# Patient Record
Sex: MALE | Race: WHITE | NOT HISPANIC OR LATINO
[De-identification: names, ages, dates, MRNs, and addresses within clinical notes are randomized per-mention and may not be internally consistent; named-entity substitution may affect disease eponyms.]

---

## 2018-06-18 ENCOUNTER — APPOINTMENT (OUTPATIENT)
Dept: CT IMAGING | Facility: IMAGING CENTER | Age: 78
End: 2018-06-18
Payer: MEDICARE

## 2018-06-18 ENCOUNTER — OUTPATIENT (OUTPATIENT)
Dept: OUTPATIENT SERVICES | Facility: HOSPITAL | Age: 78
LOS: 1 days | End: 2018-06-18
Payer: MEDICARE

## 2018-06-18 DIAGNOSIS — Z00.8 ENCOUNTER FOR OTHER GENERAL EXAMINATION: ICD-10-CM

## 2018-06-18 DIAGNOSIS — Z98.89 OTHER SPECIFIED POSTPROCEDURAL STATES: Chronic | ICD-10-CM

## 2018-06-18 PROCEDURE — 71250 CT THORAX DX C-: CPT | Mod: 26

## 2018-06-18 PROCEDURE — 71250 CT THORAX DX C-: CPT

## 2018-07-25 ENCOUNTER — RESULT CHARGE (OUTPATIENT)
Age: 78
End: 2018-07-25

## 2018-07-26 ENCOUNTER — APPOINTMENT (OUTPATIENT)
Dept: PULMONOLOGY | Facility: CLINIC | Age: 78
End: 2018-07-26
Payer: MEDICARE

## 2018-07-26 VITALS — BODY MASS INDEX: 32.5 KG/M2 | WEIGHT: 233 LBS

## 2018-07-26 VITALS
OXYGEN SATURATION: 92 % | HEIGHT: 71 IN | HEART RATE: 60 BPM | RESPIRATION RATE: 14 BRPM | DIASTOLIC BLOOD PRESSURE: 70 MMHG | TEMPERATURE: 98 F | SYSTOLIC BLOOD PRESSURE: 114 MMHG

## 2018-07-26 DIAGNOSIS — Z87.891 PERSONAL HISTORY OF NICOTINE DEPENDENCE: ICD-10-CM

## 2018-07-26 PROCEDURE — 36415 COLL VENOUS BLD VENIPUNCTURE: CPT

## 2018-07-26 PROCEDURE — 99213 OFFICE O/P EST LOW 20 MIN: CPT | Mod: 25

## 2018-07-26 PROCEDURE — 94060 EVALUATION OF WHEEZING: CPT

## 2018-07-26 RX ORDER — ASPIRIN ENTERIC COATED TABLETS 81 MG 81 MG/1
81 TABLET, DELAYED RELEASE ORAL
Refills: 0 | Status: ACTIVE | COMMUNITY
Start: 2018-07-26

## 2018-07-27 LAB
ALBUMIN SERPL ELPH-MCNC: 4.4 G/DL
ALP BLD-CCNC: 48 U/L
ALT SERPL-CCNC: 18 U/L
ANION GAP SERPL CALC-SCNC: 14 MMOL/L
AST SERPL-CCNC: 18 U/L
BILIRUB SERPL-MCNC: 0.8 MG/DL
BUN SERPL-MCNC: 15 MG/DL
CALCIUM SERPL-MCNC: 10.3 MG/DL
CHLORIDE SERPL-SCNC: 95 MMOL/L
CHOLEST SERPL-MCNC: 152 MG/DL
CHOLEST/HDLC SERPL: 3.1 RATIO
CO2 SERPL-SCNC: 26 MMOL/L
CREAT SERPL-MCNC: 1 MG/DL
GLUCOSE SERPL-MCNC: 100 MG/DL
HDLC SERPL-MCNC: 49 MG/DL
LDLC SERPL CALC-MCNC: 81 MG/DL
POTASSIUM SERPL-SCNC: 4.4 MMOL/L
PROT SERPL-MCNC: 6.7 G/DL
SODIUM SERPL-SCNC: 135 MMOL/L
TRIGL SERPL-MCNC: 109 MG/DL

## 2018-08-04 ENCOUNTER — RECORD ABSTRACTING (OUTPATIENT)
Age: 78
End: 2018-08-04

## 2018-08-31 ENCOUNTER — APPOINTMENT (OUTPATIENT)
Dept: PULMONOLOGY | Facility: CLINIC | Age: 78
End: 2018-08-31
Payer: MEDICARE

## 2018-08-31 VITALS
OXYGEN SATURATION: 94 % | DIASTOLIC BLOOD PRESSURE: 77 MMHG | RESPIRATION RATE: 16 BRPM | SYSTOLIC BLOOD PRESSURE: 124 MMHG | WEIGHT: 233 LBS | HEART RATE: 63 BPM | BODY MASS INDEX: 32.62 KG/M2 | HEIGHT: 71 IN | TEMPERATURE: 97.7 F

## 2018-08-31 PROCEDURE — 99213 OFFICE O/P EST LOW 20 MIN: CPT | Mod: 25

## 2018-08-31 PROCEDURE — 94060 EVALUATION OF WHEEZING: CPT

## 2018-09-05 ENCOUNTER — RX CHANGE (OUTPATIENT)
Age: 78
End: 2018-09-05

## 2018-10-05 ENCOUNTER — APPOINTMENT (OUTPATIENT)
Dept: PULMONOLOGY | Facility: CLINIC | Age: 78
End: 2018-10-05
Payer: MEDICARE

## 2018-10-05 VITALS
SYSTOLIC BLOOD PRESSURE: 152 MMHG | DIASTOLIC BLOOD PRESSURE: 80 MMHG | HEIGHT: 71 IN | OXYGEN SATURATION: 91 % | BODY MASS INDEX: 32.62 KG/M2 | RESPIRATION RATE: 16 BRPM | WEIGHT: 233 LBS | TEMPERATURE: 97.5 F | HEART RATE: 74 BPM

## 2018-10-05 PROCEDURE — 99213 OFFICE O/P EST LOW 20 MIN: CPT | Mod: 25

## 2018-10-05 PROCEDURE — 90662 IIV NO PRSV INCREASED AG IM: CPT

## 2018-10-05 PROCEDURE — 94060 EVALUATION OF WHEEZING: CPT

## 2018-10-05 PROCEDURE — G0008: CPT

## 2018-10-16 ENCOUNTER — RX RENEWAL (OUTPATIENT)
Age: 78
End: 2018-10-16

## 2018-11-15 ENCOUNTER — RX RENEWAL (OUTPATIENT)
Age: 78
End: 2018-11-15

## 2018-11-16 ENCOUNTER — APPOINTMENT (OUTPATIENT)
Dept: PULMONOLOGY | Facility: CLINIC | Age: 78
End: 2018-11-16
Payer: MEDICARE

## 2018-11-16 VITALS — DIASTOLIC BLOOD PRESSURE: 98 MMHG | HEART RATE: 91 BPM | OXYGEN SATURATION: 94 % | SYSTOLIC BLOOD PRESSURE: 180 MMHG

## 2018-11-16 PROCEDURE — 99213 OFFICE O/P EST LOW 20 MIN: CPT | Mod: 25

## 2018-11-16 PROCEDURE — 94010 BREATHING CAPACITY TEST: CPT

## 2018-11-16 RX ORDER — CEFUROXIME AXETIL 500 MG/1
500 TABLET ORAL
Qty: 14 | Refills: 0 | Status: DISCONTINUED | COMMUNITY
Start: 2018-09-05 | End: 2018-11-16

## 2018-12-18 ENCOUNTER — LABORATORY RESULT (OUTPATIENT)
Age: 78
End: 2018-12-18

## 2018-12-18 ENCOUNTER — APPOINTMENT (OUTPATIENT)
Dept: PULMONOLOGY | Facility: CLINIC | Age: 78
End: 2018-12-18
Payer: MEDICARE

## 2018-12-18 ENCOUNTER — NON-APPOINTMENT (OUTPATIENT)
Age: 78
End: 2018-12-18

## 2018-12-18 VITALS
DIASTOLIC BLOOD PRESSURE: 102 MMHG | BODY MASS INDEX: 32.2 KG/M2 | HEIGHT: 71 IN | HEART RATE: 72 BPM | WEIGHT: 230 LBS | OXYGEN SATURATION: 94 % | RESPIRATION RATE: 16 BRPM | SYSTOLIC BLOOD PRESSURE: 162 MMHG

## 2018-12-18 LAB
ALBUMIN: 30
BILIRUB UR QL STRIP: NEGATIVE
CLARITY UR: CLEAR
COLLECTION METHOD: NORMAL
CREATININE: 100
GLUCOSE UR-MCNC: NEGATIVE
HCG UR QL: 0.2 EU/DL
HGB UR QL STRIP.AUTO: NEGATIVE
KETONES UR-MCNC: NEGATIVE
LEUKOCYTE ESTERASE UR QL STRIP: NEGATIVE
MICROALBUMIN/CREAT UR TEST STR-RTO: 30
NITRITE UR QL STRIP: NEGATIVE
PH UR STRIP: 8
PROT UR STRIP-MCNC: NEGATIVE
SP GR UR STRIP: 1.01

## 2018-12-18 PROCEDURE — 82044 UR ALBUMIN SEMIQUANTITATIVE: CPT | Mod: QW

## 2018-12-18 PROCEDURE — 36415 COLL VENOUS BLD VENIPUNCTURE: CPT

## 2018-12-18 PROCEDURE — 81003 URINALYSIS AUTO W/O SCOPE: CPT | Mod: QW

## 2018-12-18 PROCEDURE — 94060 EVALUATION OF WHEEZING: CPT

## 2018-12-18 PROCEDURE — 82570 ASSAY OF URINE CREATININE: CPT | Mod: QW

## 2018-12-18 PROCEDURE — 93000 ELECTROCARDIOGRAM COMPLETE: CPT

## 2018-12-18 PROCEDURE — 71046 X-RAY EXAM CHEST 2 VIEWS: CPT

## 2018-12-18 PROCEDURE — 99214 OFFICE O/P EST MOD 30 MIN: CPT | Mod: 25

## 2018-12-18 RX ORDER — NIFEDIPINE 90 MG/1
90 TABLET, EXTENDED RELEASE ORAL
Refills: 0 | Status: DISCONTINUED | COMMUNITY
End: 2018-12-18

## 2018-12-18 RX ORDER — METHYLPREDNISOLONE 4 MG/1
4 TABLET ORAL
Qty: 1 | Refills: 0 | Status: DISCONTINUED | COMMUNITY
Start: 2018-09-05 | End: 2018-12-18

## 2018-12-19 LAB
25(OH)D3 SERPL-MCNC: 26.7 NG/ML
ALBUMIN SERPL ELPH-MCNC: 4.6 G/DL
ALP BLD-CCNC: 44 U/L
ALT SERPL-CCNC: 20 U/L
ANION GAP SERPL CALC-SCNC: 12 MMOL/L
AST SERPL-CCNC: 20 U/L
BASOPHILS # BLD AUTO: 0.04 K/UL
BASOPHILS NFR BLD AUTO: 0.5 %
BILIRUB SERPL-MCNC: 0.8 MG/DL
BUN SERPL-MCNC: 17 MG/DL
CALCIUM SERPL-MCNC: 10.7 MG/DL
CHLORIDE SERPL-SCNC: 99 MMOL/L
CHOLEST SERPL-MCNC: 171 MG/DL
CHOLEST/HDLC SERPL: 3.6 RATIO
CO2 SERPL-SCNC: 27 MMOL/L
CREAT SERPL-MCNC: 0.91 MG/DL
EOSINOPHIL # BLD AUTO: 0.4 K/UL
EOSINOPHIL NFR BLD AUTO: 4.6 %
GLUCOSE SERPL-MCNC: 105 MG/DL
HBA1C MFR BLD HPLC: 6.2 %
HCT VFR BLD CALC: 50 %
HCV AB SER QL: NONREACTIVE
HCV S/CO RATIO: 0.26 S/CO
HDLC SERPL-MCNC: 47 MG/DL
HGB BLD-MCNC: 16.6 G/DL
IMM GRANULOCYTES NFR BLD AUTO: 0.3 %
LDLC SERPL CALC-MCNC: 102 MG/DL
LYMPHOCYTES # BLD AUTO: 2.33 K/UL
LYMPHOCYTES NFR BLD AUTO: 26.8 %
MAN DIFF?: NORMAL
MCHC RBC-ENTMCNC: 31.2 PG
MCHC RBC-ENTMCNC: 33.2 GM/DL
MCV RBC AUTO: 94 FL
MONOCYTES # BLD AUTO: 0.92 K/UL
MONOCYTES NFR BLD AUTO: 10.6 %
NEUTROPHILS # BLD AUTO: 4.99 K/UL
NEUTROPHILS NFR BLD AUTO: 57.2 %
PLATELET # BLD AUTO: 275 K/UL
POTASSIUM SERPL-SCNC: 4.9 MMOL/L
PROT SERPL-MCNC: 7.4 G/DL
PSA SERPL-MCNC: 1.55 NG/ML
RBC # BLD: 5.32 M/UL
RBC # FLD: 13.4 %
SODIUM SERPL-SCNC: 138 MMOL/L
T3 SERPL-MCNC: 83 NG/DL
T3RU NFR SERPL: 0.96 INDEX
T4 FREE SERPL-MCNC: 1.2 NG/DL
T4 SERPL-MCNC: 5.9 UG/DL
TRIGL SERPL-MCNC: 108 MG/DL
TSH SERPL-ACNC: 3.06 UIU/ML
WBC # FLD AUTO: 8.71 K/UL

## 2018-12-20 LAB
CALCIUM SERPL-MCNC: 10.5 MG/DL
PARATHYROID HORMONE INTACT: 18 PG/ML

## 2019-01-22 ENCOUNTER — APPOINTMENT (OUTPATIENT)
Dept: PULMONOLOGY | Facility: CLINIC | Age: 79
End: 2019-01-22
Payer: MEDICARE

## 2019-01-22 VITALS
DIASTOLIC BLOOD PRESSURE: 84 MMHG | RESPIRATION RATE: 14 BRPM | SYSTOLIC BLOOD PRESSURE: 144 MMHG | OXYGEN SATURATION: 90 % | TEMPERATURE: 98.4 F | HEART RATE: 73 BPM

## 2019-01-22 PROCEDURE — 99213 OFFICE O/P EST LOW 20 MIN: CPT | Mod: 25

## 2019-01-22 PROCEDURE — 94060 EVALUATION OF WHEEZING: CPT

## 2019-01-22 PROCEDURE — 94729 DIFFUSING CAPACITY: CPT

## 2019-01-22 PROCEDURE — 94727 GAS DIL/WSHOT DETER LNG VOL: CPT

## 2019-01-22 PROCEDURE — 83036 HEMOGLOBIN GLYCOSYLATED A1C: CPT | Mod: QW

## 2019-01-22 NOTE — PROCEDURE
[FreeTextEntry1] : Spirometry Nov 16 2018\par Moderate reduction with obstructive ventilatory impairment\par Borderline 9% response to inhaled bronchodilator at the FEV1 line mild decline in flow rates\par \par Pulmonary Six Minute Walk 5/4/2018 No desaturation\par \par CXR  PA lateral the 18th 2018\par Cardiac size normal\par Aorta uncoiled\par Evidence of pleural plaque most prominent right lower lung zone with calcification\par Some mild interstitial markings\par Calcification right midlung zone left mid to upper lung zone\par No dominant parenchymal infiltrates pleural effusions pulmonary nodules scattered scar noted left upper lobe\par No pneumothorax\par Interval change compared to chest x-ray 10/17/2017\par \par EKG December 18, 2018\par Normal sinus rhythm with occasional ectopic ventricular beats\par RSR consistent with normal variant\par \par PFT 1/22/2019\par Moderate reduction flow rfates \par OAD\par  No BD at FEV!\par Airtrapping\par Mild reduction Diffusion with mild loss  fx alveolar capillary units\par HGB16.6\par

## 2019-01-22 NOTE — PHYSICAL EXAM
[General Appearance - Well Developed] : well developed [Normal Appearance] : normal appearance [Well Groomed] : well groomed [General Appearance - Well Nourished] : well nourished [No Deformities] : no deformities [General Appearance - In No Acute Distress] : no acute distress [Normal Conjunctiva] : the conjunctiva exhibited no abnormalities [Eyelids - No Xanthelasma] : the eyelids demonstrated no xanthelasmas [Normal Oropharynx] : normal oropharynx [I] : I [Neck Appearance] : the appearance of the neck was normal [Neck Cervical Mass (___cm)] : no neck mass was observed [Jugular Venous Distention Increased] : there was no jugular-venous distention [Thyroid Diffuse Enlargement] : the thyroid was not enlarged [Heart Rate And Rhythm] : heart rate and rhythm were normal [Heart Sounds] : normal S1 and S2 [Murmurs] : no murmurs present [Arterial Pulses Normal] : the arterial pulses were normal [Edema] : no peripheral edema present [Veins - Varicosity Changes] : no varicosital changes were noted in the lower extremities [Respiration, Rhythm And Depth] : normal respiratory rhythm and effort [Exaggerated Use Of Accessory Muscles For Inspiration] : no accessory muscle use [Bowel Sounds] : normal bowel sounds [Abdomen Soft] : soft [Abdomen Tenderness] : non-tender [Abdomen Mass (___ Cm)] : no abdominal mass palpated [Abnormal Walk] : normal gait [Gait - Sufficient For Exercise Testing] : the gait was sufficient for exercise testing [Nail Clubbing] : no clubbing of the fingernails [Cyanosis, Localized] : no localized cyanosis [Petechial Hemorrhages (___cm)] : no petechial hemorrhages [Skin Color & Pigmentation] : normal skin color and pigmentation [] : no rash [No Venous Stasis] : no venous stasis [Skin Lesions] : no skin lesions [No Skin Ulcers] : no skin ulcer [No Xanthoma] : no  xanthoma was observed [Deep Tendon Reflexes (DTR)] : deep tendon reflexes were 2+ and symmetric [Sensation] : the sensory exam was normal to light touch and pinprick [Motor Exam] : the motor exam was normal [No Focal Deficits] : no focal deficits [Oriented To Time, Place, And Person] : oriented to person, place, and time [Impaired Insight] : insight and judgment were intact [Affect] : the affect was normal [FreeTextEntry1] : Minimal basilar crackles

## 2019-01-22 NOTE — DISCUSSION/SUMMARY
[FreeTextEntry1] : \par \par OAD\par  abnormal PFT\par  History asbestosis\par  COPD-Emphysema with mucoid impaction\par HTN with better control\par ANORO 1 puff Daily\par Rx updated\par f/u several months CT CHEST June 2 2019\par

## 2019-02-19 ENCOUNTER — APPOINTMENT (OUTPATIENT)
Dept: PULMONOLOGY | Facility: CLINIC | Age: 79
End: 2019-02-19

## 2019-02-26 ENCOUNTER — APPOINTMENT (OUTPATIENT)
Dept: PULMONOLOGY | Facility: CLINIC | Age: 79
End: 2019-02-26
Payer: MEDICARE

## 2019-02-26 VITALS
OXYGEN SATURATION: 93 % | HEIGHT: 71 IN | BODY MASS INDEX: 30.1 KG/M2 | HEART RATE: 69 BPM | RESPIRATION RATE: 16 BRPM | SYSTOLIC BLOOD PRESSURE: 160 MMHG | DIASTOLIC BLOOD PRESSURE: 90 MMHG | WEIGHT: 215 LBS

## 2019-02-26 LAB
GLUCOSE BLDC GLUCOMTR-MCNC: 122
HBA1C MFR BLD HPLC: 6.1

## 2019-02-26 PROCEDURE — 94060 EVALUATION OF WHEEZING: CPT

## 2019-02-26 PROCEDURE — 99213 OFFICE O/P EST LOW 20 MIN: CPT | Mod: 25

## 2019-02-26 PROCEDURE — 83036 HEMOGLOBIN GLYCOSYLATED A1C: CPT | Mod: QW

## 2019-02-26 PROCEDURE — 82962 GLUCOSE BLOOD TEST: CPT

## 2019-02-26 NOTE — DISCUSSION/SUMMARY
[FreeTextEntry1] : \par \par OAD\par  abnormal PFT\par  History asbestosis\par  COPD-Emphysema with mucoid impaction\par Pre DM\par HTN with better control\par HLD\par ANORO 1 puff Daily\par f/u several months CT CHEST June 2 2019\par Next 1 month APPT watch Lipid profile\par  june HGBA1C / watch Glucose\par low sugar diet\par

## 2019-02-26 NOTE — HISTORY OF PRESENT ILLNESS
[Stable] : are stable [None] : ~He/She~ has no significant interval events [Difficulty Breathing During Exertion] : stable dyspnea on exertion [Feelings Of Weakness On Exertion] : stable exercise intolerance [Cough] : denies coughing [Wheezing] : denies wheezing [Regional Soft Tissue Swelling Both Lower Extremities] : denies lower extremity edema [Chest Pain Or Discomfort] : denies chest pain [Fever] : denies fever [Date: ___] : was performed [unfilled] [Wt Gain ___ Lbs] : no recent weight gain [Wt Loss ___ Lbs] : no recent weight loss [Oxygen] : the patient uses no supplemental oxygen [de-identified] : emphysema with mucous plugging

## 2019-02-26 NOTE — PROCEDURE
[FreeTextEntry1] : \par Pulmonary Six Minute Walk 5/4/2018 No desaturation\par \par CXR  PA lateral  Dec 18th 2018\par Cardiac size normal\par Aorta uncoiled\par Evidence of pleural plaque most prominent right lower lung zone with calcification\par Some mild interstitial markings\par Calcification right midlung zone left mid to upper lung zone\par No dominant parenchymal infiltrates pleural effusions pulmonary nodules scattered scar noted left upper lobe\par No pneumothorax\par Interval change compared to chest x-ray 10/17/2017\par \par EKG December 18, 2018\par Normal sinus rhythm with occasional ectopic ventricular beats\par RSR consistent with normal variant\par \par PFT 1/22/2019\par Moderate reduction flow rfates \par OAD\par  No BD at FEV!\par Airtrapping\par Mild reduction Diffusion with mild loss  fx alveolar capillary units\par HGB16.6\par \par Spirometry 2/26/2019\par Moderate OAD\par  No BD at FEV1\par \par Glucose 122\par  HGBA1C 6.1

## 2019-03-06 ENCOUNTER — RX CHANGE (OUTPATIENT)
Age: 79
End: 2019-03-06

## 2019-03-06 RX ORDER — METOPROLOL SUCCINATE 100 MG/1
100 TABLET, EXTENDED RELEASE ORAL DAILY
Refills: 3 | Status: DISCONTINUED | COMMUNITY
End: 2019-03-06

## 2019-04-04 ENCOUNTER — APPOINTMENT (OUTPATIENT)
Dept: PULMONOLOGY | Facility: CLINIC | Age: 79
End: 2019-04-04
Payer: MEDICARE

## 2019-04-04 VITALS
HEIGHT: 71 IN | SYSTOLIC BLOOD PRESSURE: 139 MMHG | RESPIRATION RATE: 16 BRPM | DIASTOLIC BLOOD PRESSURE: 85 MMHG | OXYGEN SATURATION: 94 % | WEIGHT: 215 LBS | BODY MASS INDEX: 30.1 KG/M2 | HEART RATE: 63 BPM

## 2019-04-04 LAB — GLUCOSE BLDC GLUCOMTR-MCNC: 109

## 2019-04-04 PROCEDURE — 82962 GLUCOSE BLOOD TEST: CPT

## 2019-04-04 PROCEDURE — 36415 COLL VENOUS BLD VENIPUNCTURE: CPT

## 2019-04-04 PROCEDURE — 94010 BREATHING CAPACITY TEST: CPT

## 2019-04-04 PROCEDURE — 99213 OFFICE O/P EST LOW 20 MIN: CPT | Mod: 25

## 2019-04-05 LAB
CHOLEST SERPL-MCNC: 158 MG/DL
CHOLEST/HDLC SERPL: 3.2 RATIO
HDLC SERPL-MCNC: 50 MG/DL
LDLC SERPL CALC-MCNC: 85 MG/DL
TRIGL SERPL-MCNC: 117 MG/DL

## 2019-04-05 NOTE — REASON FOR VISIT
[Follow-Up] : a follow-up visit [Abnormal CT Scan] : abnormal CT Scan [COPD] : COPD [FreeTextEntry2] : HTN, HLD

## 2019-04-05 NOTE — HISTORY OF PRESENT ILLNESS
[Stable] : are stable [None] : ~He/She~ has no significant interval events [Difficulty Breathing During Exertion] : stable dyspnea on exertion [Feelings Of Weakness On Exertion] : stable exercise intolerance [Cough] : denies coughing [Wheezing] : denies wheezing [Regional Soft Tissue Swelling Both Lower Extremities] : denies lower extremity edema [Chest Pain Or Discomfort] : denies chest pain [Fever] : denies fever [Date: ___] : was performed [unfilled] [Wt Gain ___ Lbs] : no recent weight gain [Wt Loss ___ Lbs] : no recent weight loss [Oxygen] : the patient uses no supplemental oxygen [de-identified] : emphysema with mucous plugging

## 2019-04-05 NOTE — DISCUSSION/SUMMARY
[FreeTextEntry1] : \par \par OAD\par  abnormal PFT\par  History asbestosis\par  COPD-Emphysema with mucoid impaction\par Pre DM\par HTN with better control\par HLD\par ANORO 1 puff Daily\par f/u several months CT CHEST June 2 2019\par Next 1 month APPT watch Lipid profile\par  june HGBA1C / watch Glucose\par low sugar diet\par Continue statin as ordered

## 2019-04-05 NOTE — PROCEDURE
[FreeTextEntry1] : \par Pulmonary Six Minute Walk 5/4/2018 No desaturation\par \par CXR  PA lateral  Dec 18th 2018\par Cardiac size normal\par Aorta uncoiled\par Evidence of pleural plaque most prominent right lower lung zone with calcification\par Some mild interstitial markings\par Calcification right midlung zone left mid to upper lung zone\par No dominant parenchymal infiltrates pleural effusions pulmonary nodules scattered scar noted left upper lobe\par No pneumothorax\par Interval change compared to chest x-ray 10/17/2017\par \par EKG December 18, 2018\par Normal sinus rhythm with occasional ectopic ventricular beats\par RSR consistent with normal variant\par \par PFT 1/22/2019\par Moderate reduction flow rfates \par OAD\par  No BD at FEV!\par Airtrapping\par Mild reduction Diffusion with mild loss  fx alveolar capillary units\par HGB16.6\par \par Spirometry  4/4/2019\par Moderate OAD\par stable flow rates\par \par Glucose 109 /4/2019\par  HGBA1C 6.1f/u June 2019\par \par Lipid profile April 5, 2019\par Total cholesterol  158 HDL 50 LDL 85\par Triglycerides 117

## 2019-05-06 ENCOUNTER — APPOINTMENT (OUTPATIENT)
Dept: PULMONOLOGY | Facility: CLINIC | Age: 79
End: 2019-05-06
Payer: MEDICARE

## 2019-05-06 VITALS
HEART RATE: 64 BPM | RESPIRATION RATE: 13 BRPM | SYSTOLIC BLOOD PRESSURE: 145 MMHG | DIASTOLIC BLOOD PRESSURE: 83 MMHG | TEMPERATURE: 97.7 F | OXYGEN SATURATION: 92 %

## 2019-05-06 LAB — GLUCOSE BLDC GLUCOMTR-MCNC: 107

## 2019-05-06 PROCEDURE — 82962 GLUCOSE BLOOD TEST: CPT

## 2019-05-06 PROCEDURE — 94010 BREATHING CAPACITY TEST: CPT

## 2019-05-06 PROCEDURE — 99213 OFFICE O/P EST LOW 20 MIN: CPT | Mod: 25

## 2019-05-06 NOTE — DISCUSSION/SUMMARY
[FreeTextEntry1] : \par OAD\par  abnormal PFT\par  History asbestosis\par  COPD-Emphysema with mucoid impaction\par Pre DM\par HTN with better control\par HLD\par ANORO 1 puff Daily\par f/u several months CT CHEST June 2 2019\par  watch Lipid profile Q 3 months\par  june HGBA1C / watch Glucose\par low sugar diet\par

## 2019-05-06 NOTE — PHYSICAL EXAM
[General Appearance - Well Developed] : well developed [Normal Appearance] : normal appearance [Well Groomed] : well groomed [General Appearance - Well Nourished] : well nourished [No Deformities] : no deformities [General Appearance - In No Acute Distress] : no acute distress [Normal Conjunctiva] : the conjunctiva exhibited no abnormalities [Eyelids - No Xanthelasma] : the eyelids demonstrated no xanthelasmas [Normal Oropharynx] : normal oropharynx [I] : I [Neck Appearance] : the appearance of the neck was normal [Neck Cervical Mass (___cm)] : no neck mass was observed [Thyroid Diffuse Enlargement] : the thyroid was not enlarged [Jugular Venous Distention Increased] : there was no jugular-venous distention [Heart Sounds] : normal S1 and S2 [Heart Rate And Rhythm] : heart rate and rhythm were normal [Arterial Pulses Normal] : the arterial pulses were normal [Murmurs] : no murmurs present [Edema] : no peripheral edema present [Veins - Varicosity Changes] : no varicosital changes were noted in the lower extremities [Respiration, Rhythm And Depth] : normal respiratory rhythm and effort [Exaggerated Use Of Accessory Muscles For Inspiration] : no accessory muscle use [Abdomen Soft] : soft [Bowel Sounds] : normal bowel sounds [Abdomen Tenderness] : non-tender [Abdomen Mass (___ Cm)] : no abdominal mass palpated [Abnormal Walk] : normal gait [Gait - Sufficient For Exercise Testing] : the gait was sufficient for exercise testing [Nail Clubbing] : no clubbing of the fingernails [Cyanosis, Localized] : no localized cyanosis [Petechial Hemorrhages (___cm)] : no petechial hemorrhages [Skin Color & Pigmentation] : normal skin color and pigmentation [] : no rash [Skin Lesions] : no skin lesions [No Venous Stasis] : no venous stasis [No Skin Ulcers] : no skin ulcer [No Xanthoma] : no  xanthoma was observed [Sensation] : the sensory exam was normal to light touch and pinprick [Deep Tendon Reflexes (DTR)] : deep tendon reflexes were 2+ and symmetric [Motor Exam] : the motor exam was normal [No Focal Deficits] : no focal deficits [Oriented To Time, Place, And Person] : oriented to person, place, and time [Affect] : the affect was normal [Impaired Insight] : insight and judgment were intact [FreeTextEntry1] : Minimal basilar crackles

## 2019-05-06 NOTE — HISTORY OF PRESENT ILLNESS
[Stable] : are stable [None] : ~He/She~ has no significant interval events [Feelings Of Weakness On Exertion] : stable exercise intolerance [Difficulty Breathing During Exertion] : stable dyspnea on exertion [Wheezing] : denies wheezing [Cough] : denies coughing [Fever] : denies fever [Chest Pain Or Discomfort] : denies chest pain [Regional Soft Tissue Swelling Both Lower Extremities] : denies lower extremity edema [Date: ___] : was performed [unfilled] [Wt Gain ___ Lbs] : no recent weight gain [Oxygen] : the patient uses no supplemental oxygen [Wt Loss ___ Lbs] : no recent weight loss [de-identified] : emphysema with mucous plugging

## 2019-05-06 NOTE — PROCEDURE
[FreeTextEntry1] : \par Pulmonary Six Minute Walk 5/4/2018 No desaturation\par \par CXR  PA lateral  Dec 18th 2018\par Cardiac size normal\par Aorta uncoiled\par Evidence of pleural plaque most prominent right lower lung zone with calcification\par Some mild interstitial markings\par Calcification right midlung zone left mid to upper lung zone\par No dominant parenchymal infiltrates pleural effusions pulmonary nodules scattered scar noted left upper lobe\par No pneumothorax\par Interval change compared to chest x-ray 10/17/2017\par \par EKG December 18, 2018\par Normal sinus rhythm with occasional ectopic ventricular beats\par RSR consistent with normal variant\par \par PFT 1/22/2019\par Moderate reduction flow rates \par OAD\par  No BD at FEV1\par Airtrapping\par Mild reduction Diffusion with mild loss  fx alveolar capillary units\par HGB16.6\par \par Spirometry   5/6/2019\par Moderate OAD with reduction flow rates\par stable flow rates\par \par Glucose 1075/6//2019\par  HGBA1C 6.1f/u June 2019\par \par Lipid profile April 5, 2019\par Total cholesterol  158 HDL 50 LDL 85\par Triglycerides 117

## 2019-06-24 ENCOUNTER — APPOINTMENT (OUTPATIENT)
Dept: PULMONOLOGY | Facility: CLINIC | Age: 79
End: 2019-06-24
Payer: MEDICARE

## 2019-06-24 VITALS — DIASTOLIC BLOOD PRESSURE: 82 MMHG | HEART RATE: 92 BPM | SYSTOLIC BLOOD PRESSURE: 162 MMHG | OXYGEN SATURATION: 90 %

## 2019-06-24 DIAGNOSIS — N50.819 TESTICULAR PAIN, UNSPECIFIED: ICD-10-CM

## 2019-06-24 LAB
GLUCOSE BLDC GLUCOMTR-MCNC: 114
HBA1C MFR BLD HPLC: 5.9

## 2019-06-24 PROCEDURE — 99214 OFFICE O/P EST MOD 30 MIN: CPT | Mod: 25

## 2019-06-24 PROCEDURE — 94010 BREATHING CAPACITY TEST: CPT

## 2019-06-24 PROCEDURE — 82962 GLUCOSE BLOOD TEST: CPT

## 2019-06-24 PROCEDURE — 36415 COLL VENOUS BLD VENIPUNCTURE: CPT

## 2019-06-24 PROCEDURE — 83036 HEMOGLOBIN GLYCOSYLATED A1C: CPT | Mod: QW

## 2019-06-25 LAB
ALBUMIN SERPL ELPH-MCNC: 4.8 G/DL
ALP BLD-CCNC: 52 U/L
ALT SERPL-CCNC: 21 U/L
ANION GAP SERPL CALC-SCNC: 15 MMOL/L
AST SERPL-CCNC: 17 U/L
BASOPHILS # BLD AUTO: 0.04 K/UL
BASOPHILS NFR BLD AUTO: 0.4 %
BILIRUB SERPL-MCNC: 0.8 MG/DL
BUN SERPL-MCNC: 11 MG/DL
CALCIUM SERPL-MCNC: 9.9 MG/DL
CHLORIDE SERPL-SCNC: 98 MMOL/L
CO2 SERPL-SCNC: 23 MMOL/L
CREAT SERPL-MCNC: 0.81 MG/DL
EOSINOPHIL # BLD AUTO: 0.22 K/UL
EOSINOPHIL NFR BLD AUTO: 2.4 %
GLUCOSE SERPL-MCNC: 128 MG/DL
HCT VFR BLD CALC: 49.8 %
HGB BLD-MCNC: 16.2 G/DL
IMM GRANULOCYTES NFR BLD AUTO: 0.4 %
LYMPHOCYTES # BLD AUTO: 2.14 K/UL
LYMPHOCYTES NFR BLD AUTO: 23.1 %
MAN DIFF?: NORMAL
MCHC RBC-ENTMCNC: 30.5 PG
MCHC RBC-ENTMCNC: 32.5 GM/DL
MCV RBC AUTO: 93.6 FL
MONOCYTES # BLD AUTO: 0.76 K/UL
MONOCYTES NFR BLD AUTO: 8.2 %
NEUTROPHILS # BLD AUTO: 6.07 K/UL
NEUTROPHILS NFR BLD AUTO: 65.5 %
PLATELET # BLD AUTO: 314 K/UL
POTASSIUM SERPL-SCNC: 4 MMOL/L
PROT SERPL-MCNC: 7.1 G/DL
RBC # BLD: 5.32 M/UL
RBC # FLD: 12.5 %
SODIUM SERPL-SCNC: 136 MMOL/L
WBC # FLD AUTO: 9.27 K/UL

## 2019-06-25 NOTE — HISTORY OF PRESENT ILLNESS
[Stable] : are stable [None] : ~He/She~ has no significant interval events [Difficulty Breathing During Exertion] : stable dyspnea on exertion [Feelings Of Weakness On Exertion] : stable exercise intolerance [Cough] : denies coughing [Wheezing] : denies wheezing [Regional Soft Tissue Swelling Both Lower Extremities] : denies lower extremity edema [Chest Pain Or Discomfort] : denies chest pain [Fever] : denies fever [Date: ___] : was performed [unfilled] [Wt Gain ___ Lbs] : no recent weight gain [Wt Loss ___ Lbs] : no recent weight loss [Oxygen] : the patient uses no supplemental oxygen [de-identified] : emphysema with mucous plugging

## 2019-06-25 NOTE — PROCEDURE
[FreeTextEntry1] : \par Pulmonary Six Minute Walk 5/4/2018 No desaturation\par \par CXR  PA lateral  Dec 18th 2018\par Cardiac size normal\par Aorta uncoiled\par Evidence of pleural plaque most prominent right lower lung zone with calcification\par Some mild interstitial markings\par Calcification right midlung zone left mid to upper lung zone\par No dominant parenchymal infiltrates pleural effusions pulmonary nodules scattered scar noted left upper lobe\par No pneumothorax\par Interval change compared to chest x-ray 10/17/2017\par \par EKG December 18, 2018\par Normal sinus rhythm with occasional ectopic ventricular beats\par RSR consistent with normal variant\par \par PFT 1/22/2019\par Moderate reduction flow rates \par OAD\par  No BD at FEV1\par Airtrapping\par Mild reduction Diffusion with mild loss  fx alveolar capillary units\par HGB16.6\par \par  F- V Loop June 24 2019\par  Moderate reduction flow rates\par Spirometry   5/6/2019\par Moderate OAD with reduction flow rates\par stable flow rates\par \par Glucose  114\par 5/6//2019\par  HGBA1C 5.9 % improved and prior  6.1 F/U late Sept\par Blood draw\par Reviewed June 25, 2019\par CBC normal\par White blood count 9.27\par Hemoglobin 16.2 hematocrit 49.8 platelet count 214,000\par Glucose 128\par Serum electrolytes normal\par BUN 11 creatinine 0.81\par Liver function testing normal\par Serum potassium 4.0\par Serum calcium 9.9\par Lipid profile April 5, 2019\par Total cholesterol  158 HDL 50 LDL 85\par Triglycerides 117

## 2019-06-25 NOTE — PHYSICAL EXAM
[General Appearance - Well Developed] : well developed [Normal Appearance] : normal appearance [General Appearance - Well Nourished] : well nourished [Well Groomed] : well groomed [No Deformities] : no deformities [General Appearance - In No Acute Distress] : no acute distress [Normal Conjunctiva] : the conjunctiva exhibited no abnormalities [Eyelids - No Xanthelasma] : the eyelids demonstrated no xanthelasmas [Normal Oropharynx] : normal oropharynx [I] : I [Neck Appearance] : the appearance of the neck was normal [Neck Cervical Mass (___cm)] : no neck mass was observed [Jugular Venous Distention Increased] : there was no jugular-venous distention [Heart Rate And Rhythm] : heart rate and rhythm were normal [Thyroid Diffuse Enlargement] : the thyroid was not enlarged [Heart Sounds] : normal S1 and S2 [Murmurs] : no murmurs present [Arterial Pulses Normal] : the arterial pulses were normal [Edema] : no peripheral edema present [Veins - Varicosity Changes] : no varicosital changes were noted in the lower extremities [Respiration, Rhythm And Depth] : normal respiratory rhythm and effort [Exaggerated Use Of Accessory Muscles For Inspiration] : no accessory muscle use [Bowel Sounds] : normal bowel sounds [Abdomen Soft] : soft [Abdomen Tenderness] : non-tender [Abdomen Mass (___ Cm)] : no abdominal mass palpated [Abnormal Walk] : normal gait [Gait - Sufficient For Exercise Testing] : the gait was sufficient for exercise testing [Nail Clubbing] : no clubbing of the fingernails [Cyanosis, Localized] : no localized cyanosis [Petechial Hemorrhages (___cm)] : no petechial hemorrhages [Skin Color & Pigmentation] : normal skin color and pigmentation [] : no rash [No Venous Stasis] : no venous stasis [Skin Lesions] : no skin lesions [No Skin Ulcers] : no skin ulcer [No Xanthoma] : no  xanthoma was observed [Deep Tendon Reflexes (DTR)] : deep tendon reflexes were 2+ and symmetric [Sensation] : the sensory exam was normal to light touch and pinprick [Motor Exam] : the motor exam was normal [No Focal Deficits] : no focal deficits [Oriented To Time, Place, And Person] : oriented to person, place, and time [Impaired Insight] : insight and judgment were intact [Affect] : the affect was normal [Mood] : the mood was normal [FreeTextEntry1] : left groin swelling erythema edema consistent with cellulitis

## 2019-06-25 NOTE — DISCUSSION/SUMMARY
[FreeTextEntry1] : Cellulitis Groin wityh extension left Groin\par OAD\par  abnormal PFT\par  History asbestosis\par  COPD-Emphysema with mucoid impaction\par Pre DM\par HTN with better control\par HLD\par ANORO 1 puff Daily\par f/u several months CT CHEST June 2 2019\par Next 1 month APPT watch Lipid profile\par low sugar diet\par ANTIBX Augmentin BID with food\par  Topical Antibx\par  Warm soaks QID\par  f/u later this week advised\par

## 2019-06-27 ENCOUNTER — APPOINTMENT (OUTPATIENT)
Dept: PULMONOLOGY | Facility: CLINIC | Age: 79
End: 2019-06-27
Payer: MEDICARE

## 2019-06-27 VITALS
WEIGHT: 215 LBS | DIASTOLIC BLOOD PRESSURE: 96 MMHG | TEMPERATURE: 97.8 F | RESPIRATION RATE: 16 BRPM | SYSTOLIC BLOOD PRESSURE: 163 MMHG | OXYGEN SATURATION: 95 % | HEIGHT: 71 IN | HEART RATE: 92 BPM | BODY MASS INDEX: 30.1 KG/M2

## 2019-06-27 PROCEDURE — 99213 OFFICE O/P EST LOW 20 MIN: CPT

## 2019-06-27 NOTE — DISCUSSION/SUMMARY
[FreeTextEntry1] : Left groin cellulitis with some improvement\par complete antibx\par  cont antix cream\par  warm, soaks\par \par OAD\par  abnormal PFT\par  History asbestosis\par  COPD-Emphysema with mucoid impaction\par Pre DM\par HTN with better control\par HLD\par ANORO 1 puff Daily\par f/u several months CT CHEST June 2 2019\par Next 1 month APPT watch Lipid profile\par  june HGBA1C / watch Glucose\par low sugar diet\par

## 2019-06-27 NOTE — PHYSICAL EXAM
[General Appearance - Well Developed] : well developed [Well Groomed] : well groomed [Normal Appearance] : normal appearance [General Appearance - Well Nourished] : well nourished [No Deformities] : no deformities [General Appearance - In No Acute Distress] : no acute distress [Eyelids - No Xanthelasma] : the eyelids demonstrated no xanthelasmas [Normal Conjunctiva] : the conjunctiva exhibited no abnormalities [I] : I [Normal Oropharynx] : normal oropharynx [Neck Cervical Mass (___cm)] : no neck mass was observed [Neck Appearance] : the appearance of the neck was normal [Jugular Venous Distention Increased] : there was no jugular-venous distention [Thyroid Diffuse Enlargement] : the thyroid was not enlarged [Heart Sounds] : normal S1 and S2 [Heart Rate And Rhythm] : heart rate and rhythm were normal [Arterial Pulses Normal] : the arterial pulses were normal [Edema] : no peripheral edema present [Murmurs] : no murmurs present [Respiration, Rhythm And Depth] : normal respiratory rhythm and effort [Veins - Varicosity Changes] : no varicosital changes were noted in the lower extremities [Exaggerated Use Of Accessory Muscles For Inspiration] : no accessory muscle use [Bowel Sounds] : normal bowel sounds [Abdomen Tenderness] : non-tender [Abdomen Soft] : soft [Abdomen Mass (___ Cm)] : no abdominal mass palpated [Gait - Sufficient For Exercise Testing] : the gait was sufficient for exercise testing [Abnormal Walk] : normal gait [Nail Clubbing] : no clubbing of the fingernails [Cyanosis, Localized] : no localized cyanosis [Petechial Hemorrhages (___cm)] : no petechial hemorrhages [Skin Color & Pigmentation] : normal skin color and pigmentation [] : no ischemic changes [No Skin Ulcers] : no skin ulcer [Skin Lesions] : no skin lesions [No Venous Stasis] : no venous stasis [Deep Tendon Reflexes (DTR)] : deep tendon reflexes were 2+ and symmetric [No Xanthoma] : no  xanthoma was observed [Sensation] : the sensory exam was normal to light touch and pinprick [Motor Exam] : the motor exam was normal [No Focal Deficits] : no focal deficits [Impaired Insight] : insight and judgment were intact [Oriented To Time, Place, And Person] : oriented to person, place, and time [Affect] : the affect was normal [Mood] : the mood was normal [FreeTextEntry1] : left groin swelling erythema edema consistent with cellulitis with mild improvement

## 2019-06-27 NOTE — HISTORY OF PRESENT ILLNESS
[None] : ~He/She~ has no significant interval events [Stable] : are stable [Difficulty Breathing During Exertion] : stable dyspnea on exertion [Cough] : denies coughing [Feelings Of Weakness On Exertion] : stable exercise intolerance [Wheezing] : denies wheezing [Regional Soft Tissue Swelling Both Lower Extremities] : denies lower extremity edema [Chest Pain Or Discomfort] : denies chest pain [Fever] : denies fever [Date: ___] : was performed [unfilled] [Wt Loss ___ Lbs] : no recent weight loss [Oxygen] : the patient uses no supplemental oxygen [Wt Gain ___ Lbs] : no recent weight gain [FreeTextEntry1] : s/p treatment left  groin cellulitis on Augmentin with slow improvement \par Pain at left groin resolved\par No testicular pain [de-identified] : emphysema with mucous plugging

## 2019-07-08 ENCOUNTER — APPOINTMENT (OUTPATIENT)
Dept: PULMONOLOGY | Facility: CLINIC | Age: 79
End: 2019-07-08
Payer: MEDICARE

## 2019-07-08 VITALS
RESPIRATION RATE: 16 BRPM | HEART RATE: 57 BPM | OXYGEN SATURATION: 95 % | HEIGHT: 71 IN | DIASTOLIC BLOOD PRESSURE: 92 MMHG | TEMPERATURE: 97.9 F | SYSTOLIC BLOOD PRESSURE: 166 MMHG | BODY MASS INDEX: 30.1 KG/M2 | WEIGHT: 215 LBS

## 2019-07-08 PROCEDURE — 99213 OFFICE O/P EST LOW 20 MIN: CPT

## 2019-07-08 RX ORDER — AMOXICILLIN AND CLAVULANATE POTASSIUM 875; 125 MG/1; MG/1
875-125 TABLET, COATED ORAL
Qty: 14 | Refills: 1 | Status: DISCONTINUED | COMMUNITY
Start: 2019-06-24 | End: 2019-07-08

## 2019-07-08 NOTE — DISCUSSION/SUMMARY
[FreeTextEntry1] : Cellulitis groin post antibiotic with interval improvement\par OAD\par  abnormal PFT\par  History asbestosis\par  COPD-Emphysema with mucoid impaction\par Pre DM\par HTN  with borderline control\par HLD\par ANORO 1 puff Daily\par f/u several months CT CHEST  readdress\par Next 1 month APPT watch Lipid profile at f/u\par September follow-up hemoglobin A1c and continue to monitor the glucose\par low sugar diet\par

## 2019-07-08 NOTE — HISTORY OF PRESENT ILLNESS
[Stable] : are stable [None] : ~He/She~ has no significant interval events [Cough] : denies coughing [Difficulty Breathing During Exertion] : stable dyspnea on exertion [Feelings Of Weakness On Exertion] : stable exercise intolerance [Regional Soft Tissue Swelling Both Lower Extremities] : denies lower extremity edema [Chest Pain Or Discomfort] : denies chest pain [Wheezing] : denies wheezing [Fever] : denies fever [Date: ___] : was performed [unfilled] [Wt Gain ___ Lbs] : no recent weight gain [Wt Loss ___ Lbs] : no recent weight loss [Oxygen] : the patient uses no supplemental oxygen [de-identified] : emphysema with mucous plugging [FreeTextEntry1] : s/p treatment left  groin cellulitis on Augmentin with slow improvement \par Pain at left groin resolved\par No testicular pain

## 2019-07-08 NOTE — PHYSICAL EXAM
[General Appearance - Well Developed] : well developed [Normal Appearance] : normal appearance [General Appearance - Well Nourished] : well nourished [Well Groomed] : well groomed [Normal Conjunctiva] : the conjunctiva exhibited no abnormalities [General Appearance - In No Acute Distress] : no acute distress [No Deformities] : no deformities [Eyelids - No Xanthelasma] : the eyelids demonstrated no xanthelasmas [Normal Oropharynx] : normal oropharynx [I] : I [Neck Cervical Mass (___cm)] : no neck mass was observed [Neck Appearance] : the appearance of the neck was normal [Jugular Venous Distention Increased] : there was no jugular-venous distention [Murmurs] : no murmurs present [Thyroid Diffuse Enlargement] : the thyroid was not enlarged [Heart Sounds] : normal S1 and S2 [Heart Rate And Rhythm] : heart rate and rhythm were normal [Edema] : no peripheral edema present [Arterial Pulses Normal] : the arterial pulses were normal [Veins - Varicosity Changes] : no varicosital changes were noted in the lower extremities [Respiration, Rhythm And Depth] : normal respiratory rhythm and effort [Exaggerated Use Of Accessory Muscles For Inspiration] : no accessory muscle use [Bowel Sounds] : normal bowel sounds [Abdomen Soft] : soft [Abdomen Tenderness] : non-tender [Abdomen Mass (___ Cm)] : no abdominal mass palpated [Abnormal Walk] : normal gait [Gait - Sufficient For Exercise Testing] : the gait was sufficient for exercise testing [Nail Clubbing] : no clubbing of the fingernails [Petechial Hemorrhages (___cm)] : no petechial hemorrhages [Cyanosis, Localized] : no localized cyanosis [No Venous Stasis] : no venous stasis [] : no rash [Skin Color & Pigmentation] : normal skin color and pigmentation [Skin Lesions] : no skin lesions [No Xanthoma] : no  xanthoma was observed [No Skin Ulcers] : no skin ulcer [Deep Tendon Reflexes (DTR)] : deep tendon reflexes were 2+ and symmetric [Sensation] : the sensory exam was normal to light touch and pinprick [Motor Exam] : the motor exam was normal [No Focal Deficits] : no focal deficits [Oriented To Time, Place, And Person] : oriented to person, place, and time [Affect] : the affect was normal [Impaired Insight] : insight and judgment were intact [Mood] : the mood was normal [FreeTextEntry1] : left groin swelling erythema edema consistent with cellulitis with mild improvement

## 2019-08-12 ENCOUNTER — APPOINTMENT (OUTPATIENT)
Dept: PULMONOLOGY | Facility: CLINIC | Age: 79
End: 2019-08-12
Payer: MEDICARE

## 2019-08-12 VITALS
OXYGEN SATURATION: 92 % | DIASTOLIC BLOOD PRESSURE: 76 MMHG | HEART RATE: 66 BPM | RESPIRATION RATE: 12 BRPM | SYSTOLIC BLOOD PRESSURE: 128 MMHG

## 2019-08-12 LAB — GLUCOSE BLDC GLUCOMTR-MCNC: 130

## 2019-08-12 PROCEDURE — 82962 GLUCOSE BLOOD TEST: CPT

## 2019-08-12 PROCEDURE — 99213 OFFICE O/P EST LOW 20 MIN: CPT | Mod: 25

## 2019-08-12 NOTE — HISTORY OF PRESENT ILLNESS
[Stable] : are stable [None] : ~He/She~ has no significant interval events [Feelings Of Weakness On Exertion] : stable exercise intolerance [Difficulty Breathing During Exertion] : stable dyspnea on exertion [Wheezing] : denies wheezing [Cough] : denies coughing [Chest Pain Or Discomfort] : denies chest pain [Regional Soft Tissue Swelling Both Lower Extremities] : denies lower extremity edema [Fever] : denies fever [Date: ___] : was performed [unfilled] [Wt Gain ___ Lbs] : no recent weight gain [Wt Loss ___ Lbs] : no recent weight loss [Oxygen] : the patient uses no supplemental oxygen [de-identified] : emphysema with mucous plugging [FreeTextEntry1] : s/p treatment left  groin cellulitis on Augmentin with slow improvement \par Pain at left groin resolved\par No testicular pain

## 2019-08-12 NOTE — PROCEDURE
[FreeTextEntry1] : \par Pulmonary Six Minute Walk 5/4/2018 No desaturation\par \par CXR  PA lateral  Dec 18th 2018\par Cardiac size normal\par Aorta uncoiled\par Evidence of pleural plaque most prominent right lower lung zone with calcification\par Some mild interstitial markings\par Calcification right midlung zone left mid to upper lung zone\par No dominant parenchymal infiltrates pleural effusions pulmonary nodules scattered scar noted left upper lobe\par No pneumothorax\par Interval change compared to chest x-ray 10/17/2017\par \par EKG December 18, 2018\par Normal sinus rhythm with occasional ectopic ventricular beats\par RSR consistent with normal variant\par \par PFT 1/22/2019\par Moderate reduction flow rates \par OAD\par  No BD at FEV1\par Airtrapping\par Mild reduction Diffusion with mild loss  fx alveolar capillary units\par HGB16.6\par \par  F- V Loop June 24 2019\par  Moderate reduction flow rates\par Spirometry   5/6/2019\par Moderate OAD with reduction flow rates\par stable flow rates\par \par Glucose  130 8/12/2019\par 5/6//2019\par  HGBA1C 5.9 % improved and prior  6.1 F/U late Sept\par Blood draw\par Reviewed June 25, 2019\par CBC normal\par White blood count 9.27\par Hemoglobin 16.2 hematocrit 49.8 platelet count 214,000\par Glucose 128\par Serum electrolytes normal\par BUN 11 creatinine 0.81\par Liver function testing normal\par Serum potassium 4.0\par Serum calcium 9.9\par Lipid profile April 5, 2019\par Total cholesterol  158 HDL 50 LDL 85\par Triglycerides 117

## 2019-08-12 NOTE — DISCUSSION/SUMMARY
[FreeTextEntry1] : Cellulitis groin post antibiotic Resolved\par OAD\par  abnormal PFT\par  History asbestosis\par  COPD-Emphysema with mucoid impaction\par Pre DM\par HTN  with better control\par Post oral surgery this mweek can elevate blood sugar\par HLD\par ANORO 1 puff Daily\par f/u several months CT CHEST  readdress\par Next 1 month APPT watch Lipid profile at f/u\par September follow-up hemoglobin A1c and continue to monitor the glucose\par low sugar diet\par

## 2019-08-12 NOTE — PHYSICAL EXAM
[General Appearance - Well Developed] : well developed [Normal Appearance] : normal appearance [Well Groomed] : well groomed [General Appearance - Well Nourished] : well nourished [No Deformities] : no deformities [General Appearance - In No Acute Distress] : no acute distress [Normal Conjunctiva] : the conjunctiva exhibited no abnormalities [Eyelids - No Xanthelasma] : the eyelids demonstrated no xanthelasmas [Normal Oropharynx] : normal oropharynx [I] : I [Neck Cervical Mass (___cm)] : no neck mass was observed [Neck Appearance] : the appearance of the neck was normal [Jugular Venous Distention Increased] : there was no jugular-venous distention [Heart Rate And Rhythm] : heart rate and rhythm were normal [Thyroid Diffuse Enlargement] : the thyroid was not enlarged [Heart Sounds] : normal S1 and S2 [Murmurs] : no murmurs present [Edema] : no peripheral edema present [Arterial Pulses Normal] : the arterial pulses were normal [Veins - Varicosity Changes] : no varicosital changes were noted in the lower extremities [Respiration, Rhythm And Depth] : normal respiratory rhythm and effort [Exaggerated Use Of Accessory Muscles For Inspiration] : no accessory muscle use [Bowel Sounds] : normal bowel sounds [Abdomen Tenderness] : non-tender [Abdomen Soft] : soft [Abdomen Mass (___ Cm)] : no abdominal mass palpated [Abnormal Walk] : normal gait [Gait - Sufficient For Exercise Testing] : the gait was sufficient for exercise testing [Nail Clubbing] : no clubbing of the fingernails [Cyanosis, Localized] : no localized cyanosis [Petechial Hemorrhages (___cm)] : no petechial hemorrhages [] : no rash [Skin Color & Pigmentation] : normal skin color and pigmentation [Skin Lesions] : no skin lesions [No Venous Stasis] : no venous stasis [No Skin Ulcers] : no skin ulcer [No Xanthoma] : no  xanthoma was observed [Sensation] : the sensory exam was normal to light touch and pinprick [Deep Tendon Reflexes (DTR)] : deep tendon reflexes were 2+ and symmetric [No Focal Deficits] : no focal deficits [Motor Exam] : the motor exam was normal [Impaired Insight] : insight and judgment were intact [Oriented To Time, Place, And Person] : oriented to person, place, and time [Affect] : the affect was normal [Mood] : the mood was normal [FreeTextEntry1] : left groin swelling erythema edema consistent with cellulitis with mild improvement

## 2019-09-16 ENCOUNTER — APPOINTMENT (OUTPATIENT)
Dept: PULMONOLOGY | Facility: CLINIC | Age: 79
End: 2019-09-16
Payer: MEDICARE

## 2019-09-16 VITALS
RESPIRATION RATE: 16 BRPM | HEART RATE: 124 BPM | OXYGEN SATURATION: 98 % | DIASTOLIC BLOOD PRESSURE: 88 MMHG | SYSTOLIC BLOOD PRESSURE: 158 MMHG

## 2019-09-16 DIAGNOSIS — Z23 ENCOUNTER FOR IMMUNIZATION: ICD-10-CM

## 2019-09-16 LAB — GLUCOSE BLDC GLUCOMTR-MCNC: 127

## 2019-09-16 PROCEDURE — 99214 OFFICE O/P EST MOD 30 MIN: CPT | Mod: 25

## 2019-09-16 PROCEDURE — 90662 IIV NO PRSV INCREASED AG IM: CPT

## 2019-09-16 PROCEDURE — 82962 GLUCOSE BLOOD TEST: CPT

## 2019-09-16 PROCEDURE — G0008: CPT

## 2019-09-16 PROCEDURE — 94727 GAS DIL/WSHOT DETER LNG VOL: CPT

## 2019-09-16 PROCEDURE — 94060 EVALUATION OF WHEEZING: CPT

## 2019-09-16 PROCEDURE — 94729 DIFFUSING CAPACITY: CPT

## 2019-09-16 NOTE — DISCUSSION/SUMMARY
[FreeTextEntry1] : \par OAD\par  abnormal PFT\par  History asbestosis\par  COPD-Emphysema with mucoid impaction\par Pre DM\par HTN  with borderline control\par HLD\par Groin cellulitis without recurrence\par ANORO 1 puff Daily\par f/u several months CT CHEST  readdress- Ordered \par Next 1 month APPT watch Lipid profile at f/u\par September follow-up hemoglobin A1c and continue to monitor the glucose\par low sugar diet\par

## 2019-09-16 NOTE — REVIEW OF SYSTEMS
[As Noted in HPI] : as noted in HPI [Hypertension] : ~T hypertension [Negative] : Psychiatric [de-identified] : R/O Pre DM

## 2019-09-16 NOTE — PROCEDURE
[FreeTextEntry1] : Glucose 127\par \par PFT 9/16/2019\par Moderate OAD\par  Pos BD 14 % at FVC\par  Minimal airtrapping and normal TLC 95 %\par  DLCO  84 % normal  range\par  HGB 16.2\par  Stable pulmonary physiology\par \par Pulmonary Six Minute Walk 5/4/2018 No desaturation\par \par CXR  PA lateral  Dec 18th 2018\par Cardiac size normal\par Aorta uncoiled\par Evidence of pleural plaque most prominent right lower lung zone with calcification\par Some mild interstitial markings\par Calcification right midlung zone left mid to upper lung zone\par No dominant parenchymal infiltrates pleural effusions pulmonary nodules scattered scar noted left upper lobe\par No pneumothorax\par Interval change compared to chest x-ray 10/17/2017\par \par EKG December 18, 2018\par Normal sinus rhythm with occasional ectopic ventricular beats\par RSR consistent with normal variant\par \par PFT 1/22/2019\par Moderate reduction flow rates \par OAD\par  No BD at FEV1\par Airtrapping\par Mild reduction Diffusion with mild loss  fx alveolar capillary units\par HGB16.6\par \par  F- V Loop June 24 2019\par  Moderate reduction flow rates\par Spirometry   5/6/2019\par Moderate OAD with reduction flow rates\par stable flow rates\par \par Glucose  130 8/12/2019\par 5/6//2019\par  HGBA1C 5.9 % improved and prior  6.1 F/U late Sept\par Blood draw\par Reviewed June 25, 2019\par CBC normal\par White blood count 9.27\par Hemoglobin 16.2 hematocrit 49.8 platelet count 214,000\par Glucose 128\par Serum electrolytes normal\par BUN 11 creatinine 0.81\par Liver function testing normal\par Serum potassium 4.0\par Serum calcium 9.9\par Lipid profile April 5, 2019\par Total cholesterol  158 HDL 50 LDL 85\par Triglycerides 117\par \par HD Flu administered

## 2019-09-16 NOTE — PHYSICAL EXAM
[General Appearance - Well Developed] : well developed [Normal Appearance] : normal appearance [Well Groomed] : well groomed [General Appearance - Well Nourished] : well nourished [No Deformities] : no deformities [General Appearance - In No Acute Distress] : no acute distress [Normal Conjunctiva] : the conjunctiva exhibited no abnormalities [Eyelids - No Xanthelasma] : the eyelids demonstrated no xanthelasmas [Normal Oropharynx] : normal oropharynx [I] : I [Neck Appearance] : the appearance of the neck was normal [Neck Cervical Mass (___cm)] : no neck mass was observed [Thyroid Diffuse Enlargement] : the thyroid was not enlarged [Jugular Venous Distention Increased] : there was no jugular-venous distention [Heart Rate And Rhythm] : heart rate and rhythm were normal [Heart Sounds] : normal S1 and S2 [Murmurs] : no murmurs present [Arterial Pulses Normal] : the arterial pulses were normal [Edema] : no peripheral edema present [Veins - Varicosity Changes] : no varicosital changes were noted in the lower extremities [Exaggerated Use Of Accessory Muscles For Inspiration] : no accessory muscle use [Respiration, Rhythm And Depth] : normal respiratory rhythm and effort [Abdomen Soft] : soft [Bowel Sounds] : normal bowel sounds [Abdomen Tenderness] : non-tender [Abdomen Mass (___ Cm)] : no abdominal mass palpated [Abnormal Walk] : normal gait [Gait - Sufficient For Exercise Testing] : the gait was sufficient for exercise testing [Nail Clubbing] : no clubbing of the fingernails [Cyanosis, Localized] : no localized cyanosis [Petechial Hemorrhages (___cm)] : no petechial hemorrhages [] : no rash [Skin Color & Pigmentation] : normal skin color and pigmentation [Skin Lesions] : no skin lesions [No Venous Stasis] : no venous stasis [No Skin Ulcers] : no skin ulcer [No Xanthoma] : no  xanthoma was observed [Sensation] : the sensory exam was normal to light touch and pinprick [Deep Tendon Reflexes (DTR)] : deep tendon reflexes were 2+ and symmetric [No Focal Deficits] : no focal deficits [Motor Exam] : the motor exam was normal [Oriented To Time, Place, And Person] : oriented to person, place, and time [Affect] : the affect was normal [Impaired Insight] : insight and judgment were intact [Mood] : the mood was normal [FreeTextEntry1] : left groin swelling erythema edema consistent with cellulitis with mild improvement [FreeTextEntry2] : trace edema

## 2019-09-19 ENCOUNTER — APPOINTMENT (OUTPATIENT)
Dept: UROLOGY | Facility: CLINIC | Age: 79
End: 2019-09-19
Payer: MEDICARE

## 2019-09-19 VITALS
WEIGHT: 213 LBS | OXYGEN SATURATION: 93 % | BODY MASS INDEX: 29.82 KG/M2 | TEMPERATURE: 98.6 F | HEIGHT: 71 IN | DIASTOLIC BLOOD PRESSURE: 121 MMHG | HEART RATE: 114 BPM | SYSTOLIC BLOOD PRESSURE: 219 MMHG

## 2019-09-19 PROCEDURE — 99202 OFFICE O/P NEW SF 15 MIN: CPT

## 2019-09-19 NOTE — HISTORY OF PRESENT ILLNESS
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

## 2019-09-19 NOTE — PHYSICAL EXAM
[General Appearance - Well Developed] : well developed [Heart Rate And Rhythm] : Heart rate and rhythm were normal [] : no respiratory distress [Bowel Sounds] : normal bowel sounds [Abdomen Soft] : soft [Abdomen Tenderness] : non-tender [Abdomen Mass (___ Cm)] : no abdominal mass palpated [Abdomen Hernia] : no hernia was discovered [Urethral Meatus] : meatus normal [Penis Abnormality] : normal circumcised penis [Testes Tenderness] : no tenderness of the testes [Prostate Enlargement] : the prostate was not enlarged [Prostate Tenderness] : the prostate was not tender [Normal Station and Gait] : the gait and station were normal for the patient's age [Skin Color & Pigmentation] : normal skin color and pigmentation [No Focal Deficits] : no focal deficits [Mood] : the mood was normal [Inguinal Lymph Nodes Enlarged Bilaterally] : inguinal [FreeTextEntry1] : healing inguinal skin abscess with induration no erythema no tenderness

## 2019-09-19 NOTE — ASSESSMENT
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 AMA (saw a physician/midlevel provider and clinician was able to provide reasons for staying for treatment & form is signed)

## 2019-09-19 NOTE — ASSESSMENT
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

## 2019-09-20 LAB
ANION GAP SERPL CALC-SCNC: 15 MMOL/L
APPEARANCE: CLEAR
BACTERIA: NEGATIVE
BILIRUBIN URINE: NEGATIVE
BLOOD URINE: NEGATIVE
BUN SERPL-MCNC: 8 MG/DL
CALCIUM SERPL-MCNC: 10.4 MG/DL
CHLORIDE SERPL-SCNC: 99 MMOL/L
CO2 SERPL-SCNC: 23 MMOL/L
COLOR: NORMAL
CREAT SERPL-MCNC: 0.83 MG/DL
GLUCOSE QUALITATIVE U: NEGATIVE
GLUCOSE SERPL-MCNC: 123 MG/DL
HYALINE CASTS: 0 /LPF
KETONES URINE: NEGATIVE
LEUKOCYTE ESTERASE URINE: NEGATIVE
MICROSCOPIC-UA: NORMAL
NITRITE URINE: NEGATIVE
PH URINE: 7
POTASSIUM SERPL-SCNC: 4.1 MMOL/L
PROTEIN URINE: NEGATIVE
RED BLOOD CELLS URINE: 1 /HPF
SODIUM SERPL-SCNC: 137 MMOL/L
SPECIFIC GRAVITY URINE: 1.01
SQUAMOUS EPITHELIAL CELLS: 0 /HPF
UROBILINOGEN URINE: NORMAL
WHITE BLOOD CELLS URINE: 0 /HPF

## 2019-09-21 LAB — BACTERIA UR CULT: NORMAL

## 2019-09-22 ENCOUNTER — FORM ENCOUNTER (OUTPATIENT)
Age: 79
End: 2019-09-22

## 2019-09-23 ENCOUNTER — OUTPATIENT (OUTPATIENT)
Dept: OUTPATIENT SERVICES | Facility: HOSPITAL | Age: 79
LOS: 1 days | End: 2019-09-23
Payer: MEDICARE

## 2019-09-23 ENCOUNTER — APPOINTMENT (OUTPATIENT)
Dept: CT IMAGING | Facility: CLINIC | Age: 79
End: 2019-09-23
Payer: MEDICARE

## 2019-09-23 DIAGNOSIS — Z98.89 OTHER SPECIFIED POSTPROCEDURAL STATES: Chronic | ICD-10-CM

## 2019-09-23 DIAGNOSIS — J61 PNEUMOCONIOSIS DUE TO ASBESTOS AND OTHER MINERAL FIBERS: ICD-10-CM

## 2019-09-23 PROCEDURE — 74178 CT ABD&PLV WO CNTR FLWD CNTR: CPT | Mod: 26

## 2019-09-23 PROCEDURE — 74178 CT ABD&PLV WO CNTR FLWD CNTR: CPT

## 2019-09-23 PROCEDURE — 71260 CT THORAX DX C+: CPT

## 2019-09-23 PROCEDURE — 71260 CT THORAX DX C+: CPT | Mod: 26

## 2019-09-25 ENCOUNTER — RX RENEWAL (OUTPATIENT)
Age: 79
End: 2019-09-25

## 2019-10-01 ENCOUNTER — APPOINTMENT (OUTPATIENT)
Dept: UROLOGY | Facility: CLINIC | Age: 79
End: 2019-10-01
Payer: MEDICARE

## 2019-10-01 PROCEDURE — 99214 OFFICE O/P EST MOD 30 MIN: CPT

## 2019-10-14 ENCOUNTER — APPOINTMENT (OUTPATIENT)
Dept: PULMONOLOGY | Facility: CLINIC | Age: 79
End: 2019-10-14
Payer: MEDICARE

## 2019-10-14 VITALS
RESPIRATION RATE: 15 BRPM | DIASTOLIC BLOOD PRESSURE: 105 MMHG | HEIGHT: 71 IN | TEMPERATURE: 98.3 F | SYSTOLIC BLOOD PRESSURE: 190 MMHG | OXYGEN SATURATION: 93 % | HEART RATE: 128 BPM

## 2019-10-14 LAB
ALBUMIN SERPL ELPH-MCNC: 4.8 G/DL
ALP BLD-CCNC: 50 U/L
ALT SERPL-CCNC: 18 U/L
AST SERPL-CCNC: 20 U/L
BILIRUB DIRECT SERPL-MCNC: 0.2 MG/DL
BILIRUB INDIRECT SERPL-MCNC: 0.5 MG/DL
BILIRUB SERPL-MCNC: 0.6 MG/DL
CHOLEST SERPL-MCNC: 166 MG/DL
CHOLEST/HDLC SERPL: 3.7 RATIO
GLUCOSE BLDC GLUCOMTR-MCNC: 127
HBA1C MFR BLD HPLC: 6
HDLC SERPL-MCNC: 45 MG/DL
LDLC SERPL CALC-MCNC: 99 MG/DL
PROT SERPL-MCNC: 7.3 G/DL
TRIGL SERPL-MCNC: 109 MG/DL

## 2019-10-14 PROCEDURE — 94010 BREATHING CAPACITY TEST: CPT

## 2019-10-14 PROCEDURE — 99214 OFFICE O/P EST MOD 30 MIN: CPT | Mod: 25

## 2019-10-14 PROCEDURE — 82962 GLUCOSE BLOOD TEST: CPT

## 2019-10-14 PROCEDURE — 83036 HEMOGLOBIN GLYCOSYLATED A1C: CPT | Mod: QW

## 2019-10-14 PROCEDURE — 36415 COLL VENOUS BLD VENIPUNCTURE: CPT

## 2019-10-14 RX ORDER — MUPIROCIN 20 MG/G
2 OINTMENT TOPICAL 3 TIMES DAILY
Qty: 1 | Refills: 1 | Status: DISCONTINUED | COMMUNITY
Start: 2019-06-24 | End: 2019-10-14

## 2019-10-14 NOTE — HISTORY OF PRESENT ILLNESS
[None] : ~He/She~ has no significant interval events [Stable] : are stable [Difficulty Breathing During Exertion] : stable dyspnea on exertion [Feelings Of Weakness On Exertion] : stable exercise intolerance [Cough] : denies coughing [Wheezing] : denies wheezing [Regional Soft Tissue Swelling Both Lower Extremities] : denies lower extremity edema [Chest Pain Or Discomfort] : denies chest pain [Fever] : denies fever [Date: ___] : was performed [unfilled] [Wt Gain ___ Lbs] : no recent weight gain [Wt Loss ___ Lbs] : no recent weight loss [Oxygen] : the patient uses no supplemental oxygen [de-identified] : emphysema with mucous plugging

## 2019-10-14 NOTE — PHYSICAL EXAM
[General Appearance - Well Developed] : well developed [General Appearance - Well Nourished] : well nourished [Well Groomed] : well groomed [Normal Appearance] : normal appearance [No Deformities] : no deformities [General Appearance - In No Acute Distress] : no acute distress [Normal Conjunctiva] : the conjunctiva exhibited no abnormalities [Eyelids - No Xanthelasma] : the eyelids demonstrated no xanthelasmas [Neck Appearance] : the appearance of the neck was normal [I] : I [Normal Oropharynx] : normal oropharynx [Neck Cervical Mass (___cm)] : no neck mass was observed [Heart Rate And Rhythm] : heart rate and rhythm were normal [Thyroid Diffuse Enlargement] : the thyroid was not enlarged [Jugular Venous Distention Increased] : there was no jugular-venous distention [Heart Sounds] : normal S1 and S2 [Murmurs] : no murmurs present [Veins - Varicosity Changes] : no varicosital changes were noted in the lower extremities [Edema] : no peripheral edema present [Arterial Pulses Normal] : the arterial pulses were normal [Respiration, Rhythm And Depth] : normal respiratory rhythm and effort [Exaggerated Use Of Accessory Muscles For Inspiration] : no accessory muscle use [Abdomen Tenderness] : non-tender [Bowel Sounds] : normal bowel sounds [Abdomen Soft] : soft [Abdomen Mass (___ Cm)] : no abdominal mass palpated [Gait - Sufficient For Exercise Testing] : the gait was sufficient for exercise testing [Abnormal Walk] : normal gait [Nail Clubbing] : no clubbing of the fingernails [Cyanosis, Localized] : no localized cyanosis [Petechial Hemorrhages (___cm)] : no petechial hemorrhages [Skin Color & Pigmentation] : normal skin color and pigmentation [] : no rash [No Venous Stasis] : no venous stasis [Skin Lesions] : no skin lesions [No Skin Ulcers] : no skin ulcer [No Xanthoma] : no  xanthoma was observed [Deep Tendon Reflexes (DTR)] : deep tendon reflexes were 2+ and symmetric [Sensation] : the sensory exam was normal to light touch and pinprick [Motor Exam] : the motor exam was normal [No Focal Deficits] : no focal deficits [Oriented To Time, Place, And Person] : oriented to person, place, and time [Affect] : the affect was normal [Impaired Insight] : insight and judgment were intact [Mood] : the mood was normal [FreeTextEntry1] : Minimal basilar crackles [FreeTextEntry2] : trace edema

## 2019-10-14 NOTE — REVIEW OF SYSTEMS
[As Noted in HPI] : as noted in HPI [Hypertension] : ~T hypertension [Negative] : Psychiatric [FreeTextEntry7] : abnormal CT  ABD  focal  wall thickening [de-identified] : R/O Pre DM [FreeTextEntry5] : Hematuria  with  cystoscopy

## 2019-10-14 NOTE — DISCUSSION/SUMMARY
[FreeTextEntry1] : Hematuria -   noted scheduled this  week cystoscopy/cytology\par Abnl CT  ABD  rl colonic  neoplasm- GI  consult  Dr Thomas\par OAD\par  abnormal PFT\par  History asbestosis\par  COPD-Emphysema with mucoid impaction\par Pre DM\par HTN  with borderline control-  maitaim Rx  adherence\par HLD\par Groin cellulitis without recurrence\par ANORO 1 puff Daily\par f/u several months CT CHEST  readdress- Ordered \par Next 1 month APPT watch Lipid profile at f/u\par December follow-up hemoglobin A1c and continue to monitor the glucose\par low sugar diet\par

## 2019-10-14 NOTE — REASON FOR VISIT
[Follow-Up] : a follow-up visit [COPD] : COPD [FreeTextEntry2] : Asbestosis, HTN,HLD, PreDM Hematuria

## 2019-10-14 NOTE — PROCEDURE
[FreeTextEntry1] : Hemoglobin A1c 6.0\par Glucose 127\par \par Spirometry without bronchodilator October 14, 2019\par Moderate reduction in flow rates with an obstructive pattern with FEV1 FVC 69\par \par PFT 9/16/2019\par Moderate OAD\par  Pos BD 14 % at FVC\par  Minimal airtrapping and normal TLC 95 %\par  DLCO  84 % normal  range\par  HGB 16.2\par  Stable pulmonary physiology\par \par Pulmonary Six Minute Walk 5/4/2018 No desaturation\par \par CXR  PA lateral  Dec 18th 2018\par Cardiac size normal\par Aorta uncoiled\par Evidence of pleural plaque most prominent right lower lung zone with calcification\par Some mild interstitial markings\par Calcification right midlung zone left mid to upper lung zone\par No dominant parenchymal infiltrates pleural effusions pulmonary nodules scattered scar noted left upper lobe\par No pneumothorax\par Interval change compared to chest x-ray 10/17/2017\par \par EKG December 18, 2018\par Normal sinus rhythm with occasional ectopic ventricular beats\par RSR consistent with normal variant\par \par .\par \par Glucose  130 8/12/2019\par 5/6//2019\par  HGBA1C 5.9 % improved and prior  6.1 F/U late Sept\par Blood draw\par Data  Review  lab  review  Oct  14  2019\par Lipid panel\par Cholesterol 166 HDL 45 LDL 99\par Liver function testing normal\par Reviewed June 25, 2019\par CBC normal\par White blood count 9.27\par Hemoglobin 16.2 hematocrit 49.8 platelet count 214,000\par Glucose 128\par Serum electrolytes normal\par BUN 11 creatinine 0.81\par Liver function testing normal\par Serum potassium 4.0\par Serum calcium 9.9\par Lipid profile April 5, 2019\par Total cholesterol  158 HDL 50 LDL 85\par Triglycerides 117\par \par HD Flu administered sept 2019

## 2019-10-21 ENCOUNTER — APPOINTMENT (OUTPATIENT)
Dept: UROLOGY | Facility: CLINIC | Age: 79
End: 2019-10-21
Payer: MEDICARE

## 2019-10-21 ENCOUNTER — OUTPATIENT (OUTPATIENT)
Dept: OUTPATIENT SERVICES | Facility: HOSPITAL | Age: 79
LOS: 1 days | End: 2019-10-21
Payer: MEDICARE

## 2019-10-21 VITALS
HEART RATE: 128 BPM | SYSTOLIC BLOOD PRESSURE: 228 MMHG | WEIGHT: 213 LBS | TEMPERATURE: 97.8 F | HEIGHT: 71 IN | DIASTOLIC BLOOD PRESSURE: 127 MMHG | BODY MASS INDEX: 29.82 KG/M2

## 2019-10-21 VITALS — DIASTOLIC BLOOD PRESSURE: 108 MMHG | HEART RATE: 137 BPM | SYSTOLIC BLOOD PRESSURE: 239 MMHG

## 2019-10-21 DIAGNOSIS — R31.9 HEMATURIA, UNSPECIFIED: ICD-10-CM

## 2019-10-21 DIAGNOSIS — R35.0 FREQUENCY OF MICTURITION: ICD-10-CM

## 2019-10-21 DIAGNOSIS — Z98.89 OTHER SPECIFIED POSTPROCEDURAL STATES: Chronic | ICD-10-CM

## 2019-10-21 PROCEDURE — 52000 CYSTOURETHROSCOPY: CPT

## 2019-10-24 DIAGNOSIS — R31.9 HEMATURIA, UNSPECIFIED: ICD-10-CM

## 2019-10-29 LAB — URINE CYTOLOGY: NORMAL

## 2019-11-15 ENCOUNTER — APPOINTMENT (OUTPATIENT)
Dept: PULMONOLOGY | Facility: CLINIC | Age: 79
End: 2019-11-15

## 2019-11-19 ENCOUNTER — APPOINTMENT (OUTPATIENT)
Dept: PULMONOLOGY | Facility: CLINIC | Age: 79
End: 2019-11-19
Payer: MEDICARE

## 2019-11-19 VITALS
HEIGHT: 71 IN | SYSTOLIC BLOOD PRESSURE: 170 MMHG | HEART RATE: 62 BPM | TEMPERATURE: 97.6 F | DIASTOLIC BLOOD PRESSURE: 85 MMHG | OXYGEN SATURATION: 94 % | WEIGHT: 213 LBS | BODY MASS INDEX: 29.82 KG/M2

## 2019-11-19 VITALS — SYSTOLIC BLOOD PRESSURE: 140 MMHG | DIASTOLIC BLOOD PRESSURE: 80 MMHG

## 2019-11-19 LAB — GLUCOSE BLDC GLUCOMTR-MCNC: 115

## 2019-11-19 PROCEDURE — 99213 OFFICE O/P EST LOW 20 MIN: CPT | Mod: 25

## 2019-11-19 PROCEDURE — 82962 GLUCOSE BLOOD TEST: CPT

## 2019-11-19 PROCEDURE — 36415 COLL VENOUS BLD VENIPUNCTURE: CPT

## 2019-11-19 PROCEDURE — 94060 EVALUATION OF WHEEZING: CPT

## 2019-11-20 LAB
CHOLEST SERPL-MCNC: 163 MG/DL
CHOLEST/HDLC SERPL: 3.3 RATIO
HDLC SERPL-MCNC: 50 MG/DL
LDLC SERPL CALC-MCNC: 96 MG/DL
TRIGL SERPL-MCNC: 87 MG/DL

## 2019-11-20 NOTE — HISTORY OF PRESENT ILLNESS
[None] : ~He/She~ has no significant interval events [Stable] : are stable [Difficulty Breathing During Exertion] : stable dyspnea on exertion [Feelings Of Weakness On Exertion] : stable exercise intolerance [Wheezing] : denies wheezing [Cough] : denies coughing [Regional Soft Tissue Swelling Both Lower Extremities] : denies lower extremity edema [Chest Pain Or Discomfort] : denies chest pain [Fever] : denies fever [Date: ___] : was performed [unfilled] [Wt Loss ___ Lbs] : no recent weight loss [Oxygen] : the patient uses no supplemental oxygen [Wt Gain ___ Lbs] : no recent weight gain [de-identified] : emphysema with mucous plugging [FreeTextEntry1] :  cystoscopy-  negative\par will  schedule  colonoscopy

## 2019-11-20 NOTE — REVIEW OF SYSTEMS
[Hypertension] : ~T hypertension [As Noted in HPI] : as noted in HPI [Negative] : Psychiatric [FreeTextEntry7] : abnormal CT  ABD  focal  wall thickening [FreeTextEntry5] : Hematuria  with  cystoscopy [de-identified] : R/O Pre DM

## 2019-11-20 NOTE — PHYSICAL EXAM
[General Appearance - Well Developed] : well developed [Normal Appearance] : normal appearance [Well Groomed] : well groomed [General Appearance - Well Nourished] : well nourished [No Deformities] : no deformities [General Appearance - In No Acute Distress] : no acute distress [Normal Conjunctiva] : the conjunctiva exhibited no abnormalities [Eyelids - No Xanthelasma] : the eyelids demonstrated no xanthelasmas [Normal Oropharynx] : normal oropharynx [I] : I [Neck Appearance] : the appearance of the neck was normal [Jugular Venous Distention Increased] : there was no jugular-venous distention [Neck Cervical Mass (___cm)] : no neck mass was observed [Thyroid Diffuse Enlargement] : the thyroid was not enlarged [Heart Sounds] : normal S1 and S2 [Heart Rate And Rhythm] : heart rate and rhythm were normal [Murmurs] : no murmurs present [Arterial Pulses Normal] : the arterial pulses were normal [Edema] : no peripheral edema present [Veins - Varicosity Changes] : no varicosital changes were noted in the lower extremities [Respiration, Rhythm And Depth] : normal respiratory rhythm and effort [Exaggerated Use Of Accessory Muscles For Inspiration] : no accessory muscle use [Bowel Sounds] : normal bowel sounds [Abdomen Soft] : soft [Abdomen Tenderness] : non-tender [Abdomen Mass (___ Cm)] : no abdominal mass palpated [Abnormal Walk] : normal gait [Gait - Sufficient For Exercise Testing] : the gait was sufficient for exercise testing [Cyanosis, Localized] : no localized cyanosis [Nail Clubbing] : no clubbing of the fingernails [Petechial Hemorrhages (___cm)] : no petechial hemorrhages [] : no rash [Skin Color & Pigmentation] : normal skin color and pigmentation [Skin Lesions] : no skin lesions [No Venous Stasis] : no venous stasis [No Skin Ulcers] : no skin ulcer [Deep Tendon Reflexes (DTR)] : deep tendon reflexes were 2+ and symmetric [No Xanthoma] : no  xanthoma was observed [Motor Exam] : the motor exam was normal [Sensation] : the sensory exam was normal to light touch and pinprick [No Focal Deficits] : no focal deficits [Oriented To Time, Place, And Person] : oriented to person, place, and time [Impaired Insight] : insight and judgment were intact [Affect] : the affect was normal [Mood] : the mood was normal [FreeTextEntry1] : left groin swelling erythema edema consistent with cellulitis with mild improvement [FreeTextEntry2] : trace edema

## 2019-11-20 NOTE — PROCEDURE
[FreeTextEntry1] : Hemoglobin A1c 6.0 Oct  14  2019\par Glucose 127\par Spirometry November 19, 2019\par Mild- moderate  reduction in flow rates\par Mild obstructive ventilatory impairment\par \par PFT 9/16/2019\par Moderate OAD\par  Pos BD 14 % at FVC\par  Minimal airtrapping and normal TLC 95 %\par  DLCO  84 % normal  range\par  HGB 16.2\par  Stable pulmonary physiology\par \par Pulmonary Six Minute Walk 5/4/2018 No desaturation\par \par CXR  PA lateral  Dec 18th 2018\par Cardiac size normal\par Aorta uncoiled\par Evidence of pleural plaque most prominent right lower lung zone with calcification\par Some mild interstitial markings\par Calcification right midlung zone left mid to upper lung zone\par No dominant parenchymal infiltrates pleural effusions pulmonary nodules scattered scar noted left upper lobe\par No pneumothorax\par Interval change compared to chest x-ray 10/17/2017\par \par EKG December 18, 2018\par Normal sinus rhythm with occasional ectopic ventricular beats\par RSR consistent with normal variant\par \par Blood draw\par Data review November 20, 2019\par Lipid profile\par Cholesterol 163\par HDL 50\par LDL 96\par Triglycerides 87\par \par Data  Review  lab  review  Oct  14  2019\par Lipid panel\par Cholesterol 166 HDL 45 LDL 99\par Liver function testing normal\par Reviewed June 25, 2019\par CBC normal\par White blood count 9.27\par Hemoglobin 16.2 hematocrit 49.8 platelet count 214,000\par Glucose 128\par Serum electrolytes normal\par BUN 11 creatinine 0.81\par Liver function testing normal\par Serum potassium 4.0\par Serum calcium 9.9\par Lipid profile April 5, 2019\par Total cholesterol  158 HDL 50 LDL 85\par Triglycerides 117\par \par HD Flu administered sept 2019

## 2019-11-20 NOTE — DISCUSSION/SUMMARY
[FreeTextEntry1] : \par OAD\par  abnormal PFT\par  History asbestosis\par  COPD-Emphysema with mucoid impaction\par Pre DM\par HTN  with borderline control\par HLD\par Groin cellulitis without recurrence\par  hematuria  with  negative  cystoscopy  and  neg urine  cytology\par ANORO 1 puff Daily\par f/u several months CT CHEST  readdress- Sept 2020\par  watch Lipid profile at f/u\par  follow-up hemoglobin A1c and continue to monitor the glucose\par low sugar diet\par

## 2019-11-26 ENCOUNTER — RX CHANGE (OUTPATIENT)
Age: 79
End: 2019-11-26

## 2019-11-27 ENCOUNTER — RX CHANGE (OUTPATIENT)
Age: 79
End: 2019-11-27

## 2019-12-19 ENCOUNTER — APPOINTMENT (OUTPATIENT)
Dept: PULMONOLOGY | Facility: CLINIC | Age: 79
End: 2019-12-19
Payer: MEDICARE

## 2019-12-19 VITALS
WEIGHT: 213 LBS | OXYGEN SATURATION: 94 % | BODY MASS INDEX: 29.82 KG/M2 | HEIGHT: 71 IN | RESPIRATION RATE: 16 BRPM | DIASTOLIC BLOOD PRESSURE: 93 MMHG | SYSTOLIC BLOOD PRESSURE: 153 MMHG | HEART RATE: 68 BPM

## 2019-12-19 PROCEDURE — 94060 EVALUATION OF WHEEZING: CPT

## 2019-12-19 PROCEDURE — 99213 OFFICE O/P EST LOW 20 MIN: CPT | Mod: 25

## 2019-12-19 PROCEDURE — 71046 X-RAY EXAM CHEST 2 VIEWS: CPT

## 2019-12-19 NOTE — PHYSICAL EXAM
[General Appearance - Well Developed] : well developed [Normal Appearance] : normal appearance [Well Groomed] : well groomed [General Appearance - Well Nourished] : well nourished [No Deformities] : no deformities [General Appearance - In No Acute Distress] : no acute distress [Normal Conjunctiva] : the conjunctiva exhibited no abnormalities [Eyelids - No Xanthelasma] : the eyelids demonstrated no xanthelasmas [Normal Oropharynx] : normal oropharynx [I] : I [Neck Appearance] : the appearance of the neck was normal [Thyroid Diffuse Enlargement] : the thyroid was not enlarged [Jugular Venous Distention Increased] : there was no jugular-venous distention [Neck Cervical Mass (___cm)] : no neck mass was observed [Murmurs] : no murmurs present [Heart Sounds] : normal S1 and S2 [Heart Rate And Rhythm] : heart rate and rhythm were normal [Veins - Varicosity Changes] : no varicosital changes were noted in the lower extremities [Arterial Pulses Normal] : the arterial pulses were normal [Edema] : no peripheral edema present [Respiration, Rhythm And Depth] : normal respiratory rhythm and effort [Bowel Sounds] : normal bowel sounds [Exaggerated Use Of Accessory Muscles For Inspiration] : no accessory muscle use [Abdomen Tenderness] : non-tender [Abdomen Soft] : soft [Abdomen Mass (___ Cm)] : no abdominal mass palpated [Abnormal Walk] : normal gait [Nail Clubbing] : no clubbing of the fingernails [Gait - Sufficient For Exercise Testing] : the gait was sufficient for exercise testing [Petechial Hemorrhages (___cm)] : no petechial hemorrhages [Cyanosis, Localized] : no localized cyanosis [Skin Color & Pigmentation] : normal skin color and pigmentation [] : no rash [No Venous Stasis] : no venous stasis [Skin Lesions] : no skin lesions [No Xanthoma] : no  xanthoma was observed [No Skin Ulcers] : no skin ulcer [Sensation] : the sensory exam was normal to light touch and pinprick [Deep Tendon Reflexes (DTR)] : deep tendon reflexes were 2+ and symmetric [No Focal Deficits] : no focal deficits [Motor Exam] : the motor exam was normal [Oriented To Time, Place, And Person] : oriented to person, place, and time [Affect] : the affect was normal [Impaired Insight] : insight and judgment were intact [Mood] : the mood was normal [FreeTextEntry1] : left groin swelling erythema edema consistent with cellulitis with mild improvement [FreeTextEntry2] : trace edema

## 2019-12-19 NOTE — PROCEDURE
[FreeTextEntry1] : Hemoglobin A1c 6.0 Oct  14  2019\par Glucose 127\par Spirometry  12/19/19\par Mild- moderate  reduction in flow rates\par  obstructive ventilatory impairment and with reduced FVC cannot exclude restrictive component\par \par Chest x-ray PA lateral December 19, 2019\par Indication COPD-asbestosis\par Cardiac size normal\par " Aortic\par Dense calcified pleural plaque right hemidiaphragm\par COPD changes\par No significant interstitial changes\par No dominant pulmonary nodules parenchymal infiltrates pleural effusions\par Change compared to chest x-ray of December 18, 2018\par \par PFT 9/16/2019\par Moderate OAD\par  Pos BD 14 % at FVC\par  Minimal airtrapping and normal TLC 95 %\par  DLCO  84 % normal  range\par  HGB 16.2\par  Stable pulmonary physiology\par \par Pulmonary Six Minute Walk 5/4/2018 No desaturation\par \par CXR  PA lateral  Dec 18th 2018\par Cardiac size normal\par Aorta uncoiled\par Evidence of pleural plaque most prominent right lower lung zone with calcification\par Some mild interstitial markings\par Calcification right midlung zone left mid to upper lung zone\par No dominant parenchymal infiltrates pleural effusions pulmonary nodules scattered scar noted left upper lobe\par No pneumothorax\par Interval change compared to chest x-ray 10/17/2017\par \par EKG December 18, 2018\par Normal sinus rhythm with occasional ectopic ventricular beats\par RSR consistent with normal variant\par \par Blood draw\par Data review November 20, 2019\par Lipid profile\par Cholesterol 163\par HDL 50\par LDL 96\par Triglycerides 87\par \par Data  Review  lab  review  Oct  14  2019\par Lipid panel\par Cholesterol 166 HDL 45 LDL 99\par Liver function testing normal\par Reviewed June 25, 2019\par CBC normal\par White blood count 9.27\par Hemoglobin 16.2 hematocrit 49.8 platelet count 214,000\par Glucose 128\par Serum electrolytes normal\par BUN 11 creatinine 0.81\par Liver function testing normal\par Serum potassium 4.0\par Serum calcium 9.9\par Lipid profile April 5, 2019\par Total cholesterol  158 HDL 50 LDL 85\par Triglycerides 117\par \par HD Flu administered sept 2019

## 2019-12-19 NOTE — DISCUSSION/SUMMARY
[FreeTextEntry1] : \par OAD\par  abnormal PFT\par  History asbestosis\par  COPD-Emphysema with mucoid impaction\par Pre DM\par HTN  with borderline control\par HLD\par Groin cellulitis without recurrence\par ANORO 1 puff Daily\par f/u several months CT CHEST  readdress-  Sept 2020\par low sugar diet\par Next visit well Visit with EKG

## 2019-12-19 NOTE — HISTORY OF PRESENT ILLNESS
[Stable] : are stable [None] : ~He/She~ has no significant interval events [Difficulty Breathing During Exertion] : stable dyspnea on exertion [Feelings Of Weakness On Exertion] : stable exercise intolerance [Cough] : denies coughing [Wheezing] : denies wheezing [Regional Soft Tissue Swelling Both Lower Extremities] : denies lower extremity edema [Chest Pain Or Discomfort] : denies chest pain [Fever] : denies fever [Date: ___] : was performed [unfilled] [Wt Gain ___ Lbs] : no recent weight gain [Wt Loss ___ Lbs] : no recent weight loss [Oxygen] : the patient uses no supplemental oxygen [de-identified] : emphysema with mucous plugging [FreeTextEntry1] :  cystoscopy-  negative\par will  schedule  colonoscopy

## 2019-12-19 NOTE — REVIEW OF SYSTEMS
[Hypertension] : ~T hypertension [As Noted in HPI] : as noted in HPI [Negative] : Psychiatric [FreeTextEntry7] : abnormal CT  ABD  focal  wall thickening [de-identified] : R/O Pre DM [FreeTextEntry5] : Hematuria  with  cystoscopy

## 2020-01-06 ENCOUNTER — RX RENEWAL (OUTPATIENT)
Age: 80
End: 2020-01-06

## 2020-01-17 ENCOUNTER — NON-APPOINTMENT (OUTPATIENT)
Age: 80
End: 2020-01-17

## 2020-01-17 ENCOUNTER — APPOINTMENT (OUTPATIENT)
Dept: PULMONOLOGY | Facility: CLINIC | Age: 80
End: 2020-01-17
Payer: MEDICARE

## 2020-01-17 VITALS
HEART RATE: 61 BPM | WEIGHT: 213 LBS | BODY MASS INDEX: 29.82 KG/M2 | SYSTOLIC BLOOD PRESSURE: 150 MMHG | TEMPERATURE: 97.5 F | DIASTOLIC BLOOD PRESSURE: 80 MMHG | HEIGHT: 71 IN | OXYGEN SATURATION: 94 %

## 2020-01-17 LAB
GLUCOSE BLDC GLUCOMTR-MCNC: 107
HBA1C MFR BLD HPLC: 6

## 2020-01-17 PROCEDURE — 83036 HEMOGLOBIN GLYCOSYLATED A1C: CPT | Mod: QW

## 2020-01-17 PROCEDURE — 99214 OFFICE O/P EST MOD 30 MIN: CPT | Mod: 25

## 2020-01-17 PROCEDURE — 82962 GLUCOSE BLOOD TEST: CPT

## 2020-01-17 PROCEDURE — 93000 ELECTROCARDIOGRAM COMPLETE: CPT

## 2020-01-17 PROCEDURE — 94060 EVALUATION OF WHEEZING: CPT

## 2020-01-17 NOTE — DISCUSSION/SUMMARY
[FreeTextEntry1] : mild lower  extremity edema\par OAD\par  abnormal PFT\par  History asbestosis\par  COPD-Emphysema with mucoid impaction\par Pre DM\par HTN  with borderline control\par HLD\par Groin cellulitis without recurrence\par ANORO 1 puff Daily\par f/u several months CT CHEST  readdress- Ordered \par Next 1 month APPT watch Lipid profile at f/u\par September follow-up hemoglobin A1c and continue to monitor the glucose\par low sugar diet\par make sure von HCTZ\par  leg  elevation\par repeat EKG

## 2020-01-17 NOTE — PROCEDURE
[FreeTextEntry1] : Hemoglobin A1c 6.0  Jan 17 2020\par Glucose  107\par Spirometry  1/17/2020\par  moderate  reduction in flow rates\par  obstructive ventilatory impairment and with reduced FVC cannot exclude restrictive component\par Pos BD at FVC 12 %\par \par EKG 1/16/20\par  atrial  tachycardia\par  1 st degree AV block\par Chest x-ray PA lateral December 19, 2019\par Indication COPD-asbestosis\par Cardiac size normal\par " Aortic\par Dense calcified pleural plaque right hemidiaphragm\par COPD changes\par No significant interstitial changes\par No dominant pulmonary nodules parenchymal infiltrates pleural effusions\par Change compared to chest x-ray of December 18, 2018\par \par PFT 9/16/2019\par Moderate OAD\par  Pos BD 14 % at FVC\par  Minimal airtrapping and normal TLC 95 %\par  DLCO  84 % normal  range\par  HGB 16.2\par  Stable pulmonary physiology\par \par Pulmonary Six Minute Walk 5/4/2018 No desaturation\par \par CXR  PA lateral  Dec 18th 2018\par Cardiac size normal\par Aorta uncoiled\par Evidence of pleural plaque most prominent right lower lung zone with calcification\par Some mild interstitial markings\par Calcification right midlung zone left mid to upper lung zone\par No dominant parenchymal infiltrates pleural effusions pulmonary nodules scattered scar noted left upper lobe\par No pneumothorax\par Interval change compared to chest x-ray 10/17/2017\par \par EKG December 18, 2018\par Normal sinus rhythm with occasional ectopic ventricular beats\par RSR consistent with normal variant\par \par Blood draw\par Data review November 20, 2019\par Lipid profile\par Cholesterol 163\par HDL 50\par LDL 96\par Triglycerides 87\par \par Data  Review  lab  review  Oct  14  2019\par Lipid panel\par Cholesterol 166 HDL 45 LDL 99\par Liver function testing normal\par Reviewed June 25, 2019\par CBC normal\par White blood count 9.27\par Hemoglobin 16.2 hematocrit 49.8 platelet count 214,000\par Glucose 128\par Serum electrolytes normal\par BUN 11 creatinine 0.81\par Liver function testing normal\par Serum potassium 4.0\par Serum calcium 9.9\par Lipid profile April 5, 2019\par Total cholesterol  158 HDL 50 LDL 85\par Triglycerides 117\par \par HD Flu administered sept 2019

## 2020-01-17 NOTE — REVIEW OF SYSTEMS
[Hypertension] : ~T hypertension [As Noted in HPI] : as noted in HPI [Negative] : Psychiatric [FreeTextEntry7] : abnormal CT  ABD  focal  wall thickening [de-identified] : R/O Pre DM [FreeTextEntry5] : Hematuria  with  cystoscopy

## 2020-01-17 NOTE — HISTORY OF PRESENT ILLNESS
[None] : ~He/She~ has no significant interval events [Stable] : are stable [Feelings Of Weakness On Exertion] : stable exercise intolerance [Difficulty Breathing During Exertion] : stable dyspnea on exertion [Wheezing] : denies wheezing [Cough] : denies coughing [Regional Soft Tissue Swelling Both Lower Extremities] : denies lower extremity edema [Chest Pain Or Discomfort] : denies chest pain [Fever] : denies fever [Date: ___] : was performed [unfilled] [Wt Loss ___ Lbs] : no recent weight loss [Wt Gain ___ Lbs] : no recent weight gain [Oxygen] : the patient uses no supplemental oxygen [de-identified] : emphysema with mucous plugging [FreeTextEntry1] :  cystoscopy-  negative\par will  schedule  colonoscopy

## 2020-01-17 NOTE — PHYSICAL EXAM
[General Appearance - Well Developed] : well developed [Normal Appearance] : normal appearance [Well Groomed] : well groomed [General Appearance - Well Nourished] : well nourished [General Appearance - In No Acute Distress] : no acute distress [No Deformities] : no deformities [Eyelids - No Xanthelasma] : the eyelids demonstrated no xanthelasmas [Normal Conjunctiva] : the conjunctiva exhibited no abnormalities [Normal Oropharynx] : normal oropharynx [I] : I [Neck Appearance] : the appearance of the neck was normal [Jugular Venous Distention Increased] : there was no jugular-venous distention [Neck Cervical Mass (___cm)] : no neck mass was observed [Heart Rate And Rhythm] : heart rate and rhythm were normal [Thyroid Diffuse Enlargement] : the thyroid was not enlarged [Murmurs] : no murmurs present [Heart Sounds] : normal S1 and S2 [Arterial Pulses Normal] : the arterial pulses were normal [Edema] : no peripheral edema present [Veins - Varicosity Changes] : no varicosital changes were noted in the lower extremities [Respiration, Rhythm And Depth] : normal respiratory rhythm and effort [Exaggerated Use Of Accessory Muscles For Inspiration] : no accessory muscle use [Bowel Sounds] : normal bowel sounds [Abdomen Soft] : soft [Abdomen Tenderness] : non-tender [Abdomen Mass (___ Cm)] : no abdominal mass palpated [Abnormal Walk] : normal gait [Gait - Sufficient For Exercise Testing] : the gait was sufficient for exercise testing [Nail Clubbing] : no clubbing of the fingernails [Cyanosis, Localized] : no localized cyanosis [Petechial Hemorrhages (___cm)] : no petechial hemorrhages [] : no rash [Skin Color & Pigmentation] : normal skin color and pigmentation [No Venous Stasis] : no venous stasis [No Skin Ulcers] : no skin ulcer [Skin Lesions] : no skin lesions [No Xanthoma] : no  xanthoma was observed [Deep Tendon Reflexes (DTR)] : deep tendon reflexes were 2+ and symmetric [Motor Exam] : the motor exam was normal [Sensation] : the sensory exam was normal to light touch and pinprick [No Focal Deficits] : no focal deficits [Oriented To Time, Place, And Person] : oriented to person, place, and time [Impaired Insight] : insight and judgment were intact [Affect] : the affect was normal [Mood] : the mood was normal [FreeTextEntry1] : left groin swelling erythema edema consistent with cellulitis with mild improvement [FreeTextEntry2] : trace edema

## 2020-02-21 ENCOUNTER — NON-APPOINTMENT (OUTPATIENT)
Age: 80
End: 2020-02-21

## 2020-02-21 ENCOUNTER — APPOINTMENT (OUTPATIENT)
Dept: PULMONOLOGY | Facility: CLINIC | Age: 80
End: 2020-02-21
Payer: MEDICARE

## 2020-02-21 VITALS — SYSTOLIC BLOOD PRESSURE: 174 MMHG | DIASTOLIC BLOOD PRESSURE: 71 MMHG | OXYGEN SATURATION: 95 % | HEART RATE: 61 BPM

## 2020-02-21 LAB
ANION GAP SERPL CALC-SCNC: 15 MMOL/L
BUN SERPL-MCNC: 16 MG/DL
CALCIUM SERPL-MCNC: 10.3 MG/DL
CHLORIDE SERPL-SCNC: 95 MMOL/L
CO2 SERPL-SCNC: 27 MMOL/L
CREAT SERPL-MCNC: 0.94 MG/DL
GLUCOSE SERPL-MCNC: 117 MG/DL
POTASSIUM SERPL-SCNC: 4.4 MMOL/L
SODIUM SERPL-SCNC: 136 MMOL/L

## 2020-02-21 PROCEDURE — 36415 COLL VENOUS BLD VENIPUNCTURE: CPT

## 2020-02-21 PROCEDURE — 94010 BREATHING CAPACITY TEST: CPT

## 2020-02-21 PROCEDURE — 93000 ELECTROCARDIOGRAM COMPLETE: CPT

## 2020-02-21 PROCEDURE — 99213 OFFICE O/P EST LOW 20 MIN: CPT | Mod: 25

## 2020-02-21 PROCEDURE — 82962 GLUCOSE BLOOD TEST: CPT

## 2020-02-21 NOTE — PHYSICAL EXAM
[FreeTextEntry1] : left groin swelling erythema edema consistent with cellulitis with mild improvement [FreeTextEntry2] : trace edema

## 2020-02-21 NOTE — PROCEDURE
[FreeTextEntry1] : Hemoglobin A1c 6.0  Jan 17 2020\par Glucose  107\par Spirometry No BD\par Moderate reduction flow rates\par Spirometry  1/17/2020\par  moderate  reduction in flow rates\par  obstructive ventilatory impairment and with reduced FVC cannot exclude restrictive component\par Pos BD at FVC 12 %\par \par EKG 2/21/2020\par sinus tachycardia\par  1 st degree AV block\par NSST -T changes\par \par Chest x-ray PA lateral December 19, 2019\par Indication COPD-asbestosis\par Cardiac size normal\par " Aortic\par Dense calcified pleural plaque right hemidiaphragm\par COPD changes\par No significant interstitial changes\par No dominant pulmonary nodules parenchymal infiltrates pleural effusions\par Change compared to chest x-ray of December 18, 2018\par \par PFT 9/16/2019\par Moderate OAD\par  Pos BD 14 % at FVC\par  Minimal airtrapping and normal TLC 95 %\par  DLCO  84 % normal  range\par  HGB 16.2\par  Stable pulmonary physiology\par \par Pulmonary Six Minute Walk 5/4/2018 No desaturation\par \par Blood draw\par Basic metabolic panel February 21, 2020\par Normal\par BUN 16 creatinine 0.94\par Serum electrolytes normal\par Glucose 117\par Data review November 20, 2019\par Lipid profile\par Cholesterol 163\par HDL 50\par LDL 96\par Triglycerides 87\par \par Data  Review  lab  review  Oct  14  2019\par Lipid panel\par Cholesterol 166 HDL 45 LDL 99\par Liver function testing normal\par Reviewed June 25, 2019\par CBC normal\par White blood count 9.27\par Hemoglobin 16.2 hematocrit 49.8 platelet count 214,000\par Glucose 128\par Serum electrolytes normal\par BUN 11 creatinine 0.81\par Liver function testing normal\par Serum potassium 4.0\par Serum calcium 9.9\par Lipid profile April 5, 2019\par Total cholesterol  158 HDL 50 LDL 85\par Triglycerides 117\par \par HD Flu administered sept 2019

## 2020-02-21 NOTE — REVIEW OF SYSTEMS
[FreeTextEntry7] : abnormal CT  ABD  focal  wall thickening [FreeTextEntry5] : Hematuria  with  cystoscopy [de-identified] : R/O Pre DM

## 2020-02-21 NOTE — DISCUSSION/SUMMARY
[FreeTextEntry1] : mild lower  extremity edema\par OAD\par  abnormal PFT\par  History asbestosis\par  COPD-Emphysema with mucoid impaction\par Pre DM\par HTN  with borderline control\par HLD\par Groin cellulitis without recurrence\par ANORO 1 puff Daily\par  CT CHEST   Sept 2020\par Next 1 month APPT watch Lipid profile at f/u\par September follow-up hemoglobin A1c and continue to monitor the glucose\par low sugar diet\par  leg  elevation\par repeat EKG

## 2020-02-21 NOTE — HISTORY OF PRESENT ILLNESS
[Wt Gain ___ Lbs] : no recent weight gain [Wt Loss ___ Lbs] : no recent weight loss [Oxygen] : the patient uses no supplemental oxygen [de-identified] : emphysema with mucous plugging [FreeTextEntry1] : no chest pain SOB\par  sleeping well\par no increased lower extremity edema\par up to date with Meds

## 2020-02-24 ENCOUNTER — RX RENEWAL (OUTPATIENT)
Age: 80
End: 2020-02-24

## 2020-03-27 ENCOUNTER — APPOINTMENT (OUTPATIENT)
Dept: PULMONOLOGY | Facility: CLINIC | Age: 80
End: 2020-03-27

## 2020-04-22 ENCOUNTER — RX RENEWAL (OUTPATIENT)
Age: 80
End: 2020-04-22

## 2020-05-27 ENCOUNTER — RX RENEWAL (OUTPATIENT)
Age: 80
End: 2020-05-27

## 2020-05-30 ENCOUNTER — RX RENEWAL (OUTPATIENT)
Age: 80
End: 2020-05-30

## 2020-06-02 ENCOUNTER — RX RENEWAL (OUTPATIENT)
Age: 80
End: 2020-06-02

## 2020-06-03 ENCOUNTER — NON-APPOINTMENT (OUTPATIENT)
Age: 80
End: 2020-06-03

## 2020-06-03 ENCOUNTER — APPOINTMENT (OUTPATIENT)
Dept: PULMONOLOGY | Facility: CLINIC | Age: 80
End: 2020-06-03
Payer: MEDICARE

## 2020-06-03 VITALS
SYSTOLIC BLOOD PRESSURE: 160 MMHG | HEART RATE: 106 BPM | TEMPERATURE: 97.9 F | DIASTOLIC BLOOD PRESSURE: 110 MMHG | OXYGEN SATURATION: 95 %

## 2020-06-03 LAB
ALBUMIN: 80
BILIRUB UR QL STRIP: NEGATIVE
CLARITY UR: CLEAR
COLLECTION METHOD: NORMAL
CREATININE: 200
GLUCOSE BLDC GLUCOMTR-MCNC: 112
GLUCOSE UR-MCNC: NEGATIVE
HBA1C MFR BLD HPLC: 6.1
HCG UR QL: 0.2 EU/DL
HGB UR QL STRIP.AUTO: NEGATIVE
KETONES UR-MCNC: NEGATIVE
LEUKOCYTE ESTERASE UR QL STRIP: NEGATIVE
MICROALBUMIN/CREAT UR TEST STR-RTO: NORMAL
NITRITE UR QL STRIP: NEGATIVE
PH UR STRIP: 6
PROT UR STRIP-MCNC: NORMAL
SP GR UR STRIP: 1.02

## 2020-06-03 PROCEDURE — 82962 GLUCOSE BLOOD TEST: CPT

## 2020-06-03 PROCEDURE — 93000 ELECTROCARDIOGRAM COMPLETE: CPT

## 2020-06-03 PROCEDURE — 83036 HEMOGLOBIN GLYCOSYLATED A1C: CPT | Mod: QW

## 2020-06-03 PROCEDURE — 36415 COLL VENOUS BLD VENIPUNCTURE: CPT

## 2020-06-03 PROCEDURE — 99214 OFFICE O/P EST MOD 30 MIN: CPT | Mod: 25

## 2020-06-03 PROCEDURE — 71046 X-RAY EXAM CHEST 2 VIEWS: CPT

## 2020-06-03 PROCEDURE — 82044 UR ALBUMIN SEMIQUANTITATIVE: CPT | Mod: QW

## 2020-06-03 PROCEDURE — 81003 URINALYSIS AUTO W/O SCOPE: CPT | Mod: QW

## 2020-06-04 LAB
25(OH)D3 SERPL-MCNC: 27.9 NG/ML
ALBUMIN SERPL ELPH-MCNC: 4.7 G/DL
ALP BLD-CCNC: 60 U/L
ALT SERPL-CCNC: 16 U/L
ANION GAP SERPL CALC-SCNC: 21 MMOL/L
AST SERPL-CCNC: 18 U/L
BASOPHILS # BLD AUTO: 0.05 K/UL
BASOPHILS NFR BLD AUTO: 0.5 %
BILIRUB DIRECT SERPL-MCNC: 0.2 MG/DL
BILIRUB INDIRECT SERPL-MCNC: 0.5 MG/DL
BILIRUB SERPL-MCNC: 0.7 MG/DL
BUN SERPL-MCNC: 10 MG/DL
CALCIUM SERPL-MCNC: 9.8 MG/DL
CHLORIDE SERPL-SCNC: 96 MMOL/L
CHOLEST SERPL-MCNC: 148 MG/DL
CHOLEST/HDLC SERPL: 3.2 RATIO
CO2 SERPL-SCNC: 20 MMOL/L
CREAT SERPL-MCNC: 0.74 MG/DL
EOSINOPHIL # BLD AUTO: 0.24 K/UL
EOSINOPHIL NFR BLD AUTO: 2.5 %
GLUCOSE SERPL-MCNC: 111 MG/DL
HCT VFR BLD CALC: 49.6 %
HDLC SERPL-MCNC: 47 MG/DL
HGB BLD-MCNC: 16.3 G/DL
IMM GRANULOCYTES NFR BLD AUTO: 0.3 %
LDLC SERPL CALC-MCNC: 82 MG/DL
LYMPHOCYTES # BLD AUTO: 2.27 K/UL
LYMPHOCYTES NFR BLD AUTO: 24 %
MAN DIFF?: NORMAL
MCHC RBC-ENTMCNC: 30.4 PG
MCHC RBC-ENTMCNC: 32.9 GM/DL
MCV RBC AUTO: 92.4 FL
MONOCYTES # BLD AUTO: 0.86 K/UL
MONOCYTES NFR BLD AUTO: 9.1 %
NEUTROPHILS # BLD AUTO: 6 K/UL
NEUTROPHILS NFR BLD AUTO: 63.6 %
PLATELET # BLD AUTO: 315 K/UL
POTASSIUM SERPL-SCNC: 3.9 MMOL/L
PROT SERPL-MCNC: 7 G/DL
PSA SERPL-MCNC: 1.38 NG/ML
RBC # BLD: 5.37 M/UL
RBC # FLD: 12.8 %
SARS-COV-2 IGG SERPL IA-ACNC: 0.22 INDEX
SARS-COV-2 IGG SERPL QL IA: NEGATIVE
SODIUM SERPL-SCNC: 136 MMOL/L
T4 FREE SERPL-MCNC: 1.3 NG/DL
T4 SERPL-MCNC: 5.5 UG/DL
TRIGL SERPL-MCNC: 97 MG/DL
TSH SERPL-ACNC: 2.68 UIU/ML
WBC # FLD AUTO: 9.45 K/UL

## 2020-06-04 NOTE — HISTORY OF PRESENT ILLNESS
[Stable] : are stable [None] : ~He/She~ has no significant interval events [Difficulty Breathing During Exertion] : stable dyspnea on exertion [Feelings Of Weakness On Exertion] : stable exercise intolerance [Cough] : denies coughing [Wheezing] : denies wheezing [Regional Soft Tissue Swelling Both Lower Extremities] : denies lower extremity edema [Chest Pain Or Discomfort] : denies chest pain [Fever] : denies fever [Date: ___] : was performed [unfilled] [Wt Gain ___ Lbs] : no recent weight gain [Oxygen] : the patient uses no supplemental oxygen [Wt Loss ___ Lbs] : no recent weight loss [de-identified] : emphysema with mucous plugging [FreeTextEntry1] : no chest pain SOB\par  sleeping well\par no increased lower extremity edema\par up to date with Meds

## 2020-06-04 NOTE — REVIEW OF SYSTEMS
[Hypertension] : ~T hypertension [As Noted in HPI] : as noted in HPI [Negative] : Psychiatric [FreeTextEntry7] : abnormal CT  ABD  focal  wall thickening [FreeTextEntry5] : Hematuria  with  cystoscopy [de-identified] : R/O Pre DM

## 2020-06-04 NOTE — PHYSICAL EXAM
[General Appearance - Well Developed] : well developed [Normal Appearance] : normal appearance [Well Groomed] : well groomed [General Appearance - Well Nourished] : well nourished [No Deformities] : no deformities [General Appearance - In No Acute Distress] : no acute distress [Normal Conjunctiva] : the conjunctiva exhibited no abnormalities [Eyelids - No Xanthelasma] : the eyelids demonstrated no xanthelasmas [I] : I [Normal Oropharynx] : normal oropharynx [Neck Appearance] : the appearance of the neck was normal [Neck Cervical Mass (___cm)] : no neck mass was observed [Jugular Venous Distention Increased] : there was no jugular-venous distention [Thyroid Diffuse Enlargement] : the thyroid was not enlarged [Heart Rate And Rhythm] : heart rate and rhythm were normal [Heart Sounds] : normal S1 and S2 [Murmurs] : no murmurs present [Arterial Pulses Normal] : the arterial pulses were normal [Edema] : no peripheral edema present [Veins - Varicosity Changes] : no varicosital changes were noted in the lower extremities [Respiration, Rhythm And Depth] : normal respiratory rhythm and effort [Exaggerated Use Of Accessory Muscles For Inspiration] : no accessory muscle use [Bowel Sounds] : normal bowel sounds [Abdomen Soft] : soft [Abdomen Tenderness] : non-tender [Abdomen Mass (___ Cm)] : no abdominal mass palpated [Abnormal Walk] : normal gait [Gait - Sufficient For Exercise Testing] : the gait was sufficient for exercise testing [Nail Clubbing] : no clubbing of the fingernails [Cyanosis, Localized] : no localized cyanosis [Petechial Hemorrhages (___cm)] : no petechial hemorrhages [Skin Color & Pigmentation] : normal skin color and pigmentation [] : no rash [Deep Tendon Reflexes (DTR)] : deep tendon reflexes were 2+ and symmetric [Sensation] : the sensory exam was normal to light touch and pinprick [Motor Exam] : the motor exam was normal [No Focal Deficits] : no focal deficits [Oriented To Time, Place, And Person] : oriented to person, place, and time [Impaired Insight] : insight and judgment were intact [Affect] : the affect was normal [Mood] : the mood was normal [FreeTextEntry1] : left groin swelling erythema edema consistent with cellulitis  [FreeTextEntry2] : No significant edema

## 2020-06-04 NOTE — DISCUSSION/SUMMARY
[FreeTextEntry1] : mild lower  extremity edema\par OAD\par  abnormal PFT\par  History asbestosis\par  COPD-Emphysema with mucoid impaction\par Pre DM\par HTN  - poor  control\par Sinus tachycardia-we will increase metoprolol 200 mg a.m. and 50 mg p.o.\par HLD\par Groin cellulitis w with recurrence- left inguinal\par COVID 19 AB Negative\par \par ANORO 1 puff Daily\par  CT CHEST   Sept 2020\par Next 1 month APPT watch Lipid profile at f/u\par September follow-up hemoglobin A1c and continue to monitor the glucose\par low sugar diet\par  leg  elevation\par Order topical antibiotic with oral antibiotic and warm soaks\par Should have follow-up with urology if no interval improvement\par We will consider a CT abdomen pelvis if no adequate response

## 2020-06-04 NOTE — PROCEDURE
[FreeTextEntry1] : DATA [facility]  3  2020\par Serum glucose 112\par Hemoglobin A1c 6.1\par Urine analysis trace protein negative nitrates\par \par EKG [facility] 3, 2020\par Sinus tachycardia\par Cannot exclude old anterior wall changes\par RSR\par \par Chest x-ray PA lateral [facility] 3, 2020\par Normal cardiac size\par " Reported\par Calcified pleural diaphragmatic pad right\par Patchy changes midlung zone\par No pleural effusions dominant pulmonary nodules\par No gross interval change compared to chest x-ray of December 19, 2019\par \par \par \par Spirometry No BD\par Moderate reduction flow rates\par Spirometry  1/17/2020\par  moderate  reduction in flow rates\par  obstructive ventilatory impairment and with reduced FVC cannot exclude restrictive component\par Pos BD at FVC 12 %\par \par Chest x-ray PA lateral December 19, 2019\par Indication COPD-asbestosis\par Cardiac size normal\par " Aortic\par Dense calcified pleural plaque right hemidiaphragm\par COPD changes\par No significant interstitial changes\par No dominant pulmonary nodules parenchymal infiltrates pleural effusions\par Change compared to chest x-ray of December 18, 2018\par \par PFT 9/16/2019\par Moderate OAD\par  Pos BD 14 % at FVC\par  Minimal airtrapping and normal TLC 95 %\par  DLCO  84 % normal  range\par  HGB 16.2\par  Stable pulmonary physiology\par \par Pulmonary Six Minute Walk 5/4/2018 No desaturation\par \par Blood draw\par Data review June 4, 2020\par COVID-19 IgG antibody negative\par CBC normal\par White blood count 9.45 hemoglobin 16.3 hematocrit 49.6\par Platelet count 315,000\par T4 TSH normal\par PSA normal range 1.38\par Lipid profile\par Cholesterol 148 HDL 47 LDL 82\par Vitamin D 27.9\par \par Basic metabolic panel February 21, 2020\par Normal\par BUN 16 creatinine 0.94\par Serum electrolytes normal\par Glucose 117\par Data review November 20, 2019\par Lipid profile\par Cholesterol 163\par HDL 50\par LDL 96\par Triglycerides 87\par \par Data  Review  lab  review  Oct  14  2019\par Lipid panel\par Cholesterol 166 HDL 45 LDL 99\par Liver function testing normal\par Reviewed June 25, 2019\par CBC normal\par White blood count 9.27\par Hemoglobin 16.2 hematocrit 49.8 platelet count 214,000\par Glucose 128\par Serum electrolytes normal\par BUN 11 creatinine 0.81\par Liver function testing normal\par Serum potassium 4.0\par Serum calcium 9.9\par Lipid profile April 5, 2019\par Total cholesterol  158 HDL 50 LDL 85\par Triglycerides 117\par \par HD Flu administered sept 2019

## 2020-06-10 ENCOUNTER — APPOINTMENT (OUTPATIENT)
Dept: PULMONOLOGY | Facility: CLINIC | Age: 80
End: 2020-06-10
Payer: MEDICARE

## 2020-06-10 VITALS
DIASTOLIC BLOOD PRESSURE: 82 MMHG | SYSTOLIC BLOOD PRESSURE: 156 MMHG | OXYGEN SATURATION: 93 % | TEMPERATURE: 97.7 F | HEART RATE: 68 BPM

## 2020-06-10 DIAGNOSIS — L03.314 CELLULITIS OF GROIN: ICD-10-CM

## 2020-06-10 PROCEDURE — 99213 OFFICE O/P EST LOW 20 MIN: CPT

## 2020-06-10 NOTE — HISTORY OF PRESENT ILLNESS
[Stable] : are stable [Difficulty Breathing During Exertion] : stable dyspnea on exertion [None] : ~He/She~ has no significant interval events [Feelings Of Weakness On Exertion] : stable exercise intolerance [Cough] : denies coughing [Chest Pain Or Discomfort] : denies chest pain [Wheezing] : denies wheezing [Regional Soft Tissue Swelling Both Lower Extremities] : denies lower extremity edema [Fever] : denies fever [Date: ___] : was performed [unfilled] [Wt Gain ___ Lbs] : no recent weight gain [Wt Loss ___ Lbs] : no recent weight loss [Oxygen] : the patient uses no supplemental oxygen [de-identified] : emphysema with mucous plugging [FreeTextEntry1] : no chest pain SOB\par  sleeping well\par no increased lower extremity edema\par up to date with Meds

## 2020-06-10 NOTE — DISCUSSION/SUMMARY
[FreeTextEntry1] : mild lower  extremity edema\par OAD\par  abnormal PFT\par  History asbestosis\par  COPD-Emphysema with mucoid impaction\par Pre DM\par HTN  - poor  control\par Sinus tachycardia-we will increase metoprolol 200 mg a.m. and 50 mg p.o.\par HLD\par Groin cellulitis w with recurrence- left inguinal\par COVID 19 AB Negative\par \par ANORO 1 puff Daily\par  CT CHEST   Sept 2020\par Next 1 month APPT watch Lipid profile at f/u\par September follow-up hemoglobin A1c and continue to monitor the glucose\par low sugar diet\par  leg  elevation\par Order topical antibiotic with oral antibiotic and warm soaks- additional  week\par add Diflucan \par Should have follow-up with urology if no interval improvement\par We will consider a CT abdomen pelvis if no adequate response\par 2 week f/u and then  schedule PFT

## 2020-06-10 NOTE — PHYSICAL EXAM
[General Appearance - Well Developed] : well developed [Normal Appearance] : normal appearance [Well Groomed] : well groomed [General Appearance - Well Nourished] : well nourished [General Appearance - In No Acute Distress] : no acute distress [No Deformities] : no deformities [Eyelids - No Xanthelasma] : the eyelids demonstrated no xanthelasmas [Normal Conjunctiva] : the conjunctiva exhibited no abnormalities [Normal Oropharynx] : normal oropharynx [I] : I [Neck Appearance] : the appearance of the neck was normal [Jugular Venous Distention Increased] : there was no jugular-venous distention [Neck Cervical Mass (___cm)] : no neck mass was observed [Thyroid Diffuse Enlargement] : the thyroid was not enlarged [Heart Sounds] : normal S1 and S2 [Heart Rate And Rhythm] : heart rate and rhythm were normal [Murmurs] : no murmurs present [Arterial Pulses Normal] : the arterial pulses were normal [Edema] : no peripheral edema present [Veins - Varicosity Changes] : no varicosital changes were noted in the lower extremities [Exaggerated Use Of Accessory Muscles For Inspiration] : no accessory muscle use [Respiration, Rhythm And Depth] : normal respiratory rhythm and effort [Bowel Sounds] : normal bowel sounds [Abdomen Soft] : soft [Abdomen Mass (___ Cm)] : no abdominal mass palpated [Abdomen Tenderness] : non-tender [Gait - Sufficient For Exercise Testing] : the gait was sufficient for exercise testing [Abnormal Walk] : normal gait [Cyanosis, Localized] : no localized cyanosis [Petechial Hemorrhages (___cm)] : no petechial hemorrhages [Nail Clubbing] : no clubbing of the fingernails [Deep Tendon Reflexes (DTR)] : deep tendon reflexes were 2+ and symmetric [Skin Color & Pigmentation] : normal skin color and pigmentation [] : no rash [Motor Exam] : the motor exam was normal [Sensation] : the sensory exam was normal to light touch and pinprick [Oriented To Time, Place, And Person] : oriented to person, place, and time [No Focal Deficits] : no focal deficits [Mood] : the mood was normal [Affect] : the affect was normal [Impaired Insight] : insight and judgment were intact [FreeTextEntry1] : left groin swelling erythema edema consistent with cellulitis - interval improvement with candida  [FreeTextEntry2] : No significant edema

## 2020-06-10 NOTE — REVIEW OF SYSTEMS
[Hypertension] : ~T hypertension [As Noted in HPI] : as noted in HPI [Negative] : Psychiatric [FreeTextEntry7] : abnormal CT  ABD  focal  wall thickening [FreeTextEntry5] : Hematuria  with  cystoscopy [de-identified] : R/O Pre DM

## 2020-07-31 ENCOUNTER — APPOINTMENT (OUTPATIENT)
Dept: PULMONOLOGY | Facility: CLINIC | Age: 80
End: 2020-07-31
Payer: MEDICARE

## 2020-07-31 VITALS
OXYGEN SATURATION: 93 % | TEMPERATURE: 98.2 F | DIASTOLIC BLOOD PRESSURE: 80 MMHG | BODY MASS INDEX: 32.9 KG/M2 | HEART RATE: 57 BPM | WEIGHT: 235 LBS | SYSTOLIC BLOOD PRESSURE: 178 MMHG | HEIGHT: 71 IN

## 2020-07-31 VITALS — DIASTOLIC BLOOD PRESSURE: 82 MMHG | SYSTOLIC BLOOD PRESSURE: 167 MMHG

## 2020-07-31 DIAGNOSIS — Z20.828 CONTACT WITH AND (SUSPECTED) EXPOSURE TO OTHER VIRAL COMMUNICABLE DISEASES: ICD-10-CM

## 2020-07-31 PROCEDURE — 99214 OFFICE O/P EST MOD 30 MIN: CPT | Mod: CS,25

## 2020-07-31 PROCEDURE — 36415 COLL VENOUS BLD VENIPUNCTURE: CPT | Mod: CS

## 2020-07-31 RX ORDER — AMOXICILLIN AND CLAVULANATE POTASSIUM 875; 125 MG/1; MG/1
875-125 TABLET, COATED ORAL
Qty: 14 | Refills: 1 | Status: DISCONTINUED | COMMUNITY
Start: 2020-06-03 | End: 2020-07-31

## 2020-07-31 NOTE — DISCUSSION/SUMMARY
[FreeTextEntry1] : mild lower  extremity edema\par OAD\par  abnormal PFT\par  History asbestosis\par  COPD-Emphysema with mucoid impaction\par Pre DM\par HTN  - poor  control\par Sinus tachycardia-we will increase metoprolol 200 mg a.m. and 50 mg p.o.\par HLD\par Groin cellulitis w with recurrence- left inguinal- Resolved\par COVID 19 AB Negative\par \par ANORO 1 puff Daily\par  CT CHEST   Sept 2020\par Next 1 month APPT watch Lipid profile at f/u\par September follow-up hemoglobin A1c and continue to monitor the glucose\par low sugar diet\par  leg  elevation\par Order topical antibiotic with oral antibiotic and warm soaks- additional  week\par add Diflucan \par Should have follow-up with urology if no interval improvement\par We will consider a CT abdomen pelvis if no adequate response\par 2 week f/u and then  schedule PFT

## 2020-07-31 NOTE — REVIEW OF SYSTEMS
[As Noted in HPI] : as noted in HPI [Hypertension] : ~T hypertension [Negative] : Psychiatric [FreeTextEntry5] : Hematuria  with  cystoscopy [FreeTextEntry7] : abnormal CT  ABD  focal  wall thickening [de-identified] : R/O Pre DM

## 2020-07-31 NOTE — PHYSICAL EXAM
[General Appearance - Well Developed] : well developed [Normal Appearance] : normal appearance [General Appearance - Well Nourished] : well nourished [Well Groomed] : well groomed [No Deformities] : no deformities [General Appearance - In No Acute Distress] : no acute distress [Eyelids - No Xanthelasma] : the eyelids demonstrated no xanthelasmas [Normal Conjunctiva] : the conjunctiva exhibited no abnormalities [Normal Oropharynx] : normal oropharynx [Neck Cervical Mass (___cm)] : no neck mass was observed [Neck Appearance] : the appearance of the neck was normal [I] : I [Jugular Venous Distention Increased] : there was no jugular-venous distention [Thyroid Diffuse Enlargement] : the thyroid was not enlarged [Heart Rate And Rhythm] : heart rate and rhythm were normal [Heart Sounds] : normal S1 and S2 [Arterial Pulses Normal] : the arterial pulses were normal [Edema] : no peripheral edema present [Murmurs] : no murmurs present [Veins - Varicosity Changes] : no varicosital changes were noted in the lower extremities [Exaggerated Use Of Accessory Muscles For Inspiration] : no accessory muscle use [Respiration, Rhythm And Depth] : normal respiratory rhythm and effort [Abdomen Soft] : soft [Abdomen Tenderness] : non-tender [Bowel Sounds] : normal bowel sounds [Abdomen Mass (___ Cm)] : no abdominal mass palpated [Abnormal Walk] : normal gait [Gait - Sufficient For Exercise Testing] : the gait was sufficient for exercise testing [Cyanosis, Localized] : no localized cyanosis [Nail Clubbing] : no clubbing of the fingernails [Petechial Hemorrhages (___cm)] : no petechial hemorrhages [Skin Color & Pigmentation] : normal skin color and pigmentation [Sensation] : the sensory exam was normal to light touch and pinprick [Deep Tendon Reflexes (DTR)] : deep tendon reflexes were 2+ and symmetric [] : no rash [Motor Exam] : the motor exam was normal [No Focal Deficits] : no focal deficits [Impaired Insight] : insight and judgment were intact [Affect] : the affect was normal [Oriented To Time, Place, And Person] : oriented to person, place, and time [Mood] : the mood was normal [FreeTextEntry1] : left groin swelling erythema edema consistent with cellulitis - interval improvement with candida  [FreeTextEntry2] : No significant edema

## 2020-07-31 NOTE — HISTORY OF PRESENT ILLNESS
[Feelings Of Weakness On Exertion] : stable exercise intolerance [None] : ~He/She~ has no significant interval events [Stable] : are stable [Difficulty Breathing During Exertion] : stable dyspnea on exertion [Chest Pain Or Discomfort] : denies chest pain [Wheezing] : denies wheezing [Cough] : denies coughing [Regional Soft Tissue Swelling Both Lower Extremities] : denies lower extremity edema [Fever] : denies fever [Date: ___] : was performed [unfilled] [Wt Gain ___ Lbs] : no recent weight gain [Wt Loss ___ Lbs] : no recent weight loss [Oxygen] : the patient uses no supplemental oxygen [de-identified] : emphysema with mucous plugging [FreeTextEntry1] : no chest pain SOB\par  sleeping well\par no increased lower extremity edema\par up to date with Meds

## 2020-08-02 LAB
ANION GAP SERPL CALC-SCNC: 20 MMOL/L
BUN SERPL-MCNC: 13 MG/DL
CALCIUM SERPL-MCNC: 9.5 MG/DL
CHLORIDE SERPL-SCNC: 96 MMOL/L
CO2 SERPL-SCNC: 17 MMOL/L
CREAT SERPL-MCNC: 0.82 MG/DL
GLUCOSE SERPL-MCNC: 108 MG/DL
POTASSIUM SERPL-SCNC: 4 MMOL/L
SODIUM SERPL-SCNC: 133 MMOL/L

## 2020-08-21 ENCOUNTER — RX RENEWAL (OUTPATIENT)
Age: 80
End: 2020-08-21

## 2020-08-28 ENCOUNTER — RX RENEWAL (OUTPATIENT)
Age: 80
End: 2020-08-28

## 2020-09-01 LAB — SARS-COV-2 N GENE NPH QL NAA+PROBE: NOT DETECTED

## 2020-09-04 ENCOUNTER — APPOINTMENT (OUTPATIENT)
Dept: PULMONOLOGY | Facility: CLINIC | Age: 80
End: 2020-09-04
Payer: MEDICARE

## 2020-09-04 VITALS
RESPIRATION RATE: 14 BRPM | BODY MASS INDEX: 32.2 KG/M2 | HEIGHT: 71 IN | HEART RATE: 59 BPM | OXYGEN SATURATION: 93 % | WEIGHT: 230 LBS | SYSTOLIC BLOOD PRESSURE: 160 MMHG | DIASTOLIC BLOOD PRESSURE: 80 MMHG

## 2020-09-04 PROCEDURE — 94010 BREATHING CAPACITY TEST: CPT

## 2020-09-04 PROCEDURE — 94727 GAS DIL/WSHOT DETER LNG VOL: CPT

## 2020-09-04 PROCEDURE — 94729 DIFFUSING CAPACITY: CPT

## 2020-10-26 ENCOUNTER — APPOINTMENT (OUTPATIENT)
Dept: PULMONOLOGY | Facility: CLINIC | Age: 80
End: 2020-10-26
Payer: MEDICARE

## 2020-10-26 VITALS
TEMPERATURE: 98.6 F | HEART RATE: 72 BPM | DIASTOLIC BLOOD PRESSURE: 74 MMHG | OXYGEN SATURATION: 93 % | SYSTOLIC BLOOD PRESSURE: 163 MMHG | RESPIRATION RATE: 14 BRPM

## 2020-10-26 PROCEDURE — 90662 IIV NO PRSV INCREASED AG IM: CPT

## 2020-10-26 PROCEDURE — 99214 OFFICE O/P EST MOD 30 MIN: CPT | Mod: 25

## 2020-10-26 PROCEDURE — G0008: CPT

## 2020-10-26 NOTE — PHYSICAL EXAM
[General Appearance - Well Developed] : well developed [Normal Appearance] : normal appearance [Well Groomed] : well groomed [General Appearance - Well Nourished] : well nourished [No Deformities] : no deformities [General Appearance - In No Acute Distress] : no acute distress [Normal Conjunctiva] : the conjunctiva exhibited no abnormalities [Eyelids - No Xanthelasma] : the eyelids demonstrated no xanthelasmas [Normal Oropharynx] : normal oropharynx [I] : I [Neck Appearance] : the appearance of the neck was normal [Neck Cervical Mass (___cm)] : no neck mass was observed [Jugular Venous Distention Increased] : there was no jugular-venous distention [Thyroid Diffuse Enlargement] : the thyroid was not enlarged [Heart Rate And Rhythm] : heart rate and rhythm were normal [Heart Sounds] : normal S1 and S2 [Murmurs] : no murmurs present [Arterial Pulses Normal] : the arterial pulses were normal [Edema] : no peripheral edema present [Veins - Varicosity Changes] : no varicosital changes were noted in the lower extremities [Respiration, Rhythm And Depth] : normal respiratory rhythm and effort [Exaggerated Use Of Accessory Muscles For Inspiration] : no accessory muscle use [Bowel Sounds] : normal bowel sounds [Abdomen Soft] : soft [Abdomen Tenderness] : non-tender [Abdomen Mass (___ Cm)] : no abdominal mass palpated [Abnormal Walk] : normal gait [Gait - Sufficient For Exercise Testing] : the gait was sufficient for exercise testing [Nail Clubbing] : no clubbing of the fingernails [Cyanosis, Localized] : no localized cyanosis [Petechial Hemorrhages (___cm)] : no petechial hemorrhages [Skin Color & Pigmentation] : normal skin color and pigmentation [] : no rash [Deep Tendon Reflexes (DTR)] : deep tendon reflexes were 2+ and symmetric [Sensation] : the sensory exam was normal to light touch and pinprick [Motor Exam] : the motor exam was normal [No Focal Deficits] : no focal deficits [Oriented To Time, Place, And Person] : oriented to person, place, and time [Impaired Insight] : insight and judgment were intact [Affect] : the affect was normal [Mood] : the mood was normal [FreeTextEntry1] : left groin swelling erythema edema consistent with cellulitis - interval improvement with candida  [FreeTextEntry2] : No significant edema

## 2020-10-26 NOTE — HISTORY OF PRESENT ILLNESS
[Stable] : are stable [None] : ~He/She~ has no significant interval events [Difficulty Breathing During Exertion] : stable dyspnea on exertion [Feelings Of Weakness On Exertion] : stable exercise intolerance [Cough] : denies coughing [Wheezing] : denies wheezing [Regional Soft Tissue Swelling Both Lower Extremities] : denies lower extremity edema [Chest Pain Or Discomfort] : denies chest pain [Fever] : denies fever [Date: ___] : was performed [unfilled] [Wt Gain ___ Lbs] : no recent weight gain [Wt Loss ___ Lbs] : no recent weight loss [Oxygen] : the patient uses no supplemental oxygen [de-identified] : emphysema with mucous plugging [FreeTextEntry1] : no chest pain SOB\par  sleeping well\par no increased lower extremity edema\par up to date with Meds

## 2020-10-26 NOTE — PROCEDURE
[FreeTextEntry1] : PFT September 4, 2020\par No bronchodilator administered\par Moderate reduction flow rates with an obstructive ventilatory impairment and a reduced total lung capacity 69% predicted.\par Mild reduction diffusion with a loss of functioning alveolocapillary units are\par Impression combined mild obstructive and restrictive ventilatory impairment with mild gas exchange impairment.\par \par DATA [facility]  3  2020\par Serum glucose 112\par Hemoglobin A1c 6.1\par Urine analysis trace protein negative nitrates\par \par EKG [facility] 3, 2020\par Sinus tachycardia\par Cannot exclude old anterior wall changes\par RSR\par \par Chest x-ray PA lateral [facility] 3, 2020\par Normal cardiac size\par " Reported\par Calcified pleural diaphragmatic pad right\par Patchy changes midlung zone\par No pleural effusions dominant pulmonary nodules\par No gross interval change compared to chest x-ray of December 19, 2019\par \par \par \par Spirometry No BD\par Moderate reduction flow rates\par Spirometry  1/17/2020\par  moderate  reduction in flow rates\par  obstructive ventilatory impairment and with reduced FVC cannot exclude restrictive component\par Pos BD at FVC 12 %\par \par Chest x-ray PA lateral December 19, 2019\par Indication COPD-asbestosis\par Cardiac size normal\par " Aortic\par Dense calcified pleural plaque right hemidiaphragm\par COPD changes\par No significant interstitial changes\par No dominant pulmonary nodules parenchymal infiltrates pleural effusions\par Change compared to chest x-ray of December 18, 2018\par \par PFT 9/16/2019\par Moderate OAD\par  Pos BD 14 % at FVC\par  Minimal airtrapping and normal TLC 95 %\par  DLCO  84 % normal  range\par  HGB 16.2\par  Stable pulmonary physiology\par \par Pulmonary Six Minute Walk 5/4/2018 No desaturation\par \par Blood draw\par Data review June 4, 2020\par COVID-19 IgG antibody negative\par CBC normal\par White blood count 9.45 hemoglobin 16.3 hematocrit 49.6\par Platelet count 315,000\par T4 TSH normal\par PSA normal range 1.38\par Lipid profile\par Cholesterol 148 HDL 47 LDL 82\par Vitamin D 27.9\par \par Basic metabolic panel February 21, 2020\par Normal\par BUN 16 creatinine 0.94\par Serum electrolytes normal\par Glucose 117\par Data review November 20, 2019\par Lipid profile\par Cholesterol 163\par HDL 50\par LDL 96\par Triglycerides 87\par \par Data  Review  lab  review  Oct  14  2019\par Lipid panel\par Cholesterol 166 HDL 45 LDL 99\par Liver function testing normal\par Reviewed June 25, 2019\par CBC normal\par White blood count 9.27\par Hemoglobin 16.2 hematocrit 49.8 platelet count 214,000\par Glucose 128\par Serum electrolytes normal\par BUN 11 creatinine 0.81\par Liver function testing normal\par Serum potassium 4.0\par Serum calcium 9.9\par Lipid profile April 5, 2019\par Total cholesterol  158 HDL 50 LDL 85\par Triglycerides 117\par \par HD Flu administered Oct  26  2020

## 2020-10-26 NOTE — REVIEW OF SYSTEMS
[Hypertension] : ~T hypertension [As Noted in HPI] : as noted in HPI [Negative] : Psychiatric [FreeTextEntry7] : abnormal CT  ABD  focal  wall thickening [FreeTextEntry5] : Hematuria  with  cystoscopy [de-identified] : R/O Pre DM

## 2020-10-26 NOTE — DISCUSSION/SUMMARY
[FreeTextEntry1] : mild lower  extremity edema\par OAD\par  abnormal PFT\par  History asbestosis\par  COPD-Emphysema with mucoid impaction\par Pre DM\par HTN  - poor  control\par Sinus tachycardia-we will increase metoprolol 200 mg a.m. and 50 mg p.o.-  normalized \par HLD\par Groin cellulitis w with recurrence- left inguinal- Resolved\par COVID 19 AB Negative\par \par ANORO 1 puff Daily\par  CT CHEST   Sept 2020\par Next 1 month APPT watch Lipid profile at f/u\par September follow-up hemoglobin A1c and continue to monitor the glucose\par low sugar diet\par Discussed  with patient never did get Colonoscopy - readvised\par

## 2020-11-15 ENCOUNTER — RX RENEWAL (OUTPATIENT)
Age: 80
End: 2020-11-15

## 2020-12-03 LAB — SARS-COV-2 N GENE NPH QL NAA+PROBE: NOT DETECTED

## 2020-12-07 ENCOUNTER — APPOINTMENT (OUTPATIENT)
Dept: PULMONOLOGY | Facility: CLINIC | Age: 80
End: 2020-12-07
Payer: MEDICARE

## 2020-12-07 VITALS
BODY MASS INDEX: 30.1 KG/M2 | SYSTOLIC BLOOD PRESSURE: 131 MMHG | RESPIRATION RATE: 14 BRPM | TEMPERATURE: 97.6 F | HEART RATE: 60 BPM | OXYGEN SATURATION: 92 % | DIASTOLIC BLOOD PRESSURE: 74 MMHG | HEIGHT: 71 IN | WEIGHT: 215 LBS

## 2020-12-07 LAB
GLUCOSE BLDC GLUCOMTR-MCNC: 138
HBA1C MFR BLD HPLC: 5.8

## 2020-12-07 PROCEDURE — ZZZZZ: CPT

## 2020-12-07 PROCEDURE — 83036 HEMOGLOBIN GLYCOSYLATED A1C: CPT | Mod: QW

## 2020-12-07 PROCEDURE — 94010 BREATHING CAPACITY TEST: CPT

## 2020-12-07 PROCEDURE — 94726 PLETHYSMOGRAPHY LUNG VOLUMES: CPT

## 2020-12-07 PROCEDURE — 82962 GLUCOSE BLOOD TEST: CPT

## 2020-12-07 PROCEDURE — 94750: CPT

## 2020-12-07 PROCEDURE — 99214 OFFICE O/P EST MOD 30 MIN: CPT | Mod: CS,25

## 2020-12-07 PROCEDURE — 94729 DIFFUSING CAPACITY: CPT

## 2020-12-07 NOTE — REVIEW OF SYSTEMS
[Hypertension] : ~T hypertension [As Noted in HPI] : as noted in HPI [Negative] : Psychiatric [FreeTextEntry7] : abnormal CT  ABD  focal  wall thickening [FreeTextEntry5] : Hematuria  with  cystoscopy [de-identified] : R/O Pre DM

## 2020-12-07 NOTE — DISCUSSION/SUMMARY
[FreeTextEntry1] : mild lower  extremity edema\par Low normal O2 saturations on room air\par  abnormal PFT\par  History asbestosis\par  COPD-Emphysema with mucoid impaction\par Pre DM\par HTN  - controlled\par Sinus tachycardia-we will increase metoprolol 200 mg a.m. and 50 mg p.o.-  normalized \par HLD\par Groin cellulitis w with recurrence- left inguinal- Resolved\par COVID 19 AB Negative\par \par ANORO 1 puff Daily\par  \par Next 1 month APPT watch Lipid profile at f/u\par September follow-up hemoglobin A1c and continue to monitor the glucose\par low sugar diet\par Discussed  with patient never did get Colonoscopy - readvised\par

## 2020-12-07 NOTE — HISTORY OF PRESENT ILLNESS
[Stable] : are stable [None] : ~He/She~ has no significant interval events [Difficulty Breathing During Exertion] : stable dyspnea on exertion [Feelings Of Weakness On Exertion] : stable exercise intolerance [Cough] : denies coughing [Wheezing] : denies wheezing [Regional Soft Tissue Swelling Both Lower Extremities] : denies lower extremity edema [Chest Pain Or Discomfort] : denies chest pain [Fever] : denies fever [Date: ___] : was performed [unfilled] [Wt Gain ___ Lbs] : no recent weight gain [Wt Loss ___ Lbs] : no recent weight loss [Oxygen] : the patient uses no supplemental oxygen [de-identified] : emphysema with mucous plugging [FreeTextEntry1] : no chest pain SOB\par  sleeping well\par no increased lower extremity edema\par up to date with Meds

## 2020-12-22 ENCOUNTER — RX RENEWAL (OUTPATIENT)
Age: 80
End: 2020-12-22

## 2021-01-18 ENCOUNTER — NON-APPOINTMENT (OUTPATIENT)
Age: 81
End: 2021-01-18

## 2021-01-18 ENCOUNTER — APPOINTMENT (OUTPATIENT)
Dept: PULMONOLOGY | Facility: CLINIC | Age: 81
End: 2021-01-18
Payer: MEDICARE

## 2021-01-18 VITALS — TEMPERATURE: 97.9 F

## 2021-01-18 VITALS
HEART RATE: 56 BPM | SYSTOLIC BLOOD PRESSURE: 153 MMHG | OXYGEN SATURATION: 95 % | TEMPERATURE: 97.2 F | DIASTOLIC BLOOD PRESSURE: 83 MMHG

## 2021-01-18 DIAGNOSIS — L30.9 DERMATITIS, UNSPECIFIED: ICD-10-CM

## 2021-01-18 LAB
ALBUMIN SERPL ELPH-MCNC: 4.4 G/DL
ALP BLD-CCNC: 80 U/L
ALT SERPL-CCNC: 11 U/L
ANION GAP SERPL CALC-SCNC: 16 MMOL/L
AST SERPL-CCNC: 16 U/L
BILIRUB DIRECT SERPL-MCNC: 0.2 MG/DL
BILIRUB INDIRECT SERPL-MCNC: 0.6 MG/DL
BILIRUB SERPL-MCNC: 0.8 MG/DL
BUN SERPL-MCNC: 13 MG/DL
CALCIUM SERPL-MCNC: 9.6 MG/DL
CHLORIDE SERPL-SCNC: 96 MMOL/L
CHOLEST SERPL-MCNC: 158 MG/DL
CO2 SERPL-SCNC: 21 MMOL/L
CREAT SERPL-MCNC: 0.99 MG/DL
GLUCOSE SERPL-MCNC: 134 MG/DL
HDLC SERPL-MCNC: 48 MG/DL
LDLC SERPL CALC-MCNC: 95 MG/DL
NONHDLC SERPL-MCNC: 110 MG/DL
POTASSIUM SERPL-SCNC: 4.3 MMOL/L
PROT SERPL-MCNC: 7 G/DL
SODIUM SERPL-SCNC: 132 MMOL/L
TRIGL SERPL-MCNC: 77 MG/DL

## 2021-01-18 PROCEDURE — 36415 COLL VENOUS BLD VENIPUNCTURE: CPT

## 2021-01-18 PROCEDURE — 99214 OFFICE O/P EST MOD 30 MIN: CPT | Mod: 25

## 2021-01-18 NOTE — REVIEW OF SYSTEMS
[Hypertension] : ~T hypertension [As Noted in HPI] : as noted in HPI [Negative] : Psychiatric [FreeTextEntry7] : abnormal CT  ABD  focal  wall thickening [FreeTextEntry5] : Hematuria  with  cystoscopy [de-identified] : R/O Pre DM

## 2021-01-18 NOTE — PHYSICAL EXAM
[General Appearance - Well Developed] : well developed [Normal Appearance] : normal appearance [Well Groomed] : well groomed [General Appearance - Well Nourished] : well nourished [No Deformities] : no deformities [General Appearance - In No Acute Distress] : no acute distress [Normal Conjunctiva] : the conjunctiva exhibited no abnormalities [Eyelids - No Xanthelasma] : the eyelids demonstrated no xanthelasmas [Normal Oropharynx] : normal oropharynx [I] : I [Neck Appearance] : the appearance of the neck was normal [Neck Cervical Mass (___cm)] : no neck mass was observed [Jugular Venous Distention Increased] : there was no jugular-venous distention [Thyroid Diffuse Enlargement] : the thyroid was not enlarged [Heart Rate And Rhythm] : heart rate and rhythm were normal [Heart Sounds] : normal S1 and S2 [Murmurs] : no murmurs present [Arterial Pulses Normal] : the arterial pulses were normal [Edema] : no peripheral edema present [Veins - Varicosity Changes] : no varicosital changes were noted in the lower extremities [Respiration, Rhythm And Depth] : normal respiratory rhythm and effort [Exaggerated Use Of Accessory Muscles For Inspiration] : no accessory muscle use [Bowel Sounds] : normal bowel sounds [Abdomen Soft] : soft [Abdomen Tenderness] : non-tender [Abdomen Mass (___ Cm)] : no abdominal mass palpated [Abnormal Walk] : normal gait [Gait - Sufficient For Exercise Testing] : the gait was sufficient for exercise testing [Nail Clubbing] : no clubbing of the fingernails [Cyanosis, Localized] : no localized cyanosis [Petechial Hemorrhages (___cm)] : no petechial hemorrhages [Skin Color & Pigmentation] : normal skin color and pigmentation [] : no rash [Deep Tendon Reflexes (DTR)] : deep tendon reflexes were 2+ and symmetric [Sensation] : the sensory exam was normal to light touch and pinprick [Motor Exam] : the motor exam was normal [No Focal Deficits] : no focal deficits [Oriented To Time, Place, And Person] : oriented to person, place, and time [Affect] : the affect was normal [Impaired Insight] : insight and judgment were intact [Mood] : the mood was normal [FreeTextEntry1] : left groin swelling erythema edema consistent with cellulitis - interval improvement with candida  [FreeTextEntry2] : No significant edema

## 2021-01-18 NOTE — HISTORY OF PRESENT ILLNESS
[Stable] : are stable [None] : ~He/She~ has no significant interval events [Difficulty Breathing During Exertion] : stable dyspnea on exertion [Cough] : denies coughing [Feelings Of Weakness On Exertion] : stable exercise intolerance [Wheezing] : denies wheezing [Regional Soft Tissue Swelling Both Lower Extremities] : denies lower extremity edema [Chest Pain Or Discomfort] : denies chest pain [Fever] : denies fever [Date: ___] : was performed [unfilled] [Wt Gain ___ Lbs] : no recent weight gain [Wt Loss ___ Lbs] : no recent weight loss [Oxygen] : the patient uses no supplemental oxygen [de-identified] : emphysema with mucous plugging [FreeTextEntry1] : no chest pain SOB\par  sleeping well\par no increased lower extremity edema\par up to date with Meds

## 2021-01-18 NOTE — PROCEDURE
[FreeTextEntry1] : PFT Body Box 12/7/2020\par moderate OAD\par airtrapping\par Resistance is  normal and sp conductance decreased\par low  normal DLCO DLCO 78 %\par HGB 16.3\par \par PFT September 4, 2020\par No bronchodilator administered\par Moderate reduction flow rates with an obstructive ventilatory impairment and a reduced total lung capacity 69% predicted.\par Mild reduction diffusion with a loss of functioning alveolocapillary units are\par Impression combined mild obstructive and restrictive ventilatory impairment with mild gas exchange impairment.\par \par POCT  12/7/2020\par Glucose 138\par HGB  5.8\par \par DATA [facility]  3  2020\par Serum glucose 112\par Hemoglobin A1c 6.1\par Urine analysis trace protein negative nitrates\par \par EKG [facility] 3, 2020\par Sinus tachycardia\par Cannot exclude old anterior wall changes\par RSR\par \par Chest x-ray PA lateral [facility] 3, 2020\par Normal cardiac size\par " Reported\par Calcified pleural diaphragmatic pad right\par Patchy changes midlung zone\par No pleural effusions dominant pulmonary nodules\par No gross interval change compared to chest x-ray of December 19, 2019\par \par \par Spirometry No BD\par Moderate reduction flow rates\par Spirometry  1/17/2020\par  moderate  reduction in flow rates\par  obstructive ventilatory impairment and with reduced FVC cannot exclude restrictive component\par Pos BD at FVC 12 %\par \par Chest x-ray PA lateral December 19, 2019\par Indication COPD-asbestosis\par Cardiac size normal\par " Aortic\par Dense calcified pleural plaque right hemidiaphragm\par COPD changes\par No significant interstitial changes\par No dominant pulmonary nodules parenchymal infiltrates pleural effusions\par Change compared to chest x-ray of December 18, 2018\par \par PFT 9/16/2019\par Moderate OAD\par  Pos BD 14 % at FVC\par  Minimal airtrapping and normal TLC 95 %\par  DLCO  84 % normal  range\par  HGB 16.2\par  Stable pulmonary physiology\par \par Pulmonary Six Minute Walk 5/4/2018 No desaturation\par \par Blood draw\par Data review June 4, 2020\par COVID-19 IgG antibody negative\par CBC normal\par White blood count 9.45 hemoglobin 16.3 hematocrit 49.6\par Platelet count 315,000\par T4 TSH normal\par PSA normal range 1.38\par Lipid profile\par Cholesterol 148 HDL 47 LDL 82\par Vitamin D 27.9\par \par Basic metabolic panel February 21, 2020\par Normal\par BUN 16 creatinine 0.94\par Serum electrolytes normal\par Glucose 117\par Data review November 20, 2019\par Lipid profile\par Cholesterol 163\par HDL 50\par LDL 96\par Triglycerides 87\par \par Data  Review  lab  review  Oct  14  2019\par Lipid panel\par Cholesterol 166 HDL 45 LDL 99\par Liver function testing normal\par Reviewed June 25, 2019\par CBC normal\par White blood count 9.27\par Hemoglobin 16.2 hematocrit 49.8 platelet count 214,000\par Glucose 128\par Serum electrolytes normal\par BUN 11 creatinine 0.81\par Liver function testing normal\par Serum potassium 4.0\par Serum calcium 9.9\par Lipid profile April 5, 2019\par Total cholesterol  158 HDL 50 LDL 85\par Triglycerides 117\par \par HD Flu administered Oct  26  2020

## 2021-01-24 ENCOUNTER — RX RENEWAL (OUTPATIENT)
Age: 81
End: 2021-01-24

## 2021-01-28 ENCOUNTER — RX RENEWAL (OUTPATIENT)
Age: 81
End: 2021-01-28

## 2021-02-17 ENCOUNTER — APPOINTMENT (OUTPATIENT)
Dept: PULMONOLOGY | Facility: CLINIC | Age: 81
End: 2021-02-17

## 2021-02-24 ENCOUNTER — APPOINTMENT (OUTPATIENT)
Dept: PULMONOLOGY | Facility: CLINIC | Age: 81
End: 2021-02-24
Payer: MEDICARE

## 2021-02-24 VITALS
TEMPERATURE: 98.5 F | SYSTOLIC BLOOD PRESSURE: 155 MMHG | HEART RATE: 65 BPM | OXYGEN SATURATION: 94 % | DIASTOLIC BLOOD PRESSURE: 85 MMHG

## 2021-02-24 PROCEDURE — 71046 X-RAY EXAM CHEST 2 VIEWS: CPT

## 2021-02-24 PROCEDURE — 77085 DXA BONE DENSITY AXL VRT FX: CPT

## 2021-02-24 PROCEDURE — 99214 OFFICE O/P EST MOD 30 MIN: CPT | Mod: 25

## 2021-02-24 NOTE — PHYSICAL EXAM
[General Appearance - Well Developed] : well developed [Normal Appearance] : normal appearance [Well Groomed] : well groomed [General Appearance - Well Nourished] : well nourished [No Deformities] : no deformities [General Appearance - In No Acute Distress] : no acute distress [Normal Conjunctiva] : the conjunctiva exhibited no abnormalities [Eyelids - No Xanthelasma] : the eyelids demonstrated no xanthelasmas [Normal Oropharynx] : normal oropharynx [I] : I [Neck Appearance] : the appearance of the neck was normal [Neck Cervical Mass (___cm)] : no neck mass was observed [Jugular Venous Distention Increased] : there was no jugular-venous distention [Thyroid Diffuse Enlargement] : the thyroid was not enlarged [Heart Sounds] : normal S1 and S2 [Heart Rate And Rhythm] : heart rate and rhythm were normal [Murmurs] : no murmurs present [Arterial Pulses Normal] : the arterial pulses were normal [Edema] : no peripheral edema present [Veins - Varicosity Changes] : no varicosital changes were noted in the lower extremities [Exaggerated Use Of Accessory Muscles For Inspiration] : no accessory muscle use [Respiration, Rhythm And Depth] : normal respiratory rhythm and effort [Abdomen Soft] : soft [Bowel Sounds] : normal bowel sounds [Abdomen Tenderness] : non-tender [Abdomen Mass (___ Cm)] : no abdominal mass palpated [Abnormal Walk] : normal gait [Gait - Sufficient For Exercise Testing] : the gait was sufficient for exercise testing [Nail Clubbing] : no clubbing of the fingernails [Cyanosis, Localized] : no localized cyanosis [Petechial Hemorrhages (___cm)] : no petechial hemorrhages [Skin Color & Pigmentation] : normal skin color and pigmentation [] : no rash [Deep Tendon Reflexes (DTR)] : deep tendon reflexes were 2+ and symmetric [Sensation] : the sensory exam was normal to light touch and pinprick [Motor Exam] : the motor exam was normal [No Focal Deficits] : no focal deficits [Oriented To Time, Place, And Person] : oriented to person, place, and time [Impaired Insight] : insight and judgment were intact [Affect] : the affect was normal [Mood] : the mood was normal [FreeTextEntry1] : left groin swelling erythema edema consistent with cellulitis - interval improvement with candida  [FreeTextEntry2] : No significant edema

## 2021-02-24 NOTE — DISCUSSION/SUMMARY
[FreeTextEntry1] : mild lower  extremity edema resolved\par Low normal O2 saturations on room air\par  abnormal PFT\par  History asbestosis\par  COPD-Emphysema with mucoid impaction\par Pre DM\par HTN  - controlled\par Sinus tachycardia-we will increase metoprolol 200 mg a.m. and 50 mg p.o.-  normalized \par HLD\par Groin cellulitis w with recurrence- left inguinal- Resolved without recent  recurrence\par COVID 19 AB Negative\par \par ANORO 1 puff Daily\par  \par Next 1 month APPT watch Lipid profile at f/u\par September follow-up hemoglobin A1c and continue to monitor the glucose\par low sugar diet\par Discussed  with patient never did get Colonoscopy - readvised\par

## 2021-02-24 NOTE — REVIEW OF SYSTEMS
[Hypertension] : ~T hypertension [As Noted in HPI] : as noted in HPI [Negative] : Psychiatric [FreeTextEntry7] : abnormal CT  ABD  focal  wall thickening [FreeTextEntry5] : Hematuria  with  cystoscopy [de-identified] : R/O Pre DM

## 2021-02-24 NOTE — HISTORY OF PRESENT ILLNESS
[Stable] : are stable [None] : ~He/She~ has no significant interval events [Difficulty Breathing During Exertion] : stable dyspnea on exertion [Feelings Of Weakness On Exertion] : stable exercise intolerance [Cough] : denies coughing [Wheezing] : denies wheezing [Regional Soft Tissue Swelling Both Lower Extremities] : denies lower extremity edema [Chest Pain Or Discomfort] : denies chest pain [Fever] : denies fever [Date: ___] : was performed [unfilled] [Wt Gain ___ Lbs] : no recent weight gain [Wt Loss ___ Lbs] : no recent weight loss [Oxygen] : the patient uses no supplemental oxygen [de-identified] : emphysema with mucous plugging [FreeTextEntry1] : no chest pain SOB\par  sleeping well\par no increased lower extremity edema\par up to date with Meds

## 2021-02-24 NOTE — PROCEDURE
[FreeTextEntry1] : CHEST  x-ray PA lateral February 24, 2021 history of asbestosis\par COPD\par Normal cardiac size\par Uncoiled aorta\par Chronic parenchymal changes with bilateral identifiable pleural disease.\par Diagnosis with pleural plaques clinically right diaphragm asbestosis\par Minimal calcification aortic knob no gross interval change compared to chest x-ray [facility] 3, 2020\par \par PFT Body Box 12/7/2020\par moderate OAD\par airtrapping\par Resistance is  normal and sp conductance decreased\par low  normal DLCO DLCO 78 %\par HGB 16.3\par \par PFT September 4, 2020\par No bronchodilator administered\par Moderate reduction flow rates with an obstructive ventilatory impairment and a reduced total lung capacity 69% predicted.\par Mild reduction diffusion with a loss of functioning alveolocapillary units are\par Impression combined mild obstructive and restrictive ventilatory impairment with mild gas exchange impairment.\par \par POCT  12/7/2020\par Glucose 138\par HGB  5.8\par \par DATA [facility]  3  2020\par Serum glucose 112\par Hemoglobin A1c 6.1\par Urine analysis trace protein negative nitrates\par \par EKG [facility] 3, 2020\par Sinus tachycardia\par Cannot exclude old anterior wall changes\par RSR\par \par Chest x-ray PA lateral [facility] 3, 2020\par Normal cardiac size\par " Reported\par Calcified pleural diaphragmatic pad right\par Patchy changes midlung zone\par No pleural effusions dominant pulmonary nodules\par No gross interval change compared to chest x-ray of December 19, 2019\par \par \par Spirometry No BD\par Moderate reduction flow rates\par Spirometry  1/17/2020\par  moderate  reduction in flow rates\par  obstructive ventilatory impairment and with reduced FVC cannot exclude restrictive component\par Pos BD at FVC 12 %\par \par Chest x-ray PA lateral December 19, 2019\par Indication COPD-asbestosis\par Cardiac size normal\par " Aortic\par Dense calcified pleural plaque right hemidiaphragm\par COPD changes\par No significant interstitial changes\par No dominant pulmonary nodules parenchymal infiltrates pleural effusions\par Change compared to chest x-ray of December 18, 2018\par \par PFT 9/16/2019\par Moderate OAD\par  Pos BD 14 % at FVC\par  Minimal airtrapping and normal TLC 95 %\par  DLCO  84 % normal  range\par  HGB 16.2\par  Stable pulmonary physiology\par \par Pulmonary Six Minute Walk 5/4/2018 No desaturation\par \par Blood draw\par Data review June 4, 2020\par COVID-19 IgG antibody negative\par CBC normal\par White blood count 9.45 hemoglobin 16.3 hematocrit 49.6\par Platelet count 315,000\par T4 TSH normal\par PSA normal range 1.38\par Lipid profile\par Cholesterol 148 HDL 47 LDL 82\par Vitamin D 27.9\par \par \par HD Flu administered Oct  26  2020\par \par Bone density screening osteoporosis high risk patient February 24, 2021\par AP spine L1-L4 normal\par Femoral neck left normal\par Total left hip normal\par Impression normal study\par

## 2021-04-10 LAB — SARS-COV-2 N GENE NPH QL NAA+PROBE: NOT DETECTED

## 2021-04-14 ENCOUNTER — APPOINTMENT (OUTPATIENT)
Dept: PULMONOLOGY | Facility: CLINIC | Age: 81
End: 2021-04-14
Payer: MEDICARE

## 2021-04-14 VITALS
TEMPERATURE: 97.9 F | HEIGHT: 71 IN | OXYGEN SATURATION: 96 % | RESPIRATION RATE: 14 BRPM | SYSTOLIC BLOOD PRESSURE: 152 MMHG | BODY MASS INDEX: 30.1 KG/M2 | WEIGHT: 215 LBS | HEART RATE: 67 BPM | DIASTOLIC BLOOD PRESSURE: 78 MMHG

## 2021-04-14 LAB — POCT - HEMOGLOBIN (HGB), QUANTITATIVE, TRANSCUTANEOUS: 17.7

## 2021-04-14 PROCEDURE — 94010 BREATHING CAPACITY TEST: CPT

## 2021-04-14 PROCEDURE — 94727 GAS DIL/WSHOT DETER LNG VOL: CPT

## 2021-04-14 PROCEDURE — 99214 OFFICE O/P EST MOD 30 MIN: CPT | Mod: 25,CS

## 2021-04-14 PROCEDURE — 94729 DIFFUSING CAPACITY: CPT

## 2021-04-14 PROCEDURE — 88738 HGB QUANT TRANSCUTANEOUS: CPT

## 2021-04-14 PROCEDURE — ZZZZZ: CPT

## 2021-04-14 RX ORDER — MUPIROCIN 2 G/100G
2 CREAM TOPICAL
Qty: 60 | Refills: 1 | Status: DISCONTINUED | COMMUNITY
Start: 2020-06-03 | End: 2021-04-14

## 2021-04-14 NOTE — PROCEDURE
[FreeTextEntry1] : PFT  4//14/21\par Moderate reduction flow  rates\par  Mod OAD\par Normal TLC 82 %\par Pos  airtrapping\par Low nl DLCO  73 %\par HGB 17.7\par  stable pulmonary physiology\par \par CHEST  x-ray PA lateral February 24, 2021 history of asbestosis\par COPD\par Normal cardiac size\par Uncoiled aorta\par Chronic parenchymal changes with bilateral identifiable pleural disease.\par Diagnosis with pleural plaques clinically right diaphragm asbestosis\par Minimal calcification aortic knob no gross interval change compared to chest x-ray [facility] 3, 2020\par \par PFT Body Box 12/7/2020\par moderate OAD\par airtrapping\par Resistance is  normal and sp conductance decreased\par low  normal DLCO DLCO 78 %\par HGB 16.3\par \par PFT September 4, 2020\par No bronchodilator administered\par Moderate reduction flow rates with an obstructive ventilatory impairment and a reduced total lung capacity 69% predicted.\par Mild reduction diffusion with a loss of functioning alveolocapillary units are\par Impression combined mild obstructive and restrictive ventilatory impairment with mild gas exchange impairment.\par \par POCT  12/7/2020\par Glucose 138\par HGB  5.8\par \par DATA [facility]  3  2020\par Serum glucose 112\par Hemoglobin A1c 6.1\par Urine analysis trace protein negative nitrates\par \par EKG [facility] 3, 2020\par Sinus tachycardia\par Cannot exclude old anterior wall changes\par RSR\par \par Chest x-ray PA lateral [facility] 3, 2020\par Normal cardiac size\par " Reported\par Calcified pleural diaphragmatic pad right\par Patchy changes midlung zone\par No pleural effusions dominant pulmonary nodules\par No gross interval change compared to chest x-ray of December 19, 2019\par \par \par Spirometry No BD\par Moderate reduction flow rates\par Spirometry  1/17/2020\par  moderate  reduction in flow rates\par  obstructive ventilatory impairment and with reduced FVC cannot exclude restrictive component\par Pos BD at FVC 12 %\par \par Chest x-ray PA lateral December 19, 2019\par Indication COPD-asbestosis\par Cardiac size normal\par " Aortic\par Dense calcified pleural plaque right hemidiaphragm\par COPD changes\par No significant interstitial changes\par No dominant pulmonary nodules parenchymal infiltrates pleural effusions\par Change compared to chest x-ray of December 18, 2018\par \par PFT 9/16/2019\par Moderate OAD\par  Pos BD 14 % at FVC\par  Minimal airtrapping and normal TLC 95 %\par  DLCO  84 % normal  range\par  HGB 16.2\par  Stable pulmonary physiology\par \par Pulmonary Six Minute Walk 5/4/2018 No desaturation\par \par Blood draw\par Data review June 4, 2020\par COVID-19 IgG antibody negative\par CBC normal\par White blood count 9.45 hemoglobin 16.3 hematocrit 49.6\par Platelet count 315,000\par T4 TSH normal\par PSA normal range 1.38\par Lipid profile\par Cholesterol 148 HDL 47 LDL 82\par Vitamin D 27.9\par \par \par HD Flu administered Oct  26  2020\par \par Bone density screening osteoporosis high risk patient February 24, 2021\par AP spine L1-L4 normal\par Femoral neck left normal\par Total left hip normal\par Impression normal study\par

## 2021-04-14 NOTE — DISCUSSION/SUMMARY
[FreeTextEntry1] : mild lower  extremity edema resolved\par Low normal O2 saturations on room air\par  abnormal PFT\par  History asbestosis\par  COPD-Emphysema with mucoid impaction\par Pre DM\par HTN  - controlled\par Sinus tachycardia-we will increase metoprolol 200 mg a.m. and 50 mg p.o.-  normalized \par HLD\par Groin cellulitis w with recurrence- left inguinal- Resolved without recent  recurrence\par COVID 19 AB Negative\par \par ANORO 1 puff Daily\par  \par Next 1 month APPT watch Lipid profile at f/u/ A1C\par low sugar diet\par Discussed  with patient never did get Colonoscopy - readvised\par \par J and J completed

## 2021-04-14 NOTE — REVIEW OF SYSTEMS
[Hypertension] : ~T hypertension [As Noted in HPI] : as noted in HPI [Negative] : Psychiatric [FreeTextEntry7] : abnormal CT  ABD  focal  wall thickening [FreeTextEntry5] : Hematuria  with  cystoscopy [de-identified] : R/O Pre DM

## 2021-04-14 NOTE — HISTORY OF PRESENT ILLNESS
[Stable] : are stable [None] : ~He/She~ has no significant interval events [Difficulty Breathing During Exertion] : stable dyspnea on exertion [Feelings Of Weakness On Exertion] : stable exercise intolerance [Wheezing] : denies wheezing [Cough] : denies coughing [Regional Soft Tissue Swelling Both Lower Extremities] : denies lower extremity edema [Chest Pain Or Discomfort] : denies chest pain [Fever] : denies fever [Date: ___] : was performed [unfilled] [Wt Gain ___ Lbs] : no recent weight gain [Wt Loss ___ Lbs] : no recent weight loss [Oxygen] : the patient uses no supplemental oxygen [de-identified] : emphysema with mucous plugging [FreeTextEntry1] : no chest pain SOB\par  sleeping well\par no increased lower extremity edema\par up to date with Meds

## 2021-04-19 ENCOUNTER — RX RENEWAL (OUTPATIENT)
Age: 81
End: 2021-04-19

## 2021-05-05 ENCOUNTER — RX RENEWAL (OUTPATIENT)
Age: 81
End: 2021-05-05

## 2021-05-12 ENCOUNTER — APPOINTMENT (OUTPATIENT)
Dept: PULMONOLOGY | Facility: CLINIC | Age: 81
End: 2021-05-12
Payer: MEDICARE

## 2021-05-12 VITALS
DIASTOLIC BLOOD PRESSURE: 75 MMHG | OXYGEN SATURATION: 96 % | TEMPERATURE: 98 F | SYSTOLIC BLOOD PRESSURE: 160 MMHG | HEART RATE: 77 BPM

## 2021-05-12 PROCEDURE — 99214 OFFICE O/P EST MOD 30 MIN: CPT | Mod: 25

## 2021-05-12 PROCEDURE — 36415 COLL VENOUS BLD VENIPUNCTURE: CPT

## 2021-05-12 NOTE — PHYSICAL EXAM
[General Appearance - Well Developed] : well developed [Normal Appearance] : normal appearance [Well Groomed] : well groomed [General Appearance - Well Nourished] : well nourished [No Deformities] : no deformities [General Appearance - In No Acute Distress] : no acute distress [Normal Conjunctiva] : the conjunctiva exhibited no abnormalities [Eyelids - No Xanthelasma] : the eyelids demonstrated no xanthelasmas [Normal Oropharynx] : normal oropharynx [I] : I [Neck Appearance] : the appearance of the neck was normal [Neck Cervical Mass (___cm)] : no neck mass was observed [Jugular Venous Distention Increased] : there was no jugular-venous distention [Thyroid Diffuse Enlargement] : the thyroid was not enlarged [Heart Rate And Rhythm] : heart rate and rhythm were normal [Heart Sounds] : normal S1 and S2 [Murmurs] : no murmurs present [Arterial Pulses Normal] : the arterial pulses were normal [Edema] : no peripheral edema present [Veins - Varicosity Changes] : no varicosital changes were noted in the lower extremities [Respiration, Rhythm And Depth] : normal respiratory rhythm and effort [Exaggerated Use Of Accessory Muscles For Inspiration] : no accessory muscle use [Bowel Sounds] : normal bowel sounds [Abdomen Soft] : soft [Abdomen Tenderness] : non-tender [Abdomen Mass (___ Cm)] : no abdominal mass palpated [Abnormal Walk] : normal gait [Gait - Sufficient For Exercise Testing] : the gait was sufficient for exercise testing [Nail Clubbing] : no clubbing of the fingernails [Cyanosis, Localized] : no localized cyanosis [Petechial Hemorrhages (___cm)] : no petechial hemorrhages [Skin Color & Pigmentation] : normal skin color and pigmentation [] : no rash [Deep Tendon Reflexes (DTR)] : deep tendon reflexes were 2+ and symmetric [Sensation] : the sensory exam was normal to light touch and pinprick [Motor Exam] : the motor exam was normal [No Focal Deficits] : no focal deficits [Oriented To Time, Place, And Person] : oriented to person, place, and time [Impaired Insight] : insight and judgment were intact [Affect] : the affect was normal [Mood] : the mood was normal [FreeTextEntry2] : trace  edema  [FreeTextEntry1] : Pos Right upper  ext posterior raised red lesion most consistent with  cellulitis

## 2021-05-12 NOTE — DISCUSSION/SUMMARY
[FreeTextEntry1] : Right upper  extremity cellulitis\par Augmentin BID with food\par  Topical antix\par warm soaks\par recheck 1  week\par \par mild lower  extremity edema mild interval increase- add 3  days lasix\par Low normal O2 saturations on room air\par  abnormal PFT\par  History asbestosis\par  COPD-Emphysema with mucoid impaction\par Pre DM\par HTN  - controlled\par Sinus tachycardia-we will increase metoprolol 200 mg a.m. and 50 mg p.o.-  normalized \par HLD\par Groin cellulitis w with recurrence- left inguinal- Resolved without recent  recurrence\par COVID 19 AB Negative\par \par ANORO 1 puff Daily\par  \par Next 1 month APPT watch Lipid profile at f/u/ A1C\par low sugar diet\par Discussed  with patient never did get Colonoscopy - readvised\par \par J and J completed

## 2021-05-12 NOTE — HISTORY OF PRESENT ILLNESS
[Stable] : are stable [None] : ~He/She~ has no significant interval events [Difficulty Breathing During Exertion] : stable dyspnea on exertion [Feelings Of Weakness On Exertion] : stable exercise intolerance [Cough] : denies coughing [Wheezing] : denies wheezing [Regional Soft Tissue Swelling Both Lower Extremities] : denies lower extremity edema [Chest Pain Or Discomfort] : denies chest pain [Fever] : denies fever [Date: ___] : was performed [unfilled] [Wt Gain ___ Lbs] : no recent weight gain [Wt Loss ___ Lbs] : no recent weight loss [Oxygen] : the patient uses no supplemental oxygen [de-identified] : emphysema with mucous plugging [FreeTextEntry1] : developed right posterior triceps with swelling lesion with mild  tenderness\par No  fever  chills  swaest\par \par no chest pain SOB\par  sleeping well\par no increased lower extremity edema\par up to date with Meds

## 2021-05-12 NOTE — REVIEW OF SYSTEMS
[Hypertension] : ~T hypertension [As Noted in HPI] : as noted in HPI [Negative] : Psychiatric [FreeTextEntry7] : abnormal CT  ABD  focal  wall thickening [FreeTextEntry5] : Hematuria  with  cystoscopy [de-identified] : R/O Pre DM

## 2021-05-13 ENCOUNTER — NON-APPOINTMENT (OUTPATIENT)
Age: 81
End: 2021-05-13

## 2021-05-13 LAB
ANION GAP SERPL CALC-SCNC: 18 MMOL/L
BASOPHILS # BLD AUTO: 0.06 K/UL
BASOPHILS NFR BLD AUTO: 0.5 %
BUN SERPL-MCNC: 17 MG/DL
CALCIUM SERPL-MCNC: 10.2 MG/DL
CHLORIDE SERPL-SCNC: 94 MMOL/L
CO2 SERPL-SCNC: 23 MMOL/L
CREAT SERPL-MCNC: 0.89 MG/DL
EOSINOPHIL # BLD AUTO: 0.18 K/UL
EOSINOPHIL NFR BLD AUTO: 1.6 %
GLUCOSE SERPL-MCNC: 116 MG/DL
HCT VFR BLD CALC: 53.3 %
HGB BLD-MCNC: 17.1 G/DL
IMM GRANULOCYTES NFR BLD AUTO: 0.4 %
LYMPHOCYTES # BLD AUTO: 1.6 K/UL
LYMPHOCYTES NFR BLD AUTO: 14.2 %
MAN DIFF?: NORMAL
MCHC RBC-ENTMCNC: 30.6 PG
MCHC RBC-ENTMCNC: 32.1 GM/DL
MCV RBC AUTO: 95.5 FL
MONOCYTES # BLD AUTO: 0.9 K/UL
MONOCYTES NFR BLD AUTO: 8 %
NEUTROPHILS # BLD AUTO: 8.45 K/UL
NEUTROPHILS NFR BLD AUTO: 75.3 %
PLATELET # BLD AUTO: 346 K/UL
POTASSIUM SERPL-SCNC: 4.8 MMOL/L
RBC # BLD: 5.58 M/UL
RBC # FLD: 13.1 %
SODIUM SERPL-SCNC: 135 MMOL/L
WBC # FLD AUTO: 11.24 K/UL

## 2021-05-17 ENCOUNTER — APPOINTMENT (OUTPATIENT)
Dept: PULMONOLOGY | Facility: CLINIC | Age: 81
End: 2021-05-17
Payer: MEDICARE

## 2021-05-17 VITALS
HEART RATE: 69 BPM | SYSTOLIC BLOOD PRESSURE: 154 MMHG | RESPIRATION RATE: 14 BRPM | TEMPERATURE: 97.1 F | DIASTOLIC BLOOD PRESSURE: 74 MMHG | OXYGEN SATURATION: 97 %

## 2021-05-17 PROCEDURE — 99214 OFFICE O/P EST MOD 30 MIN: CPT

## 2021-05-17 NOTE — PHYSICAL EXAM
[General Appearance - Well Developed] : well developed [Normal Appearance] : normal appearance [Well Groomed] : well groomed [General Appearance - Well Nourished] : well nourished [No Deformities] : no deformities [General Appearance - In No Acute Distress] : no acute distress [Normal Conjunctiva] : the conjunctiva exhibited no abnormalities [Eyelids - No Xanthelasma] : the eyelids demonstrated no xanthelasmas [Normal Oropharynx] : normal oropharynx [I] : I [Neck Appearance] : the appearance of the neck was normal [Neck Cervical Mass (___cm)] : no neck mass was observed [Jugular Venous Distention Increased] : there was no jugular-venous distention [Thyroid Diffuse Enlargement] : the thyroid was not enlarged [Heart Rate And Rhythm] : heart rate and rhythm were normal [Heart Sounds] : normal S1 and S2 [Murmurs] : no murmurs present [Arterial Pulses Normal] : the arterial pulses were normal [Edema] : no peripheral edema present [Veins - Varicosity Changes] : no varicosital changes were noted in the lower extremities [Respiration, Rhythm And Depth] : normal respiratory rhythm and effort [Exaggerated Use Of Accessory Muscles For Inspiration] : no accessory muscle use [Bowel Sounds] : normal bowel sounds [Abdomen Soft] : soft [Abdomen Tenderness] : non-tender [Abdomen Mass (___ Cm)] : no abdominal mass palpated [Abnormal Walk] : normal gait [Gait - Sufficient For Exercise Testing] : the gait was sufficient for exercise testing [Nail Clubbing] : no clubbing of the fingernails [Cyanosis, Localized] : no localized cyanosis [Petechial Hemorrhages (___cm)] : no petechial hemorrhages [FreeTextEntry2] : trace  edema  [Skin Color & Pigmentation] : normal skin color and pigmentation [] : no rash [FreeTextEntry1] : Pos Right upper  ext posterior raised red lesion most consistent with  cellulitis [Deep Tendon Reflexes (DTR)] : deep tendon reflexes were 2+ and symmetric [Sensation] : the sensory exam was normal to light touch and pinprick [Motor Exam] : the motor exam was normal [No Focal Deficits] : no focal deficits [Oriented To Time, Place, And Person] : oriented to person, place, and time [Impaired Insight] : insight and judgment were intact [Affect] : the affect was normal [Mood] : the mood was normal

## 2021-05-17 NOTE — REVIEW OF SYSTEMS
[Hypertension] : ~T hypertension [As Noted in HPI] : as noted in HPI [Negative] : Psychiatric [FreeTextEntry7] : abnormal CT  ABD  focal  wall thickening [FreeTextEntry5] : Hematuria  with  cystoscopy [de-identified] : R/O Pre DM

## 2021-05-17 NOTE — HISTORY OF PRESENT ILLNESS
[Stable] : are stable [None] : ~He/She~ has no significant interval events [Difficulty Breathing During Exertion] : stable dyspnea on exertion [Feelings Of Weakness On Exertion] : stable exercise intolerance [Cough] : denies coughing [Wheezing] : denies wheezing [Regional Soft Tissue Swelling Both Lower Extremities] : denies lower extremity edema [Chest Pain Or Discomfort] : denies chest pain [Fever] : denies fever [Wt Gain ___ Lbs] : no recent weight gain [Wt Loss ___ Lbs] : no recent weight loss [Oxygen] : the patient uses no supplemental oxygen [Date: ___] : was performed [unfilled] [de-identified] : emphysema with mucous plugging [FreeTextEntry1] : developed right posterior triceps with swelling lesion with mild  tenderness\par No  fever  chills  swaest\par \par no chest pain SOB\par  sleeping well\par no increased lower extremity edema\par up to date with Meds

## 2021-05-17 NOTE — DISCUSSION/SUMMARY
[FreeTextEntry1] : Right upper  extremity cellulitis with drainage\par Leukocytosis- f/u CBC\par Augmentin BID with food- GI Changed to Bactrim 1 po BID x 7  days\par  Topical antix\par warm soaks\par recheck 1  week\par wound care provided\par Topical Antibx\par \par mild lower  extremity edema mild interval increase- add 3  days lasix\par Low normal O2 saturations on room air\par  abnormal PFT\par  History asbestosis\par  COPD-Emphysema with mucoid impaction\par Pre DM\par HTN  - controlled\par Sinus tachycardia-we will increase metoprolol 200 mg a.m. and 50 mg p.o.-  normalized \par HLD\par Groin cellulitis w with recurrence- left inguinal- Resolved without recent  recurrence\par COVID 19 AB Negative\par \par ANORO 1 puff Daily\par  \par Next 1 month APPT watch Lipid profile at f/u/ A1C\par low sugar diet\par Discussed  with patient never did get Colonoscopy - readvised\par \par J and J completed

## 2021-05-20 ENCOUNTER — NON-APPOINTMENT (OUTPATIENT)
Age: 81
End: 2021-05-20

## 2021-05-20 LAB — BACTERIA FLD CULT: ABNORMAL

## 2021-05-24 ENCOUNTER — APPOINTMENT (OUTPATIENT)
Dept: PULMONOLOGY | Facility: CLINIC | Age: 81
End: 2021-05-24
Payer: MEDICARE

## 2021-05-24 VITALS
TEMPERATURE: 97.6 F | OXYGEN SATURATION: 91 % | HEART RATE: 96 BPM | HEIGHT: 71 IN | DIASTOLIC BLOOD PRESSURE: 83 MMHG | WEIGHT: 215 LBS | SYSTOLIC BLOOD PRESSURE: 161 MMHG | BODY MASS INDEX: 30.1 KG/M2

## 2021-05-24 LAB
BASOPHILS # BLD AUTO: 0.08 K/UL
BASOPHILS NFR BLD AUTO: 0.7 %
EOSINOPHIL # BLD AUTO: 0.29 K/UL
EOSINOPHIL NFR BLD AUTO: 2.7 %
HCT VFR BLD CALC: 49.2 %
HGB BLD-MCNC: 16.1 G/DL
IMM GRANULOCYTES NFR BLD AUTO: 0.5 %
LYMPHOCYTES # BLD AUTO: 1.78 K/UL
LYMPHOCYTES NFR BLD AUTO: 16.7 %
MAN DIFF?: NORMAL
MCHC RBC-ENTMCNC: 30.4 PG
MCHC RBC-ENTMCNC: 32.7 GM/DL
MCV RBC AUTO: 92.8 FL
MONOCYTES # BLD AUTO: 1 K/UL
MONOCYTES NFR BLD AUTO: 9.4 %
NEUTROPHILS # BLD AUTO: 7.47 K/UL
NEUTROPHILS NFR BLD AUTO: 70 %
PLATELET # BLD AUTO: 369 K/UL
RBC # BLD: 5.3 M/UL
RBC # FLD: 13 %
WBC # FLD AUTO: 10.67 K/UL

## 2021-05-24 PROCEDURE — 36415 COLL VENOUS BLD VENIPUNCTURE: CPT

## 2021-05-24 PROCEDURE — 99213 OFFICE O/P EST LOW 20 MIN: CPT | Mod: 25

## 2021-05-24 RX ORDER — AMOXICILLIN AND CLAVULANATE POTASSIUM 875; 125 MG/1; MG/1
875-125 TABLET, COATED ORAL
Qty: 14 | Refills: 1 | Status: DISCONTINUED | COMMUNITY
Start: 2021-05-12 | End: 2021-05-24

## 2021-05-24 NOTE — DISCUSSION/SUMMARY
[FreeTextEntry1] : Right upper  extremity cellulitis with drainage- Improving\par GI sxs with antibx\par Leukocytosis- f/u CBC\par Augmentin BID with food- GI Changed to Bactrim 1 po BID x 7  days- complete\par  Topical antix\par warm soaks\par recheck 1  week\par wound care provided\par Topical Antibx\par \par mild lower  extremity edema mild interval increase- add 3  days lasix- Improved\par Low normal O2 saturations on room air\par  abnormal PFT\par  History asbestosis\par  COPD-Emphysema with mucoid impaction\par Pre DM\par HTN  - controlled\par Sinus tachycardia-we will increase metoprolol 200 mg a.m. and 50 mg p.o.-  normalized \par HLD\par Groin cellulitis w with recurrence- left inguinal- Resolved without recent  recurrence\par COVID 19 AB Negative\par \par ANORO 1 puff Daily\par  \par Next 1 month APPT watch Lipid profile at f/u/ A1C\par low sugar diet\par Discussed  with patient never did get Colonoscopy - readvised\par \par J and J completed

## 2021-05-24 NOTE — HISTORY OF PRESENT ILLNESS
[Stable] : are stable [None] : ~He/She~ has no significant interval events [Difficulty Breathing During Exertion] : stable dyspnea on exertion [Feelings Of Weakness On Exertion] : stable exercise intolerance [Cough] : denies coughing [Wheezing] : denies wheezing [Regional Soft Tissue Swelling Both Lower Extremities] : denies lower extremity edema [Chest Pain Or Discomfort] : denies chest pain [Fever] : denies fever [Date: ___] : was performed [unfilled] [Wt Gain ___ Lbs] : no recent weight gain [Wt Loss ___ Lbs] : no recent weight loss [Oxygen] : the patient uses no supplemental oxygen [de-identified] : emphysema with mucous plugging [FreeTextEntry1] : developed right posterior triceps with swelling lesion with mild  tenderness\par No  fever  chills  swaest\par \par no chest pain SOB\par  sleeping well\par no increased lower extremity edema\par up to date with Meds

## 2021-05-24 NOTE — REVIEW OF SYSTEMS
[Hypertension] : ~T hypertension [As Noted in HPI] : as noted in HPI [Negative] : Psychiatric [FreeTextEntry7] : abnormal CT  ABD  focal  wall thickening [FreeTextEntry5] : Hematuria  with  cystoscopy [de-identified] : R/O Pre DM

## 2021-05-27 NOTE — PROCEDURE
Pt requested prn nicotine gum 2mg PO at 1548 for smoking cessation. Tolerated well. Will continue plan of care.    [FreeTextEntry1] : PFT Body Box 12/7/2020\par moderate OAD\par airtrapping\par Resistance is  normal and sp conductance decreased\par low  normal DLCO DLCO 78 %\par HGB 16.3\par \par \par PFT September 4, 2020\par No bronchodilator administered\par Moderate reduction flow rates with an obstructive ventilatory impairment and a reduced total lung capacity 69% predicted.\par Mild reduction diffusion with a loss of functioning alveolocapillary units are\par Impression combined mild obstructive and restrictive ventilatory impairment with mild gas exchange impairment.\par \par POCT  12/7/2020\par Glucose 138\par HGB  5.8\par \par DATA [facility]  3  2020\par Serum glucose 112\par Hemoglobin A1c 6.1\par Urine analysis trace protein negative nitrates\par \par EKG [facility] 3, 2020\par Sinus tachycardia\par Cannot exclude old anterior wall changes\par RSR\par \par Chest x-ray PA lateral [facility] 3, 2020\par Normal cardiac size\par " Reported\par Calcified pleural diaphragmatic pad right\par Patchy changes midlung zone\par No pleural effusions dominant pulmonary nodules\par No gross interval change compared to chest x-ray of December 19, 2019\par \par \par \par Spirometry No BD\par Moderate reduction flow rates\par Spirometry  1/17/2020\par  moderate  reduction in flow rates\par  obstructive ventilatory impairment and with reduced FVC cannot exclude restrictive component\par Pos BD at FVC 12 %\par \par Chest x-ray PA lateral December 19, 2019\par Indication COPD-asbestosis\par Cardiac size normal\par " Aortic\par Dense calcified pleural plaque right hemidiaphragm\par COPD changes\par No significant interstitial changes\par No dominant pulmonary nodules parenchymal infiltrates pleural effusions\par Change compared to chest x-ray of December 18, 2018\par \par PFT 9/16/2019\par Moderate OAD\par  Pos BD 14 % at FVC\par  Minimal airtrapping and normal TLC 95 %\par  DLCO  84 % normal  range\par  HGB 16.2\par  Stable pulmonary physiology\par \par Pulmonary Six Minute Walk 5/4/2018 No desaturation\par \par Blood draw\par Data review June 4, 2020\par COVID-19 IgG antibody negative\par CBC normal\par White blood count 9.45 hemoglobin 16.3 hematocrit 49.6\par Platelet count 315,000\par T4 TSH normal\par PSA normal range 1.38\par Lipid profile\par Cholesterol 148 HDL 47 LDL 82\par Vitamin D 27.9\par \par Basic metabolic panel February 21, 2020\par Normal\par BUN 16 creatinine 0.94\par Serum electrolytes normal\par Glucose 117\par Data review November 20, 2019\par Lipid profile\par Cholesterol 163\par HDL 50\par LDL 96\par Triglycerides 87\par \par Data  Review  lab  review  Oct  14  2019\par Lipid panel\par Cholesterol 166 HDL 45 LDL 99\par Liver function testing normal\par Reviewed June 25, 2019\par CBC normal\par White blood count 9.27\par Hemoglobin 16.2 hematocrit 49.8 platelet count 214,000\par Glucose 128\par Serum electrolytes normal\par BUN 11 creatinine 0.81\par Liver function testing normal\par Serum potassium 4.0\par Serum calcium 9.9\par Lipid profile April 5, 2019\par Total cholesterol  158 HDL 50 LDL 85\par Triglycerides 117\par \par HD Flu administered Oct  26  2020

## 2021-06-09 ENCOUNTER — APPOINTMENT (OUTPATIENT)
Dept: PULMONOLOGY | Facility: CLINIC | Age: 81
End: 2021-06-09
Payer: MEDICARE

## 2021-06-09 PROCEDURE — 99213 OFFICE O/P EST LOW 20 MIN: CPT

## 2021-06-09 NOTE — HISTORY OF PRESENT ILLNESS
[Stable] : are stable [None] : ~He/She~ has no significant interval events [Difficulty Breathing During Exertion] : stable dyspnea on exertion [Feelings Of Weakness On Exertion] : stable exercise intolerance [Cough] : denies coughing [Wheezing] : denies wheezing [Regional Soft Tissue Swelling Both Lower Extremities] : denies lower extremity edema [Chest Pain Or Discomfort] : denies chest pain [Fever] : denies fever [Date: ___] : was performed [unfilled] [Wt Gain ___ Lbs] : no recent weight gain [Wt Loss ___ Lbs] : no recent weight loss [Oxygen] : the patient uses no supplemental oxygen [de-identified] : emphysema with mucous plugging [FreeTextEntry1] : developed right posterior triceps with swelling lesion with mild  tenderness\par No  fever  chills  sweats\par Ongoing management reevaluation for right upper extremity cellulitis with continued healing following treatment including oral antibiotics and topical antibiotics I&D drainage\par \par no chest pain SOB\par  sleeping well\par no increased lower extremity edema\par up to date with Meds

## 2021-06-09 NOTE — DISCUSSION/SUMMARY
[FreeTextEntry1] : Right upper  extremity cellulitis with drainage- Improving-basically resolved but continue topical antibiotic washing soap and water\par GI sxs with antibx\par Leukocytosis- f/u CBC\par Augmentin BID with food- GI Changed to Bactrim 1 po BID x 7  days- complete\par  Topical antix\par warm soaks\par recheck 1  week\par wound care provided\par \par mild lower  extremity edema mild interval increase- add 3  days lasix- Improved\par Low normal O2 saturations on room air\par  abnormal PFT\par  History asbestosis\par  COPD-Emphysema with mucoid impaction\par Pre DM\par HTN  - controlled\par Sinus tachycardia-we will increase metoprolol 200 mg a.m. and 50 mg p.o.-  normalized \par HLD\par Groin cellulitis w with recurrence- left inguinal- Resolved without recent  recurrence\par COVID 19 AB Negative\par \par ANORO 1 puff Daily-PFT\par  \par Next 1 month APPT watch Lipid profile at f/u/ A1C\par low sugar diet\par Discussed  with patient never did get Colonoscopy - readvised\par \par J and J completed

## 2021-06-09 NOTE — REVIEW OF SYSTEMS
[Hypertension] : ~T hypertension [As Noted in HPI] : as noted in HPI [Negative] : Psychiatric [FreeTextEntry7] : abnormal CT  ABD  focal  wall thickening [FreeTextEntry5] : Hematuria  with  cystoscopy [de-identified] : R/O Pre DM

## 2021-06-25 ENCOUNTER — RX RENEWAL (OUTPATIENT)
Age: 81
End: 2021-06-25

## 2021-07-08 ENCOUNTER — APPOINTMENT (OUTPATIENT)
Dept: PULMONOLOGY | Facility: CLINIC | Age: 81
End: 2021-07-08
Payer: MEDICARE

## 2021-07-08 VITALS
RESPIRATION RATE: 14 BRPM | OXYGEN SATURATION: 96 % | HEART RATE: 61 BPM | HEIGHT: 71 IN | DIASTOLIC BLOOD PRESSURE: 81 MMHG | TEMPERATURE: 98.1 F | SYSTOLIC BLOOD PRESSURE: 157 MMHG | WEIGHT: 220 LBS | BODY MASS INDEX: 30.8 KG/M2

## 2021-07-08 DIAGNOSIS — L03.113 CELLULITIS OF RIGHT UPPER LIMB: ICD-10-CM

## 2021-07-08 PROCEDURE — ZZZZZ: CPT

## 2021-07-08 PROCEDURE — 36415 COLL VENOUS BLD VENIPUNCTURE: CPT

## 2021-07-08 PROCEDURE — 94727 GAS DIL/WSHOT DETER LNG VOL: CPT

## 2021-07-08 PROCEDURE — 99214 OFFICE O/P EST MOD 30 MIN: CPT | Mod: 25

## 2021-07-08 PROCEDURE — 94010 BREATHING CAPACITY TEST: CPT

## 2021-07-08 PROCEDURE — 94729 DIFFUSING CAPACITY: CPT

## 2021-07-08 NOTE — PROCEDURE
[FreeTextEntry1] : PFT 7/8/21\par Moderate OAD\par  TLC 86 % pred\par  Airtrapping\par  Low nl DLCO 73 %\par HGB 16.1\par \par PFT  4//14/21\par Moderate reduction flow  rates\par  Mod OAD\par Normal TLC 82 %\par Pos  airtrapping\par Low nl DLCO  73 %\par HGB 17.7\par  stable pulmonary physiology\par \par CHEST  x-ray PA lateral February 24, 2021 history of asbestosis\par COPD\par Normal cardiac size\par Uncoiled aorta\par Chronic parenchymal changes with bilateral identifiable pleural disease.\par Diagnosis with pleural plaques clinically right diaphragm asbestosis\par Minimal calcification aortic knob no gross interval change compared to chest x-ray [facility] 3, 2020\par \par PFT Body Box 12/7/2020\par moderate OAD\par airtrapping\par Resistance is  normal and sp conductance decreased\par low  normal DLCO DLCO 78 %\par HGB 16.3\par \par PFT September 4, 2020\par No bronchodilator administered\par Moderate reduction flow rates with an obstructive ventilatory impairment and a reduced total lung capacity 69% predicted.\par Mild reduction diffusion with a loss of functioning alveolocapillary units are\par Impression combined mild obstructive and restrictive ventilatory impairment with mild gas exchange impairment.\par \par POCT  12/7/2020\par Glucose 138\par HGB  5.8\par \par DATA [facility]  3  2020\par Serum glucose 112\par Hemoglobin A1c 6.1\par Urine analysis trace protein negative nitrates\par \par EKG [facility] 3, 2020\par Sinus tachycardia\par Cannot exclude old anterior wall changes\par RSR\par \par Chest x-ray PA lateral [facility] 3, 2020\par Normal cardiac size\par " Reported\par Calcified pleural diaphragmatic pad right\par Patchy changes midlung zone\par No pleural effusions dominant pulmonary nodules\par No gross interval change compared to chest x-ray of December 19, 2019\par \par \par Spirometry No BD\par Moderate reduction flow rates\par Spirometry  1/17/2020\par  moderate  reduction in flow rates\par  obstructive ventilatory impairment and with reduced FVC cannot exclude restrictive component\par Pos BD at FVC 12 %\par \par Chest x-ray PA lateral December 19, 2019\par Indication COPD-asbestosis\par Cardiac size normal\par " Aortic\par Dense calcified pleural plaque right hemidiaphragm\par COPD changes\par No significant interstitial changes\par No dominant pulmonary nodules parenchymal infiltrates pleural effusions\par Change compared to chest x-ray of December 18, 2018\par \par PFT 9/16/2019\par Moderate OAD\par  Pos BD 14 % at FVC\par  Minimal airtrapping and normal TLC 95 %\par  DLCO  84 % normal  range\par  HGB 16.2\par  Stable pulmonary physiology\par \par Pulmonary Six Minute Walk 5/4/2018 No desaturation\par \par Blood draw\par Data review June 4, 2020\par COVID-19 IgG antibody negative\par CBC normal\par White blood count 9.45 hemoglobin 16.3 hematocrit 49.6\par Platelet count 315,000\par T4 TSH normal\par PSA normal range 1.38\par Lipid profile\par Cholesterol 148 HDL 47 LDL 82\par Vitamin D 27.9\par \par \par HD Flu administered Oct  26  2020\par \par Bone density screening osteoporosis high risk patient February 24, 2021\par AP spine L1-L4 normal\par Femoral neck left normal\par Total left hip normal\par Impression normal study\par

## 2021-07-08 NOTE — DISCUSSION/SUMMARY
[FreeTextEntry1] : Right upper  extremity cellulitis with drainage- Improving-basically resolved but continue topical antibiotic washing soap and water\par Augmentin BID with food- GI Changed to Bactrim 1 po BID x 7  days- complete\par \par mild lower  extremity edema/ Venous insufficiency mild interval increase-  lasix- QOD Improved\par Low normal O2 saturations on room air\par  abnormal PFT\par  History asbestosis\par  COPD-Emphysema with mucoid impaction\par stable pulmonary physiology\par Pre DM\par HTN  - controlled\par Sinus tachycardia-we will increase metoprolol 200 mg a.m. and 50 mg p.o.-  normalized \par HLD\par Groin cellulitis w with recurrence- left inguinal- Resolved without recent  recurrence\par COVID 19 AB Negative\par \par ANORO 1 puff Daily-PFT\par  \par Next 1 month APPT watch Lipid profile at f/u/ A1C\par low sugar diet\par Discussed  with patient never did get Colonoscopy - readvised\par \par J and J completed

## 2021-07-08 NOTE — HISTORY OF PRESENT ILLNESS
[Stable] : are stable [None] : ~He/She~ has no significant interval events [Difficulty Breathing During Exertion] : stable dyspnea on exertion [Feelings Of Weakness On Exertion] : stable exercise intolerance [Cough] : denies coughing [Wheezing] : denies wheezing [Regional Soft Tissue Swelling Both Lower Extremities] : denies lower extremity edema [Chest Pain Or Discomfort] : denies chest pain [Fever] : denies fever [Date: ___] : was performed [unfilled] [Wt Gain ___ Lbs] : no recent weight gain [Wt Loss ___ Lbs] : no recent weight loss [Oxygen] : the patient uses no supplemental oxygen [de-identified] : emphysema with mucous plugging [FreeTextEntry1] : developed right posterior triceps with swelling lesion with mild  tenderness\par No  fever  chills  sweats\par Ongoing management reevaluation for right upper extremity cellulitis with continued healing following treatment including oral antibiotics and topical antibiotics I&D drainage\par \par no chest pain SOB\par  sleeping well\par no increased lower extremity edema\par up to date with Meds

## 2021-07-08 NOTE — REVIEW OF SYSTEMS
[Hypertension] : ~T hypertension [As Noted in HPI] : as noted in HPI [Negative] : Psychiatric [FreeTextEntry7] : abnormal CT  ABD  focal  wall thickening [FreeTextEntry5] : Hematuria  with  cystoscopy [de-identified] : R/O Pre DM

## 2021-07-09 ENCOUNTER — NON-APPOINTMENT (OUTPATIENT)
Age: 81
End: 2021-07-09

## 2021-07-09 LAB
ANION GAP SERPL CALC-SCNC: 13 MMOL/L
BASOPHILS # BLD AUTO: 0.06 K/UL
BASOPHILS NFR BLD AUTO: 0.6 %
BUN SERPL-MCNC: 15 MG/DL
CALCIUM SERPL-MCNC: 9.9 MG/DL
CHLORIDE SERPL-SCNC: 97 MMOL/L
CHOLEST SERPL-MCNC: 163 MG/DL
CO2 SERPL-SCNC: 23 MMOL/L
CREAT SERPL-MCNC: 0.75 MG/DL
EOSINOPHIL # BLD AUTO: 0.19 K/UL
EOSINOPHIL NFR BLD AUTO: 2 %
ESTIMATED AVERAGE GLUCOSE: 120 MG/DL
GLUCOSE SERPL-MCNC: 125 MG/DL
HBA1C MFR BLD HPLC: 5.8 %
HCT VFR BLD CALC: 50.1 %
HDLC SERPL-MCNC: 53 MG/DL
HGB BLD-MCNC: 16.3 G/DL
IMM GRANULOCYTES NFR BLD AUTO: 0.3 %
LDLC SERPL CALC-MCNC: 95 MG/DL
LYMPHOCYTES # BLD AUTO: 1.58 K/UL
LYMPHOCYTES NFR BLD AUTO: 16.8 %
MAN DIFF?: NORMAL
MCHC RBC-ENTMCNC: 30.2 PG
MCHC RBC-ENTMCNC: 32.5 GM/DL
MCV RBC AUTO: 92.9 FL
MONOCYTES # BLD AUTO: 0.76 K/UL
MONOCYTES NFR BLD AUTO: 8.1 %
NEUTROPHILS # BLD AUTO: 6.79 K/UL
NEUTROPHILS NFR BLD AUTO: 72.2 %
NONHDLC SERPL-MCNC: 109 MG/DL
PLATELET # BLD AUTO: 326 K/UL
POTASSIUM SERPL-SCNC: 4.3 MMOL/L
RBC # BLD: 5.39 M/UL
RBC # FLD: 13.2 %
SODIUM SERPL-SCNC: 134 MMOL/L
TRIGL SERPL-MCNC: 74 MG/DL
WBC # FLD AUTO: 9.41 K/UL

## 2021-08-09 ENCOUNTER — APPOINTMENT (OUTPATIENT)
Dept: PULMONOLOGY | Facility: CLINIC | Age: 81
End: 2021-08-09
Payer: MEDICARE

## 2021-08-09 VITALS — TEMPERATURE: 97.6 F | OXYGEN SATURATION: 93 % | HEART RATE: 58 BPM

## 2021-08-09 VITALS — SYSTOLIC BLOOD PRESSURE: 140 MMHG | DIASTOLIC BLOOD PRESSURE: 82 MMHG

## 2021-08-09 PROCEDURE — 71046 X-RAY EXAM CHEST 2 VIEWS: CPT

## 2021-08-09 PROCEDURE — 99213 OFFICE O/P EST LOW 20 MIN: CPT | Mod: 25

## 2021-08-09 NOTE — DISCUSSION/SUMMARY
[FreeTextEntry1] : Right upper  extremity cellulitis with drainage- Improving-basically resolved but continue topical antibiotic washing soap and water-resolved without recurrence\par Augmentin BID with food- GI Changed to Bactrim 1 po BID x 7  days- completed\par \par mild lower  extremity edema/ Venous insufficiency mild interval increase-  lasix- QOD Improved\par Low normal O2 saturations on room air\par  abnormal PFT\par  History asbestosis\par  COPD-Emphysema with mucoid impaction\par stable pulmonary physiology\par Pre DM\par HTN  - controlled\par Sinus tachycardia-we will increase metoprolol 200 mg a.m. and 50 mg p.o.-  normalized \par HLD\par Groin cellulitis w with recurrence- left inguinal- Resolved without recent  recurrence\par COVID 19 AB Negative\par \par ANORO 1 puff Daily-PFT\par  \par 2  month APPT watch Lipid profile with  A1C\par low sugar diet\par Discussed  with patient never did get Colonoscopy - readvised\par CXR 2/2022\par J and J completed

## 2021-08-09 NOTE — PHYSICAL EXAM
[General Appearance - Well Developed] : well developed [Normal Appearance] : normal appearance [Well Groomed] : well groomed [General Appearance - Well Nourished] : well nourished [No Deformities] : no deformities [General Appearance - In No Acute Distress] : no acute distress [Normal Conjunctiva] : the conjunctiva exhibited no abnormalities [Eyelids - No Xanthelasma] : the eyelids demonstrated no xanthelasmas [Normal Oropharynx] : normal oropharynx [I] : I [Neck Appearance] : the appearance of the neck was normal [Neck Cervical Mass (___cm)] : no neck mass was observed [Jugular Venous Distention Increased] : there was no jugular-venous distention [Thyroid Diffuse Enlargement] : the thyroid was not enlarged [Heart Rate And Rhythm] : heart rate and rhythm were normal [Heart Sounds] : normal S1 and S2 [Murmurs] : no murmurs present [Arterial Pulses Normal] : the arterial pulses were normal [Edema] : no peripheral edema present [Veins - Varicosity Changes] : no varicosital changes were noted in the lower extremities [Respiration, Rhythm And Depth] : normal respiratory rhythm and effort [Exaggerated Use Of Accessory Muscles For Inspiration] : no accessory muscle use [Bowel Sounds] : normal bowel sounds [Abdomen Soft] : soft [Abdomen Tenderness] : non-tender [Abdomen Mass (___ Cm)] : no abdominal mass palpated [Abnormal Walk] : normal gait [Gait - Sufficient For Exercise Testing] : the gait was sufficient for exercise testing [Nail Clubbing] : no clubbing of the fingernails [Cyanosis, Localized] : no localized cyanosis [Petechial Hemorrhages (___cm)] : no petechial hemorrhages [Skin Color & Pigmentation] : normal skin color and pigmentation [] : no rash [Deep Tendon Reflexes (DTR)] : deep tendon reflexes were 2+ and symmetric [Motor Exam] : the motor exam was normal [Sensation] : the sensory exam was normal to light touch and pinprick [No Focal Deficits] : no focal deficits [Oriented To Time, Place, And Person] : oriented to person, place, and time [Impaired Insight] : insight and judgment were intact [Affect] : the affect was normal [Mood] : the mood was normal [FreeTextEntry2] : trace  edema  [FreeTextEntry1] : Pos Right upper  ext posterior raised red lesion most consistent with  cellulitis

## 2021-08-09 NOTE — HISTORY OF PRESENT ILLNESS
[Stable] : are stable [None] : ~He/She~ has no significant interval events [Difficulty Breathing During Exertion] : stable dyspnea on exertion [Feelings Of Weakness On Exertion] : stable exercise intolerance [Cough] : denies coughing [Wheezing] : denies wheezing [Regional Soft Tissue Swelling Both Lower Extremities] : denies lower extremity edema [Chest Pain Or Discomfort] : denies chest pain [Fever] : denies fever [Date: ___] : was performed [unfilled] [Wt Gain ___ Lbs] : no recent weight gain [Wt Loss ___ Lbs] : no recent weight loss [Oxygen] : the patient uses no supplemental oxygen [de-identified] : emphysema with mucous plugging [FreeTextEntry1] : developed right posterior triceps with swelling lesion with mild  tenderness\par No  fever  chills  sweats\par Ongoing management reevaluation for right upper extremity cellulitis with continued healing following treatment including oral antibiotics and topical antibiotics I&D drainage\par \par no chest pain SOB\par  sleeping well\par no increased lower extremity edema\par up to date with Meds

## 2021-08-09 NOTE — PROCEDURE
[FreeTextEntry1] : Chest x-ray PA lateral August 9, 2021\par Cardiac size normal\par Uncoiled aorta\par Pleural plaque identified with the dominant right hemidiaphragm\par Left lung pleural-based abnormality consistent with known asbestosis\par No dominant pulmonary nodules pleural effusions pneumothorax\par \par \par PFT 7/8/21\par Moderate OAD\par  TLC 86 % pred\par  Airtrapping\par  Low nl DLCO 73 %\par HGB 16.1\par \par PFT  4//14/21\par Moderate reduction flow  rates\par  Mod OAD\par Normal TLC 82 %\par Pos  airtrapping\par Low nl DLCO  73 %\par HGB 17.7\par  stable pulmonary physiology\par \par CHEST  x-ray PA lateral February 24, 2021 history of asbestosis\par COPD\par Normal cardiac size\par Uncoiled aorta\par Chronic parenchymal changes with bilateral identifiable pleural disease.\par Diagnosis with pleural plaques clinically right diaphragm asbestosis\par Minimal calcification aortic knob no gross interval change compared to chest x-ray [facility] 3, 2020\par \par PFT Body Box 12/7/2020\par moderate OAD\par airtrapping\par Resistance is  normal and sp conductance decreased\par low  normal DLCO DLCO 78 %\par HGB 16.3\par \par PFT September 4, 2020\par No bronchodilator administered\par Moderate reduction flow rates with an obstructive ventilatory impairment and a reduced total lung capacity 69% predicted.\par Mild reduction diffusion with a loss of functioning alveolocapillary units are\par Impression combined mild obstructive and restrictive ventilatory impairment with mild gas exchange impairment.\par \par POCT  12/7/2020\par Glucose 138\par HGB  5.8\par \par DATA [facility]  3  2020\par Serum glucose 112\par Hemoglobin A1c 6.1\par Urine analysis trace protein negative nitrates\par \par EKG [facility] 3, 2020\par Sinus tachycardia\par Cannot exclude old anterior wall changes\par RSR\par \par Chest x-ray PA lateral [facility] 3, 2020\par Normal cardiac size\par " Reported\par Calcified pleural diaphragmatic pad right\par Patchy changes midlung zone\par No pleural effusions dominant pulmonary nodules\par No gross interval change compared to chest x-ray of December 19, 2019\par \par \par Spirometry No BD\par Moderate reduction flow rates\par Spirometry  1/17/2020\par  moderate  reduction in flow rates\par  obstructive ventilatory impairment and with reduced FVC cannot exclude restrictive component\par Pos BD at FVC 12 %\par \par Chest x-ray PA lateral December 19, 2019\par Indication COPD-asbestosis\par Cardiac size normal\par " Aortic\par Dense calcified pleural plaque right hemidiaphragm\par COPD changes\par No significant interstitial changes\par No dominant pulmonary nodules parenchymal infiltrates pleural effusions\par Change compared to chest x-ray of December 18, 2018\par \par PFT 9/16/2019\par Moderate OAD\par  Pos BD 14 % at FVC\par  Minimal airtrapping and normal TLC 95 %\par  DLCO  84 % normal  range\par  HGB 16.2\par  Stable pulmonary physiology\par \par Pulmonary Six Minute Walk 5/4/2018 No desaturation\par \par Blood draw\par Data review June 4, 2020\par COVID-19 IgG antibody negative\par CBC normal\par White blood count 9.45 hemoglobin 16.3 hematocrit 49.6\par Platelet count 315,000\par T4 TSH normal\par PSA normal range 1.38\par Lipid profile\par Cholesterol 148 HDL 47 LDL 82\par Vitamin D 27.9\par \par \par HD Flu administered Oct  26  2020\par \par Bone density screening osteoporosis high risk patient February 24, 2021\par AP spine L1-L4 normal\par Femoral neck left normal\par Total left hip normal\par Impression normal study\par

## 2021-08-09 NOTE — REVIEW OF SYSTEMS
[Hypertension] : ~T hypertension [As Noted in HPI] : as noted in HPI [Negative] : Psychiatric [FreeTextEntry7] : abnormal CT  ABD  focal  wall thickening [FreeTextEntry5] : Hematuria  with  cystoscopy [de-identified] : R/O Pre DM

## 2021-09-09 ENCOUNTER — RX RENEWAL (OUTPATIENT)
Age: 81
End: 2021-09-09

## 2021-10-27 ENCOUNTER — NON-APPOINTMENT (OUTPATIENT)
Age: 81
End: 2021-10-27

## 2021-10-27 ENCOUNTER — APPOINTMENT (OUTPATIENT)
Dept: PULMONOLOGY | Facility: CLINIC | Age: 81
End: 2021-10-27
Payer: MEDICARE

## 2021-10-27 VITALS
SYSTOLIC BLOOD PRESSURE: 113 MMHG | HEART RATE: 115 BPM | OXYGEN SATURATION: 94 % | DIASTOLIC BLOOD PRESSURE: 73 MMHG | TEMPERATURE: 97.7 F

## 2021-10-27 DIAGNOSIS — N40.0 BENIGN PROSTATIC HYPERPLASIA WITHOUT LOWER URINARY TRACT SYMPMS: ICD-10-CM

## 2021-10-27 DIAGNOSIS — Z00.00 ENCOUNTER FOR GENERAL ADULT MEDICAL EXAMINATION W/OUT ABNORMAL FINDINGS: ICD-10-CM

## 2021-10-27 DIAGNOSIS — I44.0 ATRIOVENTRICULAR BLOCK, FIRST DEGREE: ICD-10-CM

## 2021-10-27 LAB
ALBUMIN: 150
BILIRUB UR QL STRIP: NORMAL
CLARITY UR: CLEAR
COLLECTION METHOD: NORMAL
CREATININE: 200
GLUCOSE UR-MCNC: NORMAL
HCG UR QL: 0.2 EU/DL
HGB UR QL STRIP.AUTO: NORMAL
KETONES UR-MCNC: NORMAL
LEUKOCYTE ESTERASE UR QL STRIP: NORMAL
MICROALBUMIN/CREAT UR TEST STR-RTO: NORMAL
NITRITE UR QL STRIP: NORMAL
PH UR STRIP: 7
PROT UR STRIP-MCNC: NORMAL
SP GR UR STRIP: 1.02

## 2021-10-27 PROCEDURE — 90662 IIV NO PRSV INCREASED AG IM: CPT

## 2021-10-27 PROCEDURE — 99214 OFFICE O/P EST MOD 30 MIN: CPT | Mod: 25

## 2021-10-27 PROCEDURE — 93000 ELECTROCARDIOGRAM COMPLETE: CPT

## 2021-10-27 PROCEDURE — 82044 UR ALBUMIN SEMIQUANTITATIVE: CPT | Mod: QW

## 2021-10-27 PROCEDURE — 36415 COLL VENOUS BLD VENIPUNCTURE: CPT

## 2021-10-27 PROCEDURE — G0008: CPT

## 2021-10-27 PROCEDURE — 81003 URINALYSIS AUTO W/O SCOPE: CPT | Mod: QW

## 2021-10-27 NOTE — HISTORY OF PRESENT ILLNESS
[Stable] : are stable [None] : ~He/She~ has no significant interval events [Difficulty Breathing During Exertion] : stable dyspnea on exertion [Feelings Of Weakness On Exertion] : stable exercise intolerance [Cough] : denies coughing [Wheezing] : denies wheezing [Regional Soft Tissue Swelling Both Lower Extremities] : denies lower extremity edema [Chest Pain Or Discomfort] : denies chest pain [Fever] : denies fever [Date: ___] : was performed [unfilled] [Wt Gain ___ Lbs] : no recent weight gain [Wt Loss ___ Lbs] : no recent weight loss [Oxygen] : the patient uses no supplemental oxygen [de-identified] : emphysema with mucous plugging [FreeTextEntry1] : no active compliants\par no chest pain SOB\par  sleeping well\par no increased lower extremity edema\par up to date with Meds\par no recurrent  cellulitis

## 2021-10-27 NOTE — PROCEDURE
[FreeTextEntry1] : UA 1 = protein\par \par EKG 10/27/21\par  Sinus tach with rate variation\par \par Chest x-ray PA lateral August 9, 2021\par Cardiac size normal\par Uncoiled aorta\par Pleural plaque identified with the dominant right hemidiaphragm\par Left lung pleural-based abnormality consistent with known asbestosis\par No dominant pulmonary nodules pleural effusions pneumothorax\par \par \par PFT 7/8/21\par Moderate OAD\par  TLC 86 % pred\par  Airtrapping\par  Low nl DLCO 73 %\par HGB 16.1\par \par PFT  4//14/21\par Moderate reduction flow  rates\par  Mod OAD\par Normal TLC 82 %\par Pos  airtrapping\par Low nl DLCO  73 %\par HGB 17.7\par  stable pulmonary physiology\par \par CHEST  x-ray PA lateral February 24, 2021 history of asbestosis\par COPD\par Normal cardiac size\par Uncoiled aorta\par Chronic parenchymal changes with bilateral identifiable pleural disease.\par Diagnosis with pleural plaques clinically right diaphragm asbestosis\par Minimal calcification aortic knob no gross interval change compared to chest x-ray [facility] 3, 2020\par \par PFT Body Box 12/7/2020\par moderate OAD\par airtrapping\par Resistance is  normal and sp conductance decreased\par low  normal DLCO DLCO 78 %\par HGB 16.3\par \par PFT September 4, 2020\par No bronchodilator administered\par Moderate reduction flow rates with an obstructive ventilatory impairment and a reduced total lung capacity 69% predicted.\par Mild reduction diffusion with a loss of functioning alveolocapillary units are\par Impression combined mild obstructive and restrictive ventilatory impairment with mild gas exchange impairment.\par \par POCT  12/7/2020\par Glucose 138\par HGB  5.8\par \par DATA [facility]  3  2020\par Serum glucose 112\par Hemoglobin A1c 6.1\par Urine analysis trace protein negative nitrates\par \par EKG [facility] 3, 2020\par Sinus tachycardia\par Cannot exclude old anterior wall changes\par RSR\par \par Chest x-ray PA lateral [facility] 3, 2020\par Normal cardiac size\par " Reported\par Calcified pleural diaphragmatic pad right\par Patchy changes midlung zone\par No pleural effusions dominant pulmonary nodules\par No gross interval change compared to chest x-ray of December 19, 2019\par \par \par Spirometry No BD\par Moderate reduction flow rates\par Spirometry  1/17/2020\par  moderate  reduction in flow rates\par  obstructive ventilatory impairment and with reduced FVC cannot exclude restrictive component\par Pos BD at FVC 12 %\par \par Chest x-ray PA lateral December 19, 2019\par Indication COPD-asbestosis\par Cardiac size normal\par " Aortic\par Dense calcified pleural plaque right hemidiaphragm\par COPD changes\par No significant interstitial changes\par No dominant pulmonary nodules parenchymal infiltrates pleural effusions\par Change compared to chest x-ray of December 18, 2018\par \par PFT 9/16/2019\par Moderate OAD\par  Pos BD 14 % at FVC\par  Minimal airtrapping and normal TLC 95 %\par  DLCO  84 % normal  range\par  HGB 16.2\par  Stable pulmonary physiology\par \par Pulmonary Six Minute Walk 5/4/2018 No desaturation\par \par Blood draw\par Data review June 4, 2020\par COVID-19 IgG antibody negative\par CBC normal\par White blood count 9.45 hemoglobin 16.3 hematocrit 49.6\par Platelet count 315,000\par T4 TSH normal\par PSA normal range 1.38\par Lipid profile\par Cholesterol 148 HDL 47 LDL 82\par Vitamin D 27.9\par \par \par HD Flu administered Oct 27 2021\par \par Bone density screening osteoporosis high risk patient February 24, 2021\par AP spine L1-L4 normal\par Femoral neck left normal\par Total left hip normal\par Impression normal study\par

## 2021-10-27 NOTE — PHYSICAL EXAM
[General Appearance - Well Developed] : well developed [Normal Appearance] : normal appearance [Well Groomed] : well groomed [General Appearance - Well Nourished] : well nourished [No Deformities] : no deformities [General Appearance - In No Acute Distress] : no acute distress [Normal Conjunctiva] : the conjunctiva exhibited no abnormalities [Eyelids - No Xanthelasma] : the eyelids demonstrated no xanthelasmas [Normal Oropharynx] : normal oropharynx [I] : I [Neck Appearance] : the appearance of the neck was normal [Neck Cervical Mass (___cm)] : no neck mass was observed [Jugular Venous Distention Increased] : there was no jugular-venous distention [Thyroid Diffuse Enlargement] : the thyroid was not enlarged [Heart Rate And Rhythm] : heart rate and rhythm were normal [Heart Sounds] : normal S1 and S2 [Murmurs] : no murmurs present [Arterial Pulses Normal] : the arterial pulses were normal [Edema] : no peripheral edema present [Veins - Varicosity Changes] : no varicosital changes were noted in the lower extremities [Respiration, Rhythm And Depth] : normal respiratory rhythm and effort [Exaggerated Use Of Accessory Muscles For Inspiration] : no accessory muscle use [Bowel Sounds] : normal bowel sounds [Abdomen Soft] : soft [Abdomen Tenderness] : non-tender [Abdomen Mass (___ Cm)] : no abdominal mass palpated [Abnormal Walk] : normal gait [Gait - Sufficient For Exercise Testing] : the gait was sufficient for exercise testing [Nail Clubbing] : no clubbing of the fingernails [Cyanosis, Localized] : no localized cyanosis [Petechial Hemorrhages (___cm)] : no petechial hemorrhages [Skin Color & Pigmentation] : normal skin color and pigmentation [] : no rash [Deep Tendon Reflexes (DTR)] : deep tendon reflexes were 2+ and symmetric [Sensation] : the sensory exam was normal to light touch and pinprick [Motor Exam] : the motor exam was normal [No Focal Deficits] : no focal deficits [Oriented To Time, Place, And Person] : oriented to person, place, and time [Impaired Insight] : insight and judgment were intact [Affect] : the affect was normal [Mood] : the mood was normal [FreeTextEntry2] : trace  edema resolved [FreeTextEntry1] : Pos Right upper  ext posterior raised red lesion most consistent with  cellulitis

## 2021-10-27 NOTE — REVIEW OF SYSTEMS
[Hypertension] : ~T hypertension [As Noted in HPI] : as noted in HPI [Negative] : Psychiatric [FreeTextEntry7] : abnormal CT  ABD  focal  wall thickening [FreeTextEntry5] : Hematuria  with  cystoscopy [de-identified] : R/O Pre DM

## 2021-10-28 ENCOUNTER — NON-APPOINTMENT (OUTPATIENT)
Age: 81
End: 2021-10-28

## 2021-10-28 LAB
25(OH)D3 SERPL-MCNC: 27.9 NG/ML
ALBUMIN SERPL ELPH-MCNC: 4.6 G/DL
ALP BLD-CCNC: 79 U/L
ALT SERPL-CCNC: 13 U/L
ANION GAP SERPL CALC-SCNC: 18 MMOL/L
AST SERPL-CCNC: 16 U/L
BASOPHILS # BLD AUTO: 0.05 K/UL
BASOPHILS NFR BLD AUTO: 0.6 %
BILIRUB SERPL-MCNC: 1.1 MG/DL
BUN SERPL-MCNC: 15 MG/DL
CALCIUM SERPL-MCNC: 9.8 MG/DL
CHLORIDE SERPL-SCNC: 98 MMOL/L
CHOLEST SERPL-MCNC: 173 MG/DL
CO2 SERPL-SCNC: 20 MMOL/L
CREAT SERPL-MCNC: 0.86 MG/DL
EOSINOPHIL # BLD AUTO: 0.12 K/UL
EOSINOPHIL NFR BLD AUTO: 1.4 %
ESTIMATED AVERAGE GLUCOSE: 128 MG/DL
GLUCOSE SERPL-MCNC: 126 MG/DL
HBA1C MFR BLD HPLC: 6.1 %
HCT VFR BLD CALC: 51.8 %
HDLC SERPL-MCNC: 47 MG/DL
HGB BLD-MCNC: 16.8 G/DL
IMM GRANULOCYTES NFR BLD AUTO: 0.4 %
LDLC SERPL CALC-MCNC: 108 MG/DL
LYMPHOCYTES # BLD AUTO: 1.81 K/UL
LYMPHOCYTES NFR BLD AUTO: 21.5 %
MAN DIFF?: NORMAL
MCHC RBC-ENTMCNC: 30.1 PG
MCHC RBC-ENTMCNC: 32.4 GM/DL
MCV RBC AUTO: 92.8 FL
MONOCYTES # BLD AUTO: 0.88 K/UL
MONOCYTES NFR BLD AUTO: 10.5 %
NEUTROPHILS # BLD AUTO: 5.52 K/UL
NEUTROPHILS NFR BLD AUTO: 65.6 %
NONHDLC SERPL-MCNC: 126 MG/DL
PLATELET # BLD AUTO: 296 K/UL
POTASSIUM SERPL-SCNC: 3.9 MMOL/L
PROT SERPL-MCNC: 7.3 G/DL
PSA SERPL-MCNC: 1.53 NG/ML
RBC # BLD: 5.58 M/UL
RBC # FLD: 13.2 %
SODIUM SERPL-SCNC: 136 MMOL/L
T4 SERPL-MCNC: 5.5 UG/DL
TRIGL SERPL-MCNC: 92 MG/DL
TSH SERPL-ACNC: 1.86 UIU/ML
WBC # FLD AUTO: 8.41 K/UL

## 2021-12-05 ENCOUNTER — RX RENEWAL (OUTPATIENT)
Age: 81
End: 2021-12-05

## 2021-12-13 ENCOUNTER — APPOINTMENT (OUTPATIENT)
Dept: PULMONOLOGY | Facility: CLINIC | Age: 81
End: 2021-12-13
Payer: MEDICARE

## 2021-12-13 VITALS
DIASTOLIC BLOOD PRESSURE: 102 MMHG | BODY MASS INDEX: 30.8 KG/M2 | TEMPERATURE: 98.5 F | OXYGEN SATURATION: 94 % | SYSTOLIC BLOOD PRESSURE: 202 MMHG | HEIGHT: 71 IN | HEART RATE: 106 BPM | WEIGHT: 220 LBS

## 2021-12-13 VITALS — RESPIRATION RATE: 14 BRPM | OXYGEN SATURATION: 92 % | TEMPERATURE: 96.9 F | HEART RATE: 107 BPM

## 2021-12-13 DIAGNOSIS — R80.9 PROTEINURIA, UNSPECIFIED: ICD-10-CM

## 2021-12-13 LAB
BILIRUB UR QL STRIP: NORMAL
CLARITY UR: CLEAR
COLLECTION METHOD: NORMAL
GLUCOSE UR-MCNC: NORMAL
HCG UR QL: 0.2 EU/DL
HGB UR QL STRIP.AUTO: NORMAL
KETONES UR-MCNC: NORMAL
LEUKOCYTE ESTERASE UR QL STRIP: NORMAL
NITRITE UR QL STRIP: NORMAL
PH UR STRIP: 7
PROT UR STRIP-MCNC: NORMAL
SP GR UR STRIP: 1.02

## 2021-12-13 PROCEDURE — 81003 URINALYSIS AUTO W/O SCOPE: CPT | Mod: QW

## 2021-12-13 PROCEDURE — 94010 BREATHING CAPACITY TEST: CPT

## 2021-12-13 PROCEDURE — 94729 DIFFUSING CAPACITY: CPT

## 2021-12-13 PROCEDURE — 99214 OFFICE O/P EST MOD 30 MIN: CPT | Mod: 25

## 2021-12-13 PROCEDURE — 94727 GAS DIL/WSHOT DETER LNG VOL: CPT

## 2021-12-13 PROCEDURE — ZZZZZ: CPT

## 2021-12-13 RX ORDER — UMECLIDINIUM BROMIDE AND VILANTEROL TRIFENATATE 62.5; 25 UG/1; UG/1
62.5-25 POWDER RESPIRATORY (INHALATION)
Qty: 1 | Refills: 3 | Status: DISCONTINUED | COMMUNITY
Start: 2018-10-05 | End: 2021-12-13

## 2021-12-13 RX ORDER — FLUCONAZOLE 100 MG/1
100 TABLET ORAL DAILY
Qty: 7 | Refills: 1 | Status: DISCONTINUED | COMMUNITY
Start: 2020-06-10 | End: 2021-12-13

## 2021-12-13 RX ORDER — SULFAMETHOXAZOLE AND TRIMETHOPRIM 800; 160 MG/1; MG/1
800-160 TABLET ORAL TWICE DAILY
Qty: 14 | Refills: 0 | Status: DISCONTINUED | COMMUNITY
Start: 2021-05-17 | End: 2021-12-13

## 2021-12-13 NOTE — HISTORY OF PRESENT ILLNESS
[Stable] : are stable [None] : ~He/She~ has no significant interval events [Difficulty Breathing During Exertion] : stable dyspnea on exertion [Feelings Of Weakness On Exertion] : stable exercise intolerance [Cough] : denies coughing [Wheezing] : denies wheezing [Regional Soft Tissue Swelling Both Lower Extremities] : denies lower extremity edema [Chest Pain Or Discomfort] : denies chest pain [Fever] : denies fever [Date: ___] : was performed [unfilled] [Wt Gain ___ Lbs] : no recent weight gain [Wt Loss ___ Lbs] : no recent weight loss [Oxygen] : the patient uses no supplemental oxygen [de-identified] : emphysema with mucous plugging [FreeTextEntry1] : no active compliants\par no chest pain SOB\par  sleeping well\par no increased lower extremity edema\par up to date with Meds\par no recurrent  cellulitis

## 2021-12-13 NOTE — REVIEW OF SYSTEMS
[Hypertension] : ~T hypertension [As Noted in HPI] : as noted in HPI [Negative] : Psychiatric [FreeTextEntry7] : abnormal CT  ABD  focal  wall thickening [FreeTextEntry5] : Hematuria  with  cystoscopy [de-identified] : R/O Pre DM

## 2021-12-13 NOTE — HISTORY OF PRESENT ILLNESS
[Stable] : are stable [None] : ~He/She~ has no significant interval events [Difficulty Breathing During Exertion] : stable dyspnea on exertion [Feelings Of Weakness On Exertion] : stable exercise intolerance [Cough] : denies coughing [Regional Soft Tissue Swelling Both Lower Extremities] : denies lower extremity edema [Wheezing] : denies wheezing [Chest Pain Or Discomfort] : denies chest pain [Fever] : denies fever [Date: ___] : was performed [unfilled] [Wt Gain ___ Lbs] : no recent weight gain [Wt Loss ___ Lbs] : no recent weight loss [Oxygen] : the patient uses no supplemental oxygen [de-identified] : emphysema with mucous plugging [FreeTextEntry1] : no active compliants\par no chest pain SOB\par  sleeping well\par no increased lower extremity edema\par up to date with Meds\par no recurrent  cellulitis

## 2021-12-13 NOTE — DISCUSSION/SUMMARY
[FreeTextEntry1] : Poorly controlled hypertension-patient is medication adherence compliance\par Patient will meet with nurse practitioner tomorrow and bring in all active medications and then will make appropriate adjustments\par \par 1 + proteinuria - address at f/u 24 hr urine collection etiology hypertension\par mild lower  extremity edema/ Venous insufficiency mild interval increase-  lasix- QOD Improved\par Low normal O2 saturations on room air\par  abnormal PFT\par  History asbestosis\par  COPD-Emphysema with mucoid impaction\par stable pulmonary physiology\par Pre DM\par HTN  - controlled\par Sinus tachycardia-we will increase metoprolol 200 mg a.m. and 50 mg p.o.-  normalized \par HLD\par Groin cellulitis w with recurrence- left inguinal- Resolved without recent  recurrence\par Hx right  shoulder  cellulitis\par COVID 19 AB Negative\par \par ANORO 1 puff Daily-PFT\par  \par 2  month APPT watch Lipid profile with  A1C\par low sugar diet\par Discussed  with patient never did get Colonoscopy - readvised\par CXR 2/2022\par J and J completed

## 2021-12-13 NOTE — PHYSICAL EXAM
[General Appearance - Well Developed] : well developed [Normal Appearance] : normal appearance [Well Groomed] : well groomed [General Appearance - Well Nourished] : well nourished [No Deformities] : no deformities [General Appearance - In No Acute Distress] : no acute distress [Normal Conjunctiva] : the conjunctiva exhibited no abnormalities [Eyelids - No Xanthelasma] : the eyelids demonstrated no xanthelasmas [Normal Oropharynx] : normal oropharynx [I] : I [Neck Appearance] : the appearance of the neck was normal [Neck Cervical Mass (___cm)] : no neck mass was observed [Jugular Venous Distention Increased] : there was no jugular-venous distention [Thyroid Diffuse Enlargement] : the thyroid was not enlarged [Heart Rate And Rhythm] : heart rate and rhythm were normal [Heart Sounds] : normal S1 and S2 [Murmurs] : no murmurs present [Arterial Pulses Normal] : the arterial pulses were normal [Edema] : no peripheral edema present [Veins - Varicosity Changes] : no varicosital changes were noted in the lower extremities [Respiration, Rhythm And Depth] : normal respiratory rhythm and effort [Exaggerated Use Of Accessory Muscles For Inspiration] : no accessory muscle use [Bowel Sounds] : normal bowel sounds [Abdomen Soft] : soft [Abdomen Tenderness] : non-tender [Abdomen Mass (___ Cm)] : no abdominal mass palpated [Abnormal Walk] : normal gait [Gait - Sufficient For Exercise Testing] : the gait was sufficient for exercise testing [Nail Clubbing] : no clubbing of the fingernails [Cyanosis, Localized] : no localized cyanosis [Petechial Hemorrhages (___cm)] : no petechial hemorrhages [Skin Color & Pigmentation] : normal skin color and pigmentation [] : no rash [Deep Tendon Reflexes (DTR)] : deep tendon reflexes were 2+ and symmetric [Sensation] : the sensory exam was normal to light touch and pinprick [No Focal Deficits] : no focal deficits [Motor Exam] : the motor exam was normal [Oriented To Time, Place, And Person] : oriented to person, place, and time [Impaired Insight] : insight and judgment were intact [Affect] : the affect was normal [Mood] : the mood was normal [FreeTextEntry2] : trace  edema resolved [FreeTextEntry1] : Pos Right upper  ext posterior raised red lesion most consistent with  cellulitis

## 2021-12-13 NOTE — PROCEDURE
[FreeTextEntry1] : PFT no bronchodilator December 13, 2021\par Flow rates normal\par Very mild obstructive pattern\par Ratio 75\par Lung volumes normal\par Total lung capacity 98% predicted\par Diffusion 108% predicted\par There is noted to be some decline of pulmonary physiology\par \par \par EKG 10/27/21\par  Sinus tach with rate variation\par \par Chest x-ray PA lateral August 9, 2021\par Cardiac size normal\par Uncoiled aorta\par Pleural plaque identified with the dominant right hemidiaphragm\par Left lung pleural-based abnormality consistent with known asbestosis\par No dominant pulmonary nodules pleural effusions pneumothorax\par \par \par PFT 7/8/21\par Moderate OAD\par  TLC 86 % pred\par  Airtrapping\par  Low nl DLCO 73 %\par HGB 16.1\par \par PFT  4//14/21\par Moderate reduction flow  rates\par  Mod OAD\par Normal TLC 82 %\par Pos  airtrapping\par Low nl DLCO  73 %\par HGB 17.7\par  stable pulmonary physiology\par \par CHEST  x-ray PA lateral February 24, 2021 history of asbestosis\par COPD\par Normal cardiac size\par Uncoiled aorta\par Chronic parenchymal changes with bilateral identifiable pleural disease.\par Diagnosis with pleural plaques clinically right diaphragm asbestosis\par Minimal calcification aortic knob no gross interval change compared to chest x-ray [facility] 3, 2020\par \par PFT Body Box 12/7/2020\par moderate OAD\par airtrapping\par Resistance is  normal and sp conductance decreased\par low  normal DLCO DLCO 78 %\par HGB 16.3\par \par PFT September 4, 2020\par No bronchodilator administered\par Moderate reduction flow rates with an obstructive ventilatory impairment and a reduced total lung capacity 69% predicted.\par Mild reduction diffusion with a loss of functioning alveolocapillary units are\par Impression combined mild obstructive and restrictive ventilatory impairment with mild gas exchange impairment.\par \par POCT  12/7/2020\par Glucose 138\par HGB  5.8\par \par DATA [facility]  3  2020\par Serum glucose 112\par Hemoglobin A1c 6.1\par Urine analysis trace protein negative nitrates\par \par EKG [facility] 3, 2020\par Sinus tachycardia\par Cannot exclude old anterior wall changes\par RSR\par \par Chest x-ray PA lateral [facility] 3, 2020\par Normal cardiac size\par " Reported\par Calcified pleural diaphragmatic pad right\par Patchy changes midlung zone\par No pleural effusions dominant pulmonary nodules\par No gross interval change compared to chest x-ray of December 19, 2019\par \par \par Spirometry No BD\par Moderate reduction flow rates\par Spirometry  1/17/2020\par  moderate  reduction in flow rates\par  obstructive ventilatory impairment and with reduced FVC cannot exclude restrictive component\par Pos BD at FVC 12 %\par \par Chest x-ray PA lateral December 19, 2019\par Indication COPD-asbestosis\par Cardiac size normal\par " Aortic\par Dense calcified pleural plaque right hemidiaphragm\par COPD changes\par No significant interstitial changes\par No dominant pulmonary nodules parenchymal infiltrates pleural effusions\par Change compared to chest x-ray of December 18, 2018\par \par PFT 9/16/2019\par Moderate OAD\par  Pos BD 14 % at FVC\par  Minimal airtrapping and normal TLC 95 %\par  DLCO  84 % normal  range\par  HGB 16.2\par  Stable pulmonary physiology\par \par Pulmonary Six Minute Walk 5/4/2018 No desaturation\par \par Blood draw\par Data review June 4, 2020\par COVID-19 IgG antibody negative\par CBC normal\par White blood count 9.45 hemoglobin 16.3 hematocrit 49.6\par Platelet count 315,000\par T4 TSH normal\par PSA normal range 1.38\par Lipid profile\par Cholesterol 148 HDL 47 LDL 82\par Vitamin D 27.9\par \par \par HD Flu administered Oct 27 2021\par \par Bone density screening osteoporosis high risk patient February 24, 2021\par AP spine L1-L4 normal\par Femoral neck left normal\par Total left hip normal\par Impression normal study\par

## 2021-12-14 ENCOUNTER — NON-APPOINTMENT (OUTPATIENT)
Age: 81
End: 2021-12-14

## 2021-12-14 RX ORDER — CLOTRIMAZOLE AND BETAMETHASONE DIPROPIONATE 10; .5 MG/G; MG/G
1-0.05 CREAM TOPICAL TWICE DAILY
Qty: 1 | Refills: 2 | Status: COMPLETED | COMMUNITY
Start: 2021-01-18 | End: 2021-12-14

## 2021-12-14 RX ORDER — MUPIROCIN 20 MG/G
2 OINTMENT TOPICAL 3 TIMES DAILY
Qty: 1 | Refills: 1 | Status: COMPLETED | COMMUNITY
Start: 2021-05-12 | End: 2021-12-14

## 2021-12-14 RX ORDER — METOPROLOL TARTRATE 50 MG/1
50 TABLET, FILM COATED ORAL
Qty: 30 | Refills: 3 | Status: COMPLETED | COMMUNITY
Start: 2020-06-03 | End: 2021-12-14

## 2021-12-14 RX ORDER — CELECOXIB 200 MG/1
200 CAPSULE ORAL
Refills: 0 | Status: COMPLETED | COMMUNITY
End: 2021-12-14

## 2021-12-14 RX ORDER — MUPIROCIN 20 MG/G
2 OINTMENT TOPICAL
Qty: 60 | Refills: 1 | Status: COMPLETED | COMMUNITY
Start: 2020-06-15 | End: 2021-12-14

## 2021-12-30 ENCOUNTER — RX RENEWAL (OUTPATIENT)
Age: 81
End: 2021-12-30

## 2022-02-28 ENCOUNTER — APPOINTMENT (OUTPATIENT)
Dept: PULMONOLOGY | Facility: CLINIC | Age: 82
End: 2022-02-28
Payer: MEDICARE

## 2022-02-28 VITALS
OXYGEN SATURATION: 90 % | DIASTOLIC BLOOD PRESSURE: 82 MMHG | RESPIRATION RATE: 16 BRPM | TEMPERATURE: 98.1 F | SYSTOLIC BLOOD PRESSURE: 218 MMHG | HEART RATE: 69 BPM

## 2022-02-28 DIAGNOSIS — Z11.59 ENCOUNTER FOR SCREENING FOR OTHER VIRAL DISEASES: ICD-10-CM

## 2022-02-28 LAB
GLUCOSE BLDC GLUCOMTR-MCNC: 115
HBA1C MFR BLD HPLC: 6.5

## 2022-02-28 PROCEDURE — 36415 COLL VENOUS BLD VENIPUNCTURE: CPT

## 2022-02-28 PROCEDURE — 82962 GLUCOSE BLOOD TEST: CPT

## 2022-02-28 PROCEDURE — 99214 OFFICE O/P EST MOD 30 MIN: CPT | Mod: 25

## 2022-02-28 PROCEDURE — 83036 HEMOGLOBIN GLYCOSYLATED A1C: CPT | Mod: QW

## 2022-02-28 PROCEDURE — 71046 X-RAY EXAM CHEST 2 VIEWS: CPT

## 2022-02-28 NOTE — HISTORY OF PRESENT ILLNESS
[Stable] : are stable [None] : ~He/She~ has no significant interval events [Difficulty Breathing During Exertion] : stable dyspnea on exertion [Feelings Of Weakness On Exertion] : stable exercise intolerance [Cough] : denies coughing [Wheezing] : denies wheezing [Regional Soft Tissue Swelling Both Lower Extremities] : denies lower extremity edema [Chest Pain Or Discomfort] : denies chest pain [Fever] : denies fever [Date: ___] : was performed [unfilled] [Wt Gain ___ Lbs] : no recent weight gain [Wt Loss ___ Lbs] : no recent weight loss [Oxygen] : the patient uses no supplemental oxygen [de-identified] : emphysema with mucous plugging [FreeTextEntry1] : no active compliants\par no chest pain SOB\par  sleeping well\par no increased lower extremity edema\par up to date with Meds\par no recurrent  cellulitis

## 2022-02-28 NOTE — REVIEW OF SYSTEMS
[Hypertension] : ~T hypertension [As Noted in HPI] : as noted in HPI [Negative] : Psychiatric [FreeTextEntry7] : abnormal CT  ABD  focal  wall thickening [FreeTextEntry5] : Hematuria  with  cystoscopy [de-identified] : R/O Pre DM

## 2022-02-28 NOTE — PROCEDURE
[FreeTextEntry1] : POCT  2/28/22\par HGBA!C 6.5\par  Glucose 115\par \par Chest x-ray PA lateral February 28, 2020\par Cardiac size is normal\par Pleural plaques identified\par On the right lung\par No parenchymal infiltrates pleural effusions or dominant pulmonary nodules\par Uncoiled aorta\par \par PFT no bronchodilator December 13, 2021\par Flow rates normal\par Very mild obstructive pattern\par Ratio 75\par Lung volumes normal\par Total lung capacity 98% predicted\par Diffusion 108% predicted\par There is noted to be some decline of pulmonary physiology\par \par EKG 10/27/21\par  Sinus tach with rate variation\par \par Chest x-ray PA lateral August 9, 2021\par Cardiac size normal\par Uncoiled aorta\par Pleural plaque identified with the dominant right hemidiaphragm\par Left lung pleural-based abnormality consistent with known asbestosis\par No dominant pulmonary nodules pleural effusions pneumothorax\par \par PFT 7/8/21\par Moderate OAD\par  TLC 86 % pred\par  Airtrapping\par  Low nl DLCO 73 %\par HGB 16.1\par \par PFT  4//14/21\par Moderate reduction flow  rates\par  Mod OAD\par Normal TLC 82 %\par Pos  airtrapping\par Low nl DLCO  73 %\par HGB 17.7\par  stable pulmonary physiology\par \par CHEST  x-ray PA lateral February 24, 2021 history of asbestosis\par COPD\par Normal cardiac size\par Uncoiled aorta\par Chronic parenchymal changes with bilateral identifiable pleural disease.\par Diagnosis with pleural plaques clinically right diaphragm asbestosis\par Minimal calcification aortic knob no gross interval change compared to chest x-ray [facility] 3, 2020\par \par PFT Body Box 12/7/2020\par moderate OAD\par airtrapping\par Resistance is  normal and sp conductance decreased\par low  normal DLCO DLCO 78 %\par HGB 16.3\par \par PFT September 4, 2020\par No bronchodilator administered\par Moderate reduction flow rates with an obstructive ventilatory impairment and a reduced total lung capacity 69% predicted.\par Mild reduction diffusion with a loss of functioning alveolocapillary units are\par Impression combined mild obstructive and restrictive ventilatory impairment with mild gas exchange impairment.\par \par POCT  12/7/2020\par Glucose 138\par HGB  5.8\par \par DATA [facility]  3  2020\par Serum glucose 112\par Hemoglobin A1c 6.1\par Urine analysis trace protein negative nitrates\par \par EKG [facility] 3, 2020\par Sinus tachycardia\par Cannot exclude old anterior wall changes\par RSR\par \par Chest x-ray PA lateral [facility] 3, 2020\par Normal cardiac size\par " Reported\par Calcified pleural diaphragmatic pad right\par Patchy changes midlung zone\par No pleural effusions dominant pulmonary nodules\par No gross interval change compared to chest x-ray of December 19, 2019\par \par \par Spirometry No BD\par Moderate reduction flow rates\par Spirometry  1/17/2020\par  moderate  reduction in flow rates\par  obstructive ventilatory impairment and with reduced FVC cannot exclude restrictive component\par Pos BD at FVC 12 %\par \par Chest x-ray PA lateral December 19, 2019\par Indication COPD-asbestosis\par Cardiac size normal\par " Aortic\par Dense calcified pleural plaque right hemidiaphragm\par COPD changes\par No significant interstitial changes\par No dominant pulmonary nodules parenchymal infiltrates pleural effusions\par Change compared to chest x-ray of December 18, 2018\par \par PFT 9/16/2019\par Moderate OAD\par  Pos BD 14 % at FVC\par  Minimal airtrapping and normal TLC 95 %\par  DLCO  84 % normal  range\par  HGB 16.2\par  Stable pulmonary physiology\par \par Pulmonary Six Minute Walk 5/4/2018 No desaturation\par \par Blood draw\par Data review June 4, 2020\par COVID-19 IgG antibody negative\par CBC normal\par White blood count 9.45 hemoglobin 16.3 hematocrit 49.6\par Platelet count 315,000\par T4 TSH normal\par PSA normal range 1.38\par Lipid profile\par Cholesterol 148 HDL 47 LDL 82\par Vitamin D 27.9\par \par \par HD Flu administered Oct 27 2021\par \par Bone density screening osteoporosis high risk patient February 24, 2021\par AP spine L1-L4 normal\par Femoral neck left normal\par Total left hip normal\par Impression normal study\par

## 2022-02-28 NOTE — DISCUSSION/SUMMARY
[FreeTextEntry1] : Poorly controlled hypertension-patient is medication adherence compliance\par \par \par 1 + proteinuria - address at f/u 24 hr urine collection etiology hypertension\par mild lower  extremity edema/ Venous insufficiency mild interval increase-  lasix- QOD Improved\par Low normal O2 saturations on room air\par  abnormal PFT\par  History asbestosis\par  COPD-Emphysema with mucoid impaction\par stable pulmonary physiology\par Pre DM- inc noted HGBA1C \par HTN  - controlled\par Sinus tachycardia-we will increase metoprolol 200 mg a.m. and 50 mg p.o.-  normalized \par HLD\par Groin cellulitis w with recurrence- left inguinal- Resolved without recent  recurrence\par Hx right  shoulder  cellulitis\par COVID 19 AB Negative\par \par ANORO 1 puff Daily-PFT\par  \par 3 month APPT watch Lipid profile with  A1C\par low sugar diet\par Discussed  with patient never did get Colonoscopy - readvised\par CXR 2/2022\par J and J completed plus booster with PFIZER

## 2022-03-01 ENCOUNTER — NON-APPOINTMENT (OUTPATIENT)
Age: 82
End: 2022-03-01

## 2022-03-01 LAB
ALBUMIN SERPL ELPH-MCNC: 4.2 G/DL
ALP BLD-CCNC: 78 U/L
ALT SERPL-CCNC: 12 U/L
ANION GAP SERPL CALC-SCNC: 16 MMOL/L
AST SERPL-CCNC: 16 U/L
BILIRUB DIRECT SERPL-MCNC: 0.2 MG/DL
BILIRUB INDIRECT SERPL-MCNC: 0.5 MG/DL
BILIRUB SERPL-MCNC: 0.7 MG/DL
BUN SERPL-MCNC: 15 MG/DL
CALCIUM SERPL-MCNC: 9.6 MG/DL
CHLORIDE SERPL-SCNC: 100 MMOL/L
CHOLEST SERPL-MCNC: 165 MG/DL
CO2 SERPL-SCNC: 23 MMOL/L
COVID-19 NUCLEOCAPSID  GAM ANTIBODY INTERPRETATION: NEGATIVE
CREAT SERPL-MCNC: 0.82 MG/DL
EGFR: 87 ML/MIN/1.73M2
GLUCOSE SERPL-MCNC: 126 MG/DL
HDLC SERPL-MCNC: 48 MG/DL
LDLC SERPL CALC-MCNC: 101 MG/DL
NONHDLC SERPL-MCNC: 116 MG/DL
POTASSIUM SERPL-SCNC: 4.2 MMOL/L
PROT SERPL-MCNC: 6.8 G/DL
SARS-COV-2 AB SERPL QL IA: 0.07 INDEX
SODIUM SERPL-SCNC: 139 MMOL/L
TRIGL SERPL-MCNC: 74 MG/DL

## 2022-04-11 PROBLEM — Z11.59 SCREENING FOR VIRAL DISEASE: Status: ACTIVE | Noted: 2020-06-03

## 2022-05-02 ENCOUNTER — RX RENEWAL (OUTPATIENT)
Age: 82
End: 2022-05-02

## 2022-06-02 ENCOUNTER — RX RENEWAL (OUTPATIENT)
Age: 82
End: 2022-06-02

## 2022-06-24 ENCOUNTER — APPOINTMENT (OUTPATIENT)
Dept: PULMONOLOGY | Facility: CLINIC | Age: 82
End: 2022-06-24
Payer: MEDICARE

## 2022-06-24 ENCOUNTER — RESULT CHARGE (OUTPATIENT)
Age: 82
End: 2022-06-24

## 2022-06-24 VITALS
SYSTOLIC BLOOD PRESSURE: 181 MMHG | WEIGHT: 215 LBS | BODY MASS INDEX: 29.99 KG/M2 | HEART RATE: 73 BPM | OXYGEN SATURATION: 93 % | DIASTOLIC BLOOD PRESSURE: 78 MMHG

## 2022-06-24 LAB — POCT - HEMOGLOBIN (HGB), QUANTITATIVE, TRANSCUTANEOUS: 15

## 2022-06-24 PROCEDURE — 94729 DIFFUSING CAPACITY: CPT

## 2022-06-24 PROCEDURE — 94727 GAS DIL/WSHOT DETER LNG VOL: CPT

## 2022-06-24 PROCEDURE — 94010 BREATHING CAPACITY TEST: CPT

## 2022-06-24 PROCEDURE — 36415 COLL VENOUS BLD VENIPUNCTURE: CPT

## 2022-06-24 PROCEDURE — ZZZZZ: CPT

## 2022-06-24 PROCEDURE — 88738 HGB QUANT TRANSCUTANEOUS: CPT

## 2022-06-24 PROCEDURE — 99214 OFFICE O/P EST MOD 30 MIN: CPT | Mod: 25

## 2022-06-24 RX ORDER — LOSARTAN POTASSIUM 100 MG/1
100 TABLET, FILM COATED ORAL DAILY
Qty: 90 | Refills: 1 | Status: DISCONTINUED | COMMUNITY
Start: 2019-11-27 | End: 2022-06-24

## 2022-06-24 NOTE — HISTORY OF PRESENT ILLNESS
[Stable] : are stable [None] : ~He/She~ has no significant interval events [Difficulty Breathing During Exertion] : stable dyspnea on exertion [Feelings Of Weakness On Exertion] : stable exercise intolerance [Cough] : denies coughing [Wheezing] : denies wheezing [Regional Soft Tissue Swelling Both Lower Extremities] : denies lower extremity edema [Chest Pain Or Discomfort] : denies chest pain [Fever] : denies fever [Date: ___] : was performed [unfilled] [Wt Gain ___ Lbs] : no recent weight gain [Wt Loss ___ Lbs] : no recent weight loss [Oxygen] : the patient uses no supplemental oxygen [de-identified] : emphysema with mucous plugging [FreeTextEntry1] : no active compliants\par no chest pain SOB\par  sleeping well\par no increased lower extremity edema\par up to date with Meds\par no recurrent  cellulitis\par request DERM APPT

## 2022-06-24 NOTE — PROCEDURE
[FreeTextEntry1] : POCT 6/24/22\par  Glucose\par  HGBA1C\par \par FT no bronchodilator June 24, 2022\par Moderate to severe reduction in flow rates\par Mild obstructive ventilatory impairment\par TLC normal 83% predicted\par Positive air trapping with RV/TLC ratio 152% predicted\par Diffusion moderate to severely reduced 54% predicted with a loss of functioning alveolar capillary units\par Hemoglobin 15.0\par Overall noted decline at diffusion\par Clinical correlation\par \par POCT  2/28/22\par HGBA!C 6.5\par  Glucose 115\par \par Chest x-ray PA lateral February 28, 2022\par Cardiac size is normal\par Pleural plaques identified\par On the right lung\par No parenchymal infiltrates pleural effusions or dominant pulmonary nodules\par Uncoiled aorta\par \par PFT no bronchodilator December 13, 2021\par Flow rates normal\par Very mild obstructive pattern\par Ratio 75\par Lung volumes normal\par Total lung capacity 98% predicted\par Diffusion 108% predicted\par There is noted to be some decline of pulmonary physiology\par \par EKG 10/27/21\par  Sinus tach with rate variation\par \par Chest x-ray PA lateral August 9, 2021\par Cardiac size normal\par Uncoiled aorta\par Pleural plaque identified with the dominant right hemidiaphragm\par Left lung pleural-based abnormality consistent with known asbestosis\par No dominant pulmonary nodules pleural effusions pneumothorax\par \par PFT 7/8/21\par Moderate OAD\par  TLC 86 % pred\par  Airtrapping\par  Low nl DLCO 73 %\par HGB 16.1\par \par PFT  4//14/21\par Moderate reduction flow  rates\par  Mod OAD\par Normal TLC 82 %\par Pos  airtrapping\par Low nl DLCO  73 %\par HGB 17.7\par  stable pulmonary physiology\par \par CHEST  x-ray PA lateral February 24, 2021 history of asbestosis\par COPD\par Normal cardiac size\par Uncoiled aorta\par Chronic parenchymal changes with bilateral identifiable pleural disease.\par Diagnosis with pleural plaques clinically right diaphragm asbestosis\par Minimal calcification aortic knob no gross interval change compared to chest x-ray [facility] 3, 2020\par \par PFT Body Box 12/7/2020\par moderate OAD\par airtrapping\par Resistance is  normal and sp conductance decreased\par low  normal DLCO DLCO 78 %\par HGB 16.3\par \par PFT September 4, 2020\par No bronchodilator administered\par Moderate reduction flow rates with an obstructive ventilatory impairment and a reduced total lung capacity 69% predicted.\par Mild reduction diffusion with a loss of functioning alveolocapillary units are\par Impression combined mild obstructive and restrictive ventilatory impairment with mild gas exchange impairment.\par \par POCT  12/7/2020\par Glucose 138\par HGB  5.8\par \par DATA [facility]  3  2020\par Serum glucose 112\par Hemoglobin A1c 6.1\par Urine analysis trace protein negative nitrates\par \par EKG [facility] 3, 2020\par Sinus tachycardia\par Cannot exclude old anterior wall changes\par RSR\par \par Chest x-ray PA lateral [facility] 3, 2020\par Normal cardiac size\par " Reported\par Calcified pleural diaphragmatic pad right\par Patchy changes midlung zone\par No pleural effusions dominant pulmonary nodules\par No gross interval change compared to chest x-ray of December 19, 2019\par \par \par Spirometry No BD\par Moderate reduction flow rates\par Spirometry  1/17/2020\par  moderate  reduction in flow rates\par  obstructive ventilatory impairment and with reduced FVC cannot exclude restrictive component\par Pos BD at FVC 12 %\par \par Chest x-ray PA lateral December 19, 2019\par Indication COPD-asbestosis\par Cardiac size normal\par " Aortic\par Dense calcified pleural plaque right hemidiaphragm\par COPD changes\par No significant interstitial changes\par No dominant pulmonary nodules parenchymal infiltrates pleural effusions\par Change compared to chest x-ray of December 18, 2018\par \par PFT 9/16/2019\par Moderate OAD\par  Pos BD 14 % at FVC\par  Minimal airtrapping and normal TLC 95 %\par  DLCO  84 % normal  range\par  HGB 16.2\par  Stable pulmonary physiology\par \par Pulmonary Six Minute Walk 5/4/2018 No desaturation\par \par Blood draw\par Data review June 4, 2020\par COVID-19 IgG antibody negative\par CBC normal\par White blood count 9.45 hemoglobin 16.3 hematocrit 49.6\par Platelet count 315,000\par T4 TSH normal\par PSA normal range 1.38\par Lipid profile\par Cholesterol 148 HDL 47 LDL 82\par Vitamin D 27.9\par \par \par HD Flu administered Oct 27 2021\par \par Bone density screening osteoporosis high risk patient February 24, 2021\par AP spine L1-L4 normal\par Femoral neck left normal\par Total left hip normal\par Impression normal study\par

## 2022-06-24 NOTE — DISCUSSION/SUMMARY
[FreeTextEntry1] : Poorly controlled hypertension-patient is medication adherence compliance\par \par 1 + proteinuria - address at f/u 24 hr urine collection etiology hypertension\par mild lower  extremity edema/ Venous insufficiency mild interval increase-  lasix- QOD Improved\par Low normal O2 saturations on room air\par  abnormal PFT\par  History asbestosis\par  COPD-Emphysema with mucoid impaction\par stable pulmonary physiology\par Pre DM- inc noted HGBA1C \par HTN  - controlled\par Sinus tachycardia-we will increase metoprolol 200 mg a.m. and 50 mg p.o.-  normalized \par HLD\par Groin cellulitis w with recurrence- left inguinal- Resolved without recent  recurrence\par Hx right  shoulder  cellulitis\par COVID 19 AB Negative\par \par ANORO 1 puff Daily-PFT\par  \par 3 month APPT watch Lipid profile with  A1C\par low sugar diet\par Discussed  with patient never did get Colonoscopy - readvised\par CXR 2/2022\par J and J completed plus booster with PFIZER

## 2022-06-24 NOTE — REVIEW OF SYSTEMS
[Hypertension] : ~T hypertension [As Noted in HPI] : as noted in HPI [Negative] : Psychiatric [FreeTextEntry7] : abnormal CT  ABD  focal  wall thickening [FreeTextEntry5] : Hematuria  with  cystoscopy [de-identified] : R/O Pre DM

## 2022-06-25 ENCOUNTER — NON-APPOINTMENT (OUTPATIENT)
Age: 82
End: 2022-06-25

## 2022-06-25 LAB
ANION GAP SERPL CALC-SCNC: 13 MMOL/L
BUN SERPL-MCNC: 17 MG/DL
CALCIUM SERPL-MCNC: 10.2 MG/DL
CHLORIDE SERPL-SCNC: 98 MMOL/L
CHOLEST SERPL-MCNC: 175 MG/DL
CO2 SERPL-SCNC: 28 MMOL/L
CREAT SERPL-MCNC: 0.92 MG/DL
EGFR: 83 ML/MIN/1.73M2
GLUCOSE SERPL-MCNC: 112 MG/DL
HDLC SERPL-MCNC: 53 MG/DL
LDLC SERPL CALC-MCNC: 107 MG/DL
NONHDLC SERPL-MCNC: 122 MG/DL
POTASSIUM SERPL-SCNC: 4.7 MMOL/L
SODIUM SERPL-SCNC: 138 MMOL/L
TRIGL SERPL-MCNC: 76 MG/DL

## 2022-08-05 ENCOUNTER — APPOINTMENT (OUTPATIENT)
Dept: PULMONOLOGY | Facility: CLINIC | Age: 82
End: 2022-08-05

## 2022-08-10 ENCOUNTER — APPOINTMENT (OUTPATIENT)
Dept: PULMONOLOGY | Facility: CLINIC | Age: 82
End: 2022-08-10

## 2022-08-10 ENCOUNTER — NON-APPOINTMENT (OUTPATIENT)
Age: 82
End: 2022-08-10

## 2022-08-10 VITALS — HEART RATE: 130 BPM | TEMPERATURE: 98.6 F | OXYGEN SATURATION: 93 %

## 2022-08-10 VITALS — DIASTOLIC BLOOD PRESSURE: 104 MMHG | SYSTOLIC BLOOD PRESSURE: 170 MMHG

## 2022-08-10 LAB
GLUCOSE BLDC GLUCOMTR-MCNC: 118
HBA1C MFR BLD HPLC: 6.8

## 2022-08-10 PROCEDURE — 99214 OFFICE O/P EST MOD 30 MIN: CPT | Mod: 25

## 2022-08-10 PROCEDURE — 83036 HEMOGLOBIN GLYCOSYLATED A1C: CPT | Mod: QW

## 2022-08-10 PROCEDURE — 82962 GLUCOSE BLOOD TEST: CPT

## 2022-08-10 PROCEDURE — 94010 BREATHING CAPACITY TEST: CPT

## 2022-08-10 NOTE — REVIEW OF SYSTEMS
[Hypertension] : ~T hypertension [As Noted in HPI] : as noted in HPI [Negative] : Psychiatric [FreeTextEntry7] : abnormal CT  ABD  focal  wall thickening [FreeTextEntry5] : Hematuria  with  cystoscopy [de-identified] : R/O Pre DM

## 2022-08-10 NOTE — DISCUSSION/SUMMARY
[FreeTextEntry1] : Poorly controlled hypertension-patient is medication adherence compliance\par in view lower ext edema \par Cardiac vascular consult Lopez Kanh DO\par \par 1 + proteinuria - address at f/u 24 hr urine collection etiology hypertension\par mild lower  extremity edema/ Venous insufficiency mild interval increase-  lasix- QOD Improved\par Low normal O2 saturations on room air\par  abnormal PFT\par  History asbestosis\par  COPD-Emphysema with mucoid impaction\par stable pulmonary physiology\par Pre DM- inc noted HGBA1C \par HTN  - controlled\par Sinus tachycardia-we will increase metoprolol 200 mg a.m. and 50 mg p.o.-  normalized \par HLD\par Groin cellulitis w with recurrence- left inguinal- Resolved without recent  recurrence\par Hx right  shoulder  cellulitis\par COVID 19 AB Negative\par \par ANORO 1 puff Daily-PFT\par  \par 3 month APPT watch Lipid profile with  A1C\par low sugar diet\par Discussed  with patient never did get Colonoscopy - readvised\par CXR 2/2022\par J and J completed plus booster with PFIZER

## 2022-08-10 NOTE — HISTORY OF PRESENT ILLNESS
[Stable] : are stable [None] : ~He/She~ has no significant interval events [Difficulty Breathing During Exertion] : stable dyspnea on exertion [Feelings Of Weakness On Exertion] : stable exercise intolerance [Cough] : denies coughing [Wheezing] : denies wheezing [Regional Soft Tissue Swelling Both Lower Extremities] : denies lower extremity edema [Chest Pain Or Discomfort] : denies chest pain [Fever] : denies fever [Date: ___] : was performed [unfilled] [Wt Gain ___ Lbs] : no recent weight gain [Wt Loss ___ Lbs] : no recent weight loss [Oxygen] : the patient uses no supplemental oxygen [de-identified] : emphysema with mucous plugging [FreeTextEntry1] : HTN BP check\par no active compliants\par no chest pain SOB\par  sleeping well\par  lower extremity edema\par up to date with Meds\par no recurrent  cellulitis\par request DERM APPT- recommended Plastic surgery [Follow-Up] : a follow-up visit [FreeTextEntry1] : and management of CKD Stage IV.

## 2022-08-10 NOTE — PROCEDURE
[FreeTextEntry1] : POCT 8/10/22\par  Glucose  118\par  HGBA1C  6.8\par \par Spirometry no bronchodilator 8/10/2022\par Moderate to severe reduction in flow rates ratio 65\par Will dating back over the past year there has been a decline at the FVC most recent noted from June till present the FEV1 is unchanged\par \par PFT no bronchodilator June 24, 2022\par Moderate to severe reduction in flow rates\par Mild obstructive ventilatory impairment\par TLC normal 83% predicted\par Positive air trapping with RV/TLC ratio 152% predicted\par Diffusion moderate to severely reduced 54% predicted with a loss of functioning alveolar capillary units\par Hemoglobin 15.0\par Overall noted decline at diffusion\par Clinical correlation\par \par POCT  2/28/22\par HGBA!C 6.5\par  Glucose 115\par \par Chest x-ray PA lateral February 28, 2022\par Cardiac size is normal\par Pleural plaques identified\par On the right lung\par No parenchymal infiltrates pleural effusions or dominant pulmonary nodules\par Uncoiled aorta\par \par PFT no bronchodilator December 13, 2021\par Flow rates normal\par Very mild obstructive pattern\par Ratio 75\par Lung volumes normal\par Total lung capacity 98% predicted\par Diffusion 108% predicted\par There is noted to be some decline of pulmonary physiology\par \par EKG 10/27/21\par  Sinus tach with rate variation\par \par Chest x-ray PA lateral August 9, 2021\par Cardiac size normal\par Uncoiled aorta\par Pleural plaque identified with the dominant right hemidiaphragm\par Left lung pleural-based abnormality consistent with known asbestosis\par No dominant pulmonary nodules pleural effusions pneumothorax\par \par PFT 7/8/21\par Moderate OAD\par  TLC 86 % pred\par  Airtrapping\par  Low nl DLCO 73 %\par HGB 16.1\par \par PFT  4//14/21\par Moderate reduction flow  rates\par  Mod OAD\par Normal TLC 82 %\par Pos  airtrapping\par Low nl DLCO  73 %\par HGB 17.7\par  stable pulmonary physiology\par \par CHEST  x-ray PA lateral February 24, 2021 history of asbestosis\par COPD\par Normal cardiac size\par Uncoiled aorta\par Chronic parenchymal changes with bilateral identifiable pleural disease.\par Diagnosis with pleural plaques clinically right diaphragm asbestosis\par Minimal calcification aortic knob no gross interval change compared to chest x-ray [facility] 3, 2020\par \par PFT Body Box 12/7/2020\par moderate OAD\par airtrapping\par Resistance is  normal and sp conductance decreased\par low  normal DLCO DLCO 78 %\par HGB 16.3\par \par PFT September 4, 2020\par No bronchodilator administered\par Moderate reduction flow rates with an obstructive ventilatory impairment and a reduced total lung capacity 69% predicted.\par Mild reduction diffusion with a loss of functioning alveolocapillary units are\par Impression combined mild obstructive and restrictive ventilatory impairment with mild gas exchange impairment.\par \par POCT  12/7/2020\par Glucose 138\par HGB  5.8\par \par DATA [facility]  3  2020\par Serum glucose 112\par Hemoglobin A1c 6.1\par Urine analysis trace protein negative nitrates\par \par EKG [facility] 3, 2020\par Sinus tachycardia\par Cannot exclude old anterior wall changes\par RSR\par \par Chest x-ray PA lateral [facility] 3, 2020\par Normal cardiac size\par " Reported\par Calcified pleural diaphragmatic pad right\par Patchy changes midlung zone\par No pleural effusions dominant pulmonary nodules\par No gross interval change compared to chest x-ray of December 19, 2019\par \par \par Spirometry No BD\par Moderate reduction flow rates\par Spirometry  1/17/2020\par  moderate  reduction in flow rates\par  obstructive ventilatory impairment and with reduced FVC cannot exclude restrictive component\par Pos BD at FVC 12 %\par \par Chest x-ray PA lateral December 19, 2019\par Indication COPD-asbestosis\par Cardiac size normal\par " Aortic\par Dense calcified pleural plaque right hemidiaphragm\par COPD changes\par No significant interstitial changes\par No dominant pulmonary nodules parenchymal infiltrates pleural effusions\par Change compared to chest x-ray of December 18, 2018\par \par PFT 9/16/2019\par Moderate OAD\par  Pos BD 14 % at FVC\par  Minimal airtrapping and normal TLC 95 %\par  DLCO  84 % normal  range\par  HGB 16.2\par  Stable pulmonary physiology\par \par Pulmonary Six Minute Walk 5/4/2018 No desaturation\par \par Blood draw\par Data review June 4, 2020\par COVID-19 IgG antibody negative\par CBC normal\par White blood count 9.45 hemoglobin 16.3 hematocrit 49.6\par Platelet count 315,000\par T4 TSH normal\par PSA normal range 1.38\par Lipid profile\par Cholesterol 148 HDL 47 LDL 82\par Vitamin D 27.9\par \par \par HD Flu administered Oct 27 2021\par \par Bone density screening osteoporosis high risk patient February 24, 2021\par AP spine L1-L4 normal\par Femoral neck left normal\par Total left hip normal\par Impression normal study\par

## 2022-08-11 ENCOUNTER — APPOINTMENT (OUTPATIENT)
Dept: CARDIOLOGY | Facility: CLINIC | Age: 82
End: 2022-08-11

## 2022-08-11 VITALS
HEIGHT: 71 IN | OXYGEN SATURATION: 95 % | HEART RATE: 79 BPM | DIASTOLIC BLOOD PRESSURE: 77 MMHG | WEIGHT: 258 LBS | BODY MASS INDEX: 36.12 KG/M2 | SYSTOLIC BLOOD PRESSURE: 199 MMHG

## 2022-08-11 PROCEDURE — 99205 OFFICE O/P NEW HI 60 MIN: CPT

## 2022-08-11 RX ORDER — LOSARTAN POTASSIUM AND HYDROCHLOROTHIAZIDE 25; 100 MG/1; MG/1
100-25 TABLET ORAL
Qty: 90 | Refills: 3 | Status: DISCONTINUED | COMMUNITY
End: 2022-08-11

## 2022-08-11 NOTE — ASSESSMENT
[FreeTextEntry1] : Assessment:\par 1.  Coronary Athero\par 2.  Obesity\par 3.  Asbestosis/copd\par 4.  LE edema, pitting, to the thigh\par .5.  Former smoker\par 6.  Aortic athero\par \par \par Plan:\par 1.  Labs today including BNP\par 2.  He has pitting of the legs to the thighs:\par - echo\par - BNP\par 3.  He has extensive plaque in the LAD distribution, former heavy smoker, will order a nuclear stress test to evaluate\par 4.  He is on 2 diuretics, will stop HCTZ\par INCREASE lasix to 40mg BID\par 5.  BP is poorly controlled, start spironolactone 25mg daily \par 6.  For now, continue metoprolol 100mg daily and losartan 100mg daily \par 7.  Await testing \par 8.  Return in 1 week

## 2022-08-11 NOTE — REASON FOR VISIT
[Initial Evaluation] : an initial evaluation of [FreeTextEntry1] : 81M HTN, cellulitis, edema, emphysema, here for vascular eval for edema\par He is a patient of Dr. Zev Thomas. \par \par Carotid duplex 2014:  mild athero\par CT from 2019 reviewed, the aorta and iliac arteries have atherosclerotic changes.  There is mild ectasia of the abdominal aorta. \par Echo in 2014:  trace MR.  Moderate Aortic root dilitation 4.4.  Normal LV function.  He has diastolic dysfunction.  \par \par CT abdomen/pelvis - showed thickening of the gut, he states he had a colonoscopy \par \par He is ordered for nifedipine 90 , but he states he has not taken it in 1 week. \par \par LDL is 107\par \par He is here for edema of the legs. This has been ongoing for 6 months.  It is progressive.  He wakes up with swollen legs.  \par \par No recent cardiac testing. No coronary stents.\par He gets winded with exertion. \par Denies any chest pain or pressure.  \par \par Medications:\par atorvatstatin 10\par metoprolol 100 daily \par losartan 100\par HCTZ 25\par finasteride 5\par furosemide 20\par Aspirin \par

## 2022-08-11 NOTE — PHYSICAL EXAM
[General Appearance - Well Developed] : well developed [Normal Appearance] : normal appearance [Well Groomed] : well groomed [General Appearance - Well Nourished] : well nourished [No Deformities] : no deformities [General Appearance - In No Acute Distress] : no acute distress [Normal Conjunctiva] : the conjunctiva exhibited no abnormalities [Eyelids - No Xanthelasma] : the eyelids demonstrated no xanthelasmas [Normal Oral Mucosa] : normal oral mucosa [No Oral Pallor] : no oral pallor [No Oral Cyanosis] : no oral cyanosis [Normal Jugular Venous A Waves Present] : normal jugular venous A waves present [Normal Jugular Venous V Waves Present] : normal jugular venous V waves present [No Jugular Venous Garcia A Waves] : no jugular venous garcia A waves [Respiration, Rhythm And Depth] : normal respiratory rhythm and effort [Exaggerated Use Of Accessory Muscles For Inspiration] : no accessory muscle use [Auscultation Breath Sounds / Voice Sounds] : lungs were clear to auscultation bilaterally [Abdomen Soft] : soft [Abdomen Tenderness] : non-tender [Abdomen Mass (___ Cm)] : no abdominal mass palpated [Nail Clubbing] : no clubbing of the fingernails [Cyanosis, Localized] : no localized cyanosis [Petechial Hemorrhages (___cm)] : no petechial hemorrhages [] : no ischemic changes [Heart Rate And Rhythm] : heart rate and rhythm were normal [Heart Sounds] : normal S1 and S2 [Murmurs] : no murmurs present [FreeTextEntry1] : bilateral pitting edema to the thighs

## 2022-08-12 LAB
ALBUMIN SERPL ELPH-MCNC: 3.8 G/DL
ALP BLD-CCNC: 83 U/L
ALT SERPL-CCNC: 11 U/L
ANION GAP SERPL CALC-SCNC: 14 MMOL/L
AST SERPL-CCNC: 22 U/L
BASOPHILS # BLD AUTO: 0.04 K/UL
BASOPHILS NFR BLD AUTO: 0.4 %
BILIRUB SERPL-MCNC: 0.8 MG/DL
BUN SERPL-MCNC: 12 MG/DL
CALCIUM SERPL-MCNC: 9.7 MG/DL
CHLORIDE SERPL-SCNC: 101 MMOL/L
CO2 SERPL-SCNC: 23 MMOL/L
CREAT SERPL-MCNC: 0.75 MG/DL
EGFR: 90 ML/MIN/1.73M2
EOSINOPHIL # BLD AUTO: 0.3 K/UL
EOSINOPHIL NFR BLD AUTO: 3.2 %
GLUCOSE SERPL-MCNC: 123 MG/DL
HCT VFR BLD CALC: 46.6 %
HGB BLD-MCNC: 14.4 G/DL
IMM GRANULOCYTES NFR BLD AUTO: 0.3 %
LYMPHOCYTES # BLD AUTO: 1.31 K/UL
LYMPHOCYTES NFR BLD AUTO: 14 %
MAN DIFF?: NORMAL
MCHC RBC-ENTMCNC: 27.8 PG
MCHC RBC-ENTMCNC: 30.9 GM/DL
MCV RBC AUTO: 90 FL
MONOCYTES # BLD AUTO: 0.85 K/UL
MONOCYTES NFR BLD AUTO: 9.1 %
NEUTROPHILS # BLD AUTO: 6.85 K/UL
NEUTROPHILS NFR BLD AUTO: 73 %
NT-PROBNP SERPL-MCNC: 327 PG/ML
PLATELET # BLD AUTO: 323 K/UL
POTASSIUM SERPL-SCNC: 4.4 MMOL/L
PROT SERPL-MCNC: 6.6 G/DL
RBC # BLD: 5.18 M/UL
RBC # FLD: 14.7 %
SODIUM SERPL-SCNC: 138 MMOL/L
WBC # FLD AUTO: 9.38 K/UL

## 2022-08-15 ENCOUNTER — APPOINTMENT (OUTPATIENT)
Dept: CARDIOLOGY | Facility: CLINIC | Age: 82
End: 2022-08-15

## 2022-08-15 PROCEDURE — 93306 TTE W/DOPPLER COMPLETE: CPT

## 2022-08-19 ENCOUNTER — APPOINTMENT (OUTPATIENT)
Dept: CARDIOLOGY | Facility: CLINIC | Age: 82
End: 2022-08-19

## 2022-08-19 ENCOUNTER — NON-APPOINTMENT (OUTPATIENT)
Age: 82
End: 2022-08-19

## 2022-08-19 VITALS
DIASTOLIC BLOOD PRESSURE: 82 MMHG | HEIGHT: 71 IN | SYSTOLIC BLOOD PRESSURE: 134 MMHG | HEART RATE: 67 BPM | OXYGEN SATURATION: 96 % | BODY MASS INDEX: 35 KG/M2 | WEIGHT: 250 LBS

## 2022-08-19 PROCEDURE — 99214 OFFICE O/P EST MOD 30 MIN: CPT

## 2022-08-19 NOTE — ASSESSMENT
[FreeTextEntry1] : Assessment:\par 1.  Coronary Athero\par 2.  Obesity\par 3.  Asbestosis/copd\par 4.  LE edema, pitting, to the thigh\par .5.  Former smoker\par 6.  Aortic athero\par \par \par Plan: \par 1.  Continue current meds\par 2.  Edema is improving\par 3.  Labwork today to assure he is tolerating lasix \par 4.  Nuclear stress test Sept\par 5.  To see Dr. Thomas mid sept\par 6.  Return in Oct\par 7.  Showed him a pneumatic pump that he can use at home, daughter will order for him

## 2022-08-19 NOTE — PHYSICAL EXAM
[General Appearance - Well Developed] : well developed [Normal Appearance] : normal appearance [Well Groomed] : well groomed [General Appearance - Well Nourished] : well nourished [No Deformities] : no deformities [General Appearance - In No Acute Distress] : no acute distress [Normal Conjunctiva] : the conjunctiva exhibited no abnormalities [Eyelids - No Xanthelasma] : the eyelids demonstrated no xanthelasmas [Normal Oral Mucosa] : normal oral mucosa [No Oral Pallor] : no oral pallor [No Oral Cyanosis] : no oral cyanosis [Normal Jugular Venous A Waves Present] : normal jugular venous A waves present [Normal Jugular Venous V Waves Present] : normal jugular venous V waves present [No Jugular Venous Garcia A Waves] : no jugular venous garcia A waves [Respiration, Rhythm And Depth] : normal respiratory rhythm and effort [Exaggerated Use Of Accessory Muscles For Inspiration] : no accessory muscle use [Auscultation Breath Sounds / Voice Sounds] : lungs were clear to auscultation bilaterally [Heart Rate And Rhythm] : heart rate and rhythm were normal [Heart Sounds] : normal S1 and S2 [Murmurs] : no murmurs present [Abdomen Soft] : soft [Abdomen Tenderness] : non-tender [Abdomen Mass (___ Cm)] : no abdominal mass palpated [Nail Clubbing] : no clubbing of the fingernails [Cyanosis, Localized] : no localized cyanosis [Petechial Hemorrhages (___cm)] : no petechial hemorrhages [] : no ischemic changes [FreeTextEntry1] : Edema is much improved, but present still

## 2022-08-19 NOTE — REASON FOR VISIT
[Follow-Up - Clinic] : a clinic follow-up of [FreeTextEntry2] : Edema [FreeTextEntry1] : 8/19/2022\par He reports that his leg swelling is improving\par Breathing ok\par His BP at home is elevated with the BP cuff\par BP here is controlled \par \par Meds:\par Aspirin 81mg daily \par Lasix 40mg BID\par Finasteride 5mg Daily\par Atorva 10mg daily \par Metoprolol 100mg daily \par Losartan 100mg daily \par Spiranolactone 25mg daily \par \par \par 81M HTN, cellulitis, edema, emphysema, here for vascular eval for edema\par He is a patient of Dr. Zev Thomas. \par \par Carotid duplex 2014:  mild athero\par CT from 2019 reviewed, the aorta and iliac arteries have atherosclerotic changes.  There is mild ectasia of the abdominal aorta. \par Echo in 2014:  trace MR.  Moderate Aortic root dilitation 4.4.  Normal LV function.  He has diastolic dysfunction.  \par \par CT abdomen/pelvis - showed thickening of the gut, he states he had a colonoscopy \par \par He is ordered for nifedipine 90 , but he states he has not taken it in 1 week. \par \par LDL is 107\par \par He is here for edema of the legs. This has been ongoing for 6 months.  It is progressive.  He wakes up with swollen legs.  \par \par No recent cardiac testing. No coronary stents.\par He gets winded with exertion. \par Denies any chest pain or pressure.  \par \par Medications:\par atorvatstatin 10\par metoprolol 100 daily \par losartan 100\par HCTZ 25\par finasteride 5\par furosemide 20\par Aspirin \par

## 2022-08-22 ENCOUNTER — RX RENEWAL (OUTPATIENT)
Age: 82
End: 2022-08-22

## 2022-08-22 LAB
ALBUMIN SERPL ELPH-MCNC: 4 G/DL
ALP BLD-CCNC: 79 U/L
ALT SERPL-CCNC: 9 U/L
ANION GAP SERPL CALC-SCNC: 16 MMOL/L
AST SERPL-CCNC: 17 U/L
BASOPHILS # BLD AUTO: 0.05 K/UL
BASOPHILS NFR BLD AUTO: 0.6 %
BILIRUB SERPL-MCNC: 0.6 MG/DL
BUN SERPL-MCNC: 18 MG/DL
CALCIUM SERPL-MCNC: 10.2 MG/DL
CHLORIDE SERPL-SCNC: 99 MMOL/L
CO2 SERPL-SCNC: 25 MMOL/L
CREAT SERPL-MCNC: 0.92 MG/DL
EGFR: 82 ML/MIN/1.73M2
EOSINOPHIL # BLD AUTO: 0.31 K/UL
EOSINOPHIL NFR BLD AUTO: 3.4 %
GLUCOSE SERPL-MCNC: 121 MG/DL
HCT VFR BLD CALC: 45.4 %
HGB BLD-MCNC: 14.3 G/DL
IMM GRANULOCYTES NFR BLD AUTO: 0.6 %
LYMPHOCYTES # BLD AUTO: 1.5 K/UL
LYMPHOCYTES NFR BLD AUTO: 16.6 %
MAN DIFF?: NORMAL
MCHC RBC-ENTMCNC: 27.6 PG
MCHC RBC-ENTMCNC: 31.5 GM/DL
MCV RBC AUTO: 87.5 FL
MONOCYTES # BLD AUTO: 0.93 K/UL
MONOCYTES NFR BLD AUTO: 10.3 %
NEUTROPHILS # BLD AUTO: 6.17 K/UL
NEUTROPHILS NFR BLD AUTO: 68.5 %
NT-PROBNP SERPL-MCNC: 178 PG/ML
PLATELET # BLD AUTO: 370 K/UL
POTASSIUM SERPL-SCNC: 5 MMOL/L
PROT SERPL-MCNC: 6.6 G/DL
RBC # BLD: 5.19 M/UL
RBC # FLD: 14.6 %
SODIUM SERPL-SCNC: 140 MMOL/L
WBC # FLD AUTO: 9.01 K/UL

## 2022-08-29 ENCOUNTER — RX RENEWAL (OUTPATIENT)
Age: 82
End: 2022-08-29

## 2022-09-21 ENCOUNTER — APPOINTMENT (OUTPATIENT)
Dept: PULMONOLOGY | Facility: CLINIC | Age: 82
End: 2022-09-21

## 2022-09-21 VITALS
OXYGEN SATURATION: 94 % | SYSTOLIC BLOOD PRESSURE: 158 MMHG | DIASTOLIC BLOOD PRESSURE: 82 MMHG | HEART RATE: 72 BPM | TEMPERATURE: 96.7 F

## 2022-09-21 PROCEDURE — 90662 IIV NO PRSV INCREASED AG IM: CPT

## 2022-09-21 PROCEDURE — G0008: CPT

## 2022-09-21 PROCEDURE — 99214 OFFICE O/P EST MOD 30 MIN: CPT | Mod: 25

## 2022-09-21 NOTE — DISCUSSION/SUMMARY
[FreeTextEntry1] : Better controlled hypertension-patient is medication adherence compliance\par in view lower ext edema improved\par Cardiac vascular consult Lopez Kahn DO reviewed\par \par 1 + proteinuria - address at f/u 24 hr urine collection etiology hypertension\par mild lower  extremity edema/ Venous insufficiency mild interval increase-  lasix- QOD Improved\par Low normal O2 saturations on room air\par  abnormal PFT\par  History asbestosis\par  COPD-Emphysema with mucoid impaction\par stable pulmonary physiology\par Pre DM- inc noted HGBA1C \par HTN  - controlled\par Sinus tachycardia-we will increase metoprolol 200 mg a.m. and 50 mg p.o.-  normalized \par HLD\par Groin cellulitis w with recurrence- left inguinal- Resolved without recent  recurrence\par Hx right  shoulder  cellulitis\par \par ANORO 1 puff Daily-PFT\par  \par 3 month APPT watch Lipid profile with  A1C\par low sugar diet\par Discussed  with patient never did get Colonoscopy - readvised\par CXR 2/2022\par J and J completed plus booster with [employer]

## 2022-09-21 NOTE — HISTORY OF PRESENT ILLNESS
[Stable] : are stable [None] : ~He/She~ has no significant interval events [Difficulty Breathing During Exertion] : stable dyspnea on exertion [Feelings Of Weakness On Exertion] : stable exercise intolerance [Cough] : denies coughing [Wheezing] : denies wheezing [Regional Soft Tissue Swelling Both Lower Extremities] : denies lower extremity edema [Chest Pain Or Discomfort] : denies chest pain [Fever] : denies fever [Date: ___] : was performed [unfilled] [Wt Gain ___ Lbs] : no recent weight gain [Wt Loss ___ Lbs] : no recent weight loss [Oxygen] : the patient uses no supplemental oxygen [de-identified] : emphysema with mucous plugging [FreeTextEntry1] : HTN BP check\par no active compliants\par no chest pain SOB\par  sleeping well\par  lower extremity edema\par up to date with Meds\par no recurrent  cellulitis\par request DERM APPT- recommended Plastic surgery\par inguinal yeast\par \par [facility] vascular\par 1. Coronary Athero\par 2. Obesity\par 3. Asbestosis/copd\par 4. LE edema, pitting, to the thigh\par .5. Former smoker\par 6. Aortic athero\par Plan: \par 1. Continue current meds\par 2. Edema is improving\par 3. Labwork today to assure he is tolerating lasix \par 4. Nuclear stress test Sept\par 6. Return in Oct\par 7. Showed him a pneumatic pump that he can use at home, daughter will order for him.

## 2022-09-21 NOTE — REVIEW OF SYSTEMS
[Hypertension] : ~T hypertension [As Noted in HPI] : as noted in HPI [Negative] : Psychiatric [FreeTextEntry7] : abnormal CT  ABD  focal  wall thickening [FreeTextEntry5] : Hematuria  with  cystoscopy [de-identified] : R/O Pre DM

## 2022-10-04 ENCOUNTER — APPOINTMENT (OUTPATIENT)
Dept: CARDIOLOGY | Facility: CLINIC | Age: 82
End: 2022-10-04

## 2022-10-30 ENCOUNTER — RX RENEWAL (OUTPATIENT)
Age: 82
End: 2022-10-30

## 2022-11-02 ENCOUNTER — APPOINTMENT (OUTPATIENT)
Dept: PULMONOLOGY | Facility: CLINIC | Age: 82
End: 2022-11-02

## 2022-11-02 PROCEDURE — 99442: CPT | Mod: 95

## 2022-12-15 ENCOUNTER — APPOINTMENT (OUTPATIENT)
Dept: CARDIOLOGY | Facility: CLINIC | Age: 82
End: 2022-12-15

## 2022-12-15 VITALS
HEIGHT: 71 IN | SYSTOLIC BLOOD PRESSURE: 150 MMHG | OXYGEN SATURATION: 97 % | DIASTOLIC BLOOD PRESSURE: 90 MMHG | WEIGHT: 250 LBS | BODY MASS INDEX: 35 KG/M2 | HEART RATE: 68 BPM

## 2022-12-15 PROCEDURE — 99214 OFFICE O/P EST MOD 30 MIN: CPT

## 2022-12-15 NOTE — REASON FOR VISIT
[Follow-Up - Clinic] : a clinic follow-up of [FreeTextEntry2] : Edema [FreeTextEntry1] : 12/15/2022\par Doing ok \par Leg swelling is controlled\par here with daughter\par Had R nasal cyst drained\par Remains on lasix 40mg bid\par Breathing is fine\par No chest pain or pressure\par No stress test done.  He does not want to have it done.  \par \par Meds:\par Aspirin 81mg daily \par Lasix 40mg BID\par Finasteride 5mg Daily\par Atorva 10mg daily \par Metoprolol 100mg daily \par Losartan 100mg daily \par Spiranolactone 25mg daily \par \par \par 8/19/2022\par He reports that his leg swelling is improving\par Breathing ok\par His BP at home is elevated with the BP cuff\par BP here is controlled \par \par Meds:\par Aspirin 81mg daily \par Lasix 40mg BID\par Finasteride 5mg Daily\par Atorva 10mg daily \par Metoprolol 100mg daily \par Losartan 100mg daily \par Spiranolactone 25mg daily \par \par \par 81M HTN, cellulitis, edema, emphysema, here for vascular eval for edema\par He is a patient of Dr. Zev Thomas. \par \par Carotid duplex 2014:  mild athero\par CT from 2019 reviewed, the aorta and iliac arteries have atherosclerotic changes.  There is mild ectasia of the abdominal aorta. \par Echo in 2014:  trace MR.  Moderate Aortic root dilitation 4.4.  Normal LV function.  He has diastolic dysfunction.  \par \par CT abdomen/pelvis - showed thickening of the gut, he states he had a colonoscopy \par \par He is ordered for nifedipine 90 , but he states he has not taken it in 1 week. \par \par LDL is 107\par \par He is here for edema of the legs. This has been ongoing for 6 months.  It is progressive.  He wakes up with swollen legs.  \par \par No recent cardiac testing. No coronary stents.\par He gets winded with exertion. \par Denies any chest pain or pressure.  \par \par Medications:\par atorvatstatin 10\par metoprolol 100 daily \par losartan 100\par HCTZ 25\par finasteride 5\par furosemide 20\par Aspirin \par

## 2022-12-15 NOTE — ASSESSMENT
[FreeTextEntry1] : Assessment:\par 1.  Coronary Athero\par 2.  Obesity\par 3.  Asbestosis/copd\par 4.  LE edema, pitting, to the thigh\par .5.  Former smoker\par 6.  Aortic athero\par \par \par Plan: \par 1.  Continue current meds\par 2.  Edema is improving\par 3.  Labwork today to assure he is tolerating lasix \par 4.  will hold off on stress test - he has no symptoms.\par 5.  Reduce lasix to 40mg DAILY .  If swelling returns then increase back to 40mg BID. \par 6.   If all ok then return in 6 months

## 2022-12-16 LAB
ALBUMIN SERPL ELPH-MCNC: 4 G/DL
ALP BLD-CCNC: 77 U/L
ALT SERPL-CCNC: 12 U/L
ANION GAP SERPL CALC-SCNC: 10 MMOL/L
AST SERPL-CCNC: 19 U/L
BASOPHILS # BLD AUTO: 0.05 K/UL
BASOPHILS NFR BLD AUTO: 0.5 %
BILIRUB SERPL-MCNC: 0.5 MG/DL
BUN SERPL-MCNC: 27 MG/DL
CALCIUM SERPL-MCNC: 9.7 MG/DL
CHLORIDE SERPL-SCNC: 100 MMOL/L
CHOLEST SERPL-MCNC: 154 MG/DL
CO2 SERPL-SCNC: 24 MMOL/L
CREAT SERPL-MCNC: 1.05 MG/DL
EGFR: 70 ML/MIN/1.73M2
EOSINOPHIL # BLD AUTO: 0.34 K/UL
EOSINOPHIL NFR BLD AUTO: 3.4 %
ESTIMATED AVERAGE GLUCOSE: 134 MG/DL
GLUCOSE SERPL-MCNC: 114 MG/DL
HBA1C MFR BLD HPLC: 6.3 %
HCT VFR BLD CALC: 42.2 %
HDLC SERPL-MCNC: 41 MG/DL
HGB BLD-MCNC: 13.2 G/DL
IMM GRANULOCYTES NFR BLD AUTO: 0.4 %
LDLC SERPL CALC-MCNC: 95 MG/DL
LYMPHOCYTES # BLD AUTO: 1.77 K/UL
LYMPHOCYTES NFR BLD AUTO: 17.7 %
MAN DIFF?: NORMAL
MCHC RBC-ENTMCNC: 28.5 PG
MCHC RBC-ENTMCNC: 31.3 GM/DL
MCV RBC AUTO: 91.1 FL
MONOCYTES # BLD AUTO: 0.93 K/UL
MONOCYTES NFR BLD AUTO: 9.3 %
NEUTROPHILS # BLD AUTO: 6.86 K/UL
NEUTROPHILS NFR BLD AUTO: 68.7 %
NONHDLC SERPL-MCNC: 113 MG/DL
NT-PROBNP SERPL-MCNC: 280 PG/ML
PLATELET # BLD AUTO: 357 K/UL
POTASSIUM SERPL-SCNC: 4.8 MMOL/L
PROT SERPL-MCNC: 6.6 G/DL
RBC # BLD: 4.63 M/UL
RBC # FLD: 13.7 %
SODIUM SERPL-SCNC: 134 MMOL/L
TRIGL SERPL-MCNC: 92 MG/DL
WBC # FLD AUTO: 9.99 K/UL

## 2022-12-30 ENCOUNTER — NON-APPOINTMENT (OUTPATIENT)
Age: 82
End: 2022-12-30

## 2023-01-12 ENCOUNTER — RX RENEWAL (OUTPATIENT)
Age: 83
End: 2023-01-12

## 2023-01-25 ENCOUNTER — APPOINTMENT (OUTPATIENT)
Dept: PULMONOLOGY | Facility: CLINIC | Age: 83
End: 2023-01-25
Payer: MEDICARE

## 2023-01-25 VITALS — SYSTOLIC BLOOD PRESSURE: 178 MMHG | OXYGEN SATURATION: 94 % | DIASTOLIC BLOOD PRESSURE: 72 MMHG | HEART RATE: 79 BPM

## 2023-01-25 DIAGNOSIS — U07.1 COVID-19: ICD-10-CM

## 2023-01-25 PROCEDURE — 99214 OFFICE O/P EST MOD 30 MIN: CPT | Mod: CS,25

## 2023-01-25 PROCEDURE — 36415 COLL VENOUS BLD VENIPUNCTURE: CPT

## 2023-01-25 PROCEDURE — 71046 X-RAY EXAM CHEST 2 VIEWS: CPT

## 2023-01-25 PROCEDURE — ZZZZZ: CPT

## 2023-01-25 PROCEDURE — 94010 BREATHING CAPACITY TEST: CPT

## 2023-01-25 PROCEDURE — 94727 GAS DIL/WSHOT DETER LNG VOL: CPT

## 2023-01-25 PROCEDURE — 94729 DIFFUSING CAPACITY: CPT

## 2023-01-25 NOTE — HISTORY OF PRESENT ILLNESS
[Stable] : are stable [None] : ~He/She~ has no significant interval events [Difficulty Breathing During Exertion] : stable dyspnea on exertion [Feelings Of Weakness On Exertion] : stable exercise intolerance [Cough] : denies coughing [Regional Soft Tissue Swelling Both Lower Extremities] : denies lower extremity edema [Wheezing] : denies wheezing [Chest Pain Or Discomfort] : denies chest pain [Fever] : denies fever [Date: ___] : was performed [unfilled] [Wt Gain ___ Lbs] : no recent weight gain [Wt Loss ___ Lbs] : no recent weight loss [Oxygen] : the patient uses no supplemental oxygen [de-identified] : emphysema with mucous plugging [FreeTextEntry1] : HTN BP check\par no active compliants\par no chest pain SOB\par  sleeping well\par  lower extremity edema\par up to date with Meds\par no recurrent  cellulitis\par request DERM APPT- recommended Plastic surgery\par inguinal yeast\par \par Cardiac Vascular\par Edema of lower extremity (782.3) (R60.0)\par \par Assessment:\par 1. Coronary Athero\par 2. Obesity\par 3. Asbestosis/copd\par 4. LE edema, pitting, to the thigh\par .5. Former smoker\par 6. Aortic athero\par Plan: \par 1. Continue current meds\par 2. Edema is improving\par 3. Labwork today to assure he is tolerating lasix \par 4. will hold off on stress test - he has no symptoms.\par 5. Reduce lasix to 40mg DAILY. If swelling returns then increase back to 40mg BID. \par 6. If all ok then return in 6 months. \par \par \par \par

## 2023-01-25 NOTE — PROCEDURE
[FreeTextEntry1] : Chest x-ray PA lateral January 25, 2023\par Cardiac size normal\par Uncoiled aorta\par Patchy parenchymal infiltrate with scarring bilateral consistent with known pleural plaques fibrotic changes\par No appreciable densities\par Left upper lobe pleural-parenchymal scar\par No gross interval change compared to x-ray dating back to February 28, 2020\par \par PFT 1/25/23\par moderate reduction flow  rates\par  TLC nl range 82 % pred\par  Pos airtrapping\par DLCO 58 % with moderate loss fx alveolar  capillary units \par HGB 13.2\par \par Labs per Vascular Dec 2022\par Reviewed\par Stable hemoglobin A1c\par \par Spirometry no bronchodilator 8/10/2022\par Moderate to severe reduction in flow rates ratio 65\par Will dating back over the past year there has been a decline at the FVC most recent noted from June till present the FEV1 is unchanged\par \par PFT no bronchodilator June 24, 2022\par Moderate to severe reduction in flow rates\par Mild obstructive ventilatory impairment\par TLC normal 83% predicted\par Positive air trapping with RV/TLC ratio 152% predicted\par Diffusion moderate to severely reduced 54% predicted with a loss of functioning alveolar capillary units\par Hemoglobin 15.0\par Overall noted decline at diffusion\par Clinical correlation\par \par POCT  2/28/22\par HGBA!C 6.5\par  Glucose 115\par \par Chest x-ray PA lateral February 28, 2022\par Cardiac size is normal\par Pleural plaques identified\par On the right lung\par No parenchymal infiltrates pleural effusions or dominant pulmonary nodules\par Uncoiled aorta\par \par PFT no bronchodilator December 13, 2021\par Flow rates normal\par Very mild obstructive pattern\par Ratio 75\par Lung volumes normal\par Total lung capacity 98% predicted\par Diffusion 108% predicted\par There is noted to be some decline of pulmonary physiology\par \par EKG 10/27/21\par  Sinus tach with rate variation\par \par Chest x-ray PA lateral August 9, 2021\par Cardiac size normal\par Uncoiled aorta\par Pleural plaque identified with the dominant right hemidiaphragm\par Left lung pleural-based abnormality consistent with known asbestosis\par No dominant pulmonary nodules pleural effusions pneumothorax\par \par PFT 7/8/21\par Moderate OAD\par  TLC 86 % pred\par  Airtrapping\par  Low nl DLCO 73 %\par HGB 16.1\par \par PFT  4//14/21\par Moderate reduction flow  rates\par  Mod OAD\par Normal TLC 82 %\par Pos  airtrapping\par Low nl DLCO  73 %\par HGB 17.7\par  stable pulmonary physiology\par \par CHEST  x-ray PA lateral February 24, 2021 history of asbestosis\par COPD\par Normal cardiac size\par Uncoiled aorta\par Chronic parenchymal changes with bilateral identifiable pleural disease.\par Diagnosis with pleural plaques clinically right diaphragm asbestosis\par Minimal calcification aortic knob no gross interval change compared to chest x-ray [facility] 3, 2020\par \par PFT Body Box 12/7/2020\par moderate OAD\par airtrapping\par Resistance is  normal and sp conductance decreased\par low  normal DLCO DLCO 78 %\par HGB 16.3\par \par PFT September 4, 2020\par No bronchodilator administered\par Moderate reduction flow rates with an obstructive ventilatory impairment and a reduced total lung capacity 69% predicted.\par Mild reduction diffusion with a loss of functioning alveolocapillary units are\par Impression combined mild obstructive and restrictive ventilatory impairment with mild gas exchange impairment.\par \par POCT  12/7/2020\par Glucose 138\par HGB  5.8\par \par DATA [facility]  3  2020\par Serum glucose 112\par Hemoglobin A1c 6.1\par Urine analysis trace protein negative nitrates\par \par EKG [facility] 3, 2020\par Sinus tachycardia\par Cannot exclude old anterior wall changes\par RSR\par \par Chest x-ray PA lateral [facility] 3, 2020\par Normal cardiac size\par " Reported\par Calcified pleural diaphragmatic pad right\par Patchy changes midlung zone\par No pleural effusions dominant pulmonary nodules\par No gross interval change compared to chest x-ray of December 19, 2019\par \par \par Spirometry No BD\par Moderate reduction flow rates\par Spirometry  1/17/2020\par  moderate  reduction in flow rates\par  obstructive ventilatory impairment and with reduced FVC cannot exclude restrictive component\par Pos BD at FVC 12 %\par \par Chest x-ray PA lateral December 19, 2019\par Indication COPD-asbestosis\par Cardiac size normal\par " Aortic\par Dense calcified pleural plaque right hemidiaphragm\par COPD changes\par No significant interstitial changes\par No dominant pulmonary nodules parenchymal infiltrates pleural effusions\par Change compared to chest x-ray of December 18, 2018\par \par PFT 9/16/2019\par Moderate OAD\par  Pos BD 14 % at FVC\par  Minimal airtrapping and normal TLC 95 %\par  DLCO  84 % normal  range\par  HGB 16.2\par  Stable pulmonary physiology\par \par Pulmonary Six Minute Walk 5/4/2018 No desaturation\par \par Blood draw\par Data review June 4, 2020\par COVID-19 IgG antibody negative\par CBC normal\par White blood count 9.45 hemoglobin 16.3 hematocrit 49.6\par Platelet count 315,000\par T4 TSH normal\par PSA normal range 1.38\par Lipid profile\par Cholesterol 148 HDL 47 LDL 82\par Vitamin D 27.9\par \par \par HD Flu administered Oct 27 2021\par \par Bone density screening osteoporosis high risk patient February 24, 2021\par AP spine L1-L4 normal\par Femoral neck left normal\par Total left hip normal\par Impression normal study\par

## 2023-01-25 NOTE — DISCUSSION/SUMMARY
[FreeTextEntry1] : ? COVID Dec 2022= check Ab and then address timing COVID variant booster\par \par Better controlled hypertension-patient is medication adherence compliance\par in view lower ext edema improved\par Cardiac vascular consult Lopez Kahn DO reviewed\par \par 1 + proteinuria - address at f/u 24 hr urine collection etiology hypertension\par mild lower  extremity edema/ Venous insufficiency mild interval increase-  lasix- QOD Improved\par Low normal O2 saturations on room air\par  abnormal PFT\par  History asbestosis\par  COPD-Emphysema with mucoid impaction\par stable pulmonary physiology\par Pre DM- inc noted HGBA1C \par HTN  - controlled\par Sinus tachycardia-we will increase metoprolol 200 mg a.m. and 50 mg p.o.-  normalized \par HLD\par Groin cellulitis w with recurrence- left inguinal- Resolved without recent  recurrence\par Hx right  shoulder  cellulitis\par \par ANORO 1 puff Daily-PFT\par  \par 3 month APPT watch Lipid profile with  A1C\par low sugar diet\par Discussed  with patient never did get Colonoscopy - readvised\par CXR 2/2022\par J and J completed plus booster with [employer]

## 2023-01-25 NOTE — REVIEW OF SYSTEMS
[Hypertension] : ~T hypertension [As Noted in HPI] : as noted in HPI [Negative] : Psychiatric [FreeTextEntry7] : abnormal CT  ABD  focal  wall thickening [FreeTextEntry5] : Hematuria  with  cystoscopy [de-identified] : R/O Pre DM

## 2023-01-26 ENCOUNTER — NON-APPOINTMENT (OUTPATIENT)
Age: 83
End: 2023-01-26

## 2023-01-26 LAB
COVID-19 NUCLEOCAPSID  GAM ANTIBODY INTERPRETATION: POSITIVE
SARS-COV-2 AB SERPL QL IA: 7.72 INDEX

## 2023-01-30 ENCOUNTER — RX RENEWAL (OUTPATIENT)
Age: 83
End: 2023-01-30

## 2023-02-02 ENCOUNTER — RX RENEWAL (OUTPATIENT)
Age: 83
End: 2023-02-02

## 2023-02-16 ENCOUNTER — RX RENEWAL (OUTPATIENT)
Age: 83
End: 2023-02-16

## 2023-02-21 RX ORDER — FUROSEMIDE 20 MG/1
20 TABLET ORAL
Qty: 30 | Refills: 2 | Status: DISCONTINUED | COMMUNITY
Start: 2022-08-22 | End: 2023-02-21

## 2023-03-08 ENCOUNTER — APPOINTMENT (OUTPATIENT)
Dept: PULMONOLOGY | Facility: CLINIC | Age: 83
End: 2023-03-08
Payer: MEDICARE

## 2023-03-08 VITALS
DIASTOLIC BLOOD PRESSURE: 70 MMHG | HEART RATE: 80 BPM | SYSTOLIC BLOOD PRESSURE: 142 MMHG | OXYGEN SATURATION: 98 % | TEMPERATURE: 96.3 F

## 2023-03-08 LAB
GLUCOSE BLDC GLUCOMTR-MCNC: 142
HBA1C MFR BLD HPLC: 6.1

## 2023-03-08 PROCEDURE — 99214 OFFICE O/P EST MOD 30 MIN: CPT | Mod: 25

## 2023-03-08 PROCEDURE — 83036 HEMOGLOBIN GLYCOSYLATED A1C: CPT | Mod: QW

## 2023-03-08 PROCEDURE — 82962 GLUCOSE BLOOD TEST: CPT

## 2023-03-08 NOTE — DISCUSSION/SUMMARY
For scheduling: Call Mackenzie at [phone]    For questions or concerns call: [facility] 8 AM- 5PM [phone]. Radiology resident on call [phone].    For immediate concerns that are not emergent, you may call our radiology clinic at: [phone]     [FreeTextEntry1] : Pos COVID AB  consitenet with Dec known pos exposure\par \par Better controlled hypertension-patient is medication adherence compliance\par in view lower ext edema improved\par Cardiac vascular consult Lopez Kahn DO reviewed\par \par 1 + proteinuria - address at f/u 24 hr urine collection etiology hypertension\par mild lower  extremity edema/ Venous insufficiency mild interval increase-  lasix- QOD Improved\par Low normal O2 saturations on room air\par  abnormal PFT\par  History asbestosis\par  COPD-Emphysema with mucoid impaction\par stable pulmonary physiology\par Pre DM- inc noted HGBA1C \par HTN  - controlled\par Sinus tachycardia-we will increase metoprolol 200 mg a.m. and 50 mg p.o.-  normalized \par HLD\par Groin cellulitis w with recurrence- left inguinal- Resolved without recent  recurrence\par Hx right  shoulder  cellulitis\par \par ANORO 1 puff Daily-PFT trend\par  \par 3 month APPT watch Lipid profile with  A1C\par low sugar diet addressed \par Discussed  with patient never did get Colonoscopy - readvised\par \par J and J completed plus booster with [employer]ER

## 2023-03-08 NOTE — PROCEDURE
[FreeTextEntry1] : POCT 3/8/23\par Glucose 142\par HGBA1C  6.1\par \par Chest x-ray PA lateral January 25, 2023\par Cardiac size normal\par Uncoiled aorta\par Patchy parenchymal infiltrate with scarring bilateral consistent with known pleural plaques fibrotic changes\par No appreciable densities\par Left upper lobe pleural-parenchymal scar\par No gross interval change compared to x-ray dating back to February 28, 2020\par \par PFT 1/25/23\par moderate reduction flow  rates\par  TLC nl range 82 % pred\par  Pos airtrapping\par DLCO 58 % with moderate loss fx alveolar  capillary units \par HGB 13.2\par \par Labs per Vascular Dec 2022\par Reviewed\par Stable hemoglobin A1c\par \par Spirometry no bronchodilator 8/10/2022\par Moderate to severe reduction in flow rates ratio 65\par Will dating back over the past year there has been a decline at the FVC most recent noted from June till present the FEV1 is unchanged\par \par PFT no bronchodilator June 24, 2022\par Moderate to severe reduction in flow rates\par Mild obstructive ventilatory impairment\par TLC normal 83% predicted\par Positive air trapping with RV/TLC ratio 152% predicted\par Diffusion moderate to severely reduced 54% predicted with a loss of functioning alveolar capillary units\par Hemoglobin 15.0\par Overall noted decline at diffusion\par Clinical correlation\par \par POCT  2/28/22\par HGBA!C 6.5\par  Glucose 115\par \par Chest x-ray PA lateral February 28, 2022\par Cardiac size is normal\par Pleural plaques identified\par On the right lung\par No parenchymal infiltrates pleural effusions or dominant pulmonary nodules\par Uncoiled aorta\par \par PFT no bronchodilator December 13, 2021\par Flow rates normal\par Very mild obstructive pattern\par Ratio 75\par Lung volumes normal\par Total lung capacity 98% predicted\par Diffusion 108% predicted\par There is noted to be some decline of pulmonary physiology\par \par EKG 10/27/21\par  Sinus tach with rate variation\par \par Chest x-ray PA lateral August 9, 2021\par Cardiac size normal\par Uncoiled aorta\par Pleural plaque identified with the dominant right hemidiaphragm\par Left lung pleural-based abnormality consistent with known asbestosis\par No dominant pulmonary nodules pleural effusions pneumothorax\par \par PFT 7/8/21\par Moderate OAD\par  TLC 86 % pred\par  Airtrapping\par  Low nl DLCO 73 %\par HGB 16.1\par \par PFT  4//14/21\par Moderate reduction flow  rates\par  Mod OAD\par Normal TLC 82 %\par Pos  airtrapping\par Low nl DLCO  73 %\par HGB 17.7\par  stable pulmonary physiology\par \par CHEST  x-ray PA lateral February 24, 2021 history of asbestosis\par COPD\par Normal cardiac size\par Uncoiled aorta\par Chronic parenchymal changes with bilateral identifiable pleural disease.\par Diagnosis with pleural plaques clinically right diaphragm asbestosis\par Minimal calcification aortic knob no gross interval change compared to chest x-ray Delores 3, 2020\par \par PFT Body Box 12/7/2020\par moderate OAD\par airtrapping\par Resistance is  normal and sp conductance decreased\par low  normal DLCO DLCO 78 %\par HGB 16.3\par \par PFT September 4, 2020\par No bronchodilator administered\par Moderate reduction flow rates with an obstructive ventilatory impairment and a reduced total lung capacity 69% predicted.\par Mild reduction diffusion with a loss of functioning alveolocapillary units are\par Impression combined mild obstructive and restrictive ventilatory impairment with mild gas exchange impairment.\par \par POCT  12/7/2020\par Glucose 138\par HGB  5.8\par \par DATA [facility]  3  2020\par Serum glucose 112\par Hemoglobin A1c 6.1\par Urine analysis trace protein negative nitrates\par \par EKG Del[facility] 3, 2020\par Sinus tachycardia\par Cannot exclude old anterior wall changes\par RSR\par \par Chest x-ray PA lateral Delores 3, 2020\par Normal cardiac size\par " Reported\par Calcified pleural diaphragmatic pad right\par Patchy changes midlung zone\par No pleural effusions dominant pulmonary nodules\par No gross interval change compared to chest x-ray of December 19, 2019\par \par \par Spirometry No BD\par Moderate reduction flow rates\par Spirometry  1/17/2020\par  moderate  reduction in flow rates\par  obstructive ventilatory impairment and with reduced FVC cannot exclude restrictive component\par Pos BD at FVC 12 %\par \par Chest x-ray PA lateral December 19, 2019\par Indication COPD-asbestosis\par Cardiac size normal\par " Aortic\par Dense calcified pleural plaque right hemidiaphragm\par COPD changes\par No significant interstitial changes\par No dominant pulmonary nodules parenchymal infiltrates pleural effusions\par Change compared to chest x-ray of December 18, 2018\par \par PFT 9/16/2019\par Moderate OAD\par  Pos BD 14 % at FVC\par  Minimal airtrapping and normal TLC 95 %\par  DLCO  84 % normal  range\par  HGB 16.2\par  Stable pulmonary physiology\par \par Pulmonary Six Minute Walk 5/4/2018 No desaturation\par \par Blood draw\par Data review June 4, 2020\par COVID-19 IgG antibody negative\par CBC normal\par White blood count 9.45 hemoglobin 16.3 hematocrit 49.6\par Platelet count 315,000\par T4 TSH normal\par PSA normal range 1.38\par Lipid profile\par Cholesterol 148 HDL 47 LDL 82\par Vitamin D 27.9\par \par \par HD Flu administered Oct 27 2021\par \par Bone density screening osteoporosis high risk patient February 24, 2021\par AP spine L1-L4 normal\par Femoral neck left normal\par Total left hip normal\par Impression normal study\par

## 2023-03-08 NOTE — HISTORY OF PRESENT ILLNESS

## 2023-03-08 NOTE — REVIEW OF SYSTEMS
[Hypertension] : ~T hypertension [As Noted in HPI] : as noted in HPI [Negative] : Psychiatric [FreeTextEntry7] : abnormal CT  ABD  focal  wall thickening [FreeTextEntry5] : Hematuria  with  cystoscopy [de-identified] : R/O Pre DM

## 2023-04-19 ENCOUNTER — APPOINTMENT (OUTPATIENT)
Dept: PULMONOLOGY | Facility: CLINIC | Age: 83
End: 2023-04-19
Payer: MEDICARE

## 2023-04-19 ENCOUNTER — NON-APPOINTMENT (OUTPATIENT)
Age: 83
End: 2023-04-19

## 2023-04-19 VITALS — OXYGEN SATURATION: 96 % | HEART RATE: 65 BPM | SYSTOLIC BLOOD PRESSURE: 168 MMHG | DIASTOLIC BLOOD PRESSURE: 71 MMHG

## 2023-04-19 DIAGNOSIS — E83.52 HYPERCALCEMIA: ICD-10-CM

## 2023-04-19 DIAGNOSIS — Z13.820 ENCOUNTER FOR SCREENING FOR OSTEOPOROSIS: ICD-10-CM

## 2023-04-19 LAB
ANION GAP SERPL CALC-SCNC: 14 MMOL/L
BUN SERPL-MCNC: 27 MG/DL
CALCIUM SERPL-MCNC: 10.1 MG/DL
CHLORIDE SERPL-SCNC: 96 MMOL/L
CHOLEST SERPL-MCNC: 149 MG/DL
CO2 SERPL-SCNC: 25 MMOL/L
CREAT SERPL-MCNC: 1.32 MG/DL
EGFR: 53 ML/MIN/1.73M2
GLUCOSE SERPL-MCNC: 135 MG/DL
HDLC SERPL-MCNC: 38 MG/DL
LDLC SERPL CALC-MCNC: 93 MG/DL
NONHDLC SERPL-MCNC: 112 MG/DL
POCT - HEMOGLOBIN (HGB), QUANTITATIVE, TRANSCUTANEOUS: 13.5
POTASSIUM SERPL-SCNC: 4.5 MMOL/L
SODIUM SERPL-SCNC: 135 MMOL/L
TRIGL SERPL-MCNC: 93 MG/DL

## 2023-04-19 PROCEDURE — 88738 HGB QUANT TRANSCUTANEOUS: CPT

## 2023-04-19 PROCEDURE — 94729 DIFFUSING CAPACITY: CPT

## 2023-04-19 PROCEDURE — 99214 OFFICE O/P EST MOD 30 MIN: CPT | Mod: 25

## 2023-04-19 PROCEDURE — 94010 BREATHING CAPACITY TEST: CPT

## 2023-04-19 PROCEDURE — 94727 GAS DIL/WSHOT DETER LNG VOL: CPT

## 2023-04-19 PROCEDURE — 36415 COLL VENOUS BLD VENIPUNCTURE: CPT

## 2023-04-19 PROCEDURE — 77085 DXA BONE DENSITY AXL VRT FX: CPT

## 2023-04-20 NOTE — HISTORY OF PRESENT ILLNESS
[Stable] : are stable [None] : ~He/She~ has no significant interval events [Difficulty Breathing During Exertion] : stable dyspnea on exertion [Feelings Of Weakness On Exertion] : stable exercise intolerance [Cough] : denies coughing [Wheezing] : denies wheezing [Regional Soft Tissue Swelling Both Lower Extremities] : denies lower extremity edema [Chest Pain Or Discomfort] : denies chest pain [Fever] : denies fever [Date: ___] : was performed [unfilled] [Wt Gain ___ Lbs] : no recent weight gain [Wt Loss ___ Lbs] : no recent weight loss [Oxygen] : the patient uses no supplemental oxygen [de-identified] : emphysema with mucous plugging [FreeTextEntry1] : COVID Ab pos  consistent with COVID prior infection\par did complete tx Paxlovid\par \par HTN BP check\par no active complaints\par no chest pain SOB\par  sleeping well\par  lower extremity edema\par up to date with Meds\par no recurrent  cellulitis\par request DERM APPT- recommended Plastic surgery\par inguinal yeast\par \par Cardiac Vascular\par Edema of lower extremity (782.3) (R60.0)\par \par Assessment:\par 1. Coronary Athero\par 2. Obesity\par 3. Asbestosis/copd\par 4. LE edema, pitting, to the thigh\par .5. Former smoker\par 6. Aortic athero\par Plan: \par 1. Continue current meds\par 2. Edema is improving\par 3. Labwork today to assure he is tolerating lasix \par 4. will hold off on stress test - he has no symptoms.\par 5. Reduce lasix to 40mg DAILY. If swelling returns then increase back to 40mg BID. \par 6. If all ok then return in 6 months. \par \par \par \par

## 2023-04-20 NOTE — REVIEW OF SYSTEMS
[Hypertension] : ~T hypertension [As Noted in HPI] : as noted in HPI [Negative] : Psychiatric [FreeTextEntry7] : abnormal CT  ABD  focal  wall thickening [FreeTextEntry5] : Hematuria  with  cystoscopy [de-identified] : R/O Pre DM

## 2023-04-20 NOTE — PROCEDURE
[FreeTextEntry1] : PFT April 19 2023 indication asbestosis\par Interstitial lung disease\par COPD\par Moderate obstructive ventilatory impairment\par Lung volumes normal\par TLC 81% predicted\par Air-trapping with RV/TLC ratio 125% predicted\par Diffusion mild mild reduction 64% predicted with normal functioning alveolar capillary units and gas exchange impairment\par Hemoglobin 13.5\par Overall stable pulmonary physiology\par \par Bone density April 19, 2023\par Indication steroids\par AP spine L1-L4 normal\par Femoral neck\par \par Normal\par Total left hip normal\par Impression normal study\par Calcium vitamin D\par Weightbearing walking exercise\par Consider follow-up in 2 to 5 years\par \par POCT 3/8/23\par Glucose 142\par HGBA1C  6.1\par \par Chest x-ray PA lateral January 25, 2023\par Cardiac size normal\par Uncoiled aorta\par Patchy parenchymal infiltrate with scarring bilateral consistent with known pleural plaques fibrotic changes\par No appreciable densities\par Left upper lobe pleural-parenchymal scar\par No gross interval change compared to x-ray dating back to February 28, 2020\par \par PFT 1/25/23\par moderate reduction flow  rates\par  TLC nl range 82 % pred\par  Pos airtrapping\par DLCO 58 % with moderate loss fx alveolar  capillary units \par HGB 13.2\par \par Labs per Vascular Dec 2022\par Reviewed\par Stable hemoglobin A1c\par \par Spirometry no bronchodilator 8/10/2022\par Moderate to severe reduction in flow rates ratio 65\par Will dating back over the past year there has been a decline at the FVC most recent noted from June till present the FEV1 is unchanged\par \par PFT no bronchodilator June 24, 2022\par Moderate to severe reduction in flow rates\par Mild obstructive ventilatory impairment\par TLC normal 83% predicted\par Positive air trapping with RV/TLC ratio 152% predicted\par Diffusion moderate to severely reduced 54% predicted with a loss of functioning alveolar capillary units\par Hemoglobin 15.0\par Overall noted decline at diffusion\par Clinical correlation\par \par POCT  2/28/22\par HGBA!C 6.5\par  Glucose 115\par \par Chest x-ray PA lateral February 28, 2022\par Cardiac size is normal\par Pleural plaques identified\par On the right lung\par No parenchymal infiltrates pleural effusions or dominant pulmonary nodules\par Uncoiled aorta\par \par PFT no bronchodilator December 13, 2021\par Flow rates normal\par Very mild obstructive pattern\par Ratio 75\par Lung volumes normal\par Total lung capacity 98% predicted\par Diffusion 108% predicted\par There is noted to be some decline of pulmonary physiology\par \par EKG 10/27/21\par  Sinus tach with rate variation\par \par Chest x-ray PA lateral August 9, 2021\par Cardiac size normal\par Uncoiled aorta\par Pleural plaque identified with the dominant right hemidiaphragm\par Left lung pleural-based abnormality consistent with known asbestosis\par No dominant pulmonary nodules pleural effusions pneumothorax\par \par PFT 7/8/21\par Moderate OAD\par  TLC 86 % pred\par  Airtrapping\par  Low nl DLCO 73 %\par HGB 16.1\par \par PFT  4//14/21\par Moderate reduction flow  rates\par  Mod OAD\par Normal TLC 82 %\par Pos  airtrapping\par Low nl DLCO  73 %\par HGB 17.7\par  stable pulmonary physiology\par \par CHEST  x-ray PA lateral February 24, 2021 history of asbestosis\par COPD\par Normal cardiac size\par Uncoiled aorta\par Chronic parenchymal changes with bilateral identifiable pleural disease.\par Diagnosis with pleural plaques clinically right diaphragm asbestosis\par Minimal calcification aortic knob no gross interval change compared to chest x-ray [facility] 3, 2020\par \par PFT Body Box 12/7/2020\par moderate OAD\par airtrapping\par Resistance is  normal and sp conductance decreased\par low  normal DLCO DLCO 78 %\par HGB 16.3\par \par PFT September 4, 2020\par No bronchodilator administered\par Moderate reduction flow rates with an obstructive ventilatory impairment and a reduced total lung capacity 69% predicted.\par Mild reduction diffusion with a loss of functioning alveolocapillary units are\par Impression combined mild obstructive and restrictive ventilatory impairment with mild gas exchange impairment.\par \par POCT  12/7/2020\par Glucose 138\par HGB  5.8\par \par DATA [facility]  3  2020\par Serum glucose 112\par Hemoglobin A1c 6.1\par Urine analysis trace protein negative nitrates\par \par EKG [facility] 3, 2020\par Sinus tachycardia\par Cannot exclude old anterior wall changes\par RSR\par \par Chest x-ray PA lateral [facility] 3, 2020\par Normal cardiac size\par " Reported\par Calcified pleural diaphragmatic pad right\par Patchy changes midlung zone\par No pleural effusions dominant pulmonary nodules\par No gross interval change compared to chest x-ray of December 19, 2019\par \par \par Spirometry No BD\par Moderate reduction flow rates\par Spirometry  1/17/2020\par  moderate  reduction in flow rates\par  obstructive ventilatory impairment and with reduced FVC cannot exclude restrictive component\par Pos BD at FVC 12 %\par \par Chest x-ray PA lateral December 19, 2019\par Indication COPD-asbestosis\par Cardiac size normal\par " Aortic\par Dense calcified pleural plaque right hemidiaphragm\par COPD changes\par No significant interstitial changes\par No dominant pulmonary nodules parenchymal infiltrates pleural effusions\par Change compared to chest x-ray of December 18, 2018\par \par PFT 9/16/2019\par Moderate OAD\par  Pos BD 14 % at FVC\par  Minimal airtrapping and normal TLC 95 %\par  DLCO  84 % normal  range\par  HGB 16.2\par  Stable pulmonary physiology\par \par Pulmonary Six Minute Walk 5/4/2018 No desaturation\par \par Blood draw\par Data review June 4, 2020\par COVID-19 IgG antibody negative\par CBC normal\par White blood count 9.45 hemoglobin 16.3 hematocrit 49.6\par Platelet count 315,000\par T4 TSH normal\par PSA normal range 1.38\par Lipid profile\par Cholesterol 148 HDL 47 LDL 82\par Vitamin D 27.9\par \par \par HD Flu administered Oct 27 2021\par \par Bone density screening osteoporosis high risk patient February 24, 2021\par AP spine L1-L4 normal\par Femoral neck left normal\par Total left hip normal\par Impression normal study\par \par Blood draw

## 2023-04-20 NOTE — PHYSICAL EXAM
[General Appearance - Well Developed] : well developed [Normal Appearance] : normal appearance [Well Groomed] : well groomed [General Appearance - Well Nourished] : well nourished [No Deformities] : no deformities [General Appearance - In No Acute Distress] : no acute distress [Normal Conjunctiva] : the conjunctiva exhibited no abnormalities [Eyelids - No Xanthelasma] : the eyelids demonstrated no xanthelasmas [Normal Oropharynx] : normal oropharynx [I] : I [Neck Appearance] : the appearance of the neck was normal [Neck Cervical Mass (___cm)] : no neck mass was observed [Thyroid Diffuse Enlargement] : the thyroid was not enlarged [Jugular Venous Distention Increased] : there was no jugular-venous distention [Heart Sounds] : normal S1 and S2 [Heart Rate And Rhythm] : heart rate and rhythm were normal [Murmurs] : no murmurs present [Arterial Pulses Normal] : the arterial pulses were normal [Edema] : no peripheral edema present [Veins - Varicosity Changes] : no varicosital changes were noted in the lower extremities [Respiration, Rhythm And Depth] : normal respiratory rhythm and effort [Exaggerated Use Of Accessory Muscles For Inspiration] : no accessory muscle use [Bowel Sounds] : normal bowel sounds [Abdomen Soft] : soft [Abdomen Tenderness] : non-tender [Abdomen Mass (___ Cm)] : no abdominal mass palpated [Abnormal Walk] : normal gait [Gait - Sufficient For Exercise Testing] : the gait was sufficient for exercise testing [Nail Clubbing] : no clubbing of the fingernails [Cyanosis, Localized] : no localized cyanosis [Petechial Hemorrhages (___cm)] : no petechial hemorrhages [Skin Color & Pigmentation] : normal skin color and pigmentation [] : no rash [Deep Tendon Reflexes (DTR)] : deep tendon reflexes were 2+ and symmetric [Sensation] : the sensory exam was normal to light touch and pinprick [Motor Exam] : the motor exam was normal [No Focal Deficits] : no focal deficits [Oriented To Time, Place, And Person] : oriented to person, place, and time [Impaired Insight] : insight and judgment were intact [Affect] : the affect was normal [Mood] : the mood was normal [FreeTextEntry2] : trace  edema resolved [FreeTextEntry1] : Pos Right upper  ext posterior raised red lesion most consistent with  cellulitis

## 2023-04-20 NOTE — DISCUSSION/SUMMARY
[FreeTextEntry1] : Stable pulmonary physiology\par Normal bone density monitor systolic hypertension\par \par Pos COVID AB  consistent with Dec known pos exposure\par \par Better controlled hypertension-patient is medication adherence compliance\par in view lower ext edema improved\par Cardiac vascular consult Lopez Kahn DO reviewed\par \par 1 + proteinuria - address at f/u 24 hr urine collection etiology hypertension\par mild lower  extremity edema/ Venous insufficiency mild interval increase-  lasix- QOD Improved\par Low normal O2 saturations on room air\par  abnormal PFT\par  History asbestosis\par  COPD-Emphysema with mucoid impaction\par stable pulmonary physiology\par Pre DM- inc noted HGBA1C \par HTN  - controlled\par Sinus tachycardia-we will increase metoprolol 200 mg a.m. and 50 mg p.o.-  normalized \par HLD\par Groin cellulitis w with recurrence- left inguinal- Resolved without recent  recurrence\par Hx right  shoulder  cellulitis\par \par ANORO 1 puff Daily-PFT trend\par  \par 3 month APPT watch Lipid profile with  A1C\par low sugar diet addressed \par Discussed  with patient never did get Colonoscopy - readvised\par \par J and J completed plus booster with PFIZER

## 2023-04-29 ENCOUNTER — RX RENEWAL (OUTPATIENT)
Age: 83
End: 2023-04-29

## 2023-06-14 ENCOUNTER — APPOINTMENT (OUTPATIENT)
Dept: PULMONOLOGY | Facility: CLINIC | Age: 83
End: 2023-06-14
Payer: MEDICARE

## 2023-06-14 VITALS — DIASTOLIC BLOOD PRESSURE: 82 MMHG | SYSTOLIC BLOOD PRESSURE: 135 MMHG | HEART RATE: 81 BPM | OXYGEN SATURATION: 96 %

## 2023-06-14 LAB
GLUCOSE BLDC GLUCOMTR-MCNC: 141
HBA1C MFR BLD HPLC: 6.2

## 2023-06-14 PROCEDURE — 99214 OFFICE O/P EST MOD 30 MIN: CPT | Mod: 25

## 2023-06-14 PROCEDURE — 83036 HEMOGLOBIN GLYCOSYLATED A1C: CPT | Mod: QW

## 2023-06-14 PROCEDURE — 82962 GLUCOSE BLOOD TEST: CPT

## 2023-06-14 NOTE — PHYSICAL EXAM
[General Appearance - Well Developed] : well developed [Normal Appearance] : normal appearance [Well Groomed] : well groomed [General Appearance - Well Nourished] : well nourished [No Deformities] : no deformities [General Appearance - In No Acute Distress] : no acute distress [Normal Conjunctiva] : the conjunctiva exhibited no abnormalities [Eyelids - No Xanthelasma] : the eyelids demonstrated no xanthelasmas [Normal Oropharynx] : normal oropharynx [I] : I [Neck Appearance] : the appearance of the neck was normal [Neck Cervical Mass (___cm)] : no neck mass was observed [Jugular Venous Distention Increased] : there was no jugular-venous distention [Thyroid Diffuse Enlargement] : the thyroid was not enlarged [Heart Sounds] : normal S1 and S2 [Heart Rate And Rhythm] : heart rate and rhythm were normal [Murmurs] : no murmurs present [Arterial Pulses Normal] : the arterial pulses were normal [Edema] : no peripheral edema present [Veins - Varicosity Changes] : no varicosital changes were noted in the lower extremities [Respiration, Rhythm And Depth] : normal respiratory rhythm and effort [Exaggerated Use Of Accessory Muscles For Inspiration] : no accessory muscle use [Bowel Sounds] : normal bowel sounds [Abdomen Soft] : soft [Abdomen Tenderness] : non-tender [Abdomen Mass (___ Cm)] : no abdominal mass palpated [Abnormal Walk] : normal gait [Gait - Sufficient For Exercise Testing] : the gait was sufficient for exercise testing [Nail Clubbing] : no clubbing of the fingernails [Cyanosis, Localized] : no localized cyanosis [Petechial Hemorrhages (___cm)] : no petechial hemorrhages [Skin Color & Pigmentation] : normal skin color and pigmentation [] : no rash [Deep Tendon Reflexes (DTR)] : deep tendon reflexes were 2+ and symmetric [Sensation] : the sensory exam was normal to light touch and pinprick [Motor Exam] : the motor exam was normal [No Focal Deficits] : no focal deficits [Oriented To Time, Place, And Person] : oriented to person, place, and time [Impaired Insight] : insight and judgment were intact [Affect] : the affect was normal [Mood] : the mood was normal [FreeTextEntry2] : trace  edema resolved [FreeTextEntry1] : Pos Right upper  ext posterior raised red lesion most consistent with  cellulitis

## 2023-06-14 NOTE — REVIEW OF SYSTEMS
[Hypertension] : ~T hypertension [As Noted in HPI] : as noted in HPI [Negative] : Psychiatric [FreeTextEntry7] : abnormal CT  ABD  focal  wall thickening [de-identified] : R/O Pre DM [FreeTextEntry5] : Hematuria  with  cystoscopy

## 2023-06-14 NOTE — HISTORY OF PRESENT ILLNESS

## 2023-06-14 NOTE — PROCEDURE
[FreeTextEntry1] : POCT 6/1/423\par  Glucose 141\par HGBA1C 6.2\par \par PFT April 19 2023 indication asbestosis\par Interstitial lung disease\par COPD\par Moderate obstructive ventilatory impairment\par Lung volumes normal\par TLC 81% predicted\par Air-trapping with RV/TLC ratio 125% predicted\par Diffusion mild mild reduction 64% predicted with normal functioning alveolar capillary units and gas exchange impairment\par Hemoglobin 13.5\par Overall stable pulmonary physiology\par \par Bone density April 19, 2023\par Indication steroids\par AP spine L1-L4 normal\par Femoral neck\par Normal\par Total left hip normal\par Impression normal study\par Calcium vitamin D\par Weightbearing walking exercise\par Consider follow-up in 2 to 5 years\par \par POCT 3/8/23\par Glucose 142\par HGBA1C  6.1\par \par Chest x-ray PA lateral January 25, 2023\par Cardiac size normal\par Uncoiled aorta\par Patchy parenchymal infiltrate with scarring bilateral consistent with known pleural plaques fibrotic changes\par No appreciable densities\par Left upper lobe pleural-parenchymal scar\par No gross interval change compared to x-ray dating back to February 28, 2020\par \par PFT 1/25/23\par moderate reduction flow  rates\par  TLC nl range 82 % pred\par  Pos airtrapping\par DLCO 58 % with moderate loss fx alveolar  capillary units \par HGB 13.2\par \par Labs per Vascular Dec 2022\par Reviewed\par Stable hemoglobin A1c\par \par Spirometry no bronchodilator 8/10/2022\par Moderate to severe reduction in flow rates ratio 65\par Will dating back over the past year there has been a decline at the FVC most recent noted from June till present the FEV1 is unchanged\par \par PFT no bronchodilator June 24, 2022\par Moderate to severe reduction in flow rates\par Mild obstructive ventilatory impairment\par TLC normal 83% predicted\par Positive air trapping with RV/TLC ratio 152% predicted\par Diffusion moderate to severely reduced 54% predicted with a loss of functioning alveolar capillary units\par Hemoglobin 15.0\par Overall noted decline at diffusion\par Clinical correlation\par \par POCT  2/28/22\par HGBA!C 6.5\par  Glucose 115\par \par Chest x-ray PA lateral February 28, 2022\par Cardiac size is normal\par Pleural plaques identified\par On the right lung\par No parenchymal infiltrates pleural effusions or dominant pulmonary nodules\par Uncoiled aorta\par \par PFT no bronchodilator December 13, 2021\par Flow rates normal\par Very mild obstructive pattern\par Ratio 75\par Lung volumes normal\par Total lung capacity 98% predicted\par Diffusion 108% predicted\par There is noted to be some decline of pulmonary physiology\par \par EKG 10/27/21\par  Sinus tach with rate variation\par \par Chest x-ray PA lateral August 9, 2021\par Cardiac size normal\par Uncoiled aorta\par Pleural plaque identified with the dominant right hemidiaphragm\par Left lung pleural-based abnormality consistent with known asbestosis\par No dominant pulmonary nodules pleural effusions pneumothorax\par \par PFT 7/8/21\par Moderate OAD\par  TLC 86 % pred\par  Airtrapping\par  Low nl DLCO 73 %\par HGB 16.1\par \par PFT  4//14/21\par Moderate reduction flow  rates\par  Mod OAD\par Normal TLC 82 %\par Pos  airtrapping\par Low nl DLCO  73 %\par HGB 17.7\par  stable pulmonary physiology\par \par CHEST  x-ray PA lateral February 24, 2021 history of asbestosis\par COPD\par Normal cardiac size\par Uncoiled aorta\par Chronic parenchymal changes with bilateral identifiable pleural disease.\par Diagnosis with pleural plaques clinically right diaphragm asbestosis\par Minimal calcification aortic knob no gross interval change compared to chest x-ray [facility] 3, 2020\par \par PFT Body Box 12/7/2020\par moderate OAD\par airtrapping\par Resistance is  normal and sp conductance decreased\par low  normal DLCO DLCO 78 %\par HGB 16.3\par \par PFT September 4, 2020\par No bronchodilator administered\par Moderate reduction flow rates with an obstructive ventilatory impairment and a reduced total lung capacity 69% predicted.\par Mild reduction diffusion with a loss of functioning alveolocapillary units are\par Impression combined mild obstructive and restrictive ventilatory impairment with mild gas exchange impairment.\par \par POCT  12/7/2020\par Glucose 138\par HGB  5.8\par \par DATA [facility]  3  2020\par Serum glucose 112\par Hemoglobin A1c 6.1\par Urine analysis trace protein negative nitrates\par \par EKG [facility] 3, 2020\par Sinus tachycardia\par Cannot exclude old anterior wall changes\par RSR\par \par Chest x-ray PA lateral [facility] 3, 2020\par Normal cardiac size\par " Reported\par Calcified pleural diaphragmatic pad right\par Patchy changes midlung zone\par No pleural effusions dominant pulmonary nodules\par No gross interval change compared to chest x-ray of December 19, 2019\par \par \par Spirometry No BD\par Moderate reduction flow rates\par Spirometry  1/17/2020\par  moderate  reduction in flow rates\par  obstructive ventilatory impairment and with reduced FVC cannot exclude restrictive component\par Pos BD at FVC 12 %\par \par Chest x-ray PA lateral December 19, 2019\par Indication COPD-asbestosis\par Cardiac size normal\par " Aortic\par Dense calcified pleural plaque right hemidiaphragm\par COPD changes\par No significant interstitial changes\par No dominant pulmonary nodules parenchymal infiltrates pleural effusions\par Change compared to chest x-ray of December 18, 2018\par \par PFT 9/16/2019\par Moderate OAD\par  Pos BD 14 % at FVC\par  Minimal airtrapping and normal TLC 95 %\par  DLCO  84 % normal  range\par  HGB 16.2\par  Stable pulmonary physiology\par \par Pulmonary Six Minute Walk 5/4/2018 No desaturation\par \par Blood draw\par Data review June 4, 2020\par COVID-19 IgG antibody negative\par CBC normal\par White blood count 9.45 hemoglobin 16.3 hematocrit 49.6\par Platelet count 315,000\par T4 TSH normal\par PSA normal range 1.38\par Lipid profile\par Cholesterol 148 HDL 47 LDL 82\par Vitamin D 27.9\par \par \par HD Flu administered Oct 27 2021\par \par Bone density screening osteoporosis high risk patient February 24, 2021\par AP spine L1-L4 normal\par Femoral neck left normal\par Total left hip normal\par Impression normal study\par \par Blood draw

## 2023-07-11 NOTE — REASON FOR VISIT
Health Maintenance Due   Topic Date Due   • Colorectal Cancer Risk - Colonoscopy  08/05/2023   • Depression Screening  01/11/2024       Patient is up to date, no discussion needed.   [Follow-Up] : a follow-up visit [COPD] : COPD [ILD] : ILD [FreeTextEntry2] : HTN , HLD, asbestosis

## 2023-07-24 ENCOUNTER — RX RENEWAL (OUTPATIENT)
Age: 83
End: 2023-07-24

## 2023-08-08 ENCOUNTER — INPATIENT (INPATIENT)
Facility: HOSPITAL | Age: 83
LOS: 14 days | Discharge: NURSING, REHAB OR LONG-TERM CARE | DRG: 853 | End: 2023-08-23
Attending: COLON & RECTAL SURGERY | Admitting: HOSPITALIST
Payer: MEDICARE

## 2023-08-08 VITALS
HEART RATE: 100 BPM | SYSTOLIC BLOOD PRESSURE: 128 MMHG | DIASTOLIC BLOOD PRESSURE: 72 MMHG | RESPIRATION RATE: 19 BRPM | TEMPERATURE: 98 F | HEIGHT: 71 IN | OXYGEN SATURATION: 92 % | WEIGHT: 179.9 LBS

## 2023-08-08 DIAGNOSIS — Z98.89 OTHER SPECIFIED POSTPROCEDURAL STATES: Chronic | ICD-10-CM

## 2023-08-08 LAB
ALBUMIN SERPL ELPH-MCNC: 3.2 G/DL — LOW (ref 3.3–5)
ALP SERPL-CCNC: 79 U/L — SIGNIFICANT CHANGE UP (ref 40–120)
ALT FLD-CCNC: 11 U/L — SIGNIFICANT CHANGE UP (ref 10–45)
ANION GAP SERPL CALC-SCNC: 16 MMOL/L — SIGNIFICANT CHANGE UP (ref 5–17)
APPEARANCE UR: CLEAR — SIGNIFICANT CHANGE UP
APTT BLD: 33.4 SEC — SIGNIFICANT CHANGE UP (ref 24.5–35.6)
AST SERPL-CCNC: 17 U/L — SIGNIFICANT CHANGE UP (ref 10–40)
BASE EXCESS BLDV CALC-SCNC: 0.7 MMOL/L — SIGNIFICANT CHANGE UP (ref -2–3)
BASOPHILS # BLD AUTO: 0.06 K/UL — SIGNIFICANT CHANGE UP (ref 0–0.2)
BASOPHILS NFR BLD AUTO: 0.3 % — SIGNIFICANT CHANGE UP (ref 0–2)
BILIRUB SERPL-MCNC: 0.8 MG/DL — SIGNIFICANT CHANGE UP (ref 0.2–1.2)
BILIRUB UR-MCNC: NEGATIVE — SIGNIFICANT CHANGE UP
BUN SERPL-MCNC: 18 MG/DL — SIGNIFICANT CHANGE UP (ref 7–23)
CA-I SERPL-SCNC: 1.23 MMOL/L — SIGNIFICANT CHANGE UP (ref 1.15–1.33)
CALCIUM SERPL-MCNC: 9.3 MG/DL — SIGNIFICANT CHANGE UP (ref 8.4–10.5)
CHLORIDE BLDV-SCNC: 101 MMOL/L — SIGNIFICANT CHANGE UP (ref 96–108)
CHLORIDE SERPL-SCNC: 99 MMOL/L — SIGNIFICANT CHANGE UP (ref 96–108)
CO2 BLDV-SCNC: 27 MMOL/L — HIGH (ref 22–26)
CO2 SERPL-SCNC: 21 MMOL/L — LOW (ref 22–31)
COLOR SPEC: YELLOW — SIGNIFICANT CHANGE UP
CREAT SERPL-MCNC: 1.17 MG/DL — SIGNIFICANT CHANGE UP (ref 0.5–1.3)
DIFF PNL FLD: NEGATIVE — SIGNIFICANT CHANGE UP
EGFR: 61 ML/MIN/1.73M2 — SIGNIFICANT CHANGE UP
EOSINOPHIL # BLD AUTO: 0.05 K/UL — SIGNIFICANT CHANGE UP (ref 0–0.5)
EOSINOPHIL NFR BLD AUTO: 0.3 % — SIGNIFICANT CHANGE UP (ref 0–6)
GAS PNL BLDV: 133 MMOL/L — LOW (ref 136–145)
GAS PNL BLDV: SIGNIFICANT CHANGE UP
GAS PNL BLDV: SIGNIFICANT CHANGE UP
GLUCOSE BLDV-MCNC: 138 MG/DL — HIGH (ref 70–99)
GLUCOSE SERPL-MCNC: 141 MG/DL — HIGH (ref 70–99)
GLUCOSE UR QL: NEGATIVE — SIGNIFICANT CHANGE UP
HCO3 BLDV-SCNC: 25 MMOL/L — SIGNIFICANT CHANGE UP (ref 22–29)
HCT VFR BLD CALC: 36.8 % — LOW (ref 39–50)
HCT VFR BLDA CALC: 36 % — LOW (ref 39–51)
HGB BLD CALC-MCNC: 12.1 G/DL — LOW (ref 12.6–17.4)
HGB BLD-MCNC: 11.8 G/DL — LOW (ref 13–17)
HOROWITZ INDEX BLDV+IHG-RTO: SIGNIFICANT CHANGE UP
IMM GRANULOCYTES NFR BLD AUTO: 0.8 % — SIGNIFICANT CHANGE UP (ref 0–0.9)
INR BLD: 1.3 RATIO — HIGH (ref 0.85–1.18)
KETONES UR-MCNC: NEGATIVE — SIGNIFICANT CHANGE UP
LACTATE BLDV-MCNC: 3.3 MMOL/L — HIGH (ref 0.5–2)
LEUKOCYTE ESTERASE UR-ACNC: NEGATIVE — SIGNIFICANT CHANGE UP
LYMPHOCYTES # BLD AUTO: 0.76 K/UL — LOW (ref 1–3.3)
LYMPHOCYTES # BLD AUTO: 4.1 % — LOW (ref 13–44)
MCHC RBC-ENTMCNC: 28.2 PG — SIGNIFICANT CHANGE UP (ref 27–34)
MCHC RBC-ENTMCNC: 32.1 GM/DL — SIGNIFICANT CHANGE UP (ref 32–36)
MCV RBC AUTO: 88 FL — SIGNIFICANT CHANGE UP (ref 80–100)
MONOCYTES # BLD AUTO: 1.18 K/UL — HIGH (ref 0–0.9)
MONOCYTES NFR BLD AUTO: 6.4 % — SIGNIFICANT CHANGE UP (ref 2–14)
NEUTROPHILS # BLD AUTO: 16.3 K/UL — HIGH (ref 1.8–7.4)
NEUTROPHILS NFR BLD AUTO: 88.1 % — HIGH (ref 43–77)
NITRITE UR-MCNC: NEGATIVE — SIGNIFICANT CHANGE UP
NRBC # BLD: 0 /100 WBCS — SIGNIFICANT CHANGE UP (ref 0–0)
PCO2 BLDV: 40 MMHG — LOW (ref 42–55)
PH BLDV: 7.41 — SIGNIFICANT CHANGE UP (ref 7.32–7.43)
PH UR: 5.5 — SIGNIFICANT CHANGE UP (ref 5–8)
PLATELET # BLD AUTO: 428 K/UL — HIGH (ref 150–400)
PO2 BLDV: 37 MMHG — SIGNIFICANT CHANGE UP (ref 25–45)
POTASSIUM BLDV-SCNC: 3.6 MMOL/L — SIGNIFICANT CHANGE UP (ref 3.5–5.1)
POTASSIUM SERPL-MCNC: 3.5 MMOL/L — SIGNIFICANT CHANGE UP (ref 3.5–5.3)
POTASSIUM SERPL-SCNC: 3.5 MMOL/L — SIGNIFICANT CHANGE UP (ref 3.5–5.3)
PROT SERPL-MCNC: 6.5 G/DL — SIGNIFICANT CHANGE UP (ref 6–8.3)
PROT UR-MCNC: SIGNIFICANT CHANGE UP
PROTHROM AB SERPL-ACNC: 13.5 SEC — HIGH (ref 9.5–13)
RBC # BLD: 4.18 M/UL — LOW (ref 4.2–5.8)
RBC # FLD: 14.4 % — SIGNIFICANT CHANGE UP (ref 10.3–14.5)
SAO2 % BLDV: 68.2 % — SIGNIFICANT CHANGE UP (ref 67–88)
SODIUM SERPL-SCNC: 136 MMOL/L — SIGNIFICANT CHANGE UP (ref 135–145)
SP GR SPEC: 1.02 — SIGNIFICANT CHANGE UP (ref 1.01–1.02)
UROBILINOGEN FLD QL: NEGATIVE — SIGNIFICANT CHANGE UP
WBC # BLD: 18.49 K/UL — HIGH (ref 3.8–10.5)
WBC # FLD AUTO: 18.49 K/UL — HIGH (ref 3.8–10.5)

## 2023-08-08 PROCEDURE — 71045 X-RAY EXAM CHEST 1 VIEW: CPT | Mod: 26

## 2023-08-08 PROCEDURE — 99285 EMERGENCY DEPT VISIT HI MDM: CPT | Mod: GC

## 2023-08-08 RX ORDER — PIPERACILLIN AND TAZOBACTAM 4; .5 G/20ML; G/20ML
3.38 INJECTION, POWDER, LYOPHILIZED, FOR SOLUTION INTRAVENOUS ONCE
Refills: 0 | Status: COMPLETED | OUTPATIENT
Start: 2023-08-08 | End: 2023-08-08

## 2023-08-08 RX ORDER — PIPERACILLIN AND TAZOBACTAM 4; .5 G/20ML; G/20ML
3.38 INJECTION, POWDER, LYOPHILIZED, FOR SOLUTION INTRAVENOUS ONCE
Refills: 0 | Status: COMPLETED | OUTPATIENT
Start: 2023-08-09 | End: 2023-08-09

## 2023-08-08 RX ORDER — VANCOMYCIN HCL 1 G
1000 VIAL (EA) INTRAVENOUS ONCE
Refills: 0 | Status: COMPLETED | OUTPATIENT
Start: 2023-08-08 | End: 2023-08-08

## 2023-08-08 RX ORDER — ACETAMINOPHEN 500 MG
1000 TABLET ORAL ONCE
Refills: 0 | Status: COMPLETED | OUTPATIENT
Start: 2023-08-08 | End: 2023-08-08

## 2023-08-08 RX ORDER — SODIUM CHLORIDE 9 MG/ML
2500 INJECTION INTRAMUSCULAR; INTRAVENOUS; SUBCUTANEOUS ONCE
Refills: 0 | Status: COMPLETED | OUTPATIENT
Start: 2023-08-08 | End: 2023-08-08

## 2023-08-08 RX ADMIN — PIPERACILLIN AND TAZOBACTAM 200 GRAM(S): 4; .5 INJECTION, POWDER, LYOPHILIZED, FOR SOLUTION INTRAVENOUS at 22:27

## 2023-08-08 RX ADMIN — SODIUM CHLORIDE 2500 MILLILITER(S): 9 INJECTION INTRAMUSCULAR; INTRAVENOUS; SUBCUTANEOUS at 22:27

## 2023-08-08 RX ADMIN — Medication 400 MILLIGRAM(S): at 22:27

## 2023-08-08 NOTE — ED PROVIDER NOTE - CLINICAL SUMMARY MEDICAL DECISION MAKING FREE TEXT BOX
Meets SIRS criteria, leukocytosis + fever/chills i/s/o weakness   Source potentially urine. Pt currently comfortable.

## 2023-08-08 NOTE — ED PROVIDER NOTE - CARE PLAN
1 Principal Discharge DX:	SIRS (systemic inflammatory response syndrome)  Assessment and plan of treatment:	weakness x1 week i/s/o fever/chills and elevated white count.   Ordered UA, labs, CTAP

## 2023-08-08 NOTE — ED PROVIDER NOTE - OBJECTIVE STATEMENT
82M w/ pmh HTN, hld, asbestosis presents after he couldn't get up from his bed tonight. He is accompanied by family at bedside. He reports that he fell twice one week ago. He said his legs "gave out" and he had to crawl to the phone to call his daughter. He reportedly felt better after hydration and food and he didn't seek care at that time. Today he wasn't able to get out of bed due to weakness. Reports not eating well x1 week due to poor appetite. He denies focal neurologic sx. Reports fever and chills today. Denies cp, sob, diarrhea/ constipation.

## 2023-08-08 NOTE — ED PROVIDER NOTE - NS ED MD DISPO SPECIAL CONSIDERATION1
Pt presents for routine physical, no pap.  Normal pap, HPV negative 2/26/18.  Saw Dr Sheikh earlier today.  Medications verified, no changes.  Denies known Latex allergy or symptoms of Latex sensitivity.  Health Maintenance Due   Topic Date Due   • Influenza Vaccine (1) 09/01/2018   • Depression Screening  02/26/2019       Patient declines flu shot.  Depression screening performed.               None

## 2023-08-08 NOTE — ED ADULT NURSE NOTE - OBJECTIVE STATEMENT
81 y/o male with PMH BPH HTN HLD BIBEMS for weakness. 83 y/o male with PMH BPH HTN HLD BIBEMS for weakness. Pt had a fall 1 week ago, pt unsure of how he fell. family at bedside reports that patient has been complaining of urinary symptoms as well. Today, pt was feeling weak and was unable to get up out of bed so he had family contact EMS. on exam pt Aox4 breathing even unlabored spontaneously,  no difficulty speaking in complete sentences, s1s2 heart sounds heard, pulses x 4, ramirez x4, abdomen soft nontender nondistended, skin intact. pt denies chest pain, sob, ha, n/v/d, abdominal pain, f/c, hematuria.  rectally 102.5F, 20 gauge IV placed to b/l ac, labs drawn & sent. Pt placed on CM, afib 110s. MD Tanner at bedside.

## 2023-08-08 NOTE — ED PROVIDER NOTE - PHYSICAL EXAMINATION
PHYSICAL EXAM:  GENERAL: NAD, Resting in bed  HEENT:  Head atraumatic, EOMI, PERRLA, conjunctiva and sclera clear; Moist mucous membranes, normal oropharynx  NECK: Supple, No JVD, no lymphadenopathy, no thyroid nodules or enlargement  CHEST/LUNG: Clear to auscultation bilaterally; No rales, rhonchi, wheezing, or rubs. Unlabored respirations on room air  HEART: Regular rate and rhythm; No murmurs, rubs, or gallops  ABDOMEN: Bowel sounds present; Soft, Nontender, Nondistended. No hepatomegally  EXTREMITIES:  2+ Peripheral Pulses, brisk capillary refill. No clubbing, cyanosis, or edema  NERVOUS SYSTEM:  Alert & Oriented X3, non-focal and spontaneous movements of all extremities  SKIN: No rashes or lesions

## 2023-08-08 NOTE — ED PROVIDER NOTE - NSICDXPASTMEDICALHX_GEN_ALL_CORE_FT
PAST MEDICAL HISTORY:  Asbestosis (ICD9 501)     BPH (Benign Prostatic Hypertrophy)     Dyslipidemia     HTN (Hypertension)     Localized Osteoarthrosis, Lower Leg left    Obesity

## 2023-08-08 NOTE — ED PROVIDER NOTE - NS ED ROS FT
REVIEW OF SYSTEMS:    CONSTITUTIONAL: No weakness, fevers or chills  EYES/ENT: No visual changes;  No vertigo or throat pain   NECK: No pain or stiffness  RESPIRATORY: No cough, wheezing, hemoptysis; No shortness of breath  CARDIOVASCULAR: No chest pain or palpitations  GASTROINTESTINAL: No abdominal or epigastric pain. No nausea, vomiting, or hematemesis; No diarrhea or constipation. No melena or hematochezia.  GENITOURINARY: No dysuria, frequency or hematuria  NEUROLOGICAL: weakness  SKIN: No itching, rashes

## 2023-08-08 NOTE — ED PROVIDER NOTE - ATTENDING CONTRIBUTION TO CARE
MD Tanner:  patient seen and evaluated with the resident.  I was present for key portions of the History & Physical, and I agree with the Impression & Plan.    Patient is an 82-year-old male brought in by EMS from home for evaluation of global fatigue unable to get out of bed.  Fever.  Rectal temp in the .5    Context: Patient has been progressively getting more fatigued over the last 2 weeks.  Decreased p.o.'s..  Family encouraging him to eat but he will not.  Not because he has abdominal pain nausea or vomiting, he just has no appetite.  Family also contends that he minimizes most of his symptoms.    Vital signs: Heart rate 100, rectal temperature 102, blood pressure 128/72, respirations 19  VS:  wnl.    Gen: eldelry male in NAD.    Head: NC/AT.   PERRL, EOMI.    Neck: trachea midline, supple.  Resp:  No distress, CTA B.  Cardiac RRR, no RMG.  Bipedal edema 1+  Abdomen:  soft, nondistended, nontender; no R/G.  Ext: no deformities, no edema.    Neuro:  A&Ox4 appears non focal.  Moving all 4 extremities equally  Skin:  thin skin, otherwise arm and dry as visualized, no rash.     Psych:  Normal affect and mood.    Medical Decision Making  82-year-old male with history and physical concerning for sepsis.  Will order sepsis bundle.  Most likely source of infection is urine, given lack of cough nausea vomiting or abdominal pain.      directly visualized by me and shows undetermined rhythm, rate 108, QRS 56, QTc 442.

## 2023-08-08 NOTE — ED PROVIDER NOTE - NSICDXPASTSURGICALHX_GEN_ALL_CORE_FT
PAST SURGICAL HISTORY:  Cataract right IOL    History of appendectomy     History of Tonsillectomy     Hyperlipidemia (ICD9 272.4)     Inguinal Hernia x2 right    S/P Arthroscopy of Left Knee x 20 yrs ago    S/P Cystoscopy (ICD9 V45.89)

## 2023-08-09 DIAGNOSIS — Z29.9 ENCOUNTER FOR PROPHYLACTIC MEASURES, UNSPECIFIED: ICD-10-CM

## 2023-08-09 DIAGNOSIS — R65.10 SYSTEMIC INFLAMMATORY RESPONSE SYNDROME (SIRS) OF NON-INFECTIOUS ORIGIN WITHOUT ACUTE ORGAN DYSFUNCTION: ICD-10-CM

## 2023-08-09 DIAGNOSIS — I10 ESSENTIAL (PRIMARY) HYPERTENSION: ICD-10-CM

## 2023-08-09 DIAGNOSIS — J44.9 CHRONIC OBSTRUCTIVE PULMONARY DISEASE, UNSPECIFIED: ICD-10-CM

## 2023-08-09 DIAGNOSIS — K63.9 DISEASE OF INTESTINE, UNSPECIFIED: ICD-10-CM

## 2023-08-09 DIAGNOSIS — I50.32 CHRONIC DIASTOLIC (CONGESTIVE) HEART FAILURE: ICD-10-CM

## 2023-08-09 DIAGNOSIS — J18.9 PNEUMONIA, UNSPECIFIED ORGANISM: ICD-10-CM

## 2023-08-09 DIAGNOSIS — Z79.899 OTHER LONG TERM (CURRENT) DRUG THERAPY: ICD-10-CM

## 2023-08-09 DIAGNOSIS — E78.5 HYPERLIPIDEMIA, UNSPECIFIED: ICD-10-CM

## 2023-08-09 DIAGNOSIS — A41.9 SEPSIS, UNSPECIFIED ORGANISM: ICD-10-CM

## 2023-08-09 DIAGNOSIS — R73.03 PREDIABETES: ICD-10-CM

## 2023-08-09 LAB
A1C WITH ESTIMATED AVERAGE GLUCOSE RESULT: 6.2 % — HIGH (ref 4–5.6)
ALBUMIN SERPL ELPH-MCNC: 2.7 G/DL — LOW (ref 3.3–5)
ALP SERPL-CCNC: 68 U/L — SIGNIFICANT CHANGE UP (ref 40–120)
ALT FLD-CCNC: 11 U/L — SIGNIFICANT CHANGE UP (ref 10–45)
ANION GAP SERPL CALC-SCNC: 13 MMOL/L — SIGNIFICANT CHANGE UP (ref 5–17)
AST SERPL-CCNC: 14 U/L — SIGNIFICANT CHANGE UP (ref 10–40)
BASOPHILS # BLD AUTO: 0.06 K/UL — SIGNIFICANT CHANGE UP (ref 0–0.2)
BASOPHILS NFR BLD AUTO: 0.4 % — SIGNIFICANT CHANGE UP (ref 0–2)
BILIRUB SERPL-MCNC: 0.8 MG/DL — SIGNIFICANT CHANGE UP (ref 0.2–1.2)
BUN SERPL-MCNC: 16 MG/DL — SIGNIFICANT CHANGE UP (ref 7–23)
CALCIUM SERPL-MCNC: 8.3 MG/DL — LOW (ref 8.4–10.5)
CHLORIDE SERPL-SCNC: 101 MMOL/L — SIGNIFICANT CHANGE UP (ref 96–108)
CO2 SERPL-SCNC: 20 MMOL/L — LOW (ref 22–31)
CREAT SERPL-MCNC: 1.06 MG/DL — SIGNIFICANT CHANGE UP (ref 0.5–1.3)
EGFR: 69 ML/MIN/1.73M2 — SIGNIFICANT CHANGE UP
EOSINOPHIL # BLD AUTO: 0.12 K/UL — SIGNIFICANT CHANGE UP (ref 0–0.5)
EOSINOPHIL NFR BLD AUTO: 0.7 % — SIGNIFICANT CHANGE UP (ref 0–6)
ESTIMATED AVERAGE GLUCOSE: 131 MG/DL — HIGH (ref 68–114)
GLUCOSE SERPL-MCNC: 155 MG/DL — HIGH (ref 70–99)
GRAM STN FLD: SIGNIFICANT CHANGE UP
HCT VFR BLD CALC: 34.7 % — LOW (ref 39–50)
HGB BLD-MCNC: 10.8 G/DL — LOW (ref 13–17)
IMM GRANULOCYTES NFR BLD AUTO: 0.6 % — SIGNIFICANT CHANGE UP (ref 0–0.9)
LACTATE BLDV-MCNC: 1.3 MMOL/L — SIGNIFICANT CHANGE UP (ref 0.5–2)
LACTATE SERPL-SCNC: 1.2 MMOL/L — SIGNIFICANT CHANGE UP (ref 0.5–2)
LEGIONELLA AG UR QL: NEGATIVE — SIGNIFICANT CHANGE UP
LYMPHOCYTES # BLD AUTO: 1.29 K/UL — SIGNIFICANT CHANGE UP (ref 1–3.3)
LYMPHOCYTES # BLD AUTO: 8.1 % — LOW (ref 13–44)
MAGNESIUM SERPL-MCNC: 1.5 MG/DL — LOW (ref 1.6–2.6)
MCHC RBC-ENTMCNC: 28.1 PG — SIGNIFICANT CHANGE UP (ref 27–34)
MCHC RBC-ENTMCNC: 31.1 GM/DL — LOW (ref 32–36)
MCV RBC AUTO: 90.1 FL — SIGNIFICANT CHANGE UP (ref 80–100)
MONOCYTES # BLD AUTO: 1.23 K/UL — HIGH (ref 0–0.9)
MONOCYTES NFR BLD AUTO: 7.7 % — SIGNIFICANT CHANGE UP (ref 2–14)
MRSA PCR RESULT.: SIGNIFICANT CHANGE UP
NEUTROPHILS # BLD AUTO: 13.22 K/UL — HIGH (ref 1.8–7.4)
NEUTROPHILS NFR BLD AUTO: 82.5 % — HIGH (ref 43–77)
NRBC # BLD: 0 /100 WBCS — SIGNIFICANT CHANGE UP (ref 0–0)
NT-PROBNP SERPL-SCNC: 3186 PG/ML — HIGH (ref 0–300)
PHOSPHATE SERPL-MCNC: 3.1 MG/DL — SIGNIFICANT CHANGE UP (ref 2.5–4.5)
PLATELET # BLD AUTO: 366 K/UL — SIGNIFICANT CHANGE UP (ref 150–400)
POTASSIUM SERPL-MCNC: 3.4 MMOL/L — LOW (ref 3.5–5.3)
POTASSIUM SERPL-SCNC: 3.4 MMOL/L — LOW (ref 3.5–5.3)
PROCALCITONIN SERPL-MCNC: 0.17 NG/ML — HIGH (ref 0.02–0.1)
PROT SERPL-MCNC: 5.7 G/DL — LOW (ref 6–8.3)
RAPID RVP RESULT: SIGNIFICANT CHANGE UP
RBC # BLD: 3.85 M/UL — LOW (ref 4.2–5.8)
RBC # FLD: 14.6 % — HIGH (ref 10.3–14.5)
S AUREUS DNA NOSE QL NAA+PROBE: SIGNIFICANT CHANGE UP
SARS-COV-2 RNA SPEC QL NAA+PROBE: SIGNIFICANT CHANGE UP
SODIUM SERPL-SCNC: 134 MMOL/L — LOW (ref 135–145)
SPECIMEN SOURCE: SIGNIFICANT CHANGE UP
TROPONIN T, HIGH SENSITIVITY RESULT: 38 NG/L — SIGNIFICANT CHANGE UP (ref 0–51)
TROPONIN T, HIGH SENSITIVITY RESULT: 45 NG/L — SIGNIFICANT CHANGE UP (ref 0–51)
TSH SERPL-MCNC: <0.01 UIU/ML — LOW (ref 0.27–4.2)
WBC # BLD: 16.02 K/UL — HIGH (ref 3.8–10.5)
WBC # FLD AUTO: 16.02 K/UL — HIGH (ref 3.8–10.5)

## 2023-08-09 PROCEDURE — 71260 CT THORAX DX C+: CPT | Mod: 26

## 2023-08-09 PROCEDURE — 93970 EXTREMITY STUDY: CPT | Mod: 26

## 2023-08-09 PROCEDURE — 74177 CT ABD & PELVIS W/CONTRAST: CPT | Mod: 26

## 2023-08-09 PROCEDURE — 99223 1ST HOSP IP/OBS HIGH 75: CPT

## 2023-08-09 PROCEDURE — 99497 ADVNCD CARE PLAN 30 MIN: CPT | Mod: 25

## 2023-08-09 RX ORDER — ATORVASTATIN CALCIUM 80 MG/1
10 TABLET, FILM COATED ORAL AT BEDTIME
Refills: 0 | Status: DISCONTINUED | OUTPATIENT
Start: 2023-08-09 | End: 2023-08-17

## 2023-08-09 RX ORDER — METHYLPREDNISOLONE 4 MG
500 TABLET ORAL ONCE
Refills: 0 | Status: COMPLETED | OUTPATIENT
Start: 2023-08-09 | End: 2023-08-09

## 2023-08-09 RX ORDER — ENOXAPARIN SODIUM 100 MG/ML
40 INJECTION SUBCUTANEOUS EVERY 24 HOURS
Refills: 0 | Status: DISCONTINUED | OUTPATIENT
Start: 2023-08-09 | End: 2023-08-10

## 2023-08-09 RX ORDER — PIPERACILLIN AND TAZOBACTAM 4; .5 G/20ML; G/20ML
3.38 INJECTION, POWDER, LYOPHILIZED, FOR SOLUTION INTRAVENOUS EVERY 8 HOURS
Refills: 0 | Status: COMPLETED | OUTPATIENT
Start: 2023-08-09 | End: 2023-08-16

## 2023-08-09 RX ORDER — FINASTERIDE 5 MG/1
5 TABLET, FILM COATED ORAL DAILY
Refills: 0 | Status: DISCONTINUED | OUTPATIENT
Start: 2023-08-09 | End: 2023-08-17

## 2023-08-09 RX ORDER — ACETAMINOPHEN 500 MG
650 TABLET ORAL EVERY 6 HOURS
Refills: 0 | Status: DISCONTINUED | OUTPATIENT
Start: 2023-08-09 | End: 2023-08-17

## 2023-08-09 RX ORDER — METOPROLOL TARTRATE 50 MG
100 TABLET ORAL DAILY
Refills: 0 | Status: DISCONTINUED | OUTPATIENT
Start: 2023-08-09 | End: 2023-08-14

## 2023-08-09 RX ORDER — POTASSIUM CHLORIDE 20 MEQ
40 PACKET (EA) ORAL ONCE
Refills: 0 | Status: COMPLETED | OUTPATIENT
Start: 2023-08-09 | End: 2023-08-09

## 2023-08-09 RX ADMIN — FINASTERIDE 5 MILLIGRAM(S): 5 TABLET, FILM COATED ORAL at 15:48

## 2023-08-09 RX ADMIN — Medication 250 MILLIGRAM(S): at 00:14

## 2023-08-09 RX ADMIN — Medication 40 MILLIEQUIVALENT(S): at 10:32

## 2023-08-09 RX ADMIN — PIPERACILLIN AND TAZOBACTAM 25 GRAM(S): 4; .5 INJECTION, POWDER, LYOPHILIZED, FOR SOLUTION INTRAVENOUS at 11:21

## 2023-08-09 RX ADMIN — PIPERACILLIN AND TAZOBACTAM 25 GRAM(S): 4; .5 INJECTION, POWDER, LYOPHILIZED, FOR SOLUTION INTRAVENOUS at 18:04

## 2023-08-09 RX ADMIN — PIPERACILLIN AND TAZOBACTAM 25 GRAM(S): 4; .5 INJECTION, POWDER, LYOPHILIZED, FOR SOLUTION INTRAVENOUS at 02:13

## 2023-08-09 RX ADMIN — Medication 500 MILLIGRAM(S): at 13:00

## 2023-08-09 RX ADMIN — Medication 100 MILLIGRAM(S): at 17:21

## 2023-08-09 RX ADMIN — Medication 1000 MILLIGRAM(S): at 17:10

## 2023-08-09 RX ADMIN — ATORVASTATIN CALCIUM 10 MILLIGRAM(S): 80 TABLET, FILM COATED ORAL at 22:53

## 2023-08-09 NOTE — H&P ADULT - PROBLEM SELECTOR PLAN 5
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 Former smoker, quit ~15 yrs ago.  - per pt, previously tried an inhaler (Anoro per outpt records), but he stopped it shortly after because it did not make him feel well  - monitor respiratory status, currently stable on RA  - f/u CT chest for interval assessment of pulm nodule

## 2023-08-09 NOTE — H&P ADULT - HISTORY OF PRESENT ILLNESS
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 He was brought to the hospital after being unable to get out of bed due to weakness. Noted that she fell twice about a week ago, stating his legs "gave out," had to crawl to the phone to call his daughter. Felt better after hydration and food and so he didn't seek care at the time. Has had poor appetite for the past week.    INCOMPLETE    In ED: Tmax rectal 102.5F, HR 80s-110s, SBP 80s-130s, RR 17-22, sating 92-97% on RA. WBC 18.49k. UA not suggestive of infection. CXR (prelim read) showed b/l patchy opacities. Given tylenol 1g, Vanc 1g, Zosyn 3.375mg x2, and 2.5L NS bolus. Admitted to Medicine for further management. 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 He was brought to the hospital after being unable to get out of bed due to weakness. Noted that he fell twice about a week ago, stating the first time was because he tripped and second time his legs "just gave out." Denied head strike or LOC. Felt better after hydration and food and so he didn't seek care at the time. Has had poor appetite for the past week, denied noticing any unintended weight loss. Denied headache, vision changes, CP, SOB, nausea, vomiting, abdominal pain, dysuria, hematuria, hematochezia, melena, diarrhea, constipation, recent travel, or sick contacts. Noted he was spitting up some mucous, but stated he has that from time to time. Stated he had a colonoscopy some time ago (unclear when) and was not told about any remarkable findings, though outpatient note from PCP from 6/2023 stated pt had not had colonoscopy at time.    In ED: Tmax rectal 102.5F, HR 80s-110s, SBP 80s-130s, RR 17-22, sating 92-97% on RA. WBC 18.49k. UA not suggestive of infection. CXR (prelim read) showed b/l patchy opacities. Given tylenol 1g, Vanc 1g, Zosyn 3.375mg x2, and 2.5L NS bolus. Admitted to Medicine for further management. 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 He was brought to the hospital after being unable to get out of bed due to weakness. Noted that he fell twice about a week ago, stating the first time was because he tripped and second time his legs "just gave out." Denied head strike or LOC. Felt better after hydration and food and so he didn't seek care at the time. Has had poor appetite for the past week, denied noticing any unintended weight loss. Denied headache, vision changes, CP, SOB, nausea, vomiting, abdominal pain, dysuria, hematuria, hematochezia, melena, diarrhea, constipation, recent travel, or sick contacts. Noted he was spitting up some mucous, but stated he has that from time to time. Stated he had a colonoscopy some time ago (unclear when) and was not told about any remarkable findings, though outpatient note from PCP from 6/2023 stated pt had not had colonoscopy at time.    In ED: Tmax rectal 102.5F, HR 80s-110s, SBP 80s-130s, RR 17-22, sating 92-97% on RA. Labs notable for WBC 18.49k, lactate 3.3->1.2. UA not suggestive of infection. CXR (prelim read) showed b/l patchy opacities. Given tylenol 1g, Vanc 1g, Zosyn 3.375mg x2, and 2.5L NS bolus. Admitted to Medicine for further management.

## 2023-08-09 NOTE — H&P ADULT - NSHPLABSRESULTS_GEN_ALL_CORE
LABS:                        11.8   18.49 )-----------( 428      ( 08 Aug 2023 22:56 )             36.8         136  |  99  |  18  ----------------------------<  141<H>  3.5   |  21<L>  |  1.17    Ca    9.3      08 Aug 2023 22:56    TPro  6.5  /  Alb  3.2<L>  /  TBili  0.8  /  DBili  x   /  AST  17  /  ALT  11  /  AlkPhos  79  08-    PT/INR - ( 08 Aug 2023 22:56 )   PT: 13.5 sec;   INR: 1.30 ratio         PTT - ( 08 Aug 2023 22:56 )  PTT:33.4 sec      Urinalysis Basic - ( 08 Aug 2023 22:57 )    Color: Yellow / Appearance: Clear / S.016 / pH: x  Gluc: x / Ketone: Negative  / Bili: Negative / Urobili: Negative   Blood: x / Protein: Trace / Nitrite: Negative   Leuk Esterase: Negative / RBC: x / WBC x   Sq Epi: x / Non Sq Epi: x / Bacteria: x            IMAGING/ADDITIONAL TESTS:    EKG (23) personally reviewed: difficult to interpret due to baseline artifacts, appears irregular, , QTc 442; repeat EKG ordered.    CXR (23) personally reviewed: bibasilar patchy opacities    CT C/A/P ordered to further assess for source of infection.

## 2023-08-09 NOTE — PHYSICAL THERAPY INITIAL EVALUATION ADULT - NSPTDMEREC_GEN_A_CORE
Patient is confined to a single room without a bathroom and requires a commode for safe home discharge./toileting

## 2023-08-09 NOTE — H&P ADULT - PROBLEM SELECTOR PLAN 2
Concern for possible PNA based on CXR (prelim/independent read) noting bibasilar patchy opacities. Pt noted some mucous production. CT chest in the past notable for mucous plugging.  - c/w empiric abx as above  - f/u procal, BCx, Sputum Cx  - f/u CT chest to further assess for PNA Concern for possible PNA based on CXR (prelim/independent read) noting bibasilar patchy opacities. Pt noted some mucous production. Review of prior CT chests notable for mucous plugging.  - c/w empiric abx as above  - f/u procal, BCx, Sputum Cx  - f/u CT chest to further assess for PNA

## 2023-08-09 NOTE — PHYSICAL THERAPY INITIAL EVALUATION ADULT - PERTINENT HX OF CURRENT PROBLEM, REHAB EVAL
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 He was brought to the hospital after being unable to get out of bed due to weakness. Noted that he fell twice about a week ago, stating the first time was because he tripped and second time his legs "just gave out." Denied head strike or LOC. Felt better after hydration and food and so he didn't seek care at the time. Has had poor appetite for the past week, denied noticing any unintended weight loss. Denied headache, vision changes, CP, SOB, nausea, vomiting, abdominal pain, dysuria, hematuria, hematochezia, melena, diarrhea, constipation, recent travel, or sick contacts. Noted he was spitting up some mucous, but stated he has that from time to time. Stated he had a colonoscopy some time ago (unclear when) and was not told about any remarkable findings, though outpatient note from PCP from 6/2023 stated pt had not had colonoscopy at time. Hospital course: In ED: Tmax rectal 102.5F, HR 80s-110s, SBP 80s-130s, RR 17-22, sating 92-97% on RA. Labs notable for WBC 18.49k, lactate 3.3->1.2. UA not suggestive of infection. CXR (prelim read) showed b/l patchy opacities. Given tylenol 1g, Vanc 1g, Zosyn 3.375mg x2, and 2.5L NS bolus. Admitted to Medicine for further management. SBP down to 80s. Concern for severe sepsis/SIRS of uncertain etiology, possibly PNA vs ?malignancy. Presented with fever to 102.5F rectal, tachycardia to 110s, RR>20, SBP down to 80s, WBC 18.49k, and lactate elevated to 3.3 initially. UA and RVP unrevealing. Concern for severe sepsis/SIRS. PNA is possible source per prelim CXR read. CT chest: Focal mural thickening and associated loculated fluid collection at the junction of the descending and sigmoid colon suspicious for contained perforation of colonic neoplasm, progressed since 2019. Stable nonobstructive left inguinal hernia Stable bilateral calcified pleural plaques Pt is a 82M admitted to [facility] on 8/9/23 w/ hx of HTN, HLD, pre-DM, BPH, COPD, ?ILD, pleural plaques 2/2 asbestosis, former smoker, colonic lesion (?neoplasm), pulm nodule, thyroid nodule, diastolic dysfunction, aortic ectasia, cellulitis, presenting with generalized weakness, fevers, and chills x1 day. He was brought to the hospital after being unable to get out of bed due to weakness. Noted that he fell twice about a week ago, stating the first time was because he tripped and second time his legs "just gave out." Denied head strike or LOC. Felt better after hydration and food and so he didn't seek care at the time. Has had poor appetite for the past week, denied noticing any unintended weight loss. Denied headache, vision changes, CP, SOB, nausea, vomiting, abdominal pain, dysuria, hematuria, hematochezia, melena, diarrhea, constipation, recent travel, or sick contacts. Noted he was spitting up some mucous, but stated he has that from time to time. Stated he had a colonoscopy some time ago (unclear when) and was not told about any remarkable findings, though outpatient note from PCP from 6/2023 stated pt had not had colonoscopy at time. Hospital course: In ED: Tmax rectal 102.5F, HR 80s-110s, SBP 80s-130s, RR 17-22, sating 92-97% on RA. Labs notable for WBC 18.49k, lactate 3.3->1.2. UA not suggestive of infection. CXR (prelim read) showed b/l patchy opacities. Given tylenol 1g, Vanc 1g, Zosyn 3.375mg x2, and 2.5L NS bolus. Admitted to Medicine for further management. SBP down to 80s. Concern for severe sepsis/SIRS of uncertain etiology, possibly PNA vs ?malignancy. Presented with fever to 102.5F rectal, tachycardia to 110s, RR>20, SBP down to 80s, WBC 18.49k, and lactate elevated to 3.3 initially. UA and RVP unrevealing. Concern for severe sepsis/SIRS. PNA is possible source per prelim CXR read. CT chest: Focal mural thickening and associated loculated fluid collection at the junction of the descending and sigmoid colon suspicious for contained perforation of colonic neoplasm, progressed since 2019. Stable nonobstructive left inguinal hernia Stable bilateral calcified pleural plaques  US BLE: negative for DVT

## 2023-08-09 NOTE — CONSULT NOTE ADULT - ASSESSMENT
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 Patient admitted for severe sepsis vs SIRS.     Severe sepsis - fever, tachycardia, leukocytosis with lactic acidosis   Concern for contained perforation of colonic neoplasm  Rule out bacteremia   - RVP negative  - UA negative for pyuria   - CT imaging noted with focal mural thickening and associated loculated fluid collection at the junction of the descending and sigmoid colon suspicious for contained perforation of colonic neoplasm, progressed since 2019. Has atelectasis reported, no pneumonia.  - s/p vancomycin and pip-tazo   - WBC downtrended, afebrile since this am, lactate downtrended     Recommendations:  Follow up cultures  Surgery evaluation   Continue on pip-tazo  Monitor temps/CBC, lactate  Continue rest of care per primary team      D/w Dr. Araceli Connelly M.D.  [facility], Division of Infectious Diseases  [phone]  After 5pm on weekdays and all day on weekends - please call [phone]

## 2023-08-09 NOTE — CONSULT NOTE ADULT - SUBJECTIVE AND OBJECTIVE BOX
[facility] DIVISION OF INFECTIOUS DISEASES  KASEY Mustafa, TUYET Nails 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 He was brought to the hospital after being unable to get out of bed due to weakness. Noted that he fell twice about a week ago, stating the first time was because he tripped and second time his legs "just gave out." Denied head strike or LOC. Felt better after hydration and food and so he didn't seek care at the time. Has had poor appetite for the past week, denied noticing any unintended weight loss. Denied headache, vision changes, CP, SOB, nausea, vomiting, abdominal pain, dysuria, hematuria, hematochezia, melena, diarrhea, constipation, recent travel, or sick contacts. Noted he was spitting up some mucous, but stated he has that from time to time. Stated he had a colonoscopy some time ago (unclear when) and was not told about any remarkable findings, though outpatient note from PCP from 6/2023 stated pt had not had colonoscopy at time.  In ED: Tmax rectal 102.5F, HR 80s-110s, SBP 80s-130s, RR 17-22, sating 92-97% on RA. Labs notable for WBC 18.49k, lactate 3.3->1.2. UA not suggestive of infection. CXR (prelim read) showed b/l patchy opacities. Given tylenol 1g, Vanc 1g, Zosyn 3.375mg x2, and 2.5L NS bolus. Admitted to Medicine for further management. (09 Aug 2023 02:46)  ROS: 14 point review of systems completed, pertinent positives and negatives as per HPI.    Allergies: No Known Allergies    PMH -- Asbestosis (ICD9 501)  HTN (Hypertension)  BPH (Benign Prostatic Hypertrophy)  Dyslipidemia  Localized Osteoarthrosis, Lower Leg  Obesity  COPD with emphysema  Pulmonary nodule  Thyroid nodule  Aortic ectasia  History of diastolic dysfunction  Venous insufficiency    PSH -- S/P Cystoscopy (ICD9 V45.89)  Hyperlipidemia (ICD9 272.4)  Localized Osteoarthrosis, Lower Leg  S/P Arthroscopy of Left Knee  Cataract  Inguinal Hernia x2  History of Tonsillectomy  History of appendectomy    FH -- No pertinent family history in first degree relatives  Social History -- denies tobacco, alcohol or illicit drug use    Physical Exam--  Vital Signs Last 24 Hrs  T(F): 97.7 (09 Aug 2023 11:37), Max: 102.5 (08 Aug 2023 22:12)  HR: 98 (09 Aug 2023 11:37) (87 - 111)  BP: 153/87 (09 Aug 2023 11:37) (86/45 - 153/87)  RR: 20 (09 Aug 2023 11:37) (17 - 23)  SpO2: 95% (09 Aug 2023 11:37) (92% - 99%)  General: no acute distress  HEENT: NC/AT, EOMI, anicteric, neck supple  Lungs: Clear bilaterally without rales, wheezing or rhonchi  Heart: S1, S2 present, RRR. No murmur, rub or gallop.  Abdomen: Soft. Nondistended. Nontender. BS present.   Neuro: AAOx3, no obvious focal deficits   Extremities: No cyanosis or clubbing. No edema.   Skin: Warm. Dry. Good turgor. No visible rash.   Psychiatric: Appropriate affect and mood for situation.   Lines: PIV    Laboratory & Imaging Data--  CBC:                       10.8   16.02 )-----------( 366      ( 09 Aug 2023 06:35 )             34.7     WBC Count: 16.02 K/uL (08-09-23 @ 06:35)  WBC Count: 18.49 K/uL (08-08-23 @ 22:56)    CMP: 08-09    134<L>  |  101  |  16  ----------------------------<  155<H>  3.4<L>   |  20<L>  |  1.06    Ca    8.3<L>      09 Aug 2023 06:35  Phos  3.1     08-09  Mg     1.5     08-09    TPro  5.7<L>  /  Alb  2.7<L>  /  TBili  0.8  /  DBili  x   /  AST  14  /  ALT  11  /  AlkPhos  68  08-09    LIVER FUNCTIONS - ( 09 Aug 2023 06:35 )  Alb: 2.7 g/dL / Pro: 5.7 g/dL / ALK PHOS: 68 U/L / ALT: 11 U/L / AST: 14 U/L / GGT: x           Urinalysis (08.08.23 @ 22:57)    Glucose Qualitative, Urine: Negative   Blood, Urine: Negative   pH Urine: 5.5   Color: Yellow   Urine Appearance: Clear   Bilirubin: Negative   Ketone - Urine: Negative   Specific Gravity: 1.016   Protein, Urine: Trace   Urobilinogen: Negative   Nitrite: Negative   Leukocyte Esterase Concentration: Negative    Microbiology: reviewed  Respiratory Viral Panel with COVID-19 by SUSANNAH (08.09.23 @ 05:03)    Rapid RVP Result: [facility]   SARS-CoV-2: [facility]: This Respiratory Panel uses polymerase chain reaction (PCR) to detect for  adenovirus; coronavirus (HKU1, NL63, 229E, OC43); human metapneumovirus  (hMPV); human enterovirus/rhinovirus (Entero/RV); influenza A; influenza  A/H1; influenza A/H3; influenza A/H1-2009; influenza B; parainfluenza  viruses 1, 2, 3, 4; respiratory syncytial virus; Mycoplasma pneumoniae;  Chlamydophila pneumoniae; and SARS-CoV-2.    Radiology--reviewed  < from: CT Chest Abdomen Pelvis w/ IV Cont (08.09.23 @ 06:20) >  IMPRESSION:  Focal mural thickening and associated loculated fluid collection at the   junction of the descending and sigmoid colon suspicious for contained   perforation of colonic neoplasm, progressed since 2019.    Stable nonobstructive left inguinal hernia    Stable bilateral calcified pleural plaques    < end of copied text >  < from: Xray Chest 1 View-PORTABLE IMMEDIATE (08.08.23 @ 22:42) >  IMPRESSION:    Patchy bibasilar opacities, possibly multifocal infection in the context   of sepsis.    Refer to follow-up chest CT.    < end of copied text >    Active Medications--  acetaminophen     Tablet .. 650 milliGRAM(s) Oral every 6 hours PRN  atorvastatin 10 milliGRAM(s) Oral at bedtime  enoxaparin Injectable 40 milliGRAM(s) SubCutaneous every 24 hours  finasteride 5 milliGRAM(s) Oral daily  piperacillin/tazobactam IVPB.. 3.375 Gram(s) IV Intermittent every 8 hours    Current Antimicrobials:   piperacillin/tazobactam IVPB.. 3.375 Gram(s) IV Intermittent every 8 hours    Prior/Completed Antimicrobials:  piperacillin/tazobactam IVPB.  piperacillin/tazobactam IVPB..  vancomycin  IVPB.    Immunologic:      [facility] DIVISION OF INFECTIOUS DISEASES  KASEY Mustafa, TUYET Nails 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 He was brought to the hospital after being unable to get out of bed due to weakness. Noted that he fell twice about a week ago, stating the first time was because he tripped and second time his legs "just gave out." Denied head strike or LOC. Felt better after hydration and food and so he didn't seek care at the time. Has had poor appetite for the past week, denied noticing any unintended weight loss. Denied headache, vision changes, CP, SOB, nausea, vomiting, abdominal pain, dysuria, hematuria, hematochezia, melena, diarrhea, constipation, recent travel, or sick contacts. Noted he was spitting up some mucous, but stated he has that from time to time. Stated he had a colonoscopy some time ago (unclear when) and was not told about any remarkable findings, though outpatient note from PCP from 6/2023 stated pt had not had colonoscopy at time.  In ED: Tmax rectal 102.5F, HR 80s-110s, SBP 80s-130s, RR 17-22, sating 92-97% on RA. Labs notable for WBC 18.49k, lactate 3.3->1.2. UA not suggestive of infection. CXR (prelim read) showed b/l patchy opacities. Given tylenol 1g, Vanc 1g, Zosyn 3.375mg x2, and 2.5L NS bolus. Admitted to Medicine for further management. (09 Aug 2023 02:46)  ROS: 14 point review of systems completed, pertinent positives and negatives as per HPI.    Allergies: No Known Allergies    PMH -- Asbestosis (ICD9 501)  HTN (Hypertension)  BPH (Benign Prostatic Hypertrophy)  Dyslipidemia  Localized Osteoarthrosis, Lower Leg  Obesity  COPD with emphysema  Pulmonary nodule  Thyroid nodule  Aortic ectasia  History of diastolic dysfunction  Venous insufficiency    PSH -- S/P Cystoscopy (ICD9 V45.89)  Hyperlipidemia (ICD9 272.4)  Localized Osteoarthrosis, Lower Leg  S/P Arthroscopy of Left Knee  Cataract  Inguinal Hernia x2  History of Tonsillectomy  History of appendectomy    FH -- No pertinent family history in first degree relatives  Social History -- denies tobacco, alcohol or illicit drug use    Physical Exam--  Vital Signs Last 24 Hrs  T(F): 97.7 (09 Aug 2023 11:37), Max: 102.5 (08 Aug 2023 22:12)  HR: 98 (09 Aug 2023 11:37) (87 - 111)  BP: 153/87 (09 Aug 2023 11:37) (86/45 - 153/87)  RR: 20 (09 Aug 2023 11:37) (17 - 23)  SpO2: 95% (09 Aug 2023 11:37) (92% - 99%)  General: no acute distress  HEENT: NC/AT, EOMI, anicteric, neck supple  Lungs: Clear bilaterally without rales, wheezing or rhonchi  Heart: S1, S2 present, normal rate, irregular rhythm  Abdomen: Soft. Nondistended. Nontender. BS present.   Neuro: AAOx3, no obvious focal deficits   Extremities: B/l LE edema. No cyanosis  Skin: Warm. Dry. LE excoriations, ecchymosis   Psychiatric: Appropriate affect and mood for situation.   Lines: PIV    Laboratory & Imaging Data--  CBC:                       10.8   16.02 )-----------( 366      ( 09 Aug 2023 06:35 )             34.7     WBC Count: 16.02 K/uL (08-09-23 @ 06:35)  WBC Count: 18.49 K/uL (08-08-23 @ 22:56)    CMP: 08-09    134<L>  |  101  |  16  ----------------------------<  155<H>  3.4<L>   |  20<L>  |  1.06    Ca    8.3<L>      09 Aug 2023 06:35  Phos  3.1     08-09  Mg     1.5     08-09    TPro  5.7<L>  /  Alb  2.7<L>  /  TBili  0.8  /  DBili  x   /  AST  14  /  ALT  11  /  AlkPhos  68  08-09    LIVER FUNCTIONS - ( 09 Aug 2023 06:35 )  Alb: 2.7 g/dL / Pro: 5.7 g/dL / ALK PHOS: 68 U/L / ALT: 11 U/L / AST: 14 U/L / GGT: x           Urinalysis (08.08.23 @ 22:57)    Glucose Qualitative, Urine: Negative   Blood, Urine: Negative   pH Urine: 5.5   Color: Yellow   Urine Appearance: Clear   Bilirubin: Negative   Ketone - Urine: Negative   Specific Gravity: 1.016   Protein, Urine: Trace   Urobilinogen: Negative   Nitrite: Negative   Leukocyte Esterase Concentration: Negative    Microbiology: reviewed  Respiratory Viral Panel with COVID-19 by SUSANNAH (08.09.23 @ 05:03)    Rapid RVP Result: [facility]   [facility]: [facility]: This Respiratory Panel uses polymerase chain reaction (PCR) to detect for  adenovirus; coronavirus (HKU1, NL63, 229E, OC43); human metapneumovirus  (hMPV); human enterovirus/rhinovirus (Entero/RV); influenza A; influenza  A/H1; influenza A/H3; influenza A/H1-2009; influenza B; parainfluenza  viruses 1, 2, 3, 4; respiratory syncytial virus; Mycoplasma pneumoniae;  Chlamydophila pneumoniae; and SARS-CoV-2.    Radiology--reviewed  < from: CT Chest Abdomen Pelvis w/ IV Cont (08.09.23 @ 06:20) >  IMPRESSION:  Focal mural thickening and associated loculated fluid collection at the   junction of the descending and sigmoid colon suspicious for contained   perforation of colonic neoplasm, progressed since 2019.    Stable nonobstructive left inguinal hernia    Stable bilateral calcified pleural plaques    < end of copied text >  < from: Xray Chest 1 View-PORTABLE IMMEDIATE (08.08.23 @ 22:42) >  IMPRESSION:    Patchy bibasilar opacities, possibly multifocal infection in the context   of sepsis.    Refer to follow-up chest CT.    < end of copied text >    Active Medications--  acetaminophen     Tablet .. 650 milliGRAM(s) Oral every 6 hours PRN  atorvastatin 10 milliGRAM(s) Oral at bedtime  enoxaparin Injectable 40 milliGRAM(s) SubCutaneous every 24 hours  finasteride 5 milliGRAM(s) Oral daily  piperacillin/tazobactam IVPB.. 3.375 Gram(s) IV Intermittent every 8 hours    Current Antimicrobials:   piperacillin/tazobactam IVPB.. 3.375 Gram(s) IV Intermittent every 8 hours    Prior/Completed Antimicrobials:  piperacillin/tazobactam IVPB.  piperacillin/tazobactam IVPB..  vancomycin  IVPB.    Immunologic:

## 2023-08-09 NOTE — H&P ADULT - PROBLEM SELECTOR PLAN 4
Pt with b/l LE pitting edema. Prior TTE (2014) noted evidence of diastolic dysfunction. Subsequent TTEs with indeterminate diastolic function. Follows outpatient with Vascular Cardiology, Dr. Lopez Kahn.  - strict I/Os, standing weights daily  - holding home lasix and spironolactone given hypotension from sepsis  - monitor volume status  - can consider obtaining repeat TTE while inpatient  - f/u proBNP  - f/u b/l LE duplex (RLE slightly larger than LLE) Pt with b/l LE pitting edema. Prior TTE (2014) noted evidence of diastolic dysfunction. Subsequent TTEs with indeterminate diastolic function. Follows outpatient with Vascular Cardiology, Dr. Lopez Kahn.  - strict I/Os, standing weights daily  - holding home lasix and spironolactone given hypotension from sepsis  - monitor volume status  - can consider obtaining repeat TTE while inpatient  - f/u proBNP, hsTrop  - f/u repeat EKG (pt with irregular rhythm on exam and difficult to interpret initial EKG due to baseline artifacts)  - f/u b/l LE duplex (RLE slightly larger than LLE)

## 2023-08-09 NOTE — H&P ADULT - PROBLEM SELECTOR PLAN 3
Prior CT A/P (9/23/19) found on PACS notable for "focal asymmetric wall thickening along the medial aspect of the descending colon with adjacent soft tissue stranding and a small lymph node. Neoplasm   is of concern." Pt stated he thinks he had a colonoscopy some time ago (unable to locate results), though outpt PCP note from 6/2023 stated pt did not have colonoscopy yet.  - f/u CT A/P for interval assessment

## 2023-08-09 NOTE — ED ADULT NURSE REASSESSMENT NOTE - NS ED NURSE REASSESS COMMENT FT1
Patient admitted to [facility]. Admission band applied to patient utilizing two patient identifiers. Patient notified and updated on plan of care. pending bed

## 2023-08-09 NOTE — ED ADULT NURSE REASSESSMENT NOTE - NS ED NURSE REASSESS COMMENT FT1
pt appears comfortable resting in stretcher with appropriate side rails up for safety and call bell in reach. pt remains A&Ox3 with breathing even and unlabored and VSS. abx infusing through IV pump per orders. NAD noted at this time. pt pending inpatient bed assignment.

## 2023-08-09 NOTE — CONSULT NOTE ADULT - SUBJECTIVE AND OBJECTIVE BOX
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 He was brought to the hospital after being unable to get out of bed due to weakness. Noted that he fell twice about a week ago, stating the first time was because he tripped and second time his legs "just gave out." Denied head strike or LOC. Felt better after hydration and food and so he didn't seek care at the time. Has had poor appetite for the past week, denied noticing any unintended weight loss. Denied headache, vision changes, CP, SOB, nausea, vomiting, abdominal pain, dysuria, hematuria, hematochezia, melena, diarrhea, constipation, recent travel, or sick contacts. Noted he was spitting up some mucous, but stated he has that from time to time. Stated he had a colonoscopy some time ago (unclear when) and was not told about any remarkable findings, though outpatient note from PCP from 6/2023 stated pt had not had colonoscopy at time.  Prior CT scan from 2019 done as outpatient as requested for urology for symptoms of hematuria,  showing Focal asymmetric wall thickening along the medial aspect of the descending colon with adjacent soft tissue stranding and a small lymph node concerning for neoplasm.  Patient did not see a Colorectal surgeon or GI doctor or any Dr for this mass. Per outpatient pulmonology notes: F/u with with GI Dr Thomas was suggested but patient never followed up, per notes was supposed to scheduled a colonoscopy, but never did.     In ED on admission on 08/08: Tmax rectal 102.5F, HR 80s-110s, SBP 80s-130s, RR 17-22, sating 92-97% on RA. Labs notable for WBC 18.49k, lactate 3.3->1.2. UA not suggestive of infection. CXR (prelim read) showed b/l patchy opacities. Given tylenol 1g, Vanc 1g, Zosyn 3.375mg x2, and 2.5L NS bolus. Admitted to Medicine for further management. patient got a CT scan on 08/09 that shows: focal mural thickening and associated loculated fluid collection at the junction of the descending and sigmoid colon suspicious for contained perforation of colonic neoplasm, progressed since 2019. Patient has never seen a colorectal surgeon in the past.     Patient seen and examined by colorectal surgery, VSS, Physical exam benign. Attempted to contact Daughter Kyle Bowen phone number [phone] with no response at the moment.         PAST MEDICAL & SURGICAL HISTORY:  Asbestosis (ICD9 501)      HTN (Hypertension)      BPH (Benign Prostatic Hypertrophy)      Dyslipidemia      Localized Osteoarthrosis, Lower Leg  left      Obesity      COPD with emphysema      Pulmonary nodule      Thyroid nodule      Aortic ectasia      History of diastolic dysfunction      Venous insufficiency      S/P Cystoscopy (ICD9 V45.89)      Hyperlipidemia (ICD9 272.4)      S/P Arthroscopy of Left Knee  x 20 yrs ago      Cataract  right IOL      Inguinal Hernia x2  right      History of Tonsillectomy      History of appendectomy          REVIEW OF SYSTEMS    General:	Negative  Skin/Breast: Negative	  Ophthalmologic: Negative  ENMT: Negative  Respiratory and Thorax: Negative  Cardiovascular: Negative  Gastrointestinal: Negative  Genitourinary: Negative  Musculoskeletal: Negative  Neurological: Negative  Psychiatric: Negative  Hematology/Lymphatics: Negative  Endocrine:	Negative  Allergic/Immunologic:	 Negative      MEDICATIONS  (STANDING):  atorvastatin 10 milliGRAM(s) Oral at bedtime  enoxaparin Injectable 40 milliGRAM(s) SubCutaneous every 24 hours  finasteride 5 milliGRAM(s) Oral daily  metoprolol tartrate 100 milliGRAM(s) Oral daily  piperacillin/tazobactam IVPB.. 3.375 Gram(s) IV Intermittent every 8 hours    MEDICATIONS  (PRN):  acetaminophen     Tablet .. 650 milliGRAM(s) Oral every 6 hours PRN Temp greater or equal to 38C (100.4F), Mild Pain (1 - 3)      Allergies    No Known Allergies    Intolerances        SOCIAL HISTORY:  Alcohol: Denied  Smoking: Nonsmoker  Drug Use: Denied  Marital Status:         FAMILY HISTORY:  No pertinent family history in first degree relatives        Vital Signs Last 24 Hrs  T(C): 36.4 (09 Aug 2023 19:13), Max: 39.2 (08 Aug 2023 22:12)  T(F): 97.5 (09 Aug 2023 19:13), Max: 102.5 (08 Aug 2023 22:12)  HR: 84 (09 Aug 2023 19:13) (84 - 111)  BP: 117/76 (09 Aug 2023 19:13) (86/45 - 153/87)  BP(mean): 67 (09 Aug 2023 07:34) (56 - 89)  RR: 20 (09 Aug 2023 19:13) (17 - 23)  SpO2: 98% (09 Aug 2023 19:13) (92% - 99%)    Parameters below as of 09 Aug 2023 19:13  Patient On (Oxygen Delivery Method): room air        PHYSICAL EXAM:  Constitutional: well developed, well nourished, NAD  Eyes: anicteric  ENMT: normal facies, symmetric  Neck: supple  Respiratory: CTA bilat  Cardiovascular: RRR  Gastrointestinal: abdomen soft, nontender, nondistended. No obvious masses. No peritonitis  Extremities: FROM, warm  Neurological: intact, non-focal  Skin: no gross lesions  Lymph Nodes: no gross adenopathy  Musculoskeletal: equal strength bilateral upper and lower extremities  Psychiatric: oriented x 3; appropriate  Rectal:        LABS:                        10.8   16.02 )-----------( 366      ( 09 Aug 2023 06:35 )             34.7     08-09    134<L>  |  101  |  16  ----------------------------<  155<H>  3.4<L>   |  20<L>  |  1.06    Ca    8.3<L>      09 Aug 2023 06:35  Phos  3.1     08-09  Mg     1.5     08-09    TPro  5.7<L>  /  Alb  2.7<L>  /  TBili  0.8  /  DBili  x   /  AST  14  /  ALT  11  /  AlkPhos  68  08-09    PT/INR - ( 08 Aug 2023 22:56 )   PT: 13.5 sec;   INR: 1.30 ratio         PTT - ( 08 Aug 2023 22:56 )  PTT:33.4 sec  Urinalysis Basic - ( 09 Aug 2023 06:35 )    Color: x / Appearance: x / SG: x / pH: x  Gluc: 155 mg/dL / Ketone: x  / Bili: x / Urobili: x   Blood: x / Protein: x / Nitrite: x   Leuk Esterase: x / RBC: x / WBC x   Sq Epi: x / Non Sq Epi: x / Bacteria: x        RADIOLOGY & ADDITIONAL STUDIES:

## 2023-08-09 NOTE — CONSULT NOTE ADULT - ASSESSMENT
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 Patient has never seen a colorectal surgeon in the past. Patient with non toxic appearance, stable  and with  benign abdominal physical exam, however CT scan concerning fo r colon neoplasm c/w contained perforation.     PLAN:  - No acute surgical intervention  - Would suggest abdominal exam before pain medications  - Surgery will follow  - Care per primary team        Discussed with Colorectal surgery DR Nam Lopez PGY2  [place] [facility]  [id number]

## 2023-08-09 NOTE — H&P ADULT - NSICDXPASTMEDICALHX_GEN_ALL_CORE_FT
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

## 2023-08-09 NOTE — H&P ADULT - PROBLEM SELECTOR PLAN 10
- DVT ppx: Lovenox  - Diet: DASH/CC  - Dispo: pending further workup/improvement, PT eval - DVT ppx: Lovenox  - Diet: DASH/CC  - Dispo: pending further workup/improvement, PT eval  - Code status: DNR/DNI

## 2023-08-09 NOTE — H&P ADULT - NSHPREVIEWOFSYSTEMS_GEN_ALL_CORE
REVIEW OF SYSTEMS:    CONSTITUTIONAL: No generalized weakness, fevers, or chills  EYES:  No visual changes or eye pain  ENMT: No hearing loss or sore throat  RESPIRATORY: No cough, wheezing, hemoptysis; No shortness of breath  CARDIOVASCULAR: No chest pain or palpitations  GASTROINTESTINAL: No abdominal or epigastric pain; No nausea, vomiting, or hematemesis; No diarrhea or constipation; No melena or hematochezia  GENITOURINARY: No dysuria, frequency or hematuria  MUSCULOSKELETAL: No joint pain or swelling; No muscle, back, or extremity pain  NEUROLOGICAL: No headaches; No numbness or loss of sensation; No loss of strength in extremities  PSYCHIATRIC: No anxiety or depression  SKIN: No itching, burning, rashes, or lesions

## 2023-08-09 NOTE — H&P ADULT - CONVERSATION DETAILS
Discussed code status with pt given hypotension down to 80s. Pt was AAOx4 during discussion. Stated that he would not want chest compressions if his heart was to stop and he would not want to be intubated/put on a ventilator machine even if he needed it to be kept alive. When asked who would be his healthcare decision maker if he was unable to make decisions for himself, he stated it would be his daughter, Michelle Sears. He noted that his daughter was aware of his wishes. DNR/DNI. RAHEEM completed and placed in paper chart.

## 2023-08-09 NOTE — H&P ADULT - NSHPPHYSICALEXAM_GEN_ALL_CORE
Vital Signs Last 24 Hrs  T(C): 36.7 (09 Aug 2023 04:15), Max: 39.2 (08 Aug 2023 22:12)  T(F): 98 (09 Aug 2023 04:15), Max: 102.5 (08 Aug 2023 22:12)  HR: 89 (09 Aug 2023 04:15) (87 - 111)  BP: 127/72 (09 Aug 2023 04:15) (86/45 - 137/70)  BP(mean): 83 (09 Aug 2023 03:46) (56 - 89)  RR: 18 (09 Aug 2023 04:15) (17 - 22)  SpO2: 99% (09 Aug 2023 04:15) (92% - 99%)    Parameters below as of 09 Aug 2023 03:46  Patient On (Oxygen Delivery Method): room air        CONSTITUTIONAL: NAD, well-developed, well-groomed, resting in bed  EYES: PERRL; sclera clear  ENMT: Moist oral mucosa, no pharyngeal injection or exudates appreciated  NECK: Supple, no palpable masses  RESPIRATORY: Normal respiratory effort; lungs are clear to auscultation bilaterally; No wheezes or rales  CARDIOVASCULAR: Irregular; Normal S1 and S2; No murmurs, rubs, or gallops; 1+ pitting edema in b/l LEs (RLE slightly more than LLE); Peripheral pulses are palpable bilaterally  ABDOMEN: Soft, Nontender, Nondistended; Bowel sounds present  MUSCULOSKELETAL: No clubbing or cyanosis of digits; No joint swelling or tenderness to palpation  PSYCH: AAOx3 (oriented to person, place, and time); affect appropriate  NEUROLOGY: CN II-XII are intact and symmetric; no gross sensory deficits  SKIN: RLE excoriation (pt attributes to recent fall), scattered ecchymosis on extremities, melanotic spots on lips/neck

## 2023-08-09 NOTE — H&P ADULT - PROBLEM SELECTOR PLAN 1
Presented with fever to 102.5F rectal, tachycardia to 110s, RR>20, SBP down to 80s, WBC 18.49k, and lactate elevated to 3.3 initially. UA and RVP unrevealing. Concern for severe sepsis/SIRS. PNA is possible source per prelim CXR read.  - s/p Vanc/Zosyn and 2.5L IVF in ED with improvement of BP  - c/w empiric Zosyn for now (8/8/23- )  - monitor WBC and fever curve  - f/u BCx, UCx, MRSA/MSSA swab  - f/u CT C/A/P to further assess source of fever

## 2023-08-09 NOTE — H&P ADULT - NSHPSOCIALHISTORY_GEN_ALL_CORE
Former smoker (~0.5 ppd for several decades, quit ~15 yrs ago). Denied drinking EtOH or using illicit/recreational drugs. At baseline, ambulates with walker when needed.

## 2023-08-09 NOTE — H&P ADULT - ASSESSMENT
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 SBP down to 80s. Concern for severe sepsis/SIRS of uncertain etiology, possibly PNA vs ?malignancy

## 2023-08-09 NOTE — ED ADULT NURSE REASSESSMENT NOTE - NS ED NURSE REASSESS COMMENT FT1
Pt is aware of plan of care and in agreement. Pt given tylenols for fever. PT has one IV and is finishing vacno, when done LR bolus to be hung

## 2023-08-09 NOTE — CHART NOTE - NSCHARTNOTEFT_GEN_A_CORE
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 Patient denies abdominal pain, N/V and last BM yesterday. Unable to recall his outpatient GI doctor or last C scope. On exam, heart irregularly irregular with LE 1+edema b/l. No hx of afib per daughter but takes metoprolol 100mg daily. Ecg reviewed with Afib    #Severe Sepsis  #Loculated fluid in Colon  #Atrial Fibrillation    -cw zosyn. Hold off additional IVF. 1+ LE edema  -f/u blood cultures  -ID and surgery consulted  -med rec completed  -f/u repeat ecg for afib    Noreen Charles MD  Division of Hospital Medicine  Available on [employer] Teams 82M w/ hx of HTN, HLD, pre-DM, BPH, COPD, ?ILD, pleural plaques 2/2 asbestosis, former smoker, colonic lesion, pulm nodule, thyroid nodule, diastolic dysfunction, aortic ectasia, cellulitis, presenting with generalized weakness, fevers, and chills admitted for severe sepsis found to have loculated fluid collection at the descending and sigmoid colon suspicious for contained perforation of colonic neoplasm. Patient denies abdominal pain, N/V and last BM yesterday. Unable to recall his outpatient GI doctor or last C scope. On exam, heart irregularly irregular with LE 1+edema b/l. No hx of afib per daughter but takes metoprolol 100mg daily. Ecg reviewed with Afib    #Severe Sepsis  #Loculated fluid in Colon  #New Atrial Fibrillation    -cw zosyn. Hold off additional IVF. 1+ LE edema  -f/u blood cultures  -ID and surgery consulted  -med rec completed. Restart home metoprolol. Hold losartan and spironolactone. Hold lasix  -Repeat ecg ordered. CHADs vasc at least 3.     Noreen Charles MD  Division of Hospital Medicine  Available on [employer] Teams

## 2023-08-09 NOTE — PROGRESS NOTE ADULT - SUBJECTIVE AND OBJECTIVE BOX
PULMONARY PROGRESS NOTE    GLADIS LEONARD  MRN-[id number]    Patient is a 82y old  Male who presents with a chief complaint of Severe sepsis/SIRS (09 Aug 2023 02:46)      HPI:  -known to my partner   -admitted with weakness  -denies cough/chest discomfort/sob    ROS:   -    ACTIVE MEDICATION LIST:  MEDICATIONS  (STANDING):  atorvastatin 10 milliGRAM(s) Oral at bedtime  enoxaparin Injectable 40 milliGRAM(s) SubCutaneous every 24 hours  finasteride 5 milliGRAM(s) Oral daily  piperacillin/tazobactam IVPB.. 3.375 Gram(s) IV Intermittent every 8 hours    MEDICATIONS  (PRN):  acetaminophen     Tablet .. 650 milliGRAM(s) Oral every 6 hours PRN Temp greater or equal to 38C (100.4F), Mild Pain (1 - 3)      EXAM:  Vital Signs Last 24 Hrs  T(C): 36.5 (09 Aug 2023 11:37), Max: 39.2 (08 Aug 2023 22:12)  T(F): 97.7 (09 Aug 2023 11:37), Max: 102.5 (08 Aug 2023 22:12)  HR: 98 (09 Aug 2023 11:37) (87 - 111)  BP: 153/87 (09 Aug 2023 11:37) (86/45 - 153/87)  BP(mean): 67 (09 Aug 2023 07:34) (56 - 89)  RR: 20 (09 Aug 2023 11:37) (17 - 23)  SpO2: 95% (09 Aug 2023 11:37) (92% - 99%)    Parameters below as of 09 Aug 2023 11:37  Patient On (Oxygen Delivery Method): room air        GENERAL: The patient is awake and alert in no apparent distress.     LUNGS: Clear to auscultation without wheezing, rales or rhonchi; respirations unlabored        LABS/IMAGING: reviewed                          10.8   16.02 )-----------( 366      ( 09 Aug 2023 06:35 )             34.7   08-09    134<L>  |  101  |  16  ----------------------------<  155<H>  3.4<L>   |  20<L>  |  1.06    Ca    8.3<L>      09 Aug 2023 06:35  Phos  3.1     08-09  Mg     1.5     08-09    TPro  5.7<L>  /  Alb  2.7<L>  /  TBili  0.8  /  DBili  x   /  AST  14  /  ALT  11  /  AlkPhos  68  08-09      < from: CT Chest w/ IV Cont (08.09.23 @ 06:20) >    ACC: [id number] EXAM:  CT CHEST IC   ORDERED BY:  CRISTINE KINSEY     ACC: [id number] EXAM:  CT ABDOMEN AND PELVIS IC   ORDERED BY:  CRISTINE KINSEY     PROCEDURE DATE:  08/09/2023          INTERPRETATION:  CLINICAL INFORMATION: Fever of unknown origin, evaluate  for infection. History of COPD.    COMPARISON: CT chest abdomen pelvis performed on September 23, 2019    CONTRAST/COMPLICATIONS:  IV Contrast: Omnipaque 350  90 cc administered   10 cc discarded  Oral Contrast: NONE  Complications: None reported at time of study completion    PROCEDURE:  CT of the Chest, Abdomen and Pelvis was performed.  Sagittal and coronal reformats were performed.    FINDINGS:  CHEST:  LUNGS AND LARGE AIRWAYS: Patent central airways. No nodules. Bibasilar   subsegmental atelectasis.  PLEURA: No pleural effusion. Bilateral calcified pleural plaques stable   from previous study on September 23, 2019.  VESSELS: Atherosclerotic changes..  HEART: Heart size is normal. No pericardial effusion. Coronary artery   calcifications.  MEDIASTINUM AND ROSANNA: No lymphadenopathy.  CHEST WALL AND LOWER NECK: Within normal limits.    ABDOMEN AND PELVIS:  LIVER: Within normal limits.  BILE DUCTS: Normal caliber.  GALLBLADDER: Within normal limits.  SPLEEN: Within normal limits.  PANCREAS: Within normal limits.  ADRENALS: Within normal limits.  KIDNEYS/URETERS: 2.2 cm hypodensity in the left kidney likely a cyst,   which is enlarged from previous study on September 23 2019 at which point   it measured 1.5 cm. No hydroureteronephrosis. Mild nonspecific bilateral   perinephric fat stranding. No renal calculi.    BLADDER: Within normal limits.  REPRODUCTIVE ORGANS: Prostate is enlarged    BOWEL: No bowel obstruction. Sigmoid diverticulosis. Wall thickening, fat   stranding, and associated mesenteric edema at the junction of the sigmoid   colon with descending colon with adjacent loculated collection with air   and fluid (2, 123) measuring 4 x 2 cm. This has progressed since CT of   2019. Appendix is normal.    PERITONEUM: No ascites.  VESSELS: Atherosclerotic changes.  RETROPERITONEUM/LYMPH NODES: No lymphadenopathy.  ABDOMINAL WALL: Stable left inguinal hernia containing bowel loops   without evidence of obstruction. Fat-containing umbilical hernia.  BONES: Degenerative changes.    IMPRESSION:  Focal mural thickening and associated loculated fluid collection at the   junction of the descending and sigmoid colon suspicious for contained   perforation of colonic neoplasm, progressed since 2019.    Stable nonobstructive left inguinal hernia    Stable bilateral calcified pleural plaques    --- End of Report ---          SHAYY ORTIZ DO; Resident Radiologist  This document has been electronically signed.  CECIL ROD MD; Attending Radiologist  This document has been electronically signed. Aug  9 2023 10:58AM    < end of copied text >    PROBLEM LIST:  82y Male with HEALTH ISSUES - PROBLEM Dx:  Medication management    Prophylactic measure    Severe sepsis    Lesion of colon    HTN (hypertension)    HLD (hyperlipidemia)    Chronic obstructive pulmonary disease (COPD)    Chronic diastolic congestive heart failure    prediabetes        RECS:  -no evidence of pna on ct chest  -surgical eval for abdominal findings        Cornelia Brown MD   [phone]

## 2023-08-09 NOTE — ED ADULT NURSE REASSESSMENT NOTE - NS ED NURSE REASSESS COMMENT FT1
received report from BESSY Morris @[id number]. pt appears comfortable resting in stretcher with appropriate side rails up for safety and call bell placed within reach. pt is currently A&Ox3, breathing even and unlabored, hypotensive to 100s/50s. MD aware of pt's BP, Fluids and antibiotics currently infusing via IV pump per MD orders. pt on CCM and . plan of care discussed with pt and MD.

## 2023-08-09 NOTE — H&P ADULT - PROBLEM SELECTOR PLAN 9
Pt unable to recall list of meds. Unable to reach daughter overnight for med list. Prelim med rec performed via [employer].  - further med rec needed in AM

## 2023-08-09 NOTE — ED ADULT NURSE REASSESSMENT NOTE - NS ED NURSE REASSESS COMMENT FT1
pt received in [place] area pt alert verbal pending bed assignment for pneumonia family member at bedside vitals stable with iv antibitoics in progress via infusion pump as ordered

## 2023-08-10 DIAGNOSIS — I48.91 UNSPECIFIED ATRIAL FIBRILLATION: ICD-10-CM

## 2023-08-10 LAB
ALBUMIN SERPL ELPH-MCNC: 2.6 G/DL — LOW (ref 3.3–5)
ALP SERPL-CCNC: 73 U/L — SIGNIFICANT CHANGE UP (ref 40–120)
ALT FLD-CCNC: 9 U/L — LOW (ref 10–45)
ANION GAP SERPL CALC-SCNC: 14 MMOL/L — SIGNIFICANT CHANGE UP (ref 5–17)
APTT BLD: 35.5 SEC — SIGNIFICANT CHANGE UP (ref 24.5–35.6)
AST SERPL-CCNC: 11 U/L — SIGNIFICANT CHANGE UP (ref 10–40)
BILIRUB SERPL-MCNC: 0.8 MG/DL — SIGNIFICANT CHANGE UP (ref 0.2–1.2)
BUN SERPL-MCNC: 16 MG/DL — SIGNIFICANT CHANGE UP (ref 7–23)
CALCIUM SERPL-MCNC: 8.5 MG/DL — SIGNIFICANT CHANGE UP (ref 8.4–10.5)
CHLORIDE SERPL-SCNC: 104 MMOL/L — SIGNIFICANT CHANGE UP (ref 96–108)
CO2 SERPL-SCNC: 20 MMOL/L — LOW (ref 22–31)
CREAT SERPL-MCNC: 1 MG/DL — SIGNIFICANT CHANGE UP (ref 0.5–1.3)
CULTURE RESULTS: SIGNIFICANT CHANGE UP
EGFR: 74 ML/MIN/1.73M2 — SIGNIFICANT CHANGE UP
GLUCOSE SERPL-MCNC: 117 MG/DL — HIGH (ref 70–99)
HCT VFR BLD CALC: 35.8 % — LOW (ref 39–50)
HGB BLD-MCNC: 11.2 G/DL — LOW (ref 13–17)
INR BLD: 1.36 RATIO — HIGH (ref 0.85–1.18)
MCHC RBC-ENTMCNC: 27.9 PG — SIGNIFICANT CHANGE UP (ref 27–34)
MCHC RBC-ENTMCNC: 31.3 GM/DL — LOW (ref 32–36)
MCV RBC AUTO: 89.1 FL — SIGNIFICANT CHANGE UP (ref 80–100)
NRBC # BLD: 0 /100 WBCS — SIGNIFICANT CHANGE UP (ref 0–0)
PLATELET # BLD AUTO: 387 K/UL — SIGNIFICANT CHANGE UP (ref 150–400)
POTASSIUM SERPL-MCNC: 3.8 MMOL/L — SIGNIFICANT CHANGE UP (ref 3.5–5.3)
POTASSIUM SERPL-SCNC: 3.8 MMOL/L — SIGNIFICANT CHANGE UP (ref 3.5–5.3)
PROT SERPL-MCNC: 5.9 G/DL — LOW (ref 6–8.3)
PROTHROM AB SERPL-ACNC: 14.2 SEC — HIGH (ref 9.5–13)
RBC # BLD: 4.02 M/UL — LOW (ref 4.2–5.8)
RBC # FLD: 14.6 % — HIGH (ref 10.3–14.5)
SODIUM SERPL-SCNC: 138 MMOL/L — SIGNIFICANT CHANGE UP (ref 135–145)
SPECIMEN SOURCE: SIGNIFICANT CHANGE UP
TROPONIN T, HIGH SENSITIVITY RESULT: 47 NG/L — SIGNIFICANT CHANGE UP (ref 0–51)
WBC # BLD: 16.06 K/UL — HIGH (ref 3.8–10.5)
WBC # FLD AUTO: 16.06 K/UL — HIGH (ref 3.8–10.5)

## 2023-08-10 PROCEDURE — 93010 ELECTROCARDIOGRAM REPORT: CPT

## 2023-08-10 PROCEDURE — 99233 SBSQ HOSP IP/OBS HIGH 50: CPT

## 2023-08-10 PROCEDURE — 99223 1ST HOSP IP/OBS HIGH 75: CPT | Mod: GC

## 2023-08-10 RX ORDER — APIXABAN 2.5 MG/1
5 TABLET, FILM COATED ORAL
Refills: 0 | Status: DISCONTINUED | OUTPATIENT
Start: 2023-08-10 | End: 2023-08-14

## 2023-08-10 RX ADMIN — PIPERACILLIN AND TAZOBACTAM 25 GRAM(S): 4; .5 INJECTION, POWDER, LYOPHILIZED, FOR SOLUTION INTRAVENOUS at 18:48

## 2023-08-10 RX ADMIN — PIPERACILLIN AND TAZOBACTAM 25 GRAM(S): 4; .5 INJECTION, POWDER, LYOPHILIZED, FOR SOLUTION INTRAVENOUS at 11:53

## 2023-08-10 RX ADMIN — ATORVASTATIN CALCIUM 10 MILLIGRAM(S): 80 TABLET, FILM COATED ORAL at 23:07

## 2023-08-10 RX ADMIN — PIPERACILLIN AND TAZOBACTAM 25 GRAM(S): 4; .5 INJECTION, POWDER, LYOPHILIZED, FOR SOLUTION INTRAVENOUS at 02:00

## 2023-08-10 RX ADMIN — Medication 100 MILLIGRAM(S): at 05:33

## 2023-08-10 RX ADMIN — FINASTERIDE 5 MILLIGRAM(S): 5 TABLET, FILM COATED ORAL at 11:53

## 2023-08-10 RX ADMIN — ENOXAPARIN SODIUM 40 MILLIGRAM(S): 100 INJECTION SUBCUTANEOUS at 05:33

## 2023-08-10 NOTE — PROGRESS NOTE ADULT - PROBLEM SELECTOR PLAN 2
Afib confirmed on ecg. No prior hx. Does not follow with outpatient cardiology  -TTE ordered  -cw home metoprolol  -CHADSvasc at least 3. Educated on risks and benefits of AC. Patient agreeable to start Eliquis 5mg BID  -monitor on telemetry

## 2023-08-10 NOTE — PROGRESS NOTE ADULT - PROBLEM SELECTOR PLAN 9
- DVT ppx: Lovenox  - Diet: DASH/CC  - Dispo: pending further workup/improvement, PT eval  - Code status: DNR/DNI

## 2023-08-10 NOTE — CONSULT NOTE ADULT - SUBJECTIVE AND OBJECTIVE BOX
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 Patient has been feeling fatigued + decreased appetite for the last week but denies weight loss. Felt better after hydration + food and so he didn't seek care at the time. Family was concerned with his fatigue + falls + lack of appetite and made sure that they were present to feed him at least one meal per day this past week. He was beginning to look and feel better until the morning of admission when he unable to get out of bed due to weakness.     He was admitted to the ED where he was found to have Tmax rectal 102.5F, HR 80s-110s, SBP 80s-130s, RR 17-22, sating 92-97% on RA. Labs notable for WBC 18.49k, lactate 3.3->1.2. CT A/P showed wall thickening, fat stranding, and associated mesenteric edema at the junction of the sigmoid colon with descending colon with adjacent loculated collection with air and fluid measuring 4 x 2 cm, suggestive of contained perforation of colonic neoplasm. This has progressed since CT of 2019 which suggested neoplasm in the medial wall of the descending colon. Patient did not follow up with colonoscopy. Patient stated he had a colonoscopy some time ago (he says it was about 10 years ago) and was not told about any remarkable finding; but no documentation to confirm c-scope findings. Patient denies headache, vision changes, CP, SOB, nausea, vomiting, abdominal pain, dysuria, hematuria, hematochezia, melena, diarrhea, constipation, recent travel, or sick contacts.         GI team was consulted for colonoscopy          ROS:   General:  + fevers, chills, fatigue  Eyes:  Good vision, no reported pain  ENT:  No sore throat, pain, runny nose  CV:  No pain, palpitations  Pulm:  No dyspnea, cough  GI:  See HPI, otherwise negative  :  No  incontinence, nocturia  Muscle:  No pain, weakness  Neuro:  No memory problems  Psych:  No insomnia, mood problems, depression  Endocrine:  No polyuria, polydipsia, cold/heat intolerance  Skin: bruising on arms    PMHX/PSHX:    Asbestosis (ICD9 501)    HTN (Hypertension)    BPH (Benign Prostatic Hypertrophy)    Dyslipidemia    Localized Osteoarthrosis, Lower Leg    Obesity    COPD with emphysema    Pulmonary nodule    Thyroid nodule    Aortic ectasia    History of diastolic dysfunction    Venous insufficiency    S/P Cystoscopy (ICD9 V45.89)    Hyperlipidemia (ICD9 272.4)    Localized Osteoarthrosis, Lower Leg    S/P Arthroscopy of Left Knee    Cataract    Inguinal Hernia x2    History of Tonsillectomy    History of appendectomy      Allergies:  No Known Allergies      Home Medications: reviewed  Hospital Medications:  acetaminophen     Tablet .. 650 milliGRAM(s) Oral every 6 hours PRN  atorvastatin 10 milliGRAM(s) Oral at bedtime  enoxaparin Injectable 40 milliGRAM(s) SubCutaneous every 24 hours  finasteride 5 milliGRAM(s) Oral daily  metoprolol tartrate 100 milliGRAM(s) Oral daily  piperacillin/tazobactam IVPB.. 3.375 Gram(s) IV Intermittent every 8 hours      Social History:   Tobacco: significant history of smoking, not currently smoking  Alcohol: denies  Recreational drugs: denies    Family history:    No pertinent family history in first degree relatives      Denies family history of colon cancer/polyps, stomach cancer/polyps, pancreatic cancer/masses, liver cancer/disease, ovarian cancer and endometrial cancer.    PHYSICAL EXAM:   Vital Signs:  Vital Signs Last 24 Hrs  T(C): 36.1 (10 Aug 2023 11:21), Max: 36.8 (09 Aug 2023 16:02)  T(F): 97 (10 Aug 2023 11:21), Max: 98.3 (09 Aug 2023 16:02)  HR: 86 (10 Aug 2023 11:21) (84 - 110)  BP: 113/72 (10 Aug 2023 11:21) (113/72 - 149/82)  BP(mean): --  RR: 18 (10 Aug 2023 11:21) (18 - 20)  SpO2: 97% (10 Aug 2023 11:21) (94% - 98%)    Parameters below as of 10 Aug 2023 11:21  Patient On (Oxygen Delivery Method): room air      Daily     Daily     GENERAL: no acute distress  NEURO: alert  HEENT: NCAT, no conjunctival pallor appreciated  CHEST: no respiratory distress, no accessory muscle use  CARDIAC: regular rate, +S1/S2  ABDOMEN: soft, nontender, no rebound or guarding  EXTREMITIES: warm, well perfused  SKIN: no lesions noted    LABS: reviewed                        11.2   16.06 )-----------( 387      ( 10 Aug 2023 07:07 )             35.8     08-10    138  |  104  |  16  ----------------------------<  117<H>  3.8   |  20<L>  |  1.00    Ca    8.5      10 Aug 2023 07:09  Phos  3.1     08-09  Mg     1.5     08-09    TPro  5.9<L>  /  Alb  2.6<L>  /  TBili  0.8  /  DBili  x   /  AST  11  /  ALT  9<L>  /  AlkPhos  73  08-10    LIVER FUNCTIONS - ( 10 Aug 2023 07:09 )  Alb: 2.6 g/dL / Pro: 5.9 g/dL / ALK PHOS: 73 U/L / ALT: 9 U/L / AST: 11 U/L / GGT: x             Culture - Sputum (collected 09 Aug 2023 11:22)  Source: .Sputum Sputum  Gram Stain (09 Aug 2023 20:44):    Rare Squamous epithelial cells per low power field    No polymorphonuclear leukocytes per low power field    Rare Gram Negative Rods per oil power field    Rare Gram positive cocci in pairs per oil power field    Culture - Urine (collected 08 Aug 2023 22:57)  Source: Clean Catch Clean Catch (Midstream)  Final Report (10 Aug 2023 10:40):    <10,000 CFU/mL Normal Urogenital Milly    Culture - Blood (collected 08 Aug 2023 22:15)  Source: .Blood Blood-Peripheral  Preliminary Report (10 Aug 2023 02:01):    No growth at 24 hours    Culture - Blood (collected 08 Aug 2023 22:10)  Source: .Blood Blood-Peripheral  Preliminary Report (10 Aug 2023 02:01):    No growth at 24 hours        Diagnostic Studies: see sunrise for full report         HPI:  GLADIS LEONARD is a 82M former smoker w/ hx of HTN, HLD, COPD (no home O2), ?ILD, 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 Patient has been feeling fatigued + decreased appetite for the last week but denies weight loss. Family was concerned with his fatigue + falls + lack of appetite and brought patient in due to weakness and inability to get OOB.    Patient was found to be febrile with leukocytosis and elevated lactate in ED. CT A/P w/ IV contrast showed wall thickening, fat stranding, and associated mesenteric edema at the junction of the sigmoid colon with descending colon with adjacent loculated collection with air and fluid measuring 4 x 2 cm, suggestive of contained perforation of colonic neoplasm. Patient stated he had a colonoscopy some time ago (he says it was about 10 years ago) and was not told about any remarkable finding; but no documentation to confirm c-scope findings. Patient denied headache, CP, SOB, nausea, vomiting, abdominal pain, dysuria, hematuria, hematochezia, melena, diarrhea, constipation, recent travel, or sick contacts.     Patient was seen by colorectal surgery team, with no planned intervention acutely. GI team was consulted for colonoscopy.         ROS:   General:  + fevers, chills, fatigue  Eyes:  Good vision, no reported pain  ENT:  No sore throat, pain, runny nose  CV:  No pain, palpitations  Pulm:  No dyspnea, cough  GI:  See HPI, otherwise negative  :  No  incontinence, nocturia  Muscle:  No pain, weakness  Neuro:  No memory problems  Psych:  No insomnia, mood problems, depression  Endocrine:  No polyuria, polydipsia, cold/heat intolerance  Skin: bruising on arms    PMHX/PSHX:    Asbestosis (ICD9 501)    HTN (Hypertension)    BPH (Benign Prostatic Hypertrophy)    Dyslipidemia    Localized Osteoarthrosis, Lower Leg    Obesity    COPD with emphysema    Pulmonary nodule    Thyroid nodule    Aortic ectasia    History of diastolic dysfunction    Venous insufficiency    S/P Cystoscopy (ICD9 V45.89)    Hyperlipidemia (ICD9 272.4)    Localized Osteoarthrosis, Lower Leg    S/P Arthroscopy of Left Knee    Cataract    Inguinal Hernia x2    History of Tonsillectomy    History of appendectomy      Allergies:  No Known Allergies      Home Medications: reviewed  Hospital Medications:  acetaminophen     Tablet .. 650 milliGRAM(s) Oral every 6 hours PRN  atorvastatin 10 milliGRAM(s) Oral at bedtime  enoxaparin Injectable 40 milliGRAM(s) SubCutaneous every 24 hours  finasteride 5 milliGRAM(s) Oral daily  metoprolol tartrate 100 milliGRAM(s) Oral daily  piperacillin/tazobactam IVPB.. 3.375 Gram(s) IV Intermittent every 8 hours      Social History:   Tobacco: significant history of smoking, not currently smoking  Alcohol: denies  Recreational drugs: denies    Family history:    No pertinent family history in first degree relatives      Denies family history of colon cancer/polyps, stomach cancer/polyps, pancreatic cancer/masses, liver cancer/disease, ovarian cancer and endometrial cancer.    PHYSICAL EXAM:   Vital Signs:  Vital Signs Last 24 Hrs  T(C): 36.1 (10 Aug 2023 11:21), Max: 36.8 (09 Aug 2023 16:02)  T(F): 97 (10 Aug 2023 11:21), Max: 98.3 (09 Aug 2023 16:02)  HR: 86 (10 Aug 2023 11:21) (84 - 110)  BP: 113/72 (10 Aug 2023 11:21) (113/72 - 149/82)  BP(mean): --  RR: 18 (10 Aug 2023 11:21) (18 - 20)  SpO2: 97% (10 Aug 2023 11:21) (94% - 98%)    Parameters below as of 10 Aug 2023 11:21  Patient On (Oxygen Delivery Method): room air      Daily     Daily     GENERAL: No acute distress  NEURO: alert  HEENT: NCAT, no conjunctival pallor appreciated  CHEST: no respiratory distress, no accessory muscle use  CARDIAC: regular rate, +S1/S2  ABDOMEN: soft, nontender, no rebound or guarding  EXTREMITIES: warm, well perfused  SKIN: no lesions noted    LABS: reviewed                        11.2   16.06 )-----------( 387      ( 10 Aug 2023 07:07 )             35.8     08-10    138  |  104  |  16  ----------------------------<  117<H>  3.8   |  20<L>  |  1.00    Ca    8.5      10 Aug 2023 07:09  Phos  3.1     08-09  Mg     1.5     08-09    TPro  5.9<L>  /  Alb  2.6<L>  /  TBili  0.8  /  DBili  x   /  AST  11  /  ALT  9<L>  /  AlkPhos  73  08-10    LIVER FUNCTIONS - ( 10 Aug 2023 07:09 )  Alb: 2.6 g/dL / Pro: 5.9 g/dL / ALK PHOS: 73 U/L / ALT: 9 U/L / AST: 11 U/L / GGT: x             Culture - Sputum (collected 09 Aug 2023 11:22)  Source: .Sputum Sputum  Gram Stain (09 Aug 2023 20:44):    Rare Squamous epithelial cells per low power field    No polymorphonuclear leukocytes per low power field    Rare Gram Negative Rods per oil power field    Rare Gram positive cocci in pairs per oil power field    Culture - Urine (collected 08 Aug 2023 22:57)  Source: Clean Catch Clean Catch (Midstream)  Final Report (10 Aug 2023 10:40):    <10,000 CFU/mL Normal Urogenital Milly    Culture - Blood (collected 08 Aug 2023 22:15)  Source: .Blood Blood-Peripheral  Preliminary Report (10 Aug 2023 02:01):    No growth at 24 hours    Culture - Blood (collected 08 Aug 2023 22:10)  Source: .Blood Blood-Peripheral  Preliminary Report (10 Aug 2023 02:01):    No growth at 24 hours        Diagnostic Studies: see sunrise for full report         HPI:  GLADIS LEONARD is a 82M former smoker w/ hx of HTN, HLD, COPD (no home O2), ?ILD, 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 Patient has been feeling fatigued + decreased appetite for the last week but denies weight loss. Family was concerned with his fatigue + falls + lack of appetite and brought patient in due to weakness and inability to get OOB.    Patient was found to be febrile with leukocytosis and elevated lactate in ED. CT A/P w/ IV contrast showed wall thickening, fat stranding, and associated mesenteric edema at the junction of the sigmoid colon with descending colon with adjacent loculated collection with air and fluid measuring 4 x 2 cm, suggestive of contained perforation of colonic neoplasm. Patient stated he had a colonoscopy some time ago (he says it was about 10 years ago) and was not told about any remarkable finding; but no documentation to confirm c-scope findings. Patient denied headache, CP, SOB, nausea, vomiting, abdominal pain, dysuria, hematuria, hematochezia, melena, diarrhea, constipation, recent travel, or sick contacts.     Patient was seen by colorectal surgery team, with no planned intervention acutely. GI team was consulted for colonoscopy.         ROS:   General:  + fevers, chills, fatigue  Eyes:  Good vision, no reported pain  ENT:  No sore throat, pain, runny nose  CV:  No pain, palpitations  Pulm:  No dyspnea, cough  GI:  See HPI, otherwise negative  :  No  incontinence, nocturia  Muscle:  No pain, weakness  Neuro:  No memory problems  Psych:  No insomnia, mood problems, depression  Endocrine:  No polyuria, polydipsia, cold/heat intolerance  Skin: bruising on arms    PMHX/PSHX:    Asbestosis (ICD9 501)    HTN (Hypertension)    BPH (Benign Prostatic Hypertrophy)    Dyslipidemia    Localized Osteoarthrosis, Lower Leg    Obesity    COPD with emphysema    Pulmonary nodule    Thyroid nodule    Aortic ectasia    History of diastolic dysfunction    Venous insufficiency    S/P Cystoscopy (ICD9 V45.89)    Hyperlipidemia (ICD9 272.4)    Localized Osteoarthrosis, Lower Leg    S/P Arthroscopy of Left Knee    Cataract    Inguinal Hernia x2    History of Tonsillectomy    History of appendectomy      Allergies:  No Known Allergies      Home Medications: reviewed  Hospital Medications:  acetaminophen     Tablet .. 650 milliGRAM(s) Oral every 6 hours PRN  atorvastatin 10 milliGRAM(s) Oral at bedtime  enoxaparin Injectable 40 milliGRAM(s) SubCutaneous every 24 hours  finasteride 5 milliGRAM(s) Oral daily  metoprolol tartrate 100 milliGRAM(s) Oral daily  piperacillin/tazobactam IVPB.. 3.375 Gram(s) IV Intermittent every 8 hours      Social History:   Tobacco: significant history of smoking, not currently smoking  Alcohol: denies  Recreational drugs: denies    Family history:    No pertinent family history in first degree relatives      Denies family history of colon cancer/polyps, stomach cancer/polyps, pancreatic cancer/masses, liver cancer/disease, ovarian cancer and endometrial cancer.    PHYSICAL EXAM:   Vital Signs:  Vital Signs Last 24 Hrs  T(C): 36.1 (10 Aug 2023 11:21), Max: 36.8 (09 Aug 2023 16:02)  T(F): 97 (10 Aug 2023 11:21), Max: 98.3 (09 Aug 2023 16:02)  HR: 86 (10 Aug 2023 11:21) (84 - 110)  BP: 113/72 (10 Aug 2023 11:21) (113/72 - 149/82)  BP(mean): --  RR: 18 (10 Aug 2023 11:21) (18 - 20)  SpO2: 97% (10 Aug 2023 11:21) (94% - 98%)    Parameters below as of 10 Aug 2023 11:21  Patient On (Oxygen Delivery Method): room air      Daily     Daily     GENERAL: No acute distress  NEURO: alert  HEENT: NCAT, no conjunctival pallor appreciated  CHEST: no respiratory distress, no accessory muscle use  CARDIAC: regular rate, +S1/S2  ABDOMEN: soft, nontender, no rebound or guarding  EXTREMITIES: warm, well perfused  SKIN: no lesions noted  PSYCH: normal affect    LABS: reviewed                        11.2   16.06 )-----------( 387      ( 10 Aug 2023 07:07 )             35.8     08-10    138  |  104  |  16  ----------------------------<  117<H>  3.8   |  20<L>  |  1.00    Ca    8.5      10 Aug 2023 07:09  Phos  3.1     08-09  Mg     1.5     08-09    TPro  5.9<L>  /  Alb  2.6<L>  /  TBili  0.8  /  DBili  x   /  AST  11  /  ALT  9<L>  /  AlkPhos  73  08-10    LIVER FUNCTIONS - ( 10 Aug 2023 07:09 )  Alb: 2.6 g/dL / Pro: 5.9 g/dL / ALK PHOS: 73 U/L / ALT: 9 U/L / AST: 11 U/L / GGT: x             Culture - Sputum (collected 09 Aug 2023 11:22)  Source: .Sputum Sputum  Gram Stain (09 Aug 2023 20:44):    Rare Squamous epithelial cells per low power field    No polymorphonuclear leukocytes per low power field    Rare Gram Negative Rods per oil power field    Rare Gram positive cocci in pairs per oil power field    Culture - Urine (collected 08 Aug 2023 22:57)  Source: Clean Catch Clean Catch (Midstream)  Final Report (10 Aug 2023 10:40):    <10,000 CFU/mL Normal Urogenital Milly    Culture - Blood (collected 08 Aug 2023 22:15)  Source: .Blood Blood-Peripheral  Preliminary Report (10 Aug 2023 02:01):    No growth at 24 hours    Culture - Blood (collected 08 Aug 2023 22:10)  Source: .Blood Blood-Peripheral  Preliminary Report (10 Aug 2023 02:01):    No growth at 24 hours        Diagnostic Studies: see sunrise for full report      < from: CT Abdomen and Pelvis w/ IV Cont (08.09.23 @ 06:20) >  FINDINGS:  CHEST:  LUNGS AND LARGE AIRWAYS: Patent central airways. No nodules. Bibasilar   subsegmental atelectasis.  PLEURA: No pleural effusion. Bilateral calcified pleural plaques stable   from previous study on September 23, 2019.  VESSELS: Atherosclerotic changes..  HEART: Heart size is normal. No pericardial effusion. Coronary artery   calcifications.  MEDIASTINUM AND ROSANNA: No lymphadenopathy.  CHEST WALL AND LOWER NECK: Within normal limits.    ABDOMEN AND PELVIS:  LIVER: Within normal limits.  BILE DUCTS: Normal caliber.  GALLBLADDER: Within normal limits.  SPLEEN: Within normal limits.  PANCREAS: Within normal limits.  ADRENALS: Within normal limits.  KIDNEYS/URETERS: 2.2 cm hypodensity in the left kidney likely a cyst,   which is enlarged from previous study on September 23 2019 at which point   it measured 1.5 cm. No hydroureteronephrosis. Mild nonspecific bilateral   perinephric fat stranding. No renal calculi.    BLADDER: Within normal limits.  REPRODUCTIVE ORGANS: Prostate is enlarged    BOWEL: No bowel obstruction. Sigmoid diverticulosis. Wall thickening, fat   stranding, and associated mesenteric edema at the junction of the sigmoid   colon with descending colon with adjacent loculated collection with air   and fluid (2, 123) measuring 4 x 2 cm. This has progressed since CT of   2019. Appendix is normal.    PERITONEUM: No ascites.  VESSELS: Atherosclerotic changes.  RETROPERITONEUM/LYMPH NODES: No lymphadenopathy.  ABDOMINAL WALL: Stable left inguinal hernia containing bowel loops   without evidence of obstruction. Fat-containing umbilical hernia.  BONES: Degenerative changes.    IMPRESSION:  Focal mural thickening and associated loculated fluid collection at the   junction of the descending and sigmoid colon suspicious for contained   perforation of colonic neoplasm, progressed since 2019.    Stable nonobstructive left inguinal hernia    Stable bilateral calcified pleural plaques    < end of copied text >

## 2023-08-10 NOTE — PROGRESS NOTE ADULT - PROBLEM SELECTOR PLAN 5
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 Former smoker, quit ~15 yrs ago.  - per pt, previously tried an inhaler (Anoro per outpt records), but he stopped it shortly after because it did not make him feel well  - monitor respiratory status, currently stable on RA

## 2023-08-10 NOTE — PROGRESS NOTE ADULT - SUBJECTIVE AND OBJECTIVE BOX
Patient is a 82y old  Male who presents with a chief complaint of Severe sepsis/SIRS (10 Aug 2023 14:20)        SUBJECTIVE / OVERNIGHT EVENTS: Patient had no acute events overnight. Patient seen and examined at bedside this morning with both daughters. Patient expressed he does not want to pursue inpatient colonoscopy. He would prefer to wait until his daughter comes back from [place] end of Aug. No abdominal pain. Tolerating diet. No melena, hematochezia. Also, denies hx of atrial fibrillation.     ROS: [ - ] Fever [ - ] Chills [ - ] Nausea/Vomiting [ - ] Chest Pain [ - ] Shortness of breath     MEDICATIONS  (STANDING):  apixaban 5 milliGRAM(s) Oral two times a day  atorvastatin 10 milliGRAM(s) Oral at bedtime  enoxaparin Injectable 40 milliGRAM(s) SubCutaneous every 24 hours  finasteride 5 milliGRAM(s) Oral daily  metoprolol tartrate 100 milliGRAM(s) Oral daily  piperacillin/tazobactam IVPB.. 3.375 Gram(s) IV Intermittent every 8 hours    MEDICATIONS  (PRN):  acetaminophen     Tablet .. 650 milliGRAM(s) Oral every 6 hours PRN Temp greater or equal to 38C (100.4F), Mild Pain (1 - 3)      Vital Signs Last 24 Hrs  T(C): 36.1 (10 Aug 2023 11:21), Max: 36.6 (09 Aug 2023 23:00)  T(F): 97 (10 Aug 2023 11:21), Max: 97.9 (10 Aug 2023 04:44)  HR: 91 (10 Aug 2023 15:44) (84 - 97)  BP: 116/71 (10 Aug 2023 15:44) (113/72 - 136/87)  BP(mean): --  RR: 18 (10 Aug 2023 15:44) (18 - 20)  SpO2: 97% (10 Aug 2023 15:44) (94% - 98%)    Parameters below as of 10 Aug 2023 15:44  Patient On (Oxygen Delivery Method): room air      CAPILLARY BLOOD GLUCOSE        I&O's Summary      PHYSICAL EXAM  GENERAL: NAD, lying comfortably in bed   HEENT:  Atraumatic, Normocephalic, EOMI, conjunctiva and sclera clear, no LAD  CHEST/LUNG: Clear to auscultation bilaterally; No wheeze  HEART: irregularly irregular, S1 and S2 No murmurs, rubs, or gallops  ABDOMEN: Soft, Nontender, Nondistended; Bowel sounds present  EXTREMITIES:  2+ Peripheral Pulses, No clubbing, cyanosis, or edema  NEURO: AAOx3, non-focal  SKIN: No rashes or lesions    LABS:                        11.2   16.06 )-----------( 387      ( 10 Aug 2023 07:07 )             35.8     08-10    138  |  104  |  16  ----------------------------<  117<H>  3.8   |  20<L>  |  1.00    Ca    8.5      10 Aug 2023 07:09  Phos  3.1     08-09  Mg     1.5     08-09    TPro  5.9<L>  /  Alb  2.6<L>  /  TBili  0.8  /  DBili  x   /  AST  11  /  ALT  9<L>  /  AlkPhos  73  08-10    PT/INR - ( 10 Aug 2023 07:08 )   PT: 14.2 sec;   INR: 1.36 ratio         PTT - ( 10 Aug 2023 07:08 )  PTT:35.5 sec      Urinalysis Basic - ( 10 Aug 2023 07:09 )    Color: x / Appearance: x / SG: x / pH: x  Gluc: 117 mg/dL / Ketone: x  / Bili: x / Urobili: x   Blood: x / Protein: x / Nitrite: x   Leuk Esterase: x / RBC: x / WBC x   Sq Epi: x / Non Sq Epi: x / Bacteria: x          RADIOLOGY & ADDITIONAL TESTS:    EKG reviewed: atrial fibrillation  Labs Personally Reviewed  Imaging Personally Reviewed  Consultant(s) Notes Reviewed

## 2023-08-10 NOTE — PROGRESS NOTE ADULT - SUBJECTIVE AND OBJECTIVE BOX
[facility] OF INFECTIOUS DISEASES  KASEY Mustafa Y. Patel, S. Shah, G. Gustavo  [phone]  ([phone] - weekdays after 5pm and weekends)    Name: GLADIS LEONARD  Age/Gender: 82y Male  MRN: [id number]    Interval History:  Patient seen and examined this morning.  States he feels better, denies fever or any pain.    Notes reviewed. Afebrile.   Allergies: No Known Allergies      Objective:  Vitals:   T(F): 97 (08-10-23 @ 11:21), Max: 98.3 (08-09-23 @ 16:02)  HR: 86 (08-10-23 @ 11:21) (84 - 110)  BP: 113/72 (08-10-23 @ 11:21) (113/72 - 149/82)  RR: 18 (08-10-23 @ 11:21) (18 - 20)  SpO2: 97% (08-10-23 @ 11:21) (94% - 98%)  Physical Examination:  General: no acute distress  HEENT: NC/AT, EOMI, anicteric, neck supple  Cardio: S1, S2 present, normal rate  Resp: clear to auscultation bilaterally  Abd: soft, NT, ND, + BS  Neuro: AAOx3, no obvious focal deficits  Ext: no edema, no cyanosis, moving extremities  Skin: warm, dry, no visible rash  Psych: appropriate affect and mood for situation  Lines: PIV    Laboratory Studies:  CBC:                       11.2   16.06 )-----------( 387      ( 10 Aug 2023 07:07 )             35.8     WBC Trend:  16.06 08-10-23 @ 07:07  16.02 08-09-23 @ 06:35  18.49 08-08-23 @ 22:56    CMP: 08-10    138  |  104  |  16  ----------------------------<  117<H>  3.8   |  20<L>  |  1.00    Ca    8.5      10 Aug 2023 07:09  Phos  3.1     08-09  Mg     1.5     08-09    TPro  5.9<L>  /  Alb  2.6<L>  /  TBili  0.8  /  DBili  x   /  AST  11  /  ALT  9<L>  /  AlkPhos  73  08-10    Creatinine: 1.00 mg/dL (08-10-23 @ 07:09)  Creatinine: 1.06 mg/dL (08-09-23 @ 06:35)  Creatinine: 1.17 mg/dL (08-08-23 @ 22:56)    LIVER FUNCTIONS - ( 10 Aug 2023 07:09 )  Alb: 2.6 g/dL / Pro: 5.9 g/dL / ALK PHOS: 73 U/L / ALT: 9 U/L / AST: 11 U/L / GGT: x           Microbiology: reviewed   Culture - Sputum (collected 08-09-23 @ 11:22)  Source: .Sputum Sputum  Gram Stain (08-09-23 @ 20:44):    Rare Squamous epithelial cells per low power field    No polymorphonuclear leukocytes per low power field    Rare Gram Negative Rods per oil power field    Rare Gram positive cocci in pairs per oil power field    Culture - Urine (collected 08-08-23 @ 22:57)  Source: Clean Catch Clean Catch (Midstream)  Final Report (08-10-23 @ 10:40):    <10,000 CFU/mL Normal Urogenital Milly    Culture - Blood (collected 08-08-23 @ 22:15)  Source: .Blood Blood-Peripheral  Preliminary Report (08-10-23 @ 02:01):    No growth at 24 hours    Culture - Blood (collected 08-08-23 @ 22:10)  Source: .Blood Blood-Peripheral  Preliminary Report (08-10-23 @ 02:01):    No growth at 24 hours    Radiology: reviewed   < from: [facility] Duplex Lower Ext Vein Scan, Bilat (08.09.23 @ 15:27) >  IMPRESSION:  No evidence of deep venous thrombosis in either lower extremity.    < end of copied text >  < from: CT Chest w/ IV Cont (08.09.23 @ 06:20) >  IMPRESSION:  Focal mural thickening and associated loculated fluid collection at the   junction of the descending and sigmoid colon suspicious for contained   perforation of colonic neoplasm, progressed since 2019.    Stable nonobstructive left inguinal hernia    Stable bilateral calcified pleural plaques    < end of copied text >    Medications:  acetaminophen     Tablet .. 650 milliGRAM(s) Oral every 6 hours PRN  atorvastatin 10 milliGRAM(s) Oral at bedtime  enoxaparin Injectable 40 milliGRAM(s) SubCutaneous every 24 hours  finasteride 5 milliGRAM(s) Oral daily  metoprolol tartrate 100 milliGRAM(s) Oral daily  piperacillin/tazobactam IVPB.. 3.375 Gram(s) IV Intermittent every 8 hours    Current Antimicrobials:  piperacillin/tazobactam IVPB.. 3.375 Gram(s) IV Intermittent every 8 hours    Prior/Completed Antimicrobials:  piperacillin/tazobactam IVPB.  piperacillin/tazobactam IVPB..  vancomycin  IVPB.

## 2023-08-10 NOTE — CONSULT NOTE ADULT - ASSESSMENT
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 Patient is not on any anticoagulation.   - Lung function is baseline (patient is breathing comfortably on room air, but has many chronic lung diseases)  - Patient does not want colonoscopy or surgery within the next 2 weeks until his family returns from [facility]. Patient says he will follow up with GI outpatient and will get colonoscopy outpatient.      Recommendations:  - Patient must first be optimized and need stabilization of A-fib.    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 Course complicated by new onset of A-fib on 8/10.    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 Likely in the setting of sepsis. Patient is not on any anticoagulation. Negative trop  - Lung function is baseline  - Patient does not want colonoscopy or surgery within the next 2 weeks until his family returns from [facility].  - TTE from 8/2022 revealed EF 60% with indeterminant LV diastolic dysfunction. Normal RV function.       Recommendations:  - Given new imaging finding of possible contained perforation of colonic neoplasm, colonoscopy is contraindicated at this time  - Will c/w medical management with antibiotics and symptomatic support  - Sepsis, Afib and heart failure evaluation per primary team  - GI team will sign off. Please call back with questions or concerns.    Note incomplete until finalized by attending signature/attestation.    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 Course complicated by new onset of A-fib on 8/10.    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 Likely in the setting of sepsis. Patient is not on any anticoagulation. Negative trop  - Lung function is baseline  - Patient does not want colonoscopy or surgery within the next 2 weeks until his family returns from [facility].  - TTE from 8/2022 revealed EF 60% with indeterminant LV diastolic dysfunction. Normal RV function.       Recommendations:  - Given new imaging finding of possible contained perforation of colonic neoplasm, colonoscopy is contraindicated at this time  - Will c/w medical management with antibiotics and symptomatic support  - Sepsis, Afib and heart failure evaluation per primary team  - After acute issues and infection has been stabilized, if patient is amendable to definitive surgery/oncological treatment for likely colon cancer, would have patient follow-up for colonoscopy in 4-6 weeks.  - Check CEA, Ca 19-9  - GI team will sign off. Please call back with questions or concerns.    Note incomplete until finalized by attending signature/attestation.    Keeley Lock  GI/Hepatology Fellow    MONDAY-FRIDAY 8AM-5PM:  Pager# [phone] ([facility]) or [phone] ([facility])    NON-URGENT CONSULTS:  Please email [email or web address] OR [email or web address]  AT NIGHT AND ON WEEKENDS:  Contact on-call GI fellow via answering service ([phone]) from 5pm-8am and on weekends/holidays

## 2023-08-10 NOTE — PROGRESS NOTE ADULT - ASSESSMENT
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 Patient has never seen a colorectal surgeon in the past. Patient with non toxic appearance, stable  and with  benign abdominal physical exam, however CT scan concerning fo r colon neoplasm c/w contained perforation.     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 Patient has never seen a colorectal surgeon in the past. Patient with non toxic appearance, stable  and with  benign abdominal physical exam, however CT scan concerning fo r colon neoplasm c/w contained perforation.     PLAN:  - No acute surgical intervention   - Diet as tolerated   - Serial ab exams   - Surgery will follow. Surgical intervention as outpatient  - GI consult for inpatient colonoscopy recommended  - Care per primary team    Green Surgery   [id number]

## 2023-08-10 NOTE — PROGRESS NOTE ADULT - PROBLEM SELECTOR PLAN 4
Pt with b/l LE pitting edema. Prior TTE (2014) noted evidence of diastolic dysfunction. Subsequent TTEs with indeterminate diastolic function. Follows outpatient with Vascular Cardiology, Dr. Lopez Kahn.  - strict I/Os, standing weights daily  - holding home lasix and spironolactone given hypotension from sepsis  - monitor volume status  - b/l LE duplex neg  -f/u TTE

## 2023-08-10 NOTE — PROGRESS NOTE ADULT - SUBJECTIVE AND OBJECTIVE BOX
SURGERY DAILY PROGRESS NOTE:     SUBJECTIVE/24hr Events:     Patient seen and examined on am rounds. With new onset afib. Denies ab pain, n/v. States last colonoscopy was approx 10yrs ago and was WNL.      OBJECTIVE:    MEDICATIONS  (STANDING):  atorvastatin 10 milliGRAM(s) Oral at bedtime  enoxaparin Injectable 40 milliGRAM(s) SubCutaneous every 24 hours  finasteride 5 milliGRAM(s) Oral daily  metoprolol tartrate 100 milliGRAM(s) Oral daily  piperacillin/tazobactam IVPB.. 3.375 Gram(s) IV Intermittent every 8 hours    MEDICATIONS  (PRN):  acetaminophen     Tablet .. 650 milliGRAM(s) Oral every 6 hours PRN Temp greater or equal to 38C (100.4F), Mild Pain (1 - 3)      Vital Signs Last 24 Hrs  T(C): 36.6 (10 Aug 2023 04:44), Max: 36.8 (09 Aug 2023 16:02)  T(F): 97.9 (10 Aug 2023 04:44), Max: 98.3 (09 Aug 2023 16:02)  HR: 95 (10 Aug 2023 04:44) (84 - 110)  BP: 127/68 (10 Aug 2023 04:44) (117/76 - 153/87)  BP(mean): --  RR: 18 (10 Aug 2023 04:44) (18 - 20)  SpO2: 97% (10 Aug 2023 04:44) (94% - 98%)    Parameters below as of 10 Aug 2023 04:44  Patient On (Oxygen Delivery Method): room air          LABS:                        11.2   16.06 )-----------( 387      ( 10 Aug 2023 07:07 )             35.8     08-10    138  |  104  |  16  ----------------------------<  117<H>  3.8   |  20<L>  |  1.00    Ca    8.5      10 Aug 2023 07:09  Phos  3.1     08-09  Mg     1.5     08-09    TPro  5.9<L>  /  Alb  2.6<L>  /  TBili  0.8  /  DBili  x   /  AST  11  /  ALT  9<L>  /  AlkPhos  73  08-10    PT/INR - ( 10 Aug 2023 07:08 )   PT: 14.2 sec;   INR: 1.36 ratio         PTT - ( 10 Aug 2023 07:08 )  PTT:35.5 sec  Urinalysis Basic - ( 10 Aug 2023 07:09 )    Color: x / Appearance: x / SG: x / pH: x  Gluc: 117 mg/dL / Ketone: x  / Bili: x / Urobili: x   Blood: x / Protein: x / Nitrite: x   Leuk Esterase: x / RBC: x / WBC x   Sq Epi: x / Non Sq Epi: x / Bacteria: x        PHYSICAL EXAM:  Constitutional:  NAD  ENMT: normal facies, symmetric  Respiratory: Normal respiratory effort   Abdomen: soft, NTND. No rebound or guarding.

## 2023-08-10 NOTE — PROGRESS NOTE ADULT - ASSESSMENT
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 SBP down to 80s. Concern for severe sepsis/SIRS of uncertain etiology found to have possible contained perforation of colonic neoplasm complicated with new Afib

## 2023-08-10 NOTE — PATIENT PROFILE ADULT - FALL HARM RISK - HARM RISK INTERVENTIONS

## 2023-08-11 ENCOUNTER — TRANSCRIPTION ENCOUNTER (OUTPATIENT)
Age: 83
End: 2023-08-11

## 2023-08-11 LAB
ALBUMIN SERPL ELPH-MCNC: 2.5 G/DL — LOW (ref 3.3–5)
ALP SERPL-CCNC: 70 U/L — SIGNIFICANT CHANGE UP (ref 40–120)
ALT FLD-CCNC: 9 U/L — LOW (ref 10–45)
ANION GAP SERPL CALC-SCNC: 16 MMOL/L — SIGNIFICANT CHANGE UP (ref 5–17)
AST SERPL-CCNC: 9 U/L — LOW (ref 10–40)
BILIRUB SERPL-MCNC: 0.8 MG/DL — SIGNIFICANT CHANGE UP (ref 0.2–1.2)
BUN SERPL-MCNC: 17 MG/DL — SIGNIFICANT CHANGE UP (ref 7–23)
CALCIUM SERPL-MCNC: 8.6 MG/DL — SIGNIFICANT CHANGE UP (ref 8.4–10.5)
CANCER AG19-9 SERPL-ACNC: 3 U/ML — SIGNIFICANT CHANGE UP
CEA SERPL-MCNC: 1 NG/ML — SIGNIFICANT CHANGE UP (ref 0–3.8)
CHLORIDE SERPL-SCNC: 102 MMOL/L — SIGNIFICANT CHANGE UP (ref 96–108)
CO2 SERPL-SCNC: 19 MMOL/L — LOW (ref 22–31)
CREAT SERPL-MCNC: 1.01 MG/DL — SIGNIFICANT CHANGE UP (ref 0.5–1.3)
CULTURE RESULTS: SIGNIFICANT CHANGE UP
EGFR: 73 ML/MIN/1.73M2 — SIGNIFICANT CHANGE UP
GLUCOSE SERPL-MCNC: 101 MG/DL — HIGH (ref 70–99)
HCT VFR BLD CALC: 35.2 % — LOW (ref 39–50)
HGB BLD-MCNC: 11.3 G/DL — LOW (ref 13–17)
MCHC RBC-ENTMCNC: 28.5 PG — SIGNIFICANT CHANGE UP (ref 27–34)
MCHC RBC-ENTMCNC: 32.1 GM/DL — SIGNIFICANT CHANGE UP (ref 32–36)
MCV RBC AUTO: 88.9 FL — SIGNIFICANT CHANGE UP (ref 80–100)
NRBC # BLD: 0 /100 WBCS — SIGNIFICANT CHANGE UP (ref 0–0)
PLATELET # BLD AUTO: 368 K/UL — SIGNIFICANT CHANGE UP (ref 150–400)
POTASSIUM SERPL-MCNC: 3.6 MMOL/L — SIGNIFICANT CHANGE UP (ref 3.5–5.3)
POTASSIUM SERPL-SCNC: 3.6 MMOL/L — SIGNIFICANT CHANGE UP (ref 3.5–5.3)
PROT SERPL-MCNC: 5.6 G/DL — LOW (ref 6–8.3)
RBC # BLD: 3.96 M/UL — LOW (ref 4.2–5.8)
RBC # FLD: 14.5 % — SIGNIFICANT CHANGE UP (ref 10.3–14.5)
SODIUM SERPL-SCNC: 137 MMOL/L — SIGNIFICANT CHANGE UP (ref 135–145)
SPECIMEN SOURCE: SIGNIFICANT CHANGE UP
WBC # BLD: 11.7 K/UL — HIGH (ref 3.8–10.5)
WBC # FLD AUTO: 11.7 K/UL — HIGH (ref 3.8–10.5)

## 2023-08-11 PROCEDURE — 99233 SBSQ HOSP IP/OBS HIGH 50: CPT

## 2023-08-11 PROCEDURE — 93306 TTE W/DOPPLER COMPLETE: CPT | Mod: 26

## 2023-08-11 RX ORDER — APIXABAN 2.5 MG/1
1 TABLET, FILM COATED ORAL
Qty: 60 | Refills: 0
Start: 2023-08-11 | End: 2023-09-09

## 2023-08-11 RX ORDER — CHLORHEXIDINE GLUCONATE 213 G/1000ML
1 SOLUTION TOPICAL
Refills: 0 | Status: DISCONTINUED | OUTPATIENT
Start: 2023-08-11 | End: 2023-08-17

## 2023-08-11 RX ADMIN — FINASTERIDE 5 MILLIGRAM(S): 5 TABLET, FILM COATED ORAL at 11:50

## 2023-08-11 RX ADMIN — Medication 650 MILLIGRAM(S): at 21:37

## 2023-08-11 RX ADMIN — Medication 100 MILLIGRAM(S): at 05:30

## 2023-08-11 RX ADMIN — APIXABAN 5 MILLIGRAM(S): 2.5 TABLET, FILM COATED ORAL at 17:36

## 2023-08-11 RX ADMIN — ATORVASTATIN CALCIUM 10 MILLIGRAM(S): 80 TABLET, FILM COATED ORAL at 21:34

## 2023-08-11 RX ADMIN — APIXABAN 5 MILLIGRAM(S): 2.5 TABLET, FILM COATED ORAL at 05:30

## 2023-08-11 RX ADMIN — Medication 650 MILLIGRAM(S): at 21:30

## 2023-08-11 RX ADMIN — PIPERACILLIN AND TAZOBACTAM 25 GRAM(S): 4; .5 INJECTION, POWDER, LYOPHILIZED, FOR SOLUTION INTRAVENOUS at 17:36

## 2023-08-11 RX ADMIN — CHLORHEXIDINE GLUCONATE 1 APPLICATION(S): 213 SOLUTION TOPICAL at 12:00

## 2023-08-11 RX ADMIN — PIPERACILLIN AND TAZOBACTAM 25 GRAM(S): 4; .5 INJECTION, POWDER, LYOPHILIZED, FOR SOLUTION INTRAVENOUS at 02:50

## 2023-08-11 RX ADMIN — PIPERACILLIN AND TAZOBACTAM 25 GRAM(S): 4; .5 INJECTION, POWDER, LYOPHILIZED, FOR SOLUTION INTRAVENOUS at 11:51

## 2023-08-11 NOTE — DISCHARGE NOTE PROVIDER - NSDCCPCAREPLAN_GEN_ALL_CORE_FT
PRINCIPAL DISCHARGE DIAGNOSIS  Diagnosis: Sepsis  Assessment and Plan of Treatment: You were admitted for sepsis  . Chest CT neg for PNA. Found to have fluid collection (?contained perforation of colonic neoplasm)  - s/p Vanc/Zosyn and 2.5L IVF in ED with improvement of BP  - c/w empiric Zosyn (8/8/23- )   - on discharge - can transition to augmentin 875mg PO BID x14d course   - blood cultures ngtd, sputum culture neg  You are to continue your oral antibiotics on discharge  Follow up with ID and surgery        SECONDARY DISCHARGE DIAGNOSES  Diagnosis: HTN (hypertension)  Assessment and Plan of Treatment: Continue to follow a low salt/sodium diet.  Perform physical activities as tolerated in consultation with your Primary Care Provider and physical therapist.  Take all medications as prescribed.  Follow up with your medical doctor for routine blood pressure monitoring at your next visit.  Notify your doctor if you have any of the following symptoms:  Dizziness, lightheadedness, blurry vision, headache, chest pain, or shortness of breath.      Diagnosis: Chronic diastolic congestive heart failure  Assessment and Plan of Treatment: Take all medications as prescribed.  Stop smoking if you currently If you are on medication to help you quit smoking, be sure to take it as prescribed. Find healthy ways to deal with stress, such as exercise (check with your healthcare provider first), deep breathing, meditation, or enjoyable healthy hobbies.  Avoid situations that may cause you to smoke a cigarette.  Look for help with quitting; you are not alone. A resource to help you stop smoking is the [facility] – phone number 786-095-9941., and avoid high altitudes.  Weigh yourself daily.  If you gain 3lbs in 3 days, or 5lbs in a week call your Health Care Provider.  Eat a low sodium diet.  If you have pulmonary hypertension and you are a female of child bearing age, talk to your caregiver about using birth control pills or getting pregnant.  Call your Health Care Provider if you have any swelling or increased swelling in your feet, ankles, and/or stomach.  If you experience dizziness, chest pain, or shortness of breath, seek immediate medical attention.      Diagnosis: New onset atrial fibrillation  Assessment and Plan of Treatment: Atrial fibrillation is a common heart rhythm problem which increases the risk of stroke and heat attack.  It helps if you control your blood pressure, avoid alcohol, cut down on caffeine, get treatment for your thyroid if it is overactive, and perform moderate exercise in consultation with your Primary Care Provider.  Call your doctor if you experience chest tightness/pain, lightheadedness, loss of consciousness, shortness of breath (especially with exercise), feel your heart racing or beating unusually, frequent or abnormal bleeding.  It is important to take all your heart medications as prescribed.      Diagnosis: Lesion of colon  Assessment and Plan of Treatment: You have a contained collection that has been expanding  Seen by ID and surgery  Continue your antibiotics and follow up with surgery in 2 weeks     PRINCIPAL DISCHARGE DIAGNOSIS  Diagnosis: Lesion of colon  Assessment and Plan of Treatment: WOUND CARE: Keep incisions clean and dry.  Monitor and record ostomy output.  Notify surgeon if output decrease to less than 400cc/24 hours or greather than 1200/24 hours.  Please call office if any questions.   BATHING: Please do not submerge wound underwater. You may shower and/or sponge bathe.  ACTIVITY: No heavy lifting or straining. Otherwise, you may return to your usual level of physical activity. If you are taking narcotic pain medication (such as Percocet), do NOT drive a car, operate machinery or make important decisions.  DIET: Low fiber diet  NOTIFY YOUR SURGEON IF: You have any bleeding that does not stop, any pus draining from your wound, any fever (over 100.4 F) or chills, persistent nausea/vomiting, persistent diarrhea, or if your pain is not controlled on your discharge pain medications.  FOLLOW-UP:  1. Follow-up with Dr. Browning within 1-2 weeks of discharge.  Please call office for appointment  2. Please follow up with your primary care physician in one week regarding your hospitalization.      SECONDARY DISCHARGE DIAGNOSES  Diagnosis: HTN (hypertension)  Assessment and Plan of Treatment: Continue to follow a low salt/sodium diet.  Perform physical activities as tolerated in consultation with your Primary Care Provider and physical therapist.  Take all medications as prescribed.  Follow up with your medical doctor for routine blood pressure monitoring at your next visit.  Notify your doctor if you have any of the following symptoms:  Dizziness, lightheadedness, blurry vision, headache, chest pain, or shortness of breath.      Diagnosis: Chronic diastolic congestive heart failure  Assessment and Plan of Treatment: Take all medications as prescribed.  Stop smoking if you currently If you are on medication to help you quit smoking, be sure to take it as prescribed. Find healthy ways to deal with stress, such as exercise (check with your healthcare provider first), deep breathing, meditation, or enjoyable healthy hobbies.  Avoid situations that may cause you to smoke a cigarette.  Look for help with quitting; you are not alone. A resource to help you stop smoking is the [facility] – phone number 460-807-3710., and avoid high altitudes.  Weigh yourself daily.  If you gain 3lbs in 3 days, or 5lbs in a week call your Health Care Provider.  Eat a low sodium diet.  If you have pulmonary hypertension and you are a female of child bearing age, talk to your caregiver about using birth control pills or getting pregnant.  Call your Health Care Provider if you have any swelling or increased swelling in your feet, ankles, and/or stomach.  If you experience dizziness, chest pain, or shortness of breath, seek immediate medical attention.      Diagnosis: New onset atrial fibrillation  Assessment and Plan of Treatment: Atrial fibrillation is a common heart rhythm problem which increases the risk of stroke and heat attack.  It helps if you control your blood pressure, avoid alcohol, cut down on caffeine, get treatment for your thyroid if it is overactive, and perform moderate exercise in consultation with your Primary Care Provider.  Call your doctor if you experience chest tightness/pain, lightheadedness, loss of consciousness, shortness of breath (especially with exercise), feel your heart racing or beating unusually, frequent or abnormal bleeding.  It is important to take all your heart medications as prescribed.      Diagnosis: Lesion of colon  Assessment and Plan of Treatment: You have a contained collection that has been expanding  Seen by ID and surgery  Continue your antibiotics and follow up with surgery in 2 weeks

## 2023-08-11 NOTE — CONSULT NOTE ADULT - SUBJECTIVE AND OBJECTIVE BOX
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 He was brought to the hospital after being unable to get out of bed due to weakness. Noted that he fell twice about a week ago, stating the first time was because he tripped and second time his legs "just gave out." Denied head strike or LOC. Felt better after hydration and food and so he didn't seek care at the time. Has had poor appetite for the past week, denied noticing any unintended weight loss. Denied headache, vision changes, CP, SOB, nausea, vomiting, abdominal pain, dysuria, hematuria, hematochezia, melena, diarrhea, constipation, recent travel, or sick contacts. Noted he was spitting up some mucous, but stated he has that from time to time. Stated he had a colonoscopy some time ago (unclear when) and was not told about any remarkable findings, though outpatient note from PCP from 6/2023 stated pt had not had colonoscopy at time.    In ED: Tmax rectal 102.5F, HR 80s-110s, SBP 80s-130s, RR 17-22, sating 92-97% on RA. Labs notable for WBC 18.49k, lactate 3.3->1.2. UA not suggestive of infection. CXR (prelim read) showed b/l patchy opacities. Given tylenol 1g, Vanc 1g, Zosyn 3.375mg x2, and 2.5L NS bolus. Admitted to Medicine for further management. (09 Aug 2023 02:46)      PAST MEDICAL & SURGICAL HISTORY:  Asbestosis (ICD9 501)      HTN (Hypertension)      BPH (Benign Prostatic Hypertrophy)      Dyslipidemia      Localized Osteoarthrosis, Lower Leg  left      Obesity      COPD with emphysema      Pulmonary nodule      Thyroid nodule      Aortic ectasia      History of diastolic dysfunction      Venous insufficiency      S/P Cystoscopy (ICD9 V45.89)      Hyperlipidemia (ICD9 272.4)      S/P Arthroscopy of Left Knee  x 20 yrs ago      Cataract  right IOL      Inguinal Hernia x2  right      History of Tonsillectomy      History of appendectomy              MEDICATIONS:  apixaban 5 milliGRAM(s) Oral two times a day  metoprolol tartrate 100 milliGRAM(s) Oral daily    piperacillin/tazobactam IVPB.. 3.375 Gram(s) IV Intermittent every 8 hours      acetaminophen     Tablet .. 650 milliGRAM(s) Oral every 6 hours PRN      atorvastatin 10 milliGRAM(s) Oral at bedtime  finasteride 5 milliGRAM(s) Oral daily    chlorhexidine 2% Cloths 1 Application(s) Topical <User Schedule>      FAMILY HISTORY:  No pertinent family history in first degree relatives        Non-contributory    SOCIAL HISTORY:    [ ] not a smoker      Allergies    No Known Allergies    Intolerances    	    REVIEW OF SYSTEMS:  CONSTITUTIONAL: No fever  EYES: No eye pain, visual disturbances, or discharge  ENMT:  No difficulty hearing, tinnitus  NECK: No pain or stiffness  RESPIRATORY: No cough, wheezing,  CARDIOVASCULAR: No chest pain, palpitations, passing out, dizziness, or leg swelling  GASTROINTESTINAL:  No nausea, vomiting, diarrhea or constipation. No melena.  GENITOURINARY: No dysuria, hematuria  NEUROLOGICAL: No stroke like symptoms  SKIN: No burning or lesions   ENDOCRINE: No heat or cold intolerance  MUSCULOSKELETAL: No joint pain or swelling  PSYCHIATRIC: No  anxiety, mood swings  HEME/LYMPH: No bleeding gums  ALLERGY AND IMMUNOLOGIC: No hives or eczema	    All other ROS negative    PHYSICAL EXAM:  T(C): 36.6 (08-11-23 @ 04:52), Max: 36.6 (08-11-23 @ 04:52)  HR: 103 (08-11-23 @ 12:25) (76 - 103)  BP: 137/84 (08-11-23 @ 12:25) (137/84 - 148/67)  RR: 18 (08-11-23 @ 12:25) (18 - 18)  SpO2: 96% (08-11-23 @ 12:25) (96% - 98%)  Wt(kg): --  I&O's Summary    11 Aug 2023 07:01  -  11 Aug 2023 17:17  --------------------------------------------------------  IN: 300 mL / OUT: 200 mL / NET: 100 mL        Appearance: Normal	  HEENT:   Normal oral mucosa, EOMI	  Cardiovascular:  S1 S2, No JVD,    Respiratory: Lungs clear to auscultation	  Psychiatry: Alert  Gastrointestinal:  Soft, Non-tender, + BS	  Skin: No rashes   Neurologic: Non-focal  Extremities:  No edema  Vascular: Peripheral pulses palpable    	    	  	  CARDIAC MARKERS:  Labs personally reviewed by me                                  11.3   11.70 )-----------( 368      ( 11 Aug 2023 07:14 )             35.2     08-11    137  |  102  |  17  ----------------------------<  101<H>  3.6   |  19<L>  |  1.01    Ca    8.6      11 Aug 2023 07:15    TPro  5.6<L>  /  Alb  2.5<L>  /  TBili  0.8  /  DBili  x   /  AST  9<L>  /  ALT  9<L>  /  AlkPhos  70  08-11      TTE CONCLUSIONS:     1. Left ventricular systolic function is normal with an ejection fraction of 68 % by Henao's method of disks.      EKG: Personally reviewed by me - AF  Radiology: Personally reviewed by me -    from: CT Chest w/ IV Cont (08.09.23 @ 06:20) >  IMPRESSION:  Focal mural thickening and associated loculated fluid collection at the   junction of the descending and sigmoid colon suspicious for contained   perforation of colonic neoplasm, progressed since 2019.    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 SBP down to 80s. 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   ·  Plan: Afib confirmed on ecg. No prior hx.    Now in NSR  -TTE no LA enlargement. EF 68%  -cw home metoprolol  -CHADSvasc at least 3. Per colorectal surgery team no contraindication to AC  If no contraindication by CRS will start Eliquis 5mg BID for stroke ppx    Problem/Plan - 2:  ·  Problem: Chronic diastolic congestive heart failure.   ·  Plan: TTE with preserved EF.  - holding home lasix and spironolactone given hypotension from sepsis     Problem/Plan - 3:  ·  Problem: HTN (hypertension).   ·  Plan: - hold home antihypertensives for now given hypotension in setting of sepsis.    Problem/Plan - 4:  ·  Problem: HLD (hyperlipidemia).   ·  Plan: - c/w home atorvastatin.       Discussed with OP cardiologist Dr Kahn, he will follow a OP             Differential diagnosis and plan of care discussed with patient after the evaluation. Counseling on diet, nutritional counseling, weight management, exercise and medication compliance was done.   Advanced care planning/advanced directives discussed with patient/family. DNR status including forceful chest compressions to attempt to restart the heart, ventilator support/artificial breathing, electric shock, artificial nutrition, health care proxy, Molst form all discussed with pt. Pt wishes to consider. More than fifteen minutes spent on discussing advanced directives.          Joce Ballesteros DO [facility]  Cardiovascular Medicine  [address], [address]  Office [phone]  Available via call/text on Microsoft Teams

## 2023-08-11 NOTE — PROGRESS NOTE ADULT - PROBLEM SELECTOR PLAN 2
Afib confirmed on ecg. No prior hx. Does not follow with outpatient cardiology  Now in NSR  -TTE no LA enlargement. EF 68%  -cw home metoprolol  -CHADSvasc at least 3. F/u colorectal surgery on AC given perforated colon  monitor on tele

## 2023-08-11 NOTE — DISCHARGE NOTE PROVIDER - CARE PROVIDER_API CALL
Doris Connelly.  Infectious Disease  [address], [address]  [place] [address]  Phone: [phone]  Fax: [phone]  Follow Up Time: 1 week    Igor Browning  Colon/Rectal Surgery  [address], [address]  [place] [address]  Phone: [phone]  Fax: [phone]  Follow Up Time: 2 weeks    Zev Thomas  Pulmonary Disease  [address], [address]  [place] [address]  Phone: [phone]  Fax: [phone]  Follow Up Time: 1 week

## 2023-08-11 NOTE — DISCHARGE NOTE PROVIDER - NSDCCPTREATMENT_GEN_ALL_CORE_FT
PRINCIPAL PROCEDURE  Procedure: Open colectomy involving left side of colon or sigmoid colon  Findings and Treatment:       SECONDARY PROCEDURE  Procedure: Mobilization, splenic flexure  Findings and Treatment:

## 2023-08-11 NOTE — PROGRESS NOTE ADULT - ASSESSMENT
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 Patient admitted for severe sepsis vs SIRS.     Severe sepsis - fever, tachycardia, leukocytosis with lactic acidosis   Likely due to contained perforation of colonic neoplasm  Ruled out bacteremia; sepsis resolved   New onset Afib, now on eliquis   - CT with focal mural thickening and associated loculated fluid collection at the junction of the descending and sigmoid colon suspicious for contained perforation of colonic neoplasm, progressed since 2019. Has atelectasis reported, no pneumonia.   CRS following, no acute surgical intervention   GI following, no plan for colonoscopy at this time    Discussed with patient that may be difficult to treat with antibiotics alone, states he wants to hold off on work up until his other daughter returns from [facility]   - RVP negative, Scx negative   - MRSA PCR screen negative, off vancomycin    - Bcx NGTD x2 (48h)  - WBC downtrended, afebrile x48h, abd nontender    Recommendations:  Continue on pip-tazo for now while inpt   - on discharge - can transition to augmentin 875mg PO BID x14d course    - patient will follow up with me as outpatient after discharge, office information provided   Monitor temps/CBC  Continue rest of care per primary team    Over the weekend Dr. Jurgen Berkowitz will be covering for our group. If you have any questions, concerns or new micro data, please reach out to them at [phone].    D/w Dr. Araceli Connelly M.D.  [facility], Division of Infectious Diseases  [phone]  After 5pm on weekdays and all day on weekends - please call [phone]

## 2023-08-11 NOTE — DISCHARGE NOTE PROVIDER - NSDCMRMEDTOKEN_GEN_ALL_CORE_FT
atorvastatin 10 mg oral tablet: 1 tab(s) orally once a day  clotrimazole-betamethasone dipropionate 1%-0.05% topical cream: Apply topically to affected area 2 times a day in the AM and PM. according to daughter, for his private part  finasteride 5 mg oral tablet: 1 tab(s) orally once a day  furosemide 40 mg oral tablet: 1 tab(s) orally once a day  losartan 100 mg oral tablet: 1 tab(s) orally once a day  Metoprolol Tartrate 100 mg oral tablet: 1 tab(s) orally once a day  spironolactone 25 mg oral tablet: 1 tab(s) orally once a day   apixaban 5 mg oral tablet: 1 tab(s) orally 2 times a day  atorvastatin 10 mg oral tablet: 1 tab(s) orally once a day  clotrimazole-betamethasone dipropionate 1%-0.05% topical cream: Apply topically to affected area 2 times a day in the AM and PM. according to daughter, for his private part  finasteride 5 mg oral tablet: 1 tab(s) orally once a day  furosemide 40 mg oral tablet: 1 tab(s) orally once a day  losartan 100 mg oral tablet: 1 tab(s) orally once a day  Metoprolol Tartrate 100 mg oral tablet: 1 tab(s) orally once a day  spironolactone 25 mg oral tablet: 1 tab(s) orally once a day   apixaban 5 mg oral tablet: 1 tab(s) orally 2 times a day  atorvastatin 10 mg oral tablet: 1 tab(s) orally once a day  clotrimazole-betamethasone dipropionate 1%-0.05% topical cream: Apply topically to affected area 2 times a day in the AM and PM. according to daughter, for his private part  finasteride 5 mg oral tablet: 1 tab(s) orally once a day  furosemide 80 mg oral tablet: 1 tab(s) orally once a day  losartan 100 mg oral tablet: 1 tab(s) orally once a day  Metoprolol Tartrate 100 mg oral tablet: 1 tab(s) orally once a day  nystatin 100,000 units/g topical powder: 1 Apply topically to affected area 2 times a day  oxyCODONE 5 mg oral tablet: 1 tab(s) orally every 4 hours As needed Severe Pain (7 - 10)  pantoprazole 40 mg oral delayed release tablet: 1 tab(s) orally every 12 hours  spironolactone 25 mg oral tablet: 1 tab(s) orally once a day  tamsulosin 0.4 mg oral capsule: 1 cap(s) orally once a day (at bedtime)

## 2023-08-11 NOTE — PROGRESS NOTE ADULT - ASSESSMENT
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 SBP down to 80s. Concern for severe sepsis/SIRS of uncertain etiology found to have possible contained perforation of colonic neoplasm complicated with new pAfib

## 2023-08-11 NOTE — PROGRESS NOTE ADULT - SUBJECTIVE AND OBJECTIVE BOX
[facility] Daily Resident Progress Note    OVERNIGHT:  No acute events.     SUBJECTIVE: Pt seen and examined at bedside.     OBJECTIVE:  Vital Signs Last 24 Hrs  T(C): 36.9 (10 Aug 2023 15:44), Max: 36.9 (10 Aug 2023 15:44)  T(F): 98.4 (10 Aug 2023 15:44), Max: 98.4 (10 Aug 2023 15:44)  HR: 91 (10 Aug 2023 15:44) (86 - 95)  BP: 120/73 (10 Aug 2023 15:44) (113/72 - 127/68)  BP(mean): --  RR: 18 (10 Aug 2023 15:44) (18 - 18)  SpO2: 97% (10 Aug 2023 15:44) (97% - 97%)    Parameters below as of 10 Aug 2023 15:44  Patient On (Oxygen Delivery Method): room air      Physical Exam:  Constitutional:  NAD  ENMT: normal facies, symmetric  Respiratory: Normal respiratory effort   Abdomen: soft, NTND. No rebound or guarding.         Medications:  MEDICATIONS  (STANDING):  apixaban 5 milliGRAM(s) Oral two times a day  atorvastatin 10 milliGRAM(s) Oral at bedtime  finasteride 5 milliGRAM(s) Oral daily  metoprolol tartrate 100 milliGRAM(s) Oral daily  piperacillin/tazobactam IVPB.. 3.375 Gram(s) IV Intermittent every 8 hours    MEDICATIONS  (PRN):  acetaminophen     Tablet .. 650 milliGRAM(s) Oral every 6 hours PRN Temp greater or equal to 38C (100.4F), Mild Pain (1 - 3)    Allergies:  No Known Allergies      Labs:  08-10    138  |  104  |  16  ----------------------------<  117<H>  3.8   |  20<L>  |  1.00    Ca    8.5      10 Aug 2023 07:09  Phos  3.1     08-09  Mg     1.5     08-09    TPro  5.9<L>  /  Alb  2.6<L>  /  TBili  0.8  /  DBili  x   /  AST  11  /  ALT  9<L>  /  AlkPhos  73  08-10                          11.2   16.06 )-----------( 387      ( 10 Aug 2023 07:07 )             35.8     PT/INR - ( 10 Aug 2023 07:08 )   PT: 14.2 sec;   INR: 1.36 ratio         PTT - ( 10 Aug 2023 07:08 )  PTT:35.5 sec

## 2023-08-11 NOTE — PROGRESS NOTE ADULT - SUBJECTIVE AND OBJECTIVE BOX
Patient is a 82y old  Male who presents with a chief complaint of Severe sepsis/SIRS (11 Aug 2023 13:33)        SUBJECTIVE / OVERNIGHT EVENTS: Patient had no acute events overnight. Patient seen and examined at bedside this morning. No acute complaints. No abdominal pain, nausea or emesis.     ROS: [ - ] Fever [ - ] Chills [ - ] Nausea/Vomiting [ - ] Chest Pain [ - ] Shortness of breath     Telemetry: NSR    MEDICATIONS  (STANDING):  apixaban 5 milliGRAM(s) Oral two times a day  atorvastatin 10 milliGRAM(s) Oral at bedtime  chlorhexidine 2% Cloths 1 Application(s) Topical <User Schedule>  finasteride 5 milliGRAM(s) Oral daily  metoprolol tartrate 100 milliGRAM(s) Oral daily  piperacillin/tazobactam IVPB.. 3.375 Gram(s) IV Intermittent every 8 hours    MEDICATIONS  (PRN):  acetaminophen     Tablet .. 650 milliGRAM(s) Oral every 6 hours PRN Temp greater or equal to 38C (100.4F), Mild Pain (1 - 3)      Vital Signs Last 24 Hrs  T(C): 36.6 (11 Aug 2023 04:52), Max: 36.9 (10 Aug 2023 15:44)  T(F): 97.8 (11 Aug 2023 04:52), Max: 98.4 (10 Aug 2023 15:44)  HR: 103 (11 Aug 2023 12:25) (76 - 103)  BP: 137/84 (11 Aug 2023 12:25) (120/73 - 148/67)  BP(mean): --  RR: 18 (11 Aug 2023 12:25) (18 - 18)  SpO2: 96% (11 Aug 2023 12:25) (96% - 98%)    Parameters below as of 11 Aug 2023 12:25  Patient On (Oxygen Delivery Method): room air      CAPILLARY BLOOD GLUCOSE        I&O's Summary    11 Aug 2023 07:01  -  11 Aug 2023 15:42  --------------------------------------------------------  IN: 300 mL / OUT: 200 mL / NET: 100 mL        PHYSICAL EXAM  GENERAL: NAD, lying comfortably in bed   HEENT:  Atraumatic, Normocephalic, EOMI, conjunctiva and sclera clear, no LAD  CHEST/LUNG: Clear to auscultation bilaterally; No wheeze  HEART: RRR, S1 and S2 No murmurs, rubs, or gallops  ABDOMEN: Soft, Nontender, Nondistended; Bowel sounds present  EXTREMITIES:  2+ Peripheral Pulses, No clubbing, cyanosis, or edema  NEURO: AAOx3, non-focal  SKIN: No rashes or lesions      LABS:                        11.3   11.70 )-----------( 368      ( 11 Aug 2023 07:14 )             35.2     08-11    137  |  102  |  17  ----------------------------<  101<H>  3.6   |  19<L>  |  1.01    Ca    8.6      11 Aug 2023 07:15    TPro  5.6<L>  /  Alb  2.5<L>  /  TBili  0.8  /  DBili  x   /  AST  9<L>  /  ALT  9<L>  /  AlkPhos  70  08-11    PT/INR - ( 10 Aug 2023 07:08 )   PT: 14.2 sec;   INR: 1.36 ratio         PTT - ( 10 Aug 2023 07:08 )  PTT:35.5 sec      Urinalysis Basic - ( 11 Aug 2023 07:15 )    Color: x / Appearance: x / SG: x / pH: x  Gluc: 101 mg/dL / Ketone: x  / Bili: x / Urobili: x   Blood: x / Protein: x / Nitrite: x   Leuk Esterase: x / RBC: x / WBC x   Sq Epi: x / Non Sq Epi: x / Bacteria: x          RADIOLOGY & ADDITIONAL TESTS:      Labs Personally Reviewed  Imaging Personally Reviewed  Consultant(s) Notes Reviewed

## 2023-08-11 NOTE — DISCHARGE NOTE PROVIDER - PROVIDER TOKENS
PROVIDER:[TOKEN:[[id number]],FOLLOWUP:[1 week]],PROVIDER:[TOKEN:[[id number]],FOLLOWUP:[2 weeks]],PROVIDER:[TOKEN:[[id number]],FOLLOWUP:[1 week]]

## 2023-08-11 NOTE — PROGRESS NOTE ADULT - PROBLEM SELECTOR PLAN 4
Pt with b/l LE pitting edema. Prior TTE (2014) noted evidence of diastolic dysfunction. Subsequent TTEs with indeterminate diastolic function. Follows outpatient with Vascular Cardiology, Dr. Lopez Kahn.  - strict I/Os, standing weights daily  - holding home lasix and spironolactone given hypotension from sepsis  - monitor volume status  - b/l LE duplex neg  - TTE with preserved EF

## 2023-08-11 NOTE — PROGRESS NOTE ADULT - ASSESSMENT
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 Patient has never seen a colorectal surgeon in the past. Patient with non toxic appearance, stable  and with  benign abdominal physical exam, however CT scan concerning fo r colon neoplasm c/w contained perforation.     PLAN:  - No acute surgical intervention   - Diet as tolerated   - Serial ab exams   - Surgery will follow. 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 Patient has never seen a colorectal surgeon in the past. Patient with non toxic appearance, stable  and with  benign abdominal physical exam, however CT scan concerning fo r colon neoplasm c/w contained perforation.     PLAN:  - Diet as tolerated   - Serial ab exams   - Surgery will follow. Surgical intervention as outpatient  - GI consult for inpatient colonoscopy recommended - pt prefers to perform as outpatient  - Care per primary team  - after discharge, pt to follow-up with Dr. Nam Padgett Surgery   [id number]

## 2023-08-11 NOTE — PROGRESS NOTE ADULT - SUBJECTIVE AND OBJECTIVE BOX
[facility] DIVISION OF INFECTIOUS DISEASES  KASEY Mustafa Y. Patel, S. Shah, G. [facility]  [phone]  ([phone] - weekdays after 5pm and weekends)    Name: GLADIS LEONARD  Age/Gender: 82y Male  MRN: [id number]    Interval History:  Patient seen and examined this morning.   Denies fever, pain or any new complaints.  Notes reviewed  No concerning overnight events  Afebrile   Allergies: No Known Allergies      Objective:  Vitals:   T(F): 97.8 (08-11-23 @ 04:52), Max: 98.4 (08-10-23 @ 15:44)  HR: 76 (08-11-23 @ 04:52) (76 - 91)  BP: 148/67 (08-11-23 @ 04:52) (113/72 - 148/67)  RR: 18 (08-11-23 @ 04:52) (18 - 18)  SpO2: 98% (08-11-23 @ 04:52) (97% - 98%)  Physical Examination:  General: no acute distress  HEENT: NC/AT, anicteric, neck supple  Respiratory: no acc muscle use, breathing comfortably  Cardiovascular: S1 and S2 present  Gastrointestinal: soft, nontender, nondistended  Extremities: no edema, no cyanosis  Skin: no visible rash    Laboratory Studies:  CBC:                       11.3   11.70 )-----------( 368      ( 11 Aug 2023 07:14 )             35.2     WBC Trend:  11.70 08-11-23 @ 07:14  16.06 08-10-23 @ 07:07  16.02 08-09-23 @ 06:35  18.49 08-08-23 @ 22:56    CMP: 08-11    137  |  102  |  17  ----------------------------<  101<H>  3.6   |  19<L>  |  1.01    Ca    8.6      11 Aug 2023 07:15    TPro  5.6<L>  /  Alb  2.5<L>  /  TBili  0.8  /  DBili  x   /  AST  9<L>  /  ALT  9<L>  /  AlkPhos  70  08-11    Creatinine: 1.01 mg/dL (08-11-23 @ 07:15)  Creatinine: 1.00 mg/dL (08-10-23 @ 07:09)  Creatinine: 1.06 mg/dL (08-09-23 @ 06:35)  Creatinine: 1.17 mg/dL (08-08-23 @ 22:56)      LIVER FUNCTIONS - ( 11 Aug 2023 07:15 )  Alb: 2.5 g/dL / Pro: 5.6 g/dL / ALK PHOS: 70 U/L / ALT: 9 U/L / AST: 9 U/L / GGT: x           Microbiology: reviewed   Culture - Sputum (collected 08-09-23 @ 11:22)  Source: .Sputum Sputum  Gram Stain (08-09-23 @ 20:44):    Rare Squamous epithelial cells per low power field    No polymorphonuclear leukocytes per low power field    Rare Gram Negative Rods per oil power field    Rare Gram positive cocci in pairs per oil power field  Preliminary Report (08-10-23 @ 14:49):    Normal Respiratory Milly present    Culture - Urine (collected 08-08-23 @ 22:57)  Source: Clean Catch Clean Catch (Midstream)  Final Report (08-10-23 @ 10:40):    <10,000 CFU/mL Normal Urogenital Milly    Culture - Blood (collected 08-08-23 @ 22:15)  Source: .Blood Blood-Peripheral  Preliminary Report (08-11-23 @ 01:03):    No growth at 48 Hours    Culture - Blood (collected 08-08-23 @ 22:10)  Source: .Blood Blood-Peripheral  Preliminary Report (08-11-23 @ 01:03):    No growth at 48 Hours    Radiology: reviewed     Medications:  acetaminophen     Tablet .. 650 milliGRAM(s) Oral every 6 hours PRN  apixaban 5 milliGRAM(s) Oral two times a day  atorvastatin 10 milliGRAM(s) Oral at bedtime  finasteride 5 milliGRAM(s) Oral daily  metoprolol tartrate 100 milliGRAM(s) Oral daily  piperacillin/tazobactam IVPB.. 3.375 Gram(s) IV Intermittent every 8 hours    Current Antimicrobials:  piperacillin/tazobactam IVPB.. 3.375 Gram(s) IV Intermittent every 8 hours    Prior/Completed Antimicrobials:  piperacillin/tazobactam IVPB.  piperacillin/tazobactam IVPB..  vancomycin  IVPB.   Strong peripheral pulses

## 2023-08-11 NOTE — DISCHARGE NOTE PROVIDER - HOSPITAL COURSE
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 SBP down to 80s. 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   Presented with fever to 102.5F rectal, tachycardia to 110s, RR>20, SBP down to 80s, WBC 18.49k, and lactate elevated to 3.3 initially. UA and RVP unrevealing. Concern for severe sepsis/SIRS. Chest CT neg for PNA. Found to have fluid collection (?contained perforation of colonic neoplasm)  - s/p Vanc/Zosyn and 2.5L IVF in ED with improvement of BP  - c/w empiric Zosyn (8/8/23- )   - on discharge - can transition to augmentin 875mg PO BID x14d course   - blood cultures ngtd, sputum culture neg  - see below for colon lesion  - ID recs appreciated.    New onset atrial fibrillation.   Afib confirmed on ecg. No prior hx. Does not follow with outpatient cardiology  -TTE with LV 68%  -cw home metoprolol  -CHADSvasc at least 3, started Eliquis 5mg BID    Lesion of colon.    Prior CT A/P (9/23/19) found on PACS notable for "focal asymmetric wall thickening along the medial aspect of the descending colon with adjacent soft tissue stranding and a small lymph node. Neoplasm is of concern."   -Ct ab/p shows loculated fluid collection at the descending and sigmoid colon suspicious for contained perforation of colonic neoplasm- does not want workup for colon mass as in patient follow up with surgery in 2 weeks     Chronic diastolic congestive heart failure.   Pt with b/l LE pitting edema. Prior TTE (2014) noted evidence of diastolic dysfunction. Subsequent TTEs with indeterminate diastolic function. Follows outpatient with Vascular Cardiology, Dr. Lopez Kahn.  - holding home lasix and spironolactone given hypotension from sepsis  - monitor volume status  - b/l LE duplex neg  - Echo Normal EF    Chronic obstructive pulmonary disease (COPD).   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 Former smoker, quit ~15 yrs ago.  - per pt, previously tried an inhaler (Anoro per outpt records), but he stopped it shortly after because it did not make him feel well    HTN (hypertension). - hold home antihypertensives for now given hypotension in setting of sepsis.    HLD (hyperlipidemia). c/w home atorvastatin.    Prediabetes. CC diet.    Patient medically cleared on XXX by XXXX with med rec reviewed and revised with XXX   Outpatient follow up with Dr. Connelly (ID) and Dr. Browning (Surgery) in 2 weeks and Dr. Kahn 82M w/ hx of HTN, HLD, pre-DM, BPH, COPD, ?ILD, pleural 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 SBP down to 80s. 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   Presented with fever to 102.5F rectal, tachycardia to 110s, RR>20, SBP down to 80s, WBC 18.49k, and lactate elevated to 3.3 initially. UA and RVP unrevealing. Concern for severe sepsis/SIRS. Chest CT neg for PNA. Found to have fluid collection (?contained perforation of colonic neoplasm)  - s/p Vanc/Zosyn and 2.5L IVF in ED with improvement of BP  - c/w empiric Zosyn (8/8/23- )   - on discharge - can transition to augmentin 875mg PO BID x14d course   - blood cultures ngtd, sputum culture neg  - see below for colon lesion  - ID recs appreciated.    New onset atrial fibrillation.   Afib confirmed on ecg. No prior hx. Does not follow with outpatient cardiology  -TTE with LV 68%  -cw home metoprolol  -CHADSvasc at least 3, started Eliquis 5mg BID    Lesion of colon.    Prior CT A/P (9/23/19) found on PACS notable for "focal asymmetric wall thickening along the medial aspect of the descending colon with adjacent soft tissue stranding and a small lymph node. Neoplasm is of concern."   -Ct ab/p shows loculated fluid collection at the descending and sigmoid colon suspicious for contained perforation of colonic neoplasm- does not want workup for colon mass as in patient follow up with surgery in 2 weeks     Chronic diastolic congestive heart failure.   Pt with b/l LE pitting edema. Prior TTE (2014) noted evidence of diastolic dysfunction. Subsequent TTEs with indeterminate diastolic function. Follows outpatient with Vascular Cardiology, Dr. Lopez Kahn.  - holding home lasix and spironolactone given hypotension from sepsis  - monitor volume status  - b/l LE duplex neg  - Echo Normal EF    Chronic obstructive pulmonary disease (COPD).   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 Former smoker, quit ~15 yrs ago.  - per pt, previously tried an inhaler (Anoro per outpt records), but he stopped it shortly after because it did not make him feel well    HTN (hypertension). - hold home antihypertensives for now given hypotension in setting of sepsis.  HLD (hyperlipidemia). c/w home atorvastatin.  Prediabetes. CC diet.  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    On 8/17 pt underwent Sarah's procedure. The patient tolerated the procedure well, was extubated and sent to PACU in stable condition. The patient was hemodynamically stable and was transferred to a surgical floor. The patient's pain was controlled by IV pain medications and then by PO pain medications. He was evaluated by PT who recommended [facility] after discharge.  IV antibiotics were stopped.  Pt's WBC was rising, CT scan performed 8/21 which showed no abscess or collection.  He was advanced from clears to low fiber diet and tolerated it well.  He is ambulating with assistance, is voiding, tolerating a diet, and pain is controlled with oral pain medications.  He is stable for discharge to [facility]        Patient medically cleared on XXX by XXXX with med rec reviewed and revised with XXX   Outpatient follow up with Dr. Connelly (ID) and Dr. Browning (Surgery) in 2 weeks and Dr. Kahn 82M w/ hx of HTN, HLD, pre-DM, BPH, COPD, ?ILD, pleural 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 SBP down to 80s. 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   Presented with fever to 102.5F rectal, tachycardia to 110s, RR>20, SBP down to 80s, WBC 18.49k, and lactate elevated to 3.3 initially. UA and RVP unrevealing. Concern for severe sepsis/SIRS. Chest CT neg for PNA. Found to have fluid collection (?contained perforation of colonic neoplasm)  - s/p Vanc/Zosyn and 2.5L IVF in ED with improvement of BP  - c/w empiric Zosyn (8/8/23- )   - on discharge - can transition to augmentin 875mg PO BID x14d course   - blood cultures ngtd, sputum culture neg  - see below for colon lesion  - ID recs appreciated.    New onset atrial fibrillation.   Afib confirmed on ecg. No prior hx. Does not follow with outpatient cardiology  -TTE with LV 68%  -cw home metoprolol  -CHADSvasc at least 3, started Eliquis 5mg BID    Lesion of colon.    Prior CT A/P (9/23/19) found on PACS notable for "focal asymmetric wall thickening along the medial aspect of the descending colon with adjacent soft tissue stranding and a small lymph node. Neoplasm is of concern."   -Ct ab/p shows loculated fluid collection at the descending and sigmoid colon suspicious for contained perforation of colonic neoplasm- does not want workup for colon mass as in patient follow up with surgery in 2 weeks     Chronic diastolic congestive heart failure.   Pt with b/l LE pitting edema. Prior TTE (2014) noted evidence of diastolic dysfunction. Subsequent TTEs with indeterminate diastolic function. Follows outpatient with Vascular Cardiology, Dr. Lopez Kahn.  - holding home lasix and spironolactone given hypotension from sepsis  - monitor volume status  - b/l LE duplex neg  - Echo Normal EF    Chronic obstructive pulmonary disease (COPD).   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 Former smoker, quit ~15 yrs ago.  - per pt, previously tried an inhaler (Anoro per outpt records), but he stopped it shortly after because it did not make him feel well    HTN (hypertension). - hold home antihypertensives for now given hypotension in setting of sepsis.  HLD (hyperlipidemia). c/w home atorvastatin.  Prediabetes. CC diet.  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    On 8/17 pt underwent Sarah's procedure. The patient tolerated the procedure well, was extubated and sent to PACU in stable condition. The patient was hemodynamically stable and was transferred to a surgical floor. The patient's pain was controlled by IV pain medications and then by PO pain medications. He was evaluated by PT who recommended [facility] after discharge.  IV antibiotics were stopped.  Pt's WBC was rising, CT scan performed 8/21 which showed no abscess or collection.  He was advanced from clears to low fiber diet and tolerated it well.  He is ambulating with assistance, is voiding, tolerating a diet, and pain is controlled with oral pain medications.  He is stable for discharge to [facility] and will follow-up with Dr. Browning within 1-2 weeks.

## 2023-08-11 NOTE — DISCHARGE NOTE PROVIDER - NSDCFUSCHEDAPPT_GEN_ALL_CORE_FT
[facility]  CARDIOLOGY [address]  Scheduled Appointment: 08/17/2023    Zev Thomas  [facility]  PULMMED [address]  Scheduled Appointment: 08/23/2023

## 2023-08-11 NOTE — DISCHARGE NOTE PROVIDER - CARE PROVIDERS DIRECT ADDRESSES
,DirectAddress_Unknown,[email or web address]

## 2023-08-11 NOTE — PHARMACOTHERAPY INTERVENTION NOTE - COMMENTS
Performed medication reconciliation and home medication list updated in prescription writer/ outpatient medication review. Medications verified with patient's daughter, Juani, via telephone call.    Home Medications:  atorvastatin 10 mg oral tablet: 1 tab(s) orally once a day   clotrimazole-betamethasone dipropionate 1%-0.05% topical cream: Apply topically to affected area 2 times a day in the AM and PM. according to daughter, for his private part   finasteride 5 mg oral tablet: 1 tab(s) orally once a day   furosemide 40 mg oral tablet: 1 tab(s) orally once a day   losartan 100 mg oral tablet: 1 tab(s) orally once a day   Metoprolol Tartrate 100 mg oral tablet: 1 tab(s) orally once a day   spironolactone 25 mg oral tablet: 1 tab(s) orally once a day     Meredith Alejo  PharmD Candidate of 2024  [facility]    
Counseled patient at bedside & daughter over phone on the following inpatient medications, including indication and possible side effects:    ·	Eliquis 5mg by mouth twice daily: notified that the copay will be $40/month and 1-month free trial was applied    Provided medication card in english. Patient questions and concerns were answered and addressed. Patient demonstrated understanding.    Nichelle Han, PharmD  PGY1 Pharmacy Resident  Available on Teams & [phone]

## 2023-08-11 NOTE — DISCHARGE NOTE PROVIDER - NSDCFUADDAPPT_GEN_ALL_CORE_FT
APPTS ARE READY TO BE MADE: [ ] YES    Best Family or Patient Contact (if needed):    Additional Information about above appointments (if needed):    1: ID  2: Surgery  3: PCP Dr. Thomas    Other comments or requests:

## 2023-08-12 DIAGNOSIS — R10.32 LEFT LOWER QUADRANT PAIN: ICD-10-CM

## 2023-08-12 LAB
ALBUMIN SERPL ELPH-MCNC: 2.4 G/DL — LOW (ref 3.3–5)
ALP SERPL-CCNC: 62 U/L — SIGNIFICANT CHANGE UP (ref 40–120)
ALT FLD-CCNC: 8 U/L — LOW (ref 10–45)
ANION GAP SERPL CALC-SCNC: 13 MMOL/L — SIGNIFICANT CHANGE UP (ref 5–17)
AST SERPL-CCNC: 13 U/L — SIGNIFICANT CHANGE UP (ref 10–40)
BILIRUB SERPL-MCNC: 0.6 MG/DL — SIGNIFICANT CHANGE UP (ref 0.2–1.2)
BUN SERPL-MCNC: 21 MG/DL — SIGNIFICANT CHANGE UP (ref 7–23)
CALCIUM SERPL-MCNC: 8.7 MG/DL — SIGNIFICANT CHANGE UP (ref 8.4–10.5)
CHLORIDE SERPL-SCNC: 101 MMOL/L — SIGNIFICANT CHANGE UP (ref 96–108)
CO2 SERPL-SCNC: 22 MMOL/L — SIGNIFICANT CHANGE UP (ref 22–31)
CREAT SERPL-MCNC: 0.91 MG/DL — SIGNIFICANT CHANGE UP (ref 0.5–1.3)
EGFR: 83 ML/MIN/1.73M2 — SIGNIFICANT CHANGE UP
GLUCOSE SERPL-MCNC: 125 MG/DL — HIGH (ref 70–99)
HCT VFR BLD CALC: 36.8 % — LOW (ref 39–50)
HGB BLD-MCNC: 11.5 G/DL — LOW (ref 13–17)
MCHC RBC-ENTMCNC: 27.4 PG — SIGNIFICANT CHANGE UP (ref 27–34)
MCHC RBC-ENTMCNC: 31.3 GM/DL — LOW (ref 32–36)
MCV RBC AUTO: 87.8 FL — SIGNIFICANT CHANGE UP (ref 80–100)
NRBC # BLD: 0 /100 WBCS — SIGNIFICANT CHANGE UP (ref 0–0)
PLATELET # BLD AUTO: 367 K/UL — SIGNIFICANT CHANGE UP (ref 150–400)
POTASSIUM SERPL-MCNC: 3.8 MMOL/L — SIGNIFICANT CHANGE UP (ref 3.5–5.3)
POTASSIUM SERPL-SCNC: 3.8 MMOL/L — SIGNIFICANT CHANGE UP (ref 3.5–5.3)
PROT SERPL-MCNC: 5.7 G/DL — LOW (ref 6–8.3)
RBC # BLD: 4.19 M/UL — LOW (ref 4.2–5.8)
RBC # FLD: 14.5 % — SIGNIFICANT CHANGE UP (ref 10.3–14.5)
SODIUM SERPL-SCNC: 136 MMOL/L — SIGNIFICANT CHANGE UP (ref 135–145)
WBC # BLD: 10.39 K/UL — SIGNIFICANT CHANGE UP (ref 3.8–10.5)
WBC # FLD AUTO: 10.39 K/UL — SIGNIFICANT CHANGE UP (ref 3.8–10.5)

## 2023-08-12 PROCEDURE — 74177 CT ABD & PELVIS W/CONTRAST: CPT | Mod: 26

## 2023-08-12 PROCEDURE — 99233 SBSQ HOSP IP/OBS HIGH 50: CPT

## 2023-08-12 RX ORDER — ACETAMINOPHEN 500 MG
1000 TABLET ORAL ONCE
Refills: 0 | Status: COMPLETED | OUTPATIENT
Start: 2023-08-12 | End: 2023-08-12

## 2023-08-12 RX ORDER — SPIRONOLACTONE 25 MG/1
25 TABLET, FILM COATED ORAL DAILY
Refills: 0 | Status: DISCONTINUED | OUTPATIENT
Start: 2023-08-12 | End: 2023-08-17

## 2023-08-12 RX ADMIN — Medication 100 MILLIGRAM(S): at 05:43

## 2023-08-12 RX ADMIN — APIXABAN 5 MILLIGRAM(S): 2.5 TABLET, FILM COATED ORAL at 17:54

## 2023-08-12 RX ADMIN — PIPERACILLIN AND TAZOBACTAM 25 GRAM(S): 4; .5 INJECTION, POWDER, LYOPHILIZED, FOR SOLUTION INTRAVENOUS at 12:18

## 2023-08-12 RX ADMIN — ATORVASTATIN CALCIUM 10 MILLIGRAM(S): 80 TABLET, FILM COATED ORAL at 21:57

## 2023-08-12 RX ADMIN — PIPERACILLIN AND TAZOBACTAM 25 GRAM(S): 4; .5 INJECTION, POWDER, LYOPHILIZED, FOR SOLUTION INTRAVENOUS at 17:54

## 2023-08-12 RX ADMIN — FINASTERIDE 5 MILLIGRAM(S): 5 TABLET, FILM COATED ORAL at 12:20

## 2023-08-12 RX ADMIN — Medication 650 MILLIGRAM(S): at 09:25

## 2023-08-12 RX ADMIN — PIPERACILLIN AND TAZOBACTAM 25 GRAM(S): 4; .5 INJECTION, POWDER, LYOPHILIZED, FOR SOLUTION INTRAVENOUS at 02:06

## 2023-08-12 RX ADMIN — Medication 650 MILLIGRAM(S): at 08:49

## 2023-08-12 RX ADMIN — Medication 400 MILLIGRAM(S): at 01:55

## 2023-08-12 RX ADMIN — APIXABAN 5 MILLIGRAM(S): 2.5 TABLET, FILM COATED ORAL at 05:42

## 2023-08-12 RX ADMIN — CHLORHEXIDINE GLUCONATE 1 APPLICATION(S): 213 SOLUTION TOPICAL at 06:24

## 2023-08-12 NOTE — PROGRESS NOTE ADULT - PROBLEM SELECTOR PLAN 2
Presented with fever to 102.5F rectal, tachycardia to 110s, RR>20, SBP down to 80s, WBC 18.49k, and lactate elevated to 3.3 initially. UA and RVP unrevealing. Concern for severe sepsis/SIRS. Chest CT neg for PNA. Found to have fluid collection (?contained perforation of colonic neoplasm)  - s/p Vanc/Zosyn and 2.5L IVF in ED with improvement of BP  - c/w empiric Zosyn (8/8/23- ). Will transition to augmentin for total 14d course on dc (no source control)  - monitor WBC and fever curve  - blood cultures ngtd, sputum culture neg  - see below for colon lesion  - ID recs appreciated

## 2023-08-12 NOTE — PROGRESS NOTE ADULT - PROBLEM SELECTOR PLAN 5
Pt with b/l LE pitting edema. Prior TTE (2014) noted evidence of diastolic dysfunction. Subsequent TTEs with indeterminate diastolic function. Follows outpatient with Vascular Cardiology, Dr. Lopez Kahn.  - strict I/Os, standing weights daily  - hold home lasix   - resume spironolactone  - monitor volume status  - b/l LE duplex neg  - TTE with preserved EF

## 2023-08-12 NOTE — PROGRESS NOTE ADULT - SUBJECTIVE AND OBJECTIVE BOX
DATE OF SERVICE: 08-12-23 @ 11:02    Patient is a 82y old  Male who presents with a chief complaint of Severe sepsis/SIRS (11 Aug 2023 15:42)      INTERVAL HISTORY: no complaints      REVIEW OF SYSTEMS:  CONSTITUTIONAL: No weakness  EYES/ENT: No visual changes;  No throat pain   NECK: No pain or stiffness  RESPIRATORY: No cough, wheezing; No shortness of breath  CARDIOVASCULAR: No chest pain or palpitations  GASTROINTESTINAL: No abdominal  pain. No nausea, vomiting, or hematemesis  GENITOURINARY: No dysuria, frequency or hematuria  NEUROLOGICAL: No stroke like symptoms  SKIN: No rashes        MEDICATIONS:  metoprolol tartrate 100 milliGRAM(s) Oral daily        PHYSICAL EXAM:  T(C): 36.5 (08-12-23 @ 04:19), Max: 37 (08-11-23 @ 21:00)  HR: 90 (08-12-23 @ 04:19) (90 - 103)  BP: 108/70 (08-12-23 @ 04:19) (108/70 - 137/84)  RR: 18 (08-12-23 @ 04:19) (18 - 18)  SpO2: 94% (08-12-23 @ 04:19) (94% - 96%)  Wt(kg): --  I&O's Summary    11 Aug 2023 07:01  -  12 Aug 2023 07:00  --------------------------------------------------------  IN: 300 mL / OUT: 400 mL / NET: -100 mL          Appearance: In no distress	  HEENT:    PERRL, EOMI	  Cardiovascular:  S1 S2, No JVD  Respiratory: Lungs clear to auscultation	  Gastrointestinal:  Soft, Non-tender, + BS	  Vascularature:  No edema of LE  Psychiatric: Appropriate affect   Neuro: no acute focal deficits                               11.3   11.70 )-----------( 368      ( 11 Aug 2023 07:14 )             35.2     08-11    137  |  102  |  17  ----------------------------<  101<H>  3.6   |  19<L>  |  1.01    Ca    8.6      11 Aug 2023 07:15    TPro  5.6<L>  /  Alb  2.5<L>  /  TBili  0.8  /  DBili  x   /  AST  9<L>  /  ALT  9<L>  /  AlkPhos  70  08-11        Labs personally reviewed      ASSESSMENT/PLAN: 	  82M w/ hx of HTN, HLD, pre-DM, BPH, COPD, ?ILD, pleural 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 SBP down to 80s. 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   ·  Plan: Afib confirmed on ecg. No prior hx.    Now in NSR  -TTE no LA enlargement. EF 68%  -cw home metoprolol  -CHADSvasc at least 3. Per colorectal surgery team no contraindication to AC  If no contraindication by CRS will start Eliquis 5mg BID for stroke ppx  - Start Eliquis as above     Problem/Plan - 2:  ·  Problem: Chronic diastolic congestive heart failure.   ·  Plan: TTE with preserved EF.  - holding home lasix and spironolactone given hypotension from sepsis     Problem/Plan - 3:  ·  Problem: HTN (hypertension).   ·  Plan: - hold home antihypertensives for now given hypotension in setting of sepsis.    Problem/Plan - 4:  ·  Problem: HLD (hyperlipidemia).   ·  Plan: - c/w home atorvastatin.       Discussed with OP cardiologist Dr Kahn, he will follow a OP             MARGRET Mckeon DO formerly [facility]  Cardiovascular Medicine  [address] [place] [address], [address]  Office: [phone]  Available via call/text on [employer] Teams  DATE OF SERVICE: 08-12-23 @ 11:02    Patient is a 82y old  Male who presents with a chief complaint of Severe sepsis/SIRS (11 Aug 2023 15:42)      INTERVAL HISTORY: no complaints      REVIEW OF SYSTEMS:  CONSTITUTIONAL: No weakness  EYES/ENT: No visual changes;  No throat pain   NECK: No pain or stiffness  RESPIRATORY: No cough, wheezing; No shortness of breath  CARDIOVASCULAR: No chest pain or palpitations  GASTROINTESTINAL: No abdominal  pain. No nausea, vomiting, or hematemesis  GENITOURINARY: No dysuria, frequency or hematuria  NEUROLOGICAL: No stroke like symptoms  SKIN: No rashes        MEDICATIONS:  metoprolol tartrate 100 milliGRAM(s) Oral daily        PHYSICAL EXAM:  T(C): 36.5 (08-12-23 @ 04:19), Max: 37 (08-11-23 @ 21:00)  HR: 90 (08-12-23 @ 04:19) (90 - 103)  BP: 108/70 (08-12-23 @ 04:19) (108/70 - 137/84)  RR: 18 (08-12-23 @ 04:19) (18 - 18)  SpO2: 94% (08-12-23 @ 04:19) (94% - 96%)  Wt(kg): --  I&O's Summary    11 Aug 2023 07:01  -  12 Aug 2023 07:00  --------------------------------------------------------  IN: 300 mL / OUT: 400 mL / NET: -100 mL          Appearance: In no distress	  HEENT:    PERRL, EOMI	  Cardiovascular:  S1 S2, No JVD  Respiratory: Lungs clear to auscultation	  Gastrointestinal:  Soft, Non-tender, + BS	  Vascularature:  No edema of LE  Psychiatric: Appropriate affect   Neuro: no acute focal deficits                               11.3   11.70 )-----------( 368      ( 11 Aug 2023 07:14 )             35.2     08-11    137  |  102  |  17  ----------------------------<  101<H>  3.6   |  19<L>  |  1.01    Ca    8.6      11 Aug 2023 07:15    TPro  5.6<L>  /  Alb  2.5<L>  /  TBili  0.8  /  DBili  x   /  AST  9<L>  /  ALT  9<L>  /  AlkPhos  70  08-11        Labs personally reviewed      ASSESSMENT/PLAN: 	  82M w/ hx of HTN, HLD, pre-DM, BPH, COPD, ?ILD, pleural 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 SBP down to 80s. 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   ·  Plan: Afib confirmed on ecg. No prior hx.    Now in NSR  -TTE no LA enlargement. EF 68%  -cw home metoprolol  -CHADSvasc at least 3. Per colorectal surgery team no contraindication to AC  If no contraindication by CRS will start Eliquis 5mg BID for stroke ppx  - Started Eliquis as above     Problem/Plan - 2:  ·  Problem: Chronic diastolic congestive heart failure.   ·  Plan: TTE with preserved EF.  - holding home lasix and spironolactone given hypotension from sepsis     Problem/Plan - 3:  ·  Problem: HTN (hypertension).   ·  Plan: - hold home antihypertensives for now given hypotension in setting of sepsis.    Problem/Plan - 4:  ·  Problem: HLD (hyperlipidemia).   ·  Plan: - c/w home atorvastatin.       Discussed with OP cardiologist Dr Kahn, he will follow a OP             MARGRET Mckeon DO [facility]  Cardiovascular Medicine  [address] [place] [address], [address]  Office: [phone]  Available via call/text on [employer] Teams

## 2023-08-12 NOTE — PROGRESS NOTE ADULT - PROBLEM SELECTOR PLAN 4
Prior CT A/P (9/23/19) found on PACS notable for "focal asymmetric wall thickening along the medial aspect of the descending colon with adjacent soft tissue stranding and a small lymph node. Neoplasm   is of concern."   -Ct ab/p shows loculated fluid collection at the descending and sigmoid colon suspicious for contained perforation of colonic neoplasm.

## 2023-08-13 PROCEDURE — 99233 SBSQ HOSP IP/OBS HIGH 50: CPT

## 2023-08-13 RX ORDER — SENNA PLUS 8.6 MG/1
2 TABLET ORAL ONCE
Refills: 0 | Status: COMPLETED | OUTPATIENT
Start: 2023-08-13 | End: 2023-08-13

## 2023-08-13 RX ORDER — POLYETHYLENE GLYCOL 3350 17 G/17G
17 POWDER, FOR SOLUTION ORAL DAILY
Refills: 0 | Status: DISCONTINUED | OUTPATIENT
Start: 2023-08-13 | End: 2023-08-13

## 2023-08-13 RX ORDER — FUROSEMIDE 40 MG
40 TABLET ORAL DAILY
Refills: 0 | Status: DISCONTINUED | OUTPATIENT
Start: 2023-08-13 | End: 2023-08-17

## 2023-08-13 RX ADMIN — Medication 100 MILLIGRAM(S): at 05:27

## 2023-08-13 RX ADMIN — APIXABAN 5 MILLIGRAM(S): 2.5 TABLET, FILM COATED ORAL at 19:34

## 2023-08-13 RX ADMIN — POLYETHYLENE GLYCOL 3350 17 GRAM(S): 17 POWDER, FOR SOLUTION ORAL at 11:27

## 2023-08-13 RX ADMIN — PIPERACILLIN AND TAZOBACTAM 25 GRAM(S): 4; .5 INJECTION, POWDER, LYOPHILIZED, FOR SOLUTION INTRAVENOUS at 11:30

## 2023-08-13 RX ADMIN — SENNA PLUS 2 TABLET(S): 8.6 TABLET ORAL at 01:43

## 2023-08-13 RX ADMIN — Medication 40 MILLIGRAM(S): at 19:33

## 2023-08-13 RX ADMIN — CHLORHEXIDINE GLUCONATE 1 APPLICATION(S): 213 SOLUTION TOPICAL at 05:48

## 2023-08-13 RX ADMIN — SPIRONOLACTONE 25 MILLIGRAM(S): 25 TABLET, FILM COATED ORAL at 05:28

## 2023-08-13 RX ADMIN — PIPERACILLIN AND TAZOBACTAM 25 GRAM(S): 4; .5 INJECTION, POWDER, LYOPHILIZED, FOR SOLUTION INTRAVENOUS at 01:44

## 2023-08-13 RX ADMIN — ATORVASTATIN CALCIUM 10 MILLIGRAM(S): 80 TABLET, FILM COATED ORAL at 22:06

## 2023-08-13 RX ADMIN — FINASTERIDE 5 MILLIGRAM(S): 5 TABLET, FILM COATED ORAL at 11:27

## 2023-08-13 RX ADMIN — APIXABAN 5 MILLIGRAM(S): 2.5 TABLET, FILM COATED ORAL at 05:28

## 2023-08-13 RX ADMIN — PIPERACILLIN AND TAZOBACTAM 25 GRAM(S): 4; .5 INJECTION, POWDER, LYOPHILIZED, FOR SOLUTION INTRAVENOUS at 22:06

## 2023-08-13 NOTE — PROGRESS NOTE ADULT - NSPROGADDITIONALINFOA_GEN_ALL_CORE
Likely dispo tomorrow. F/u colorectal surgery on AC use for pAfib. D/w with ID    Updated daughter at bedside.     The necessity of the time spent during the encounter on this date of service was due to:   - Ordering, reviewing, and interpreting labs, testing, and imaging.  - Independently obtaining a review of systems and performing a physical exam  - Reviewing prior hospitalization and where necessary, outpatient records.  - Counselling and educating patient and family regarding interpretation of aforementioned items and plan of care.    Time Spent __ minutes    Noreen Charles  Division of Hospital Medicine  Available on Microsoft Teams
Repeat CT ab/P for new LLQ abdominal pain.      The necessity of the time spent during the encounter on this date of service was due to:   - Ordering, reviewing, and interpreting labs, testing, and imaging.  - Independently obtaining a review of systems and performing a physical exam  - Reviewing prior hospitalization and where necessary, outpatient records.  - Counselling and educating patient and family regarding interpretation of aforementioned items and plan of care.    Time Spent 40 minutes    Noreen Charles  Division of Hospital Medicine  Available on Microsoft Teams
Start Eliquis 5mg BID for afib. Monitor on telemetry. TTE ordered. Continue with zosyn. Patient opting for outpatient eval for colon neoplasm. Send CEA and CA 19-9. Updated both daughters at bedside      The necessity of the time spent during the encounter on this date of service was due to:   - Ordering, reviewing, and interpreting labs, testing, and imaging.  - Independently obtaining a review of systems and performing a physical exam  - Reviewing prior hospitalization and where necessary, outpatient records.  - Counselling and educating patient and family regarding interpretation of aforementioned items and plan of care.    Time Spent 40 minutes    Noreen Charles  Division of Hospital Medicine  Available on Microsoft Teams
F/u colorectal surgery on increased collection concerned for perforated colonic neoplasm with new LLQ ab pain. Continue with zosyn. Updated daughter.       The necessity of the time spent during the encounter on this date of service was due to:   - Ordering, reviewing, and interpreting labs, testing, and imaging.  - Independently obtaining a review of systems and performing a physical exam  - Reviewing prior hospitalization and where necessary, outpatient records.  - Counselling and educating patient and family regarding interpretation of aforementioned items and plan of care.    Time Spent 40 minutes    Noreen Charles  Division of Hospital Medicine  Available on Microsoft Teams

## 2023-08-13 NOTE — PROGRESS NOTE ADULT - SUBJECTIVE AND OBJECTIVE BOX
DATE OF SERVICE: 08-13-23 @ 12:05    Patient is a 82y old  Male who presents with a chief complaint of Severe sepsis/SIRS (12 Aug 2023 13:38)      INTERVAL HISTORY: no complaints     REVIEW OF SYSTEMS:  CONSTITUTIONAL: No weakness  EYES/ENT: No visual changes;  No throat pain   NECK: No pain or stiffness  RESPIRATORY: No cough, wheezing; No shortness of breath  CARDIOVASCULAR: No chest pain or palpitations  GASTROINTESTINAL: No abdominal  pain. No nausea, vomiting, or hematemesis  GENITOURINARY: No dysuria, frequency or hematuria  NEUROLOGICAL: No stroke like symptoms  SKIN: No rashes      	  MEDICATIONS:  metoprolol tartrate 100 milliGRAM(s) Oral daily  spironolactone 25 milliGRAM(s) Oral daily        PHYSICAL EXAM:  T(C): 36.3 (08-13-23 @ 11:33), Max: 36.9 (08-12-23 @ 20:58)  HR: 66 (08-13-23 @ 11:33) (66 - 97)  BP: 118/78 (08-13-23 @ 11:33) (118/78 - 136/77)  RR: 18 (08-13-23 @ 11:33) (18 - 18)  SpO2: 96% (08-13-23 @ 11:33) (92% - 96%)  Wt(kg): --  I&O's Summary    12 Aug 2023 07:01  -  13 Aug 2023 07:00  --------------------------------------------------------  IN: 0 mL / OUT: 401 mL / NET: -401 mL    13 Aug 2023 07:01  -  13 Aug 2023 12:05  --------------------------------------------------------  IN: 200 mL / OUT: 0 mL / NET: 200 mL          Appearance: In no distress	  HEENT:    PERRL, EOMI	  Cardiovascular:  S1 S2, No JVD  Respiratory: Lungs clear to auscultation	  Gastrointestinal:  Soft, Non-tender, + BS	  Vascularature:  No edema of LE  Psychiatric: Appropriate affect   Neuro: no acute focal deficits                               11.5   10.39 )-----------( 367      ( 12 Aug 2023 12:08 )             36.8     08-12    136  |  101  |  21  ----------------------------<  125<H>  3.8   |  22  |  0.91    Ca    8.7      12 Aug 2023 12:08    TPro  5.7<L>  /  Alb  2.4<L>  /  TBili  0.6  /  DBili  x   /  AST  13  /  ALT  8<L>  /  AlkPhos  62  08-12        Labs personally reviewed      ASSESSMENT/PLAN: 	    82M w/ hx of HTN, HLD, pre-DM, BPH, COPD, ?ILD, pleural 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 SBP down to 80s. 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   ·  Plan: Afib confirmed on ecg. No prior hx.    Now in NSR  -TTE no LA enlargement. EF 68%  -cw home metoprolol  -CHADSvasc at least 3. Per colorectal surgery team no contraindication to AC  If no contraindication by CRS will start Eliquis 5mg BID for stroke ppx  - Started Eliquis as above     Problem/Plan - 2:  ·  Problem: Chronic diastolic congestive heart failure.   ·  Plan: TTE with preserved EF.  - holding home lasix and spironolactone given hypotension from sepsis     Problem/Plan - 3:  ·  Problem: HTN (hypertension).   ·  Plan: - hold home antihypertensives for now given hypotension in setting of sepsis.    Problem/Plan - 4:  ·  Problem: HLD (hyperlipidemia).   ·  Plan: - c/w home atorvastatin.       Discussed with OP cardiologist Dr Kahn, he will follow a OP             MARGRET Mckeon DO [facility]  Cardiovascular Medicine  [address], [address]  Office: [phone]  Available via call/text on [employer] Teams

## 2023-08-13 NOTE — PROGRESS NOTE ADULT - SUBJECTIVE AND OBJECTIVE BOX
Patient is a 82y old  Male who presents with a chief complaint of Severe sepsis/SIRS (13 Aug 2023 12:04)        SUBJECTIVE / OVERNIGHT EVENTS: Patient had no acute events overnight. Patient seen and examined at bedside this morning. Endorses worsen LLQ abdominal pain from yesterday. Reports constipation. Small BM yesterday     ROS: [ - ] Fever [ - ] Chills [ - ] Nausea/Vomiting [ - ] Chest Pain [ - ] Shortness of breath     MEDICATIONS  (STANDING):  apixaban 5 milliGRAM(s) Oral two times a day  atorvastatin 10 milliGRAM(s) Oral at bedtime  chlorhexidine 2% Cloths 1 Application(s) Topical <User Schedule>  finasteride 5 milliGRAM(s) Oral daily  metoprolol tartrate 100 milliGRAM(s) Oral daily  piperacillin/tazobactam IVPB.. 3.375 Gram(s) IV Intermittent every 8 hours  spironolactone 25 milliGRAM(s) Oral daily    MEDICATIONS  (PRN):  acetaminophen     Tablet .. 650 milliGRAM(s) Oral every 6 hours PRN Temp greater or equal to 38C (100.4F), Mild Pain (1 - 3)  polyethylene glycol 3350 17 Gram(s) Oral daily PRN Constipation      Vital Signs Last 24 Hrs  T(C): 36.3 (13 Aug 2023 11:33), Max: 36.9 (12 Aug 2023 20:58)  T(F): 97.3 (13 Aug 2023 11:33), Max: 98.4 (12 Aug 2023 20:58)  HR: 66 (13 Aug 2023 11:33) (66 - 97)  BP: 118/78 (13 Aug 2023 11:33) (118/78 - 136/77)  BP(mean): --  RR: 18 (13 Aug 2023 11:33) (18 - 18)  SpO2: 96% (13 Aug 2023 11:33) (92% - 96%)    Parameters below as of 13 Aug 2023 11:33  Patient On (Oxygen Delivery Method): room air      CAPILLARY BLOOD GLUCOSE        I&O's Summary    12 Aug 2023 07:01  -  13 Aug 2023 07:00  --------------------------------------------------------  IN: 0 mL / OUT: 401 mL / NET: -401 mL    13 Aug 2023 07:01  -  13 Aug 2023 15:57  --------------------------------------------------------  IN: 200 mL / OUT: 0 mL / NET: 200 mL        PHYSICAL EXAM  GENERAL: NAD, lying comfortably in bed   HEENT:  Atraumatic, Normocephalic, EOMI, conjunctiva and sclera clear, no LAD  CHEST/LUNG: Clear to auscultation bilaterally; No wheeze  HEART: irregularly irregular, S1 and S2 No murmurs, rubs, or gallops  ABDOMEN: Soft, LLQ tenderness no rebound, Nondistended; Bowel sounds present  EXTREMITIES:  2+ Peripheral Pulses, No clubbing, cyanosis, or edema  NEURO: AAOx3, non-focal  SKIN: No rashes or lesions    LABS:                        11.5   10.39 )-----------( 367      ( 12 Aug 2023 12:08 )             36.8     08-12    136  |  101  |  21  ----------------------------<  125<H>  3.8   |  22  |  0.91    Ca    8.7      12 Aug 2023 12:08    TPro  5.7<L>  /  Alb  2.4<L>  /  TBili  0.6  /  DBili  x   /  AST  13  /  ALT  8<L>  /  AlkPhos  62  08-12          Urinalysis Basic - ( 12 Aug 2023 12:08 )    Color: x / Appearance: x / SG: x / pH: x  Gluc: 125 mg/dL / Ketone: x  / Bili: x / Urobili: x   Blood: x / Protein: x / Nitrite: x   Leuk Esterase: x / RBC: x / WBC x   Sq Epi: x / Non Sq Epi: x / Bacteria: x          RADIOLOGY & ADDITIONAL TESTS:  < from: CT Abdomen and Pelvis w/ IV Cont (08.12.23 @ 21:46) >  IMPRESSION:  Findings in the descending colon are again suspicious for perforated   neoplasm with slight interval increase in size of adjacent collection.  No pneumoperitoneum.    --- End of Report ---      < end of copied text >      Labs Personally Reviewed  Imaging Personally Reviewed  Consultant(s) Notes Reviewed

## 2023-08-13 NOTE — PROGRESS NOTE ADULT - SUBJECTIVE AND OBJECTIVE BOX
[facility] Daily Resident Progress Note    OVERNIGHT:  No acute events.     SUBJECTIVE: Pt seen and examined at bedside. Notes one day history of LLQ abdominal pain. No N/V. Passing Gas. BM yesterday, but none today. At the onset of abdominal pain, the patient received a CT which was measured as an increase in size of the collection. Vitals stable during this window.    OBJECTIVE:  Vital Signs Last 24 Hrs  T(C): 36.9 (10 Aug 2023 15:44), Max: 36.9 (10 Aug 2023 15:44)  T(F): 98.4 (10 Aug 2023 15:44), Max: 98.4 (10 Aug 2023 15:44)  HR: 91 (10 Aug 2023 15:44) (86 - 95)  BP: 120/73 (10 Aug 2023 15:44) (113/72 - 127/68)  BP(mean): --  RR: 18 (10 Aug 2023 15:44) (18 - 18)  SpO2: 97% (10 Aug 2023 15:44) (97% - 97%)    Parameters below as of 10 Aug 2023 15:44  Patient On (Oxygen Delivery Method): room air      Physical Exam:  Constitutional:  NAD  ENMT: normal facies, symmetric  Respiratory: Normal respiratory effort   Abdomen: soft, ND. No rebound or guarding. LLQ non-radiating pain on light palpation        Medications:  MEDICATIONS  (STANDING):  apixaban 5 milliGRAM(s) Oral two times a day  atorvastatin 10 milliGRAM(s) Oral at bedtime  finasteride 5 milliGRAM(s) Oral daily  metoprolol tartrate 100 milliGRAM(s) Oral daily  piperacillin/tazobactam IVPB.. 3.375 Gram(s) IV Intermittent every 8 hours    MEDICATIONS  (PRN):  acetaminophen     Tablet .. 650 milliGRAM(s) Oral every 6 hours PRN Temp greater or equal to 38C (100.4F), Mild Pain (1 - 3)    Allergies:  No Known Allergies      Labs:  08-10    138  |  104  |  16  ----------------------------<  117<H>  3.8   |  20<L>  |  1.00    Ca    8.5      10 Aug 2023 07:09  Phos  3.1     08-09  Mg     1.5     08-09    TPro  5.9<L>  /  Alb  2.6<L>  /  TBili  0.8  /  DBili  x   /  AST  11  /  ALT  9<L>  /  AlkPhos  73  08-10                          11.2   16.06 )-----------( 387      ( 10 Aug 2023 07:07 )             35.8     PT/INR - ( 10 Aug 2023 07:08 )   PT: 14.2 sec;   INR: 1.36 ratio         PTT - ( 10 Aug 2023 07:08 )  PTT:35.5 sec

## 2023-08-13 NOTE — PROGRESS NOTE ADULT - PROBLEM SELECTOR PLAN 4
Prior CT A/P (9/23/19) found on PACS notable for "focal asymmetric wall thickening along the medial aspect of the descending colon with adjacent soft tissue stranding and a small lymph node. Neoplasm   is of concern."   -Ct ab/p shows loculated fluid collection at the descending and sigmoid colon suspicious for contained perforation of colonic neoplasm  -f/u colorectal surgery on increased collection on repeat CT ab/P  -CEA and Ca 19 negative

## 2023-08-13 NOTE — PROGRESS NOTE ADULT - PROBLEM SELECTOR PLAN 5
Pt with b/l LE pitting edema. Prior TTE (2014) noted evidence of diastolic dysfunction. Subsequent TTEs with indeterminate diastolic function. Follows outpatient with Vascular Cardiology, Dr. Lopez Kahn.  - strict I/Os, standing weights daily  - restart lasix home dose (2+ pitting edema LE)  - resume spironolactone  - monitor volume status  - b/l LE duplex neg  - TTE with preserved EF

## 2023-08-13 NOTE — PROGRESS NOTE ADULT - ASSESSMENT
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 SBP down to 80s. Concern for severe sepsis/SIRS of uncertain etiology found to have possible contained perforation of colonic neoplasm complicated with new pAfib. Developed new LLQ pain with slight increased for perforated colonic lesion

## 2023-08-14 DIAGNOSIS — K63.89 OTHER SPECIFIED DISEASES OF INTESTINE: ICD-10-CM

## 2023-08-14 PROBLEM — E04.1 NONTOXIC SINGLE THYROID NODULE: Chronic | Status: ACTIVE | Noted: 2023-08-09

## 2023-08-14 PROBLEM — Z86.79 PERSONAL HISTORY OF OTHER DISEASES OF THE CIRCULATORY SYSTEM: Chronic | Status: ACTIVE | Noted: 2023-08-09

## 2023-08-14 PROBLEM — J43.9 EMPHYSEMA, UNSPECIFIED: Chronic | Status: ACTIVE | Noted: 2023-08-09

## 2023-08-14 PROBLEM — I87.2 VENOUS INSUFFICIENCY (CHRONIC) (PERIPHERAL): Chronic | Status: ACTIVE | Noted: 2023-08-09

## 2023-08-14 PROBLEM — R91.1 SOLITARY PULMONARY NODULE: Chronic | Status: ACTIVE | Noted: 2023-08-09

## 2023-08-14 PROBLEM — I77.819 AORTIC ECTASIA, UNSPECIFIED SITE: Chronic | Status: ACTIVE | Noted: 2023-08-09

## 2023-08-14 LAB
ALBUMIN SERPL ELPH-MCNC: 2.1 G/DL — LOW (ref 3.3–5)
ALP SERPL-CCNC: 58 U/L — SIGNIFICANT CHANGE UP (ref 40–120)
ALT FLD-CCNC: 12 U/L — SIGNIFICANT CHANGE UP (ref 10–45)
ANION GAP SERPL CALC-SCNC: 13 MMOL/L — SIGNIFICANT CHANGE UP (ref 5–17)
APTT BLD: 41.6 SEC — HIGH (ref 24.5–35.6)
AST SERPL-CCNC: 17 U/L — SIGNIFICANT CHANGE UP (ref 10–40)
BILIRUB SERPL-MCNC: 0.4 MG/DL — SIGNIFICANT CHANGE UP (ref 0.2–1.2)
BUN SERPL-MCNC: 26 MG/DL — HIGH (ref 7–23)
CALCIUM SERPL-MCNC: 8.7 MG/DL — SIGNIFICANT CHANGE UP (ref 8.4–10.5)
CHLORIDE SERPL-SCNC: 102 MMOL/L — SIGNIFICANT CHANGE UP (ref 96–108)
CO2 SERPL-SCNC: 22 MMOL/L — SIGNIFICANT CHANGE UP (ref 22–31)
CREAT SERPL-MCNC: 0.91 MG/DL — SIGNIFICANT CHANGE UP (ref 0.5–1.3)
CULTURE RESULTS: SIGNIFICANT CHANGE UP
CULTURE RESULTS: SIGNIFICANT CHANGE UP
EGFR: 83 ML/MIN/1.73M2 — SIGNIFICANT CHANGE UP
GLUCOSE SERPL-MCNC: 115 MG/DL — HIGH (ref 70–99)
HCT VFR BLD CALC: 31.3 % — LOW (ref 39–50)
HCT VFR BLD CALC: 32.2 % — LOW (ref 39–50)
HGB BLD-MCNC: 10 G/DL — LOW (ref 13–17)
HGB BLD-MCNC: 10.4 G/DL — LOW (ref 13–17)
MCHC RBC-ENTMCNC: 28.1 PG — SIGNIFICANT CHANGE UP (ref 27–34)
MCHC RBC-ENTMCNC: 28.6 PG — SIGNIFICANT CHANGE UP (ref 27–34)
MCHC RBC-ENTMCNC: 31.9 GM/DL — LOW (ref 32–36)
MCHC RBC-ENTMCNC: 32.3 GM/DL — SIGNIFICANT CHANGE UP (ref 32–36)
MCV RBC AUTO: 87.9 FL — SIGNIFICANT CHANGE UP (ref 80–100)
MCV RBC AUTO: 88.5 FL — SIGNIFICANT CHANGE UP (ref 80–100)
NRBC # BLD: 0 /100 WBCS — SIGNIFICANT CHANGE UP (ref 0–0)
NRBC # BLD: 0 /100 WBCS — SIGNIFICANT CHANGE UP (ref 0–0)
PLATELET # BLD AUTO: 404 K/UL — HIGH (ref 150–400)
PLATELET # BLD AUTO: 405 K/UL — HIGH (ref 150–400)
POTASSIUM SERPL-MCNC: 3.6 MMOL/L — SIGNIFICANT CHANGE UP (ref 3.5–5.3)
POTASSIUM SERPL-SCNC: 3.6 MMOL/L — SIGNIFICANT CHANGE UP (ref 3.5–5.3)
PROT SERPL-MCNC: 5.6 G/DL — LOW (ref 6–8.3)
RBC # BLD: 3.56 M/UL — LOW (ref 4.2–5.8)
RBC # BLD: 3.64 M/UL — LOW (ref 4.2–5.8)
RBC # FLD: 14.2 % — SIGNIFICANT CHANGE UP (ref 10.3–14.5)
RBC # FLD: 14.4 % — SIGNIFICANT CHANGE UP (ref 10.3–14.5)
SODIUM SERPL-SCNC: 137 MMOL/L — SIGNIFICANT CHANGE UP (ref 135–145)
SPECIMEN SOURCE: SIGNIFICANT CHANGE UP
SPECIMEN SOURCE: SIGNIFICANT CHANGE UP
WBC # BLD: 12.25 K/UL — HIGH (ref 3.8–10.5)
WBC # BLD: 13.37 K/UL — HIGH (ref 3.8–10.5)
WBC # FLD AUTO: 12.25 K/UL — HIGH (ref 3.8–10.5)
WBC # FLD AUTO: 13.37 K/UL — HIGH (ref 3.8–10.5)

## 2023-08-14 PROCEDURE — 99231 SBSQ HOSP IP/OBS SF/LOW 25: CPT | Mod: GC

## 2023-08-14 PROCEDURE — 99233 SBSQ HOSP IP/OBS HIGH 50: CPT

## 2023-08-14 RX ORDER — HEPARIN SODIUM 5000 [USP'U]/ML
6500 INJECTION INTRAVENOUS; SUBCUTANEOUS EVERY 6 HOURS
Refills: 0 | Status: DISCONTINUED | OUTPATIENT
Start: 2023-08-14 | End: 2023-08-15

## 2023-08-14 RX ORDER — HEPARIN SODIUM 5000 [USP'U]/ML
6500 INJECTION INTRAVENOUS; SUBCUTANEOUS ONCE
Refills: 0 | Status: COMPLETED | OUTPATIENT
Start: 2023-08-14 | End: 2023-08-14

## 2023-08-14 RX ORDER — METOPROLOL TARTRATE 50 MG
50 TABLET ORAL
Refills: 0 | Status: DISCONTINUED | OUTPATIENT
Start: 2023-08-14 | End: 2023-08-17

## 2023-08-14 RX ORDER — HEPARIN SODIUM 5000 [USP'U]/ML
3000 INJECTION INTRAVENOUS; SUBCUTANEOUS EVERY 6 HOURS
Refills: 0 | Status: DISCONTINUED | OUTPATIENT
Start: 2023-08-14 | End: 2023-08-15

## 2023-08-14 RX ORDER — HEPARIN SODIUM 5000 [USP'U]/ML
INJECTION INTRAVENOUS; SUBCUTANEOUS
Qty: 25000 | Refills: 0 | Status: DISCONTINUED | OUTPATIENT
Start: 2023-08-14 | End: 2023-08-15

## 2023-08-14 RX ADMIN — PIPERACILLIN AND TAZOBACTAM 25 GRAM(S): 4; .5 INJECTION, POWDER, LYOPHILIZED, FOR SOLUTION INTRAVENOUS at 11:23

## 2023-08-14 RX ADMIN — Medication 50 MILLIGRAM(S): at 17:07

## 2023-08-14 RX ADMIN — PIPERACILLIN AND TAZOBACTAM 25 GRAM(S): 4; .5 INJECTION, POWDER, LYOPHILIZED, FOR SOLUTION INTRAVENOUS at 22:56

## 2023-08-14 RX ADMIN — Medication 100 MILLIGRAM(S): at 06:43

## 2023-08-14 RX ADMIN — Medication 40 MILLIGRAM(S): at 06:44

## 2023-08-14 RX ADMIN — SPIRONOLACTONE 25 MILLIGRAM(S): 25 TABLET, FILM COATED ORAL at 06:43

## 2023-08-14 RX ADMIN — APIXABAN 5 MILLIGRAM(S): 2.5 TABLET, FILM COATED ORAL at 06:43

## 2023-08-14 RX ADMIN — ATORVASTATIN CALCIUM 10 MILLIGRAM(S): 80 TABLET, FILM COATED ORAL at 22:56

## 2023-08-14 RX ADMIN — CHLORHEXIDINE GLUCONATE 1 APPLICATION(S): 213 SOLUTION TOPICAL at 06:43

## 2023-08-14 RX ADMIN — FINASTERIDE 5 MILLIGRAM(S): 5 TABLET, FILM COATED ORAL at 11:23

## 2023-08-14 RX ADMIN — PIPERACILLIN AND TAZOBACTAM 25 GRAM(S): 4; .5 INJECTION, POWDER, LYOPHILIZED, FOR SOLUTION INTRAVENOUS at 06:42

## 2023-08-14 RX ADMIN — HEPARIN SODIUM 1500 UNIT(S)/HR: 5000 INJECTION INTRAVENOUS; SUBCUTANEOUS at 17:06

## 2023-08-14 RX ADMIN — HEPARIN SODIUM 6500 UNIT(S): 5000 INJECTION INTRAVENOUS; SUBCUTANEOUS at 17:07

## 2023-08-14 NOTE — PROGRESS NOTE ADULT - SUBJECTIVE AND OBJECTIVE BOX
General Surgery Progress Note    Overnight: No acute events.    Subjective: Patient resting in bed.      Objective:  Vitals:  T(C): 36.7 (08-13-23 @ 20:41), Max: 36.7 (08-13-23 @ 03:48)  HR: 92 (08-13-23 @ 20:41) (66 - 93)  BP: 126/80 (08-13-23 @ 20:41) (118/78 - 126/80)  RR: 18 (08-13-23 @ 20:41) (18 - 18)  SpO2: 94% (08-13-23 @ 20:41) (92% - 96%)  Wt(kg): --    08-12 @ 07:01  -  08-13 @ 07:00  --------------------------------------------------------  IN:  Total IN: 0 mL    OUT:    Stool (mL): 1 mL    Voided (mL): 400 mL  Total OUT: 401 mL    Total NET: -401 mL      08-13 @ 07:01  -  08-14 @ 00:36  --------------------------------------------------------  IN:    Oral Fluid: 200 mL  Total IN: 200 mL    OUT:    Stool (mL): 1 mL  Total OUT: 1 mL    Total NET: 199 mL        Physical Exam:  Constitutional:  NAD  ENMT: normal facies, symmetric  Respiratory: Normal respiratory effort   Abdomen: soft, ND. No rebound or guarding. LLQ non-radiating pain on light palpation      Labs:                        11.5   10.39 )-----------( 367      ( 12 Aug 2023 12:08 )             36.8     08-12    136  |  101  |  21  ----------------------------<  125<H>  3.8   |  22  |  0.91    Ca    8.7      12 Aug 2023 12:08    TPro  5.7<L>  /  Alb  2.4<L>  /  TBili  0.6  /  DBili  x   /  AST  13  /  ALT  8<L>  /  AlkPhos  62  08-12    LIVER FUNCTIONS - ( 12 Aug 2023 12:08 )  Alb: 2.4 g/dL / Pro: 5.7 g/dL / ALK PHOS: 62 U/L / ALT: 8 U/L / AST: 13 U/L / GGT: x             Urinalysis Basic - ( 12 Aug 2023 12:08 )    Color: x / Appearance: x / SG: x / pH: x  Gluc: 125 mg/dL / Ketone: x  / Bili: x / Urobili: x   Blood: x / Protein: x / Nitrite: x   Leuk Esterase: x / RBC: x / WBC x   Sq Epi: x / Non Sq Epi: x / Bacteria: x General Surgery Progress Note    Overnight: No acute events.    Subjective: Patient resting in bed.  Reports abdominal pain similar to yesterday. Tolerating clears with no nausea or vomiting. Pain not associated with PO intake.     Objective:  Vitals:  T(C): 36.7 (08-13-23 @ 20:41), Max: 36.7 (08-13-23 @ 03:48)  HR: 92 (08-13-23 @ 20:41) (66 - 93)  BP: 126/80 (08-13-23 @ 20:41) (118/78 - 126/80)  RR: 18 (08-13-23 @ 20:41) (18 - 18)  SpO2: 94% (08-13-23 @ 20:41) (92% - 96%)  Wt(kg): --    08-12 @ 07:01  -  08-13 @ 07:00  --------------------------------------------------------  IN:  Total IN: 0 mL    OUT:    Stool (mL): 1 mL    Voided (mL): 400 mL  Total OUT: 401 mL    Total NET: -401 mL      08-13 @ 07:01  -  08-14 @ 00:36  --------------------------------------------------------  IN:    Oral Fluid: 200 mL  Total IN: 200 mL    OUT:    Stool (mL): 1 mL  Total OUT: 1 mL    Total NET: 199 mL        Physical Exam:  Constitutional:  NAD  Respiratory: Normal respiratory effort   Abdomen: soft, ND. No rebound or guarding. LLQ TTP.       Labs:                        11.5   10.39 )-----------( 367      ( 12 Aug 2023 12:08 )             36.8     08-12    136  |  101  |  21  ----------------------------<  125<H>  3.8   |  22  |  0.91    Ca    8.7      12 Aug 2023 12:08    TPro  5.7<L>  /  Alb  2.4<L>  /  TBili  0.6  /  DBili  x   /  AST  13  /  ALT  8<L>  /  AlkPhos  62  08-12    LIVER FUNCTIONS - ( 12 Aug 2023 12:08 )  Alb: 2.4 g/dL / Pro: 5.7 g/dL / ALK PHOS: 62 U/L / ALT: 8 U/L / AST: 13 U/L / GGT: x             Urinalysis Basic - ( 12 Aug 2023 12:08 )    Color: x / Appearance: x / SG: x / pH: x  Gluc: 125 mg/dL / Ketone: x  / Bili: x / Urobili: x   Blood: x / Protein: x / Nitrite: x   Leuk Esterase: x / RBC: x / WBC x   Sq Epi: x / Non Sq Epi: x / Bacteria: x

## 2023-08-14 NOTE — PROGRESS NOTE ADULT - SUBJECTIVE AND OBJECTIVE BOX
[facility] Division of Hospital Medicine  Minh Sterling MD, GREGORY  I'm reachable on Microsoft Teams   Off hours:  [phone] ([facility] pager)    Patient is a 82y old  Male who presents with a chief complaint of Severe sepsis/SIRS (14 Aug 2023 09:48)      SUBJECTIVE / OVERNIGHT EVENTS:  No events overnight. C/o worsening abdominal pain in the LLQ that starts to radiate as the do goes on.     MEDICATIONS  (STANDING):  atorvastatin 10 milliGRAM(s) Oral at bedtime  chlorhexidine 2% Cloths 1 Application(s) Topical <User Schedule>  finasteride 5 milliGRAM(s) Oral daily  furosemide    Tablet 40 milliGRAM(s) Oral daily  heparin   Injectable 6500 Unit(s) IV Push once  heparin  Infusion.  Unit(s)/Hr (15 mL/Hr) IV Continuous <Continuous>  metoprolol tartrate 50 milliGRAM(s) Oral two times a day  piperacillin/tazobactam IVPB.. 3.375 Gram(s) IV Intermittent every 8 hours  spironolactone 25 milliGRAM(s) Oral daily    MEDICATIONS  (PRN):  acetaminophen     Tablet .. 650 milliGRAM(s) Oral every 6 hours PRN Temp greater or equal to 38C (100.4F), Mild Pain (1 - 3)  heparin   Injectable 6500 Unit(s) IV Push every 6 hours PRN For aPTT less than 40  heparin   Injectable 3000 Unit(s) IV Push every 6 hours PRN For aPTT between 40 - 57    CAPILLARY BLOOD GLUCOSE        I&O's Summary    13 Aug 2023 07:01  -  14 Aug 2023 07:00  --------------------------------------------------------  IN: 200 mL / OUT: 251 mL / NET: -51 mL    14 Aug 2023 07:01  -  14 Aug 2023 12:45  --------------------------------------------------------  IN: 360 mL / OUT: 661 mL / NET: -301 mL        PHYSICAL EXAM:  Vital Signs Last 24 Hrs  T(C): 36.5 (14 Aug 2023 11:44), Max: 36.7 (13 Aug 2023 20:41)  T(F): 97.7 (14 Aug 2023 11:44), Max: 98.1 (13 Aug 2023 20:41)  HR: 82 (14 Aug 2023 11:44) (82 - 100)  BP: 111/72 (14 Aug 2023 11:44) (111/72 - 139/83)  BP(mean): --  RR: 18 (14 Aug 2023 11:44) (18 - 18)  SpO2: 96% (14 Aug 2023 11:44) (94% - 96%)    Parameters below as of 14 Aug 2023 11:44  Patient On (Oxygen Delivery Method): room air      CONSTITUTIONAL: elderly man, NAD, well-developed  EYES:  EOMI, conjunctiva and sclera clear  ENMT: Moist oral mucosa  NECK: Supple, no JVD  RESPIRATORY: Normal respiratory effort; lungs are clear to auscultation bilaterally  CARDIOVASCULAR: Regular rate and rhythm, normal S1 and S2, no murmur/rub/gallop; No lower extremity edema  ABDOMEN: decreased BS, soft, LLQ tender to palpation, no distension   MUSCULOSKELETAL:  no clubbing or cyanosis of digits; no joint swelling or tenderness to palpation  PSYCH: A+O x3; affect appropriate, calm and cooperative  NEUROLOGY: CN 2-12 are intact and symmetric; no gross sensory deficits     LABS:                        10.4   12.25 )-----------( 404      ( 14 Aug 2023 07:13 )             32.2     08-14    137  |  102  |  26<H>  ----------------------------<  115<H>  3.6   |  22  |  0.91    Ca    8.7      14 Aug 2023 07:11    TPro  5.6<L>  /  Alb  2.1<L>  /  TBili  0.4  /  DBili  x   /  AST  17  /  ALT  12  /  AlkPhos  58  08-14          Urinalysis Basic - ( 14 Aug 2023 07:11 )    Color: x / Appearance: x / SG: x / pH: x  Gluc: 115 mg/dL / Ketone: x  / Bili: x / Urobili: x   Blood: x / Protein: x / Nitrite: x   Leuk Esterase: x / RBC: x / WBC x   Sq Epi: x / Non Sq Epi: x / Bacteria: x

## 2023-08-14 NOTE — PROGRESS NOTE ADULT - ASSESSMENT
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 Patient admitted for severe sepsis vs SIRS.     Severe sepsis - fever, tachycardia, leukocytosis with lactic acidosis   Likely due to contained perforation of colonic neoplasm  Ruled out bacteremia; sepsis resolved   New onset Afib, now on eliquis   - CT with focal mural thickening and associated loculated fluid collection at the junction of the descending and sigmoid colon suspicious for contained perforation of colonic neoplasm, progressed since 2019. Has atelectasis reported, no pneumonia.   - repeat CTAP with perforated neoplasm and slight interval increased in size of adjacent collection   GI following, no plan for colonoscopy at this time   - RVP negative, Scx negative   - MRSA PCR screen negative, off vancomycin    - Bcx NGTD x      Recommendations:  CRS following - noted patient now amenable to surgery as inpt- possible Sarah's procedure Th  Continue on pip-tazo for now   Monitor temps/CBC  Continue rest of care per primary team      Doris Connelly M.D.  [facility], Division of Infectious Diseases  [phone]  After 5pm on weekdays and all day on weekends - please call [phone]

## 2023-08-14 NOTE — PROGRESS NOTE ADULT - PROBLEM SELECTOR PLAN 5
Pt with b/l LE pitting edema. Prior TTE (2014) noted evidence of diastolic dysfunction. Subsequent TTEs with indeterminate diastolic function. Follows outpatient with Vascular Cardiology, Dr. Lopez Kahn.  - strict I/Os, standing weights daily  - continue lasix 40mg daily - home dose   - continue spironolactone 25mg daily  - continue metoprolol 50mg BID  - monitor volume status  - b/l LE duplex neg  - TTE with preserved EF

## 2023-08-14 NOTE — PROGRESS NOTE ADULT - ASSESSMENT
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 Patient has never seen a colorectal surgeon in the past. Patient with non toxic appearance, stable  and with  benign abdominal physical exam, however CT scan concerning fo r colon neoplasm c/w contained perforation.     PLAN:  - Reduce to CLD in the setting of abdominal pain   - Serial ab exams   - WBC decreasing over last three days. Recommend trending WBC. Continue medical management of sepsis  - Patient now amenable to colonoscopy inpatient. Recommend reconsulting GI  - Care per primary team  - after discharge, pt to follow-up with Dr. Browning   - No indication for acute surgical intervention      [facility]   [id number]    82M w/ hx of HTN, HLD, pre-DM, BPH, COPD, ILD, pleural plaques 2/2 asbestosis, former smoker, colonic lesion (?neoplasm), pulm nodule, thyroid nodule, diastolic dysfunction, aortic ectasia, cellulitis, presenting with generalized weakness, fevers, and chills x1 day. 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 Patient has never seen a colorectal surgeon in the past. Patient with non toxic appearance, stable  and with  benign abdominal physical exam, however CT scan concerning fo r colon neoplasm c/w contained perforation.     PLAN:  - Continue clears as tolerated   - Patient now amenable to surgery inpatient. Plan for possible Sarah's procedure on Thursday pending OR availability   - Serial abdominal exams   - Please discontinue eliquis and switch to heparin gtt if patient needs anticoagulation inpatient       [facility]   [id number]    82M w/ hx of HTN, HLD, pre-DM, BPH, COPD, ILD, pleural plaques 2/2 asbestosis, former smoker, colonic lesion (?neoplasm), pulm nodule, thyroid nodule, diastolic dysfunction, aortic ectasia, cellulitis, presenting with generalized weakness, fevers, and chills x1 day. 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 Patient has never seen a colorectal surgeon in the past. Patient with non toxic appearance, stable  and with  benign abdominal physical exam, however CT scan concerning for colon mass c/w contained perforation.     PLAN:  - Continue clears as tolerated   - Patient now amenable to surgery inpatient. Plan for possible Sarah's procedure on Thursday pending OR availability given persistent abdominal pain and now tenderness on exam   - Serial abdominal exams   - Please discontinue eliquis and switch to heparin gtt if patient needs anticoagulation inpatient       [facility]   [id number]

## 2023-08-14 NOTE — PROGRESS NOTE ADULT - SUBJECTIVE AND OBJECTIVE BOX
[facility] DISEASES  KASEY Mustafa Y. Patel, S. Shah, G. Gustavo  [phone]  ([phone] - weekdays after 5pm and weekends)    Name: GLADIS LEONARD  Age/Gender: 82y Male  MRN: [id number]    Interval History:  Patient seen and examined this morning.   No new complaints noted.  Notes reviewed  No concerning overnight events  Afebrile   Allergies: No Known Allergies      Objective:  Vitals:   T(F): 97.7 (08-14-23 @ 04:52), Max: 98.1 (08-13-23 @ 20:41)  HR: 100 (08-14-23 @ 04:52) (66 - 100)  BP: 139/83 (08-14-23 @ 04:52) (118/78 - 139/83)  RR: 18 (08-14-23 @ 04:52) (18 - 18)  SpO2: 95% (08-14-23 @ 04:52) (94% - 96%)  Physical Examination:  General: no acute distress  HEENT: NC/AT, anicteric, neck supple  Respiratory: no acc muscle use, breathing comfortably  Cardiovascular: S1 and S2 present  Gastrointestinal: nondistended, LLQ TTP  Extremities: no edema, no cyanosis  Skin: no visible rash    Laboratory Studies:  CBC:                       10.4   12.25 )-----------( 404      ( 14 Aug 2023 07:13 )             32.2     WBC Trend:  12.25 08-14-23 @ 07:13  10.39 08-12-23 @ 12:08  11.70 08-11-23 @ 07:14  16.06 08-10-23 @ 07:07  16.02 08-09-23 @ 06:35  18.49 08-08-23 @ 22:56    CMP: 08-14    137  |  102  |  26<H>  ----------------------------<  115<H>  3.6   |  22  |  0.91    Ca    8.7      14 Aug 2023 07:11    TPro  5.6<L>  /  Alb  2.1<L>  /  TBili  0.4  /  DBili  x   /  AST  17  /  ALT  12  /  AlkPhos  58  08-14    Creatinine: 0.91 mg/dL (08-14-23 @ 07:11)  Creatinine: 0.91 mg/dL (08-12-23 @ 12:08)  Creatinine: 1.01 mg/dL (08-11-23 @ 07:15)  Creatinine: 1.00 mg/dL (08-10-23 @ 07:09)  Creatinine: 1.06 mg/dL (08-09-23 @ 06:35)  Creatinine: 1.17 mg/dL (08-08-23 @ 22:56)    LIVER FUNCTIONS - ( 14 Aug 2023 07:11 )  Alb: 2.1 g/dL / Pro: 5.6 g/dL / ALK PHOS: 58 U/L / ALT: 12 U/L / AST: 17 U/L / GGT: x           Microbiology: reviewed   Culture - Sputum (collected 08-09-23 @ 11:22)  Source: .Sputum Sputum  Gram Stain (08-09-23 @ 20:44):    Rare Squamous epithelial cells per low power field    No polymorphonuclear leukocytes per low power field    Rare Gram Negative Rods per oil power field    Rare Gram positive cocci in pairs per oil power field  Final Report (08-11-23 @ 21:22):    Normal Respiratory Milly present    Culture - Urine (collected 08-08-23 @ 22:57)  Source: Clean Catch Clean Catch (Midstream)  Final Report (08-10-23 @ 10:40):    <10,000 CFU/mL Normal Urogenital Milly    Culture - Blood (collected 08-08-23 @ 22:15)  Source: .Blood Blood-Peripheral  Final Report (08-14-23 @ 02:00):    No growth at 5 days    Culture - Blood (collected 08-08-23 @ 22:10)  Source: .Blood Blood-Peripheral  Final Report (08-14-23 @ 02:00):    No growth at 5 days    Radiology: reviewed     Medications:  acetaminophen     Tablet .. 650 milliGRAM(s) Oral every 6 hours PRN  atorvastatin 10 milliGRAM(s) Oral at bedtime  chlorhexidine 2% Cloths 1 Application(s) Topical <User Schedule>  finasteride 5 milliGRAM(s) Oral daily  furosemide    Tablet 40 milliGRAM(s) Oral daily  heparin   Injectable 6500 Unit(s) IV Push every 6 hours PRN  heparin   Injectable 3000 Unit(s) IV Push every 6 hours PRN  heparin   Injectable 6500 Unit(s) IV Push once  heparin  Infusion.  Unit(s)/Hr IV Continuous <Continuous>  metoprolol tartrate 100 milliGRAM(s) Oral daily  piperacillin/tazobactam IVPB.. 3.375 Gram(s) IV Intermittent every 8 hours  spironolactone 25 milliGRAM(s) Oral daily    Current Antimicrobials:  piperacillin/tazobactam IVPB.. 3.375 Gram(s) IV Intermittent every 8 hours    Prior/Completed Antimicrobials:  piperacillin/tazobactam IVPB.  piperacillin/tazobactam IVPB..  vancomycin  IVPB.

## 2023-08-14 NOTE — PROGRESS NOTE ADULT - PROBLEM SELECTOR PLAN 2
Presented with fever to 102.5F rectal, tachycardia to 110s, RR>20, SBP down to 80s, WBC 18.49k, and lactate elevated to 3.3 initially. UA and RVP unrevealing. Concern for severe sepsis/SIRS. Chest CT neg for PNA. Found to have fluid collection (?contained perforation of colonic neoplasm)  - c/w empiric Zosyn (8/8/23- )  - monitor WBC and fever curve  - blood cultures ngtd, sputum culture neg  - ID recs appreciated  - Surgical plan as above (source control).

## 2023-08-14 NOTE — PROGRESS NOTE ADULT - SUBJECTIVE AND OBJECTIVE BOX
DATE OF SERVICE: 08-14-23 @ 13:56    Patient is a 82y old  Male who presents with a chief complaint of Severe sepsis/SIRS (14 Aug 2023 12:45)      INTERVAL HISTORY: Feels well    REVIEW OF SYSTEMS:  CONSTITUTIONAL: No weakness  EYES/ENT: No visual changes;  No throat pain   NECK: No pain or stiffness  RESPIRATORY: No cough, wheezing; No shortness of breath  CARDIOVASCULAR: No chest pain or palpitations  GASTROINTESTINAL: No abdominal  pain. No nausea, vomiting, or hematemesis  GENITOURINARY: No dysuria, frequency or hematuria  NEUROLOGICAL: No stroke like symptoms  SKIN: No rashes    TELEMETRY Personally reviewed: AFIB   	  MEDICATIONS:  furosemide    Tablet 40 milliGRAM(s) Oral daily  metoprolol tartrate 50 milliGRAM(s) Oral two times a day  spironolactone 25 milliGRAM(s) Oral daily        PHYSICAL EXAM:  T(C): 36.5 (08-14-23 @ 11:44), Max: 36.7 (08-13-23 @ 20:41)  HR: 82 (08-14-23 @ 11:44) (82 - 100)  BP: 111/72 (08-14-23 @ 11:44) (111/72 - 139/83)  RR: 18 (08-14-23 @ 11:44) (18 - 18)  SpO2: 96% (08-14-23 @ 11:44) (94% - 96%)  Wt(kg): --  I&O's Summary    13 Aug 2023 07:01  -  14 Aug 2023 07:00  --------------------------------------------------------  IN: 200 mL / OUT: 251 mL / NET: -51 mL    14 Aug 2023 07:01  -  14 Aug 2023 13:56  --------------------------------------------------------  IN: 360 mL / OUT: 661 mL / NET: -301 mL          Appearance: In no distress	  HEENT:    PERRL, EOMI	  Cardiovascular:  S1 S2, No JVD  Respiratory: Lungs clear to auscultation	  Gastrointestinal:  Soft, Non-tender, + BS	  Vascularature:  No edema of LE  Psychiatric: Appropriate affect   Neuro: no acute focal deficits                               10.4   12.25 )-----------( 404      ( 14 Aug 2023 07:13 )             32.2     08-14    137  |  102  |  26<H>  ----------------------------<  115<H>  3.6   |  22  |  0.91    Ca    8.7      14 Aug 2023 07:11    TPro  5.6<L>  /  Alb  2.1<L>  /  TBili  0.4  /  DBili  x   /  AST  17  /  ALT  12  /  AlkPhos  58  08-14        Labs personally reviewed      ASSESSMENT/PLAN: 	  82M w/ hx of HTN, HLD, pre-DM, BPH, COPD, ?ILD, pleural 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 SBP down to 80s. 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   ·  Plan: Afib confirmed on ecg. No prior hx.    Now in NSR  -TTE no LA enlargement. EF 68%  -cw home metoprolol  -CHADSvasc at least 3. Per colorectal surgery team no contraindication to AC  If no contraindication by CRS will start Eliquis 5mg BID for stroke ppx  - Started Eliquis as above     Problem/Plan - 2:  ·  Problem: Chronic diastolic congestive heart failure.   ·  Plan: TTE with preserved EF.  - Continue Furosemide 40mg daily  - Continue Cameron 25mg daily      Problem/Plan - 3:  ·  Problem: HTN (hypertension).   ·  Plan: - Metoprolol 50mg PO BID    Problem/Plan - 4:  ·  Problem: HLD (hyperlipidemia).   ·  Plan: - c/w home atorvastatin.      Discussed with OP cardiologist Dr Kahn, he will follow a OP          Dorothy Lama, JOSÉ Ballesteros, DO [facility]  Cardiovascular Medicine  [address] [place] [address], [address]  Available via call or text on [employer]  Office: [phone]

## 2023-08-15 DIAGNOSIS — K92.2 GASTROINTESTINAL HEMORRHAGE, UNSPECIFIED: ICD-10-CM

## 2023-08-15 LAB
APTT BLD: 51.7 SEC — HIGH (ref 24.5–35.6)
BLD GP AB SCN SERPL QL: NEGATIVE — SIGNIFICANT CHANGE UP
HCT VFR BLD CALC: 24.3 % — LOW (ref 39–50)
HCT VFR BLD CALC: 27.1 % — LOW (ref 39–50)
HGB BLD-MCNC: 7.7 G/DL — LOW (ref 13–17)
HGB BLD-MCNC: 8.7 G/DL — LOW (ref 13–17)
MCHC RBC-ENTMCNC: 27.8 PG — SIGNIFICANT CHANGE UP (ref 27–34)
MCHC RBC-ENTMCNC: 28.5 PG — SIGNIFICANT CHANGE UP (ref 27–34)
MCHC RBC-ENTMCNC: 31.7 GM/DL — LOW (ref 32–36)
MCHC RBC-ENTMCNC: 32.1 GM/DL — SIGNIFICANT CHANGE UP (ref 32–36)
MCV RBC AUTO: 87.7 FL — SIGNIFICANT CHANGE UP (ref 80–100)
MCV RBC AUTO: 88.9 FL — SIGNIFICANT CHANGE UP (ref 80–100)
NRBC # BLD: 0 /100 WBCS — SIGNIFICANT CHANGE UP (ref 0–0)
NRBC # BLD: 0 /100 WBCS — SIGNIFICANT CHANGE UP (ref 0–0)
PLATELET # BLD AUTO: 377 K/UL — SIGNIFICANT CHANGE UP (ref 150–400)
PLATELET # BLD AUTO: 384 K/UL — SIGNIFICANT CHANGE UP (ref 150–400)
RBC # BLD: 2.77 M/UL — LOW (ref 4.2–5.8)
RBC # BLD: 3.05 M/UL — LOW (ref 4.2–5.8)
RBC # FLD: 14.3 % — SIGNIFICANT CHANGE UP (ref 10.3–14.5)
RBC # FLD: 14.3 % — SIGNIFICANT CHANGE UP (ref 10.3–14.5)
RH IG SCN BLD-IMP: POSITIVE — SIGNIFICANT CHANGE UP
WBC # BLD: 11.75 K/UL — HIGH (ref 3.8–10.5)
WBC # BLD: 14.27 K/UL — HIGH (ref 3.8–10.5)
WBC # FLD AUTO: 11.75 K/UL — HIGH (ref 3.8–10.5)
WBC # FLD AUTO: 14.27 K/UL — HIGH (ref 3.8–10.5)

## 2023-08-15 PROCEDURE — 99233 SBSQ HOSP IP/OBS HIGH 50: CPT

## 2023-08-15 PROCEDURE — 99232 SBSQ HOSP IP/OBS MODERATE 35: CPT | Mod: GC

## 2023-08-15 RX ORDER — PANTOPRAZOLE SODIUM 20 MG/1
8 TABLET, DELAYED RELEASE ORAL
Qty: 80 | Refills: 0 | Status: DISCONTINUED | OUTPATIENT
Start: 2023-08-15 | End: 2023-08-17

## 2023-08-15 RX ORDER — PANTOPRAZOLE SODIUM 20 MG/1
80 TABLET, DELAYED RELEASE ORAL ONCE
Refills: 0 | Status: COMPLETED | OUTPATIENT
Start: 2023-08-15 | End: 2023-08-15

## 2023-08-15 RX ADMIN — HEPARIN SODIUM 1700 UNIT(S)/HR: 5000 INJECTION INTRAVENOUS; SUBCUTANEOUS at 07:41

## 2023-08-15 RX ADMIN — PANTOPRAZOLE SODIUM 80 MILLIGRAM(S): 20 TABLET, DELAYED RELEASE ORAL at 11:09

## 2023-08-15 RX ADMIN — PIPERACILLIN AND TAZOBACTAM 25 GRAM(S): 4; .5 INJECTION, POWDER, LYOPHILIZED, FOR SOLUTION INTRAVENOUS at 22:56

## 2023-08-15 RX ADMIN — PANTOPRAZOLE SODIUM 10 MG/HR: 20 TABLET, DELAYED RELEASE ORAL at 20:18

## 2023-08-15 RX ADMIN — Medication 50 MILLIGRAM(S): at 18:22

## 2023-08-15 RX ADMIN — PANTOPRAZOLE SODIUM 10 MG/HR: 20 TABLET, DELAYED RELEASE ORAL at 11:08

## 2023-08-15 RX ADMIN — PIPERACILLIN AND TAZOBACTAM 25 GRAM(S): 4; .5 INJECTION, POWDER, LYOPHILIZED, FOR SOLUTION INTRAVENOUS at 11:10

## 2023-08-15 RX ADMIN — FINASTERIDE 5 MILLIGRAM(S): 5 TABLET, FILM COATED ORAL at 11:09

## 2023-08-15 RX ADMIN — ATORVASTATIN CALCIUM 10 MILLIGRAM(S): 80 TABLET, FILM COATED ORAL at 22:56

## 2023-08-15 RX ADMIN — HEPARIN SODIUM 3000 UNIT(S): 5000 INJECTION INTRAVENOUS; SUBCUTANEOUS at 07:42

## 2023-08-15 RX ADMIN — Medication 50 MILLIGRAM(S): at 06:46

## 2023-08-15 RX ADMIN — SPIRONOLACTONE 25 MILLIGRAM(S): 25 TABLET, FILM COATED ORAL at 06:46

## 2023-08-15 RX ADMIN — PIPERACILLIN AND TAZOBACTAM 25 GRAM(S): 4; .5 INJECTION, POWDER, LYOPHILIZED, FOR SOLUTION INTRAVENOUS at 06:45

## 2023-08-15 RX ADMIN — Medication 40 MILLIGRAM(S): at 06:46

## 2023-08-15 RX ADMIN — CHLORHEXIDINE GLUCONATE 1 APPLICATION(S): 213 SOLUTION TOPICAL at 06:46

## 2023-08-15 NOTE — PROGRESS NOTE ADULT - ASSESSMENT
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 Patient has never seen a colorectal surgeon in the past. Patient with non toxic appearance, stable  and with  benign abdominal physical exam, however CT scan concerning for colon mass c/w contained perforation.     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 Patient has never seen a colorectal surgeon in the past. Patient with non toxic appearance, stable  and with  benign abdominal physical exam, however CT scan concerning for colon mass c/w contained perforation.     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 Patient has never seen a colorectal surgeon in the past. Patient with non toxic appearance, stable  and with  benign abdominal physical exam, however CT scan concerning for colon mass c/w contained perforation.     PLAN:  - Continue clears as tolerated until OR  - OR on Thursday (8/17) for ERP Laparoscopic Left Colectomy   - Serial abdominal exams   - heparin gtt for AC while inpatient, hold Wednesday at 7 pm.       [facility]   [id number]

## 2023-08-15 NOTE — CONSULT NOTE ADULT - ATTENDING COMMENTS
Agree with above. Patient with known colon thickening suspicious of mass in 2019 on CT but has not had any workup or endoscopic evaluation for it. He is now admitted for sepsis likely from localized perforation from colonic neoplasm, increased in size from previous, as well as new onset a fib. Given acute sepsis and localized perforation, colonoscopy at this time is contraindicated. Asked patient if he would want definitive surgery/treatment for suspected colon cancer, he states he wants to wait until his family comes in from [facility][1] in 12 days to decided. Would treat sepsis, give antibiotics, and treat the localized perforation medically for now. If he does end up wanting definitive treatment, would offer colonoscopy in 4-6 weeks once hopefully localized perforation/collection has closed.
Melena  ddx new upper gi bleed such as stress ulcer  Can not rule out bleeding from colonic source  rec CT bao  Patient declines elective EGD at this time  Agrees to reevaluation in the morning, sooner as needed  Continue clears PO  NPO after midnight

## 2023-08-15 NOTE — PROGRESS NOTE ADULT - SUBJECTIVE AND OBJECTIVE BOX
General Surgery Progress Note    Overnight: No acute events.    Subjective: Patient resting in bed. Denies flatus/Bowel movement. Pain well controlled.     Objective:  Vitals:  T(C): 37 (08-14-23 @ 20:52), Max: 37 (08-14-23 @ 20:52)  HR: 87 (08-14-23 @ 20:52) (82 - 100)  BP: 107/67 (08-14-23 @ 20:52) (107/67 - 139/83)  RR: 18 (08-14-23 @ 20:52) (18 - 18)  SpO2: 94% (08-14-23 @ 20:52) (94% - 96%)  Wt(kg): --    08-13 @ 07:01  -  08-14 @ 07:00  --------------------------------------------------------  IN:    Oral Fluid: 200 mL  Total IN: 200 mL    OUT:    Stool (mL): 1 mL    Voided (mL): 250 mL  Total OUT: 251 mL    Total NET: -51 mL      08-14 @ 07:01  -  08-15 @ 01:40  --------------------------------------------------------  IN:    Oral Fluid: 600 mL  Total IN: 600 mL    OUT:    Voided (mL): 910 mL  Total OUT: 910 mL    Total NET: -310 mL          Physical Exam:  General: WN/WD NAD  Neurology: A&Ox3, nonfocal, follows commands  Eyes: PERRLA/ EOMI  ENT/Neck: Neck supple, trachea midline, No JVD  Respiratory: CTA B/L, No wheezing, rales, rhonchi  CV: Normal rate regular rhythm, S1S2, no murmurs, rubs or gallops  Abdominal: Soft, NT, ND +BS,   Extremities: No edema, + peripheral pulses  Skin: No Rashes, Hematoma, Ecchymosis  Incisions:   Tubes:      Labs:                        10.0   13.37 )-----------( 405      ( 14 Aug 2023 23:17 )             31.3     08-14    137  |  102  |  26<H>  ----------------------------<  115<H>  3.6   |  22  |  0.91    Ca    8.7      14 Aug 2023 07:11    TPro  5.6<L>  /  Alb  2.1<L>  /  TBili  0.4  /  DBili  x   /  AST  17  /  ALT  12  /  AlkPhos  58  08-14    LIVER FUNCTIONS - ( 14 Aug 2023 07:11 )  Alb: 2.1 g/dL / Pro: 5.6 g/dL / ALK PHOS: 58 U/L / ALT: 12 U/L / AST: 17 U/L / GGT: x           PTT - ( 14 Aug 2023 23:17 )  PTT:51.7 sec  Urinalysis Basic - ( 14 Aug 2023 07:11 )    Color: x / Appearance: x / SG: x / pH: x  Gluc: 115 mg/dL / Ketone: x  / Bili: x / Urobili: x   Blood: x / Protein: x / Nitrite: x   Leuk Esterase: x / RBC: x / WBC x   Sq Epi: x / Non Sq Epi: x / Bacteria: x General Surgery Progress Note    Overnight: No acute events.    Subjective: Patient resting in bed. Reports multiple liquid BM after taking a stool softener. Tolerating clears with no pain. Overall abdominal pain remains unchanged.     Objective:  Vitals:  T(C): 37 (08-14-23 @ 20:52), Max: 37 (08-14-23 @ 20:52)  HR: 87 (08-14-23 @ 20:52) (82 - 100)  BP: 107/67 (08-14-23 @ 20:52) (107/67 - 139/83)  RR: 18 (08-14-23 @ 20:52) (18 - 18)  SpO2: 94% (08-14-23 @ 20:52) (94% - 96%)  Wt(kg): --    08-13 @ 07:01  -  08-14 @ 07:00  --------------------------------------------------------  IN:    Oral Fluid: 200 mL  Total IN: 200 mL    OUT:    Stool (mL): 1 mL    Voided (mL): 250 mL  Total OUT: 251 mL    Total NET: -51 mL      08-14 @ 07:01  -  08-15 @ 01:40  --------------------------------------------------------  IN:    Oral Fluid: 600 mL  Total IN: 600 mL    OUT:    Voided (mL): 910 mL  Total OUT: 910 mL    Total NET: -310 mL          Physical exam:  General; NAD  Respiratory: nonlabored breathing  Abdomen: soft, ND, LLQ TTP. No rebound or guarding  Extremities: WWP       Labs:                        10.0   13.37 )-----------( 405      ( 14 Aug 2023 23:17 )             31.3     08-14    137  |  102  |  26<H>  ----------------------------<  115<H>  3.6   |  22  |  0.91    Ca    8.7      14 Aug 2023 07:11    TPro  5.6<L>  /  Alb  2.1<L>  /  TBili  0.4  /  DBili  x   /  AST  17  /  ALT  12  /  AlkPhos  58  08-14    LIVER FUNCTIONS - ( 14 Aug 2023 07:11 )  Alb: 2.1 g/dL / Pro: 5.6 g/dL / ALK PHOS: 58 U/L / ALT: 12 U/L / AST: 17 U/L / GGT: x           PTT - ( 14 Aug 2023 23:17 )  PTT:51.7 sec  Urinalysis Basic - ( 14 Aug 2023 07:11 )    Color: x / Appearance: x / SG: x / pH: x  Gluc: 115 mg/dL / Ketone: x  / Bili: x / Urobili: x   Blood: x / Protein: x / Nitrite: x   Leuk Esterase: x / RBC: x / WBC x   Sq Epi: x / Non Sq Epi: x / Bacteria: x

## 2023-08-15 NOTE — CONSULT NOTE ADULT - SUBJECTIVE AND OBJECTIVE BOX
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 In ED, was found to be febrile with leukocytosis and elevated lactate, CT A/P w/ IV contrast showed wall thickening, fat stranding, and associated mesenteric edema at the junction of the sigmoid colon with descending colon with adjacent loculated collection with air and fluid measuring 4 x 2 cm, suggestive of contained perforation of colonic neoplasm. Patient stated he had a colonoscopy some time ago (he says it was about 10 years ago) and was not told about any remarkable finding; but no documentation to confirm c-scope findings. GI was initially consulted on 8/10 for consideration of colonoscopy, however colonoscopy was contraindicated given suspected colonic perforation. Patient was treated with IV zosyn, blood and urine cultures were negative, chest imaging negative for PNA, colonic perforation presumed to be source of sepsis. Colorectal surgery following, planning for laparoscopic L colectomy this Thursday 8/17. Course complicated by new afib, was started on eliquis then transitioned to heparin gtt 8/14. This AM (8/15), episode of melena reported by RN. Additionally, Hb 8.7 (10 yesterday 8/14, 11s previously). Protonix gtt started, heparin gtt stopped, GI reconsulted for suspected upper GI bleed.    Patient seen and examined at bedside today. Denies abdominal pain, nausea/vomiting, ***.     Allergies:  No Known Allergies      Home Medications:    Hospital Medications:  acetaminophen     Tablet .. 650 milliGRAM(s) Oral every 6 hours PRN  atorvastatin 10 milliGRAM(s) Oral at bedtime  chlorhexidine 2% Cloths 1 Application(s) Topical <User Schedule>  finasteride 5 milliGRAM(s) Oral daily  furosemide    Tablet 40 milliGRAM(s) Oral daily  metoprolol tartrate 50 milliGRAM(s) Oral two times a day  pantoprazole Infusion 8 mG/Hr IV Continuous <Continuous>  piperacillin/tazobactam IVPB.. 3.375 Gram(s) IV Intermittent every 8 hours  spironolactone 25 milliGRAM(s) Oral daily      PMHX/PSHX:  Asbestosis (ICD9 501)    HTN (Hypertension)    BPH (Benign Prostatic Hypertrophy)    Dyslipidemia    Localized Osteoarthrosis, Lower Leg    Obesity    COPD with emphysema    Pulmonary nodule    Thyroid nodule    Aortic ectasia    History of diastolic dysfunction    Venous insufficiency    S/P Cystoscopy (ICD9 V45.89)    Hyperlipidemia (ICD9 272.4)    Localized Osteoarthrosis, Lower Leg    S/P Arthroscopy of Left Knee    Cataract    Inguinal Hernia x2    History of Tonsillectomy    History of appendectomy        Family history:  No pertinent family history in first degree relatives        Social History:     ROS:     General:  No wt loss, fevers, chills, night sweats, fatigue,   Eyes:  Good vision, no reported pain  ENT:  No sore throat, pain, runny nose, dysphagia  CV:  No pain, palpitations, hypo/hypertension  Resp:  No dyspnea, cough, tachypnea, wheezing  GI:  No pain, No nausea, No vomiting, No diarrhea, No constipation, No weight loss, No fever, No pruritis, No rectal bleeding, No tarry stools, No dysphagia,  :  No pain, bleeding, incontinence, nocturia  Muscle:  No pain, weakness  Neuro:  No weakness, tingling, memory problems  Psych:  No fatigue, insomnia, mood problems, depression  Endocrine:  No polyuria, polydipsia, cold/heat intolerance  Heme:  No petechiae, ecchymosis, easy bruisability  Skin:  No rash, tattoos, scars, edema      PHYSICAL EXAM:     GENERAL:  Appears stated age, well-groomed, well-nourished, no distress  HEENT:  NC/AT,  conjunctivae clear and pink, no thyromegaly, nodules, adenopathy, no JVD, sclera -anicteric  CHEST:  Full & symmetric excursion, no increased effort, breath sounds clear  HEART:  Regular rhythm, S1, S2, no murmur/rub/S3/S4, no abdominal bruit, no edema  ABDOMEN: Soft, LUQ and LUQ TTP, mildly distended, no palpable masses   EXTEREMITIES:  no cyanosis,clubbing or edema  SKIN:  No rash/erythema/ecchymoses/petechiae/wounds/abscess/warm/dry  NEURO:  Alert, oriented, no asterixis, no tremor, no encephalopathy    Vital Signs:  Vital Signs Last 24 Hrs  T(C): 36.7 (15 Aug 2023 12:09), Max: 37 (14 Aug 2023 20:52)  T(F): 98.1 (15 Aug 2023 12:09), Max: 98.6 (14 Aug 2023 20:52)  HR: 93 (15 Aug 2023 12:09) (87 - 111)  BP: 102/69 (15 Aug 2023 12:09) (102/69 - 117/72)  BP(mean): --  RR: 18 (15 Aug 2023 12:09) (18 - 20)  SpO2: 97% (15 Aug 2023 12:09) (94% - 97%)    Parameters below as of 15 Aug 2023 12:09  Patient On (Oxygen Delivery Method): room air      Daily     Daily     LABS:                        8.7    14.27 )-----------( 377      ( 15 Aug 2023 09:12 )             27.1     Mean Cell Volume: 88.9 fl (08-15-23 @ 09:12)    08-14    137  |  102  |  26<H>  ----------------------------<  115<H>  3.6   |  22  |  0.91    Ca    8.7      14 Aug 2023 07:11    TPro  5.6<L>  /  Alb  2.1<L>  /  TBili  0.4  /  DBili  x   /  AST  17  /  ALT  12  /  AlkPhos  58  08-14    LIVER FUNCTIONS - ( 14 Aug 2023 07:11 )  Alb: 2.1 g/dL / Pro: 5.6 g/dL / ALK PHOS: 58 U/L / ALT: 12 U/L / AST: 17 U/L / GGT: x           PTT - ( 14 Aug 2023 23:17 )  PTT:51.7 sec  Urinalysis Basic - ( 14 Aug 2023 07:11 )    Color: x / Appearance: x / SG: x / pH: x  Gluc: 115 mg/dL / Ketone: x  / Bili: x / Urobili: x   Blood: x / Protein: x / Nitrite: x   Leuk Esterase: x / RBC: x / WBC x   Sq Epi: x / Non Sq Epi: x / Bacteria: x                 8.7    14.27 )-----------( 377      ( 15 Aug 2023 09:12 )             27.1                         10.0   13.37 )-----------( 405      ( 14 Aug 2023 23:17 )             31.3                         10.4   12.25 )-----------( 404      ( 14 Aug 2023 07:13 )             32.2     Imaging:             Chief Complaint:  Patient is a 83y old  Male who presents with a chief complaint of Severe sepsis/SIRS (15 Aug 2023 12:13)    HPI:    GLADIS LEONARD is a 82M former smoker w/ hx of HTN, HLD, COPD (no home O2), ?ILD, 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 In ED, was found to be febrile with leukocytosis and elevated lactate, CT A/P w/ IV contrast showed wall thickening, fat stranding, and associated mesenteric edema at the junction of the sigmoid colon with descending colon with adjacent loculated collection with air and fluid measuring 4 x 2 cm, suggestive of contained perforation of colonic neoplasm. Patient stated he had a colonoscopy some time ago (he says it was about 10 years ago) and was not told about any remarkable finding; but no documentation to confirm c-scope findings. GI was initially consulted on 8/10 for consideration of colonoscopy given abnormal CT findings, however colonoscopy was contraindicated given suspected colonic perforation. Managed medically with IV zosyn, remainder of infectious workup was negative, colonic perforation presumed to be source of sepsis. Colorectal surgery following, planning for laparoscopic L colectomy this Thursday 8/17. Course complicated by new afib, was started on eliquis then transitioned to heparin gtt 8/14. This AM (8/15), had episode of melena reported by RN. Additionally, Hb 8.7 (10 yesterday 8/14, 11s previously). Protonix gtt started, heparin gtt stopped, GI re-consulted for suspected upper GI bleed.    Patient seen and examined at bedside today. Endorses dark stools, this AM and again this afternoon, incontinent. Denies abdominal pain, nausea/vomiting, dizziness/lightheadedness.      Allergies:  No Known Allergies    Home Medications:    Hospital Medications:  acetaminophen     Tablet .. 650 milliGRAM(s) Oral every 6 hours PRN  atorvastatin 10 milliGRAM(s) Oral at bedtime  chlorhexidine 2% Cloths 1 Application(s) Topical <User Schedule>  finasteride 5 milliGRAM(s) Oral daily  furosemide    Tablet 40 milliGRAM(s) Oral daily  metoprolol tartrate 50 milliGRAM(s) Oral two times a day  pantoprazole Infusion 8 mG/Hr IV Continuous <Continuous>  piperacillin/tazobactam IVPB.. 3.375 Gram(s) IV Intermittent every 8 hours  spironolactone 25 milliGRAM(s) Oral daily      PMHX/PSHX:  Asbestosis (ICD9 501)    HTN (Hypertension)    BPH (Benign Prostatic Hypertrophy)    Dyslipidemia    Localized Osteoarthrosis, Lower Leg    Obesity    COPD with emphysema    Pulmonary nodule    Thyroid nodule    Aortic ectasia    History of diastolic dysfunction    Venous insufficiency    S/P Cystoscopy (ICD9 V45.89)    Hyperlipidemia (ICD9 272.4)    Localized Osteoarthrosis, Lower Leg    S/P Arthroscopy of Left Knee    Cataract    Inguinal Hernia x2    History of Tonsillectomy    History of appendectomy        Family history:  No pertinent family history in first degree relatives        Social History:     ROS:     General:  No wt loss, fevers, chills, night sweats, fatigue,   Eyes:  Good vision, no reported pain  ENT:  No sore throat, pain, runny nose, dysphagia  CV:  No pain, palpitations, hypo/hypertension  Resp:  No dyspnea, cough, tachypnea, wheezing  GI:  No pain, No nausea, No vomiting, No diarrhea, No constipation, No weight loss, No fever, No pruritis, No rectal bleeding, No tarry stools, No dysphagia,  :  No pain, bleeding, incontinence, nocturia  Muscle:  No pain, weakness  Neuro:  No weakness, tingling, memory problems  Psych:  No fatigue, insomnia, mood problems, depression  Endocrine:  No polyuria, polydipsia, cold/heat intolerance  Heme:  No petechiae, ecchymosis, easy bruisability  Skin:  No rash, tattoos, scars, edema      PHYSICAL EXAM:     GENERAL:  Appears stated age, well-groomed, well-nourished, no distress  HEENT:  NC/AT,  conjunctivae clear and pink, no thyromegaly, nodules, adenopathy, no JVD, sclera -anicteric  CHEST:  Full & symmetric excursion, no increased effort, breath sounds clear  HEART:  Regular rhythm, S1, S2, no murmur/rub/S3/S4, no abdominal bruit, no edema  ABDOMEN: Soft, mildly distended, LUQ and LUQ TTP, no palpable masses. Incontinent of stools, melena present at time of exam.  EXTEREMITIES:  no cyanosis,clubbing or edema  SKIN: No rash/erythema/ecchymoses/petechiae/wounds/abscess/warm/dry  NEURO:  Alert, oriented, no asterixis, no tremor, no encephalopathy    Vital Signs:  Vital Signs Last 24 Hrs  T(C): 36.7 (15 Aug 2023 12:09), Max: 37 (14 Aug 2023 20:52)  T(F): 98.1 (15 Aug 2023 12:09), Max: 98.6 (14 Aug 2023 20:52)  HR: 93 (15 Aug 2023 12:09) (87 - 111)  BP: 102/69 (15 Aug 2023 12:09) (102/69 - 117/72)  BP(mean): --  RR: 18 (15 Aug 2023 12:09) (18 - 20)  SpO2: 97% (15 Aug 2023 12:09) (94% - 97%)    Parameters below as of 15 Aug 2023 12:09  Patient On (Oxygen Delivery Method): room air      Daily     Daily     LABS:                        8.7    14.27 )-----------( 377      ( 15 Aug 2023 09:12 )             27.1     Mean Cell Volume: 88.9 fl (08-15-23 @ 09:12)    08-14    137  |  102  |  26<H>  ----------------------------<  115<H>  3.6   |  22  |  0.91    Ca    8.7      14 Aug 2023 07:11    TPro  5.6<L>  /  Alb  2.1<L>  /  TBili  0.4  /  DBili  x   /  AST  17  /  ALT  12  /  AlkPhos  58  08-14    LIVER FUNCTIONS - ( 14 Aug 2023 07:11 )  Alb: 2.1 g/dL / Pro: 5.6 g/dL / ALK PHOS: 58 U/L / ALT: 12 U/L / AST: 17 U/L / GGT: x           PTT - ( 14 Aug 2023 23:17 )  PTT:51.7 sec  Urinalysis Basic - ( 14 Aug 2023 07:11 )    Color: x / Appearance: x / SG: x / pH: x  Gluc: 115 mg/dL / Ketone: x  / Bili: x / Urobili: x   Blood: x / Protein: x / Nitrite: x   Leuk Esterase: x / RBC: x / WBC x   Sq Epi: x / Non Sq Epi: x / Bacteria: x                 8.7    14.27 )-----------( 377      ( 15 Aug 2023 09:12 )             27.1                         10.0   13.37 )-----------( 405      ( 14 Aug 2023 23:17 )             31.3                         10.4   12.25 )-----------( 404      ( 14 Aug 2023 07:13 )             32.2     Imaging:             Chief Complaint:  Patient is a 83y old  Male who presents with a chief complaint of Severe sepsis/SIRS (15 Aug 2023 12:13)    HPI:    GLADIS LEONARD is a 82M former smoker w/ hx of HTN, HLD, COPD (no home O2), ?ILD, 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 In ED, was found to be febrile with leukocytosis and elevated lactate, CT A/P w/ IV contrast showed wall thickening, fat stranding, and associated mesenteric edema at the junction of the sigmoid colon with descending colon with adjacent loculated collection with air and fluid measuring 4 x 2 cm, suggestive of contained perforation of colonic neoplasm. Patient stated he had a colonoscopy some time ago (he says it was about 10 years ago) and was not told about any remarkable finding; but no documentation to confirm c-scope findings. GI was initially consulted on 8/10 for consideration of colonoscopy given abnormal CT findings, however colonoscopy was contraindicated given suspected colonic perforation. Managed medically with IV zosyn, remainder of infectious workup was negative, colonic perforation presumed to be source of sepsis. Colorectal surgery following, planning for laparoscopic L colectomy this Thursday 8/17. Course complicated by new afib, was started on eliquis then transitioned to heparin gtt 8/14. This AM (8/15), had episode of melena reported by RN. Additionally, Hb 8.7 (10 yesterday 8/14, 11s previously). Protonix gtt started, heparin gtt stopped, GI re-consulted for suspected upper GI bleed.    Patient seen and examined at bedside today. Endorses dark stools x3 today, began this AM, incontinent. Was initially constipated, then yesterday 8/14 began having diarrhea after getting stool softener. Denies abdominal pain, nausea/vomiting, dizziness/lightheadedness.      Allergies:  No Known Allergies    Home Medications:    Hospital Medications:  acetaminophen     Tablet .. 650 milliGRAM(s) Oral every 6 hours PRN  atorvastatin 10 milliGRAM(s) Oral at bedtime  chlorhexidine 2% Cloths 1 Application(s) Topical <User Schedule>  finasteride 5 milliGRAM(s) Oral daily  furosemide    Tablet 40 milliGRAM(s) Oral daily  metoprolol tartrate 50 milliGRAM(s) Oral two times a day  pantoprazole Infusion 8 mG/Hr IV Continuous <Continuous>  piperacillin/tazobactam IVPB.. 3.375 Gram(s) IV Intermittent every 8 hours  spironolactone 25 milliGRAM(s) Oral daily      PMHX/PSHX:  Asbestosis (ICD9 501)    HTN (Hypertension)    BPH (Benign Prostatic Hypertrophy)    Dyslipidemia    Localized Osteoarthrosis, Lower Leg    Obesity    COPD with emphysema    Pulmonary nodule    Thyroid nodule    Aortic ectasia    History of diastolic dysfunction    Venous insufficiency    S/P Cystoscopy (ICD9 V45.89)    Hyperlipidemia (ICD9 272.4)    Localized Osteoarthrosis, Lower Leg    S/P Arthroscopy of Left Knee    Cataract    Inguinal Hernia x2    History of Tonsillectomy    History of appendectomy        Family history:  No pertinent family history in first degree relatives        Social History:     ROS:     General:  No wt loss, fevers, chills, night sweats, fatigue,   Eyes:  Good vision, no reported pain  ENT:  No sore throat, pain, runny nose, dysphagia  CV:  No pain, palpitations, hypo/hypertension  Resp:  No dyspnea, cough, tachypnea, wheezing  GI:  No pain, No nausea, No vomiting, No diarrhea, No constipation, No weight loss, No fever, No pruritis, No rectal bleeding, No tarry stools, No dysphagia,  :  No pain, bleeding, incontinence, nocturia  Muscle:  No pain, weakness  Neuro:  No weakness, tingling, memory problems  Psych:  No fatigue, insomnia, mood problems, depression  Endocrine:  No polyuria, polydipsia, cold/heat intolerance  Heme:  No petechiae, ecchymosis, easy bruisability  Skin:  No rash, tattoos, scars, edema      PHYSICAL EXAM:     GENERAL:  Appears stated age, awake, NAD  HEENT:  NC/AT,  conjunctivae clear and pink  CHEST:  Full & symmetric excursion, no increased effort, breath sounds clear  HEART:  Regular rhythm, S1, S2, no murmur/rub/S3/S4  ABDOMEN: Soft, mildly distended, LUQ and LUQ TTP, no palpable masses. Incontinent of stools, melena present at time of exam.  EXTEREMITIES:  no cyanosis,clubbing or edema  SKIN: No rash/erythema/ecchymoses/petechiae/wounds/abscess/warm/dry  NEURO:  Alert, oriented, no asterixis, no tremor, no encephalopathy    Vital Signs:  Vital Signs Last 24 Hrs  T(C): 36.7 (15 Aug 2023 12:09), Max: 37 (14 Aug 2023 20:52)  T(F): 98.1 (15 Aug 2023 12:09), Max: 98.6 (14 Aug 2023 20:52)  HR: 93 (15 Aug 2023 12:09) (87 - 111)  BP: 102/69 (15 Aug 2023 12:09) (102/69 - 117/72)  BP(mean): --  RR: 18 (15 Aug 2023 12:09) (18 - 20)  SpO2: 97% (15 Aug 2023 12:09) (94% - 97%)    Parameters below as of 15 Aug 2023 12:09  Patient On (Oxygen Delivery Method): room air      Daily     Daily     LABS:                        8.7    14.27 )-----------( 377      ( 15 Aug 2023 09:12 )             27.1     Mean Cell Volume: 88.9 fl (08-15-23 @ 09:12)    08-14    137  |  102  |  26<H>  ----------------------------<  115<H>  3.6   |  22  |  0.91    Ca    8.7      14 Aug 2023 07:11    TPro  5.6<L>  /  Alb  2.1<L>  /  TBili  0.4  /  DBili  x   /  AST  17  /  ALT  12  /  AlkPhos  58  08-14    LIVER FUNCTIONS - ( 14 Aug 2023 07:11 )  Alb: 2.1 g/dL / Pro: 5.6 g/dL / ALK PHOS: 58 U/L / ALT: 12 U/L / AST: 17 U/L / GGT: x           PTT - ( 14 Aug 2023 23:17 )  PTT:51.7 sec  Urinalysis Basic - ( 14 Aug 2023 07:11 )    Color: x / Appearance: x / SG: x / pH: x  Gluc: 115 mg/dL / Ketone: x  / Bili: x / Urobili: x   Blood: x / Protein: x / Nitrite: x   Leuk Esterase: x / RBC: x / WBC x   Sq Epi: x / Non Sq Epi: x / Bacteria: x                 8.7    14.27 )-----------( 377      ( 15 Aug 2023 09:12 )             27.1                         10.0   13.37 )-----------( 405      ( 14 Aug 2023 23:17 )             31.3                         10.4   12.25 )-----------( 404      ( 14 Aug 2023 07:13 )             32.2

## 2023-08-15 NOTE — PROGRESS NOTE ADULT - SUBJECTIVE AND OBJECTIVE BOX
[facility]  KASEY Mustafa Y. Patel, S. Shah, G. Gustavo  [phone]  ([phone] - weekdays after 5pm and weekends)    Name: GLADIS LEONARD  Age/Gender: 83y Male  MRN: [id number]    Interval History:  Patient seen and examined this morning.   Patient having loose BMs, had blood this am per RN.   Notes reviewed. Remains afebrile   Allergies: No Known Allergies      Objective:  Vitals:   T(F): 98.1 (08-15-23 @ 05:33), Max: 98.6 (08-14-23 @ 20:52)  HR: 94 (08-15-23 @ 09:02) (82 - 111)  BP: 117/72 (08-15-23 @ 09:02) (107/67 - 117/72)  RR: 18 (08-15-23 @ 09:02) (18 - 20)  SpO2: 95% (08-15-23 @ 09:02) (94% - 96%)  Physical Examination:  General: no acute distress  HEENT: NC/AT, anicteric, neck supple  Respiratory: no acc muscle use, breathing comfortably  Cardiovascular: S1 and S2 present  Gastrointestinal: LLQ TTP, nondistended  Extremities: no edema, no cyanosis  Skin: no visible rash    Laboratory Studies:  CBC:                       8.7    14.27 )-----------( 377      ( 15 Aug 2023 09:12 )             27.1     WBC Trend:  14.27 08-15-23 @ 09:12  13.37 08-14-23 @ 23:17  12.25 08-14-23 @ 07:13  10.39 08-12-23 @ 12:08  11.70 08-11-23 @ 07:14  16.06 08-10-23 @ 07:07  16.02 08-09-23 @ 06:35  18.49 08-08-23 @ 22:56    CMP: 08-14    137  |  102  |  26<H>  ----------------------------<  115<H>  3.6   |  22  |  0.91    Ca    8.7      14 Aug 2023 07:11    TPro  5.6<L>  /  Alb  2.1<L>  /  TBili  0.4  /  DBili  x   /  AST  17  /  ALT  12  /  AlkPhos  58  08-14    Creatinine: 0.91 mg/dL (08-14-23 @ 07:11)  Creatinine: 0.91 mg/dL (08-12-23 @ 12:08)  Creatinine: 1.01 mg/dL (08-11-23 @ 07:15)  Creatinine: 1.00 mg/dL (08-10-23 @ 07:09)  Creatinine: 1.06 mg/dL (08-09-23 @ 06:35)  Creatinine: 1.17 mg/dL (08-08-23 @ 22:56)      LIVER FUNCTIONS - ( 14 Aug 2023 07:11 )  Alb: 2.1 g/dL / Pro: 5.6 g/dL / ALK PHOS: 58 U/L / ALT: 12 U/L / AST: 17 U/L / GGT: x           Microbiology: reviewed     Culture - Sputum (collected 08-09-23 @ 11:22)  Source: .Sputum Sputum  Gram Stain (08-09-23 @ 20:44):    Rare Squamous epithelial cells per low power field    No polymorphonuclear leukocytes per low power field    Rare Gram Negative Rods per oil power field    Rare Gram positive cocci in pairs per oil power field  Final Report (08-11-23 @ 21:22):    Normal Respiratory Milly present    Culture - Urine (collected 08-08-23 @ 22:57)  Source: Clean Catch Clean Catch (Midstream)  Final Report (08-10-23 @ 10:40):    <10,000 CFU/mL Normal Urogenital Milly    Culture - Blood (collected 08-08-23 @ 22:15)  Source: .Blood Blood-Peripheral  Final Report (08-14-23 @ 02:00):    No growth at 5 days    Culture - Blood (collected 08-08-23 @ 22:10)  Source: .Blood Blood-Peripheral  Final Report (08-14-23 @ 02:00):    No growth at 5 days    Radiology: reviewed     Medications:  acetaminophen     Tablet .. 650 milliGRAM(s) Oral every 6 hours PRN  atorvastatin 10 milliGRAM(s) Oral at bedtime  chlorhexidine 2% Cloths 1 Application(s) Topical <User Schedule>  finasteride 5 milliGRAM(s) Oral daily  furosemide    Tablet 40 milliGRAM(s) Oral daily  metoprolol tartrate 50 milliGRAM(s) Oral two times a day  pantoprazole  Injectable 80 milliGRAM(s) IV Push once  pantoprazole Infusion 8 mG/Hr IV Continuous <Continuous>  piperacillin/tazobactam IVPB.. 3.375 Gram(s) IV Intermittent every 8 hours  spironolactone 25 milliGRAM(s) Oral daily    Current Antimicrobials:  piperacillin/tazobactam IVPB.. 3.375 Gram(s) IV Intermittent every 8 hours    Prior/Completed Antimicrobials:  piperacillin/tazobactam IVPB.  piperacillin/tazobactam IVPB..  vancomycin  IVPB.

## 2023-08-15 NOTE — PROGRESS NOTE ADULT - SUBJECTIVE AND OBJECTIVE BOX
DATE OF SERVICE: 08-15-23 @ 15:38    Patient is a 83y old  Male who presents with a chief complaint of Severe sepsis/SIRS (15 Aug 2023 13:44)      INTERVAL HISTORY: Feels ok.     REVIEW OF SYSTEMS:  CONSTITUTIONAL: No weakness  EYES/ENT: No visual changes;  No throat pain   NECK: No pain or stiffness  RESPIRATORY: No cough, wheezing; No shortness of breath  CARDIOVASCULAR: No chest pain or palpitations  GASTROINTESTINAL: No abdominal  pain. No nausea, vomiting, or hematemesis  GENITOURINARY: No dysuria, frequency or hematuria  NEUROLOGICAL: No stroke like symptoms  SKIN: No rashes    TELEMETRY Personally reviewed: AF   	  MEDICATIONS:  furosemide    Tablet 40 milliGRAM(s) Oral daily  metoprolol tartrate 50 milliGRAM(s) Oral two times a day  spironolactone 25 milliGRAM(s) Oral daily        PHYSICAL EXAM:  T(C): 36.7 (08-15-23 @ 12:09), Max: 37 (08-14-23 @ 20:52)  HR: 93 (08-15-23 @ 12:09) (87 - 111)  BP: 102/69 (08-15-23 @ 12:09) (102/69 - 117/72)  RR: 18 (08-15-23 @ 12:09) (18 - 20)  SpO2: 97% (08-15-23 @ 12:09) (94% - 97%)  Wt(kg): --  I&O's Summary    14 Aug 2023 07:01  -  15 Aug 2023 07:00  --------------------------------------------------------  IN: 600 mL / OUT: 910 mL / NET: -310 mL    15 Aug 2023 07:01  -  15 Aug 2023 15:38  --------------------------------------------------------  IN: 240 mL / OUT: 0 mL / NET: 240 mL          Appearance: In no distress	  HEENT:    PERRL, EOMI	  Cardiovascular:  S1 S2, No JVD  Respiratory: Lungs clear to auscultation	  Gastrointestinal:  Soft, Non-tender, + BS	  Vascularature:  + edema of LE  Psychiatric: Appropriate affect   Neuro: no acute focal deficits                               8.7    14.27 )-----------( 377      ( 15 Aug 2023 09:12 )             27.1     08-14    137  |  102  |  26<H>  ----------------------------<  115<H>  3.6   |  22  |  0.91    Ca    8.7      14 Aug 2023 07:11    TPro  5.6<L>  /  Alb  2.1<L>  /  TBili  0.4  /  DBili  x   /  AST  17  /  ALT  12  /  AlkPhos  58  08-14        Labs personally reviewed      ASSESSMENT/PLAN: 	    82M w/ hx of HTN, HLD, pre-DM, BPH, COPD, ?ILD, pleural 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 SBP down to 80s. 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   ·  Plan: Afib confirmed on ecg. No prior hx.    -TTE no LA enlargement. EF 68%  -cw home metoprolol  -CHADSvasc at least 3. Per colorectal surgery team no contraindication to AC  If no contraindication by CRS will start Eliquis 5mg BID for stroke ppx  - AC now on hold i/s/o suspected GIB with plans for EGD tomorrow.     Problem/Plan - 2:  ·  Problem: Chronic diastolic congestive heart failure.   ·  Plan: TTE with preserved EF.  - Continue Furosemide 40mg daily  - Continue [place] 25mg daily      Problem/Plan - 3:  ·  Problem: HTN (hypertension).   ·  Plan: - Metoprolol 50mg PO BID    Problem/Plan - 4:  ·  Problem: HLD (hyperlipidemia).   ·  Plan: - c/w home atorvastatin.      Discussed with OP cardiologist Dr Kahn, he will follow a OP        Zena Garcia, ALPESH-NP   Joce Ballesteros, DO [facility]  Cardiovascular Medicine  [address], [address]  Available through call or text on [employer] TE[employer]  Office: [phone]   DATE OF SERVICE: 08-15-23 @ 15:38    Patient is a 83y old  Male who presents with a chief complaint of Severe sepsis/SIRS (15 Aug 2023 13:44)      INTERVAL HISTORY: Feels ok.     REVIEW OF SYSTEMS:  CONSTITUTIONAL: No weakness  EYES/ENT: No visual changes;  No throat pain   NECK: No pain or stiffness  RESPIRATORY: No cough, wheezing; No shortness of breath  CARDIOVASCULAR: No chest pain or palpitations  GASTROINTESTINAL: No abdominal  pain. No nausea, vomiting, or hematemesis  GENITOURINARY: No dysuria, frequency or hematuria  NEUROLOGICAL: No stroke like symptoms  SKIN: No rashes    TELEMETRY Personally reviewed: AF   	  MEDICATIONS:  furosemide    Tablet 40 milliGRAM(s) Oral daily  metoprolol tartrate 50 milliGRAM(s) Oral two times a day  spironolactone 25 milliGRAM(s) Oral daily        PHYSICAL EXAM:  T(C): 36.7 (08-15-23 @ 12:09), Max: 37 (08-14-23 @ 20:52)  HR: 93 (08-15-23 @ 12:09) (87 - 111)  BP: 102/69 (08-15-23 @ 12:09) (102/69 - 117/72)  RR: 18 (08-15-23 @ 12:09) (18 - 20)  SpO2: 97% (08-15-23 @ 12:09) (94% - 97%)  Wt(kg): --  I&O's Summary    14 Aug 2023 07:01  -  15 Aug 2023 07:00  --------------------------------------------------------  IN: 600 mL / OUT: 910 mL / NET: -310 mL    15 Aug 2023 07:01  -  15 Aug 2023 15:38  --------------------------------------------------------  IN: 240 mL / OUT: 0 mL / NET: 240 mL          Appearance: In no distress	  HEENT:    PERRL, EOMI	  Cardiovascular:  S1 S2, No JVD  Respiratory: Lungs clear to auscultation	  Gastrointestinal:  Soft, Non-tender, + BS	  Vascularature:  + edema of LE  Psychiatric: Appropriate affect   Neuro: no acute focal deficits                               8.7    14.27 )-----------( 377      ( 15 Aug 2023 09:12 )             27.1     08-14    137  |  102  |  26<H>  ----------------------------<  115<H>  3.6   |  22  |  0.91    Ca    8.7      14 Aug 2023 07:11    TPro  5.6<L>  /  Alb  2.1<L>  /  TBili  0.4  /  DBili  x   /  AST  17  /  ALT  12  /  AlkPhos  58  08-14        Labs personally reviewed      ASSESSMENT/PLAN: 	    82M w/ hx of HTN, HLD, pre-DM, BPH, COPD, ?ILD, pleural 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 SBP down to 80s. 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   ·  Plan: Afib confirmed on ecg. No prior hx.    -TTE no LA enlargement. EF 68%  -cw home metoprolol  -CHADSvasc at least 3. Per colorectal surgery team no contraindication to AC  If no contraindication by CRS will start Eliquis 5mg BID for stroke ppx  - AC now on hold i/s/o suspected GIB with plans for EGD tomorrow.     Problem/Plan - 2:  ·  Problem: Chronic diastolic congestive heart failure.   ·  Plan: TTE with preserved EF.  - Continue Furosemide 40mg daily  - Continue [place] 25mg daily   - Patient with LE edema. Will give Lasix 20mg IVP once to optimize fo planned procedures/surgery.     Problem/Plan - 3:  ·  Problem: HTN (hypertension).   ·  Plan: - Metoprolol 50mg PO BID    Problem/Plan - 4:  ·  Problem: HLD (hyperlipidemia).   ·  Plan: - c/w home atorvastatin.      Discussed with OP cardiologist Dr Kahn, he will follow a OP        Zena Garcia, ALPESH-NP   Joce Ballesteros, DO [facility]  Cardiovascular Medicine  [address], [address]  Available through call or text on [employer] TEAM[employer]  Office: [phone]

## 2023-08-15 NOTE — PROGRESS NOTE ADULT - PROBLEM SELECTOR PLAN 2
New LLQ abdominal pain 8/11. CT scan on admission showed loculated fluid collection at the   junction of the descending and sigmoid colon suspicious for contained perforation of colon neoplasm  - Repeat CT ab/P IV after developing new LL showed increased in fluid collection at descending/sigmoid colon suspicious for perforated neoplasm.  - After discussing with the surgical team, the patient is now in agreement with possible surgery later this week.   - Continue clears for now.   - Zosyn IV per ID recs (appreciated).  - Plan of care discussed in detail with the patient and daughter Laurie by phone ([phone], [phone])

## 2023-08-15 NOTE — PROGRESS NOTE ADULT - ASSESSMENT
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 Patient admitted for severe sepsis vs SIRS.     Severe sepsis - fever, tachycardia, leukocytosis with lactic acidosis   Likely due to contained perforation of colonic neoplasm  Ruled out bacteremia; sepsis resolved   New onset Afib, now on eliquis   - CT with focal mural thickening and associated loculated fluid collection at the junction of the descending and sigmoid colon suspicious for contained perforation of colonic neoplasm, progressed since 2019. Has atelectasis reported, no pneumonia.   - repeat CTAP with perforated neoplasm and slight interval increased in size of adjacent collection   GI following, no plan for colonoscopy at this time   - RVP negative, Scx negative   - MRSA PCR screen negative, off vancomycin    - Bcx NGTD  - afebrile, WBC uptrending, having loose stools with blood this am       Recommendations:  CRS following - planning for ERP laparoscopic L colectomy Thursday  Continue on pip-tazo for now   Monitor temps/CBC  Continue rest of care per primary team      Doris Connelly M.D.  [facility], Division of Infectious Diseases  [phone]  After 5pm on weekdays and all day on weekends - please call [phone]

## 2023-08-15 NOTE — PROGRESS NOTE ADULT - PROBLEM SELECTOR PLAN 4
Afib confirmed on ECG. No prior hx. Does not follow with outpatient cardiology  Paroxysmal Afib  -TTE no LA enlargement. EF 68%  -cw home metoprolol  -CHADSvasc at least 3.   -stopped Eliquis (last dose AM 8/14), stopped heparin gtt due to possible Upper GI Bleed  -monitor on tele

## 2023-08-15 NOTE — CONSULT NOTE ADULT - ASSESSMENT
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 Course complicated by new onset of A-fib on 8/10. Colorectal surgery planning for laparoscopic L colectomy for source control 8/17. Now with episode of reported melena and acute drop in Hb (Hb 8.7 from 10 yesterday, 11s previously) after initiation of anticoagulation c/f UGIB. Also with uptrending WBC and increased abdominal tenderness ***.     Assessment:  #melena  #acute blood loss anemia  - Hb 8.7 (previously 11s), uptrending BUN with stable/downtrending SCr concerning for UGIB. 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 Likely in the setting of sepsis. Negative trop. Was on anticoagulation (eliquis -> heparin gtt 8/14-8/15), now discontinued ISO suspected UGIB   - Lung function is baseline  - Patient does not want colonoscopy or surgery within the next 2 weeks until his family returns from [facility].  - TTE from 8/2022 revealed EF 60% with indeterminant LV diastolic dysfunction. Normal RV function.       Recommendations:  -   - Given new imaging finding of possible contained perforation of colonic neoplasm, colonoscopy is contraindicated at this time  - Will c/w medical management with antibiotics and symptomatic support  - Sepsis, Afib and heart failure evaluation per primary team      Note incomplete until finalized by attending signature/attestation.   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 Course complicated by new onset of A-fib on 8/10. Uptrending WBC and increased abdominal tenderness and repeat CT AP 8/13 with interval increase in colonic perforation collection. colorectal surgery planning for laparoscopic L colectomy for source control 8/17. Now with episode of reported melena and acute drop in Hb (Hb 8.7 from 10 yesterday, 11s previously) after initiation of anticoagulation c/f UGIB.    #melena  #acute blood loss anemia  Observed melena today, Hb 8.7 (previously 11s), uptrending BUN with stable/downtrending SCr concerning for UGIB. Holding AC ISO suspected UGIB, started on protonix gtt.     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 Colorectal surgery planning for OR 8/17 for laparoscopic L colectomy  - Sepsis likely due to perforation of colonic neoplasm  - New onset of A-fib on 8/10, not noted previously in chart. Likely in the setting of sepsis. Negative trop. Was on anticoagulation (eliquis -> heparin gtt 8/14-8/15), now discontinued ISO suspected UGIB.  - Lung function is baseline  - TTE from 8/2022 revealed EF 60% with indeterminant LV diastolic dysfunction. Normal RV function.     Recommendations:  - Colonoscopy contraindicated ISO suspected contained perforation of colonic neoplasm  - OR for planned laparoscopic L colectomy with colorectal surgery 8/17  - c/w medical management with antibiotics  - Sepsis, Afib and heart failure evaluation per primary team    Note incomplete until finalized by attending signature/attestation.   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 Course complicated by new onset of A-fib on 8/10. Uptrending WBC and increased abdominal tenderness and repeat CT AP 8/13 with interval increase in colonic perforation collection. colorectal surgery planning for laparoscopic L colectomy for source control 8/17. Now with episode of reported melena and acute drop in Hb (Hb 8.7 from 10 yesterday, 11s previously) after initiation of anticoagulation c/f UGIB.    #melena  #acute blood loss anemia  Observed melena today, Hb 8.7 (previously 11s), uptrending BUN with stable/downtrending SCr concerning for UGIB. Holding AC ISO suspected UGIB, started on protonix gtt.     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 Colorectal surgery planning for OR 8/17 for laparoscopic L colectomy  - Sepsis likely due to perforation of colonic neoplasm  - New onset of A-fib on 8/10, not noted previously in chart. Likely in the setting of sepsis. Negative trop. Was on anticoagulation (eliquis -> heparin gtt 8/14-8/15), now discontinued ISO suspected UGIB.  - Lung function is baseline  - TTE from 8/2022 revealed EF 60% with indeterminant LV diastolic dysfunction. Normal RV function.     Recommendations:  - Colonoscopy contraindicated ISO suspected contained perforation of colonic neoplasm  - OR for planned laparoscopic L colectomy with colorectal surgery 8/17  - c/w medical management with antibiotics  - Sepsis, Afib and heart failure evaluation per primary team    Note incomplete until finalized by attending signature/attestation.   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 Course complicated by new onset of A-fib on 8/10. Uptrending WBC and increased abdominal tenderness and repeat CT AP 8/13 with interval increase in colonic perforation collection. colorectal surgery planning for laparoscopic L colectomy for source control 8/17. Now with episode of reported melena and acute drop in Hb (Hb 8.7 from 10 yesterday, 11s previously) after initiation of anticoagulation c/f UGIB.    #melena  #acute blood loss anemia  Observed melena today, Hb 8.7 (previously 11s), uptrending BUN with stable/downtrending SCr concerning for UGIB. Holding AC ISO suspected UGIB, started on protonix gtt. Clinical picture most concerning for UGIB, ddx includes most likely stress ulcer vs AVM, no hx cirrhosis, no recent NSAID use. Ddx also includes LGIB ISO colonic perforation, however need to r/o UGIB given new melena and acute drop in Hb.     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 Colorectal surgery planning for OR 8/17 for laparoscopic L colectomy  - Sepsis likely due to perforation of colonic neoplasm  - New onset of A-fib on 8/10, not noted previously in chart. Likely in the setting of sepsis. Negative trop. Was on anticoagulation (eliquis -> heparin gtt 8/14-8/15), now discontinued ISO suspected UGIB.  - Lung function is baseline  - TTE from 8/2022 revealed EF 60% with indeterminant LV diastolic dysfunction. Normal RV function.     Recommendations:  - Colonoscopy contraindicated ISO suspected contained perforation of colonic neoplasm  - OR for planned laparoscopic L colectomy with colorectal surgery 8/17  - c/w medical management with antibiotics  - Sepsis, Afib and heart failure evaluation per primary team    Note incomplete until finalized by attending signature/attestation.   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 Course complicated by new onset of A-fib on 8/10. Uptrending WBC and increased abdominal tenderness and repeat CT AP 8/13 with interval increase in colonic perforation collection. colorectal surgery planning for laparoscopic L colectomy for source control 8/17. Now with episode of reported melena and acute drop in Hb (Hb 8.7 from 10 yesterday, 11s previously) after initiation of anticoagulation c/f UGIB.    #melena  #acute blood loss anemia  New melena x3 episodes with acute decrease in Hb to 8.7 (previously 11s), uptrending BUN with stable/downtrending SCr c/f GIB. Source of bleeding unclear, ddx includes most likely stress ulcer vs AVM, no hx cirrhosis, no recent NSAID use, ddx also includes LGIB ISO colonic perforation (WBC now uptrending again, repeat imaging with interval increase in size of pericolonic collection, worsening abdominal tenderness c/f progression of perforation). Now holding AC and on protonix gtt. Would recommend EGD to r/o UGIB, however patient is reluctant to proceed with EGD at this time but would be amenable to EGD if urgently indicated or if melena persists. Would obtain CTA abdomen to evaluate for source of bleeding, and will consider EGD tomorrow pending CTA results and clinical course overnight.     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 Colorectal surgery planning for OR 8/17 for laparoscopic L colectomy  - Sepsis likely due to perforation of colonic neoplasm  - New onset of A-fib on 8/10, not noted previously in chart. Likely in the setting of sepsis. Negative trop. Was on anticoagulation (eliquis -> heparin gtt 8/14-8/15), now discontinued ISO suspected UGIB.  - Lung function is baseline  - TTE from 8/2022 revealed EF 60% with indeterminant LV diastolic dysfunction. Normal RV function.     Recommendations:  - OR for planned laparoscopic L colectomy with colorectal surgery 8/17  - c/w medical management with antibiotics  - Sepsis, Afib and heart failure evaluation per primary team    Note incomplete until finalized by attending signature/attestation.

## 2023-08-15 NOTE — PROGRESS NOTE ADULT - SUBJECTIVE AND OBJECTIVE BOX
[facility] Division of Hospital Medicine  Minh Sterling MD, GREGORY  I'm reachable on Microsoft Teams   Off hours:  [phone] ([facility] pager)    Patient is a 83y old  Male who presents with a chief complaint of Severe sepsis/SIRS (15 Aug 2023 10:42)      SUBJECTIVE / OVERNIGHT EVENTS:  No events overnight. Did complain of ongoing pain in the LLQ abdomen, improved with pain medications. This morning the RN reported possible melena. Heparin gtt stopped.     MEDICATIONS  (STANDING):  atorvastatin 10 milliGRAM(s) Oral at bedtime  chlorhexidine 2% Cloths 1 Application(s) Topical <User Schedule>  finasteride 5 milliGRAM(s) Oral daily  furosemide    Tablet 40 milliGRAM(s) Oral daily  metoprolol tartrate 50 milliGRAM(s) Oral two times a day  pantoprazole Infusion 8 mG/Hr (10 mL/Hr) IV Continuous <Continuous>  piperacillin/tazobactam IVPB.. 3.375 Gram(s) IV Intermittent every 8 hours  spironolactone 25 milliGRAM(s) Oral daily    MEDICATIONS  (PRN):  acetaminophen     Tablet .. 650 milliGRAM(s) Oral every 6 hours PRN Temp greater or equal to 38C (100.4F), Mild Pain (1 - 3)    CAPILLARY BLOOD GLUCOSE        I&O's Summary    14 Aug 2023 07:01  -  15 Aug 2023 07:00  --------------------------------------------------------  IN: 600 mL / OUT: 910 mL / NET: -310 mL    15 Aug 2023 07:01  -  15 Aug 2023 12:13  --------------------------------------------------------  IN: 240 mL / OUT: 0 mL / NET: 240 mL        PHYSICAL EXAM:  Vital Signs Last 24 Hrs  T(C): 36.7 (15 Aug 2023 12:09), Max: 37 (14 Aug 2023 20:52)  T(F): 98.1 (15 Aug 2023 12:09), Max: 98.6 (14 Aug 2023 20:52)  HR: 93 (15 Aug 2023 12:09) (87 - 111)  BP: 102/69 (15 Aug 2023 12:09) (102/69 - 117/72)  BP(mean): --  RR: 18 (15 Aug 2023 12:09) (18 - 20)  SpO2: 97% (15 Aug 2023 12:09) (94% - 97%)    Parameters below as of 15 Aug 2023 12:09  Patient On (Oxygen Delivery Method): room air      CONSTITUTIONAL: elderly man, NAD, well-developed  EYES:  EOMI, conjunctiva and sclera clear  ENMT: Moist oral mucosa  NECK: Supple, no JVD  RESPIRATORY: Normal respiratory effort; lungs are clear to auscultation bilaterally  CARDIOVASCULAR: Regular rate and rhythm, normal S1 and S2, no murmur/rub/gallop; No lower extremity edema  ABDOMEN: decreased BS, soft, LLQ tender to palpation, no distension   MUSCULOSKELETAL:  no clubbing or cyanosis of digits; no joint swelling or tenderness to palpation  PSYCH: A+O x3; affect appropriate, calm and cooperative  NEUROLOGY: CN 2-12 are intact and symmetric; no gross sensory deficits       LABS:                        8.7    14.27 )-----------( 377      ( 15 Aug 2023 09:12 )             27.1     08-14    137  |  102  |  26<H>  ----------------------------<  115<H>  3.6   |  22  |  0.91    Ca    8.7      14 Aug 2023 07:11    TPro  5.6<L>  /  Alb  2.1<L>  /  TBili  0.4  /  DBili  x   /  AST  17  /  ALT  12  /  AlkPhos  58  08-14    PTT - ( 14 Aug 2023 23:17 )  PTT:51.7 sec      Urinalysis Basic - ( 14 Aug 2023 07:11 )    Color: x / Appearance: x / SG: x / pH: x  Gluc: 115 mg/dL / Ketone: x  / Bili: x / Urobili: x   Blood: x / Protein: x / Nitrite: x   Leuk Esterase: x / RBC: x / WBC x   Sq Epi: x / Non Sq Epi: x / Bacteria: x

## 2023-08-16 ENCOUNTER — RESULT REVIEW (OUTPATIENT)
Age: 83
End: 2023-08-16

## 2023-08-16 ENCOUNTER — TRANSCRIPTION ENCOUNTER (OUTPATIENT)
Age: 83
End: 2023-08-16

## 2023-08-16 LAB
ANION GAP SERPL CALC-SCNC: 11 MMOL/L — SIGNIFICANT CHANGE UP (ref 5–17)
BUN SERPL-MCNC: 36 MG/DL — HIGH (ref 7–23)
CALCIUM SERPL-MCNC: 8.4 MG/DL — SIGNIFICANT CHANGE UP (ref 8.4–10.5)
CHLORIDE SERPL-SCNC: 100 MMOL/L — SIGNIFICANT CHANGE UP (ref 96–108)
CO2 SERPL-SCNC: 22 MMOL/L — SIGNIFICANT CHANGE UP (ref 22–31)
CREAT SERPL-MCNC: 1.05 MG/DL — SIGNIFICANT CHANGE UP (ref 0.5–1.3)
EGFR: 70 ML/MIN/1.73M2 — SIGNIFICANT CHANGE UP
GLUCOSE SERPL-MCNC: 127 MG/DL — HIGH (ref 70–99)
HCT VFR BLD CALC: 24.4 % — LOW (ref 39–50)
HCT VFR BLD CALC: 25.8 % — LOW (ref 39–50)
HGB BLD-MCNC: 7.8 G/DL — LOW (ref 13–17)
HGB BLD-MCNC: 8.5 G/DL — LOW (ref 13–17)
MCHC RBC-ENTMCNC: 28.7 PG — SIGNIFICANT CHANGE UP (ref 27–34)
MCHC RBC-ENTMCNC: 28.7 PG — SIGNIFICANT CHANGE UP (ref 27–34)
MCHC RBC-ENTMCNC: 32 GM/DL — SIGNIFICANT CHANGE UP (ref 32–36)
MCHC RBC-ENTMCNC: 32.9 GM/DL — SIGNIFICANT CHANGE UP (ref 32–36)
MCV RBC AUTO: 87.2 FL — SIGNIFICANT CHANGE UP (ref 80–100)
MCV RBC AUTO: 89.7 FL — SIGNIFICANT CHANGE UP (ref 80–100)
NRBC # BLD: 0 /100 WBCS — SIGNIFICANT CHANGE UP (ref 0–0)
NRBC # BLD: 0 /100 WBCS — SIGNIFICANT CHANGE UP (ref 0–0)
PLATELET # BLD AUTO: 381 K/UL — SIGNIFICANT CHANGE UP (ref 150–400)
PLATELET # BLD AUTO: 418 K/UL — HIGH (ref 150–400)
POTASSIUM SERPL-MCNC: 4.1 MMOL/L — SIGNIFICANT CHANGE UP (ref 3.5–5.3)
POTASSIUM SERPL-SCNC: 4.1 MMOL/L — SIGNIFICANT CHANGE UP (ref 3.5–5.3)
RBC # BLD: 2.72 M/UL — LOW (ref 4.2–5.8)
RBC # BLD: 2.96 M/UL — LOW (ref 4.2–5.8)
RBC # FLD: 14.1 % — SIGNIFICANT CHANGE UP (ref 10.3–14.5)
RBC # FLD: 14.5 % — SIGNIFICANT CHANGE UP (ref 10.3–14.5)
SODIUM SERPL-SCNC: 133 MMOL/L — LOW (ref 135–145)
WBC # BLD: 11.16 K/UL — HIGH (ref 3.8–10.5)
WBC # BLD: 13.57 K/UL — HIGH (ref 3.8–10.5)
WBC # FLD AUTO: 11.16 K/UL — HIGH (ref 3.8–10.5)
WBC # FLD AUTO: 13.57 K/UL — HIGH (ref 3.8–10.5)

## 2023-08-16 PROCEDURE — 99233 SBSQ HOSP IP/OBS HIGH 50: CPT

## 2023-08-16 PROCEDURE — 43239 EGD BIOPSY SINGLE/MULTIPLE: CPT | Mod: GC

## 2023-08-16 RX ORDER — METRONIDAZOLE 500 MG
250 TABLET ORAL
Refills: 0 | Status: COMPLETED | OUTPATIENT
Start: 2023-08-16 | End: 2023-08-17

## 2023-08-16 RX ORDER — NEOMYCIN SULFATE 500 MG/1
500 TABLET ORAL EVERY 8 HOURS
Refills: 0 | Status: DISCONTINUED | OUTPATIENT
Start: 2023-08-16 | End: 2023-08-16

## 2023-08-16 RX ORDER — NEOMYCIN SULFATE 500 MG/1
500 TABLET ORAL
Refills: 0 | Status: COMPLETED | OUTPATIENT
Start: 2023-08-16 | End: 2023-08-17

## 2023-08-16 RX ORDER — METRONIDAZOLE 500 MG
250 TABLET ORAL EVERY 8 HOURS
Refills: 0 | Status: DISCONTINUED | OUTPATIENT
Start: 2023-08-16 | End: 2023-08-16

## 2023-08-16 RX ORDER — FUROSEMIDE 40 MG
20 TABLET ORAL ONCE
Refills: 0 | Status: COMPLETED | OUTPATIENT
Start: 2023-08-16 | End: 2023-08-16

## 2023-08-16 RX ORDER — CELECOXIB 200 MG/1
400 CAPSULE ORAL ONCE
Refills: 0 | Status: DISCONTINUED | OUTPATIENT
Start: 2023-08-16 | End: 2023-08-17

## 2023-08-16 RX ORDER — GABAPENTIN 400 MG/1
600 CAPSULE ORAL ONCE
Refills: 0 | Status: DISCONTINUED | OUTPATIENT
Start: 2023-08-16 | End: 2023-08-17

## 2023-08-16 RX ADMIN — PANTOPRAZOLE SODIUM 10 MG/HR: 20 TABLET, DELAYED RELEASE ORAL at 20:27

## 2023-08-16 RX ADMIN — ATORVASTATIN CALCIUM 10 MILLIGRAM(S): 80 TABLET, FILM COATED ORAL at 23:04

## 2023-08-16 RX ADMIN — Medication 250 MILLIGRAM(S): at 23:04

## 2023-08-16 RX ADMIN — CHLORHEXIDINE GLUCONATE 1 APPLICATION(S): 213 SOLUTION TOPICAL at 06:15

## 2023-08-16 RX ADMIN — FINASTERIDE 5 MILLIGRAM(S): 5 TABLET, FILM COATED ORAL at 11:19

## 2023-08-16 RX ADMIN — NEOMYCIN SULFATE 500 MILLIGRAM(S): 500 TABLET ORAL at 11:41

## 2023-08-16 RX ADMIN — Medication 20 MILLIGRAM(S): at 02:00

## 2023-08-16 RX ADMIN — Medication 40 MILLIGRAM(S): at 08:47

## 2023-08-16 RX ADMIN — PIPERACILLIN AND TAZOBACTAM 25 GRAM(S): 4; .5 INJECTION, POWDER, LYOPHILIZED, FOR SOLUTION INTRAVENOUS at 06:15

## 2023-08-16 RX ADMIN — SPIRONOLACTONE 25 MILLIGRAM(S): 25 TABLET, FILM COATED ORAL at 08:47

## 2023-08-16 RX ADMIN — NEOMYCIN SULFATE 500 MILLIGRAM(S): 500 TABLET ORAL at 23:04

## 2023-08-16 RX ADMIN — Medication 250 MILLIGRAM(S): at 11:41

## 2023-08-16 RX ADMIN — Medication 20 MILLIGRAM(S): at 11:27

## 2023-08-16 RX ADMIN — Medication 50 MILLIGRAM(S): at 18:21

## 2023-08-16 RX ADMIN — PANTOPRAZOLE SODIUM 10 MG/HR: 20 TABLET, DELAYED RELEASE ORAL at 06:52

## 2023-08-16 NOTE — PROGRESS NOTE ADULT - PROBLEM SELECTOR PLAN 2
New LLQ abdominal pain 8/11. CT scan on admission showed loculated fluid collection at the   junction of the descending and sigmoid colon suspicious for contained perforation of colon neoplasm  - Repeat CT ab/P IV after developing new LL showed increased in fluid collection at descending/sigmoid colon suspicious for perforated neoplasm.  - Plan for surgery tomorrow 8/17.   - Continue clears for now.   - Zosyn IV per ID recs (appreciated).  - Plan of care discussed in detail with the patient and daughter Laurie by phone ([phone], [phone])

## 2023-08-16 NOTE — PROGRESS NOTE ADULT - SUBJECTIVE AND OBJECTIVE BOX
Gastroenterology/Hepatology Progress Note    Interval Events:   - Hb 7.7  - hypotensive to 96/61 this AM    Allergies:  No Known Allergies      Hospital Medications:  acetaminophen     Tablet .. 650 milliGRAM(s) Oral every 6 hours PRN  atorvastatin 10 milliGRAM(s) Oral at bedtime  chlorhexidine 2% Cloths 1 Application(s) Topical <User Schedule>  finasteride 5 milliGRAM(s) Oral daily  furosemide    Tablet 40 milliGRAM(s) Oral daily  metoprolol tartrate 50 milliGRAM(s) Oral two times a day  pantoprazole Infusion 8 mG/Hr IV Continuous <Continuous>  spironolactone 25 milliGRAM(s) Oral daily      ROS: 14 point ROS negative unless otherwise state in subjective    PHYSICAL EXAM:   Vital Signs:  Vital Signs Last 24 Hrs  T(C): 36.4 (16 Aug 2023 05:45), Max: 36.7 (15 Aug 2023 12:09)  T(F): 97.5 (16 Aug 2023 05:45), Max: 98.1 (15 Aug 2023 12:09)  HR: 96 (16 Aug 2023 05:45) (92 - 96)  BP: 96/61 (16 Aug 2023 05:45) (96/61 - 117/72)  BP(mean): --  RR: 16 (16 Aug 2023 05:45) (16 - 18)  SpO2: 96% (16 Aug 2023 05:45) (95% - 97%)    Parameters below as of 15 Aug 2023 21:33  Patient On (Oxygen Delivery Method): room air      Daily     Daily     GENERAL:  No acute distress  HEENT:  NCAT, no scleral icterus  CHEST: no resp distress  HEART:  RRR  ABDOMEN:  Soft, non-tender, non-distended, no masses  EXTREMITIES:  No cyanosis, clubbing, or edema  SKIN:  No rash/erythema/ecchymoses/petechiae/wounds/abscess/warm/dry  NEURO:  Alert and oriented x 3, no asterixis, no tremor    LABS:                        7.8    11.16 )-----------( 381      ( 16 Aug 2023 07:13 )             24.4     Mean Cell Volume: 89.7 fl (08-16-23 @ 07:13)    08-16    133<L>  |  100  |  36<H>  ----------------------------<  127<H>  4.1   |  22  |  1.05    Ca    8.4      16 Aug 2023 07:13        PTT - ( 14 Aug 2023 23:17 )  PTT:51.7 sec  Urinalysis Basic - ( 16 Aug 2023 07:13 )    Color: x / Appearance: x / SG: x / pH: x  Gluc: 127 mg/dL / Ketone: x  / Bili: x / Urobili: x   Blood: x / Protein: x / Nitrite: x   Leuk Esterase: x / RBC: x / WBC x   Sq Epi: x / Non Sq Epi: x / Bacteria: x      Imaging:           Gastroenterology/Hepatology Progress Note    Interval Events:   - per RN one episode melena overnight, no BMs this AM  - endorses persistent abdominal pain with palpation but denies abdominal pain at rest. Denies n/v, no BMs since last night. No new concerns.   - Hb 7.7 22:00 8/15 -> 7.8 this AM  - BP 96/61 this AM, denies dizziness/lightheadedness  - CTA not yet performed    Allergies:  No Known Allergies      Hospital Medications:  acetaminophen     Tablet .. 650 milliGRAM(s) Oral every 6 hours PRN  atorvastatin 10 milliGRAM(s) Oral at bedtime  chlorhexidine 2% Cloths 1 Application(s) Topical <User Schedule>  finasteride 5 milliGRAM(s) Oral daily  furosemide    Tablet 40 milliGRAM(s) Oral daily  metoprolol tartrate 50 milliGRAM(s) Oral two times a day  pantoprazole Infusion 8 mG/Hr IV Continuous <Continuous>  spironolactone 25 milliGRAM(s) Oral daily      ROS: 14 point ROS negative unless otherwise state in subjective    PHYSICAL EXAM:   Vital Signs:  Vital Signs Last 24 Hrs  T(C): 36.4 (16 Aug 2023 05:45), Max: 36.7 (15 Aug 2023 12:09)  T(F): 97.5 (16 Aug 2023 05:45), Max: 98.1 (15 Aug 2023 12:09)  HR: 96 (16 Aug 2023 05:45) (92 - 96)  BP: 96/61 (16 Aug 2023 05:45) (96/61 - 117/72)  BP(mean): --  RR: 16 (16 Aug 2023 05:45) (16 - 18)  SpO2: 96% (16 Aug 2023 05:45) (95% - 97%)    Parameters below as of 15 Aug 2023 21:33  Patient On (Oxygen Delivery Method): room air      Daily     Daily     GENERAL: sleeping in bed, wakes to voice, no acute distress  HEENT:  NCAT, no scleral icterus  CHEST: no resp distress  ABDOMEN:  soft, LLQ TTP, no palpable masses  EXTREMITIES:  No cyanosis, clubbing, or edema  SKIN:  No rashes, scattered ecchymosis over arms, no wounds on visible skin  NEURO:  Alert and oriented x 3, no asterixis, no tremor    LABS:                        7.8    11.16 )-----------( 381      ( 16 Aug 2023 07:13 )             24.4     Mean Cell Volume: 89.7 fl (08-16-23 @ 07:13)    08-16    133<L>  |  100  |  36<H>  ----------------------------<  127<H>  4.1   |  22  |  1.05    Ca    8.4      16 Aug 2023 07:13        PTT - ( 14 Aug 2023 23:17 )  PTT:51.7 sec  Urinalysis Basic - ( 16 Aug 2023 07:13 )    Color: x / Appearance: x / SG: x / pH: x  Gluc: 127 mg/dL / Ketone: x  / Bili: x / Urobili: x   Blood: x / Protein: x / Nitrite: x   Leuk Esterase: x / RBC: x / WBC x   Sq Epi: x / Non Sq Epi: x / Bacteria: x      Imaging:

## 2023-08-16 NOTE — PROGRESS NOTE ADULT - ASSESSMENT
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 Patient with fever, tachycardia, leukocytosis with lactic acidosis on admission and admitted for severe sepsis vs SIRS     Severe sepsis likely due to contained perforation of colonic neoplasm   New onset Afib, now on eliquis   - CT with focal mural thickening and associated loculated fluid collection at the junction of the descending and sigmoid colon suspicious for contained perforation of colonic neoplasm, progressed since 2019. Has atelectasis reported, no pneumonia.   - repeat CTAP with perforated neoplasm and slight interval increased in size of adjacent collection   - GI following, no plan for colonoscopy at this time   - melena 8/15 - none noted this am;   - MRSA PCR screen negative, off vancomycin    - Bcx negative   - remains afebrile, WBC improved/stable      Recommendations:  CRS following - planning for ERP laparoscopic L colectomy Thursday  S/p pip-tazo -- now on neomycin and metronidazole preop per surgery team  Monitor temps/CBC  Continue rest of care per primary team      Doris Connelly M.D.  [facility], Division of Infectious Diseases  [phone]  After 5pm on weekdays and all day on weekends - please call [phone]

## 2023-08-16 NOTE — PROGRESS NOTE ADULT - SUBJECTIVE AND OBJECTIVE BOX
Colorectal Surgery Progress Note    Overnight: No acute events.    Subjective: Patient resting in bed.    Objective:  Vitals:  T(C): 36.5 (08-15-23 @ 21:33), Max: 36.7 (08-15-23 @ 05:33)  HR: 92 (08-15-23 @ 21:33) (92 - 111)  BP: 104/59 (08-15-23 @ 21:33) (102/69 - 117/72)  RR: 18 (08-15-23 @ 21:33) (18 - 20)  SpO2: 97% (08-15-23 @ 21:33) (94% - 97%)  Wt(kg): --    08-14 @ 07:01  -  08-15 @ 07:00  --------------------------------------------------------  IN:    Oral Fluid: 600 mL  Total IN: 600 mL    OUT:    Voided (mL): 910 mL  Total OUT: 910 mL    Total NET: -310 mL      08-15 @ 07:01  -  08-16 @ 01:24  --------------------------------------------------------  IN:    Oral Fluid: 240 mL  Total IN: 240 mL    OUT:    Voided (mL): 400 mL  Total OUT: 400 mL    Total NET: -160 mL        Physical exam:  General; NAD  Respiratory: nonlabored breathing  Abdomen: soft, ND, LLQ TTP. No rebound or guarding  Extremities: WWP       Labs:                        7.7    11.75 )-----------( 384      ( 15 Aug 2023 21:39 )             24.3     08-14    137  |  102  |  26<H>  ----------------------------<  115<H>  3.6   |  22  |  0.91    Ca    8.7      14 Aug 2023 07:11    TPro  5.6<L>  /  Alb  2.1<L>  /  TBili  0.4  /  DBili  x   /  AST  17  /  ALT  12  /  AlkPhos  58  08-14    LIVER FUNCTIONS - ( 14 Aug 2023 07:11 )  Alb: 2.1 g/dL / Pro: 5.6 g/dL / ALK PHOS: 58 U/L / ALT: 12 U/L / AST: 17 U/L / GGT: x           PTT - ( 14 Aug 2023 23:17 )  PTT:51.7 sec  Urinalysis Basic - ( 14 Aug 2023 07:11 )    Color: x / Appearance: x / SG: x / pH: x  Gluc: 115 mg/dL / Ketone: x  / Bili: x / Urobili: x   Blood: x / Protein: x / Nitrite: x   Leuk Esterase: x / RBC: x / WBC x   Sq Epi: x / Non Sq Epi: x / Bacteria: x Colorectal Surgery Progress Note    Overnight: Yesterday pt with melena i/s/o drop in HGB. HD stable.     Subjective: Patient resting in bed.    Objective:  Vitals:  T(C): 36.5 (08-15-23 @ 21:33), Max: 36.7 (08-15-23 @ 05:33)  HR: 92 (08-15-23 @ 21:33) (92 - 111)  BP: 104/59 (08-15-23 @ 21:33) (102/69 - 117/72)  RR: 18 (08-15-23 @ 21:33) (18 - 20)  SpO2: 97% (08-15-23 @ 21:33) (94% - 97%)  Wt(kg): --    08-14 @ 07:01  -  08-15 @ 07:00  --------------------------------------------------------  IN:    Oral Fluid: 600 mL  Total IN: 600 mL    OUT:    Voided (mL): 910 mL  Total OUT: 910 mL    Total NET: -310 mL      08-15 @ 07:01  -  08-16 @ 01:24  --------------------------------------------------------  IN:    Oral Fluid: 240 mL  Total IN: 240 mL    OUT:    Voided (mL): 400 mL  Total OUT: 400 mL    Total NET: -160 mL        Physical exam:  General; NAD  Respiratory: nonlabored breathing  Abdomen: soft, ND, LLQ TTP. No rebound or guarding  Extremities: WWP       Labs:                        7.7    11.75 )-----------( 384      ( 15 Aug 2023 21:39 )             24.3     08-14    137  |  102  |  26<H>  ----------------------------<  115<H>  3.6   |  22  |  0.91    Ca    8.7      14 Aug 2023 07:11    TPro  5.6<L>  /  Alb  2.1<L>  /  TBili  0.4  /  DBili  x   /  AST  17  /  ALT  12  /  AlkPhos  58  08-14    LIVER FUNCTIONS - ( 14 Aug 2023 07:11 )  Alb: 2.1 g/dL / Pro: 5.6 g/dL / ALK PHOS: 58 U/L / ALT: 12 U/L / AST: 17 U/L / GGT: x           PTT - ( 14 Aug 2023 23:17 )  PTT:51.7 sec  Urinalysis Basic - ( 14 Aug 2023 07:11 )    Color: x / Appearance: x / SG: x / pH: x  Gluc: 115 mg/dL / Ketone: x  / Bili: x / Urobili: x   Blood: x / Protein: x / Nitrite: x   Leuk Esterase: x / RBC: x / WBC x   Sq Epi: x / Non Sq Epi: x / Bacteria: x

## 2023-08-16 NOTE — PROGRESS NOTE ADULT - SUBJECTIVE AND OBJECTIVE BOX
DATE OF SERVICE: 08-16-23 @ 10:20    Patient is a 83y old  Male who presents with a chief complaint of Severe sepsis/SIRS (16 Aug 2023 08:36)      INTERVAL HISTORY: Feels well, denies SOB and chest pain    REVIEW OF SYSTEMS:  CONSTITUTIONAL: No weakness  EYES/ENT: No visual changes;  No throat pain   NECK: No pain or stiffness  RESPIRATORY: No cough, wheezing; No shortness of breath  CARDIOVASCULAR: No chest pain or palpitations  GASTROINTESTINAL: No abdominal  pain. No nausea, vomiting, or hematemesis  GENITOURINARY: No dysuria, frequency or hematuria  NEUROLOGICAL: No stroke like symptoms  SKIN: No rashes    TELEMETRY Personally reviewed: AFIB   	  MEDICATIONS:  furosemide    Tablet 40 milliGRAM(s) Oral daily  furosemide   Injectable 20 milliGRAM(s) IV Push once  metoprolol tartrate 50 milliGRAM(s) Oral two times a day  spironolactone 25 milliGRAM(s) Oral daily        PHYSICAL EXAM:  T(C): 36.4 (08-16-23 @ 05:45), Max: 36.7 (08-15-23 @ 12:09)  HR: 105 (08-16-23 @ 08:45) (92 - 105)  BP: 113/72 (08-16-23 @ 08:45) (96/61 - 113/72)  RR: 16 (08-16-23 @ 05:45) (16 - 18)  SpO2: 96% (08-16-23 @ 05:45) (96% - 97%)  Wt(kg): --  I&O's Summary    15 Aug 2023 07:01  -  16 Aug 2023 07:00  --------------------------------------------------------  IN: 240 mL / OUT: 400 mL / NET: -160 mL          Appearance: In no distress	  HEENT:    PERRL, EOMI	  Cardiovascular:  S1 S2, No JVD  Respiratory: Lungs clear to auscultation	  Gastrointestinal:  Soft, Non-tender, + BS	  Vascularature:  No edema of LE  Psychiatric: Appropriate affect   Neuro: no acute focal deficits                               7.8    11.16 )-----------( 381      ( 16 Aug 2023 07:13 )             24.4     08-16    133<L>  |  100  |  36<H>  ----------------------------<  127<H>  4.1   |  22  |  1.05    Ca    8.4      16 Aug 2023 07:13          Labs personally reviewed      ASSESSMENT/PLAN: 	  82M w/ hx of HTN, HLD, pre-DM, BPH, COPD, ?ILD, pleural 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 SBP down to 80s. 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   ·  Plan: Afib confirmed on ecg. No prior hx.    -TTE no LA enlargement. EF 68%  -cw home metoprolol  -CHADSvasc at least 3. Per colorectal surgery team no contraindication to AC  If no contraindication by CRS will start Eliquis 5mg BID for stroke ppx  - AC now on hold i/s/o suspected GIB with plans for EGD tomorrow.     Problem/Plan - 2:  ·  Problem: Chronic diastolic congestive heart failure.   ·  Plan: TTE with preserved EF.  - Continue Furosemide 40mg daily  - Continue [place] 25mg daily   - Patient remains w/ LE edema. Will give additional dose of Lasix 20mg IVP optimize fo planned procedures/surgery.     Problem/Plan - 3:  ·  Problem: HTN (hypertension).   ·  Plan: - Metoprolol 50mg PO BID    Problem/Plan - 4:  ·  Problem: HLD (hyperlipidemia).   ·  Plan: - c/w home atorvastatin.    Problem/Plan- 5:  Problem: Cardiac Risk Stratification  - EKG with no ischemia noted  - Patient not in decompensated HF  - No hx of tachy/marysol arrhythmia  - TTE unremarkable   - Patient is mod risk for mod risk Laparoscopic Left Colectomy. No contraindication to proceed          Discussed with OP cardiologist Dr Kahn, he will follow a OP            JOSÉ Steward,  [facility]  Cardiovascular Medicine  [address] [facility], [address]  Available via call or text on [employer]  Office: [phone]   DATE OF SERVICE: 08-16-23 @ 10:20    Patient is a 83y old  Male who presents with a chief complaint of Severe sepsis/SIRS (16 Aug 2023 08:36)      INTERVAL HISTORY: Feels well, denies SOB and chest pain    REVIEW OF SYSTEMS:  CONSTITUTIONAL: No weakness  EYES/ENT: No visual changes;  No throat pain   NECK: No pain or stiffness  RESPIRATORY: No cough, wheezing; No shortness of breath  CARDIOVASCULAR: No chest pain or palpitations  GASTROINTESTINAL: No abdominal  pain. No nausea, vomiting, or hematemesis  GENITOURINARY: No dysuria, frequency or hematuria  NEUROLOGICAL: No stroke like symptoms  SKIN: No rashes    TELEMETRY Personally reviewed: AFIB   	  MEDICATIONS:  furosemide    Tablet 40 milliGRAM(s) Oral daily  furosemide   Injectable 20 milliGRAM(s) IV Push once  metoprolol tartrate 50 milliGRAM(s) Oral two times a day  spironolactone 25 milliGRAM(s) Oral daily        PHYSICAL EXAM:  T(C): 36.4 (08-16-23 @ 05:45), Max: 36.7 (08-15-23 @ 12:09)  HR: 105 (08-16-23 @ 08:45) (92 - 105)  BP: 113/72 (08-16-23 @ 08:45) (96/61 - 113/72)  RR: 16 (08-16-23 @ 05:45) (16 - 18)  SpO2: 96% (08-16-23 @ 05:45) (96% - 97%)  Wt(kg): --  I&O's Summary    15 Aug 2023 07:01  -  16 Aug 2023 07:00  --------------------------------------------------------  IN: 240 mL / OUT: 400 mL / NET: -160 mL          Appearance: In no distress	  HEENT:    PERRL, EOMI	  Cardiovascular:  S1 S2, No JVD  Respiratory: Lungs clear to auscultation	  Gastrointestinal:  Soft, Non-tender, + BS	  Vascularature:  No edema of LE  Psychiatric: Appropriate affect   Neuro: no acute focal deficits                               7.8    11.16 )-----------( 381      ( 16 Aug 2023 07:13 )             24.4     08-16    133<L>  |  100  |  36<H>  ----------------------------<  127<H>  4.1   |  22  |  1.05    Ca    8.4      16 Aug 2023 07:13          Labs personally reviewed      ASSESSMENT/PLAN: 	  82M w/ hx of HTN, HLD, pre-DM, BPH, COPD, ?ILD, pleural 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 SBP down to 80s. 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   ·  Plan: Afib confirmed on ecg. No prior hx.    -TTE no LA enlargement. EF 68%  -cw home metoprolol  -CHADSvasc at least 3. Per colorectal surgery team no contraindication to AC  If no contraindication by CRS will start Eliquis 5mg BID for stroke ppx  - AC now on hold i/s/o suspected GIB with plans for EGD tomorrow.     Problem/Plan - 2:  ·  Problem: Chronic diastolic congestive heart failure.   ·  Plan: TTE with preserved EF.  - Continue Furosemide 40mg daily  - Continue [place] 25mg daily   - Patient remains w/ LE edema. Will give additional dose of Lasix 20mg IVP optimize fo planned procedures/surgery.     Problem/Plan - 3:  ·  Problem: HTN (hypertension).   ·  Plan: - Metoprolol 50mg PO BID    Problem/Plan - 4:  ·  Problem: HLD (hyperlipidemia).   ·  Plan: - c/w home atorvastatin.    Problem/Plan- 5:  Problem: Cardiac Risk Stratification  - EKG with no ischemia noted  - Patient not in decompensated HF  - No hx of tachy/marysol arrhythmia  - TTE unremarkable   - Patient is mod risk for mod risk Laparoscopic Left Colectomy. No contraindication to proceed          Discussed with OP cardiologist Dr Kahn, he will follow a OP            JOSÉ Steward,  [facility]  Cardiovascular Medicine  [address] [facility], [address]  Available via call or text on [employer]  Office: [phone]

## 2023-08-16 NOTE — PRE PROCEDURE NOTE - PRE PROCEDURE EVALUATION
Attending Physician:    Dr Sosa                      Procedure: EGD     Indication for Procedure:  ________________________________________________________  PAST MEDICAL & SURGICAL HISTORY:  Asbestosis (ICD9 501)      HTN (Hypertension)      BPH (Benign Prostatic Hypertrophy)      Dyslipidemia      Localized Osteoarthrosis, Lower Leg  left      Obesity      COPD with emphysema      Pulmonary nodule      Thyroid nodule      Aortic ectasia      History of diastolic dysfunction      Venous insufficiency      S/P Cystoscopy (ICD9 V45.89)      Hyperlipidemia (ICD9 272.4)      S/P Arthroscopy of Left Knee  x 20 yrs ago      Cataract  right IOL      Inguinal Hernia x2  right      History of Tonsillectomy      History of appendectomy        ALLERGIES:  No Known Allergies    HOME MEDICATIONS:  atorvastatin 10 mg oral tablet: 1 tab(s) orally once a day  clotrimazole-betamethasone dipropionate 1%-0.05% topical cream: Apply topically to affected area 2 times a day in the AM and PM. according to daughter, for his private part  finasteride 5 mg oral tablet: 1 tab(s) orally once a day  furosemide 40 mg oral tablet: 1 tab(s) orally once a day  losartan 100 mg oral tablet: 1 tab(s) orally once a day  Metoprolol Tartrate 100 mg oral tablet: 1 tab(s) orally once a day  spironolactone 25 mg oral tablet: 1 tab(s) orally once a day    AICD/PPM: [ ] yes   [ ] no    PERTINENT LAB DATA:                        7.8    11.16 )-----------( 381      ( 16 Aug 2023 07:13 )             24.4     08-16    133<L>  |  100  |  36<H>  ----------------------------<  127<H>  4.1   |  22  |  1.05    Ca    8.4      16 Aug 2023 07:13      PTT - ( 14 Aug 2023 23:17 )  PTT:51.7 sec            PHYSICAL EXAMINATION:    T(C): 36.7  HR: 113  BP: 103/65  RR: 18  SpO2: 96%    Constitutional: NAD    Neck:  No JVD  Respiratory: CTAB/L  Cardiovascular: S1 and S2  Gastrointestinal: BS+, soft, NT/ND  Extremities: No peripheral edema  Neurological: A/O x 3, no focal deficits        COMMENTS:    The patient is a suitable candidate for the planned procedure unless box checked [ ]  No, explain:     Attending Physician:    Dr Sosa                      Procedure: EGD     Indication for Procedure: GIB  ________________________________________________________  PAST MEDICAL & SURGICAL HISTORY:  Asbestosis (ICD9 501)      HTN (Hypertension)      BPH (Benign Prostatic Hypertrophy)      Dyslipidemia      Localized Osteoarthrosis, Lower Leg  left      Obesity      COPD with emphysema      Pulmonary nodule      Thyroid nodule      Aortic ectasia      History of diastolic dysfunction      Venous insufficiency      S/P Cystoscopy (ICD9 V45.89)      Hyperlipidemia (ICD9 272.4)      S/P Arthroscopy of Left Knee  x 20 yrs ago      Cataract  right IOL      Inguinal Hernia x2  right      History of Tonsillectomy      History of appendectomy        ALLERGIES:  No Known Allergies    HOME MEDICATIONS:  atorvastatin 10 mg oral tablet: 1 tab(s) orally once a day  clotrimazole-betamethasone dipropionate 1%-0.05% topical cream: Apply topically to affected area 2 times a day in the AM and PM. according to daughter, for his private part  finasteride 5 mg oral tablet: 1 tab(s) orally once a day  furosemide 40 mg oral tablet: 1 tab(s) orally once a day  losartan 100 mg oral tablet: 1 tab(s) orally once a day  Metoprolol Tartrate 100 mg oral tablet: 1 tab(s) orally once a day  spironolactone 25 mg oral tablet: 1 tab(s) orally once a day    AICD/PPM: [ ] yes   [ ] no    PERTINENT LAB DATA:                        7.8    11.16 )-----------( 381      ( 16 Aug 2023 07:13 )             24.4     08-16    133<L>  |  100  |  36<H>  ----------------------------<  127<H>  4.1   |  22  |  1.05    Ca    8.4      16 Aug 2023 07:13      PTT - ( 14 Aug 2023 23:17 )  PTT:51.7 sec            PHYSICAL EXAMINATION:    T(C): 36.7  HR: 113  BP: 103/65  RR: 18  SpO2: 96%    Constitutional: NAD    Neck:  No JVD  Respiratory: CTAB/L  Cardiovascular: S1 and S2  Gastrointestinal: BS+, soft, NT/ND  Extremities: No peripheral edema  Neurological: A/O x 3, no focal deficits        COMMENTS:    The patient is a suitable candidate for the planned procedure unless box checked [ ]  No, explain:

## 2023-08-16 NOTE — PROGRESS NOTE ADULT - ASSESSMENT
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 Course complicated by new onset of A-fib on 8/10. Uptrending WBC and increased abdominal tenderness and repeat CT AP 8/13 with interval increase in colonic perforation collection. colorectal surgery planning for laparoscopic L colectomy for source control 8/17. Now with melena and downtrending Hb c/f UGIB, although cannot r/o LGIB from colonic source.     #melena  #acute blood loss anemia  Melena since 8/15 with downtrending Hb to 7.8 today 8/16 (previously 11s earlier this admission), uptrending BUN with stable/downtrending SCr. C/f UGIB, most likely etiology stress ulcer vs AVM, although cannot r/o LGIB. On protonix gtt, holding anticoagulation. Patient declined elective EGD 8/15, however ***. CTA pending.     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 Colorectal surgery planning for OR 8/17 for laparoscopic L colectomy  - Sepsis likely due to perforation of colonic neoplasm  - New onset of A-fib on 8/10, not noted previously in chart. Likely in the setting of sepsis. Negative trop. Was on anticoagulation (eliquis -> heparin gtt 8/14-8/15), now discontinued ISO suspected UGIB.  - Lung function is baseline  - TTE from 8/2022 revealed EF 60% with indeterminant LV diastolic dysfunction. Normal RV function.     Recommendations:  - OR for planned laparoscopic L colectomy with colorectal surgery 8/17  - c/w medical management with antibiotics  - Sepsis, Afib and heart failure evaluation per primary team    Note incomplete until finalized by attending signature/attestation. 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 Course complicated by new onset of A-fib on 8/10. Uptrending WBC and increased abdominal tenderness and repeat CT AP 8/13 with interval increase in colonic perforation collection. colorectal surgery planning for laparoscopic L colectomy for source control 8/17. Now with melena and downtrending Hb c/f UGIB, although cannot r/o LGIB ISO suspected contained colonic perforation.     #melena  #acute blood loss anemia  Melena since 8/15 with downtrending Hb to 7.8 today 8/16 (8.7 8/15 AM, previously 11s earlier this admission), uptrending BUN with stable SCr. C/f UGIB, most likely etiology stress ulcer vs AVM, although cannot r/o LGIB. On protonix gtt, holding anticoagulation. Patient declined elective EGD 8/15, however today states he would be amenable to EGD if recommended. 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 Colorectal surgery planning for OR 8/17 for laparoscopic L colectomy  - Sepsis likely due to perforation of colonic neoplasm  - New onset of A-fib on 8/10, not noted previously in chart. Likely in the setting of sepsis. Negative trop. Was on anticoagulation (eliquis -> heparin gtt 8/14-8/15), now discontinued ISO suspected UGIB.  - Lung function is baseline  - TTE from 8/2022 revealed EF 60% with indeterminant LV diastolic dysfunction. Normal RV function.     Recommendations:  - OR for planned laparoscopic L colectomy with colorectal surgery 8/17  - c/w medical management with antibiotics  - Sepsis, Afib and heart failure evaluation per primary team    Note incomplete until finalized by attending signature/attestation. 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 Course complicated by new onset of A-fib on 8/10. Uptrending WBC and increased abdominal tenderness and repeat CT AP 8/13 with interval increase in colonic perforation collection. colorectal surgery planning for laparoscopic L colectomy for source control 8/17. Now with melena and downtrending Hb c/f UGIB, although cannot r/o LGIB ISO suspected contained colonic perforation.     #melena  #acute blood loss anemia  Melena since 8/15 with downtrending Hb to 7.8 today 8/16 (8.7 8/15 AM, previously 11s earlier this admission), uptrending BUN with stable SCr. C/f UGIB, most likely etiology stress ulcer vs AVM, although cannot r/o LGIB. On protonix gtt, holding anticoagulation. Patient declined elective EGD 8/15, however today states he would be amenable to EGD if recommended. CTA still pending. Given persistent melena and continually downtrending Hb would recommend EGD today. Hb 7.8, will need 1U PRBC prior to endoscopy.     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 Colorectal surgery planning for OR 8/17 for laparoscopic L colectomy  - Sepsis likely due to perforation of colonic neoplasm  - New onset of A-fib on 8/10, not noted previously in chart. Likely in the setting of sepsis. Negative trop. Was on anticoagulation (eliquis -> heparin gtt 8/14-8/15), now discontinued ISO suspected UGIB.  - Lung function is baseline  - TTE from 8/2022 revealed EF 60% with indeterminant LV diastolic dysfunction. Normal RV function.     Recommendations:  - OR for planned laparoscopic L colectomy with colorectal surgery 8/17  - c/w medical management with antibiotics  - Sepsis, Afib and heart failure evaluation per primary team    Note incomplete until finalized by attending signature/attestation. 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 Course complicated by new onset of A-fib on 8/10. Uptrending WBC and increased abdominal tenderness and repeat CT AP 8/13 with interval increase in colonic perforation collection. colorectal surgery planning for laparoscopic L colectomy for source control 8/17. Now with melena and downtrending Hb c/f UGIB, although cannot r/o LGIB ISO suspected contained colonic perforation.     #melena  #acute blood loss anemia  Melena since 8/15 with downtrending Hb to 7.8 today 8/16 (8.7 8/15 AM, previously 11s earlier this admission), uptrending BUN with stable SCr. C/f UGIB, most likely etiology stress ulcer vs AVM, although cannot r/o LGIB. On protonix gtt, holding anticoagulation. Patient declined elective EGD 8/15, however today states he would be amenable to EGD if recommended. CTA still pending. Given persistent melena and continually downtrending Hb would recommend EGD today. Hb 7.8, will need 1U PRBC prior to endoscopy.     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 Colorectal surgery planning for OR 8/17 for laparoscopic L colectomy  - Sepsis likely due to perforation of colonic neoplasm  - New onset of A-fib on 8/10, not noted previously in chart. Likely in the setting of sepsis. Negative trop. Was on anticoagulation (eliquis -> heparin gtt 8/14-8/15), now discontinued ISO suspected UGIB.  - Lung function is baseline  - TTE from 8/2022 revealed EF 60% with indeterminant LV diastolic dysfunction. Normal RV function.     Recommendations:  - OR for planned laparoscopic L colectomy with colorectal surgery 8/17  - Colonoscopy contraindicated ISO suspected contained colonic perforation  - c/w medical management with antibiotics  - Sepsis, Afib and heart failure evaluation per primary team    Note incomplete until finalized by attending signature/attestation.

## 2023-08-16 NOTE — PROGRESS NOTE ADULT - SUBJECTIVE AND OBJECTIVE BOX
[facility]  KASEY Mustafa Y. Patel, S. Shah, G. Gustavo  [phone]  ([phone] - weekdays after 5pm and weekends)    Name: GLADIS LEONARD  Age/Gender: 83y Male  MRN: [id number]    Interval History:  Patient seen and examined this morning.   Denies pain but has tenderness on palpation.  Denies fever or any new complaints.  Notes reviewed  No concerning overnight events  Afebrile   Allergies: No Known Allergies      Objective:  Vitals:   T(F): 97.5 (08-16-23 @ 05:45), Max: 98.1 (08-15-23 @ 12:09)  HR: 105 (08-16-23 @ 08:45) (92 - 105)  BP: 113/72 (08-16-23 @ 08:45) (96/61 - 113/72)  RR: 16 (08-16-23 @ 05:45) (16 - 18)  SpO2: 96% (08-16-23 @ 05:45) (96% - 97%)  Physical Examination:  General: no acute distress  HEENT: NC/AT, anicteric, neck supple  Respiratory: no acc muscle use, breathing comfortably  Cardiovascular: S1 and S2 present  Gastrointestinal: soft, LLQ TTP, nondistended  Extremities: no edema, no cyanosis  Skin: no visible rash, ecchymosis     Laboratory Studies:  CBC:                       7.8    11.16 )-----------( 381      ( 16 Aug 2023 07:13 )             24.4     WBC Trend:  11.16 08-16-23 @ 07:13  11.75 08-15-23 @ 21:39  14.27 08-15-23 @ 09:12  13.37 08-14-23 @ 23:17  12.25 08-14-23 @ 07:13  10.39 08-12-23 @ 12:08  11.70 08-11-23 @ 07:14  16.06 08-10-23 @ 07:07    CMP: 08-16    133<L>  |  100  |  36<H>  ----------------------------<  127<H>  4.1   |  22  |  1.05    Ca    8.4      16 Aug 2023 07:13      Creatinine: 1.05 mg/dL (08-16-23 @ 07:13)  Creatinine: 0.91 mg/dL (08-14-23 @ 07:11)  Creatinine: 0.91 mg/dL (08-12-23 @ 12:08)  Creatinine: 1.01 mg/dL (08-11-23 @ 07:15)  Creatinine: 1.00 mg/dL (08-10-23 @ 07:09)    Microbiology: reviewed     Culture - Sputum (collected 08-09-23 @ 11:22)  Source: .Sputum Sputum  Gram Stain (08-09-23 @ 20:44):    Rare Squamous epithelial cells per low power field    No polymorphonuclear leukocytes per low power field    Rare Gram Negative Rods per oil power field    Rare Gram positive cocci in pairs per oil power field  Final Report (08-11-23 @ 21:22):    Normal Respiratory Milly present    Culture - Urine (collected 08-08-23 @ 22:57)  Source: Clean Catch Clean Catch (Midstream)  Final Report (08-10-23 @ 10:40):    <10,000 CFU/mL Normal Urogenital Milly    Culture - Blood (collected 08-08-23 @ 22:15)  Source: .Blood Blood-Peripheral  Final Report (08-14-23 @ 02:00):    No growth at 5 days    Culture - Blood (collected 08-08-23 @ 22:10)  Source: .Blood Blood-Peripheral  Final Report (08-14-23 @ 02:00):    No growth at 5 days    Radiology: reviewed     Medications:  acetaminophen     Tablet .. 650 milliGRAM(s) Oral every 6 hours PRN  atorvastatin 10 milliGRAM(s) Oral at bedtime  celecoxib 400 milliGRAM(s) Oral once  chlorhexidine 2% Cloths 1 Application(s) Topical <User Schedule>  finasteride 5 milliGRAM(s) Oral daily  furosemide    Tablet 40 milliGRAM(s) Oral daily  gabapentin 600 milliGRAM(s) Oral once  metoprolol tartrate 50 milliGRAM(s) Oral two times a day  metroNIDAZOLE    Tablet 250 milliGRAM(s) Oral <User Schedule>  neomycin 500 milliGRAM(s) Oral <User Schedule>  pantoprazole Infusion 8 mG/Hr IV Continuous <Continuous>  spironolactone 25 milliGRAM(s) Oral daily    Current Antimicrobials:  metroNIDAZOLE    Tablet 250 milliGRAM(s) Oral <User Schedule>  neomycin 500 milliGRAM(s) Oral <User Schedule>    Prior/Completed Antimicrobials:  piperacillin/tazobactam IVPB.  piperacillin/tazobactam IVPB..  piperacillin/tazobactam IVPB..  vancomycin  IVPB.

## 2023-08-16 NOTE — ASU PREOP CHECKLIST, PEDIATRIC - PATIENT SENT TO
Individual Psychotherapy (PhD/LCSW)    07/28/2023    Interim Events/Subjective Report/Content of Current Session:  follow-up appointment.    Pt is a 29 y.o. female with past psychiatric hx of  trauma and depression who presents for follow-up treatment.  Feeling discouraged per pt. Has gained approx 30 lbs since beginning medication. Stated he is having more difficulty getting to the gym due to lack of transportation.   Discussed ways to address weight gain in a healthy manner. Denied exacerbated depression or anxiety but is present in his mindset at this time. Wanting to address the weight gain by working out.   Discussed various dynamics within his relationship that he felt was related to traumatic experience in his life. Gf reportedly showed pt pictures of a trip and saw pictures from a previous relationship. Pt stated this triggered him and had a dream. Identified that he wanted to bring it up to gf and wanting to discuss ways to communicate this effectively without causing an argument. Focused on communication techniques.     Mood was positive. Showing motivation and stability.     Will continue to follow.   Pt aware to contact sw for any additional needs that may occur prior to next session.        Therapeutic Intervention/Techniques: insight oriented, interactive, and supportive; relevant to diagnosis, patient responds to this modality    Risk Parameters:  Patient reports no suicidal ideation  Patient reports no homicidal ideation  Patient reports no self-injurious behavior  Patient reports no violent behavior    Diagnosis:   1. MDD (major depressive disorder), recurrent episode, mild        2. PTSD (post-traumatic stress disorder)            Return to Clinic: as scheduled  Counseling time: 45  -Call to report any worsening of symptoms or problems associated with medication.  - Pt instructed to go to ER if thoughts of harming self or others arise.   -Supportive therapy and psychoeducation provided  -Pt  instructed to call clinic, 911 or go to nearest emergency room if sxs worsen or pt is in crisis. The pt expresses understanding.   Each patient to whom he or she provides medical services by telemedicine is:  (1) informed of the relationship between the physician and patient and the respective role of any other health care provider with respect to management of the patient; and (2) notified that he or she may decline to receive medical services by telemedicine and may withdraw from such care at any time.     endoscopy

## 2023-08-16 NOTE — PROGRESS NOTE ADULT - ASSESSMENT
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 Patient has never seen a colorectal surgeon in the past. Patient with non toxic appearance, stable  and with  benign abdominal physical exam, however CT scan concerning for colon mass c/w contained perforation.     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 Patient has never seen a colorectal surgeon in the past. Patient with non toxic appearance, stable  and with  benign abdominal physical exam, however CT scan concerning for colon mass c/w contained perforation.     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 Patient has never seen a colorectal surgeon in the past. Patient with non toxic appearance, stable  and with  benign abdominal physical exam, however CT scan concerning for colon mass c/w contained perforation.     PLAN:  - Continue clears as tolerated until OR  - F/u GI endoscopy  - OR on Thursday (8/17) for ERP Laparoscopic Left Colectomy   - Serial abdominal exams   - heparin gtt for AC while inpatient, hold Wednesday at 23:59  - Consented      [facility]   [id number]

## 2023-08-16 NOTE — PROGRESS NOTE ADULT - PROBLEM SELECTOR PLAN 4
Afib confirmed on ECG. No prior hx. Does not follow with outpatient cardiology  Paroxysmal Afib  -TTE no LA enlargement. EF 68%  -cw home metoprolol  -CHADSvasc at least 3.   -stopped Eliquis (last dose AM 8/14), stopped heparin gtt due to GI Bleed  -monitor on tele

## 2023-08-16 NOTE — PRE PROCEDURE NOTE - PRE PROCEDURE EVALUATION
Pre-operative Note    - Date: 8/17/23    - Labs:                        7.8    11.16 )-----------( 381      ( 16 Aug 2023 07:13 )             24.4     08-16    133<L>  |  100  |  36<H>  ----------------------------<  127<H>  4.1   |  22  |  1.05    Ca    8.4      16 Aug 2023 07:13      PTT - ( 14 Aug 2023 23:17 )  PTT:51.7 sec  Type & Screen #1: 8/16/23 (x2)    - UA: 8/8/23    - CXR: 8/8/23    - Blood: Not needed.       - Orders:  > ERP Protocol  > NPO two hours before OR  > IVF at midnight  > Morning Labs: CBC, BMP, coags, type & screen, night and morning chlorhexidine bath    - Permits:  > Consent in chart.  > Case scheduled with OR.

## 2023-08-16 NOTE — PROGRESS NOTE ADULT - SUBJECTIVE AND OBJECTIVE BOX
[facility] Division of Hospital Medicine  Minh Sterling MD, GREGORY  I'm reachable on edelight Teams   Off hours:  [phone] ([facility] pager)    Patient is a 83y old  Male who presents with a chief complaint of Severe sepsis/SIRS (16 Aug 2023 11:42)      SUBJECTIVE / OVERNIGHT EVENTS:  No events overnight. Bleeding seems to have stopped. Denies any more melena BMs. Agreeable to to have EGD today and surgery tomorrow.     MEDICATIONS  (STANDING):  atorvastatin 10 milliGRAM(s) Oral at bedtime  celecoxib 400 milliGRAM(s) Oral once  chlorhexidine 2% Cloths 1 Application(s) Topical <User Schedule>  finasteride 5 milliGRAM(s) Oral daily  furosemide    Tablet 40 milliGRAM(s) Oral daily  gabapentin 600 milliGRAM(s) Oral once  metoprolol tartrate 50 milliGRAM(s) Oral two times a day  metroNIDAZOLE    Tablet 250 milliGRAM(s) Oral <User Schedule>  neomycin 500 milliGRAM(s) Oral <User Schedule>  pantoprazole Infusion 8 mG/Hr (10 mL/Hr) IV Continuous <Continuous>  spironolactone 25 milliGRAM(s) Oral daily    MEDICATIONS  (PRN):  acetaminophen     Tablet .. 650 milliGRAM(s) Oral every 6 hours PRN Temp greater or equal to 38C (100.4F), Mild Pain (1 - 3)    CAPILLARY BLOOD GLUCOSE        I&O's Summary    15 Aug 2023 07:01  -  16 Aug 2023 07:00  --------------------------------------------------------  IN: 240 mL / OUT: 400 mL / NET: -160 mL        PHYSICAL EXAM:  Vital Signs Last 24 Hrs  T(C): 36.7 (16 Aug 2023 12:35), Max: 36.7 (16 Aug 2023 12:35)  T(F): 98 (16 Aug 2023 12:35), Max: 98 (16 Aug 2023 12:35)  HR: 113 (16 Aug 2023 12:35) (92 - 113)  BP: 103/65 (16 Aug 2023 12:35) (96/61 - 113/72)  BP(mean): --  RR: 18 (16 Aug 2023 12:35) (16 - 18)  SpO2: 96% (16 Aug 2023 12:35) (96% - 97%)    Parameters below as of 16 Aug 2023 12:35  Patient On (Oxygen Delivery Method): room air    CONSTITUTIONAL: elderly man, NAD, well-developed  EYES:  EOMI, conjunctiva and sclera clear  ENMT: Moist oral mucosa  NECK: Supple, no JVD  RESPIRATORY: Normal respiratory effort; lungs are clear to auscultation bilaterally  CARDIOVASCULAR: Regular rate and rhythm, normal S1 and S2, no murmur/rub/gallop; No lower extremity edema  ABDOMEN: decreased BS, soft, LLQ tender to palpation, no distension   MUSCULOSKELETAL:  no clubbing or cyanosis of digits; no joint swelling or tenderness to palpation  PSYCH: A+O x3; affect appropriate, calm and cooperative  NEUROLOGY: CN 2-12 are intact and symmetric; no gross sensory deficits       LABS:                        7.8    11.16 )-----------( 381      ( 16 Aug 2023 07:13 )             24.4     08-16    133<L>  |  100  |  36<H>  ----------------------------<  127<H>  4.1   |  22  |  1.05    Ca    8.4      16 Aug 2023 07:13      PTT - ( 14 Aug 2023 23:17 )  PTT:51.7 sec      Urinalysis Basic - ( 16 Aug 2023 07:13 )    Color: x / Appearance: x / SG: x / pH: x  Gluc: 127 mg/dL / Ketone: x  / Bili: x / Urobili: x   Blood: x / Protein: x / Nitrite: x   Leuk Esterase: x / RBC: x / WBC x   Sq Epi: x / Non Sq Epi: x / Bacteria: x

## 2023-08-17 ENCOUNTER — RESULT REVIEW (OUTPATIENT)
Age: 83
End: 2023-08-17

## 2023-08-17 ENCOUNTER — APPOINTMENT (OUTPATIENT)
Dept: CARDIOLOGY | Facility: CLINIC | Age: 83
End: 2023-08-17

## 2023-08-17 ENCOUNTER — TRANSCRIPTION ENCOUNTER (OUTPATIENT)
Age: 83
End: 2023-08-17

## 2023-08-17 ENCOUNTER — APPOINTMENT (OUTPATIENT)
Dept: SURGERY | Facility: HOSPITAL | Age: 83
End: 2023-08-17
Payer: MEDICARE

## 2023-08-17 LAB
ANION GAP SERPL CALC-SCNC: 13 MMOL/L — SIGNIFICANT CHANGE UP (ref 5–17)
ANION GAP SERPL CALC-SCNC: 15 MMOL/L — SIGNIFICANT CHANGE UP (ref 5–17)
APTT BLD: 32.4 SEC — SIGNIFICANT CHANGE UP (ref 24.5–35.6)
BUN SERPL-MCNC: 26 MG/DL — HIGH (ref 7–23)
BUN SERPL-MCNC: 30 MG/DL — HIGH (ref 7–23)
CALCIUM SERPL-MCNC: 7.8 MG/DL — LOW (ref 8.4–10.5)
CALCIUM SERPL-MCNC: 8.4 MG/DL — SIGNIFICANT CHANGE UP (ref 8.4–10.5)
CHLORIDE SERPL-SCNC: 100 MMOL/L — SIGNIFICANT CHANGE UP (ref 96–108)
CHLORIDE SERPL-SCNC: 101 MMOL/L — SIGNIFICANT CHANGE UP (ref 96–108)
CO2 SERPL-SCNC: 21 MMOL/L — LOW (ref 22–31)
CO2 SERPL-SCNC: 22 MMOL/L — SIGNIFICANT CHANGE UP (ref 22–31)
CREAT SERPL-MCNC: 0.93 MG/DL — SIGNIFICANT CHANGE UP (ref 0.5–1.3)
CREAT SERPL-MCNC: 1.01 MG/DL — SIGNIFICANT CHANGE UP (ref 0.5–1.3)
EGFR: 73 ML/MIN/1.73M2 — SIGNIFICANT CHANGE UP
EGFR: 80 ML/MIN/1.73M2 — SIGNIFICANT CHANGE UP
GAS PNL BLDA: SIGNIFICANT CHANGE UP
GLUCOSE SERPL-MCNC: 120 MG/DL — HIGH (ref 70–99)
GLUCOSE SERPL-MCNC: 90 MG/DL — SIGNIFICANT CHANGE UP (ref 70–99)
HCT VFR BLD CALC: 24.7 % — LOW (ref 39–50)
HCT VFR BLD CALC: 25.8 % — LOW (ref 39–50)
HGB BLD-MCNC: 7.9 G/DL — LOW (ref 13–17)
HGB BLD-MCNC: 8.2 G/DL — LOW (ref 13–17)
INR BLD: 1.33 RATIO — HIGH (ref 0.85–1.18)
MAGNESIUM SERPL-MCNC: 1.9 MG/DL — SIGNIFICANT CHANGE UP (ref 1.6–2.6)
MAGNESIUM SERPL-MCNC: 2 MG/DL — SIGNIFICANT CHANGE UP (ref 1.6–2.6)
MCHC RBC-ENTMCNC: 28 PG — SIGNIFICANT CHANGE UP (ref 27–34)
MCHC RBC-ENTMCNC: 28.2 PG — SIGNIFICANT CHANGE UP (ref 27–34)
MCHC RBC-ENTMCNC: 31.8 GM/DL — LOW (ref 32–36)
MCHC RBC-ENTMCNC: 32 GM/DL — SIGNIFICANT CHANGE UP (ref 32–36)
MCV RBC AUTO: 88.1 FL — SIGNIFICANT CHANGE UP (ref 80–100)
MCV RBC AUTO: 88.2 FL — SIGNIFICANT CHANGE UP (ref 80–100)
NRBC # BLD: 0 /100 WBCS — SIGNIFICANT CHANGE UP (ref 0–0)
NRBC # BLD: 0 /100 WBCS — SIGNIFICANT CHANGE UP (ref 0–0)
PHOSPHATE SERPL-MCNC: 2.9 MG/DL — SIGNIFICANT CHANGE UP (ref 2.5–4.5)
PHOSPHATE SERPL-MCNC: 3.5 MG/DL — SIGNIFICANT CHANGE UP (ref 2.5–4.5)
PLATELET # BLD AUTO: 464 K/UL — HIGH (ref 150–400)
PLATELET # BLD AUTO: 470 K/UL — HIGH (ref 150–400)
POTASSIUM SERPL-MCNC: 3.9 MMOL/L — SIGNIFICANT CHANGE UP (ref 3.5–5.3)
POTASSIUM SERPL-MCNC: 3.9 MMOL/L — SIGNIFICANT CHANGE UP (ref 3.5–5.3)
POTASSIUM SERPL-SCNC: 3.9 MMOL/L — SIGNIFICANT CHANGE UP (ref 3.5–5.3)
POTASSIUM SERPL-SCNC: 3.9 MMOL/L — SIGNIFICANT CHANGE UP (ref 3.5–5.3)
PROTHROM AB SERPL-ACNC: 14.5 SEC — HIGH (ref 9.5–13)
RBC # BLD: 2.8 M/UL — LOW (ref 4.2–5.8)
RBC # BLD: 2.93 M/UL — LOW (ref 4.2–5.8)
RBC # FLD: 14.3 % — SIGNIFICANT CHANGE UP (ref 10.3–14.5)
RBC # FLD: 14.3 % — SIGNIFICANT CHANGE UP (ref 10.3–14.5)
SODIUM SERPL-SCNC: 135 MMOL/L — SIGNIFICANT CHANGE UP (ref 135–145)
SODIUM SERPL-SCNC: 137 MMOL/L — SIGNIFICANT CHANGE UP (ref 135–145)
WBC # BLD: 11.85 K/UL — HIGH (ref 3.8–10.5)
WBC # BLD: 18.27 K/UL — HIGH (ref 3.8–10.5)
WBC # FLD AUTO: 11.85 K/UL — HIGH (ref 3.8–10.5)
WBC # FLD AUTO: 18.27 K/UL — HIGH (ref 3.8–10.5)

## 2023-08-17 PROCEDURE — 99233 SBSQ HOSP IP/OBS HIGH 50: CPT

## 2023-08-17 PROCEDURE — 88341 IMHCHEM/IMCYTCHM EA ADD ANTB: CPT | Mod: 26

## 2023-08-17 PROCEDURE — 99232 SBSQ HOSP IP/OBS MODERATE 35: CPT | Mod: GC

## 2023-08-17 PROCEDURE — 88342 IMHCHEM/IMCYTCHM 1ST ANTB: CPT | Mod: 26

## 2023-08-17 PROCEDURE — 49505 PRP I/HERN INIT REDUC >5 YR: CPT | Mod: 52,LT

## 2023-08-17 PROCEDURE — 88309 TISSUE EXAM BY PATHOLOGIST: CPT | Mod: 26

## 2023-08-17 PROCEDURE — 44143 PARTIAL REMOVAL OF COLON: CPT

## 2023-08-17 PROCEDURE — 44139 MOBILIZATION OF COLON: CPT

## 2023-08-17 DEVICE — KIT A-LINE 1LUM 20G X 12CM SAFE KIT: Type: IMPLANTABLE DEVICE | Status: FUNCTIONAL

## 2023-08-17 DEVICE — STAPLER COVIDIEN TRI-STAPLE 60MM PURPLE RELOAD: Type: IMPLANTABLE DEVICE | Status: FUNCTIONAL

## 2023-08-17 RX ORDER — HYDROMORPHONE HYDROCHLORIDE 2 MG/ML
0.5 INJECTION INTRAMUSCULAR; INTRAVENOUS; SUBCUTANEOUS EVERY 4 HOURS
Refills: 0 | Status: DISCONTINUED | OUTPATIENT
Start: 2023-08-17 | End: 2023-08-18

## 2023-08-17 RX ORDER — HYDROMORPHONE HYDROCHLORIDE 2 MG/ML
0.25 INJECTION INTRAMUSCULAR; INTRAVENOUS; SUBCUTANEOUS
Refills: 0 | Status: DISCONTINUED | OUTPATIENT
Start: 2023-08-17 | End: 2023-08-18

## 2023-08-17 RX ORDER — PIPERACILLIN AND TAZOBACTAM 4; .5 G/20ML; G/20ML
3.38 INJECTION, POWDER, LYOPHILIZED, FOR SOLUTION INTRAVENOUS ONCE
Refills: 0 | Status: COMPLETED | OUTPATIENT
Start: 2023-08-18 | End: 2023-08-18

## 2023-08-17 RX ORDER — FINASTERIDE 5 MG/1
5 TABLET, FILM COATED ORAL DAILY
Refills: 0 | Status: DISCONTINUED | OUTPATIENT
Start: 2023-08-17 | End: 2023-08-23

## 2023-08-17 RX ORDER — PIPERACILLIN AND TAZOBACTAM 4; .5 G/20ML; G/20ML
3.38 INJECTION, POWDER, LYOPHILIZED, FOR SOLUTION INTRAVENOUS ONCE
Refills: 0 | Status: DISCONTINUED | OUTPATIENT
Start: 2023-08-17 | End: 2023-08-17

## 2023-08-17 RX ORDER — PIPERACILLIN AND TAZOBACTAM 4; .5 G/20ML; G/20ML
3.38 INJECTION, POWDER, LYOPHILIZED, FOR SOLUTION INTRAVENOUS ONCE
Refills: 0 | Status: COMPLETED | OUTPATIENT
Start: 2023-08-17 | End: 2023-08-17

## 2023-08-17 RX ORDER — HYDROMORPHONE HYDROCHLORIDE 2 MG/ML
0.25 INJECTION INTRAMUSCULAR; INTRAVENOUS; SUBCUTANEOUS EVERY 4 HOURS
Refills: 0 | Status: DISCONTINUED | OUTPATIENT
Start: 2023-08-17 | End: 2023-08-18

## 2023-08-17 RX ORDER — ACETAMINOPHEN 500 MG
1000 TABLET ORAL EVERY 6 HOURS
Refills: 0 | Status: COMPLETED | OUTPATIENT
Start: 2023-08-17 | End: 2023-08-18

## 2023-08-17 RX ORDER — PANTOPRAZOLE SODIUM 20 MG/1
8 TABLET, DELAYED RELEASE ORAL
Qty: 80 | Refills: 0 | Status: DISCONTINUED | OUTPATIENT
Start: 2023-08-17 | End: 2023-08-20

## 2023-08-17 RX ORDER — SODIUM CHLORIDE 9 MG/ML
1000 INJECTION, SOLUTION INTRAVENOUS
Refills: 0 | Status: DISCONTINUED | OUTPATIENT
Start: 2023-08-17 | End: 2023-08-18

## 2023-08-17 RX ORDER — ONDANSETRON 8 MG/1
4 TABLET, FILM COATED ORAL ONCE
Refills: 0 | Status: DISCONTINUED | OUTPATIENT
Start: 2023-08-17 | End: 2023-08-18

## 2023-08-17 RX ORDER — PIPERACILLIN AND TAZOBACTAM 4; .5 G/20ML; G/20ML
3.38 INJECTION, POWDER, LYOPHILIZED, FOR SOLUTION INTRAVENOUS EVERY 8 HOURS
Refills: 0 | Status: DISCONTINUED | OUTPATIENT
Start: 2023-08-18 | End: 2023-08-22

## 2023-08-17 RX ADMIN — Medication 250 MILLIGRAM(S): at 05:12

## 2023-08-17 RX ADMIN — Medication 50 MILLIGRAM(S): at 05:13

## 2023-08-17 RX ADMIN — PIPERACILLIN AND TAZOBACTAM 200 GRAM(S): 4; .5 INJECTION, POWDER, LYOPHILIZED, FOR SOLUTION INTRAVENOUS at 23:14

## 2023-08-17 RX ADMIN — PANTOPRAZOLE SODIUM 10 MG/HR: 20 TABLET, DELAYED RELEASE ORAL at 20:52

## 2023-08-17 RX ADMIN — Medication 40 MILLIGRAM(S): at 05:13

## 2023-08-17 RX ADMIN — CHLORHEXIDINE GLUCONATE 1 APPLICATION(S): 213 SOLUTION TOPICAL at 05:18

## 2023-08-17 RX ADMIN — SPIRONOLACTONE 25 MILLIGRAM(S): 25 TABLET, FILM COATED ORAL at 05:14

## 2023-08-17 RX ADMIN — PANTOPRAZOLE SODIUM 10 MG/HR: 20 TABLET, DELAYED RELEASE ORAL at 05:09

## 2023-08-17 RX ADMIN — SODIUM CHLORIDE 100 MILLILITER(S): 9 INJECTION, SOLUTION INTRAVENOUS at 20:52

## 2023-08-17 RX ADMIN — NEOMYCIN SULFATE 500 MILLIGRAM(S): 500 TABLET ORAL at 05:12

## 2023-08-17 NOTE — PROGRESS NOTE ADULT - PROBLEM SELECTOR PROBLEM 6
Chronic obstructive pulmonary disease (COPD)
HTN (hypertension)
Chronic diastolic congestive heart failure
Chronic obstructive pulmonary disease (COPD)
HTN (hypertension)
Chronic diastolic congestive heart failure
Chronic obstructive pulmonary disease (COPD)
Chronic diastolic congestive heart failure

## 2023-08-17 NOTE — PROGRESS NOTE ADULT - PROBLEM SELECTOR PROBLEM 5
Chronic diastolic congestive heart failure
Lesion of colon
Chronic obstructive pulmonary disease (COPD)
Chronic diastolic congestive heart failure
Chronic diastolic congestive heart failure
Lesion of colon
Lesion of colon
Chronic obstructive pulmonary disease (COPD)

## 2023-08-17 NOTE — PROGRESS NOTE ADULT - PROBLEM SELECTOR PLAN 5
Prior CT A/P (9/23/19) found on PACS notable for "focal asymmetric wall thickening along the medial aspect of the descending colon with adjacent soft tissue stranding and a small lymph node. Neoplasm   is of concern."   -Ct ab/p shows loculated fluid collection at the descending and sigmoid colon suspicious for contained perforation of colonic neoplasm  -f/u colorectal surgery on increased collection on repeat CT ab/P  -CEA and Ca 19 negative Prior CT A/P (9/23/19) found on PACS notable for "focal asymmetric wall thickening along the medial aspect of the descending colon with adjacent soft tissue stranding and a small lymph node. Neoplasm   is of concern."   CT ab/p shows loculated fluid collection at the descending and sigmoid colon suspicious for contained perforation of colonic neoplasm  Surgery with colorectal scheduled for today 8/17  CEA and Ca 19 negative

## 2023-08-17 NOTE — PROGRESS NOTE ADULT - ASSESSMENT
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 Course complicated by new onset of A-fib on 8/10. Uptrending WBC and increased abdominal tenderness and repeat CT AP 8/13 with interval increase in colonic perforation collection. colorectal surgery planning for laparoscopic L colectomy for source control 8/17. Now with melena and downtrending Hb c/f UGIB, EGD 8/16 with non-bleeding duodenal ulcers x2, likely the source of previous bleeding.     #melena  #acute blood loss anemia  Melena since 8/15 with downtrending Hb, s/p transfusion of 1U PRBC 8/16, Hb 7.9 8/17 AM. (previously 11s earlier this admission). EGD 8/16 with non-bleeding duodenal ulcers x2, likely the source of previous bleeding - no endoscopic therapy performed given no stigmata of bleeding at time of endoscopy.     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 Colorectal surgery planning for OR 8/17 for laparoscopic L colectomy  - Sepsis likely due to perforation of colonic neoplasm  - New onset of A-fib on 8/10, not noted previously in chart. Likely in the setting of sepsis. Negative trop. Was on anticoagulation (eliquis -> heparin gtt 8/14-8/15), now discontinued ISO suspected UGIB.  - Lung function is baseline  - TTE from 8/2022 revealed EF 60% with indeterminant LV diastolic dysfunction. Normal RV function.     Recommendations:  - OR for planned laparoscopic L colectomy with colorectal surgery 8/17  - Colonoscopy contraindicated ISO suspected contained colonic perforation  - c/w medical management with antibiotics  - Sepsis, Afib and heart failure evaluation per primary team    Note incomplete until finalized by attending signature/attestation. 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 Course complicated by new onset of A-fib on 8/10. Uptrending WBC and increased abdominal tenderness and repeat CT AP 8/13 with interval increase in colonic perforation collection. colorectal surgery planning for laparoscopic L colectomy for source control 8/17. Now with melena and downtrending Hb c/f UGIB, EGD 8/16 with non-bleeding duodenal ulcers x2, likely the source of previous bleeding.     #melena  #acute blood loss anemia  Melena since 8/15 with downtrending Hb, s/p transfusion of 1U PRBC 8/16, Hb 7.9 8/17 AM. (previously 11s earlier this admission). EGD 8/16 with non-bleeding duodenal ulcers x2, likely the source of previous bleeding - no endoscopic therapy performed given no stigmata of bleeding at time of endoscopy.     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 Colorectal surgery planning for OR 8/17 for laparoscopic L colectomy  - Sepsis likely due to perforation of colonic neoplasm  - New onset of A-fib on 8/10, not noted previously in chart. Likely in the setting of sepsis. Negative trop. Was on anticoagulation (eliquis -> heparin gtt 8/14-8/15), now discontinued ISO suspected UGIB.  - Lung function is baseline  - TTE from 8/2022 revealed EF 60% with indeterminant LV diastolic dysfunction. Normal RV function.     Recommendations:  - OR for planned laparoscopic L colectomy with colorectal surgery 8/17  - Colonoscopy contraindicated ISO suspected contained colonic perforation  - c/w medical management with antibiotics  - Sepsis, Afib and heart failure evaluation per primary team    Note incomplete until finalized by attending signature/attestation. 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 Course complicated by new onset of A-fib on 8/10. Uptrending WBC and increased abdominal tenderness and repeat CT AP 8/13 with interval increase in colonic perforation collection. colorectal surgery planning for laparoscopic L colectomy for source control 8/17. Now with melena and downtrending Hb c/f UGIB, EGD 8/16 with non-bleeding duodenal ulcers x2, likely the source of previous bleeding.     #melena  #acute blood loss anemia  Melena since 8/15 with downtrending Hb, s/p transfusion of 1U PRBC 8/16, Hb 7.9 8/17 AM. (previously 11s earlier this admission). EGD 8/16 with non-bleeding duodenal ulcers x2, likely the source of previous bleeding - no endoscopic therapy performed given no stigmata of bleeding at time of endoscopy.     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 Colorectal surgery planning for OR 8/17 for laparoscopic L colectomy  - Sepsis likely due to perforation of colonic neoplasm  - New onset of A-fib on 8/10, not noted previously in chart. Likely in the setting of sepsis. Negative trop. Was on anticoagulation (eliquis -> heparin gtt 8/14-8/15), now discontinued ISO suspected UGIB.  - Lung function is baseline  - TTE from 8/2022 revealed EF 60% with indeterminant LV diastolic dysfunction. Normal RV function.     Recommendations:  - OR for planned laparoscopic L colectomy with colorectal surgery 8/17  - Colonoscopy contraindicated ISO suspected contained colonic perforation  - c/w medical management with antibiotics  - Sepsis, Afib and heart failure evaluation per primary team  - continue protonix gtt for 72h, then transition to 40 mg IV BID  - monitor for melena  - monitor H/H , maintain active type and screen, transfuse for Hb <7  - f/u gastric biopsy results      Note incomplete until finalized by attending signature/attestation.      We will sign off at this time. Please reconsult/page if questions.

## 2023-08-17 NOTE — PROGRESS NOTE ADULT - PROBLEM SELECTOR PROBLEM 7
Chronic obstructive pulmonary disease (COPD)
HLD (hyperlipidemia)
Chronic obstructive pulmonary disease (COPD)
HLD (hyperlipidemia)
HTN (hypertension)
Chronic obstructive pulmonary disease (COPD)

## 2023-08-17 NOTE — PROGRESS NOTE ADULT - PROBLEM SELECTOR PLAN 2
New LLQ abdominal pain 8/11  CT scan on admission showed loculated fluid collection at the junction of the descending and sigmoid colon suspicious for contained perforation of colon neoplasm  Repeat CT ab/P IV after developing new LL showed increased in fluid collection at descending/sigmoid colon suspicious for perforated neoplasm.  Surgery with colorectal surgery scheduled for today 8/17  NPO  Zosyn IV per ID recs (appreciated).  Plan of care discussed in detail with the patient and daughter Laurie by phone ([phone], [phone]) New LLQ abdominal pain 8/11  CT scan on admission showed loculated fluid collection at the junction of the descending and sigmoid colon suspicious for contained perforation of colon neoplasm  Repeat CT ab/P IV after developing new LL showed increased in fluid collection at descending/sigmoid colon suspicious for perforated neoplasm.  Surgery with colorectal scheduled for today 8/17  NPO  Zosyn IV per ID recs (appreciated).  Plan of care discussed in detail with the patient and daughter Laurie by phone ([phone], [phone])

## 2023-08-17 NOTE — PROGRESS NOTE ADULT - SUBJECTIVE AND OBJECTIVE BOX
DATE OF SERVICE: 08-17-23 @ 15:12    Patient is a 83y old  Male who presents with a chief complaint of Severe sepsis/SIRS (17 Aug 2023 13:57)      INTERVAL HISTORY: Feels ok. Planned for surgery today.     REVIEW OF SYSTEMS:  CONSTITUTIONAL: No weakness  EYES/ENT: No visual changes;  No throat pain   NECK: No pain or stiffness  RESPIRATORY: No cough, wheezing; No shortness of breath  CARDIOVASCULAR: No chest pain or palpitations  GASTROINTESTINAL: No abdominal  pain. No nausea, vomiting, or hematemesis  GENITOURINARY: No dysuria, frequency or hematuria  NEUROLOGICAL: No stroke like symptoms  SKIN: No rashes    TELEMETRY Personally reviewed: AF   	  MEDICATIONS:  furosemide    Tablet 40 milliGRAM(s) Oral daily  metoprolol tartrate 50 milliGRAM(s) Oral two times a day  spironolactone 25 milliGRAM(s) Oral daily        PHYSICAL EXAM:  T(C): 36.4 (08-17-23 @ 04:17), Max: 97.1 (08-16-23 @ 16:06)  HR: 88 (08-17-23 @ 04:17) (83 - 116)  BP: 111/67 (08-17-23 @ 04:17) (102/67 - 160/74)  RR: 18 (08-17-23 @ 04:17) (17 - 21)  SpO2: 96% (08-17-23 @ 04:17) (94% - 99%)  Wt(kg): --  I&O's Summary    16 Aug 2023 07:01  -  17 Aug 2023 07:00  --------------------------------------------------------  IN: 170 mL / OUT: 1001 mL / NET: -831 mL      Height (cm): 180.3 (08-16 @ 16:24)  Weight (kg): 81.6 (08-16 @ 16:24)  BMI (kg/m2): 25.1 (08-16 @ 16:24)  BSA (m2): 2.02 (08-16 @ 16:24)    Appearance: In no distress	  HEENT:    PERRL, EOMI	  Cardiovascular:  S1 S2, No JVD  Respiratory: Lungs clear to auscultation	  Gastrointestinal:  Soft, Non-tender, + BS	  Vascularature:  dep edema of LE  Psychiatric: Appropriate affect   Neuro: no acute focal deficits                               7.9    11.85 )-----------( 464      ( 17 Aug 2023 07:00 )             24.7     08-17    135  |  100  |  30<H>  ----------------------------<  90  3.9   |  22  |  1.01    Ca    8.4      17 Aug 2023 06:56  Phos  2.9     08-17  Mg     1.9     08-17          Labs personally reviewed      ASSESSMENT/PLAN: 	    82M w/ hx of HTN, HLD, pre-DM, BPH, COPD, ?ILD, pleural 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 SBP down to 80s. 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   ·  Plan: Afib confirmed on ecg. No prior hx.    -TTE no LA enlargement. EF 68%  -cw home metoprolol  -CHADSvasc at least 3. Per colorectal surgery team no contraindication to AC  If no contraindication by CRS will start Eliquis 5mg BID for stroke ppx  - Resume AC post Op    Problem/Plan - 2:  ·  Problem: Chronic diastolic congestive heart failure.   ·  Plan: TTE with preserved EF.  - Continue Furosemide 40mg daily  - Continue Manito 25mg daily      Problem/Plan - 3:  ·  Problem: HTN (hypertension).   ·  Plan: - Metoprolol 50mg PO BID    Problem/Plan - 4:  ·  Problem: HLD (hyperlipidemia).   ·  Plan: - c/w home atorvastatin.    Problem/Plan- 5:  Problem: Cardiac Risk Stratification  - EKG with no ischemia noted  - Patient not in decompensated HF  - No hx of tachy/marysol arrhythmia  - TTE unremarkable   - Patient is mod risk for mod risk Laparoscopic Left Colectomy. No contraindication to proceed          ALPESH Cottrell-NP   Joce Ballesteros DO [facility]  Cardiovascular Medicine  [address], [address]  Available through call or text on [employer]  Office: [phone]

## 2023-08-17 NOTE — PROGRESS NOTE ADULT - PROBLEM SELECTOR PLAN 3
Presented with fever to 102.5F rectal, tachycardia to 110s, RR>20, SBP down to 80s, WBC 18.49k, and lactate elevated to 3.3 initially. UA and RVP unrevealing. Concern for severe sepsis/SIRS. Chest CT neg for PNA. Found to have fluid collection (?contained perforation of colonic neoplasm)  - c/w empiric Zosyn (8/8/23- )  - monitor WBC and fever curve  - blood cultures ngtd, sputum culture neg  - ID recs appreciated  - Surgical plan as above (source control).
Afib confirmed on ecg. No prior hx. Does not follow with outpatient cardiology  Paroxysmal Afib  -TTE no LA enlargement. EF 68%  -cw home metoprolol  -CHADSvasc at least 3.   -stopped Eliquis (last dose AM 8/14) - started heparin gtt for now pending surgery   -monitor on tele
Prior CT A/P (9/23/19) found on PACS notable for "focal asymmetric wall thickening along the medial aspect of the descending colon with adjacent soft tissue stranding and a small lymph node. Neoplasm   is of concern."   -Ct ab/p shows loculated fluid collection at the descending and sigmoid colon suspicious for contained perforation of colonic neoplasm.
Afib confirmed on ecg. No prior hx. Does not follow with outpatient cardiology  Paroxysmal Afib  -TTE no LA enlargement. EF 68%  -cw home metoprolol  -CHADSvasc at least 3. Discussed with colorectal surgery 8/11 , no contraindication to start AC  -monitor on tele
Presented with fever to 102.5F rectal, tachycardia to 110s, RR>20, SBP down to 80s, WBC 18.49k, and lactate elevated to 3.3 initially. UA and RVP unrevealing. Concern for severe sepsis/SIRS. Chest CT neg for PNA. Found to have fluid collection (?contained perforation of colonic neoplasm)  - c/w empiric Zosyn (8/8/23- )  - monitor WBC and fever curve  - blood cultures ngtd, sputum culture neg  - ID recs appreciated  - Surgical plan as above (source control).
Prior CT A/P (9/23/19) found on PACS notable for "focal asymmetric wall thickening along the medial aspect of the descending colon with adjacent soft tissue stranding and a small lymph node. Neoplasm   is of concern."   -Ct ab/p shows loculated fluid collection at the descending and sigmoid colon suspicious for contained perforation of colonic neoplasm.
Afib confirmed on ecg. No prior hx. Does not follow with outpatient cardiology  Paroxysmal Afib  -TTE no LA enlargement. EF 68%  -cw home metoprolol  -CHADSvasc at least 3. Discussed with colorectal surgery 8/11 , no contraindication to start AC  -monitor on tele
Presented with fever to 102.5F rectal, tachycardia to 110s, RR>20, SBP down to 80s, WBC 18.49k, and lactate elevated to 3.3 initially  UA and RVP unrevealing  Concern for severe sepsis/SIRS  Chest CT neg for PNA. Found to have fluid collection (?contained perforation of colonic neoplasm)  c/w empiric Zosyn (8/8/23- )  Monitor WBC and fever curve  Blood cultures NGTD, sputum culture neg  ID recs appreciated  Surgical plan as above (source control) in LLQ pain

## 2023-08-17 NOTE — CHART NOTE - NSCHARTNOTEFT_GEN_A_CORE
STATUS POST:      POST OPERATIVE DAY #: 0    SUBJECTIVE: Patient examined in PACU, sleeping comfortably. No complaints. Denies abdominal pain, CP, SOB, nausea, headache.      Vital Signs Last 24 Hrs  T(C): 36 (17 Aug 2023 20:20), Max: 37.1 (17 Aug 2023 17:03)  T(F): 96.8 (17 Aug 2023 20:20), Max: 98.8 (17 Aug 2023 17:03)  HR: 94 (17 Aug 2023 23:30) (84 - 109)  BP: 107/59 (17 Aug 2023 23:30) (80/48 - 136/65)  BP(mean): 80 (17 Aug 2023 23:30) (58 - 81)  RR: 16 (17 Aug 2023 23:30) (16 - 18)  SpO2: 97% (17 Aug 2023 23:30) (94% - 100%)    Parameters below as of 17 Aug 2023 23:30  Patient On (Oxygen Delivery Method): room air      I&O's Summary    16 Aug 2023 07:01  -  17 Aug 2023 07:00  --------------------------------------------------------  IN: 170 mL / OUT: 1001 mL / NET: -831 mL    17 Aug 2023 07:01  -  17 Aug 2023 23:58  --------------------------------------------------------  IN: 330 mL / OUT: 335 mL / NET: -5 mL      I&O's Detail    16 Aug 2023 07:01  -  17 Aug 2023 07:00  --------------------------------------------------------  IN:    Oral Fluid: 50 mL    Pantoprazole: 120 mL  Total IN: 170 mL    OUT:    Stool (mL): 1 mL    Voided (mL): 1000 mL  Total OUT: 1001 mL    Total NET: -831 mL      17 Aug 2023 07:01  -  17 Aug 2023 23:58  --------------------------------------------------------  IN:    Lactated Ringers: 300 mL    Pantoprazole: 30 mL  Total IN: 330 mL    OUT:    Indwelling Catheter - Urethral (mL): 335 mL  Total OUT: 335 mL    Total NET: -5 mL          MEDICATIONS  (STANDING):  acetaminophen   IVPB .. 1000 milliGRAM(s) IV Intermittent every 6 hours  finasteride 5 milliGRAM(s) Oral daily  lactated ringers. 1000 milliLiter(s) (100 mL/Hr) IV Continuous <Continuous>  pantoprazole Infusion 8 mG/Hr (10 mL/Hr) IV Continuous <Continuous>    MEDICATIONS  (PRN):  HYDROmorphone  Injectable 0.25 milliGRAM(s) IV Push every 10 minutes PRN Moderate Pain (4 - 6)  HYDROmorphone  Injectable 0.25 milliGRAM(s) IV Push every 4 hours PRN Moderate Pain (4 - 6)  HYDROmorphone  Injectable 0.5 milliGRAM(s) IV Push every 4 hours PRN Severe Pain (7 - 10)  ondansetron Injectable 4 milliGRAM(s) IV Push once PRN Nausea and/or Vomiting      Physical Exam  Gen: NAD, A&Ox3  Pulm: Nonlabored breathing on RA  CV: NSR on tele  Abd: Soft, minimally tender, ND. Dressing C/D/I. Stoma pink and viable with scant liquid stool in bag.  Extremities: Bruising to BLE, WWP    LABS:                        8.2    18.27 )-----------( 470      ( 17 Aug 2023 20:48 )             25.8     08-17    137  |  101  |  26<H>  ----------------------------<  120<H>  3.9   |  21<L>  |  0.93    Ca    7.8<L>      17 Aug 2023 20:48  Phos  3.5     08-17  Mg     2.0     08-17      PT/INR - ( 17 Aug 2023 07:00 )   PT: 14.5 sec;   INR: 1.33 ratio         PTT - ( 17 Aug 2023 07:00 )  PTT:32.4 sec  Urinalysis Basic - ( 17 Aug 2023 20:48 )    Color: x / Appearance: x / SG: x / pH: x  Gluc: 120 mg/dL / Ketone: x  / Bili: x / Urobili: x   Blood: x / Protein: x / Nitrite: x   Leuk Esterase: x / RBC: x / WBC x   Sq Epi: x / Non Sq Epi: x / Bacteria: x          A/P: 83y Male now s/p sigmoid colectomy with end colostomy creation and left inguinal hernia reduction. Recovering appropriately, afebrile and hemodynamically stable in PACU.     Plan  - Diet: NPO with meds, IVF  - Activity: AAT  - Zosyn  - Pain medication  - Reeval in 2 hours then to floor on tele  - Ostomy consult, teaching

## 2023-08-17 NOTE — PROGRESS NOTE ADULT - PROBLEM SELECTOR PLAN 7
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 Former smoker, quit ~15 yrs ago.  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 Former smoker, quit ~15 yrs ago.  Per pt, previously tried an inhaler (Anoro per outpt records), but he stopped it shortly after because it did not make him feel well  Monitor respiratory status, currently stable on RA

## 2023-08-17 NOTE — PROGRESS NOTE ADULT - SUBJECTIVE AND OBJECTIVE BOX
Gastroenterology/Hepatology Progress Note    Interval Events:   - EGD 8/16 - non-bleeding duodenal ulcers x2, no endoscopic therapy performed  - s/p 1U PRBC 8/16  - Hb 7.9   - scheduled for OR with CRS today    Allergies:  No Known Allergies      Hospital Medications:  acetaminophen     Tablet .. 650 milliGRAM(s) Oral every 6 hours PRN  atorvastatin 10 milliGRAM(s) Oral at bedtime  celecoxib 400 milliGRAM(s) Oral once  chlorhexidine 2% Cloths 1 Application(s) Topical <User Schedule>  finasteride 5 milliGRAM(s) Oral daily  furosemide    Tablet 40 milliGRAM(s) Oral daily  gabapentin 600 milliGRAM(s) Oral once  metoprolol tartrate 50 milliGRAM(s) Oral two times a day  pantoprazole Infusion 8 mG/Hr IV Continuous <Continuous>  spironolactone 25 milliGRAM(s) Oral daily      ROS: 14 point ROS negative unless otherwise state in subjective    PHYSICAL EXAM:   Vital Signs:  Vital Signs Last 24 Hrs  T(C): 36.4 (17 Aug 2023 04:17), Max: 97.1 (16 Aug 2023 16:06)  T(F): 97.6 (17 Aug 2023 04:17), Max: 98 (16 Aug 2023 12:35)  HR: 88 (17 Aug 2023 04:17) (83 - 116)  BP: 111/67 (17 Aug 2023 04:17) (102/67 - 160/74)  BP(mean): 67 (16 Aug 2023 16:24) (67 - 67)  RR: 18 (17 Aug 2023 04:17) (17 - 21)  SpO2: 96% (17 Aug 2023 04:17) (94% - 99%)    Parameters below as of 17 Aug 2023 04:17  Patient On (Oxygen Delivery Method): room air      Daily Height in cm: 180.34 (16 Aug 2023 16:24)    Daily     GENERAL: sleeping in bed, wakes to voice, no acute distress  HEENT:  NCAT, no scleral icterus  CHEST: no resp distress  ABDOMEN:  soft, LLQ TTP, no palpable masses  EXTREMITIES:  No cyanosis, clubbing, or edema  SKIN:  No rashes, scattered ecchymosis over arms, no wounds on visible skin  NEURO:  Alert and oriented x 3, no asterixis, no tremor      LABS:                        7.9    11.85 )-----------( 464      ( 17 Aug 2023 07:00 )             24.7     Mean Cell Volume: 88.2 fl (08-17-23 @ 07:00)    08-17    135  |  100  |  30<H>  ----------------------------<  90  3.9   |  22  |  1.01    Ca    8.4      17 Aug 2023 06:56  Phos  2.9     08-17  Mg     1.9     08-17        PT/INR - ( 17 Aug 2023 07:00 )   PT: 14.5 sec;   INR: 1.33 ratio         PTT - ( 17 Aug 2023 07:00 )  PTT:32.4 sec  Urinalysis Basic - ( 17 Aug 2023 06:56 )    Color: x / Appearance: x / SG: x / pH: x  Gluc: 90 mg/dL / Ketone: x  / Bili: x / Urobili: x   Blood: x / Protein: x / Nitrite: x   Leuk Esterase: x / RBC: x / WBC x   Sq Epi: x / Non Sq Epi: x / Bacteria: x            Imaging:           Gastroenterology/Hepatology Progress Note    Interval Events:   - EGD 8/16 - non-bleeding duodenal ulcers x2, no endoscopic therapy performed  - s/p 1U PRBC 8/16  - Hb 7.9   - no melena reported overnight  - scheduled for OR with CRS today    Allergies:  No Known Allergies      Hospital Medications:  acetaminophen     Tablet .. 650 milliGRAM(s) Oral every 6 hours PRN  atorvastatin 10 milliGRAM(s) Oral at bedtime  celecoxib 400 milliGRAM(s) Oral once  chlorhexidine 2% Cloths 1 Application(s) Topical <User Schedule>  finasteride 5 milliGRAM(s) Oral daily  furosemide    Tablet 40 milliGRAM(s) Oral daily  gabapentin 600 milliGRAM(s) Oral once  metoprolol tartrate 50 milliGRAM(s) Oral two times a day  pantoprazole Infusion 8 mG/Hr IV Continuous <Continuous>  spironolactone 25 milliGRAM(s) Oral daily      ROS: 14 point ROS negative unless otherwise state in subjective    PHYSICAL EXAM:   Vital Signs:  Vital Signs Last 24 Hrs  T(C): 36.4 (17 Aug 2023 04:17), Max: 97.1 (16 Aug 2023 16:06)  T(F): 97.6 (17 Aug 2023 04:17), Max: 98 (16 Aug 2023 12:35)  HR: 88 (17 Aug 2023 04:17) (83 - 116)  BP: 111/67 (17 Aug 2023 04:17) (102/67 - 160/74)  BP(mean): 67 (16 Aug 2023 16:24) (67 - 67)  RR: 18 (17 Aug 2023 04:17) (17 - 21)  SpO2: 96% (17 Aug 2023 04:17) (94% - 99%)    Parameters below as of 17 Aug 2023 04:17  Patient On (Oxygen Delivery Method): room air      Daily Height in cm: 180.34 (16 Aug 2023 16:24)    Daily     GENERAL: sleeping in bed, wakes to voice, no acute distress  HEENT:  NCAT, no scleral icterus  CHEST: no resp distress  ABDOMEN:  soft, LLQ TTP, no palpable masses  EXTREMITIES:  No cyanosis, clubbing, or edema  SKIN:  No rashes, scattered ecchymosis over arms, no wounds on visible skin  NEURO:  Alert and oriented x 3, no asterixis, no tremor      LABS:                        7.9    11.85 )-----------( 464      ( 17 Aug 2023 07:00 )             24.7     Mean Cell Volume: 88.2 fl (08-17-23 @ 07:00)    08-17    135  |  100  |  30<H>  ----------------------------<  90  3.9   |  22  |  1.01    Ca    8.4      17 Aug 2023 06:56  Phos  2.9     08-17  Mg     1.9     08-17        PT/INR - ( 17 Aug 2023 07:00 )   PT: 14.5 sec;   INR: 1.33 ratio         PTT - ( 17 Aug 2023 07:00 )  PTT:32.4 sec  Urinalysis Basic - ( 17 Aug 2023 06:56 )    Color: x / Appearance: x / SG: x / pH: x  Gluc: 90 mg/dL / Ketone: x  / Bili: x / Urobili: x   Blood: x / Protein: x / Nitrite: x   Leuk Esterase: x / RBC: x / WBC x   Sq Epi: x / Non Sq Epi: x / Bacteria: x            Imaging:

## 2023-08-17 NOTE — PROGRESS NOTE ADULT - PROBLEM SELECTOR PROBLEM 2
Left lower quadrant pain
New onset atrial fibrillation
Severe sepsis
New onset atrial fibrillation
Left lower quadrant pain
Severe sepsis
Left lower quadrant pain
Severe sepsis

## 2023-08-17 NOTE — PROGRESS NOTE ADULT - SUBJECTIVE AND OBJECTIVE BOX
[facility]  KASEY Mustafa Y. Patel, S. Shah, G. Gusatvo  [phone]  ([phone] - weekdays after 5pm and weekends)    Name: GLADIS LEONARD  Age/Gender: 83y Male  MRN: [id number]    Interval History:  Patient seen and examined this morning.   Report LLQ pain, denies fever, n/v.   Waiting to go to OR today  Notes reviewed. Afebrile   Allergies: No Known Allergies      Objective:  Vitals:   T(F): 97.6 (08-17-23 @ 04:17), Max: 97.6 (08-17-23 @ 04:17)  HR: 88 (08-17-23 @ 04:17) (83 - 116)  BP: 111/67 (08-17-23 @ 04:17) (102/67 - 160/74)  RR: 18 (08-17-23 @ 04:17) (17 - 21)  SpO2: 96% (08-17-23 @ 04:17) (94% - 99%)  Physical Examination:  General: no acute distress  HEENT: NC/AT, anicteric, neck supple  Respiratory: no acc muscle use, breathing comfortably  Cardiovascular: S1 and S2 present  Gastrointestinal: soft, LLQ TTP, ND  Extremities: no edema, no cyanosis  Skin: no visible rash, ecchymosis     Laboratory Studies:  CBC:                       7.9    11.85 )-----------( 464      ( 17 Aug 2023 07:00 )             24.7     WBC Trend:  11.85 08-17-23 @ 07:00  13.57 08-16-23 @ 20:32  11.16 08-16-23 @ 07:13  11.75 08-15-23 @ 21:39  14.27 08-15-23 @ 09:12  13.37 08-14-23 @ 23:17  12.25 08-14-23 @ 07:13  10.39 08-12-23 @ 12:08  11.70 08-11-23 @ 07:14    CMP: 08-17    135  |  100  |  30<H>  ----------------------------<  90  3.9   |  22  |  1.01    Ca    8.4      17 Aug 2023 06:56  Phos  2.9     08-17  Mg     1.9     08-17      Creatinine: 1.01 mg/dL (08-17-23 @ 06:56)  Creatinine: 1.05 mg/dL (08-16-23 @ 07:13)  Creatinine: 0.91 mg/dL (08-14-23 @ 07:11)  Creatinine: 0.91 mg/dL (08-12-23 @ 12:08)  Creatinine: 1.01 mg/dL (08-11-23 @ 07:15)    Microbiology: reviewed   Culture - Sputum (collected 08-09-23 @ 11:22)  Source: .Sputum Sputum  Gram Stain (08-09-23 @ 20:44):    Rare Squamous epithelial cells per low power field    No polymorphonuclear leukocytes per low power field    Rare Gram Negative Rods per oil power field    Rare Gram positive cocci in pairs per oil power field  Final Report (08-11-23 @ 21:22):    Normal Respiratory Milly present    Culture - Urine (collected 08-08-23 @ 22:57)  Source: Clean Catch Clean Catch (Midstream)  Final Report (08-10-23 @ 10:40):    <10,000 CFU/mL Normal Urogenital Milly    Culture - Blood (collected 08-08-23 @ 22:15)  Source: .Blood Blood-Peripheral  Final Report (08-14-23 @ 02:00):    No growth at 5 days    Culture - Blood (collected 08-08-23 @ 22:10)  Source: .Blood Blood-Peripheral  Final Report (08-14-23 @ 02:00):    No growth at 5 days    Radiology: reviewed     Medications:  acetaminophen     Tablet .. 650 milliGRAM(s) Oral every 6 hours PRN  atorvastatin 10 milliGRAM(s) Oral at bedtime  celecoxib 400 milliGRAM(s) Oral once  chlorhexidine 2% Cloths 1 Application(s) Topical <User Schedule>  finasteride 5 milliGRAM(s) Oral daily  furosemide    Tablet 40 milliGRAM(s) Oral daily  gabapentin 600 milliGRAM(s) Oral once  metoprolol tartrate 50 milliGRAM(s) Oral two times a day  pantoprazole Infusion 8 mG/Hr IV Continuous <Continuous>  spironolactone 25 milliGRAM(s) Oral daily    Current Antimicrobials:    Prior/Completed Antimicrobials:  metroNIDAZOLE    Tablet  neomycin  piperacillin/tazobactam IVPB.  piperacillin/tazobactam IVPB..  piperacillin/tazobactam IVPB..  vancomycin  IVPB.

## 2023-08-17 NOTE — BRIEF OPERATIVE NOTE - OPERATION/FINDINGS
Open sigmoid colectomy with drainage and resection of abscess cavity. MICKI purple stapler used to transect area proximal to grossly thickened area of sigmoid. Another MICKI purple used to transect distal end. The abscess cavity and affected colon were resected. Prior to midline closure, left inguinal hernia sac was reduced and ligated using 2x endoloops. Promixal end of descending colon was matured into an end colostomy. Distal sigmoid staple line sutured to ostomy aperture.

## 2023-08-17 NOTE — PROGRESS NOTE ADULT - SUBJECTIVE AND OBJECTIVE BOX
SUBJECTIVE / OVERNIGHT EVENTS:  Today is hospital day 8d. There are no new issues or overnight events.   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 He was brought to the hospital after being unable to get out of bed due to weakness. Noted that he fell twice about a week ago, stating the first time was because he tripped and second time his legs "just gave out." Denied head strike or LOC. Felt better after hydration and food and so he didn't seek care at the time. Has had poor appetite for the past week, denied noticing any unintended weight loss. Denied headache, vision changes, CP, SOB, nausea, vomiting, abdominal pain, dysuria, hematuria, hematochezia, melena, diarrhea, constipation, recent travel, or sick contacts. Noted he was spitting up some mucous, but stated he has that from time to time. Stated he had a colonoscopy some time ago (unclear when) and was not told about any remarkable findings, though outpatient note from PCP from 6/2023 stated pt had not had colonoscopy at time.    In ED: Tmax rectal 102.5F, HR 80s-110s, SBP 80s-130s, RR 17-22, sating 92-97% on RA. Labs notable for WBC 18.49k, lactate 3.3->1.2. UA not suggestive of infection. CXR (prelim read) showed b/l patchy opacities. Given tylenol 1g, Vanc 1g, Zosyn 3.375mg x2, and 2.5L NS bolus. Admitted to Medicine for further management. (09 Aug 2023 02:46)    MEDICATIONS  (STANDING):  atorvastatin 10 milliGRAM(s) Oral at bedtime  celecoxib 400 milliGRAM(s) Oral once  chlorhexidine 2% Cloths 1 Application(s) Topical <User Schedule>  finasteride 5 milliGRAM(s) Oral daily  furosemide    Tablet 40 milliGRAM(s) Oral daily  gabapentin 600 milliGRAM(s) Oral once  metoprolol tartrate 50 milliGRAM(s) Oral two times a day  pantoprazole Infusion 8 mG/Hr (10 mL/Hr) IV Continuous <Continuous>  spironolactone 25 milliGRAM(s) Oral daily    MEDICATIONS  (PRN):  acetaminophen     Tablet .. 650 milliGRAM(s) Oral every 6 hours PRN Temp greater or equal to 38C (100.4F), Mild Pain (1 - 3)    HOME MEDICATIONS:  atorvastatin 10 mg oral tablet: 1 tab(s) orally once a day (09 Aug 2023 06:51)  clotrimazole-betamethasone dipropionate 1%-0.05% topical cream: Apply topically to affected area 2 times a day in the AM and PM. according to daughter, for his private part (11 Aug 2023 10:45)  finasteride 5 mg oral tablet: 1 tab(s) orally once a day (09 Aug 2023 06:51)  furosemide 40 mg oral tablet: 1 tab(s) orally once a day (11 Aug 2023 10:46)  losartan 100 mg oral tablet: 1 tab(s) orally once a day (09 Aug 2023 06:51)  Metoprolol Tartrate 100 mg oral tablet: 1 tab(s) orally once a day (09 Aug 2023 06:51)  spironolactone 25 mg oral tablet: 1 tab(s) orally once a day (09 Aug 2023 06:51)    PHYSICAL EXAM:  Vital Signs Last 24 Hrs  T(C): 36.4 (17 Aug 2023 04:17), Max: 97.1 (16 Aug 2023 16:06)  T(F): 97.6 (17 Aug 2023 04:17), Max: 97.6 (17 Aug 2023 04:17)  HR: 88 (17 Aug 2023 04:17) (83 - 116)  BP: 111/67 (17 Aug 2023 04:17) (102/67 - 160/74)  BP(mean): 67 (16 Aug 2023 16:24) (67 - 67)  RR: 18 (17 Aug 2023 04:17) (17 - 21)  SpO2: 96% (17 Aug 2023 04:17) (94% - 99%)    Parameters below as of 17 Aug 2023 04:17  Patient On (Oxygen Delivery Method): room air      I&O's Summary    16 Aug 2023 07:01  -  17 Aug 2023 07:00  --------------------------------------------------------  IN: 170 mL / OUT: 1001 mL / NET: -831 mL      CONSTITUTIONAL: Well-groomed, in no apparent distress  EYES: No conjunctival or scleral injection, non-icteric  ENMT: No external nasal lesions; Normal outer ears  NECK: Supple; Trachea midline  RESPIRATORY: Normal respiratory effort; lungs are clear to auscultation bilaterally without wheeze/rhonchi/rales  CARDIOVASCULAR: Regular rate and rhythm, normal S1 and S2, no murmur/rub/gallop; No lower extremity edema  GASTROINTESTINAL: Non-distended; No palpable masses; Mild tenderness to deep palpation; No rebound/guarding  MUSCULOSKELETAL:  Normal gait;  SKIN: No rashes or ulcers noted  NEUROLOGY: A+O to person, place, and time; no gross motor deficits   PSYCHIATRY: Mood and Affect appropriate    LABS:                        7.9    11.85 )-----------( 464      ( 17 Aug 2023 07:00 )             24.7     08-17    135  |  100  |  30<H>  ----------------------------<  90  3.9   |  22  |  1.01    Ca    8.4      17 Aug 2023 06:56  Phos  2.9     08-17  Mg     1.9     08-17      PT/INR - ( 17 Aug 2023 07:00 )   PT: 14.5 sec;   INR: 1.33 ratio         PTT - ( 17 Aug 2023 07:00 )  PTT:32.4 sec      Urinalysis Basic - ( 17 Aug 2023 06:56 )    Color: x / Appearance: x / SG: x / pH: x  Gluc: 90 mg/dL / Ketone: x  / Bili: x / Urobili: x   Blood: x / Protein: x / Nitrite: x   Leuk Esterase: x / RBC: x / WBC x   Sq Epi: x / Non Sq Epi: x / Bacteria: x        SARS-CoV-2: NotDetec (09 Aug 2023 05:03)      RADIOLOGY & ADDITIONAL TESTS:  EKG  12 Lead ECG:   Ventricular Rate 91 BPM    QRS Duration 72 ms    Q-T Interval 356 ms    QTC Calculation(Bazett) 437 ms    R Axis 28 degrees    T Axis 36 degrees    Diagnosis Line ATRIAL FIBRILLATION  LOW VOLTAGE QRS  ABNORMAL ECG  WHEN COMPARED WITH ECG OF 10/9/23  NO SIGNIFICANT CHANGE WAS FOUND  Confirmed by MD JACK, ESTHER ([id number]) on 8/11/2023 7:17:03 PM (08-10-23 @ 16:27)  12 Lead ECG:   Ventricular Rate 86 BPM    QRS Duration 76 ms    Q-T Interval 392 ms    QTC Calculation(Bazett) 469 ms    R Axis 41 degrees    T Axis 56 degrees    Diagnosis Line ATRIAL FIBRILLATION  ABNORMAL ECG  WHEN COMPARED WITH ECG OF 08-AUG-2023 22:42, (UNCONFIRMED)  NO SIGNIFICANT CHANGE WAS FOUND  Confirmed by MD MAXIMILIAN, BELINDA ([id number]) on 8/10/2023 10:43:20 AM (08-09-23 @ 05:12)    Xray Chest 1 View-PORTABLE IMMEDIATE:   ACC: [id number] EXAM:  XR CHEST PORTABLE IMMED 1V   ORDERED BY: TAI NEGRETE     PROCEDURE DATE:  08/08/2023          INTERPRETATION:  CLINICAL INFORMATION: Sepsis.    TECHNIQUE: AP radiograph of the chest.    COMPARISON: Chest radiograph 10/19/2010. Chest CT 9/23/2019.    Limited positioning. Right costophrenic angle cut from film. Overlying   EKG leads and wires.    FINDINGS:    Patchy bibasilar opacities. No pleural effusion or pneumothorax.  Calcified pleural plaques.  Normal cardiomediastinal silhouette.  Osseous degenerative changes.    IMPRESSION:    Patchy bibasilar opacities, possibly multifocal infection in the context   of sepsis.    Refer to follow-up chest CT.    --- End of Report ---           MORIAH LLANES MD; Resident Radiologist  This document has been electronically signed.   NEW GRUBBS DO; Attending Radiologist  This document has been electronically signed. Aug  9 2023  9:51AM (08-08-23 @ 22:42)

## 2023-08-17 NOTE — PROGRESS NOTE ADULT - PROBLEM SELECTOR PROBLEM 3
Lesion of colon
Severe sepsis
New onset atrial fibrillation
Severe sepsis
Severe sepsis
New onset atrial fibrillation
New onset atrial fibrillation
Lesion of colon

## 2023-08-17 NOTE — PRE-ANESTHESIA EVALUATION ADULT - NSRADCARDRESULTSFT_GEN_ALL_CORE
8/11/23 TTE: CONCLUSIONS:      1. Left ventricular systolic function is normal with an ejection fraction of 68 % by Henao's method of disks.    ________________________________________________________________________________________  FINDINGS:     Left Ventricle:  Left ventricular systolic function is normal with a calculated ejection fraction of 68 % by the Henao's biplane method of disks. There is normal left ventricular diastolic function. No left ventricular hypertrophy. There is normal LV mass and concentric remodeling.     Right Ventricle:  Normal right ventricular cavity size. Tricuspid annular plane systolic excursion (TAPSE) is 2.1 cm (normal >=1.7 cm). Tricuspid annular tissue Doppler S' is 13.5 cm/s (normal >10 cm/s).     Left Atrium:  The left atrium is normal in size with an indexed volume of 28.41 ml/m².     Right Atrium:  The right atrium is mildly dilated in size with an indexed volume of 34.12 ml/m².     Interatrial Septum:  There is no evidence of a patent foramen ovale with no shunt detected by color flow Doppler.     Aortic Valve:  There is fibrocalcific aortic valve sclerosis without stenosis. There is mild aortic regurgitation.     Mitral Valve:  There is calcification of the mitral valve annulus. There is mild mitral regurgitation.     Tricuspid Valve:  Structurally normal tricuspid valve with normal leaflet excursion. There is mild tricuspid regurgitation. Estimated pulmonary artery systolic pressure is 39 mmHg.     Pulmonic Valve:  Structurally normal pulmonic valve with normal leaflet excursion. There is trace pulmonic regurgitation.     Aorta:  The aortic annulus and aortic root appear normal in size.     Pericardium:  No pericardial effusion seen.     Systemic Veins:  The inferior vena cava is normal in size measuring 1.88 cm in diameter, (normal <2.1cm) with normal inspiratory collapse (normal >50%) consistent with normal right atrial pressure (~3, range 0-5mmHg).

## 2023-08-17 NOTE — PRE-ANESTHESIA EVALUATION ADULT - NSANTHAIRWAYFT_ENT_ALL_CORE
FROM  Neck circumference >17cm  TM distance <6cm   Interincisor distance >2 finger breadths  Discolored teeth, none loose

## 2023-08-17 NOTE — PROGRESS NOTE ADULT - ASSESSMENT
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 Patient with fever, tachycardia, leukocytosis with lactic acidosis on admission and admitted for severe sepsis vs SIRS     Severe sepsis likely due to contained perforation of colonic neoplasm   New onset Afib, now on eliquis   - CT with focal mural thickening and associated loculated fluid collection at the junction of the descending and sigmoid colon suspicious for contained perforation of colonic neoplasm, progressed since 2019. Has atelectasis reported, no pneumonia.   - repeat CTAP with perforated neoplasm and slight interval increased in size of adjacent collection   - GI following, s/p EGD 8/16 - 2 nonbleeding duodenal ulcers  - melena 8/15 - none noted this am;   - MRSA PCR screen negative, off vancomycin    - Bcx negative   - remains afebrile, WBC improved/stable    Recommendations:  CRS following - planning for ERP laparoscopic L colectomy today  S/p pip-tazo > neomycin and metronidazole preop per surgery team  Monitor temps/CBC  Continue rest of care per primary team      Doris Connelly M.D.  [facility], Division of Infectious Diseases  [phone]  After 5pm on weekdays and all day on weekends - please call [phone]

## 2023-08-17 NOTE — PROGRESS NOTE ADULT - PROBLEM SELECTOR PROBLEM 4
Chronic diastolic congestive heart failure
New onset atrial fibrillation
Lesion of colon
New onset atrial fibrillation
Chronic diastolic congestive heart failure
New onset atrial fibrillation
Lesion of colon
Lesion of colon

## 2023-08-17 NOTE — PROGRESS NOTE ADULT - PROBLEM SELECTOR PLAN 11
- DVT ppx: Eliquis  - Diet: DASH/CC  - Dispo: home PT  - Code status: DNR/DNI DVT ppx: Hold for GIB  Diet: NPO today  Dispo: home PT  Code status: DNR/DNI

## 2023-08-17 NOTE — PROGRESS NOTE ADULT - SUBJECTIVE AND OBJECTIVE BOX
Colorectal Surgery Progress Note    Overnight: No acute events.    Subjective: Patient resting in bed.     Objective:  Vitals:  T(C): 36.3 (08-16-23 @ 20:15), Max: 97.1 (08-16-23 @ 16:06)  HR: 83 (08-16-23 @ 20:15) (83 - 116)  BP: 102/67 (08-16-23 @ 20:15) (96/61 - 160/74)  RR: 21 (08-16-23 @ 17:45) (16 - 21)  SpO2: 97% (08-16-23 @ 17:45) (94% - 99%)  Wt(kg): --    08-15 @ 07:01  -  08-16 @ 07:00  --------------------------------------------------------  IN:    Oral Fluid: 240 mL  Total IN: 240 mL    OUT:    Voided (mL): 400 mL  Total OUT: 400 mL    Total NET: -160 mL      08-16 @ 07:01  -  08-17 @ 00:46  --------------------------------------------------------  IN:  Total IN: 0 mL    OUT:    Voided (mL): 500 mL  Total OUT: 500 mL    Total NET: -500 mL          Physical Exam:  General: WN/WD NAD  Neurology: A&Ox3, nonfocal, follows commands  Eyes: PERRLA/ EOMI  ENT/Neck: Neck supple, trachea midline, No JVD  Respiratory: CTA B/L, No wheezing, rales, rhonchi  CV: Normal rate regular rhythm, S1S2, no murmurs, rubs or gallops  Abdominal: Soft, NT, ND +BS,   Extremities: No edema, + peripheral pulses  Skin: No Rashes, Hematoma, Ecchymosis  Incisions:   Tubes:      Labs:                        8.5    13.57 )-----------( 418      ( 16 Aug 2023 20:32 )             25.8     08-16    133<L>  |  100  |  36<H>  ----------------------------<  127<H>  4.1   |  22  |  1.05    Ca    8.4      16 Aug 2023 07:13          Urinalysis Basic - ( 16 Aug 2023 07:13 )    Color: x / Appearance: x / SG: x / pH: x  Gluc: 127 mg/dL / Ketone: x  / Bili: x / Urobili: x   Blood: x / Protein: x / Nitrite: x   Leuk Esterase: x / RBC: x / WBC x   Sq Epi: x / Non Sq Epi: x / Bacteria: x

## 2023-08-17 NOTE — PROGRESS NOTE ADULT - PROBLEM SELECTOR PLAN 4
Afib confirmed on ECG. No prior hx. Does not follow with outpatient cardiology  Paroxysmal Afib  TTE no LA enlargement. EF 68%  c/w home metoprolol  CHADSvasc at least 3.   Stopped Eliquis (last dose AM 8/14), stopped heparin gtt due to GI Bleed  Monitor on tele Afib confirmed on ECG. No prior hx. Does not follow with outpatient cardiology  Paroxysmal Afib  CHADSvasc at least 3.   Stopped Eliquis (last dose AM 8/14), stopped heparin gtt due to GI Bleed  TTE no LA enlargement. EF 68%  c/w home metoprolol  Monitor on tele

## 2023-08-17 NOTE — PROGRESS NOTE ADULT - PROBLEM SELECTOR PROBLEM 9
Prediabetes
Prediabetes
Prophylactic measure
Prediabetes
Prophylactic measure
HLD (hyperlipidemia)

## 2023-08-17 NOTE — PROGRESS NOTE ADULT - TIME BILLING
Time spent reviewing the chart documentation, reviewing labs and imaging studies, evaluating the patient, discussing the plan of care with the consultants & medical team, patient and daughter, and documenting.
- Ordering, reviewing, and interpreting labs, testing, and imaging.  - Independently obtaining a review of systems and performing a physical exam  - Reviewing consultant documentation/recommendations in addition to discussing plan of care with consultants.  - Counselling and educating patient and family regarding interpretation of aforementioned items and plan of care.

## 2023-08-17 NOTE — BRIEF OPERATIVE NOTE - NSICDXBRIEFPROCEDURE_GEN_ALL_CORE_FT
PROCEDURES:  Open colectomy involving left side of colon or sigmoid colon 17-Aug-2023 20:15:46  Jodi Montero  Mobilization, splenic flexure 17-Aug-2023 20:15:55  Jodi Montero  Colectomy with end colostomy 17-Aug-2023 20:16:41  Jodi Montero

## 2023-08-17 NOTE — PROGRESS NOTE ADULT - PROBLEM SELECTOR PLAN 6
Pt with b/l LE pitting edema. Prior TTE (2014) noted evidence of diastolic dysfunction. Subsequent TTEs with indeterminate diastolic function. Follows outpatient with Vascular Cardiology, Dr. Lopez Kahn.  - strict I/Os, standing weights daily  - continue lasix 40mg daily - home dose   - continue spironolactone 25mg daily  - continue metoprolol 50mg BID  - monitor volume status  - b/l LE duplex neg  - TTE with preserved EF Follows outpatient with Vascular Cardiology, Dr. Lopez Kahn  HFpEF  (EF = 68% on Date 08/11/2023)  Heart failure, CHF order set initiated  Daily weight reviewed today (decreased/stable/increased).  Guideline directed medical therapy as below:  - Diuretic: lasix 40mg daily  - BB: metoprolol 50mg BID  - ARNI/ACE-I/ARB: On losartan 100 QD at home  - MRA: spironolactone 25mg daily  - SGLT2: Consider starting at d/c

## 2023-08-17 NOTE — PROGRESS NOTE ADULT - ASSESSMENT
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 Patient has never seen a colorectal surgeon in the past. Patient with non toxic appearance, stable  and with benign abdominal physical exam, however CT scan concerning for colon mass c/w contained perforation. 8/16 EGD with two duodenal ulcers suspicious for source of prior melena, no active bleed. Stable for OR today.    PLAN:  - OR today for ERP Laparoscopic Left Colectomy   - Continue clears as tolerated until OR  - Serial abdominal exams   - Holding heparin  - GI recs appreciated: continue PPI drip x 72 hours after EGD before transitioning to 40mg BID IV pushes  - Consented    [facility]   [id number]    82M w/ hx of HTN, HLD, pre-DM, BPH, COPD, ILD, pleural plaques 2/2 asbestosis, former smoker, colonic lesion (?neoplasm), pulm nodule, thyroid nodule, diastolic dysfunction, aortic ectasia, cellulitis, presenting with generalized weakness, fevers, and chills x1 day. 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 Patient has never seen a colorectal surgeon in the past. Patient with non toxic appearance, stable  and with benign abdominal physical exam, however CT scan concerning for colon mass c/w contained perforation. 8/16 EGD with two duodenal ulcers suspicious for source of prior melena, no active bleed. Stable for OR today.    PLAN:  - OR today for ERP Laparoscopic Left Colectomy   - NPO  - Serial abdominal exams   - Holding heparin  - GI recs appreciated: continue PPI drip x 72 hours after EGD before transitioning to 40mg BID IV pushes  - [facility]   [id number]

## 2023-08-17 NOTE — PROGRESS NOTE ADULT - PROBLEM SELECTOR PLAN 1
Possible melena this morning 8/15 per nursing report  He did drop from 8.7 from 10 last night, and 11's previously.   Stopped heparin gtt.   Given protonix 80mg IVP x1 and started protonix gtt   GI consult emailed  Monitor CBC q8h  Transfuse Hg <8
Acute blood loss anemia due to likely upper GI bleed - melena reported on 8/15.   On 8/15 stopped heparin gtt and started protonix gtt   GI planning on EGD today 8/16  Monitor CBC q8h  Transfuse 1 unit PRBC today 8/16 for goal Hg above 8
Presented with fever to 102.5F rectal, tachycardia to 110s, RR>20, SBP down to 80s, WBC 18.49k, and lactate elevated to 3.3 initially. UA and RVP unrevealing. Concern for severe sepsis/SIRS. Chest CT neg for PNA. Found to have fluid collection (?contained perforation of colonic neoplasm)  - s/p Vanc/Zosyn and 2.5L IVF in ED with improvement of BP  - c/w empiric Zosyn (8/8/23- )  - monitor WBC and fever curve  - blood cultures ngtd, sputum culture neg  - see below for colon lesion  - ID recs appreciated
New LLQ abdominal pain 8/11. CT scan on admission showed loculated fluid collection at the   junction of the descending and sigmoid colon suspicious for contained perforation of colon neoplasm  -Repeat CT ab/P IV given new ab pain to reassess perforation
Acute blood loss anemia due to likely upper GI bleed - melena reported on 8/15  8/15 stopped heparin gtt and started protonix gtt   s/p EGD 8/16 - nonhealing two duodenal ulcers  Monitor CBC q8h  s/p 1 unit PRBC 8/16 for goal Hg above 8
New LLQ abdominal pain 8/11. CT scan on admission showed loculated fluid collection at the   junction of the descending and sigmoid colon suspicious for contained perforation of colon neoplasm  -Repeat CT ab/P IV after developing new LL showed increased in fluid collection at descending/sigmoid colon suspicious for perforated neoplasm.  -reached out to surgery 8/13 in regards enlarging collection with new LLQ pain. F/u recs  -Miralax prn for constipation
New LLQ abdominal pain 8/11. CT scan on admission showed loculated fluid collection at the   junction of the descending and sigmoid colon suspicious for contained perforation of colon neoplasm  - Repeat CT ab/P IV after developing new LL showed increased in fluid collection at descending/sigmoid colon suspicious for perforated neoplasm.  - After discussing with the surgical team, the patient is now in agreement with possible surgery later this week.   - Continue clears for now.   - Zosyn IV per ID recs (appreciated).  - Plan of care discussed in detail with the patient and daughter Laurie by phone ([phone], [phone])
Presented with fever to 102.5F rectal, tachycardia to 110s, RR>20, SBP down to 80s, WBC 18.49k, and lactate elevated to 3.3 initially. UA and RVP unrevealing. Concern for severe sepsis/SIRS. Chest CT neg for PNA. Found to have fluid collection (?contained perforation of colonic neoplasm)  - s/p Vanc/Zosyn and 2.5L IVF in ED with improvement of BP  - c/w empiric Zosyn (8/8/23- ). Will transition to augmentin for total 14d course on d (no source control)  - monitor WBC and fever curve  - blood cultures ngtd, sputum culture neg  - see below for colon lesion  - ID recs appreciated

## 2023-08-17 NOTE — PROGRESS NOTE ADULT - PROBLEM SELECTOR PROBLEM 8
HTN (hypertension)
HLD (hyperlipidemia)
Prediabetes
HTN (hypertension)
Prediabetes
HLD (hyperlipidemia)
HTN (hypertension)
HLD (hyperlipidemia)

## 2023-08-17 NOTE — PROGRESS NOTE ADULT - PROBLEM SELECTOR PLAN 8
- cw home spironolactone and Lasix  - Hold losartan, restart if BP tolerates  - cw home metorpolol c/w home spironolactone and Lasix, metoprolol  Hold losartan, restart if BP tolerates

## 2023-08-17 NOTE — PRE-ANESTHESIA EVALUATION ADULT - NSANTHOSAYNRD_GEN_A_CORE
No. FREEDOM screening performed.  STOP BANG Legend: 0-2 = LOW Risk; 3-4 = INTERMEDIATE Risk; 5-8 = HIGH Risk
No. FREEDOM screening performed.  STOP BANG Legend: 0-2 = LOW Risk; 3-4 = INTERMEDIATE Risk; 5-8 = HIGH Risk

## 2023-08-17 NOTE — PROGRESS NOTE ADULT - PROBLEM SELECTOR PROBLEM 1
Left lower quadrant pain
Acute upper GI bleed
Left lower quadrant pain
Acute upper GI bleed
Severe sepsis
Acute upper GI bleed
Severe sepsis
Left lower quadrant pain

## 2023-08-18 LAB
ANION GAP SERPL CALC-SCNC: 15 MMOL/L — SIGNIFICANT CHANGE UP (ref 5–17)
BUN SERPL-MCNC: 24 MG/DL — HIGH (ref 7–23)
CALCIUM SERPL-MCNC: 8 MG/DL — LOW (ref 8.4–10.5)
CHLORIDE SERPL-SCNC: 101 MMOL/L — SIGNIFICANT CHANGE UP (ref 96–108)
CO2 SERPL-SCNC: 21 MMOL/L — LOW (ref 22–31)
CREAT SERPL-MCNC: 0.87 MG/DL — SIGNIFICANT CHANGE UP (ref 0.5–1.3)
EGFR: 85 ML/MIN/1.73M2 — SIGNIFICANT CHANGE UP
GLUCOSE SERPL-MCNC: 147 MG/DL — HIGH (ref 70–99)
HCT VFR BLD CALC: 25.3 % — LOW (ref 39–50)
HGB BLD-MCNC: 8.2 G/DL — LOW (ref 13–17)
MAGNESIUM SERPL-MCNC: 2.1 MG/DL — SIGNIFICANT CHANGE UP (ref 1.6–2.6)
MCHC RBC-ENTMCNC: 28.5 PG — SIGNIFICANT CHANGE UP (ref 27–34)
MCHC RBC-ENTMCNC: 32.4 GM/DL — SIGNIFICANT CHANGE UP (ref 32–36)
MCV RBC AUTO: 87.8 FL — SIGNIFICANT CHANGE UP (ref 80–100)
NRBC # BLD: 0 /100 WBCS — SIGNIFICANT CHANGE UP (ref 0–0)
PHOSPHATE SERPL-MCNC: 3.5 MG/DL — SIGNIFICANT CHANGE UP (ref 2.5–4.5)
PLATELET # BLD AUTO: 448 K/UL — HIGH (ref 150–400)
POTASSIUM SERPL-MCNC: 4.2 MMOL/L — SIGNIFICANT CHANGE UP (ref 3.5–5.3)
POTASSIUM SERPL-SCNC: 4.2 MMOL/L — SIGNIFICANT CHANGE UP (ref 3.5–5.3)
RBC # BLD: 2.88 M/UL — LOW (ref 4.2–5.8)
RBC # FLD: 14.3 % — SIGNIFICANT CHANGE UP (ref 10.3–14.5)
SODIUM SERPL-SCNC: 137 MMOL/L — SIGNIFICANT CHANGE UP (ref 135–145)
WBC # BLD: 13.35 K/UL — HIGH (ref 3.8–10.5)
WBC # FLD AUTO: 13.35 K/UL — HIGH (ref 3.8–10.5)

## 2023-08-18 RX ORDER — METOPROLOL TARTRATE 50 MG
5 TABLET ORAL ONCE
Refills: 0 | Status: COMPLETED | OUTPATIENT
Start: 2023-08-18 | End: 2023-08-18

## 2023-08-18 RX ORDER — METOPROLOL TARTRATE 50 MG
50 TABLET ORAL
Refills: 0 | Status: DISCONTINUED | OUTPATIENT
Start: 2023-08-18 | End: 2023-08-23

## 2023-08-18 RX ORDER — OXYCODONE HYDROCHLORIDE 5 MG/1
5 TABLET ORAL EVERY 4 HOURS
Refills: 0 | Status: DISCONTINUED | OUTPATIENT
Start: 2023-08-18 | End: 2023-08-23

## 2023-08-18 RX ORDER — ACETAMINOPHEN 500 MG
1000 TABLET ORAL EVERY 6 HOURS
Refills: 0 | Status: DISCONTINUED | OUTPATIENT
Start: 2023-08-19 | End: 2023-08-23

## 2023-08-18 RX ORDER — ATORVASTATIN CALCIUM 80 MG/1
10 TABLET, FILM COATED ORAL AT BEDTIME
Refills: 0 | Status: DISCONTINUED | OUTPATIENT
Start: 2023-08-18 | End: 2023-08-23

## 2023-08-18 RX ORDER — HEPARIN SODIUM 5000 [USP'U]/ML
5000 INJECTION INTRAVENOUS; SUBCUTANEOUS EVERY 8 HOURS
Refills: 0 | Status: DISCONTINUED | OUTPATIENT
Start: 2023-08-18 | End: 2023-08-20

## 2023-08-18 RX ORDER — METOPROLOL TARTRATE 50 MG
100 TABLET ORAL DAILY
Refills: 0 | Status: DISCONTINUED | OUTPATIENT
Start: 2023-08-18 | End: 2023-08-18

## 2023-08-18 RX ORDER — OXYCODONE HYDROCHLORIDE 5 MG/1
2.5 TABLET ORAL EVERY 4 HOURS
Refills: 0 | Status: DISCONTINUED | OUTPATIENT
Start: 2023-08-18 | End: 2023-08-23

## 2023-08-18 RX ADMIN — PIPERACILLIN AND TAZOBACTAM 25 GRAM(S): 4; .5 INJECTION, POWDER, LYOPHILIZED, FOR SOLUTION INTRAVENOUS at 14:31

## 2023-08-18 RX ADMIN — HEPARIN SODIUM 5000 UNIT(S): 5000 INJECTION INTRAVENOUS; SUBCUTANEOUS at 14:30

## 2023-08-18 RX ADMIN — Medication 1000 MILLIGRAM(S): at 18:20

## 2023-08-18 RX ADMIN — Medication 400 MILLIGRAM(S): at 00:15

## 2023-08-18 RX ADMIN — Medication 400 MILLIGRAM(S): at 17:20

## 2023-08-18 RX ADMIN — HEPARIN SODIUM 5000 UNIT(S): 5000 INJECTION INTRAVENOUS; SUBCUTANEOUS at 21:20

## 2023-08-18 RX ADMIN — Medication 50 MILLIGRAM(S): at 17:21

## 2023-08-18 RX ADMIN — SODIUM CHLORIDE 40 MILLILITER(S): 9 INJECTION, SOLUTION INTRAVENOUS at 09:18

## 2023-08-18 RX ADMIN — SODIUM CHLORIDE 100 MILLILITER(S): 9 INJECTION, SOLUTION INTRAVENOUS at 00:17

## 2023-08-18 RX ADMIN — ATORVASTATIN CALCIUM 10 MILLIGRAM(S): 80 TABLET, FILM COATED ORAL at 21:19

## 2023-08-18 RX ADMIN — Medication 1000 MILLIGRAM(S): at 00:45

## 2023-08-18 RX ADMIN — PIPERACILLIN AND TAZOBACTAM 25 GRAM(S): 4; .5 INJECTION, POWDER, LYOPHILIZED, FOR SOLUTION INTRAVENOUS at 02:53

## 2023-08-18 RX ADMIN — PANTOPRAZOLE SODIUM 10 MG/HR: 20 TABLET, DELAYED RELEASE ORAL at 00:17

## 2023-08-18 RX ADMIN — PIPERACILLIN AND TAZOBACTAM 25 GRAM(S): 4; .5 INJECTION, POWDER, LYOPHILIZED, FOR SOLUTION INTRAVENOUS at 21:20

## 2023-08-18 RX ADMIN — Medication 1000 MILLIGRAM(S): at 13:25

## 2023-08-18 RX ADMIN — Medication 400 MILLIGRAM(S): at 12:25

## 2023-08-18 RX ADMIN — Medication 5 MILLIGRAM(S): at 12:25

## 2023-08-18 RX ADMIN — Medication 400 MILLIGRAM(S): at 06:15

## 2023-08-18 RX ADMIN — FINASTERIDE 5 MILLIGRAM(S): 5 TABLET, FILM COATED ORAL at 12:25

## 2023-08-18 RX ADMIN — PIPERACILLIN AND TAZOBACTAM 25 GRAM(S): 4; .5 INJECTION, POWDER, LYOPHILIZED, FOR SOLUTION INTRAVENOUS at 06:15

## 2023-08-18 NOTE — PROGRESS NOTE ADULT - SUBJECTIVE AND OBJECTIVE BOX
Colorectal Surgery Progress Note    Overnight: No acute events since OR.    Subjective: Patient resting in bed.    Objective:  Vitals:  T(C): 36.5 (08-18-23 @ 00:00), Max: 37.1 (08-17-23 @ 17:03)  HR: 90 (08-18-23 @ 00:15) (84 - 109)  BP: 107/57 (08-18-23 @ 00:00) (80/48 - 136/65)  RR: 16 (08-18-23 @ 00:15) (16 - 18)  SpO2: 96% (08-18-23 @ 00:15) (94% - 100%)  Wt(kg): --    08-16 @ 07:01  -  08-17 @ 07:00  --------------------------------------------------------  IN:    Oral Fluid: 50 mL    Pantoprazole: 120 mL  Total IN: 170 mL    OUT:    Stool (mL): 1 mL    Voided (mL): 1000 mL  Total OUT: 1001 mL    Total NET: -831 mL      08-17 @ 07:01  -  08-18 @ 01:09  --------------------------------------------------------  IN:    Lactated Ringers: 500 mL    Pantoprazole: 50 mL  Total IN: 550 mL    OUT:    Indwelling Catheter - Urethral (mL): 410 mL  Total OUT: 410 mL    Total NET: 140 mL      Physical Exam:  General: WN/WD NAD  Neurology: A&Ox3, nonfocal, follows commands  Eyes: PERRLA/ EOMI  ENT/Neck: Neck supple, trachea midline, No JVD  Respiratory: CTA B/L, No wheezing, rales, rhonchi  CV: Normal rate regular rhythm, S1S2, no murmurs, rubs or gallops  Abdominal: Soft, NT, ND +BS,   Extremities: No edema, + peripheral pulses  Skin: No Rashes, Hematoma, Ecchymosis  Incisions:   Tubes:        Labs:                        8.2    18.27 )-----------( 470      ( 17 Aug 2023 20:48 )             25.8     08-17    137  |  101  |  26<H>  ----------------------------<  120<H>  3.9   |  21<L>  |  0.93    Ca    7.8<L>      17 Aug 2023 20:48  Phos  3.5     08-17  Mg     2.0     08-17        PT/INR - ( 17 Aug 2023 07:00 )   PT: 14.5 sec;   INR: 1.33 ratio         PTT - ( 17 Aug 2023 07:00 )  PTT:32.4 sec  Urinalysis Basic - ( 17 Aug 2023 20:48 )    Color: x / Appearance: x / SG: x / pH: x  Gluc: 120 mg/dL / Ketone: x  / Bili: x / Urobili: x   Blood: x / Protein: x / Nitrite: x   Leuk Esterase: x / RBC: x / WBC x   Sq Epi: x / Non Sq Epi: x / Bacteria: x Colorectal Surgery Progress Note    Overnight: No acute events since OR.    Subjective: Patient resting in bed, no acute events overnight. Patient is reporting minimal abdominal pain, no n/v. no ostomy function yet. working with IS this morning. has not been oob/amb. voiding well through gomez.    Objective:  Vitals:  T(C): 36.5 (08-18-23 @ 00:00), Max: 37.1 (08-17-23 @ 17:03)  HR: 90 (08-18-23 @ 00:15) (84 - 109)  BP: 107/57 (08-18-23 @ 00:00) (80/48 - 136/65)  RR: 16 (08-18-23 @ 00:15) (16 - 18)  SpO2: 96% (08-18-23 @ 00:15) (94% - 100%)  Wt(kg): --    08-16 @ 07:01  -  08-17 @ 07:00  --------------------------------------------------------  IN:    Oral Fluid: 50 mL    Pantoprazole: 120 mL  Total IN: 170 mL    OUT:    Stool (mL): 1 mL    Voided (mL): 1000 mL  Total OUT: 1001 mL    Total NET: -831 mL      08-17 @ 07:01  -  08-18 @ 01:09  --------------------------------------------------------  IN:    Lactated Ringers: 500 mL    Pantoprazole: 50 mL  Total IN: 550 mL    OUT:    Indwelling Catheter - Urethral (mL): 410 mL  Total OUT: 410 mL    Total NET: 140 mL      Physical Exam:  General: WN/WD NAD  Neurology: A&Ox3, nonfocal, follows commands  Eyes: PERRLA/ EOMI  ENT/Neck: Neck supple, trachea midline, No JVD  Respiratory: CTA B/L, No wheezing, rales, rhonchi  CV: Normal rate regular rhythm, S1S2, no murmurs, rubs or gallops  Abdominal: Soft, NT, ND +BS, midline incision with mild strikethrough. L sided colostomy mild edema, mucosa pink, no stool or gas  Extremities: No edema, + peripheral pulses  Skin: No Rashes, Hematoma, Ecchymosis  Tubes: gomez in place with clear urine        Labs:                        8.2    18.27 )-----------( 470      ( 17 Aug 2023 20:48 )             25.8     08-17    137  |  101  |  26<H>  ----------------------------<  120<H>  3.9   |  21<L>  |  0.93    Ca    7.8<L>      17 Aug 2023 20:48  Phos  3.5     08-17  Mg     2.0     08-17        PT/INR - ( 17 Aug 2023 07:00 )   PT: 14.5 sec;   INR: 1.33 ratio         PTT - ( 17 Aug 2023 07:00 )  PTT:32.4 sec  Urinalysis Basic - ( 17 Aug 2023 20:48 )    Color: x / Appearance: x / SG: x / pH: x  Gluc: 120 mg/dL / Ketone: x  / Bili: x / Urobili: x   Blood: x / Protein: x / Nitrite: x   Leuk Esterase: x / RBC: x / WBC x   Sq Epi: x / Non Sq Epi: x / Bacteria: x

## 2023-08-18 NOTE — CHART NOTE - NSCHARTNOTEFT_GEN_A_CORE
Patient POD1 L open sigmoid colectomy and end colostomy     Paged by nurse for patient in A-fib w/ RVR (126-130s). Patient's last dose of home metoprolol given at 5am yesterday prior to his surgery.   Arrived to [facility] where telemtry showed active afib. At patient's bedside he reported no new complaints. Denied CP, SOB, palpitations, abdominal pain, or n/v. Repeat vitals at this time  /81.     5mg lopressor given IV push. Will reassess vitals in 1 hour.     Green Team [id number]

## 2023-08-18 NOTE — PROGRESS NOTE ADULT - SUBJECTIVE AND OBJECTIVE BOX
[facility] DIVISION OF INFECTIOUS DISEASES  KASEY Mustafa Y. Patel, S. Shah, G. Gustavo  [phone]  ([phone] - weekdays after 5pm and weekends)    Name: GLADIS LEONARD  Age/Gender: 83y Male  MRN: [id number]    Interval History:  Patient seen and examined this morning.   Has mild abd pain and feels thirst.   Denies fever, n/v or other complaints.   Notes reviewed, s/p OR yesterday.   Afebrile.   Allergies: No Known Allergies      Objective:  Vitals:   T(F): 97.4 (08-18-23 @ 13:09), Max: 98.8 (08-17-23 @ 17:03)  HR: 93 (08-18-23 @ 13:09) (84 - 125)  BP: 107/70 (08-18-23 @ 13:09) (80/48 - 136/81)  RR: 18 (08-18-23 @ 13:09) (16 - 18)  SpO2: 96% (08-18-23 @ 13:09) (94% - 100%)  Physical Examination:  General: no acute distress  HEENT: NC/AT, EOMI, anicteric, neck supple  Cardio: S1, S2 present, normal rate  Resp: clear to auscultation bilaterally  Abd: soft, ND, midline dressing, +colostomy   Neuro: AAOx3, no obvious focal deficits  Ext: +edema, no cyanosis  Skin:  no visible rash, ecchymosis   Lines: PIV    Laboratory Studies:  CBC:                       8.2    13.35 )-----------( 448      ( 18 Aug 2023 02:38 )             25.3     WBC Trend:  13.35 08-18-23 @ 02:38  18.27 08-17-23 @ 20:48  11.85 08-17-23 @ 07:00  13.57 08-16-23 @ 20:32  11.16 08-16-23 @ 07:13  11.75 08-15-23 @ 21:39  14.27 08-15-23 @ 09:12  13.37 08-14-23 @ 23:17  12.25 08-14-23 @ 07:13  10.39 08-12-23 @ 12:08    CMP: 08-18    137  |  101  |  24<H>  ----------------------------<  147<H>  4.2   |  21<L>  |  0.87    Ca    8.0<L>      18 Aug 2023 02:38  Phos  3.5     08-18  Mg     2.1     08-18      Creatinine: 0.87 mg/dL (08-18-23 @ 02:38)  Creatinine: 0.93 mg/dL (08-17-23 @ 20:48)  Creatinine: 1.01 mg/dL (08-17-23 @ 06:56)  Creatinine: 1.05 mg/dL (08-16-23 @ 07:13)  Creatinine: 0.91 mg/dL (08-14-23 @ 07:11)  Creatinine: 0.91 mg/dL (08-12-23 @ 12:08)    Microbiology: reviewed   Culture - Sputum (collected 08-09-23 @ 11:22)  Source: .Sputum Sputum  Gram Stain (08-09-23 @ 20:44):    Rare Squamous epithelial cells per low power field    No polymorphonuclear leukocytes per low power field    Rare Gram Negative Rods per oil power field    Rare Gram positive cocci in pairs per oil power field  Final Report (08-11-23 @ 21:22):    Normal Respiratory Milly present    Culture - Urine (collected 08-08-23 @ 22:57)  Source: Clean Catch Clean Catch (Midstream)  Final Report (08-10-23 @ 10:40):    <10,000 CFU/mL Normal Urogenital Milly    Culture - Blood (collected 08-08-23 @ 22:15)  Source: .Blood Blood-Peripheral  Final Report (08-14-23 @ 02:00):    No growth at 5 days    Culture - Blood (collected 08-08-23 @ 22:10)  Source: .Blood Blood-Peripheral  Final Report (08-14-23 @ 02:00):    No growth at 5 days    Radiology: reviewed     Medications:  acetaminophen   IVPB .. 1000 milliGRAM(s) IV Intermittent every 6 hours  atorvastatin 10 milliGRAM(s) Oral at bedtime  finasteride 5 milliGRAM(s) Oral daily  heparin   Injectable 5000 Unit(s) SubCutaneous every 8 hours  metoprolol tartrate 50 milliGRAM(s) Oral two times a day  oxyCODONE    IR 2.5 milliGRAM(s) Oral every 4 hours PRN  oxyCODONE    IR 5 milliGRAM(s) Oral every 4 hours PRN  pantoprazole Infusion 8 mG/Hr IV Continuous <Continuous>  piperacillin/tazobactam IVPB.. 3.375 Gram(s) IV Intermittent every 8 hours    Current Antimicrobials:  piperacillin/tazobactam IVPB.. 3.375 Gram(s) IV Intermittent every 8 hours    Prior/Completed Antimicrobials:  metroNIDAZOLE    Tablet  neomycin  piperacillin/tazobactam IVPB.  piperacillin/tazobactam IVPB.  piperacillin/tazobactam IVPB.-  piperacillin/tazobactam IVPB..  piperacillin/tazobactam IVPB..  vancomycin  IVPB.

## 2023-08-18 NOTE — PROGRESS NOTE ADULT - ASSESSMENT
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 Patient with fever, tachycardia, leukocytosis with lactic acidosis on admission and admitted for severe sepsis vs SIRS     Severe sepsis likely due to contained perforation of colonic neoplasm   New onset Afib, now on eliquis   - CT with focal mural thickening and associated loculated fluid collection at the junction of the descending and sigmoid colon suspicious for contained perforation of colonic neoplasm, progressed since 2019. Has atelectasis reported, no pneumonia.   - repeat CTAP with perforated neoplasm and slight interval increased in size of adjacent collection  - GI following, s/p EGD 8/16 - 2 nonbleeding duodenal ulcers  - MRSA PCR screen negative, off vancomycin    - Bcx negative   - 8/17 s/p OR for laparoscopic left colectomy with end colostomy creation   -- op note reviewed -- noted there was a focal perforation of the tumor into the abdominal wall-area around the perforation ws completely excised, no contamination of abdominal contents during procedure   - WBC trended up postop--now downtrended, remains afebrile,     Recommendations:  CRS following  Restarted on piptazo this am-can discontinue given source controlled and monitor off antibiotics    Monitor temps/CBC  Continue rest of care per primary team    Over the weekend Dr. Shanice Corcoran will be covering for our group. If you have any questions, concerns or new micro data, please reach out to them at [phone]    Doris Connelly M.D.  [facility], Division of Infectious Diseases  [phone]  After 5pm on weekdays and all day on weekends - please call [phone]

## 2023-08-18 NOTE — PROGRESS NOTE ADULT - ASSESSMENT
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 Patient has never seen a colorectal surgeon in the past. Patient with non toxic appearance, stable and with benign abdominal physical exam, however CT scan concerning for colon mass c/w contained perforation. 8/16 EGD with two duodenal ulcers suspicious for source of prior melena, no active bleed. Patient now s/p laparoscopic left colectomy with end colostomy creation, recovering appropriately.    PLAN:  - ERP  - NPO  - Holding heparin  - GI recs appreciated: continue PPI drip x 72 hours after EGD before transitioning to 40mg BID IV pushes  - Cardiology recs appreciated  - Medicine recs appreciated  - ID recs appreciated: Zosyn  - Tele    [facility]   [id number]  82M w/ hx of HTN, HLD, pre-DM, BPH, COPD, ILD, pleural plaques 2/2 asbestosis, former smoker, colonic lesion (?neoplasm), pulm nodule, thyroid nodule, diastolic dysfunction, aortic ectasia, cellulitis, presenting with generalized weakness, fevers, and chills x1 day. 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 Patient has never seen a colorectal surgeon in the past. Patient with non toxic appearance, stable and with benign abdominal physical exam, however CT scan concerning for colon mass c/w contained perforation. 8/16 EGD with two duodenal ulcers suspicious for source of prior melena, no active bleed. Patient now s/p laparoscopic left colectomy with end colostomy creation, recovering appropriately.    PLAN:  - ERP  - will start CLD today  - continue gentle IVF  - encourage oob/amb PT  - DC gomez will be TOV  - f/u AM labs  - Can restart home eliquis on 8/20  - GI recs appreciated: continue PPI drip x 72 hours after EGD before transitioning to 40mg BID IV pushes  - Cardiology recs appreciated  - Medicine recs appreciated  - ID recs appreciated: Zosyn  - Tele    [facility]   [id number]

## 2023-08-18 NOTE — PROGRESS NOTE ADULT - SUBJECTIVE AND OBJECTIVE BOX
DATE OF SERVICE: 08-18-23 @ 13:38    Patient is a 83y old  Male who presents with a chief complaint of Severe sepsis/SIRS (18 Aug 2023 01:09)      INTERVAL HISTORY: Feels ok.     REVIEW OF SYSTEMS:  CONSTITUTIONAL: No weakness  EYES/ENT: No visual changes;  No throat pain   NECK: No pain or stiffness  RESPIRATORY: No cough, wheezing; No shortness of breath  CARDIOVASCULAR: No chest pain or palpitations  GASTROINTESTINAL: No abdominal  pain. No nausea, vomiting, or hematemesis  GENITOURINARY: No dysuria, frequency or hematuria  NEUROLOGICAL: No stroke like symptoms  SKIN: No rashes    TELEMETRY Personally reviewed: AF   	  MEDICATIONS:  metoprolol tartrate 50 milliGRAM(s) Oral two times a day        PHYSICAL EXAM:  T(C): 36.3 (08-18-23 @ 13:09), Max: 37.1 (08-17-23 @ 17:03)  HR: 93 (08-18-23 @ 13:09) (84 - 125)  BP: 107/70 (08-18-23 @ 13:09) (80/48 - 136/81)  RR: 18 (08-18-23 @ 13:09) (16 - 18)  SpO2: 96% (08-18-23 @ 13:09) (94% - 100%)  Wt(kg): --  I&O's Summary    17 Aug 2023 07:01  -  18 Aug 2023 07:00  --------------------------------------------------------  IN: 1510 mL / OUT: 585 mL / NET: 925 mL    18 Aug 2023 07:01  -  18 Aug 2023 13:38  --------------------------------------------------------  IN: 0 mL / OUT: 500 mL / NET: -500 mL      Height (cm): 180.3 (08-17 @ 17:03)  Weight (kg): 81.6 (08-17 @ 17:03)  BMI (kg/m2): 25.1 (08-17 @ 17:03)  BSA (m2): 2.02 (08-17 @ 17:03)    Appearance: In no distress	  HEENT:    PERRL, EOMI	  Cardiovascular:  S1 S2, No JVD  Respiratory: Lungs clear to auscultation	  Gastrointestinal:  Soft, Non-tender, + BS	  Vascularature:  +2 edema of LE  Psychiatric: Appropriate affect   Neuro: no acute focal deficits                               8.2    13.35 )-----------( 448      ( 18 Aug 2023 02:38 )             25.3     08-18    137  |  101  |  24<H>  ----------------------------<  147<H>  4.2   |  21<L>  |  0.87    Ca    8.0<L>      18 Aug 2023 02:38  Phos  3.5     08-18  Mg     2.1     08-18          Labs personally reviewed      ASSESSMENT/PLAN: 	    82M w/ hx of HTN, HLD, pre-DM, BPH, COPD, ?ILD, pleural 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 SBP down to 80s. 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   ·  Plan: Afib confirmed on ecg. No prior hx.    -TTE no LA enlargement. EF 68%  -cw metoprolol 50mg PO BID  -CHADSvasc at least 3. Per colorectal surgery team no contraindication to AC  If no contraindication by CRS will start Eliquis 5mg BID for stroke ppx  - Resume AC post Op    Problem/Plan - 2:  ·  Problem: Chronic diastolic congestive heart failure.   ·  Plan: TTE with preserved EF.  - On Furosemide 40mg and [place] 25mg daily at home  - Patient appears volume up  - Recommend Lasix 40 IVP daily and discontinue fluids     Problem/Plan - 3:  ·  Problem: HTN (hypertension).   ·  Plan: - Metoprolol 50mg PO BID    Problem/Plan - 4:  ·  Problem: HLD (hyperlipidemia).   ·  Plan: - c/w home atorvastatin.    Problem/Plan- 5:  Problem: Cardiac Risk Stratification  - EKG with no ischemia noted  - Patient not in decompensated HF  - No hx of tachy/marysol arrhythmia  - TTE unremarkable   - Patient is mod risk for mod risk Laparoscopic Left Colectomy. No contraindication to proceed  - Tolerated surgery well.         ADITI Cottrell DO [facility]  Cardiovascular Medicine  [address] [place] [address], [address]  Available through call or text on [employer] TEAM[employer]  Office: [phone]

## 2023-08-19 LAB
ANION GAP SERPL CALC-SCNC: 12 MMOL/L — SIGNIFICANT CHANGE UP (ref 5–17)
BUN SERPL-MCNC: 26 MG/DL — HIGH (ref 7–23)
CALCIUM SERPL-MCNC: 8.2 MG/DL — LOW (ref 8.4–10.5)
CHLORIDE SERPL-SCNC: 102 MMOL/L — SIGNIFICANT CHANGE UP (ref 96–108)
CO2 SERPL-SCNC: 24 MMOL/L — SIGNIFICANT CHANGE UP (ref 22–31)
CREAT SERPL-MCNC: 1.13 MG/DL — SIGNIFICANT CHANGE UP (ref 0.5–1.3)
EGFR: 64 ML/MIN/1.73M2 — SIGNIFICANT CHANGE UP
GLUCOSE SERPL-MCNC: 142 MG/DL — HIGH (ref 70–99)
HCT VFR BLD CALC: 26 % — LOW (ref 39–50)
HGB BLD-MCNC: 8.1 G/DL — LOW (ref 13–17)
MAGNESIUM SERPL-MCNC: 2.3 MG/DL — SIGNIFICANT CHANGE UP (ref 1.6–2.6)
MCHC RBC-ENTMCNC: 28.2 PG — SIGNIFICANT CHANGE UP (ref 27–34)
MCHC RBC-ENTMCNC: 31.2 GM/DL — LOW (ref 32–36)
MCV RBC AUTO: 90.6 FL — SIGNIFICANT CHANGE UP (ref 80–100)
NRBC # BLD: 0 /100 WBCS — SIGNIFICANT CHANGE UP (ref 0–0)
PHOSPHATE SERPL-MCNC: 2.1 MG/DL — LOW (ref 2.5–4.5)
PLATELET # BLD AUTO: 527 K/UL — HIGH (ref 150–400)
POTASSIUM SERPL-MCNC: 3.6 MMOL/L — SIGNIFICANT CHANGE UP (ref 3.5–5.3)
POTASSIUM SERPL-SCNC: 3.6 MMOL/L — SIGNIFICANT CHANGE UP (ref 3.5–5.3)
RBC # BLD: 2.87 M/UL — LOW (ref 4.2–5.8)
RBC # FLD: 14.6 % — HIGH (ref 10.3–14.5)
SODIUM SERPL-SCNC: 138 MMOL/L — SIGNIFICANT CHANGE UP (ref 135–145)
WBC # BLD: 18.05 K/UL — HIGH (ref 3.8–10.5)
WBC # FLD AUTO: 18.05 K/UL — HIGH (ref 3.8–10.5)

## 2023-08-19 PROCEDURE — 93010 ELECTROCARDIOGRAM REPORT: CPT

## 2023-08-19 RX ORDER — FUROSEMIDE 40 MG
40 TABLET ORAL EVERY 24 HOURS
Refills: 0 | Status: DISCONTINUED | OUTPATIENT
Start: 2023-08-19 | End: 2023-08-22

## 2023-08-19 RX ORDER — POTASSIUM PHOSPHATE, MONOBASIC POTASSIUM PHOSPHATE, DIBASIC 236; 224 MG/ML; MG/ML
30 INJECTION, SOLUTION INTRAVENOUS ONCE
Refills: 0 | Status: COMPLETED | OUTPATIENT
Start: 2023-08-19 | End: 2023-08-19

## 2023-08-19 RX ORDER — FUROSEMIDE 40 MG
40 TABLET ORAL EVERY 12 HOURS
Refills: 0 | Status: DISCONTINUED | OUTPATIENT
Start: 2023-08-19 | End: 2023-08-19

## 2023-08-19 RX ORDER — NYSTATIN CREAM 100000 [USP'U]/G
1 CREAM TOPICAL
Refills: 0 | Status: DISCONTINUED | OUTPATIENT
Start: 2023-08-19 | End: 2023-08-23

## 2023-08-19 RX ADMIN — ATORVASTATIN CALCIUM 10 MILLIGRAM(S): 80 TABLET, FILM COATED ORAL at 22:45

## 2023-08-19 RX ADMIN — PANTOPRAZOLE SODIUM 10 MG/HR: 20 TABLET, DELAYED RELEASE ORAL at 00:29

## 2023-08-19 RX ADMIN — Medication 1000 MILLIGRAM(S): at 11:53

## 2023-08-19 RX ADMIN — NYSTATIN CREAM 1 APPLICATION(S): 100000 CREAM TOPICAL at 18:32

## 2023-08-19 RX ADMIN — Medication 40 MILLIGRAM(S): at 08:56

## 2023-08-19 RX ADMIN — Medication 50 MILLIGRAM(S): at 06:27

## 2023-08-19 RX ADMIN — Medication 1000 MILLIGRAM(S): at 06:28

## 2023-08-19 RX ADMIN — Medication 1000 MILLIGRAM(S): at 23:06

## 2023-08-19 RX ADMIN — HEPARIN SODIUM 5000 UNIT(S): 5000 INJECTION INTRAVENOUS; SUBCUTANEOUS at 06:28

## 2023-08-19 RX ADMIN — PIPERACILLIN AND TAZOBACTAM 25 GRAM(S): 4; .5 INJECTION, POWDER, LYOPHILIZED, FOR SOLUTION INTRAVENOUS at 22:51

## 2023-08-19 RX ADMIN — PIPERACILLIN AND TAZOBACTAM 25 GRAM(S): 4; .5 INJECTION, POWDER, LYOPHILIZED, FOR SOLUTION INTRAVENOUS at 14:02

## 2023-08-19 RX ADMIN — HEPARIN SODIUM 5000 UNIT(S): 5000 INJECTION INTRAVENOUS; SUBCUTANEOUS at 22:45

## 2023-08-19 RX ADMIN — PIPERACILLIN AND TAZOBACTAM 25 GRAM(S): 4; .5 INJECTION, POWDER, LYOPHILIZED, FOR SOLUTION INTRAVENOUS at 06:28

## 2023-08-19 RX ADMIN — HEPARIN SODIUM 5000 UNIT(S): 5000 INJECTION INTRAVENOUS; SUBCUTANEOUS at 14:02

## 2023-08-19 RX ADMIN — Medication 1000 MILLIGRAM(S): at 18:32

## 2023-08-19 RX ADMIN — POTASSIUM PHOSPHATE, MONOBASIC POTASSIUM PHOSPHATE, DIBASIC 83.33 MILLIMOLE(S): 236; 224 INJECTION, SOLUTION INTRAVENOUS at 11:27

## 2023-08-19 RX ADMIN — Medication 50 MILLIGRAM(S): at 18:32

## 2023-08-19 RX ADMIN — FINASTERIDE 5 MILLIGRAM(S): 5 TABLET, FILM COATED ORAL at 11:27

## 2023-08-19 NOTE — PROGRESS NOTE ADULT - ASSESSMENT
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 Patient has never seen a colorectal surgeon in the past. Patient with non toxic appearance, stable and with benign abdominal physical exam, however CT scan concerning for colon mass c/w contained perforation. 8/16 EGD with two duodenal ulcers suspicious for source of prior melena, no active bleed. Patient now s/p laparoscopic left colectomy with end colostomy creation, recovering appropriately.    PLAN:  - ERP  - CLD  - monitor I&Os  - encourage oob/amb PT  - f/u AM labs  - Can restart home eliquis on 8/20  - GI recs appreciated: continue PPI drip x 72 hours after EGD before transitioning to 40mg BID IV pushes  - Cardiology recs appreciated  - Medicine recs appreciated  - ID recs appreciated: Zosyn  - Tele    [facility]   [id number]

## 2023-08-19 NOTE — PROGRESS NOTE ADULT - SUBJECTIVE AND OBJECTIVE BOX
Colorectal Surgery Progress Note    Overnight: Voiding after Gomez removal however low UOP. No acute events.    Subjective: Patient resting in bed.    Objective:  Vitals:  T(C): 36.8 (08-18-23 @ 21:24), Max: 36.8 (08-18-23 @ 21:24)  HR: 75 (08-18-23 @ 21:24) (75 - 125)  BP: 105/67 (08-18-23 @ 21:24) (97/64 - 136/81)  RR: 18 (08-18-23 @ 21:24) (16 - 18)  SpO2: 95% (08-18-23 @ 21:24) (95% - 100%)  Wt(kg): --    08-17 @ 07:01  -  08-18 @ 07:00  --------------------------------------------------------  IN:    IV PiggyBack: 300 mL    Lactated Ringers: 1100 mL    Pantoprazole: 110 mL  Total IN: 1510 mL    OUT:    Colostomy (mL): 0 mL    Indwelling Catheter - Urethral (mL): 585 mL  Total OUT: 585 mL    Total NET: 925 mL      08-18 @ 07:01  -  08-19 @ 00:39  --------------------------------------------------------  IN:    Oral Fluid: 150 mL    Pantoprazole: 120 mL  Total IN: 270 mL    OUT:    Colostomy (mL): 200 mL    Indwelling Catheter - Urethral (mL): 500 mL    Voided (mL): 200 mL  Total OUT: 900 mL    Total NET: -630 mL      Physical Exam:  General: WN/WD NAD  Neurology: A&Ox3, nonfocal, follows commands  Eyes: PERRLA/ EOMI  ENT/Neck: Neck supple, trachea midline, No JVD  Respiratory: CTA B/L, No wheezing, rales, rhonchi  CV: Normal rate regular rhythm, S1S2, no murmurs, rubs or gallops  Abdominal: Soft, NT, ND +BS, midline incision with mild strikethrough. L sided colostomy mild edema, mucosa pink, no stool or gas  Extremities: No edema, + peripheral pulses  Skin: No Rashes, Hematoma, Ecchymosis  Tubes: gomez in place with clear urine        Labs:                        8.2    13.35 )-----------( 448      ( 18 Aug 2023 02:38 )             25.3     08-18    137  |  101  |  24<H>  ----------------------------<  147<H>  4.2   |  21<L>  |  0.87    Ca    8.0<L>      18 Aug 2023 02:38  Phos  3.5     08-18  Mg     2.1     08-18        PT/INR - ( 17 Aug 2023 07:00 )   PT: 14.5 sec;   INR: 1.33 ratio         PTT - ( 17 Aug 2023 07:00 )  PTT:32.4 sec  Urinalysis Basic - ( 18 Aug 2023 02:38 )    Color: x / Appearance: x / SG: x / pH: x  Gluc: 147 mg/dL / Ketone: x  / Bili: x / Urobili: x   Blood: x / Protein: x / Nitrite: x   Leuk Esterase: x / RBC: x / WBC x   Sq Epi: x / Non Sq Epi: x / Bacteria: x

## 2023-08-20 LAB
ANION GAP SERPL CALC-SCNC: 9 MMOL/L — SIGNIFICANT CHANGE UP (ref 5–17)
APPEARANCE UR: CLEAR — SIGNIFICANT CHANGE UP
BILIRUB UR-MCNC: NEGATIVE — SIGNIFICANT CHANGE UP
BUN SERPL-MCNC: 30 MG/DL — HIGH (ref 7–23)
CALCIUM SERPL-MCNC: 7.9 MG/DL — LOW (ref 8.4–10.5)
CHLORIDE SERPL-SCNC: 104 MMOL/L — SIGNIFICANT CHANGE UP (ref 96–108)
CO2 SERPL-SCNC: 25 MMOL/L — SIGNIFICANT CHANGE UP (ref 22–31)
COLOR SPEC: COLORLESS — SIGNIFICANT CHANGE UP
CREAT SERPL-MCNC: 1.29 MG/DL — SIGNIFICANT CHANGE UP (ref 0.5–1.3)
DIFF PNL FLD: NEGATIVE — SIGNIFICANT CHANGE UP
EGFR: 54 ML/MIN/1.73M2 — LOW
GLUCOSE SERPL-MCNC: 113 MG/DL — HIGH (ref 70–99)
GLUCOSE UR QL: NEGATIVE — SIGNIFICANT CHANGE UP
HCT VFR BLD CALC: 24.7 % — LOW (ref 39–50)
HCT VFR BLD CALC: 25.5 % — LOW (ref 39–50)
HGB BLD-MCNC: 7.7 G/DL — LOW (ref 13–17)
HGB BLD-MCNC: 7.8 G/DL — LOW (ref 13–17)
KETONES UR-MCNC: NEGATIVE — SIGNIFICANT CHANGE UP
LEUKOCYTE ESTERASE UR-ACNC: NEGATIVE — SIGNIFICANT CHANGE UP
MAGNESIUM SERPL-MCNC: 2.1 MG/DL — SIGNIFICANT CHANGE UP (ref 1.6–2.6)
MCHC RBC-ENTMCNC: 28.3 PG — SIGNIFICANT CHANGE UP (ref 27–34)
MCHC RBC-ENTMCNC: 28.7 PG — SIGNIFICANT CHANGE UP (ref 27–34)
MCHC RBC-ENTMCNC: 30.6 GM/DL — LOW (ref 32–36)
MCHC RBC-ENTMCNC: 31.2 GM/DL — LOW (ref 32–36)
MCV RBC AUTO: 92.2 FL — SIGNIFICANT CHANGE UP (ref 80–100)
MCV RBC AUTO: 92.4 FL — SIGNIFICANT CHANGE UP (ref 80–100)
NITRITE UR-MCNC: NEGATIVE — SIGNIFICANT CHANGE UP
NRBC # BLD: 0 /100 WBCS — SIGNIFICANT CHANGE UP (ref 0–0)
NRBC # BLD: 0 /100 WBCS — SIGNIFICANT CHANGE UP (ref 0–0)
PH UR: 6 — SIGNIFICANT CHANGE UP (ref 5–8)
PHOSPHATE SERPL-MCNC: 2.9 MG/DL — SIGNIFICANT CHANGE UP (ref 2.5–4.5)
PLATELET # BLD AUTO: 542 K/UL — HIGH (ref 150–400)
PLATELET # BLD AUTO: 551 K/UL — HIGH (ref 150–400)
POTASSIUM SERPL-MCNC: 3.7 MMOL/L — SIGNIFICANT CHANGE UP (ref 3.5–5.3)
POTASSIUM SERPL-SCNC: 3.7 MMOL/L — SIGNIFICANT CHANGE UP (ref 3.5–5.3)
PROT UR-MCNC: NEGATIVE — SIGNIFICANT CHANGE UP
RBC # BLD: 2.68 M/UL — LOW (ref 4.2–5.8)
RBC # BLD: 2.76 M/UL — LOW (ref 4.2–5.8)
RBC # FLD: 14.9 % — HIGH (ref 10.3–14.5)
RBC # FLD: 15 % — HIGH (ref 10.3–14.5)
SODIUM SERPL-SCNC: 138 MMOL/L — SIGNIFICANT CHANGE UP (ref 135–145)
SP GR SPEC: 1.01 — SIGNIFICANT CHANGE UP (ref 1.01–1.02)
UROBILINOGEN FLD QL: NEGATIVE — SIGNIFICANT CHANGE UP
WBC # BLD: 17.64 K/UL — HIGH (ref 3.8–10.5)
WBC # BLD: 19.04 K/UL — HIGH (ref 3.8–10.5)
WBC # FLD AUTO: 17.64 K/UL — HIGH (ref 3.8–10.5)
WBC # FLD AUTO: 19.04 K/UL — HIGH (ref 3.8–10.5)

## 2023-08-20 RX ORDER — TAMSULOSIN HYDROCHLORIDE 0.4 MG/1
0.4 CAPSULE ORAL ONCE
Refills: 0 | Status: COMPLETED | OUTPATIENT
Start: 2023-08-20 | End: 2023-08-20

## 2023-08-20 RX ORDER — APIXABAN 2.5 MG/1
5 TABLET, FILM COATED ORAL
Refills: 0 | Status: DISCONTINUED | OUTPATIENT
Start: 2023-08-20 | End: 2023-08-23

## 2023-08-20 RX ORDER — TAMSULOSIN HYDROCHLORIDE 0.4 MG/1
0.4 CAPSULE ORAL AT BEDTIME
Refills: 0 | Status: DISCONTINUED | OUTPATIENT
Start: 2023-08-20 | End: 2023-08-23

## 2023-08-20 RX ORDER — PANTOPRAZOLE SODIUM 20 MG/1
40 TABLET, DELAYED RELEASE ORAL
Refills: 0 | Status: DISCONTINUED | OUTPATIENT
Start: 2023-08-20 | End: 2023-08-22

## 2023-08-20 RX ADMIN — NYSTATIN CREAM 1 APPLICATION(S): 100000 CREAM TOPICAL at 17:47

## 2023-08-20 RX ADMIN — Medication 1000 MILLIGRAM(S): at 17:47

## 2023-08-20 RX ADMIN — PIPERACILLIN AND TAZOBACTAM 25 GRAM(S): 4; .5 INJECTION, POWDER, LYOPHILIZED, FOR SOLUTION INTRAVENOUS at 06:13

## 2023-08-20 RX ADMIN — NYSTATIN CREAM 1 APPLICATION(S): 100000 CREAM TOPICAL at 06:16

## 2023-08-20 RX ADMIN — Medication 1000 MILLIGRAM(S): at 23:13

## 2023-08-20 RX ADMIN — HEPARIN SODIUM 5000 UNIT(S): 5000 INJECTION INTRAVENOUS; SUBCUTANEOUS at 06:15

## 2023-08-20 RX ADMIN — PANTOPRAZOLE SODIUM 10 MG/HR: 20 TABLET, DELAYED RELEASE ORAL at 06:16

## 2023-08-20 RX ADMIN — APIXABAN 5 MILLIGRAM(S): 2.5 TABLET, FILM COATED ORAL at 17:47

## 2023-08-20 RX ADMIN — FINASTERIDE 5 MILLIGRAM(S): 5 TABLET, FILM COATED ORAL at 12:25

## 2023-08-20 RX ADMIN — PIPERACILLIN AND TAZOBACTAM 25 GRAM(S): 4; .5 INJECTION, POWDER, LYOPHILIZED, FOR SOLUTION INTRAVENOUS at 21:36

## 2023-08-20 RX ADMIN — Medication 1000 MILLIGRAM(S): at 12:25

## 2023-08-20 RX ADMIN — TAMSULOSIN HYDROCHLORIDE 0.4 MILLIGRAM(S): 0.4 CAPSULE ORAL at 10:30

## 2023-08-20 RX ADMIN — PIPERACILLIN AND TAZOBACTAM 25 GRAM(S): 4; .5 INJECTION, POWDER, LYOPHILIZED, FOR SOLUTION INTRAVENOUS at 13:24

## 2023-08-20 RX ADMIN — ATORVASTATIN CALCIUM 10 MILLIGRAM(S): 80 TABLET, FILM COATED ORAL at 21:36

## 2023-08-20 RX ADMIN — Medication 50 MILLIGRAM(S): at 06:15

## 2023-08-20 RX ADMIN — TAMSULOSIN HYDROCHLORIDE 0.4 MILLIGRAM(S): 0.4 CAPSULE ORAL at 21:40

## 2023-08-20 RX ADMIN — Medication 1000 MILLIGRAM(S): at 06:15

## 2023-08-20 RX ADMIN — PANTOPRAZOLE SODIUM 40 MILLIGRAM(S): 20 TABLET, DELAYED RELEASE ORAL at 17:47

## 2023-08-20 RX ADMIN — Medication 40 MILLIGRAM(S): at 10:29

## 2023-08-20 NOTE — PHYSICAL THERAPY INITIAL EVALUATION ADULT - GAIT TRAINING, PT EVAL
GOAL: Pt will ambulate x150 ft with supervision using least restrictive device without loss of balance, within 2 weeks.
GOAL: pt will ambulate 100ft w/ least restrictive assistive device w/ CTG Ax1 w/i 4wks

## 2023-08-20 NOTE — PROGRESS NOTE ADULT - ASSESSMENT
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 Patient has never seen a colorectal surgeon in the past. Patient with non toxic appearance, stable and with benign abdominal physical exam, however CT scan concerning for colon mass c/w contained perforation. 8/16 EGD with two duodenal ulcers suspicious for source of prior melena, no active bleed. Patient now s/p laparoscopic left colectomy with end colostomy creation, recovering appropriately.    PLAN:  - ERP  - CLD  - monitor I&Os  - encourage oob/amb PT  - f/u AM labs  - GI recs appreciated: PPI 40mg BID IV pushes  - Cardiology recs appreciated  - Medicine recs appreciated  - ID recs appreciated: Zosyn  - Tele    [facility]   [id number]  82M w/ hx of HTN, HLD, pre-DM, BPH, COPD, ILD, pleural plaques 2/2 asbestosis, former smoker, colonic lesion (?neoplasm), pulm nodule, thyroid nodule, diastolic dysfunction, aortic ectasia, cellulitis, presenting with generalized weakness, fevers, and chills x1 day. 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 Patient has never seen a colorectal surgeon in the past. Patient with non toxic appearance, stable and with benign abdominal physical exam, however CT scan concerning for colon mass c/w contained perforation. 8/16 EGD with two duodenal ulcers suspicious for source of prior melena, no active bleed. Patient now s/p laparoscopic left colectomy with end colostomy creation, recovering appropriately.    PLAN:  - ERP  - LRD  - monitor I&Os  - encourage oob/amb PT  - f/u AM labs and UA  - restart home elqiuis  - start flomax  - GI recs appreciated: PPI 40mg BID IV pushes  - Cardiology recs appreciated  - Medicine recs appreciated  - ID recs appreciated: Zosyn  - PT consult f/u  - Tele    [facility]   [id number]

## 2023-08-20 NOTE — PHYSICAL THERAPY INITIAL EVALUATION ADULT - PERTINENT HX OF CURRENT PROBLEM, REHAB EVAL
82M admitted to [facility] on 8/9/23 w/ with generalized weakness, fevers, and chills x1 day. PMH includes: HTN, HLD, pre-DM, BPH, COPD, ?ILD, pleural plaques 2/2 asbestosis, former smoker, colonic lesion (?neoplasm), pulm nodule, thyroid nodule, diastolic dysfunction, aortic ectasia, & cellulitis. Reports 2 falls withing the past week.  Denied head strike or LOC. Felt better after hydration & food - didn't seek care at the time. Has had poor appetite for the past week, denied noticing any unintended weight loss. Denied headache, vision changes, CP, SOB, nausea, vomiting, abdominal pain, dysuria, hematuria, hematochezia, melena, diarrhea, constipation, recent travel, or sick contacts. Noted he was spitting up some mucous, but stated he has that from time to time. Stated he had a colonoscopy some time ago (unclear when) and was not told about any remarkable findings, though outpatient note from PCP from 6/2023 stated pt had not had colonoscopy at time. Concern for severe sepsis/SIRS. PNA is possible source per prelim CXR read. CT chest 7/9/23: Focal mural thickening & associated loculated fluid collection at the junction of the descending and sigmoid colon suspicious for contained perforation of colonic neoplasm, progressed since 2019. Stable nonobstructive left inguinal hernia Stable bilateral calcified pleural plaques. 8/9/23: US BLE: (-)for DVT. Pt s/p EGD 8/16/23 with two duodenal ulcers suspicious for source of prior melena, no active bleed. Patient now s/p laparoscopic left colectomy with end colostomy creation 8/17/23.

## 2023-08-20 NOTE — PHYSICAL THERAPY INITIAL EVALUATION ADULT - NSPTDISCHREC_GEN_A_CORE
Caregiver assistance for OOB mobility and ADLs, pt is at risk for falls. Per pt - he will have his daughter stay with him after discharge to assist/Home PT
if pt declined [facility] recommendation & returns home he will need assist for all functional mobility & ADL/Sub-acute Rehab

## 2023-08-20 NOTE — PHYSICAL THERAPY INITIAL EVALUATION ADULT - BALANCE TRAINING, PT EVAL
GOAL: Patient will improve static and dynamic standing balance by 1/2 grade using least restrictive device within 2 weeks to improve safety and decrease risk of falls.
GOAL: Pt will demonstrate progression of sitting posture to Good & standing posture to Fair w/i 4wks

## 2023-08-20 NOTE — PROGRESS NOTE ADULT - SUBJECTIVE AND OBJECTIVE BOX
DATE OF SERVICE: 08-20-23 @ 10:39    Patient is a 83y old  Male who presents with a chief complaint of Severe sepsis/SIRS (20 Aug 2023 02:31)      INTERVAL HISTORY: Feels well, denies chest pain and OSOB    REVIEW OF SYSTEMS:  CONSTITUTIONAL: No weakness  EYES/ENT: No visual changes;  No throat pain   NECK: No pain or stiffness  RESPIRATORY: No cough, wheezing; No shortness of breath  CARDIOVASCULAR: No chest pain or palpitations  GASTROINTESTINAL: No abdominal  pain. No nausea, vomiting, or hematemesis  GENITOURINARY: No dysuria, frequency or hematuria  NEUROLOGICAL: No stroke like symptoms  SKIN: No rashes    TELEMETRY Personally reviewed: AFIB 40-90, 2.35 sec pause   	  MEDICATIONS:  furosemide   Injectable 40 milliGRAM(s) IV Push every 24 hours  metoprolol tartrate 50 milliGRAM(s) Oral two times a day        PHYSICAL EXAM:  T(C): 36.3 (08-20-23 @ 09:19), Max: 36.8 (08-19-23 @ 17:17)  HR: 84 (08-20-23 @ 09:19) (76 - 88)  BP: 111/63 (08-20-23 @ 09:19) (103/68 - 133/85)  RR: 19 (08-20-23 @ 09:19) (17 - 19)  SpO2: 96% (08-20-23 @ 09:19) (95% - 100%)  Wt(kg): --  I&O's Summary    19 Aug 2023 07:01  -  20 Aug 2023 07:00  --------------------------------------------------------  IN: 1030 mL / OUT: 1225 mL / NET: -195 mL    20 Aug 2023 07:01  -  20 Aug 2023 10:39  --------------------------------------------------------  IN: 240 mL / OUT: 0 mL / NET: 240 mL          Appearance: In no distress	  HEENT:    PERRL, EOMI	  Cardiovascular:  S1 S2, No JVD  Respiratory: Lungs clear to auscultation	  Gastrointestinal:  Soft, Non-tender, + BS	  Vascularature:  No edema of LE  Psychiatric: Appropriate affect   Neuro: no acute focal deficits                               7.8    17.64 )-----------( 551      ( 20 Aug 2023 05:30 )             25.5     08-20    138  |  104  |  30<H>  ----------------------------<  113<H>  3.7   |  25  |  1.29    Ca    7.9<L>      20 Aug 2023 05:30  Phos  2.9     08-20  Mg     2.1     08-20          Labs personally reviewed      ASSESSMENT/PLAN: 	  82M w/ hx of HTN, HLD, pre-DM, BPH, COPD, ?ILD, pleural 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 SBP down to 80s. 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   ·  Plan: Afib confirmed on ecg. No prior hx.    -TTE no LA enlargement. EF 68%  -cw metoprolol 50mg PO BID  -CHADSvasc at least 3. Per colorectal surgery team no contraindication to AC  If no contraindication by CRS will start Eliquis 5mg BID for stroke ppx  - Resume AC post Op    Problem/Plan - 2:  ·  Problem: Chronic diastolic congestive heart failure.   ·  Plan: TTE with preserved EF.  - On Furosemide 40mg and [place] 25mg daily at home  - Volume status improved   - Recommend Lasix 40 IVP daily      Problem/Plan - 3:  ·  Problem: HTN (hypertension).   ·  Plan: - Metoprolol 50mg PO BID    Problem/Plan - 4:  ·  Problem: HLD (hyperlipidemia).   ·  Plan: - c/w home atorvastatin.    Problem/Plan- 5:  Problem: Cardiac Risk Stratification  - EKG with no ischemia noted  - Patient not in decompensated HF  - No hx of tachy/marysol arrhythmia  - TTE unremarkable   - Patient is mod risk for mod risk Laparoscopic Left Colectomy. No contraindication to proceed  - Tolerated surgery well.             JOSÉ Steward DO [facility]  Cardiovascular Medicine  [address] [place] [address], [address]  Available via call or text on [employer]  Office: [phone]

## 2023-08-20 NOTE — PHYSICAL THERAPY INITIAL EVALUATION ADULT - MANUAL MUSCLE TESTING RESULTS, REHAB EVAL
LLE 3-/5,; B UE/RLE grossly 3/5
at least 3+/5 x all 4 extremities grossly assessed through AROM/grossly assessed due to

## 2023-08-20 NOTE — PROGRESS NOTE ADULT - SUBJECTIVE AND OBJECTIVE BOX
Colorectal Surgery Progress Note    Overnight: Patient with two episodes of marysol pause, asymptomatic, EKG read as afib.     Subjective: Patient resting in bed.    Objective:  Vitals:  T(C): 36.8 (08-18-23 @ 21:24), Max: 36.8 (08-18-23 @ 21:24)  HR: 75 (08-18-23 @ 21:24) (75 - 125)  BP: 105/67 (08-18-23 @ 21:24) (97/64 - 136/81)  RR: 18 (08-18-23 @ 21:24) (16 - 18)  SpO2: 95% (08-18-23 @ 21:24) (95% - 100%)  Wt(kg): --    08-17 @ 07:01  -  08-18 @ 07:00  --------------------------------------------------------  IN:    IV PiggyBack: 300 mL    Lactated Ringers: 1100 mL    Pantoprazole: 110 mL  Total IN: 1510 mL    OUT:    Colostomy (mL): 0 mL    Indwelling Catheter - Urethral (mL): 585 mL  Total OUT: 585 mL    Total NET: 925 mL      08-18 @ 07:01  -  08-19 @ 00:39  --------------------------------------------------------  IN:    Oral Fluid: 150 mL    Pantoprazole: 120 mL  Total IN: 270 mL    OUT:    Colostomy (mL): 200 mL    Indwelling Catheter - Urethral (mL): 500 mL    Voided (mL): 200 mL  Total OUT: 900 mL    Total NET: -630 mL      Physical Exam:  General: WN/WD NAD  Neurology: A&Ox3, nonfocal, follows commands  Eyes: PERRLA/ EOMI  ENT/Neck: Neck supple, trachea midline, No JVD  Respiratory: CTA B/L, No wheezing, rales, rhonchi  CV: Normal rate regular rhythm, S1S2, no murmurs, rubs or gallops  Abdominal: Soft, NT, ND +BS, midline incision with mild strikethrough. L sided colostomy mild edema, mucosa pink, no stool or gas  Extremities: No edema, + peripheral pulses  Skin: No Rashes, Hematoma, Ecchymosis          Labs:                        8.2    13.35 )-----------( 448      ( 18 Aug 2023 02:38 )             25.3     08-18    137  |  101  |  24<H>  ----------------------------<  147<H>  4.2   |  21<L>  |  0.87    Ca    8.0<L>      18 Aug 2023 02:38  Phos  3.5     08-18  Mg     2.1     08-18        PT/INR - ( 17 Aug 2023 07:00 )   PT: 14.5 sec;   INR: 1.33 ratio         PTT - ( 17 Aug 2023 07:00 )  PTT:32.4 sec  Urinalysis Basic - ( 18 Aug 2023 02:38 )    Color: x / Appearance: x / SG: x / pH: x  Gluc: 147 mg/dL / Ketone: x  / Bili: x / Urobili: x   Blood: x / Protein: x / Nitrite: x   Leuk Esterase: x / RBC: x / WBC x   Sq Epi: x / Non Sq Epi: x / Bacteria: x Colorectal Surgery Progress Note    Overnight: Patient with two episodes of marysol pause, asymptomatic, EKG read as afib.     Subjective: Patient resting in bed. Patient denies subjective fever/chills, CP, SOB, n/v, or abdominal pain. +Gas/+Stool in ostomy bag. Pain well controlled, tolerating diet.    Objective:  Vitals:  T(C): 36.8 (08-18-23 @ 21:24), Max: 36.8 (08-18-23 @ 21:24)  HR: 75 (08-18-23 @ 21:24) (75 - 125)  BP: 105/67 (08-18-23 @ 21:24) (97/64 - 136/81)  RR: 18 (08-18-23 @ 21:24) (16 - 18)  SpO2: 95% (08-18-23 @ 21:24) (95% - 100%)  Wt(kg): --    08-17 @ 07:01  -  08-18 @ 07:00  --------------------------------------------------------  IN:    IV PiggyBack: 300 mL    Lactated Ringers: 1100 mL    Pantoprazole: 110 mL  Total IN: 1510 mL    OUT:    Colostomy (mL): 0 mL    Indwelling Catheter - Urethral (mL): 585 mL  Total OUT: 585 mL    Total NET: 925 mL      08-18 @ 07:01  -  08-19 @ 00:39  --------------------------------------------------------  IN:    Oral Fluid: 150 mL    Pantoprazole: 120 mL  Total IN: 270 mL    OUT:    Colostomy (mL): 200 mL    Indwelling Catheter - Urethral (mL): 500 mL    Voided (mL): 200 mL  Total OUT: 900 mL    Total NET: -630 mL      Physical Exam:  General Appearance: no acute distress, NTND   Chest: airway intact, non-labored breathing  CV: no JVD, palpable pulses b/l  Abdomen: soft, non-tender, non-distended, +BS, midline incision cdi. L sided colostomy mild edema, mucosa pink, no stool or gas  Extremities: [facility]             Labs:                        8.2    13.35 )-----------( 448      ( 18 Aug 2023 02:38 )             25.3     08-18    137  |  101  |  24<H>  ----------------------------<  147<H>  4.2   |  21<L>  |  0.87    Ca    8.0<L>      18 Aug 2023 02:38  Phos  3.5     08-18  Mg     2.1     08-18        PT/INR - ( 17 Aug 2023 07:00 )   PT: 14.5 sec;   INR: 1.33 ratio         PTT - ( 17 Aug 2023 07:00 )  PTT:32.4 sec  Urinalysis Basic - ( 18 Aug 2023 02:38 )    Color: x / Appearance: x / SG: x / pH: x  Gluc: 147 mg/dL / Ketone: x  / Bili: x / Urobili: x   Blood: x / Protein: x / Nitrite: x   Leuk Esterase: x / RBC: x / WBC x   Sq Epi: x / Non Sq Epi: x / Bacteria: x

## 2023-08-20 NOTE — PHYSICAL THERAPY INITIAL EVALUATION ADULT - BED MOBILITY TRAINING, PT EVAL
GOAL: Pt will independently perform all aspects of bed mobility to help prevent pressure ulcers, by 2 weeks
GOAL: pt will perform bed mobility (I) w/i 4wks

## 2023-08-20 NOTE — PHYSICAL THERAPY INITIAL EVALUATION ADULT - GENERAL OBSERVATIONS, REHAB EVAL
Pt received supine in NAD, with +PIV, dressings intact, A&Ox4, VS screened and stable.
Pt rec'ed semi-supine in bed, +[place]/condom catheter, +portable tele, elsa PT re-evaluation fair w/o adverse reaction.

## 2023-08-20 NOTE — PHYSICAL THERAPY INITIAL EVALUATION ADULT - PLANNED THERAPY INTERVENTIONS, PT EVAL
GOAL: Pt will negotiate 2 stairs using handrail independently to ensure safe home entry, within 2 weeks./balance training/bed mobility training/gait training/strengthening/transfer training
balance training/bed mobility training/gait training

## 2023-08-20 NOTE — PHYSICAL THERAPY INITIAL EVALUATION ADULT - DIAGNOSIS, PT EVAL
Decreased muscle strength, impaired balance, reduced activity tolerance
Decreased muscle strength, impaired balance, reduced activity tolerance

## 2023-08-20 NOTE — PHYSICAL THERAPY INITIAL EVALUATION ADULT - IMPAIRMENTS FOUND, PT EVAL
aerobic capacity/endurance/gait, locomotion, and balance/muscle strength/posture
aerobic capacity/endurance/gait, locomotion, and balance/muscle strength

## 2023-08-21 LAB
ANION GAP SERPL CALC-SCNC: 14 MMOL/L — SIGNIFICANT CHANGE UP (ref 5–17)
BUN SERPL-MCNC: 25 MG/DL — HIGH (ref 7–23)
C DIFF GDH STL QL: NEGATIVE — SIGNIFICANT CHANGE UP
C DIFF GDH STL QL: SIGNIFICANT CHANGE UP
CALCIUM SERPL-MCNC: 8.3 MG/DL — LOW (ref 8.4–10.5)
CHLORIDE SERPL-SCNC: 102 MMOL/L — SIGNIFICANT CHANGE UP (ref 96–108)
CO2 SERPL-SCNC: 21 MMOL/L — LOW (ref 22–31)
CREAT SERPL-MCNC: 1.18 MG/DL — SIGNIFICANT CHANGE UP (ref 0.5–1.3)
EGFR: 60 ML/MIN/1.73M2 — SIGNIFICANT CHANGE UP
GLUCOSE SERPL-MCNC: 120 MG/DL — HIGH (ref 70–99)
HCT VFR BLD CALC: 27 % — LOW (ref 39–50)
HGB BLD-MCNC: 8.4 G/DL — LOW (ref 13–17)
MAGNESIUM SERPL-MCNC: 2 MG/DL — SIGNIFICANT CHANGE UP (ref 1.6–2.6)
MCHC RBC-ENTMCNC: 28.4 PG — SIGNIFICANT CHANGE UP (ref 27–34)
MCHC RBC-ENTMCNC: 31.1 GM/DL — LOW (ref 32–36)
MCV RBC AUTO: 91.2 FL — SIGNIFICANT CHANGE UP (ref 80–100)
NRBC # BLD: 0 /100 WBCS — SIGNIFICANT CHANGE UP (ref 0–0)
PHOSPHATE SERPL-MCNC: 2.2 MG/DL — LOW (ref 2.5–4.5)
PLATELET # BLD AUTO: 614 K/UL — HIGH (ref 150–400)
POTASSIUM SERPL-MCNC: 3.7 MMOL/L — SIGNIFICANT CHANGE UP (ref 3.5–5.3)
POTASSIUM SERPL-SCNC: 3.7 MMOL/L — SIGNIFICANT CHANGE UP (ref 3.5–5.3)
RBC # BLD: 2.96 M/UL — LOW (ref 4.2–5.8)
RBC # FLD: 14.9 % — HIGH (ref 10.3–14.5)
SODIUM SERPL-SCNC: 137 MMOL/L — SIGNIFICANT CHANGE UP (ref 135–145)
WBC # BLD: 16.46 K/UL — HIGH (ref 3.8–10.5)
WBC # FLD AUTO: 16.46 K/UL — HIGH (ref 3.8–10.5)

## 2023-08-21 PROCEDURE — 74177 CT ABD & PELVIS W/CONTRAST: CPT | Mod: 26

## 2023-08-21 RX ORDER — POTASSIUM PHOSPHATE, MONOBASIC POTASSIUM PHOSPHATE, DIBASIC 236; 224 MG/ML; MG/ML
30 INJECTION, SOLUTION INTRAVENOUS ONCE
Refills: 0 | Status: COMPLETED | OUTPATIENT
Start: 2023-08-21 | End: 2023-08-21

## 2023-08-21 RX ADMIN — Medication 1000 MILLIGRAM(S): at 12:36

## 2023-08-21 RX ADMIN — POTASSIUM PHOSPHATE, MONOBASIC POTASSIUM PHOSPHATE, DIBASIC 83.33 MILLIMOLE(S): 236; 224 INJECTION, SOLUTION INTRAVENOUS at 12:36

## 2023-08-21 RX ADMIN — Medication 50 MILLIGRAM(S): at 05:22

## 2023-08-21 RX ADMIN — NYSTATIN CREAM 1 APPLICATION(S): 100000 CREAM TOPICAL at 17:50

## 2023-08-21 RX ADMIN — Medication 1000 MILLIGRAM(S): at 13:36

## 2023-08-21 RX ADMIN — Medication 1000 MILLIGRAM(S): at 05:22

## 2023-08-21 RX ADMIN — PIPERACILLIN AND TAZOBACTAM 25 GRAM(S): 4; .5 INJECTION, POWDER, LYOPHILIZED, FOR SOLUTION INTRAVENOUS at 21:19

## 2023-08-21 RX ADMIN — NYSTATIN CREAM 1 APPLICATION(S): 100000 CREAM TOPICAL at 05:49

## 2023-08-21 RX ADMIN — Medication 1000 MILLIGRAM(S): at 23:35

## 2023-08-21 RX ADMIN — APIXABAN 5 MILLIGRAM(S): 2.5 TABLET, FILM COATED ORAL at 05:22

## 2023-08-21 RX ADMIN — ATORVASTATIN CALCIUM 10 MILLIGRAM(S): 80 TABLET, FILM COATED ORAL at 21:19

## 2023-08-21 RX ADMIN — APIXABAN 5 MILLIGRAM(S): 2.5 TABLET, FILM COATED ORAL at 17:49

## 2023-08-21 RX ADMIN — PANTOPRAZOLE SODIUM 40 MILLIGRAM(S): 20 TABLET, DELAYED RELEASE ORAL at 05:23

## 2023-08-21 RX ADMIN — PIPERACILLIN AND TAZOBACTAM 25 GRAM(S): 4; .5 INJECTION, POWDER, LYOPHILIZED, FOR SOLUTION INTRAVENOUS at 13:45

## 2023-08-21 RX ADMIN — TAMSULOSIN HYDROCHLORIDE 0.4 MILLIGRAM(S): 0.4 CAPSULE ORAL at 21:19

## 2023-08-21 RX ADMIN — Medication 1000 MILLIGRAM(S): at 17:49

## 2023-08-21 RX ADMIN — Medication 40 MILLIGRAM(S): at 09:15

## 2023-08-21 RX ADMIN — FINASTERIDE 5 MILLIGRAM(S): 5 TABLET, FILM COATED ORAL at 12:36

## 2023-08-21 RX ADMIN — PIPERACILLIN AND TAZOBACTAM 25 GRAM(S): 4; .5 INJECTION, POWDER, LYOPHILIZED, FOR SOLUTION INTRAVENOUS at 05:22

## 2023-08-21 RX ADMIN — Medication 50 MILLIGRAM(S): at 17:49

## 2023-08-21 RX ADMIN — PANTOPRAZOLE SODIUM 40 MILLIGRAM(S): 20 TABLET, DELAYED RELEASE ORAL at 17:49

## 2023-08-21 NOTE — DIETITIAN INITIAL EVALUATION ADULT - PROBLEM SELECTOR PLAN 2
Concern for possible PNA based on CXR (prelim/independent read) noting bibasilar patchy opacities. Pt noted some mucous production. Review of prior CT chests notable for mucous plugging.  - c/w empiric abx as above  - f/u procal, BCx, Sputum Cx  - f/u CT chest to further assess for PNA

## 2023-08-21 NOTE — DIETITIAN INITIAL EVALUATION ADULT - PERTINENT MEDS FT
MEDICATIONS  (STANDING):  acetaminophen     Tablet .. 1000 milliGRAM(s) Oral every 6 hours  apixaban 5 milliGRAM(s) Oral two times a day  atorvastatin 10 milliGRAM(s) Oral at bedtime  finasteride 5 milliGRAM(s) Oral daily  furosemide   Injectable 40 milliGRAM(s) IV Push every 24 hours  metoprolol tartrate 50 milliGRAM(s) Oral two times a day  nystatin Powder 1 Application(s) Topical two times a day  pantoprazole  Injectable 40 milliGRAM(s) IV Push two times a day  piperacillin/tazobactam IVPB.. 3.375 Gram(s) IV Intermittent every 8 hours  tamsulosin 0.4 milliGRAM(s) Oral at bedtime    MEDICATIONS  (PRN):  oxyCODONE    IR 5 milliGRAM(s) Oral every 4 hours PRN Severe Pain (7 - 10)  oxyCODONE    IR 2.5 milliGRAM(s) Oral every 4 hours PRN Moderate Pain (4 - 6)

## 2023-08-21 NOTE — PROGRESS NOTE ADULT - SUBJECTIVE AND OBJECTIVE BOX
[facility] DIVISION OF INFECTIOUS DISEASES  KASEY Mustafa Y. Patel, S. Shah, G. [facility]  [phone]  ([phone] - weekdays after 5pm and weekends)    Name: GALDIS LEONARD  Age/Gender: 83y Male  MRN: [id number]    Interval History:  Patient seen and examined this morning.   Denies pain or any new complaints.  RN and PCA at bedside, having watery stools in ostomy.   Notes reviewed. Afebrile   Allergies: No Known Allergies      Objective:  Vitals:   T(F): 98.2 (08-21-23 @ 13:05), Max: 98.2 (08-21-23 @ 05:19)  HR: 95 (08-21-23 @ 13:05) (92 - 105)  BP: 115/71 (08-21-23 @ 13:05) (105/70 - 132/79)  RR: 18 (08-21-23 @ 13:05) (17 - 18)  SpO2: 95% (08-21-23 @ 13:05) (95% - 97%)  Physical Examination:  General: no acute distress  HEENT: NC/AT, anicteric, neck supple  Respiratory: no acc muscle use, breathing comfortably  Cardiovascular: S1 and S2 present  Gastrointestinal: nondistended, ostomy with watery stools   Extremities: +edema, no cyanosis  Skin: no visible rash, ecchymosis     Laboratory Studies:  CBC:                       8.4    16.46 )-----------( 614      ( 21 Aug 2023 07:29 )             27.0     WBC Trend:  16.46 08-21-23 @ 07:29  17.64 08-20-23 @ 05:30  19.04 08-20-23 @ 01:03  18.05 08-19-23 @ 07:11  13.35 08-18-23 @ 02:38  18.27 08-17-23 @ 20:48  11.85 08-17-23 @ 07:00  13.57 08-16-23 @ 20:32  11.16 08-16-23 @ 07:13  11.75 08-15-23 @ 21:39  14.27 08-15-23 @ 09:12  13.37 08-14-23 @ 23:17    CMP: 08-21    137  |  102  |  25<H>  ----------------------------<  120<H>  3.7   |  21<L>  |  1.18    Ca    8.3<L>      21 Aug 2023 07:29  Phos  2.2     08-21  Mg     2.0     08-21      Creatinine: 1.18 mg/dL (08-21-23 @ 07:29)  Creatinine: 1.29 mg/dL (08-20-23 @ 05:30)  Creatinine: 1.13 mg/dL (08-19-23 @ 07:13)  Creatinine: 0.87 mg/dL (08-18-23 @ 02:38)  Creatinine: 0.93 mg/dL (08-17-23 @ 20:48)  Creatinine: 1.01 mg/dL (08-17-23 @ 06:56)  Creatinine: 1.05 mg/dL (08-16-23 @ 07:13)    Microbiology: reviewed   Culture - Sputum (collected 08-09-23 @ 11:22)  Source: .Sputum Sputum  Gram Stain (08-09-23 @ 20:44):    Rare Squamous epithelial cells per low power field    No polymorphonuclear leukocytes per low power field    Rare Gram Negative Rods per oil power field    Rare Gram positive cocci in pairs per oil power field  Final Report (08-11-23 @ 21:22):    Normal Respiratory Milly present    Culture - Urine (collected 08-08-23 @ 22:57)  Source: Clean Catch Clean Catch (Midstream)  Final Report (08-10-23 @ 10:40):    <10,000 CFU/mL Normal Urogenital Milly    Culture - Blood (collected 08-08-23 @ 22:15)  Source: .Blood Blood-Peripheral  Final Report (08-14-23 @ 02:00):    No growth at 5 days    Culture - Blood (collected 08-08-23 @ 22:10)  Source: .Blood Blood-Peripheral  Final Report (08-14-23 @ 02:00):    No growth at 5 days    Radiology: reviewed   < from: CT Abdomen and Pelvis w/ IV Cont (08.21.23 @ 13:17) >  IMPRESSION:  No evidence of abscess.  Interval creation of left lower quadrant loop colostomy. No bowel   obstruction.  Scattered pneumoperitoneum and subcutaneous emphysema along the left   chest/abdominal wall, representing postoperative changes.  Small bilateral pleural effusions.    < end of copied text >  < from: CT Abdomen and Pelvis w/ IV Cont (08.12.23 @ 21:46) >  IMPRESSION:  Findings in the descending colon are again suspicious for perforated   neoplasm with slight interval increase in size of adjacent collection.  No pneumoperitoneum.    < end of copied text >    Medications:  acetaminophen     Tablet .. 1000 milliGRAM(s) Oral every 6 hours  apixaban 5 milliGRAM(s) Oral two times a day  atorvastatin 10 milliGRAM(s) Oral at bedtime  finasteride 5 milliGRAM(s) Oral daily  furosemide   Injectable 40 milliGRAM(s) IV Push every 24 hours  metoprolol tartrate 50 milliGRAM(s) Oral two times a day  nystatin Powder 1 Application(s) Topical two times a day  oxyCODONE    IR 2.5 milliGRAM(s) Oral every 4 hours PRN  oxyCODONE    IR 5 milliGRAM(s) Oral every 4 hours PRN  pantoprazole  Injectable 40 milliGRAM(s) IV Push two times a day  piperacillin/tazobactam IVPB.. 3.375 Gram(s) IV Intermittent every 8 hours  tamsulosin 0.4 milliGRAM(s) Oral at bedtime    Current Antimicrobials:  piperacillin/tazobactam IVPB.. 3.375 Gram(s) IV Intermittent every 8 hours    Prior/Completed Antimicrobials:  metroNIDAZOLE    Tablet  neomycin  piperacillin/tazobactam IVPB.  piperacillin/tazobactam IVPB.  piperacillin/tazobactam IVPB.-  piperacillin/tazobactam IVPB..  piperacillin/tazobactam IVPB..  vancomycin  IVPB.

## 2023-08-21 NOTE — DIETITIAN INITIAL EVALUATION ADULT - EDUCATION DIETARY MODIFICATIONS
RD reviewed Colostomy nutrition therapy handout with pt and daughter at bedside. Discussed eating low-fiber foods, avoiding fiber rich foods, fresh fruits/vegetables, whole grains, and added fiber in processed foods, chewing foods well, small frequent meals high in protein & energy, reviewed foods that may cause odors, discussed foods that bulk stool, avoiding straws & chewing gum, and importance of adequate hydration. Pt verbalized understanding and accepted written handout. Patient with no nutrition-related questions at this time. Made aware RD remains available as needed./(1) partially meets; needs review/practice/verbalization

## 2023-08-21 NOTE — ADVANCED PRACTICE NURSE CONSULT - RECOMMEDATIONS
Will recommend:  1) Topical therapy- Bilateral groin & scrotal wounds- cleanse w/with warm soapy water, rinse & pat dry. Apply Nystatin to b/l groin & scrotal wound (dust off excess) and then apply Aquacel; change daily.  Aquacel will absorb drainage & autolytically debride & promote granular base.  2) Continue w/ turning & positioning to off load.  3) Continue to monitor skin.  4) When pt OOB to chair seat cushion to be used & limit sitting.  5) Nutrition support as pt condition allows.  6) Complete Cair boots for off loading heels.  7) Continue ostomy teaching; empty pouch when 1/3-1/2 full; change pouching system twice weekly (Mon/Thurs)  8) Elevate scrotum  Discussed treatment plan with pt & staff RNMarcello.

## 2023-08-21 NOTE — DIETITIAN INITIAL EVALUATION ADULT - PROBLEM SELECTOR PLAN 4
Pt with b/l LE pitting edema. Prior TTE (2014) noted evidence of diastolic dysfunction. Subsequent TTEs with indeterminate diastolic function. Follows outpatient with Vascular Cardiology, Dr. Lopez Kahn.  - strict I/Os, standing weights daily  - holding home lasix and spironolactone given hypotension from sepsis  - monitor volume status  - can consider obtaining repeat TTE while inpatient  - f/u proBNP, hsTrop  - f/u repeat EKG (pt with irregular rhythm on exam and difficult to interpret initial EKG due to baseline artifacts)  - f/u b/l LE duplex (RLE slightly larger than LLE)

## 2023-08-21 NOTE — DIETITIAN INITIAL EVALUATION ADULT - ORAL INTAKE PTA/DIET HISTORY
Pt reports good PO intake at baseline w/ slight decreased PO intake PTA due to weakness.   Not on any therapeutic restriction PTA, usually eats cold cereal with milk for breakfast, daughter will prepare lunch and dinner.   Confirms NKFA/intolerance  Micronutrient/Other supplementation: none  Protein-energy supplementation: none

## 2023-08-21 NOTE — ADVANCED PRACTICE NURSE CONSULT - REASON FOR CONSULT
Requested by staff to assess skin status to bilateral groin.  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 Patient has never seen a colorectal surgeon in the past. Patient with non toxic appearance, stable and with benign abdominal physical exam, however CT scan concerning for colon mass c/w contained perforation. 8/16 EGD with two duodenal ulcers suspicious for source of prior melena, no active bleed. Patient now s/p laparoscopic left colectomy with end colostomy creation, recovering appropriately.

## 2023-08-21 NOTE — PROGRESS NOTE ADULT - SUBJECTIVE AND OBJECTIVE BOX
Colorectal Surgery Progress Note    Overnight: No acute events.    Subjective: Patient resting in bed.    Objective:  Vitals:  T(C): 36.7 (23 @ 22:21), Max: 36.8 (23 @ 13:39)  HR: 98 (23 @ 22:21) (83 - 98)  BP: 113/73 (23 @ 22:21) (102/65 - 133/85)  RR: 17 (23 @ 22:21) (17 - 19)  SpO2: 97% (23 @ 22:21) (95% - 100%)  Wt(kg): --     @ 07:01  -   @ 07:00  --------------------------------------------------------  IN:    IV PiggyBack: 200 mL    Oral Fluid: 600 mL    Pantoprazole: 230 mL  Total IN: 1030 mL    OUT:    Colostomy (mL): 400 mL    Voided (mL): 825 mL  Total OUT: 1225 mL    Total NET: -195 mL       @ 07:01  -  08 @ 01:07  --------------------------------------------------------  IN:    Oral Fluid: 480 mL  Total IN: 480 mL    OUT:    Colostomy (mL): 0 mL    Post-Void Residual per Intermittent Catheterization (mL): 200 mL    Voided (mL): 600 mL  Total OUT: 800 mL    Total NET: -320 mL        Physical Exam:  General Appearance: no acute distress, NTND   Chest: airway intact, non-labored breathing  CV: no JVD, palpable pulses b/l  Abdomen: soft, non-tender, non-distended, midline incision cdi. L sided colostomy mild edema, mucosa pink, no stool or gas  Extremities: WWP         Labs:                        7.8    17.64 )-----------( 551      ( 20 Aug 2023 05:30 )             25.5     08-20    138  |  104  |  30<H>  ----------------------------<  113<H>  3.7   |  25  |  1.29    Ca    7.9<L>      20 Aug 2023 05:30  Phos  2.9     08  Mg     2.1     08-20          Urinalysis Basic - ( 20 Aug 2023 14:51 )    Color: Colorless / Appearance: Clear / S.013 / pH: x  Gluc: x / Ketone: Negative  / Bili: Negative / Urobili: Negative   Blood: x / Protein: Negative / Nitrite: Negative   Leuk Esterase: Negative / RBC: x / WBC x   Sq Epi: x / Non Sq Epi: x / Bacteria: x Colorectal Surgery Progress Note    Overnight: No acute events.    Subjective: Patient resting in bed. Pattie removed from midline incision this am. He mentioned he feels he has trouble urinating as if he is retaining urine and had 1x episode of chills he felt last night. Otherwise patient denies subjective fever, CP, SOB, n/v, or abdominal pain. +Gas/+stool in ostomy bag. Pain well controlled, tolerating diet.     Objective:  Vitals:  T(C): 36.7 (23 @ 22:21), Max: 36.8 (23 @ 13:39)  HR: 98 (23 @ 22:21) (83 - 98)  BP: 113/73 (23 @ 22:21) (102/65 - 133/85)  RR: 17 (23 @ 22:21) (17 - 19)  SpO2: 97% (23 @ 22:21) (95% - 100%)  Wt(kg): --     @ 07:01  -   @ 07:00  --------------------------------------------------------  IN:    IV PiggyBack: 200 mL    Oral Fluid: 600 mL    Pantoprazole: 230 mL  Total IN: 1030 mL    OUT:    Colostomy (mL): 400 mL    Voided (mL): 825 mL  Total OUT: 1225 mL    Total NET: -195 mL       @ 07:01  -   @ 01:07  --------------------------------------------------------  IN:    Oral Fluid: 480 mL  Total IN: 480 mL    OUT:    Colostomy (mL): 0 mL    Post-Void Residual per Intermittent Catheterization (mL): 200 mL    Voided (mL): 600 mL  Total OUT: 800 mL    Total NET: -320 mL        Physical Exam:  General Appearance: no acute distress, NTND   Chest: airway intact, non-labored breathing  CV: no JVD, palpable pulses b/l  Abdomen: soft, non-tender, non-distended, midline incision cdi. L sided colostomy mucosa pink, some gas and liquid stool.  Extremities: WWP         Labs:                        7.8    17.64 )-----------( 551      ( 20 Aug 2023 05:30 )             25.5     08-20    138  |  104  |  30<H>  ----------------------------<  113<H>  3.7   |  25  |  1.29    Ca    7.9<L>      20 Aug 2023 05:30  Phos  2.9     08-20  Mg     2.1     08-20          Urinalysis Basic - ( 20 Aug 2023 14:51 )    Color: Colorless / Appearance: Clear / S.013 / pH: x  Gluc: x / Ketone: Negative  / Bili: Negative / Urobili: Negative   Blood: x / Protein: Negative / Nitrite: Negative   Leuk Esterase: Negative / RBC: x / WBC x   Sq Epi: x / Non Sq Epi: x / Bacteria: x Colorectal Surgery Progress Note    Overnight: No acute events.    Subjective: Patient resting in bed. Pattie removed from midline incision this am. He mentioned he feels he has trouble urinating as if he is retaining urine and had 1x episode of chills he felt last night (UA negative from yesterday). Otherwise patient denies subjective fever, CP, SOB, n/v, or abdominal pain. +Gas/+stool in ostomy bag. Pain well controlled, tolerating diet.     Objective:  Vitals:  T(C): 36.7 (23 @ 22:21), Max: 36.8 (23 @ 13:39)  HR: 98 (23 @ 22:21) (83 - 98)  BP: 113/73 (23 @ 22:21) (102/65 - 133/85)  RR: 17 (23 @ 22:21) (17 - 19)  SpO2: 97% (23 @ 22:21) (95% - 100%)  Wt(kg): --     @ 07:01  -   @ 07:00  --------------------------------------------------------  IN:    IV PiggyBack: 200 mL    Oral Fluid: 600 mL    Pantoprazole: 230 mL  Total IN: 1030 mL    OUT:    Colostomy (mL): 400 mL    Voided (mL): 825 mL  Total OUT: 1225 mL    Total NET: -195 mL      08 @ 07:01  -   @ 01:07  --------------------------------------------------------  IN:    Oral Fluid: 480 mL  Total IN: 480 mL    OUT:    Colostomy (mL): 0 mL    Post-Void Residual per Intermittent Catheterization (mL): 200 mL    Voided (mL): 600 mL  Total OUT: 800 mL    Total NET: -320 mL        Physical Exam:  General Appearance: no acute distress, NTND   Chest: airway intact, non-labored breathing  CV: no JVD, palpable pulses b/l  Abdomen: soft, non-tender, non-distended, midline incision cdi. L sided colostomy mucosa pink, some gas and liquid stool.  Extremities: [facility]         Labs:                        7.8    17.64 )-----------( 551      ( 20 Aug 2023 05:30 )             25.5     08-20    138  |  104  |  30<H>  ----------------------------<  113<H>  3.7   |  25  |  1.29    Ca    7.9<L>      20 Aug 2023 05:30  Phos  2.9     08-20  Mg     2.1     08-20          Urinalysis Basic - ( 20 Aug 2023 14:51 )    Color: Colorless / Appearance: Clear / S.013 / pH: x  Gluc: x / Ketone: Negative  / Bili: Negative / Urobili: Negative   Blood: x / Protein: Negative / Nitrite: Negative   Leuk Esterase: Negative / RBC: x / WBC x   Sq Epi: x / Non Sq Epi: x / Bacteria: x

## 2023-08-21 NOTE — PROGRESS NOTE ADULT - SUBJECTIVE AND OBJECTIVE BOX
DATE OF SERVICE: 08-21-23 @ 10:48    Patient is a 83y old  Male who presents with a chief complaint of Severe sepsis/SIRS (21 Aug 2023 01:06)      INTERVAL HISTORY:     REVIEW OF SYSTEMS:  CONSTITUTIONAL: No weakness  EYES/ENT: No visual changes;  No throat pain   NECK: No pain or stiffness  RESPIRATORY: No cough, wheezing; No shortness of breath  CARDIOVASCULAR: No chest pain or palpitations  GASTROINTESTINAL: No abdominal  pain. No nausea, vomiting, or hematemesis  GENITOURINARY: No dysuria, frequency or hematuria  NEUROLOGICAL: No stroke like symptoms  SKIN: No rashes    TELEMETRY Personally reviewed: AFIB   	  MEDICATIONS:  furosemide   Injectable 40 milliGRAM(s) IV Push every 24 hours  metoprolol tartrate 50 milliGRAM(s) Oral two times a day        PHYSICAL EXAM:  T(C): 36.8 (08-21-23 @ 05:19), Max: 36.8 (08-20-23 @ 13:39)  HR: 105 (08-21-23 @ 05:19) (89 - 105)  BP: 127/71 (08-21-23 @ 05:19) (102/65 - 132/79)  RR: 18 (08-21-23 @ 05:19) (17 - 18)  SpO2: 96% (08-21-23 @ 05:19) (95% - 98%)  Wt(kg): --  I&O's Summary    20 Aug 2023 07:01  -  21 Aug 2023 07:00  --------------------------------------------------------  IN: 480 mL / OUT: 1200 mL / NET: -720 mL          Appearance: In no distress	  HEENT:    PERRL, EOMI	  Cardiovascular:  S1 S2, No JVD  Respiratory: Lungs clear to auscultation	  Gastrointestinal:  Soft, Non-tender, + BS	  Vascularature:  No edema of LE  Psychiatric: Appropriate affect   Neuro: no acute focal deficits                               8.4    16.46 )-----------( 614      ( 21 Aug 2023 07:29 )             27.0     08-21    137  |  102  |  25<H>  ----------------------------<  120<H>  3.7   |  21<L>  |  1.18    Ca    8.3<L>      21 Aug 2023 07:29  Phos  2.2     08-21  Mg     2.0     08-21          Labs personally reviewed      ASSESSMENT/PLAN: 	  82M w/ hx of HTN, HLD, pre-DM, BPH, COPD, ?ILD, pleural 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 SBP down to 80s. 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   ·  Plan: Afib confirmed on ecg. No prior hx.    -TTE no LA enlargement. EF 68%  -cw metoprolol 50mg PO BID  -CHADSvasc at least 3. Per colorectal surgery team no contraindication to AC  If no contraindication by CRS will start Eliquis 5mg BID for stroke ppx  - Resume AC post Op    Problem/Plan - 2:  ·  Problem: Chronic diastolic congestive heart failure.   ·  Plan: TTE with preserved EF.  - On Furosemide 40mg and [place] 25mg daily at home  - Volume status improved   - Transition to Lasix 80mg PO Daily      Problem/Plan - 3:  ·  Problem: HTN (hypertension).   ·  Plan: - Metoprolol 50mg PO BID    Problem/Plan - 4:  ·  Problem: HLD (hyperlipidemia).   ·  Plan: - c/w home atorvastatin.    Problem/Plan- 5:  Problem: Cardiac Risk Stratification  - EKG with no ischemia noted  - Patient not in decompensated HF  - No hx of tachy/marysol arrhythmia  - TTE unremarkable   - Patient is mod risk for mod risk Laparoscopic Left Colectomy. No contraindication to proceed  - Tolerated surgery well.               JOSÉ Steward DO [facility]  Cardiovascular Medicine  [address], [address]  Available via call or text on [employer]  Office: [phone]

## 2023-08-21 NOTE — DIETITIAN INITIAL EVALUATION ADULT - REASON FOR ADMISSION
Systemic inflammatory response syndrome (SIRS) due to non-infectious process without acute organ dysfunction    Per chart: "82M w/ hx of HTN, HLD, pre-DM, BPH, COPD, ?ILD, pleural plaques 2/2 asbestosis, former smoker, colonic lesion (?neoplasm), pulm nodule, thyroid nodule, diastolic dysfunction, aortic ectasia, cellulitis, presenting with generalized weakness, fevers, and chills x1 day. He was brought to the hospital after being unable to get out of bed due to weakness. Noted that he fell twice about a week ago, stating the first time was because he tripped and second time his legs "just gave out." Denied head strike or LOC. Felt better after hydration and food and so he didn't seek care at the time. Has had poor appetite for the past week, denied noticing any unintended weight loss. Denied headache, vision changes, CP, SOB, nausea, vomiting, abdominal pain, dysuria, hematuria, hematochezia, melena, diarrhea, constipation, recent travel, or sick contacts. Noted he was spitting up some mucous, but stated he has that from time to time. Stated he had a colonoscopy some time ago (unclear when) and was not told about any remarkable findings, though outpatient note from PCP from 6/2023 stated pt had not had colonoscopy at time."

## 2023-08-21 NOTE — DIETITIAN INITIAL EVALUATION ADULT - ENERGY INTAKE
Adequate (%) s/p OR on 8/17 for laparoscopic left colectomy with end colostomy creation. Pt was on Clear Liquid diet from 8/18-8/19, diet advanced to low fiber diet since 8/19. Pt and daughter reports good PO intake >75% since then. They are aware of menu ordering procedure in house, has been ordering foods to ensure adequate PO intake. Does not want oral nutrition supplement when offered.

## 2023-08-21 NOTE — DIETITIAN INITIAL EVALUATION ADULT - PROBLEM SELECTOR PROBLEM 6
Future Appointments       Provider Department Center    10/25/2018 10:25 AM Wendi Lind P.A.-C. [facility]        ESTABLISHED PATIENT PRE-VISIT PLANNING     Note: Patient will not be contacted if there is no indication to call.     1.  Reviewed notes from the last few office visits within the medical group: Yes    2.  If any orders were placed at last visit or intended to be done for this visit (i.e. 6 mos follow-up), do we have Results/Consult Notes?        •  Labs - Labs ordered, completed on 06/06/18 and results are in chart.       •  Imaging - Imaging was not ordered at last office visit.       •  Referrals - No referrals were ordered at last office visit.    3. Is this appointment scheduled as a Hospital Follow-Up? No    4.  Immunizations were updated in [employer] using [employer]?: Yes       •  [employer] Recommendations: FLU, HEPATITIS B, PNEUMOVAX (PPSV23), TDAP and ZOSTAVAX (Shingles)    5.  Patient is due for the following Health Maintenance Topics:   Health Maintenance Due   Topic Date Due   • IMM HEP B VACCINE (1 of 3 - Risk 3-dose series) 06/18/1979   • IMM DTaP/Tdap/Td Vaccine (1 - Tdap) 06/18/1979   • IMM PNEUMOCOCCAL (1 of 1 - PPSV23) 06/18/1979   • IMM ZOSTER VACCINES (1 of 2) 06/18/2010   • MAMMOGRAM  03/20/2017   • RETINAL SCREENING  09/16/2017   • COLON CANCER SCREENING ANNUAL FIT  10/16/2017   • URINE ACR / MICROALBUMIN  08/07/2018   • IMM INFLUENZA (1) 09/01/2018   • FASTING LIPID PROFILE  09/16/2018   • SERUM CREATININE  09/16/2018       6.  MDX printed for Provider? NO    7.  Patient was informed to arrive 15 min prior to their scheduled appointment and bring in their medication bottles. Confirmed through automated call    HTN (hypertension)

## 2023-08-21 NOTE — DIETITIAN INITIAL EVALUATION ADULT - NSFNSGIIOFT_GEN_A_CORE
Pt confirms Ht 71"  Dosing wt: 81.6kg/179.9lb (8/17 stated wt from admission)   UBW: 190lb per pt, dx CHF was on diuretics PTA might cause wt fluctuation   Wt from current admission ([facility]): 215lb (8/13).   Wt obtained by RD at bedside: 230lb (8/21)   ? accuracy of wt while in house, RD will continue to monitor wt trends as available/able.   Wt history from previous admission: no history   Wt history per Shaneka LOPEZ: no history

## 2023-08-21 NOTE — DIETITIAN INITIAL EVALUATION ADULT - OTHER INFO
-- Pt is ordered for Protonix, Lasix, Metoprolol, Atorvastatin.   -- s/p multiple Abx, currently on Zosyn, ID following.   -- s/p multiple supplements (K3PO4, KCL and Mg), ordered for K3PO4 8/21 for hypophosphatemia.   -- s/p IVF Lactated Ringers and NaCl 0.9%.

## 2023-08-21 NOTE — DIETITIAN INITIAL EVALUATION ADULT - ADD RECOMMEND
-- Monitor PO intake, GI tolerance, skin integrity, labs, weight, and bowel movement regularity.   -- Will continue to honor food and beverage preferences within diet restriction of patient in an effort to maximize level of nutrient intake.  -- Assist with meals PRN.

## 2023-08-21 NOTE — PROGRESS NOTE ADULT - ASSESSMENT
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 Patient has never seen a colorectal surgeon in the past. Patient with non toxic appearance, stable and with benign abdominal physical exam, however CT scan concerning for colon mass c/w contained perforation. 8/16 EGD with two duodenal ulcers suspicious for source of prior melena, no active bleed. Patient now s/p laparoscopic left colectomy with end colostomy creation, recovering appropriately.    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 Patient has never seen a colorectal surgeon in the past. Patient with non toxic appearance, stable and with benign abdominal physical exam, however CT scan concerning for colon mass c/w contained perforation. 8/16 EGD with two duodenal ulcers suspicious for source of prior melena, no active bleed. Patient now s/p laparoscopic left colectomy with end colostomy creation, recovering appropriately.    PLAN:  - ERP  - LRD  - monitor I&Os (bladder scan today)  - CT AP w/ IV contrast   - encourage oob/amb PT  - f/u AM labs  - Continue home Eliquis  - Continue Flomax  - GI recs appreciated: PPI 40mg BID IV pushes  - Cardiology recs appreciated  - Medicine recs appreciated  - ID recs appreciated: Zosyn  - PT consulted, rec DAVIDA  - Tele    Green Surgery   [id number]

## 2023-08-21 NOTE — DIETITIAN INITIAL EVALUATION ADULT - PROBLEM SELECTOR PLAN 9
Pt unable to recall list of meds. Unable to reach daughter overnight for med list. Prelim med rec performed via [employer].  - further med rec needed in AM

## 2023-08-21 NOTE — DIETITIAN INITIAL EVALUATION ADULT - PERTINENT LABORATORY DATA
08-21    137  |  102  |  25<H>  ----------------------------<  120<H>  3.7   |  21<L>  |  1.18    Ca    8.3<L>      21 Aug 2023 07:29  Phos  2.2     08-21  Mg     2.0     08-21    A1C with Estimated Average Glucose Result: 6.2 % (08-09-23 @ 07:59)

## 2023-08-21 NOTE — DIETITIAN INITIAL EVALUATION ADULT - REASON INDICATOR FOR ASSESSMENT
seen for length of stay. Information obtained from pt, RN, electronic medical record, daughter at bedside. Chart reviewed, events noted.

## 2023-08-21 NOTE — ADVANCED PRACTICE NURSE CONSULT - ASSESSMENT
Consult received & events noted to date. The pt was encountered on 9m-Mr. Landis was in bed, alert & oriented x3. Pt is well known to this CWOCN from preop stoma marking & education as well as ostomy teaching post operatively.Pt requires assistance w/turning & repositioning & collegue at bedside to assist. Pt is on an alternating air with low air loss support surface. Pt is pleasant & cooperative. External condom catheter in place at this time due to urinary incontinence. Pt with colostomy- pink & viable & functioning +flatus +liquid stool; pouch seal intact (Ostomy RNs to f/u later this am when pt's daughter arrives at bedside for ongoing ostomy teaching). Pericare provided as noted moisture barrier cream with nystatin powder covering wounds (denuded/partial thickness skin loss). On exam- noted pt with scrotal edema and slow blanching erythema, denudation/erosion and moisture to scrotal skin anteriorly.  Also noted to the bilateral groin are linear breaks in the skin with pink, moist wound bed, scant fibrinous tissue on lower aspect of R groin with scant serosanguineous drainage.  No odor, erthyema, increased warmth, fluctuance noted. Pt with complaints of discomfort/"burning" to the area. Given the location of the wound & pt is incontinent of urine findings suggest that this alteration in the skin appears to be related to incontinence and moisture and  not pressure. Findings suggest that this skin alteration appears to be to be a result of moisture associated dermatitis. Will recommend  to continue to monitor & apply Nystatin powder (dust off excess) & then tuck Aquacel hydrofiber into b/l groin & small piece of Aquacel (fenestrated) and place under penis to cover, protect wound and absorb moisture.  Pt endorses relief with Aquacel in place.  Education was provided to pt regarding interventions to prevent pressure injuries including turning & repositioning every 2hr, use of seat cushion when OOB to ch, limiting time when in chair, mobility (as able), & nutrition. Pt expresses understanding using verbal teach back at this time.

## 2023-08-22 LAB
ANION GAP SERPL CALC-SCNC: 8 MMOL/L — SIGNIFICANT CHANGE UP (ref 5–17)
BUN SERPL-MCNC: 19 MG/DL — SIGNIFICANT CHANGE UP (ref 7–23)
BURR CELLS BLD QL SMEAR: PRESENT — SIGNIFICANT CHANGE UP
CALCIUM SERPL-MCNC: 8.3 MG/DL — LOW (ref 8.4–10.5)
CHLORIDE SERPL-SCNC: 105 MMOL/L — SIGNIFICANT CHANGE UP (ref 96–108)
CO2 SERPL-SCNC: 26 MMOL/L — SIGNIFICANT CHANGE UP (ref 22–31)
CREAT SERPL-MCNC: 1.02 MG/DL — SIGNIFICANT CHANGE UP (ref 0.5–1.3)
DACRYOCYTES BLD QL SMEAR: SLIGHT — SIGNIFICANT CHANGE UP
EGFR: 72 ML/MIN/1.73M2 — SIGNIFICANT CHANGE UP
ELLIPTOCYTES BLD QL SMEAR: SLIGHT — SIGNIFICANT CHANGE UP
GIANT PLATELETS BLD QL SMEAR: PRESENT — SIGNIFICANT CHANGE UP
GLUCOSE SERPL-MCNC: 112 MG/DL — HIGH (ref 70–99)
HCT VFR BLD CALC: 27.1 % — LOW (ref 39–50)
HGB BLD-MCNC: 8.5 G/DL — LOW (ref 13–17)
MAGNESIUM SERPL-MCNC: 2 MG/DL — SIGNIFICANT CHANGE UP (ref 1.6–2.6)
MANUAL SMEAR VERIFICATION: SIGNIFICANT CHANGE UP
MCHC RBC-ENTMCNC: 28.6 PG — SIGNIFICANT CHANGE UP (ref 27–34)
MCHC RBC-ENTMCNC: 31.4 GM/DL — LOW (ref 32–36)
MCV RBC AUTO: 91.2 FL — SIGNIFICANT CHANGE UP (ref 80–100)
MYELOCYTES NFR BLD: 0.8 % — HIGH (ref 0–0)
NEUTS BAND # BLD: 1.7 % — SIGNIFICANT CHANGE UP (ref 0–8)
PHOSPHATE SERPL-MCNC: 3.1 MG/DL — SIGNIFICANT CHANGE UP (ref 2.5–4.5)
PLAT MORPH BLD: ABNORMAL
PLATELET # BLD AUTO: 678 K/UL — HIGH (ref 150–400)
POIKILOCYTOSIS BLD QL AUTO: SLIGHT — SIGNIFICANT CHANGE UP
POLYCHROMASIA BLD QL SMEAR: SLIGHT — SIGNIFICANT CHANGE UP
POTASSIUM SERPL-MCNC: 4.5 MMOL/L — SIGNIFICANT CHANGE UP (ref 3.5–5.3)
POTASSIUM SERPL-SCNC: 4.5 MMOL/L — SIGNIFICANT CHANGE UP (ref 3.5–5.3)
RBC # BLD: 2.97 M/UL — LOW (ref 4.2–5.8)
RBC # FLD: 15.4 % — HIGH (ref 10.3–14.5)
RBC BLD AUTO: ABNORMAL
SODIUM SERPL-SCNC: 139 MMOL/L — SIGNIFICANT CHANGE UP (ref 135–145)
WBC # BLD: 14.47 K/UL — HIGH (ref 3.8–10.5)
WBC # FLD AUTO: 14.47 K/UL — HIGH (ref 3.8–10.5)

## 2023-08-22 PROCEDURE — 71045 X-RAY EXAM CHEST 1 VIEW: CPT | Mod: 26

## 2023-08-22 RX ORDER — IPRATROPIUM/ALBUTEROL SULFATE 18-103MCG
3 AEROSOL WITH ADAPTER (GRAM) INHALATION ONCE
Refills: 0 | Status: COMPLETED | OUTPATIENT
Start: 2023-08-22 | End: 2023-08-22

## 2023-08-22 RX ORDER — FUROSEMIDE 40 MG
80 TABLET ORAL DAILY
Refills: 0 | Status: DISCONTINUED | OUTPATIENT
Start: 2023-08-22 | End: 2023-08-22

## 2023-08-22 RX ORDER — PANTOPRAZOLE SODIUM 20 MG/1
40 TABLET, DELAYED RELEASE ORAL EVERY 12 HOURS
Refills: 0 | Status: DISCONTINUED | OUTPATIENT
Start: 2023-08-22 | End: 2023-08-23

## 2023-08-22 RX ORDER — ONDANSETRON 8 MG/1
4 TABLET, FILM COATED ORAL ONCE
Refills: 0 | Status: COMPLETED | OUTPATIENT
Start: 2023-08-22 | End: 2023-08-22

## 2023-08-22 RX ORDER — FUROSEMIDE 40 MG
80 TABLET ORAL DAILY
Refills: 0 | Status: DISCONTINUED | OUTPATIENT
Start: 2023-08-22 | End: 2023-08-23

## 2023-08-22 RX ADMIN — PIPERACILLIN AND TAZOBACTAM 25 GRAM(S): 4; .5 INJECTION, POWDER, LYOPHILIZED, FOR SOLUTION INTRAVENOUS at 05:28

## 2023-08-22 RX ADMIN — ONDANSETRON 4 MILLIGRAM(S): 8 TABLET, FILM COATED ORAL at 13:43

## 2023-08-22 RX ADMIN — TAMSULOSIN HYDROCHLORIDE 0.4 MILLIGRAM(S): 0.4 CAPSULE ORAL at 21:58

## 2023-08-22 RX ADMIN — Medication 1000 MILLIGRAM(S): at 05:27

## 2023-08-22 RX ADMIN — Medication 1000 MILLIGRAM(S): at 00:35

## 2023-08-22 RX ADMIN — ATORVASTATIN CALCIUM 10 MILLIGRAM(S): 80 TABLET, FILM COATED ORAL at 21:58

## 2023-08-22 RX ADMIN — NYSTATIN CREAM 1 APPLICATION(S): 100000 CREAM TOPICAL at 17:23

## 2023-08-22 RX ADMIN — Medication 50 MILLIGRAM(S): at 05:27

## 2023-08-22 RX ADMIN — PANTOPRAZOLE SODIUM 40 MILLIGRAM(S): 20 TABLET, DELAYED RELEASE ORAL at 05:28

## 2023-08-22 RX ADMIN — Medication 40 MILLIGRAM(S): at 08:16

## 2023-08-22 RX ADMIN — FINASTERIDE 5 MILLIGRAM(S): 5 TABLET, FILM COATED ORAL at 12:20

## 2023-08-22 RX ADMIN — APIXABAN 5 MILLIGRAM(S): 2.5 TABLET, FILM COATED ORAL at 05:27

## 2023-08-22 RX ADMIN — Medication 3 MILLILITER(S): at 09:04

## 2023-08-22 RX ADMIN — Medication 1000 MILLIGRAM(S): at 12:20

## 2023-08-22 RX ADMIN — APIXABAN 5 MILLIGRAM(S): 2.5 TABLET, FILM COATED ORAL at 17:21

## 2023-08-22 RX ADMIN — NYSTATIN CREAM 1 APPLICATION(S): 100000 CREAM TOPICAL at 05:29

## 2023-08-22 RX ADMIN — Medication 1000 MILLIGRAM(S): at 06:27

## 2023-08-22 RX ADMIN — Medication 1000 MILLIGRAM(S): at 17:21

## 2023-08-22 RX ADMIN — PANTOPRAZOLE SODIUM 40 MILLIGRAM(S): 20 TABLET, DELAYED RELEASE ORAL at 17:23

## 2023-08-22 NOTE — PROGRESS NOTE ADULT - ASSESSMENT
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 Patient with fever, tachycardia, leukocytosis with lactic acidosis on admission and admitted for severe sepsis vs SIRS     Severe sepsis likely due to contained perforation of colonic neoplasm   New onset Afib, now on eliquis   - CT with focal mural thickening and associated loculated fluid collection at the junction of the descending and sigmoid colon suspicious for contained perforation of colonic neoplasm, progressed since 2019. Has atelectasis reported, no pneumonia.   - repeat CTAP with perforated neoplasm and slight interval increased in size of adjacent collection  - GI following, s/p EGD 8/16 - 2 nonbleeding duodenal ulcers  - MRSA PCR screen negative, off vancomycin    - Bcx negative   - 8/17 s/p OR for laparoscopic left colectomy with end colostomy creation   -- op note reviewed -- noted there was a focal perforation of the tumor into the abdominal wall-area around the perforation ws completely excised, no contamination of abdominal contents during procedure   - 8/21 Repeat CTAP with no abscess, LLL loop colostomy, scattered pneumoperitoneum and subcutaneous emphysema along L chest/abd wall--post op changes; small b/l pleural effusions  - 8/21 stool C.diff negative   - WBC trended up postop/reactive - continues to downtrend, remains afebrile  - UA negative for infection    Recommendations:  CRS following   Discontinued pip-tazo this am  Monitor off antibiotics    Monitor temps/CBC  Continue rest of care per primary team      Doris Connelly M.D.  [facility], Division of Infectious Diseases  [phone]  After 5pm on weekdays and all day on weekends - please call [phone]

## 2023-08-22 NOTE — PROGRESS NOTE ADULT - SUBJECTIVE AND OBJECTIVE BOX
DATE OF SERVICE: 08-22-23 @ 10:05    Patient is a 83y old  Male who presents with a chief complaint of Severe sepsis/SIRS (22 Aug 2023 01:38)      INTERVAL HISTORY: Feels ok.     REVIEW OF SYSTEMS:  CONSTITUTIONAL: No weakness  EYES/ENT: No visual changes;  No throat pain   NECK: No pain or stiffness  RESPIRATORY: No cough, wheezing; No shortness of breath  CARDIOVASCULAR: No chest pain or palpitations  GASTROINTESTINAL: No abdominal  pain. No nausea, vomiting, or hematemesis  GENITOURINARY: No dysuria, frequency or hematuria  NEUROLOGICAL: No stroke like symptoms  SKIN: No rashes    TELEMETRY Personally reviewed: AF   	  MEDICATIONS:  furosemide   Injectable 40 milliGRAM(s) IV Push every 24 hours  metoprolol tartrate 50 milliGRAM(s) Oral two times a day        PHYSICAL EXAM:  T(C): 36.2 (08-22-23 @ 07:47), Max: 37.4 (08-21-23 @ 17:16)  HR: 84 (08-22-23 @ 07:47) (76 - 95)  BP: 119/77 (08-22-23 @ 07:47) (105/70 - 132/78)  RR: 18 (08-22-23 @ 07:47) (18 - 18)  SpO2: 96% (08-22-23 @ 07:47) (95% - 97%)  Wt(kg): --  I&O's Summary    21 Aug 2023 07:01  -  22 Aug 2023 07:00  --------------------------------------------------------  IN: 620 mL / OUT: 1900 mL / NET: -1280 mL    22 Aug 2023 07:01  -  22 Aug 2023 10:05  --------------------------------------------------------  IN: 0 mL / OUT: 500 mL / NET: -500 mL          Appearance: In no distress	  HEENT:    PERRL, EOMI	  Cardiovascular:  S1 S2, No JVD  Respiratory: Lungs clear to auscultation	  Gastrointestinal:  Soft, Non-tender, + BS	  Vascularature:  + edema of LE  Psychiatric: Appropriate affect   Neuro: no acute focal deficits                               8.4    16.46 )-----------( 614      ( 21 Aug 2023 07:29 )             27.0     08-21    137  |  102  |  25<H>  ----------------------------<  120<H>  3.7   |  21<L>  |  1.18    Ca    8.3<L>      21 Aug 2023 07:29  Phos  2.2     08-21  Mg     2.0     08-21          Labs personally reviewed      ASSESSMENT/PLAN: 	    82M w/ hx of HTN, HLD, pre-DM, BPH, COPD, ?ILD, pleural 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 SBP down to 80s. 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   ·  Plan: Afib confirmed on ecg. No prior hx.    -TTE no LA enlargement. EF 68%  -cw metoprolol 50mg PO BID  -CHADSvasc at least 3. Per colorectal surgery team no contraindication to AC  If no contraindication by CRS will start Eliquis 5mg BID for stroke ppx  - Eliquis resumed    Problem/Plan - 2:  ·  Problem: Chronic diastolic congestive heart failure.   ·  Plan: TTE with preserved EF.  - On Furosemide 40mg and [place] 25mg daily at home  - Volume status improved   - Transition to Lasix 80mg PO Daily      Problem/Plan - 3:  ·  Problem: HTN (hypertension).   ·  Plan: - Metoprolol 50mg PO BID    Problem/Plan - 4:  ·  Problem: HLD (hyperlipidemia).   ·  Plan: - c/w home atorvastatin.    Problem/Plan- 5:  Problem: Cardiac Risk Stratification  - EKG with no ischemia noted  - Patient not in decompensated HF  - No hx of tachy/marysol arrhythmia  - TTE unremarkable   - Patient is mod risk for mod risk Laparoscopic Left Colectomy. No contraindication to proceed  - Tolerated surgery well.         ADITI Cottrell DO [facility]  Cardiovascular Medicine  [address], [address]  Available through call or text on [employer]  Office: [phone]

## 2023-08-22 NOTE — PROGRESS NOTE ADULT - NS ATTEND AMEND GEN_ALL_CORE FT
Patient care and plan discussed and reviewed with Advanced Care Provider. Plan as outlined above edited by me to reflect our discussion.

## 2023-08-22 NOTE — PROGRESS NOTE ADULT - ASSESSMENT
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 Patient has never seen a colorectal surgeon in the past. Patient with non toxic appearance, stable and with benign abdominal physical exam, however CT scan concerning for colon mass c/w contained perforation. 8/16 EGD with two duodenal ulcers suspicious for source of prior melena, no active bleed. Patient now s/p laparoscopic left colectomy with end colostomy creation, recovering appropriately. Normal postoperative changes on CT.    PLAN:  - ERP  - LRD  - monitor I&Os  - encourage oob/amb PT  - f/u AM labs  - Continue home Eliquis  - Continue Flomax  - GI recs appreciated: PPI 40mg BID IV pushes  - Cardiology recs appreciated  - Medicine recs appreciated  - ID recs appreciated: [place]  - PT consulted, rec DAVIDA  - Tele    Green Surgery   [id number]

## 2023-08-22 NOTE — PROGRESS NOTE ADULT - SUBJECTIVE AND OBJECTIVE BOX
[facility] DIVISION OF INFECTIOUS DISEASES  KASEY Mustafa Y. Patel, S. Shah, G. [facility]  [phone]  ([phone] - weekdays after 5pm and weekends)    Name: GLADIS LEONARD  Age/Gender: 83y Male  MRN: [id number]    Interval History:  Patient seen and examined this morning.   No new complaints noted.  Notes reviewed  No concerning overnight events  Afebrile   Allergies: No Known Allergies      Objective:  Vitals:   T(F): 97.1 (08-22-23 @ 07:47), Max: 99.3 (08-21-23 @ 17:16)  HR: 84 (08-22-23 @ 07:47) (76 - 95)  BP: 119/77 (08-22-23 @ 07:47) (105/70 - 132/78)  RR: 18 (08-22-23 @ 07:47) (18 - 18)  SpO2: 96% (08-22-23 @ 07:47) (95% - 97%)  Physical Examination:  General: no acute distress  HEENT: NC/AT, anicteric, neck supple  Respiratory: no acc muscle use, breathing comfortably  Cardiovascular: S1 and S2 present  Gastrointestinal: nondistended, +ostomy  Extremities: +edema, no cyanosis  Skin: no visible rash, ecchymosis     Laboratory Studies:  CBC:                       8.5    14.47 )-----------( 678      ( 22 Aug 2023 10:16 )             27.1     WBC Trend:  14.47 08-22-23 @ 10:16  16.46 08-21-23 @ 07:29  17.64 08-20-23 @ 05:30  19.04 08-20-23 @ 01:03  18.05 08-19-23 @ 07:11  13.35 08-18-23 @ 02:38  18.27 08-17-23 @ 20:48  11.85 08-17-23 @ 07:00  13.57 08-16-23 @ 20:32  11.16 08-16-23 @ 07:13  11.75 08-15-23 @ 21:39    CMP: 08-21    137  |  102  |  25<H>  ----------------------------<  120<H>  3.7   |  21<L>  |  1.18    Ca    8.3<L>      21 Aug 2023 07:29  Phos  2.2     08-21  Mg     2.0     08-21    Creatinine: 1.18 mg/dL (08-21-23 @ 07:29)  Creatinine: 1.29 mg/dL (08-20-23 @ 05:30)  Creatinine: 1.13 mg/dL (08-19-23 @ 07:13)  Creatinine: 0.87 mg/dL (08-18-23 @ 02:38)  Creatinine: 0.93 mg/dL (08-17-23 @ 20:48)  Creatinine: 1.01 mg/dL (08-17-23 @ 06:56)  Creatinine: 1.05 mg/dL (08-16-23 @ 07:13)      Microbiology: reviewed   C. difficile GDH &amp; toxins A/B by EIA (08.21.23 @ 12:00)    Clostridium difficile GDH Toxins A&amp;B, EIA:   Negative   Clostridium difficile GDH Interpretation: Negative for toxigenic C. Difficile.  This specimen is negative for C.  Difficile glutamate dehydrogenase (GDH) antigen and negative for C.  Difficile Toxins A & B, by EIA.  GDH is a highly sensitive screening  marker for C. Difficile that is produced in large amounts by all C.  Difficile strains, both toxigenic and nontoxigenic.  This assay has not  been validated as a test of cure.  Repeat testing during the same episode  of diarrhea is of limited value and is discouraged.  The results of this  assay should always be interpreted in conjunction with patient's clinical  history.    Culture - Sputum (collected 08-09-23 @ 11:22)  Source: .Sputum Sputum  Gram Stain (08-09-23 @ 20:44):    Rare Squamous epithelial cells per low power field    No polymorphonuclear leukocytes per low power field    Rare Gram Negative Rods per oil power field    Rare Gram positive cocci in pairs per oil power field  Final Report (08-11-23 @ 21:22):    Normal Respiratory Milly present    Culture - Urine (collected 08-08-23 @ 22:57)  Source: Clean Catch Clean Catch (Midstream)  Final Report (08-10-23 @ 10:40):    <10,000 CFU/mL Normal Urogenital Milly    Culture - Blood (collected 08-08-23 @ 22:15)  Source: .Blood Blood-Peripheral  Final Report (08-14-23 @ 02:00):    No growth at 5 days    Culture - Blood (collected 08-08-23 @ 22:10)  Source: .Blood Blood-Peripheral  Final Report (08-14-23 @ 02:00):    No growth at 5 days    Radiology: reviewed     < from: CT Abdomen and Pelvis w/ IV Cont (08.21.23 @ 13:17) >  IMPRESSION:  No evidence of abscess.  Interval creation of left lower quadrant loop colostomy. No bowel   obstruction.  Scattered pneumoperitoneum and subcutaneous emphysema along the left   chest/abdominal wall, representing postoperative changes.  Small bilateral pleural effusions.    < end of copied text >    < from: CT Abdomen and Pelvis w/ IV Cont (08.12.23 @ 21:46) >  IMPRESSION:  Findings in the descending colon are again suspicious for perforated   neoplasm with slight interval increase in size of adjacent collection.  No pneumoperitoneum.    < end of copied text >    Medications:  acetaminophen     Tablet .. 1000 milliGRAM(s) Oral every 6 hours  apixaban 5 milliGRAM(s) Oral two times a day  atorvastatin 10 milliGRAM(s) Oral at bedtime  finasteride 5 milliGRAM(s) Oral daily  furosemide    Tablet 80 milliGRAM(s) Oral daily  metoprolol tartrate 50 milliGRAM(s) Oral two times a day  nystatin Powder 1 Application(s) Topical two times a day  oxyCODONE    IR 5 milliGRAM(s) Oral every 4 hours PRN  oxyCODONE    IR 2.5 milliGRAM(s) Oral every 4 hours PRN  pantoprazole    Tablet 40 milliGRAM(s) Oral every 12 hours  tamsulosin 0.4 milliGRAM(s) Oral at bedtime    Prior/Completed Antimicrobials:  metroNIDAZOLE    Tablet  neomycin  piperacillin/tazobactam IVPB.  piperacillin/tazobactam IVPB.  piperacillin/tazobactam IVPB.-  piperacillin/tazobactam IVPB..  piperacillin/tazobactam IVPB..  vancomycin  IVPB.

## 2023-08-22 NOTE — PROGRESS NOTE ADULT - SUBJECTIVE AND OBJECTIVE BOX
Colorectal Surgery Progress Note    Overnight: No acute events.    Subjective: Patient resting in bed.     Objective:  Vitals:  T(C): 36.3 (08-22-23 @ 01:06), Max: 37.4 (08-21-23 @ 17:16)  HR: 76 (08-22-23 @ 01:06) (76 - 105)  BP: 110/73 (08-22-23 @ 01:06) (105/70 - 132/79)  RR: 18 (08-22-23 @ 01:06) (18 - 18)  SpO2: 97% (08-22-23 @ 01:06) (95% - 97%)  Wt(kg): --    08-20 @ 07:01  -  08-21 @ 07:00  --------------------------------------------------------  IN:    Oral Fluid: 480 mL  Total IN: 480 mL    OUT:    Colostomy (mL): 100 mL    Post-Void Residual per Intermittent Catheterization (mL): 200 mL    Voided (mL): 900 mL  Total OUT: 1200 mL    Total NET: -720 mL      08-21 @ 07:01  -  08-22 @ 01:39  --------------------------------------------------------  IN:    Oral Fluid: 620 mL  Total IN: 620 mL    OUT:    Colostomy (mL): 300 mL    Voided (mL): 1300 mL  Total OUT: 1600 mL    Total NET: -980 mL      Physical Exam:  General Appearance: no acute distress, NTND   Chest: airway intact, non-labored breathing  CV: no JVD, palpable pulses b/l  Abdomen: soft, non-tender, non-distended, midline incision cdi. L sided colostomy mucosa pink, some gas and liquid stool.  Extremities: [facility]       Labs:                        8.4    16.46 )-----------( 614      ( 21 Aug 2023 07:29 )             27.0     08-21    137  |  102  |  25<H>  ----------------------------<  120<H>  3.7   |  21<L>  |  1.18    Ca    8.3<L>      21 Aug 2023 07:29  Phos  2.2     08-21  Mg     2.0     08-21          Urinalysis Basic - ( 21 Aug 2023 07:29 )    Color: x / Appearance: x / SG: x / pH: x  Gluc: 120 mg/dL / Ketone: x  / Bili: x / Urobili: x   Blood: x / Protein: x / Nitrite: x   Leuk Esterase: x / RBC: x / WBC x   Sq Epi: x / Non Sq Epi: x / Bacteria: x

## 2023-08-23 ENCOUNTER — APPOINTMENT (OUTPATIENT)
Dept: PULMONOLOGY | Facility: CLINIC | Age: 83
End: 2023-08-23

## 2023-08-23 ENCOUNTER — TRANSCRIPTION ENCOUNTER (OUTPATIENT)
Age: 83
End: 2023-08-23

## 2023-08-23 VITALS
SYSTOLIC BLOOD PRESSURE: 116 MMHG | OXYGEN SATURATION: 96 % | DIASTOLIC BLOOD PRESSURE: 76 MMHG | RESPIRATION RATE: 18 BRPM | TEMPERATURE: 97 F | HEART RATE: 88 BPM

## 2023-08-23 LAB
ANION GAP SERPL CALC-SCNC: 12 MMOL/L — SIGNIFICANT CHANGE UP (ref 5–17)
BUN SERPL-MCNC: 18 MG/DL — SIGNIFICANT CHANGE UP (ref 7–23)
CALCIUM SERPL-MCNC: 8.3 MG/DL — LOW (ref 8.4–10.5)
CHLORIDE SERPL-SCNC: 105 MMOL/L — SIGNIFICANT CHANGE UP (ref 96–108)
CO2 SERPL-SCNC: 22 MMOL/L — SIGNIFICANT CHANGE UP (ref 22–31)
CREAT SERPL-MCNC: 0.86 MG/DL — SIGNIFICANT CHANGE UP (ref 0.5–1.3)
EGFR: 85 ML/MIN/1.73M2 — SIGNIFICANT CHANGE UP
GLUCOSE SERPL-MCNC: 101 MG/DL — HIGH (ref 70–99)
HCT VFR BLD CALC: 28.1 % — LOW (ref 39–50)
HGB BLD-MCNC: 8.6 G/DL — LOW (ref 13–17)
MAGNESIUM SERPL-MCNC: 2.1 MG/DL — SIGNIFICANT CHANGE UP (ref 1.6–2.6)
MCHC RBC-ENTMCNC: 28.3 PG — SIGNIFICANT CHANGE UP (ref 27–34)
MCHC RBC-ENTMCNC: 30.6 GM/DL — LOW (ref 32–36)
MCV RBC AUTO: 92.4 FL — SIGNIFICANT CHANGE UP (ref 80–100)
NRBC # BLD: 0 /100 WBCS — SIGNIFICANT CHANGE UP (ref 0–0)
PHOSPHATE SERPL-MCNC: 2.8 MG/DL — SIGNIFICANT CHANGE UP (ref 2.5–4.5)
PLATELET # BLD AUTO: 649 K/UL — HIGH (ref 150–400)
POTASSIUM SERPL-MCNC: 3.8 MMOL/L — SIGNIFICANT CHANGE UP (ref 3.5–5.3)
POTASSIUM SERPL-SCNC: 3.8 MMOL/L — SIGNIFICANT CHANGE UP (ref 3.5–5.3)
RBC # BLD: 3.04 M/UL — LOW (ref 4.2–5.8)
RBC # FLD: 15.6 % — HIGH (ref 10.3–14.5)
SODIUM SERPL-SCNC: 139 MMOL/L — SIGNIFICANT CHANGE UP (ref 135–145)
WBC # BLD: 14.18 K/UL — HIGH (ref 3.8–10.5)
WBC # FLD AUTO: 14.18 K/UL — HIGH (ref 3.8–10.5)

## 2023-08-23 RX ORDER — OXYCODONE HYDROCHLORIDE 5 MG/1
1 TABLET ORAL
Qty: 0 | Refills: 0 | DISCHARGE
Start: 2023-08-23

## 2023-08-23 RX ORDER — FUROSEMIDE 40 MG
1 TABLET ORAL
Qty: 0 | Refills: 0 | DISCHARGE
Start: 2023-08-23

## 2023-08-23 RX ORDER — PANTOPRAZOLE SODIUM 20 MG/1
1 TABLET, DELAYED RELEASE ORAL
Qty: 0 | Refills: 0 | DISCHARGE
Start: 2023-08-23

## 2023-08-23 RX ORDER — FUROSEMIDE 40 MG
1 TABLET ORAL
Refills: 0 | DISCHARGE

## 2023-08-23 RX ORDER — NYSTATIN CREAM 100000 [USP'U]/G
1 CREAM TOPICAL
Qty: 0 | Refills: 0 | DISCHARGE
Start: 2023-08-23

## 2023-08-23 RX ORDER — TAMSULOSIN HYDROCHLORIDE 0.4 MG/1
1 CAPSULE ORAL
Qty: 0 | Refills: 0 | DISCHARGE
Start: 2023-08-23

## 2023-08-23 RX ADMIN — FINASTERIDE 5 MILLIGRAM(S): 5 TABLET, FILM COATED ORAL at 11:56

## 2023-08-23 RX ADMIN — Medication 1000 MILLIGRAM(S): at 11:56

## 2023-08-23 RX ADMIN — NYSTATIN CREAM 1 APPLICATION(S): 100000 CREAM TOPICAL at 07:04

## 2023-08-23 RX ADMIN — Medication 1000 MILLIGRAM(S): at 00:58

## 2023-08-23 RX ADMIN — Medication 80 MILLIGRAM(S): at 07:03

## 2023-08-23 RX ADMIN — Medication 1000 MILLIGRAM(S): at 07:02

## 2023-08-23 RX ADMIN — APIXABAN 5 MILLIGRAM(S): 2.5 TABLET, FILM COATED ORAL at 07:02

## 2023-08-23 RX ADMIN — Medication 50 MILLIGRAM(S): at 07:03

## 2023-08-23 RX ADMIN — PANTOPRAZOLE SODIUM 40 MILLIGRAM(S): 20 TABLET, DELAYED RELEASE ORAL at 07:04

## 2023-08-23 NOTE — PROGRESS NOTE ADULT - SUBJECTIVE AND OBJECTIVE BOX
Colorectal Surgery Progress Note    Overnight: No acute events.    Subjective: Patient resting in bed.    Objective:  Vitals:  T(C): 36.5 (08-22-23 @ 21:56), Max: 36.6 (08-22-23 @ 17:07)  HR: 100 (08-22-23 @ 21:56) (76 - 100)  BP: 130/80 (08-22-23 @ 21:56) (110/66 - 132/78)  RR: 18 (08-22-23 @ 21:56) (18 - 18)  SpO2: 93% (08-22-23 @ 21:56) (93% - 98%)  Wt(kg): --    08-21 @ 07:01  -  08-22 @ 07:00  --------------------------------------------------------  IN:    Oral Fluid: 620 mL  Total IN: 620 mL    OUT:    Colostomy (mL): 400 mL    Voided (mL): 1500 mL  Total OUT: 1900 mL    Total NET: -1280 mL      08-22 @ 07:01  -  08-23 @ 00:51  --------------------------------------------------------  IN:    Oral Fluid: 540 mL  Total IN: 540 mL    OUT:    Colostomy (mL): 250 mL    Voided (mL): 850 mL  Total OUT: 1100 mL    Total NET: -560 mL        Physical Exam:  General Appearance: no acute distress, NTND   Chest: airway intact, non-labored breathing  CV: no JVD, palpable pulses b/l  Abdomen: soft, non-tender, non-distended, midline incision cdi. L sided colostomy mucosa pink, some gas and liquid stool.  Extremities: WWP       Labs:                        8.5    14.47 )-----------( 678      ( 22 Aug 2023 10:16 )             27.1     08-22    139  |  105  |  19  ----------------------------<  112<H>  4.5   |  26  |  1.02    Ca    8.3<L>      22 Aug 2023 10:16  Phos  3.1     08-22  Mg     2.0     08-22          Urinalysis Basic - ( 22 Aug 2023 10:16 )    Color: x / Appearance: x / SG: x / pH: x  Gluc: 112 mg/dL / Ketone: x  / Bili: x / Urobili: x   Blood: x / Protein: x / Nitrite: x   Leuk Esterase: x / RBC: x / WBC x   Sq Epi: x / Non Sq Epi: x / Bacteria: x

## 2023-08-23 NOTE — DISCHARGE NOTE NURSING/CASE MANAGEMENT/SOCIAL WORK - PATIENT PORTAL LINK FT
You can access the FollowMyHealth Patient Portal offered by [facility] by registering at the following website: [email or web address]. By joining [employer]’s FollowMyHealth portal, you will also be able to view your health information using other applications (apps) compatible with our system.

## 2023-08-23 NOTE — PROGRESS NOTE ADULT - ASSESSMENT
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 Patient with fever, tachycardia, leukocytosis with lactic acidosis on admission and admitted for severe sepsis vs SIRS     Severe sepsis likely due to contained perforation of colonic neoplasm   New onset Afib, now on eliquis   - CT with focal mural thickening and associated loculated fluid collection at the junction of the descending and sigmoid colon suspicious for contained perforation of colonic neoplasm, progressed since 2019. Has atelectasis reported, no pneumonia.   - repeat CTAP with perforated neoplasm and slight interval increased in size of adjacent collection  - GI following, s/p EGD 8/16 - 2 nonbleeding duodenal ulcers  - 8/17 s/p OR for laparoscopic left colectomy with end colostomy creation   -- op note reviewed -- noted there was a focal perforation of the tumor into the abdominal wall-area around the perforation ws completely excised, no contamination of abdominal contents during procedure   - 8/21 Repeat CTAP with no abscess, LLL loop colostomy, scattered pneumoperitoneum and subcutaneous emphysema along L chest/abd wall--post op changes; small b/l pleural effusions  - 8/21 stool C.diff negative   - UA negative for infection  - WBC trended up postop/reactive - downtrended/stable, remains afebrile   - abd with clean dressing, nontender, no erythema/rash, has scrotal edema  S/p pip-tazo 8/8-8/22    Recommendations:  CRS following   Continue to monitor off antibiotics    Monitor temps/CBC  Continue rest of care per primary team      Doris Connelly M.D.  [facility], Division of Infectious Diseases  [phone]  After 5pm on weekdays and all day on weekends - please call [phone]

## 2023-08-23 NOTE — PROGRESS NOTE ADULT - ATTENDING SUPERVISION STATEMENT
Resident
Fellow
Fellow
Resident

## 2023-08-23 NOTE — PROGRESS NOTE ADULT - REASON FOR ADMISSION
Severe sepsis/SIRS

## 2023-08-23 NOTE — DISCHARGE NOTE NURSING/CASE MANAGEMENT/SOCIAL WORK - NSDCPEFALRISK_GEN_ALL_CORE
For information on Fall & Injury Prevention, visit: [email or web address] OR  [email or web address] OR  https://www.cdc.gov/steadi/patient.html

## 2023-08-23 NOTE — PROGRESS NOTE ADULT - ASSESSMENT
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 Patient has never seen a colorectal surgeon in the past. Patient with non toxic appearance, stable and with benign abdominal physical exam, however CT scan concerning for colon mass c/w contained perforation. 8/16 EGD with two duodenal ulcers suspicious for source of prior melena, no active bleed. Patient now s/p laparoscopic left colectomy with end colostomy creation, recovering appropriately. Normal postoperative changes on CT.    PLAN:  - ERP  - LRD  - monitor I&Os  - encourage oob/amb PT  - f/u AM labs  - Continue home Eliquis  - Continue Flomax  - GI recs appreciated: PPI 40mg BID IV pushes  - Cardiology recs appreciated  - Medicine recs appreciated  - ID recs appreciated: [place]  - PT consulted, rec DAVIDA  - Tele    [facility]   [id number]

## 2023-08-23 NOTE — PROGRESS NOTE ADULT - SUBJECTIVE AND OBJECTIVE BOX
[facility] DIVISION OF INFECTIOUS DISEASES  KASEY Mustafa Y. Patel, S. Shah, G. Gustavo  [phone]  ([phone] - weekdays after 5pm and weekends)    Name: GLADIS LEONARD  Age/Gender: 83y Male  MRN: [id number]    Interval History:  Patient seen and examined this morning.   Reports intermittent burning from abd to scrotal area  Denies fever, chills, nausea, vomiting, cough.   Notes reviewed. Aebrile.   Allergies: No Known Allergies      Objective:  Vitals:   T(F): 97.2 (08-23-23 @ 10:40), Max: 97.9 (08-22-23 @ 17:07)  HR: 90 (08-23-23 @ 10:40) (89 - 112)  BP: 112/74 (08-23-23 @ 10:40) (110/66 - 137/82)  RR: 18 (08-23-23 @ 10:40) (18 - 18)  SpO2: 96% (08-23-23 @ 10:40) (93% - 98%)  Physical Examination:  General: no acute distress  HEENT: NC/AT, EOMI, anicteric, neck supple  Cardio: S1, S2 present, normal rate  Resp: clear to auscultation anteriorly  Abd: soft, NT, ND, colostomy with loose brown stool  midline incision with clean dressing, no erythema or TTP  : scrotal edema; no erythema  Neuro: AAOx3, no obvious focal deficits  Ext: pedal edema, no cyanosis, moving extremities  Skin: warm, dry, no visible rash  Psych: appropriate affect and mood for situation  Lines: PIV    Laboratory Studies:  CBC:                       8.6    14.18 )-----------( 649      ( 23 Aug 2023 07:33 )             28.1     WBC Trend:  14.18 08-23-23 @ 07:33  14.47 08-22-23 @ 10:16  16.46 08-21-23 @ 07:29  17.64 08-20-23 @ 05:30  19.04 08-20-23 @ 01:03  18.05 08-19-23 @ 07:11  13.35 08-18-23 @ 02:38  18.27 08-17-23 @ 20:48  11.85 08-17-23 @ 07:00  13.57 08-16-23 @ 20:32    CMP: 08-23    139  |  105  |  18  ----------------------------<  101<H>  3.8   |  22  |  0.86    Ca    8.3<L>      23 Aug 2023 07:34  Phos  2.8     08-23  Mg     2.1     08-23      Creatinine: 0.86 mg/dL (08-23-23 @ 07:34)  Creatinine: 1.02 mg/dL (08-22-23 @ 10:16)  Creatinine: 1.18 mg/dL (08-21-23 @ 07:29)  Creatinine: 1.29 mg/dL (08-20-23 @ 05:30)  Creatinine: 1.13 mg/dL (08-19-23 @ 07:13)  Creatinine: 0.87 mg/dL (08-18-23 @ 02:38)  Creatinine: 0.93 mg/dL (08-17-23 @ 20:48)  Creatinine: 1.01 mg/dL (08-17-23 @ 06:56)    Microbiology: reviewed   Culture - Sputum (collected 08-09-23 @ 11:22)  Source: .Sputum Sputum  Gram Stain (08-09-23 @ 20:44):    Rare Squamous epithelial cells per low power field    No polymorphonuclear leukocytes per low power field    Rare Gram Negative Rods per oil power field    Rare Gram positive cocci in pairs per oil power field  Final Report (08-11-23 @ 21:22):    Normal Respiratory Milly present    Radiology: reviewed   < from: Xray Chest 1 View- PORTABLE-Urgent (Xray Chest 1 View- PORTABLE-Urgent .) (08.22.23 @ 09:49) >  IMPRESSION:  Interval reduction in patchy bilateral airspace opacities compared to   chest radiograph 8/8/2023.    < end of copied text >    Medications:  acetaminophen     Tablet .. 1000 milliGRAM(s) Oral every 6 hours  apixaban 5 milliGRAM(s) Oral two times a day  atorvastatin 10 milliGRAM(s) Oral at bedtime  finasteride 5 milliGRAM(s) Oral daily  furosemide    Tablet 80 milliGRAM(s) Oral daily  metoprolol tartrate 50 milliGRAM(s) Oral two times a day  nystatin Powder 1 Application(s) Topical two times a day  oxyCODONE    IR 5 milliGRAM(s) Oral every 4 hours PRN  oxyCODONE    IR 2.5 milliGRAM(s) Oral every 4 hours PRN  pantoprazole    Tablet 40 milliGRAM(s) Oral every 12 hours  tamsulosin 0.4 milliGRAM(s) Oral at bedtime      Prior/Completed Antimicrobials:  metroNIDAZOLE    Tablet  neomycin  piperacillin/tazobactam IVPB.  piperacillin/tazobactam IVPB.  piperacillin/tazobactam IVPB.-  piperacillin/tazobactam IVPB..  piperacillin/tazobactam IVPB..  vancomycin  IVPB.

## 2023-08-23 NOTE — PROGRESS NOTE ADULT - ATTENDING COMMENTS
Feels better.  CT and Cdiff neg.  WBC coming down.  DC planning
Patient seen/examined.  Agree w above note and plan and have discussed plan w house staff.  Comfortable, tolerating diet, voiding.  Passing gas, stool.  Stoma pink.  Eliquis tomorrow    Jeffrey Viramontes MD
Patient seen/examined.  Agree w above note and plan and have discussed plan w house staff.  Tolerating diet, c/o incomplete voiding.  Abdomen soft, stoma pink, 400cc.  Send UA.  Stzrt gino Viramontes MD
Some nausea yesterday.  Antibiotics stopped.  This may help with the nausea.  The patient is tolerating diet with ostomy function.  If does well today will transfer to rehab this afternoon.
no pain or tenderness  does not want workup for colon mass as in patient  f/u in office 2 weeks
looks and feels well, abd benign, stoma pink  -clears, oob, pt, stoma teaching
pt w likely locally advanced L-colon neoplasm (was present on ct a/p in 2019  denies pain , nontender, palpable L-sided abd mass  rec:  CEA  colonoscopy by GI  out pt f/u for L-colectomy (pt does not want surgery as in pt)
Cont leukocytosis, will get CT.  Also, watery stool output. Will check C diff
Non bleeding duodenal ulcer on EGD  continue PPI as above  follow up pathology
Patient agrees to surgery on Thursday.  Clears and ERP prep
I saw patient at 6:55 AM.  Abdomen soft and nontender.  Events noted with melena and drop in H&H.  Plan is for upper endoscopy this afternoon.  Heparin held.  Surgery will likely proceed tomorrow because of contained perforation and associated infection.
pt w continued L-sided abd pain  now agreeable to surgery  rec lap L-colectomy- r/b/c explained to pt and dtr in detail  OR Thurs  hold AC  clears until OR
Dropping hgb  prior melena  suspect upper gi bleed  rec transfusion and EGD today

## 2023-08-23 NOTE — PROGRESS NOTE ADULT - SUBJECTIVE AND OBJECTIVE BOX
DATE OF SERVICE: 08-23-23 @ 21:29    Patient is a 83y old  Male who presents with a chief complaint of Severe sepsis/SIRS (23 Aug 2023 10:56)      INTERVAL HISTORY: feels ok     	  MEDICATIONS:  furosemide    Tablet 80 milliGRAM(s) Oral daily  metoprolol tartrate 50 milliGRAM(s) Oral two times a day        PHYSICAL EXAM:  T(C): 36.2 (08-23-23 @ 13:31), Max: 36.5 (08-22-23 @ 21:56)  HR: 88 (08-23-23 @ 13:31) (88 - 112)  BP: 116/76 (08-23-23 @ 13:31) (112/74 - 137/82)  RR: 18 (08-23-23 @ 13:31) (18 - 18)  SpO2: 96% (08-23-23 @ 13:31) (93% - 96%)  Wt(kg): --  I&O's Summary    22 Aug 2023 07:01  -  23 Aug 2023 07:00  --------------------------------------------------------  IN: 540 mL / OUT: 1100 mL / NET: -560 mL    23 Aug 2023 07:01  -  23 Aug 2023 21:29  --------------------------------------------------------  IN: 300 mL / OUT: 450 mL / NET: -150 mL          Appearance: In no distress	  HEENT:    PERRL, EOMI	  Cardiovascular:  S1 S2, No JVD  Respiratory: Lungs clear to auscultation	  Gastrointestinal:  Soft, Non-tender, + BS	  Vascularature:  No edema of LE  Psychiatric: Appropriate affect   Neuro: no acute focal deficits                               8.6    14.18 )-----------( 649      ( 23 Aug 2023 07:33 )             28.1     08-23    139  |  105  |  18  ----------------------------<  101<H>  3.8   |  22  |  0.86    Ca    8.3<L>      23 Aug 2023 07:34  Phos  2.8     08-23  Mg     2.1     08-23          Labs personally reviewed      ASSESSMENT/PLAN: 	  82M w/ hx of HTN, HLD, pre-DM, BPH, COPD, ?ILD, pleural 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 SBP down to 80s. 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   ·  Plan: Afib confirmed on ecg. No prior hx.    -TTE no LA enlargement. EF 68%  -cw metoprolol 50mg PO BID  -CHADSvasc at least 3. Per colorectal surgery team no contraindication to AC  If no contraindication by CRS will start Eliquis 5mg BID for stroke ppx  - Eliquis resumed    Problem/Plan - 2:  ·  Problem: Chronic diastolic congestive heart failure.   ·  Plan: TTE with preserved EF.  - On Furosemide 40mg and [place] 25mg daily at home  - Volume status improved   - Transition to Lasix 80mg PO Daily      Problem/Plan - 3:  ·  Problem: HTN (hypertension).   ·  Plan: - Metoprolol 50mg PO BID    Problem/Plan - 4:  ·  Problem: HLD (hyperlipidemia).   ·  Plan: - c/w home atorvastatin.    Problem/Plan- 5:  Problem: Cardiac Risk Stratification  - EKG with no ischemia noted  - Patient not in decompensated HF  - No hx of tachy/marysol arrhythmia  - TTE unremarkable   - Patient is mod risk for mod risk Laparoscopic Left Colectomy. No contraindication to proceed  - Tolerated surgery well.         Joce Ballesteros DO [facility]  Cardiovascular Medicine  [address], [address]  Office: [phone]  Available via Text/call on [employer] Teams

## 2023-08-24 ENCOUNTER — TRANSCRIPTION ENCOUNTER (OUTPATIENT)
Age: 83
End: 2023-08-24

## 2023-08-25 LAB — SURGICAL PATHOLOGY STUDY: SIGNIFICANT CHANGE UP

## 2023-09-05 ENCOUNTER — TRANSCRIPTION ENCOUNTER (OUTPATIENT)
Age: 83
End: 2023-09-05

## 2023-09-05 ENCOUNTER — APPOINTMENT (OUTPATIENT)
Dept: SURGERY | Facility: CLINIC | Age: 83
End: 2023-09-05
Payer: MEDICARE

## 2023-09-05 VITALS
OXYGEN SATURATION: 96 % | SYSTOLIC BLOOD PRESSURE: 89 MMHG | TEMPERATURE: 94.3 F | HEART RATE: 75 BPM | RESPIRATION RATE: 17 BRPM | DIASTOLIC BLOOD PRESSURE: 51 MMHG

## 2023-09-05 DIAGNOSIS — Z09 ENCOUNTER FOR FOLLOW-UP EXAMINATION AFTER COMPLETED TREATMENT FOR CONDITIONS OTHER THAN MALIGNANT NEOPLASM: ICD-10-CM

## 2023-09-05 PROCEDURE — 99024 POSTOP FOLLOW-UP VISIT: CPT

## 2023-09-05 RX ORDER — NIRMATRELVIR AND RITONAVIR 300-100 MG
20 X 150 MG & KIT ORAL
Qty: 1 | Refills: 0 | Status: DISCONTINUED | COMMUNITY
Start: 2022-12-31 | End: 2023-09-05

## 2023-09-05 NOTE — CONSULT LETTER
[Dear  ___] : Dear  [unfilled], [Consult Letter:] : I had the pleasure of evaluating your patient, [unfilled]. [Please see my note below.] : Please see my note below. [Consult Closing:] : Thank you very much for allowing me to participate in the care of this patient.  If you have any questions, please do not hesitate to contact me. [Sincerely,] : Sincerely, [FreeTextEntry3] : Igor Browning M.D., F.A.C.S, F.A.S.C.R.S

## 2023-09-05 NOTE — ASSESSMENT
[FreeTextEntry1] : In summary the patient is status post left colectomy with end colostomy on August 17 for near obstructing left colon mass.  Pathology reveals T3N0 colon cancer with 18 negative lymph nodes.  His postoperative recovery has been uncomplicated.  He remains at rehab for physical therapy.  I had a long conversation with the patient and his daughters.  I recommended that he resume a regular diet.  He will follow-up with the enterostomal therapy nurses at [facility] upon his discharge from rehab if needed.  I explained that there is no role for chemotherapy for his stage II colon cancer.  He will likely need a surveillance CT abdomen pelvis and CEA in 6 months and should ultimately have a colonoscopy once he is completely recovered from surgery.

## 2023-09-05 NOTE — HISTORY OF PRESENT ILLNESS
[de-identified] : Elmer is a 82 y/o male here for a post-op visit, s/p open colectomy involving left side of colon or sigmoid colon. Mobilization, splenic flexure. Colectomy with end colostomy on 8/17/23.  Today pt reports no pain. Pain managed with Tylenol / Motrin - did have to use oxycodone once. Denies drainage, bleeding, swelling, and redness of surgical incision - does have staples in place. Denies nausea and vomiting. Denies fever and chills. Empties bag twice daily, mostly liquid consistency. Stoma is red, was told by doctor from rehab that stoma looks irritated. Doesn't have anything coming from anal area. Good appetite, low diet. Taking baby aspirin and Eliquis, hx of afib.

## 2023-09-05 NOTE — PHYSICAL EXAM
[de-identified] : Soft, nontender, well-healed midline incision, staples removed.  No sign of infection.

## 2023-09-11 ENCOUNTER — TRANSCRIPTION ENCOUNTER (OUTPATIENT)
Age: 83
End: 2023-09-11

## 2023-09-12 ENCOUNTER — RESULT REVIEW (OUTPATIENT)
Age: 83
End: 2023-09-12

## 2023-09-20 ENCOUNTER — TRANSCRIPTION ENCOUNTER (OUTPATIENT)
Age: 83
End: 2023-09-20

## 2023-10-02 ENCOUNTER — RESULT REVIEW (OUTPATIENT)
Age: 83
End: 2023-10-02

## 2023-10-07 ENCOUNTER — EMERGENCY (EMERGENCY)
Facility: HOSPITAL | Age: 83
LOS: 1 days | Discharge: HOME | End: 2023-10-07
Attending: EMERGENCY MEDICINE
Payer: MEDICARE

## 2023-10-07 VITALS
HEART RATE: 100 BPM | RESPIRATION RATE: 20 BRPM | OXYGEN SATURATION: 100 % | TEMPERATURE: 99 F | DIASTOLIC BLOOD PRESSURE: 83 MMHG | SYSTOLIC BLOOD PRESSURE: 133 MMHG

## 2023-10-07 VITALS
DIASTOLIC BLOOD PRESSURE: 78 MMHG | SYSTOLIC BLOOD PRESSURE: 126 MMHG | OXYGEN SATURATION: 96 % | HEART RATE: 82 BPM | HEIGHT: 71 IN | RESPIRATION RATE: 18 BRPM | TEMPERATURE: 98 F | WEIGHT: 212.97 LBS

## 2023-10-07 DIAGNOSIS — Z98.89 OTHER SPECIFIED POSTPROCEDURAL STATES: Chronic | ICD-10-CM

## 2023-10-07 LAB
ALBUMIN SERPL ELPH-MCNC: 2.7 G/DL — LOW (ref 3.3–5)
ALP SERPL-CCNC: 75 U/L — SIGNIFICANT CHANGE UP (ref 40–120)
ALT FLD-CCNC: 9 U/L — LOW (ref 10–45)
ANION GAP SERPL CALC-SCNC: 13 MMOL/L — SIGNIFICANT CHANGE UP (ref 5–17)
APTT BLD: 29 SEC — SIGNIFICANT CHANGE UP (ref 24.5–35.6)
AST SERPL-CCNC: 17 U/L — SIGNIFICANT CHANGE UP (ref 10–40)
BASE EXCESS BLDV CALC-SCNC: 1.4 MMOL/L — SIGNIFICANT CHANGE UP (ref -2–3)
BASOPHILS # BLD AUTO: 0.04 K/UL — SIGNIFICANT CHANGE UP (ref 0–0.2)
BASOPHILS NFR BLD AUTO: 0.5 % — SIGNIFICANT CHANGE UP (ref 0–2)
BILIRUB SERPL-MCNC: 0.5 MG/DL — SIGNIFICANT CHANGE UP (ref 0.2–1.2)
BUN SERPL-MCNC: 22 MG/DL — SIGNIFICANT CHANGE UP (ref 7–23)
CA-I SERPL-SCNC: 1.22 MMOL/L — SIGNIFICANT CHANGE UP (ref 1.15–1.33)
CALCIUM SERPL-MCNC: 8.9 MG/DL — SIGNIFICANT CHANGE UP (ref 8.4–10.5)
CHLORIDE BLDV-SCNC: 103 MMOL/L — SIGNIFICANT CHANGE UP (ref 96–108)
CHLORIDE SERPL-SCNC: 100 MMOL/L — SIGNIFICANT CHANGE UP (ref 96–108)
CO2 BLDV-SCNC: 28 MMOL/L — HIGH (ref 22–26)
CO2 SERPL-SCNC: 23 MMOL/L — SIGNIFICANT CHANGE UP (ref 22–31)
CREAT SERPL-MCNC: 1.13 MG/DL — SIGNIFICANT CHANGE UP (ref 0.5–1.3)
EGFR: 64 ML/MIN/1.73M2 — SIGNIFICANT CHANGE UP
EOSINOPHIL # BLD AUTO: 0.41 K/UL — SIGNIFICANT CHANGE UP (ref 0–0.5)
EOSINOPHIL NFR BLD AUTO: 5.4 % — SIGNIFICANT CHANGE UP (ref 0–6)
GAS PNL BLDV: 133 MMOL/L — LOW (ref 136–145)
GAS PNL BLDV: SIGNIFICANT CHANGE UP
GAS PNL BLDV: SIGNIFICANT CHANGE UP
GLUCOSE BLDV-MCNC: 107 MG/DL — HIGH (ref 70–99)
GLUCOSE SERPL-MCNC: 109 MG/DL — HIGH (ref 70–99)
HCO3 BLDV-SCNC: 27 MMOL/L — SIGNIFICANT CHANGE UP (ref 22–29)
HCT VFR BLD CALC: 25.7 % — LOW (ref 39–50)
HCT VFR BLDA CALC: 25 % — LOW (ref 39–51)
HGB BLD CALC-MCNC: 8.2 G/DL — LOW (ref 12.6–17.4)
HGB BLD-MCNC: 7.7 G/DL — LOW (ref 13–17)
IMM GRANULOCYTES NFR BLD AUTO: 0.4 % — SIGNIFICANT CHANGE UP (ref 0–0.9)
INR BLD: 1.81 RATIO — HIGH (ref 0.85–1.18)
LACTATE BLDV-MCNC: 1.6 MMOL/L — SIGNIFICANT CHANGE UP (ref 0.5–2)
LYMPHOCYTES # BLD AUTO: 1.08 K/UL — SIGNIFICANT CHANGE UP (ref 1–3.3)
LYMPHOCYTES # BLD AUTO: 14.3 % — SIGNIFICANT CHANGE UP (ref 13–44)
MAGNESIUM SERPL-MCNC: 1.5 MG/DL — LOW (ref 1.6–2.6)
MCHC RBC-ENTMCNC: 24.8 PG — LOW (ref 27–34)
MCHC RBC-ENTMCNC: 30 GM/DL — LOW (ref 32–36)
MCV RBC AUTO: 82.6 FL — SIGNIFICANT CHANGE UP (ref 80–100)
MONOCYTES # BLD AUTO: 0.71 K/UL — SIGNIFICANT CHANGE UP (ref 0–0.9)
MONOCYTES NFR BLD AUTO: 9.4 % — SIGNIFICANT CHANGE UP (ref 2–14)
NEUTROPHILS # BLD AUTO: 5.3 K/UL — SIGNIFICANT CHANGE UP (ref 1.8–7.4)
NEUTROPHILS NFR BLD AUTO: 70 % — SIGNIFICANT CHANGE UP (ref 43–77)
NRBC # BLD: 0 /100 WBCS — SIGNIFICANT CHANGE UP (ref 0–0)
NT-PROBNP SERPL-SCNC: 2788 PG/ML — HIGH (ref 0–300)
PCO2 BLDV: 44 MMHG — SIGNIFICANT CHANGE UP (ref 42–55)
PH BLDV: 7.39 — SIGNIFICANT CHANGE UP (ref 7.32–7.43)
PLATELET # BLD AUTO: 366 K/UL — SIGNIFICANT CHANGE UP (ref 150–400)
PO2 BLDV: 41 MMHG — SIGNIFICANT CHANGE UP (ref 25–45)
POTASSIUM BLDV-SCNC: 3.9 MMOL/L — SIGNIFICANT CHANGE UP (ref 3.5–5.1)
POTASSIUM SERPL-MCNC: 3.7 MMOL/L — SIGNIFICANT CHANGE UP (ref 3.5–5.3)
POTASSIUM SERPL-SCNC: 3.7 MMOL/L — SIGNIFICANT CHANGE UP (ref 3.5–5.3)
PROT SERPL-MCNC: 6.1 G/DL — SIGNIFICANT CHANGE UP (ref 6–8.3)
PROTHROM AB SERPL-ACNC: 19.6 SEC — HIGH (ref 9.5–13)
RBC # BLD: 3.11 M/UL — LOW (ref 4.2–5.8)
RBC # FLD: 17.1 % — HIGH (ref 10.3–14.5)
SAO2 % BLDV: 60.9 % — LOW (ref 67–88)
SODIUM SERPL-SCNC: 136 MMOL/L — SIGNIFICANT CHANGE UP (ref 135–145)
TROPONIN T, HIGH SENSITIVITY RESULT: 31 NG/L — SIGNIFICANT CHANGE UP (ref 0–51)
WBC # BLD: 7.57 K/UL — SIGNIFICANT CHANGE UP (ref 3.8–10.5)
WBC # FLD AUTO: 7.57 K/UL — SIGNIFICANT CHANGE UP (ref 3.8–10.5)

## 2023-10-07 PROCEDURE — 82947 ASSAY GLUCOSE BLOOD QUANT: CPT

## 2023-10-07 PROCEDURE — 83880 ASSAY OF NATRIURETIC PEPTIDE: CPT

## 2023-10-07 PROCEDURE — 84132 ASSAY OF SERUM POTASSIUM: CPT

## 2023-10-07 PROCEDURE — 84443 ASSAY THYROID STIM HORMONE: CPT

## 2023-10-07 PROCEDURE — 99285 EMERGENCY DEPT VISIT HI MDM: CPT | Mod: 25

## 2023-10-07 PROCEDURE — 36415 COLL VENOUS BLD VENIPUNCTURE: CPT

## 2023-10-07 PROCEDURE — 84295 ASSAY OF SERUM SODIUM: CPT

## 2023-10-07 PROCEDURE — 85018 HEMOGLOBIN: CPT

## 2023-10-07 PROCEDURE — 85730 THROMBOPLASTIN TIME PARTIAL: CPT

## 2023-10-07 PROCEDURE — 71045 X-RAY EXAM CHEST 1 VIEW: CPT

## 2023-10-07 PROCEDURE — 99285 EMERGENCY DEPT VISIT HI MDM: CPT | Mod: GC

## 2023-10-07 PROCEDURE — 71275 CT ANGIOGRAPHY CHEST: CPT | Mod: MA

## 2023-10-07 PROCEDURE — 71275 CT ANGIOGRAPHY CHEST: CPT | Mod: 26,MA

## 2023-10-07 PROCEDURE — 83735 ASSAY OF MAGNESIUM: CPT

## 2023-10-07 PROCEDURE — 85014 HEMATOCRIT: CPT

## 2023-10-07 PROCEDURE — 93005 ELECTROCARDIOGRAM TRACING: CPT

## 2023-10-07 PROCEDURE — 80053 COMPREHEN METABOLIC PANEL: CPT

## 2023-10-07 PROCEDURE — 85610 PROTHROMBIN TIME: CPT

## 2023-10-07 PROCEDURE — 84484 ASSAY OF TROPONIN QUANT: CPT

## 2023-10-07 PROCEDURE — 82435 ASSAY OF BLOOD CHLORIDE: CPT

## 2023-10-07 PROCEDURE — 74177 CT ABD & PELVIS W/CONTRAST: CPT | Mod: 26,MA

## 2023-10-07 PROCEDURE — 82330 ASSAY OF CALCIUM: CPT

## 2023-10-07 PROCEDURE — 82803 BLOOD GASES ANY COMBINATION: CPT

## 2023-10-07 PROCEDURE — 85025 COMPLETE CBC W/AUTO DIFF WBC: CPT

## 2023-10-07 PROCEDURE — 74177 CT ABD & PELVIS W/CONTRAST: CPT | Mod: MA

## 2023-10-07 PROCEDURE — 83605 ASSAY OF LACTIC ACID: CPT

## 2023-10-07 PROCEDURE — 71045 X-RAY EXAM CHEST 1 VIEW: CPT | Mod: 26

## 2023-10-07 NOTE — ED PROVIDER NOTE - CLINICAL SUMMARY MEDICAL DECISION MAKING FREE TEXT BOX
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 Hemodyn stable. On exam + HJ reflux, BL creps, BL forearm edema, BL LE edema upto thigh. Concern for CHF, will need to assess liver/renal functions. Concerned for infection, will need to obtain CT scan for Abd/Pelvis to for interval change/follow up. Likely admit.

## 2023-10-07 NOTE — ED PROVIDER NOTE - PATIENT PORTAL LINK FT
You can access the FollowMyHealth Patient Portal offered by [facility] by registering at the following website: [email or web address]. By joining [employer]’s FollowMyHealth portal, you will also be able to view your health information using other applications (apps) compatible with our system.

## 2023-10-07 NOTE — ED ADULT NURSE NOTE - CAS TRG GENERAL AIRWAY, MLM
Patent Consent 3/Introductory Paragraph: I gave the patient a chance to ask questions they had about the procedure.  Following this I explained the Mohs procedure and consent was obtained. The risks, benefits and alternatives to therapy were discussed in detail. Specifically, the risks of infection, scarring, bleeding, prolonged wound healing, incomplete removal, allergy to anesthesia, nerve injury and recurrence were addressed. Prior to the procedure, the treatment site was clearly identified and confirmed by the patient. All components of Universal Protocol/PAUSE Rule completed.

## 2023-10-07 NOTE — ED PROVIDER NOTE - NS ED ROS FT
CONSTITUTIONAL: No weakness, fevers   EYES/ENT: No visual changes;  No throat pain   NECK: No pain   RESPIRATORY: No cough, shortness of breath  CARDIOVASCULAR: No chest pain or palpitations  GASTROINTESTINAL: No abdominal or epigastric pain. No nausea, vomiting. Stoma site working normally   GENITOURINARY: No dysuria  NEUROLOGICAL: No numbness or weakness    All other review of systems is negative unless indicated above.

## 2023-10-07 NOTE — ED ADULT NURSE NOTE - OBJECTIVE STATEMENT
no
patient Consuelo from [facility]. As per EMS patient sent here for increasing edema to arms & legs after diuretics was changed. Denies sob or pain.   Patient a DNR. forms on the chart.

## 2023-10-07 NOTE — ED PROVIDER NOTE - PHYSICAL EXAMINATION
PHYSICAL EXAM:  GENERAL: NAD, lying in bed comfortably  HEAD:  Atraumatic, Normocephalic  EYES: EOMI, PERRLA, conjunctiva and sclera clear  ENT: No erythema/pallor/petechiae/lesions  NECK: Supple  LUNG: BL creps  HEART: RRR, +S1/S2; Elevated JVD  ABDOMEN: ML surgical scar, left stoma site containing normal stool, no blood noted.   EXTREMITIES:  2+ Peripheral Pulses. BL forearm no-tender swelling, BL LE edema upto thigh.   NERVOUS SYSTEM:  AAOx3, speech clear. Grossly moving all 4 ext. Cannot assess sensory, cerebellar systems and gait.   SKIN: No rashes or lesions

## 2023-10-07 NOTE — ED PROVIDER NOTE - ATTENDING CONTRIBUTION TO CARE
I performed a history and physical exam of the patient and discussed their management with the resident. I reviewed the resident's note and agree with the documented findings and plan of care.  Tiffany White MD

## 2023-10-07 NOTE — ED PROVIDER NOTE - NSFOLLOWUPINSTRUCTIONS_ED_ALL_ED_FT
Edema is when you have too much fluid in your body or under your skin. Edema may make your legs, feet, and ankles swell. Swelling often happens in looser tissues, such as around your eyes. This is a common condition. It gets more common as you get older.    There are many possible causes of edema. These include:  Eating too much salt (sodium).  Being on your feet or sitting for a long time.  Certain medical conditions, such as:  Pregnancy.  Heart failure.  Liver disease.  Kidney disease.  Cancer.  Hot weather may make edema worse. Edema is usually painless. Your skin may look swollen or shiny.    Follow these instructions at home:  Medicines    Take over-the-counter and prescription medicines only as told by your doctor.  Your doctor may prescribe a medicine to help your body get rid of extra water (diuretic). Take this medicine if you are told to take it.  Eating and drinking    Eat a low-salt (low-sodium) diet as told by your doctor. Sometimes, eating less salt may reduce swelling.  Depending on the cause of your swelling, you may need to limit how much fluid you drink (fluid restriction).  General instructions    Raise the injured area above the level of your heart while you are sitting or lying down.  Do not sit still or stand for a long time.  Do not wear tight clothes. Do not wear garters on your upper legs.  Exercise your legs. This can help the swelling go down.  Wear compression stockings as told by your doctor. It is important that these are the right size. These should be prescribed by your doctor to prevent possible injuries.  If elastic bandages or wraps are recommended, use them as told by your doctor.  Contact a doctor if:  Treatment is not working.  You have heart, liver, or kidney disease and have symptoms of edema.  You have sudden and unexplained weight gain.  Get help right away if:  You have shortness of breath or chest pain.  You cannot breathe when you lie down.  You have pain, redness, or warmth in the swollen areas.  You have heart, liver, or kidney disease and get edema all of a sudden.  You have a fever and your symptoms get worse all of a sudden.  These symptoms may be an emergency. Get help right away. Call 911.  Do not wait to see if the symptoms will go away.  Do not drive yourself to the hospital.    Summary    Edema is when you have too much fluid in your body or under your skin.  Edema may make your legs, feet, and ankles swell. Swelling often happens in looser tissues, such as around your eyes.  Raise the injured area above the level of your heart while you are sitting or lying down.  Follow your doctor's instructions about diet and how much fluid you can drink.  This information is not intended to replace advice given to you by your health care provider. Make sure you discuss any questions you have with your health care provider.

## 2023-10-07 NOTE — ED ADULT NURSE NOTE - NSFALLHARMRISKINTERV_ED_ALL_ED

## 2023-10-07 NOTE — ED PROVIDER NOTE - PROGRESS NOTE DETAILS
MD Nina Lopeztte (daughter) at   I discussed with the daughter of the results of today's evaluation.  Patient has a fairly small degree of anemia a hemoglobin of 7.7 and a decrease in the albumin to 2.62.69 elevation of the BNP in the 2000's the CT of the abdomen and pelvis and chest did not elucidate any etiology for the anasarca.  Discussed with the daughter.  Admission versus sending to the rehab center we think that at this point we did not find a cause for this swelling and continued diuretics and evaluation at the rehab center is appropriate.  The daughter agreed with plan. Willa CHARLTON: Patient re-evaluated.  Lab and radiology tests informed to the patient.  Patient is stable for discharge to rehab. Will require physical rehab. Return to ED parameters reviewed and patient verbalized understanding.  All questions answered, all concerns addressed.

## 2023-10-07 NOTE — ED ADULT NURSE NOTE - NS ED NURSE DISCH DISPOSITION
Patient's mom states that a while ago Alicia Bryant MD gave patient the diagnosis of ADHD but stated that usually kids start to grow out of it around his age.  Patient's mom states that this seems to get worse when his grades drop.  Mom states that patient is having a really hard time concentrating at school.  They are wanting to possibly give medication a try to help with this. Mom would like to talk to Alicia Bryant MD about this.  She states that she would have made an appointment but patient tends to be uncomfortable and anxious about this.   She is wondering if Alicia Bryant MD could call her personally.   Marcia Santiago LPN........................11/19/2019  1:26 PM    
Spoke with mom at length re anxiety vs ADHD and happy to see him any time, we are thinking this is more anxiety related. Alicia Bryant MD on 11/22/2019 at 3:06 PM   
Would like to discuss getting prescription for pt's ADHD  
Discharged

## 2023-10-07 NOTE — ED PROVIDER NOTE - OBJECTIVE STATEMENT
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 Patient denies lightheadedness, syncope, chest pain, shortness of breath, palpitations, nausea/vomiting, reduced appetite, abdominal pain, urinary complaints. Stoma is working normally.

## 2023-10-08 NOTE — ED POST DISCHARGE NOTE - DETAILS
10/8: Attempted to contact pt directly at primary line but was house number and patient granddaughter answered (does not handle care for her grandfather). Pt currently at [facility], no one in medical office at [facility] today to discuss given weekend.- Rayne Sarmiento PA-C 10/9/23: results d/w BESSY Sauer from [facility] with readback, Cristiane states results will be relayed to patient's treating team at [facility], results faxed to [phone] via clerk Massey by RN request. -Brien Patricio PA-C

## 2023-10-13 ENCOUNTER — APPOINTMENT (OUTPATIENT)
Dept: SURGERY | Facility: CLINIC | Age: 83
End: 2023-10-13

## 2023-11-06 ENCOUNTER — LABORATORY RESULT (OUTPATIENT)
Age: 83
End: 2023-11-06

## 2023-11-06 ENCOUNTER — NON-APPOINTMENT (OUTPATIENT)
Age: 83
End: 2023-11-06

## 2023-11-13 PROCEDURE — 82803 BLOOD GASES ANY COMBINATION: CPT

## 2023-11-13 PROCEDURE — 0225U NFCT DS DNA&RNA 21 SARSCOV2: CPT

## 2023-11-13 PROCEDURE — 85730 THROMBOPLASTIN TIME PARTIAL: CPT

## 2023-11-13 PROCEDURE — 87449 NOS EACH ORGANISM AG IA: CPT

## 2023-11-13 PROCEDURE — 94640 AIRWAY INHALATION TREATMENT: CPT

## 2023-11-13 PROCEDURE — 84100 ASSAY OF PHOSPHORUS: CPT

## 2023-11-13 PROCEDURE — 36430 TRANSFUSION BLD/BLD COMPNT: CPT

## 2023-11-13 PROCEDURE — 84443 ASSAY THYROID STIM HORMONE: CPT

## 2023-11-13 PROCEDURE — P9016: CPT

## 2023-11-13 PROCEDURE — 87086 URINE CULTURE/COLONY COUNT: CPT

## 2023-11-13 PROCEDURE — 85014 HEMATOCRIT: CPT

## 2023-11-13 PROCEDURE — C1889: CPT

## 2023-11-13 PROCEDURE — 86850 RBC ANTIBODY SCREEN: CPT

## 2023-11-13 PROCEDURE — 86301 IMMUNOASSAY TUMOR CA 19-9: CPT

## 2023-11-13 PROCEDURE — 87641 MR-STAPH DNA AMP PROBE: CPT

## 2023-11-13 PROCEDURE — 82435 ASSAY OF BLOOD CHLORIDE: CPT

## 2023-11-13 PROCEDURE — 85018 HEMOGLOBIN: CPT

## 2023-11-13 PROCEDURE — 80048 BASIC METABOLIC PNL TOTAL CA: CPT

## 2023-11-13 PROCEDURE — 85025 COMPLETE CBC W/AUTO DIFF WBC: CPT

## 2023-11-13 PROCEDURE — 83735 ASSAY OF MAGNESIUM: CPT

## 2023-11-13 PROCEDURE — 86901 BLOOD TYPING SEROLOGIC RH(D): CPT

## 2023-11-13 PROCEDURE — 71260 CT THORAX DX C+: CPT

## 2023-11-13 PROCEDURE — 83036 HEMOGLOBIN GLYCOSYLATED A1C: CPT

## 2023-11-13 PROCEDURE — 87640 STAPH A DNA AMP PROBE: CPT

## 2023-11-13 PROCEDURE — 88341 IMHCHEM/IMCYTCHM EA ADD ANTB: CPT

## 2023-11-13 PROCEDURE — 88309 TISSUE EXAM BY PATHOLOGIST: CPT

## 2023-11-13 PROCEDURE — 96375 TX/PRO/DX INJ NEW DRUG ADDON: CPT

## 2023-11-13 PROCEDURE — 99285 EMERGENCY DEPT VISIT HI MDM: CPT

## 2023-11-13 PROCEDURE — 83880 ASSAY OF NATRIURETIC PEPTIDE: CPT

## 2023-11-13 PROCEDURE — 97161 PT EVAL LOW COMPLEX 20 MIN: CPT

## 2023-11-13 PROCEDURE — 74177 CT ABD & PELVIS W/CONTRAST: CPT

## 2023-11-13 PROCEDURE — 97110 THERAPEUTIC EXERCISES: CPT

## 2023-11-13 PROCEDURE — 87324 CLOSTRIDIUM AG IA: CPT

## 2023-11-13 PROCEDURE — 96374 THER/PROPH/DIAG INJ IV PUSH: CPT

## 2023-11-13 PROCEDURE — 83605 ASSAY OF LACTIC ACID: CPT

## 2023-11-13 PROCEDURE — 36415 COLL VENOUS BLD VENIPUNCTURE: CPT

## 2023-11-13 PROCEDURE — 86923 COMPATIBILITY TEST ELECTRIC: CPT

## 2023-11-13 PROCEDURE — 84145 PROCALCITONIN (PCT): CPT

## 2023-11-13 PROCEDURE — 82565 ASSAY OF CREATININE: CPT

## 2023-11-13 PROCEDURE — 84132 ASSAY OF SERUM POTASSIUM: CPT

## 2023-11-13 PROCEDURE — 84484 ASSAY OF TROPONIN QUANT: CPT

## 2023-11-13 PROCEDURE — 82947 ASSAY GLUCOSE BLOOD QUANT: CPT

## 2023-11-13 PROCEDURE — 88342 IMHCHEM/IMCYTCHM 1ST ANTB: CPT

## 2023-11-13 PROCEDURE — 86900 BLOOD TYPING SEROLOGIC ABO: CPT

## 2023-11-13 PROCEDURE — 85610 PROTHROMBIN TIME: CPT

## 2023-11-13 PROCEDURE — 87040 BLOOD CULTURE FOR BACTERIA: CPT

## 2023-11-13 PROCEDURE — 93306 TTE W/DOPPLER COMPLETE: CPT

## 2023-11-13 PROCEDURE — 82378 CARCINOEMBRYONIC ANTIGEN: CPT

## 2023-11-13 PROCEDURE — 93005 ELECTROCARDIOGRAM TRACING: CPT

## 2023-11-13 PROCEDURE — 84295 ASSAY OF SERUM SODIUM: CPT

## 2023-11-13 PROCEDURE — 93970 EXTREMITY STUDY: CPT

## 2023-11-13 PROCEDURE — 85027 COMPLETE CBC AUTOMATED: CPT

## 2023-11-13 PROCEDURE — 71045 X-RAY EXAM CHEST 1 VIEW: CPT

## 2023-11-13 PROCEDURE — 97164 PT RE-EVAL EST PLAN CARE: CPT

## 2023-11-13 PROCEDURE — 81003 URINALYSIS AUTO W/O SCOPE: CPT

## 2023-11-13 PROCEDURE — 82330 ASSAY OF CALCIUM: CPT

## 2023-11-13 PROCEDURE — 80053 COMPREHEN METABOLIC PANEL: CPT

## 2023-11-13 PROCEDURE — C1769: CPT

## 2023-11-13 PROCEDURE — 87070 CULTURE OTHR SPECIMN AEROBIC: CPT

## 2023-11-29 ENCOUNTER — APPOINTMENT (OUTPATIENT)
Dept: PULMONOLOGY | Facility: CLINIC | Age: 83
End: 2023-11-29
Payer: MEDICARE

## 2023-11-29 PROCEDURE — 99443: CPT | Mod: 95

## 2023-11-29 RX ORDER — SIMETHICONE 80 MG/1
80 TABLET, CHEWABLE ORAL
Qty: 360 | Refills: 0 | Status: ACTIVE | COMMUNITY
Start: 2023-11-29 | End: 1900-01-01

## 2023-11-29 RX ORDER — ACETAMINOPHEN 325 MG/1
325 TABLET, FILM COATED ORAL EVERY 6 HOURS
Qty: 240 | Refills: 0 | Status: ACTIVE | COMMUNITY
Start: 2023-11-29 | End: 1900-01-01

## 2023-11-29 RX ORDER — ATORVASTATIN CALCIUM 10 MG/1
10 TABLET, FILM COATED ORAL
Qty: 90 | Refills: 3 | Status: ACTIVE | COMMUNITY
Start: 2018-11-15 | End: 1900-01-01

## 2023-11-29 RX ORDER — FINASTERIDE 5 MG/1
5 TABLET, FILM COATED ORAL
Qty: 90 | Refills: 3 | Status: ACTIVE | COMMUNITY
Start: 2019-05-06 | End: 1900-01-01

## 2023-11-29 RX ORDER — POTASSIUM CHLORIDE 1500 MG/1
20 TABLET, EXTENDED RELEASE ORAL TWICE DAILY
Qty: 180 | Refills: 1 | Status: ACTIVE | COMMUNITY
Start: 2023-11-29 | End: 1900-01-01

## 2023-11-30 ENCOUNTER — APPOINTMENT (OUTPATIENT)
Dept: CARDIOLOGY | Facility: CLINIC | Age: 83
End: 2023-11-30
Payer: MEDICARE

## 2023-11-30 VITALS
SYSTOLIC BLOOD PRESSURE: 112 MMHG | BODY MASS INDEX: 30.1 KG/M2 | DIASTOLIC BLOOD PRESSURE: 60 MMHG | WEIGHT: 215 LBS | OXYGEN SATURATION: 95 % | HEIGHT: 71 IN | HEART RATE: 78 BPM

## 2023-11-30 DIAGNOSIS — I49.8 OTHER SPECIFIED CARDIAC ARRHYTHMIAS: ICD-10-CM

## 2023-11-30 PROCEDURE — 99215 OFFICE O/P EST HI 40 MIN: CPT

## 2023-11-30 RX ORDER — SPIRONOLACTONE 25 MG/1
25 TABLET ORAL DAILY
Qty: 90 | Refills: 3 | Status: DISCONTINUED | COMMUNITY
Start: 2022-08-11 | End: 2023-11-30

## 2023-11-30 RX ORDER — LOSARTAN POTASSIUM 100 MG/1
100 TABLET, FILM COATED ORAL
Qty: 90 | Refills: 1 | Status: DISCONTINUED | COMMUNITY
Start: 2022-08-11 | End: 2023-11-30

## 2023-11-30 RX ORDER — NIFEDIPINE 90 MG/1
90 TABLET, EXTENDED RELEASE ORAL
Qty: 90 | Refills: 3 | Status: DISCONTINUED | COMMUNITY
Start: 2018-10-16 | End: 2023-11-30

## 2023-11-30 RX ORDER — HYDROCHLOROTHIAZIDE 25 MG/1
25 TABLET ORAL
Qty: 90 | Refills: 0 | Status: DISCONTINUED | COMMUNITY
Start: 2019-11-27 | End: 2023-11-30

## 2023-11-30 RX ORDER — FUROSEMIDE 40 MG/1
40 TABLET ORAL
Qty: 180 | Refills: 1 | Status: DISCONTINUED | COMMUNITY
Start: 2021-05-12 | End: 2023-11-30

## 2023-12-18 DIAGNOSIS — R68.89 OTHER GENERAL SYMPTOMS AND SIGNS: ICD-10-CM

## 2023-12-20 ENCOUNTER — NON-APPOINTMENT (OUTPATIENT)
Age: 83
End: 2023-12-20

## 2023-12-20 ENCOUNTER — INPATIENT (INPATIENT)
Facility: HOSPITAL | Age: 83
LOS: 12 days | Discharge: NURSING, REHAB OR LONG-TERM CARE | DRG: 871 | End: 2024-01-02
Attending: STUDENT IN AN ORGANIZED HEALTH CARE EDUCATION/TRAINING PROGRAM | Admitting: HOSPITALIST
Payer: MEDICARE

## 2023-12-20 ENCOUNTER — LABORATORY RESULT (OUTPATIENT)
Age: 83
End: 2023-12-20

## 2023-12-20 VITALS
SYSTOLIC BLOOD PRESSURE: 128 MMHG | OXYGEN SATURATION: 92 % | HEIGHT: 71 IN | WEIGHT: 212.08 LBS | RESPIRATION RATE: 36 BRPM | DIASTOLIC BLOOD PRESSURE: 64 MMHG | HEART RATE: 130 BPM

## 2023-12-20 DIAGNOSIS — I20.9 ANGINA PECTORIS, UNSPECIFIED: ICD-10-CM

## 2023-12-20 DIAGNOSIS — J44.1 CHRONIC OBSTRUCTIVE PULMONARY DISEASE WITH (ACUTE) EXACERBATION: ICD-10-CM

## 2023-12-20 DIAGNOSIS — I50.30 UNSPECIFIED DIASTOLIC (CONGESTIVE) HEART FAILURE: ICD-10-CM

## 2023-12-20 DIAGNOSIS — J90 PLEURAL EFFUSION, NOT ELSEWHERE CLASSIFIED: ICD-10-CM

## 2023-12-20 DIAGNOSIS — I50.33 ACUTE ON CHRONIC DIASTOLIC (CONGESTIVE) HEART FAILURE: ICD-10-CM

## 2023-12-20 DIAGNOSIS — I48.20 CHRONIC ATRIAL FIBRILLATION, UNSPECIFIED: ICD-10-CM

## 2023-12-20 DIAGNOSIS — Z98.89 OTHER SPECIFIED POSTPROCEDURAL STATES: Chronic | ICD-10-CM

## 2023-12-20 DIAGNOSIS — J96.01 ACUTE RESPIRATORY FAILURE WITH HYPOXIA: ICD-10-CM

## 2023-12-20 DIAGNOSIS — I10 ESSENTIAL (PRIMARY) HYPERTENSION: ICD-10-CM

## 2023-12-20 LAB
ALBUMIN SERPL ELPH-MCNC: 2.9 G/DL — LOW (ref 3.3–5)
ALBUMIN SERPL ELPH-MCNC: 2.9 G/DL — LOW (ref 3.3–5)
ALP SERPL-CCNC: 104 U/L — SIGNIFICANT CHANGE UP (ref 40–120)
ALP SERPL-CCNC: 104 U/L — SIGNIFICANT CHANGE UP (ref 40–120)
ALT FLD-CCNC: 10 U/L — SIGNIFICANT CHANGE UP (ref 10–45)
ALT FLD-CCNC: 10 U/L — SIGNIFICANT CHANGE UP (ref 10–45)
ANION GAP SERPL CALC-SCNC: 13 MMOL/L — SIGNIFICANT CHANGE UP (ref 5–17)
ANION GAP SERPL CALC-SCNC: 13 MMOL/L — SIGNIFICANT CHANGE UP (ref 5–17)
APPEARANCE UR: ABNORMAL
APPEARANCE UR: ABNORMAL
APTT BLD: 37.3 SEC — HIGH (ref 24.5–35.6)
APTT BLD: 37.3 SEC — HIGH (ref 24.5–35.6)
AST SERPL-CCNC: 21 U/L — SIGNIFICANT CHANGE UP (ref 10–40)
AST SERPL-CCNC: 21 U/L — SIGNIFICANT CHANGE UP (ref 10–40)
BACTERIA # UR AUTO: ABNORMAL /HPF
BACTERIA # UR AUTO: ABNORMAL /HPF
BASE EXCESS BLDV CALC-SCNC: 2.9 MMOL/L — SIGNIFICANT CHANGE UP (ref -2–3)
BASE EXCESS BLDV CALC-SCNC: 2.9 MMOL/L — SIGNIFICANT CHANGE UP (ref -2–3)
BASE EXCESS BLDV CALC-SCNC: 3.1 MMOL/L — HIGH (ref -2–3)
BASE EXCESS BLDV CALC-SCNC: 3.1 MMOL/L — HIGH (ref -2–3)
BASOPHILS # BLD AUTO: 0.03 K/UL — SIGNIFICANT CHANGE UP (ref 0–0.2)
BASOPHILS # BLD AUTO: 0.03 K/UL — SIGNIFICANT CHANGE UP (ref 0–0.2)
BASOPHILS NFR BLD AUTO: 0.2 % — SIGNIFICANT CHANGE UP (ref 0–2)
BASOPHILS NFR BLD AUTO: 0.2 % — SIGNIFICANT CHANGE UP (ref 0–2)
BILIRUB SERPL-MCNC: 0.9 MG/DL — SIGNIFICANT CHANGE UP (ref 0.2–1.2)
BILIRUB SERPL-MCNC: 0.9 MG/DL — SIGNIFICANT CHANGE UP (ref 0.2–1.2)
BILIRUB UR-MCNC: NEGATIVE — SIGNIFICANT CHANGE UP
BILIRUB UR-MCNC: NEGATIVE — SIGNIFICANT CHANGE UP
BUN SERPL-MCNC: 33 MG/DL — HIGH (ref 7–23)
BUN SERPL-MCNC: 33 MG/DL — HIGH (ref 7–23)
CA-I SERPL-SCNC: 1.17 MMOL/L — SIGNIFICANT CHANGE UP (ref 1.15–1.33)
CA-I SERPL-SCNC: 1.17 MMOL/L — SIGNIFICANT CHANGE UP (ref 1.15–1.33)
CA-I SERPL-SCNC: 1.18 MMOL/L — SIGNIFICANT CHANGE UP (ref 1.15–1.33)
CA-I SERPL-SCNC: 1.18 MMOL/L — SIGNIFICANT CHANGE UP (ref 1.15–1.33)
CALCIUM SERPL-MCNC: 8.9 MG/DL — SIGNIFICANT CHANGE UP (ref 8.4–10.5)
CALCIUM SERPL-MCNC: 8.9 MG/DL — SIGNIFICANT CHANGE UP (ref 8.4–10.5)
CAST: 17 /LPF — HIGH (ref 0–4)
CAST: 17 /LPF — HIGH (ref 0–4)
CHLORIDE BLDV-SCNC: 99 MMOL/L — SIGNIFICANT CHANGE UP (ref 96–108)
CHLORIDE SERPL-SCNC: 98 MMOL/L — SIGNIFICANT CHANGE UP (ref 96–108)
CHLORIDE SERPL-SCNC: 98 MMOL/L — SIGNIFICANT CHANGE UP (ref 96–108)
CO2 BLDV-SCNC: 29 MMOL/L — HIGH (ref 22–26)
CO2 BLDV-SCNC: 29 MMOL/L — HIGH (ref 22–26)
CO2 BLDV-SCNC: 30 MMOL/L — HIGH (ref 22–26)
CO2 BLDV-SCNC: 30 MMOL/L — HIGH (ref 22–26)
CO2 SERPL-SCNC: 26 MMOL/L — SIGNIFICANT CHANGE UP (ref 22–31)
CO2 SERPL-SCNC: 26 MMOL/L — SIGNIFICANT CHANGE UP (ref 22–31)
COLOR SPEC: YELLOW — SIGNIFICANT CHANGE UP
COLOR SPEC: YELLOW — SIGNIFICANT CHANGE UP
CREAT SERPL-MCNC: 1.28 MG/DL — SIGNIFICANT CHANGE UP (ref 0.5–1.3)
CREAT SERPL-MCNC: 1.28 MG/DL — SIGNIFICANT CHANGE UP (ref 0.5–1.3)
DIFF PNL FLD: ABNORMAL
DIFF PNL FLD: ABNORMAL
EGFR: 55 ML/MIN/1.73M2 — LOW
EGFR: 55 ML/MIN/1.73M2 — LOW
EOSINOPHIL # BLD AUTO: 0.06 K/UL — SIGNIFICANT CHANGE UP (ref 0–0.5)
EOSINOPHIL # BLD AUTO: 0.06 K/UL — SIGNIFICANT CHANGE UP (ref 0–0.5)
EOSINOPHIL NFR BLD AUTO: 0.4 % — SIGNIFICANT CHANGE UP (ref 0–6)
EOSINOPHIL NFR BLD AUTO: 0.4 % — SIGNIFICANT CHANGE UP (ref 0–6)
FINE GRAN CASTS #/AREA URNS AUTO: PRESENT
FINE GRAN CASTS #/AREA URNS AUTO: PRESENT
FLUAV AG NPH QL: SIGNIFICANT CHANGE UP
FLUAV AG NPH QL: SIGNIFICANT CHANGE UP
FLUBV AG NPH QL: SIGNIFICANT CHANGE UP
FLUBV AG NPH QL: SIGNIFICANT CHANGE UP
GAS PNL BLDV: 132 MMOL/L — LOW (ref 136–145)
GAS PNL BLDV: 132 MMOL/L — LOW (ref 136–145)
GAS PNL BLDV: 134 MMOL/L — LOW (ref 136–145)
GAS PNL BLDV: 134 MMOL/L — LOW (ref 136–145)
GAS PNL BLDV: SIGNIFICANT CHANGE UP
GLUCOSE BLDV-MCNC: 123 MG/DL — HIGH (ref 70–99)
GLUCOSE BLDV-MCNC: 123 MG/DL — HIGH (ref 70–99)
GLUCOSE BLDV-MCNC: 132 MG/DL — HIGH (ref 70–99)
GLUCOSE BLDV-MCNC: 132 MG/DL — HIGH (ref 70–99)
GLUCOSE SERPL-MCNC: 130 MG/DL — HIGH (ref 70–99)
GLUCOSE SERPL-MCNC: 130 MG/DL — HIGH (ref 70–99)
GLUCOSE UR QL: NEGATIVE MG/DL — SIGNIFICANT CHANGE UP
GLUCOSE UR QL: NEGATIVE MG/DL — SIGNIFICANT CHANGE UP
HCO3 BLDV-SCNC: 28 MMOL/L — SIGNIFICANT CHANGE UP (ref 22–29)
HCO3 BLDV-SCNC: 28 MMOL/L — SIGNIFICANT CHANGE UP (ref 22–29)
HCO3 BLDV-SCNC: 29 MMOL/L — SIGNIFICANT CHANGE UP (ref 22–29)
HCO3 BLDV-SCNC: 29 MMOL/L — SIGNIFICANT CHANGE UP (ref 22–29)
HCT VFR BLD CALC: 28.1 % — LOW (ref 39–50)
HCT VFR BLD CALC: 28.1 % — LOW (ref 39–50)
HCT VFR BLDA CALC: 23 % — LOW (ref 39–51)
HCT VFR BLDA CALC: 23 % — LOW (ref 39–51)
HCT VFR BLDA CALC: 27 % — LOW (ref 39–51)
HCT VFR BLDA CALC: 27 % — LOW (ref 39–51)
HGB BLD CALC-MCNC: 7.5 G/DL — LOW (ref 12.6–17.4)
HGB BLD CALC-MCNC: 7.5 G/DL — LOW (ref 12.6–17.4)
HGB BLD CALC-MCNC: 8.9 G/DL — LOW (ref 12.6–17.4)
HGB BLD CALC-MCNC: 8.9 G/DL — LOW (ref 12.6–17.4)
HGB BLD-MCNC: 8.3 G/DL — LOW (ref 13–17)
HGB BLD-MCNC: 8.3 G/DL — LOW (ref 13–17)
HOROWITZ INDEX BLDV+IHG-RTO: SIGNIFICANT CHANGE UP
HOROWITZ INDEX BLDV+IHG-RTO: SIGNIFICANT CHANGE UP
HYALINE CASTS # UR AUTO: PRESENT
HYALINE CASTS # UR AUTO: PRESENT
IMM GRANULOCYTES NFR BLD AUTO: 1.2 % — HIGH (ref 0–0.9)
IMM GRANULOCYTES NFR BLD AUTO: 1.2 % — HIGH (ref 0–0.9)
INR BLD: 2.23 RATIO — HIGH (ref 0.85–1.18)
INR BLD: 2.23 RATIO — HIGH (ref 0.85–1.18)
KETONES UR-MCNC: NEGATIVE MG/DL — SIGNIFICANT CHANGE UP
KETONES UR-MCNC: NEGATIVE MG/DL — SIGNIFICANT CHANGE UP
LACTATE BLDV-MCNC: 1.6 MMOL/L — SIGNIFICANT CHANGE UP (ref 0.5–2)
LACTATE BLDV-MCNC: 1.6 MMOL/L — SIGNIFICANT CHANGE UP (ref 0.5–2)
LACTATE BLDV-MCNC: 3.7 MMOL/L — HIGH (ref 0.5–2)
LACTATE BLDV-MCNC: 3.7 MMOL/L — HIGH (ref 0.5–2)
LEUKOCYTE ESTERASE UR-ACNC: ABNORMAL
LEUKOCYTE ESTERASE UR-ACNC: ABNORMAL
LYMPHOCYTES # BLD AUTO: 0.65 K/UL — LOW (ref 1–3.3)
LYMPHOCYTES # BLD AUTO: 0.65 K/UL — LOW (ref 1–3.3)
LYMPHOCYTES # BLD AUTO: 3.8 % — LOW (ref 13–44)
LYMPHOCYTES # BLD AUTO: 3.8 % — LOW (ref 13–44)
MCHC RBC-ENTMCNC: 21.2 PG — LOW (ref 27–34)
MCHC RBC-ENTMCNC: 21.2 PG — LOW (ref 27–34)
MCHC RBC-ENTMCNC: 29.5 GM/DL — LOW (ref 32–36)
MCHC RBC-ENTMCNC: 29.5 GM/DL — LOW (ref 32–36)
MCV RBC AUTO: 71.7 FL — LOW (ref 80–100)
MCV RBC AUTO: 71.7 FL — LOW (ref 80–100)
MONOCYTES # BLD AUTO: 0.51 K/UL — SIGNIFICANT CHANGE UP (ref 0–0.9)
MONOCYTES # BLD AUTO: 0.51 K/UL — SIGNIFICANT CHANGE UP (ref 0–0.9)
MONOCYTES NFR BLD AUTO: 3 % — SIGNIFICANT CHANGE UP (ref 2–14)
MONOCYTES NFR BLD AUTO: 3 % — SIGNIFICANT CHANGE UP (ref 2–14)
NEUTROPHILS # BLD AUTO: 15.47 K/UL — HIGH (ref 1.8–7.4)
NEUTROPHILS # BLD AUTO: 15.47 K/UL — HIGH (ref 1.8–7.4)
NEUTROPHILS NFR BLD AUTO: 91.4 % — HIGH (ref 43–77)
NEUTROPHILS NFR BLD AUTO: 91.4 % — HIGH (ref 43–77)
NITRITE UR-MCNC: NEGATIVE — SIGNIFICANT CHANGE UP
NITRITE UR-MCNC: NEGATIVE — SIGNIFICANT CHANGE UP
NRBC # BLD: 0 /100 WBCS — SIGNIFICANT CHANGE UP (ref 0–0)
NRBC # BLD: 0 /100 WBCS — SIGNIFICANT CHANGE UP (ref 0–0)
NT-PROBNP SERPL-SCNC: 2785 PG/ML — HIGH (ref 0–300)
NT-PROBNP SERPL-SCNC: 2785 PG/ML — HIGH (ref 0–300)
PCO2 BLDV: 44 MMHG — SIGNIFICANT CHANGE UP (ref 42–55)
PCO2 BLDV: 44 MMHG — SIGNIFICANT CHANGE UP (ref 42–55)
PCO2 BLDV: 50 MMHG — SIGNIFICANT CHANGE UP (ref 42–55)
PCO2 BLDV: 50 MMHG — SIGNIFICANT CHANGE UP (ref 42–55)
PH BLDV: 7.37 — SIGNIFICANT CHANGE UP (ref 7.32–7.43)
PH BLDV: 7.37 — SIGNIFICANT CHANGE UP (ref 7.32–7.43)
PH BLDV: 7.41 — SIGNIFICANT CHANGE UP (ref 7.32–7.43)
PH BLDV: 7.41 — SIGNIFICANT CHANGE UP (ref 7.32–7.43)
PH UR: 5.5 — SIGNIFICANT CHANGE UP (ref 5–8)
PH UR: 5.5 — SIGNIFICANT CHANGE UP (ref 5–8)
PLATELET # BLD AUTO: 516 K/UL — HIGH (ref 150–400)
PLATELET # BLD AUTO: 516 K/UL — HIGH (ref 150–400)
PO2 BLDV: 23 MMHG — LOW (ref 25–45)
PO2 BLDV: 23 MMHG — LOW (ref 25–45)
PO2 BLDV: 49 MMHG — HIGH (ref 25–45)
PO2 BLDV: 49 MMHG — HIGH (ref 25–45)
POTASSIUM BLDV-SCNC: 3.5 MMOL/L — SIGNIFICANT CHANGE UP (ref 3.5–5.1)
POTASSIUM SERPL-MCNC: 3.6 MMOL/L — SIGNIFICANT CHANGE UP (ref 3.5–5.3)
POTASSIUM SERPL-MCNC: 3.6 MMOL/L — SIGNIFICANT CHANGE UP (ref 3.5–5.3)
POTASSIUM SERPL-SCNC: 3.6 MMOL/L — SIGNIFICANT CHANGE UP (ref 3.5–5.3)
POTASSIUM SERPL-SCNC: 3.6 MMOL/L — SIGNIFICANT CHANGE UP (ref 3.5–5.3)
PROT SERPL-MCNC: 6.8 G/DL — SIGNIFICANT CHANGE UP (ref 6–8.3)
PROT SERPL-MCNC: 6.8 G/DL — SIGNIFICANT CHANGE UP (ref 6–8.3)
PROT UR-MCNC: 30 MG/DL
PROT UR-MCNC: 30 MG/DL
PROTHROM AB SERPL-ACNC: 24 SEC — HIGH (ref 9.5–13)
PROTHROM AB SERPL-ACNC: 24 SEC — HIGH (ref 9.5–13)
RBC # BLD: 3.92 M/UL — LOW (ref 4.2–5.8)
RBC # BLD: 3.92 M/UL — LOW (ref 4.2–5.8)
RBC # FLD: 19.2 % — HIGH (ref 10.3–14.5)
RBC # FLD: 19.2 % — HIGH (ref 10.3–14.5)
RBC CASTS # UR COMP ASSIST: 6 /HPF — HIGH (ref 0–4)
RBC CASTS # UR COMP ASSIST: 6 /HPF — HIGH (ref 0–4)
REVIEW: SIGNIFICANT CHANGE UP
REVIEW: SIGNIFICANT CHANGE UP
RSV RNA NPH QL NAA+NON-PROBE: SIGNIFICANT CHANGE UP
RSV RNA NPH QL NAA+NON-PROBE: SIGNIFICANT CHANGE UP
SAO2 % BLDV: 25.6 % — LOW (ref 67–88)
SAO2 % BLDV: 25.6 % — LOW (ref 67–88)
SAO2 % BLDV: 74.5 % — SIGNIFICANT CHANGE UP (ref 67–88)
SAO2 % BLDV: 74.5 % — SIGNIFICANT CHANGE UP (ref 67–88)
SARS-COV-2 RNA SPEC QL NAA+PROBE: SIGNIFICANT CHANGE UP
SARS-COV-2 RNA SPEC QL NAA+PROBE: SIGNIFICANT CHANGE UP
SODIUM SERPL-SCNC: 137 MMOL/L — SIGNIFICANT CHANGE UP (ref 135–145)
SODIUM SERPL-SCNC: 137 MMOL/L — SIGNIFICANT CHANGE UP (ref 135–145)
SP GR SPEC: 1.02 — SIGNIFICANT CHANGE UP (ref 1–1.03)
SP GR SPEC: 1.02 — SIGNIFICANT CHANGE UP (ref 1–1.03)
SQUAMOUS # UR AUTO: 1 /HPF — SIGNIFICANT CHANGE UP (ref 0–5)
SQUAMOUS # UR AUTO: 1 /HPF — SIGNIFICANT CHANGE UP (ref 0–5)
TROPONIN T, HIGH SENSITIVITY RESULT: 30 NG/L — SIGNIFICANT CHANGE UP (ref 0–51)
TROPONIN T, HIGH SENSITIVITY RESULT: 30 NG/L — SIGNIFICANT CHANGE UP (ref 0–51)
TROPONIN T, HIGH SENSITIVITY RESULT: 32 NG/L — SIGNIFICANT CHANGE UP (ref 0–51)
TROPONIN T, HIGH SENSITIVITY RESULT: 32 NG/L — SIGNIFICANT CHANGE UP (ref 0–51)
UROBILINOGEN FLD QL: 0.2 MG/DL — SIGNIFICANT CHANGE UP (ref 0.2–1)
UROBILINOGEN FLD QL: 0.2 MG/DL — SIGNIFICANT CHANGE UP (ref 0.2–1)
WBC # BLD: 16.93 K/UL — HIGH (ref 3.8–10.5)
WBC # BLD: 16.93 K/UL — HIGH (ref 3.8–10.5)
WBC # FLD AUTO: 16.93 K/UL — HIGH (ref 3.8–10.5)
WBC # FLD AUTO: 16.93 K/UL — HIGH (ref 3.8–10.5)
WBC UR QL: 543 /HPF — HIGH (ref 0–5)
WBC UR QL: 543 /HPF — HIGH (ref 0–5)
YEAST-LIKE CELLS: PRESENT
YEAST-LIKE CELLS: PRESENT

## 2023-12-20 PROCEDURE — 99497 ADVNCD CARE PLAN 30 MIN: CPT | Mod: 25

## 2023-12-20 PROCEDURE — 99223 1ST HOSP IP/OBS HIGH 75: CPT | Mod: GC,25

## 2023-12-20 PROCEDURE — 71275 CT ANGIOGRAPHY CHEST: CPT | Mod: 26,MA

## 2023-12-20 PROCEDURE — 71045 X-RAY EXAM CHEST 1 VIEW: CPT | Mod: 26

## 2023-12-20 PROCEDURE — 99291 CRITICAL CARE FIRST HOUR: CPT

## 2023-12-20 PROCEDURE — 76604 US EXAM CHEST: CPT | Mod: 26,GC

## 2023-12-20 PROCEDURE — 99223 1ST HOSP IP/OBS HIGH 75: CPT

## 2023-12-20 RX ORDER — HEPARIN SODIUM 5000 [USP'U]/ML
4000 INJECTION INTRAVENOUS; SUBCUTANEOUS EVERY 6 HOURS
Refills: 0 | Status: DISCONTINUED | OUTPATIENT
Start: 2023-12-20 | End: 2023-12-22

## 2023-12-20 RX ORDER — METOPROLOL TARTRATE 50 MG
1 TABLET ORAL
Refills: 0 | DISCHARGE

## 2023-12-20 RX ORDER — INFLUENZA VIRUS VACCINE 15; 15; 15; 15 UG/.5ML; UG/.5ML; UG/.5ML; UG/.5ML
0.7 SUSPENSION INTRAMUSCULAR ONCE
Refills: 0 | Status: DISCONTINUED | OUTPATIENT
Start: 2023-12-20 | End: 2024-01-02

## 2023-12-20 RX ORDER — AZITHROMYCIN 500 MG/1
TABLET, FILM COATED ORAL
Refills: 0 | Status: DISCONTINUED | OUTPATIENT
Start: 2023-12-20 | End: 2023-12-21

## 2023-12-20 RX ORDER — AZITHROMYCIN 500 MG/1
500 TABLET, FILM COATED ORAL EVERY 24 HOURS
Refills: 0 | Status: DISCONTINUED | OUTPATIENT
Start: 2023-12-21 | End: 2023-12-21

## 2023-12-20 RX ORDER — PANTOPRAZOLE SODIUM 20 MG/1
40 TABLET, DELAYED RELEASE ORAL
Refills: 0 | Status: DISCONTINUED | OUTPATIENT
Start: 2023-12-20 | End: 2023-12-30

## 2023-12-20 RX ORDER — ACETAMINOPHEN 500 MG
1000 TABLET ORAL ONCE
Refills: 0 | Status: COMPLETED | OUTPATIENT
Start: 2023-12-20 | End: 2023-12-20

## 2023-12-20 RX ORDER — LOSARTAN POTASSIUM 100 MG/1
1 TABLET, FILM COATED ORAL
Refills: 0 | DISCHARGE

## 2023-12-20 RX ORDER — ACETAMINOPHEN 500 MG
650 TABLET ORAL EVERY 6 HOURS
Refills: 0 | Status: DISCONTINUED | OUTPATIENT
Start: 2023-12-20 | End: 2024-01-02

## 2023-12-20 RX ORDER — BUDESONIDE AND FORMOTEROL FUMARATE DIHYDRATE 160; 4.5 UG/1; UG/1
2 AEROSOL RESPIRATORY (INHALATION)
Refills: 0 | Status: DISCONTINUED | OUTPATIENT
Start: 2023-12-20 | End: 2024-01-02

## 2023-12-20 RX ORDER — BUMETANIDE 0.25 MG/ML
2 INJECTION INTRAMUSCULAR; INTRAVENOUS DAILY
Refills: 0 | Status: DISCONTINUED | OUTPATIENT
Start: 2023-12-20 | End: 2023-12-22

## 2023-12-20 RX ORDER — FINASTERIDE 5 MG/1
1 TABLET, FILM COATED ORAL
Refills: 0 | DISCHARGE

## 2023-12-20 RX ORDER — SPIRONOLACTONE 25 MG/1
1 TABLET, FILM COATED ORAL
Refills: 0 | DISCHARGE

## 2023-12-20 RX ORDER — CEFTRIAXONE 500 MG/1
1000 INJECTION, POWDER, FOR SOLUTION INTRAMUSCULAR; INTRAVENOUS EVERY 24 HOURS
Refills: 0 | Status: DISCONTINUED | OUTPATIENT
Start: 2023-12-20 | End: 2023-12-21

## 2023-12-20 RX ORDER — AZITHROMYCIN 500 MG/1
500 TABLET, FILM COATED ORAL ONCE
Refills: 0 | Status: COMPLETED | OUTPATIENT
Start: 2023-12-20 | End: 2023-12-20

## 2023-12-20 RX ORDER — HEPARIN SODIUM 5000 [USP'U]/ML
8000 INJECTION INTRAVENOUS; SUBCUTANEOUS EVERY 6 HOURS
Refills: 0 | Status: DISCONTINUED | OUTPATIENT
Start: 2023-12-20 | End: 2023-12-22

## 2023-12-20 RX ORDER — IPRATROPIUM/ALBUTEROL SULFATE 18-103MCG
3 AEROSOL WITH ADAPTER (GRAM) INHALATION EVERY 6 HOURS
Refills: 0 | Status: DISCONTINUED | OUTPATIENT
Start: 2023-12-20 | End: 2023-12-24

## 2023-12-20 RX ORDER — ATORVASTATIN CALCIUM 80 MG/1
10 TABLET, FILM COATED ORAL AT BEDTIME
Refills: 0 | Status: DISCONTINUED | OUTPATIENT
Start: 2023-12-20 | End: 2024-01-02

## 2023-12-20 RX ORDER — CLOTRIMAZOLE AND BETAMETHASONE DIPROPIONATE 10; .5 MG/G; MG/G
1 CREAM TOPICAL
Refills: 0 | DISCHARGE

## 2023-12-20 RX ORDER — TAMSULOSIN HYDROCHLORIDE 0.4 MG/1
0.4 CAPSULE ORAL AT BEDTIME
Refills: 0 | Status: DISCONTINUED | OUTPATIENT
Start: 2023-12-20 | End: 2024-01-02

## 2023-12-20 RX ORDER — VANCOMYCIN HCL 1 G
1000 VIAL (EA) INTRAVENOUS ONCE
Refills: 0 | Status: COMPLETED | OUTPATIENT
Start: 2023-12-20 | End: 2023-12-20

## 2023-12-20 RX ORDER — ATORVASTATIN CALCIUM 80 MG/1
1 TABLET, FILM COATED ORAL
Refills: 0 | DISCHARGE

## 2023-12-20 RX ORDER — FINASTERIDE 5 MG/1
5 TABLET, FILM COATED ORAL DAILY
Refills: 0 | Status: DISCONTINUED | OUTPATIENT
Start: 2023-12-20 | End: 2024-01-02

## 2023-12-20 RX ORDER — SODIUM CHLORIDE 9 MG/ML
500 INJECTION INTRAMUSCULAR; INTRAVENOUS; SUBCUTANEOUS ONCE
Refills: 0 | Status: COMPLETED | OUTPATIENT
Start: 2023-12-20 | End: 2023-12-20

## 2023-12-20 RX ORDER — HEPARIN SODIUM 5000 [USP'U]/ML
INJECTION INTRAVENOUS; SUBCUTANEOUS
Qty: 25000 | Refills: 0 | Status: DISCONTINUED | OUTPATIENT
Start: 2023-12-20 | End: 2023-12-22

## 2023-12-20 RX ORDER — CEFEPIME 1 G/1
1000 INJECTION, POWDER, FOR SOLUTION INTRAMUSCULAR; INTRAVENOUS ONCE
Refills: 0 | Status: COMPLETED | OUTPATIENT
Start: 2023-12-20 | End: 2023-12-20

## 2023-12-20 RX ORDER — METOPROLOL TARTRATE 50 MG
25 TABLET ORAL
Refills: 0 | Status: DISCONTINUED | OUTPATIENT
Start: 2023-12-20 | End: 2024-01-02

## 2023-12-20 RX ADMIN — HEPARIN SODIUM 1800 UNIT(S)/HR: 5000 INJECTION INTRAVENOUS; SUBCUTANEOUS at 21:22

## 2023-12-20 RX ADMIN — Medication 25 MILLIGRAM(S): at 18:49

## 2023-12-20 RX ADMIN — Medication 250 MILLIGRAM(S): at 05:36

## 2023-12-20 RX ADMIN — AZITHROMYCIN 255 MILLIGRAM(S): 500 TABLET, FILM COATED ORAL at 16:39

## 2023-12-20 RX ADMIN — SODIUM CHLORIDE 500 MILLILITER(S): 9 INJECTION INTRAMUSCULAR; INTRAVENOUS; SUBCUTANEOUS at 04:43

## 2023-12-20 RX ADMIN — Medication 400 MILLIGRAM(S): at 04:45

## 2023-12-20 RX ADMIN — ATORVASTATIN CALCIUM 10 MILLIGRAM(S): 80 TABLET, FILM COATED ORAL at 22:27

## 2023-12-20 RX ADMIN — Medication 3 MILLILITER(S): at 23:27

## 2023-12-20 RX ADMIN — CEFEPIME 1000 MILLIGRAM(S): 1 INJECTION, POWDER, FOR SOLUTION INTRAMUSCULAR; INTRAVENOUS at 06:35

## 2023-12-20 RX ADMIN — CEFTRIAXONE 100 MILLIGRAM(S): 500 INJECTION, POWDER, FOR SOLUTION INTRAMUSCULAR; INTRAVENOUS at 20:15

## 2023-12-20 RX ADMIN — TAMSULOSIN HYDROCHLORIDE 0.4 MILLIGRAM(S): 0.4 CAPSULE ORAL at 22:27

## 2023-12-20 RX ADMIN — CEFEPIME 100 MILLIGRAM(S): 1 INJECTION, POWDER, FOR SOLUTION INTRAMUSCULAR; INTRAVENOUS at 04:44

## 2023-12-20 NOTE — ED PROVIDER NOTE - OBJECTIVE STATEMENT
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  Per patient and daughter, worsening dyspnea on exertion since yesterday.  Also had 1 episode of chest pain that was exertional yesterday morning.  No fevers, chills.  Had a productive cough.  Not on O2 at baseline.  No sick contacts or recent travel.  Compliant with Eliquis.  No melena, hematochezia.  No history of PE. 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  Per patient and daughter, worsening dyspnea on exertion since yesterday.  Also had 1 episode of chest pain that was exertional yesterday morning.  No fevers, chills.  Had a productive cough.  Not on O2 at baseline.  No sick contacts or recent travel.  Compliant with Eliquis.  No melena, hematochezia.  No history of PE.

## 2023-12-20 NOTE — ED PROVIDER NOTE - ATTENDING CONTRIBUTION TO CARE
MD Goetz:  patient seen and evaluated personally.   I agree with the History & Physical,  Impression & Plan other than what was detailed in my note.  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, diastolic dysfunction, aortic ectasia, afib on eliquis,  presenting to ed w/ cc of sob and b/l leg swelling, pt recently coming from [facility] for chf exacerbation, he has been having intermittent chills, fevers, coughing greenish sputum as well, no hx of dvt/pe, no hemoptysis, afebrile tachy, afib rvr, feels warm obtaining core temp, satting 89% on ra at bs, placed on 2 l nc, large habitus, mildly tachypnic, no severe distress, crackles b/l, possibly secondary to plaques, chf, pna, abd soft nt, ostomy bag unremarkable, plan for acs workup, pro bnp, cxr, ct scan to better eval for pna, flu/covid, cta chest. MD Goetz:  patient seen and evaluated personally.   I agree with the History & Physical,  Impression & Plan other than what was detailed in my note.  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, diastolic dysfunction, aortic ectasia, afib on eliquis,  presenting to ed w/ cc of sob and b/l leg swelling, pt recently coming from [facility] for chf exacerbation, he has been having intermittent chills, fevers, coughing greenish sputum as well, no hx of dvt/pe, no hemoptysis, afebrile tachy, afib rvr, feels warm obtaining core temp, satting 89% on ra at bs, placed on 2 l nc, large habitus, mildly tachypnic, no severe distress, crackles b/l, possibly secondary to plaques, chf, pna, abd soft nt, ostomy bag unremarkable, plan for acs workup, pro bnp, cxr, ct scan to better eval for pna, flu/covid, cta chest.

## 2023-12-20 NOTE — ED PROVIDER NOTE - CARE PLAN
1 Principal Discharge DX:	Pleural effusion  Secondary Diagnosis:	Acute UTI  Secondary Diagnosis:	Hypoxia

## 2023-12-20 NOTE — ED PROVIDER NOTE - NS ED ROS FT
CONSTITUTIONAL: No fevers, no chills, no lightheadedness, no dizziness  EYES: no visual changes, no eye pain  EARS: no ear drainage, no ear pain, no change in hearing  NOSE: no nasal congestion  MOUTH/THROAT: no sore throat  CV: see hpi   RESP: see hpi   GI: No n/v/d, no abd pain  : no dysuria, no hematuria, no flank pain  MSK: no back pain, no extremity pain  SKIN: no rashes  NEURO: no headache, no focal weakness, no decreased sensation/parasthesias   PSYCHIATRIC: no known mental health issues

## 2023-12-20 NOTE — H&P ADULT - CONVERSATION DETAILS
Discussed patient's prognosis with the patient and all treatment options including DNR/DNI/Comfort measures. Patient understands his critical condition and would like to be Full Code

## 2023-12-20 NOTE — H&P ADULT - PROBLEM SELECTOR PLAN 2
Baseline BNP ~ 2800. BNP ~ 2800 this admission  In chronic exacerbation state with 3+ pitting edema and on diuresis  HFpEF or HFrEF  EF = 68% on 8/11/23  Heart failure, CHF order set initiated  Guideline directed medical therapy as below: after med-rec completed  - Diuretic:   - BB:    - ARNI/ACE-I/ARB:  - Hydralazine/Nitrate:  - MRA:  - SGLT2:  Maintain K > 4, Mg > 2 Baseline BNP ~ 2800. BNP ~ 2800 this admission  In chronic exacerbation state with 3+ pitting edema and on diuresis  HFpEF or HFrEF  EF = 68% on 8/11/23  Heart failure, CHF order set initiated  Guideline directed medical therapy as below: after med-rec completed  - Diuretic: on bumex PO 2 mg am 1 mg pm at home. Switch to IV bumex 2 mg QD while admitted  - BB:  resume home metoprolol tatrate 25 mg BID. Switch to xl at d/c  - ARNI/ACE-I/ARB: Consider starting at d/c  - Hydralazine/Nitrate: Not indicated at this time  - MRA: Not indicated at this time  - SGLT2: Consider starting at d/c  Maintain K > 4, Mg > 2  Consult primary cardiologist

## 2023-12-20 NOTE — ED ADULT NURSE NOTE - NSFALLHARMRISKINTERV_ED_ALL_ED
Assistance OOB with selected safe patient handling equipment if applicable/Assistance with ambulation/Communicate risk of Fall with Harm to all staff, patient, and family/Monitor gait and stability/Provide visual cue: red socks, yellow wristband, yellow gown, etc/Reinforce activity limits and safety measures with patient and family/Bed in lowest position, wheels locked, appropriate side rails in place/Call bell, personal items and telephone in reach/Instruct patient to call for assistance before getting out of bed/chair/stretcher/Non-slip footwear applied when patient is off stretcher/[place] to call system/Physically safe environment - no spills, clutter or unnecessary equipment/Purposeful Proactive Rounding/Room/bathroom lighting operational, light cord in reach Assistance OOB with selected safe patient handling equipment if applicable/Assistance with ambulation/Communicate risk of Fall with Harm to all staff, patient, and family/Monitor gait and stability/Provide visual cue: red socks, yellow wristband, yellow gown, etc/Reinforce activity limits and safety measures with patient and family/Bed in lowest position, wheels locked, appropriate side rails in place/Call bell, personal items and telephone in reach/Instruct patient to call for assistance before getting out of bed/chair/stretcher/Non-slip footwear applied when patient is off stretcher/[place] to call system/Physically safe environment - no spills, clutter or unnecessary equipment/Purposeful Proactive Rounding/Room/bathroom lighting operational, light cord in reach

## 2023-12-20 NOTE — ED ADULT NURSE NOTE - OBJECTIVE STATEMENT
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 PT was recently recovering at [facility] for CHF exacerbation. PT endorsing intermittent dizziness, chills, fevers, coughing up green sputum, and chest pain that has now resolved. PT denies N/V and urinary symptoms. PT is resting comfortably in bed, breathing unlabored on room air but sating low 90's so PT placed on 2L nasal cannula per MD Goetz, and PT speaking in complete sentences. Abdomen is soft, non-tender, and non-distended. Ostomy noted to LUQ. Skin is warm and dry, no diaphoresis noted. Edema noted to B/L lower extremities. Strong strength in B/L extremities, sensation intact. IV access established 20G in left hand by EMS and 18G in right AC by [facility] ED RN. PT placed in hospital gown. EKG completed, PT placed on cardiac monitor. PT temperature 100.2 rectally. Safety and comfort maintained. Family at the bedside. 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 PT was recently recovering at [facility] for CHF exacerbation. PT endorsing intermittent dizziness, chills, fevers, coughing up green sputum, and chest pain that has now resolved. PT denies N/V and urinary symptoms. PT is resting comfortably in bed, breathing unlabored on room air but sating low 90's so PT placed on 2L nasal cannula per MD Goetz, and PT speaking in complete sentences. Abdomen is soft, non-tender, and non-distended. Ostomy noted to LUQ. Skin is warm and dry, no diaphoresis noted. Edema noted to B/L lower extremities. Strong strength in B/L extremities, sensation intact. IV access established 20G in left hand by EMS and 18G in right AC by [facility] ED RN. PT placed in hospital gown. EKG completed, PT placed on cardiac monitor. PT temperature 100.2 rectally. Safety and comfort maintained. Family at the bedside.

## 2023-12-20 NOTE — H&P ADULT - HISTORY OF PRESENT ILLNESS
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 Reports he has been having SOB for past 7 days. Yesterday morning had an episode of chest pain similar to trying to get rid of sputum but unable to. Unable to state if the chest pain is sharp or pressure like. Endorses cough with sputum. Does not endorse any fever, chills, headache, neurological deficits, nausea, vomiting, palpitations, abdominal pain, hematochezia, melena, hematemesis, diarrhea or constipation currently

## 2023-12-20 NOTE — ED PROVIDER NOTE - PHYSICAL EXAMINATION
General: Alert and Orientated x 3. On 2 L nasal cannula  Head: Normocephalic and atraumatic.  Eyes: PERRLA with EOMI.  Neck: Supple. Trachea midline.   Cardiac: Normal S1 and S2 w/ Irregularly irregular. No murmurs appreciated. No JVD appreciated.  Pulmonary: CTA bilaterally. No increased WOB. No wheezes or crackles.  Abdominal: Soft, non-tender. (+) bowel sounds appreciated in all 4 quadrants. No hepatosplenomegaly. Colostomy bag with out surrounding signs of infection, normal output.  Neurologic: No focal sensory or motor deficits.  Musculoskeletal: Strength appropriate in all 4 extremities for age with no limited ROM.  Skin: Color appropriate for race. Intact, warm, and well-perfused.  Psychiatric: Appropriate mood and affect. No apparent risk to self or others.

## 2023-12-20 NOTE — H&P ADULT - PROBLEM SELECTOR PROBLEM 1
Regarding: IL F 69 patient calling is covid-19 positive an has some question regarding taking medication   ----- Message from Krysten Perry sent at 10/31/2022  8:13 AM CDT -----  .AAHECOTRIAGE  IL F 69 patient calling is covid-19 positive an has some question regarding taking medication    Acute hypoxic respiratory failure

## 2023-12-20 NOTE — ED PROVIDER NOTE - PROGRESS NOTE DETAILS
Received signout on patient pending CT read.  CTA negative for PE, shows moderate loculated left pleural effusion and small right pleural effusion.  Most recent vitals on monitor at this time are heart rate 99, SpO2 97, respiratory rate 22, /66.  Paged hospitalist for admission. - Etienne Watson PA-C pt endorsed to hospitalist Dr. Ovalles for admission. - Etienne Watson PA-C

## 2023-12-20 NOTE — PATIENT PROFILE ADULT - MST SCORE
Refilled Midodrine per protocol per fax request.    Last filled 7/20/20  Last seen 2/21/20  Upcoming appt. 9/1/20  
0

## 2023-12-20 NOTE — CHART NOTE - NSCHARTNOTEFT_GEN_A_CORE
: Dr. Barraza, Dr. Obregon    INDICATION: pleural effusion    PROCEDURE:  [ ] LIMITED ECHO  [x] LIMITED CHEST  [ ] LIMITED RETROPERITONEAL  [ ] LIMITED ABDOMINAL  [ ] LIMITED DVT  [ ] NEEDLE GUIDANCE VASCULAR  [ ] NEEDLE GUIDANCE THORACENTESIS  [ ] NEEDLE GUIDANCE PARACENTESIS  [ ] NEEDLE GUIDANCE PERICARDIOCENTESIS  [ ] OTHER    FINDINGS:  scattered b lines bilaterally, has right small pleural effusion, moderate left complex pleural effusion    INTERPRETATION:  has safe window for thoracentesis      Images uploaded to [employer] : Dr. Barraza, Dr. Obregon    INDICATION: pleural effusion    PROCEDURE:  [ ] LIMITED ECHO  [x] LIMITED CHEST  [ ] LIMITED RETROPERITONEAL  [ ] LIMITED ABDOMINAL  [ ] LIMITED DVT  [ ] NEEDLE GUIDANCE VASCULAR  [ ] NEEDLE GUIDANCE THORACENTESIS  [ ] NEEDLE GUIDANCE PARACENTESIS  [ ] NEEDLE GUIDANCE PERICARDIOCENTESIS  [ ] OTHER    FINDINGS:  scattered b lines bilaterally, has right small pleural effusion, moderate left complex pleural effusion    INTERPRETATION:  has safe window for thoracentesis      Images uploaded to [facility] : Dr. Barraza, Dr. Obregon    INDICATION: pleural effusion    PROCEDURE:  [ ] LIMITED ECHO  [x] LIMITED CHEST  [ ] LIMITED RETROPERITONEAL  [ ] LIMITED ABDOMINAL  [ ] LIMITED DVT  [ ] NEEDLE GUIDANCE VASCULAR  [ ] NEEDLE GUIDANCE THORACENTESIS  [ ] NEEDLE GUIDANCE PARACENTESIS  [ ] NEEDLE GUIDANCE PERICARDIOCENTESIS  [ ] OTHER    FINDINGS:  scattered b lines bilaterally, has right small pleural effusion, moderate left complex pleural effusion    INTERPRETATION:  has safe window for thoracentesis      Images uploaded to [employer]    Pulmonary Attending    I personally reviewed images and agree with findings and interpretation as outlined above.

## 2023-12-20 NOTE — ED PROVIDER NOTE - CLINICAL SUMMARY MEDICAL DECISION MAKING FREE TEXT BOX
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  Had an episode of chest pain earlier yesterday morning.  Was given sublingual nitro and aspirin by EMS, no fevers.  Productive cough with green sputum.  No recent travel or sick contacts.  Is at [facility] at this time.  Patient with temp of 100.2.  In A-fib with RVR.  Normotensive.  Given new O2 demand, A-fib with RVR concern for CHF exacerbation, PE despite Eliquis, pna. Will get CTA chest, CXR, trop, bnp, cultures, empiric abx,, will slowly fluid resuscitate w/ 500 cc bolus. TBA. 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  Had an episode of chest pain earlier yesterday morning.  Was given sublingual nitro and aspirin by EMS, no fevers.  Productive cough with green sputum.  No recent travel or sick contacts.  Is at [facility] at this time.  Patient with temp of 100.2.  In A-fib with RVR.  Normotensive.  Given new O2 demand, A-fib with RVR concern for CHF exacerbation, PE despite Eliquis, pna. Will get CTA chest, CXR, trop, bnp, cultures, empiric abx,, will slowly fluid resuscitate w/ 500 cc bolus. TBA.

## 2023-12-20 NOTE — H&P ADULT - NSHPPHYSICALEXAM_GEN_ALL_CORE
PHYSICAL EXAM  Vital Signs Last 24 Hrs  T(C): 37 (20 Dec 2023 09:34), Max: 37.9 (20 Dec 2023 04:00)  T(F): 98.6 (20 Dec 2023 09:34), Max: 100.2 (20 Dec 2023 04:00)  HR: 105 (20 Dec 2023 12:18) (99 - 130)  BP: 128/61 (20 Dec 2023 12:18) (102/66 - 132/56)  BP(mean): 69 (20 Dec 2023 09:34) (69 - 76)  RR: 18 (20 Dec 2023 12:18) (18 - 36)  SpO2: 95% (20 Dec 2023 12:18) (91% - 97%)    Parameters below as of 20 Dec 2023 12:18  Patient On (Oxygen Delivery Method): nasal cannula  O2 Flow (L/min): 2      CONSTITUTIONAL: Well-groomed, in no apparent distress  EYES: No conjunctival or scleral injection, non-icteric  ENMT: No external nasal lesions; Normal outer ears  NECK: Supple; Trachea midline  RESPIRATORY: Normal respiratory effort; Decreased breathe sounds bilaterally without wheeze/rhonchi/rales;  CARDIOVASCULAR: Regular rate and rhythm, normal S1 and S2;  GASTROINTESTINAL: Non-distended; No palpable masses; No tenderness; No rebound/guarding  MUSCULOSKELETAL:  Normal gait;  EXTREMITIES:  No lower extremity edema;  NEUROLOGY: A+O to person, place, and time; Does respond to commands appropriately;  PSYCHIATRY: Mood and Affect appropriate PHYSICAL EXAM  Vital Signs Last 24 Hrs  T(C): 37 (20 Dec 2023 09:34), Max: 37.9 (20 Dec 2023 04:00)  T(F): 98.6 (20 Dec 2023 09:34), Max: 100.2 (20 Dec 2023 04:00)  HR: 105 (20 Dec 2023 12:18) (99 - 130)  BP: 128/61 (20 Dec 2023 12:18) (102/66 - 132/56)  BP(mean): 69 (20 Dec 2023 09:34) (69 - 76)  RR: 18 (20 Dec 2023 12:18) (18 - 36)  SpO2: 95% (20 Dec 2023 12:18) (91% - 97%)    Parameters below as of 20 Dec 2023 12:18  Patient On (Oxygen Delivery Method): nasal cannula  O2 Flow (L/min): 2      CONSTITUTIONAL: Well-groomed, in no apparent distress  EYES: No conjunctival or scleral injection, non-icteric  ENMT: No external nasal lesions; Normal outer ears  NECK: Supple; Trachea midline  RESPIRATORY: Normal respiratory effort; Decreased breathe sounds bilaterally without wheeze/rhonchi/rales;  CARDIOVASCULAR: Regular rate and rhythm, normal S1 and S2;  GASTROINTESTINAL: Non-distended; No palpable masses; No tenderness; No rebound/guarding  MUSCULOSKELETAL:  Normal gait;  EXTREMITIES:  Gross 3+ lower extremity edema;  NEUROLOGY: A+O to person, place, and time; Does respond to commands appropriately;  PSYCHIATRY: Mood and Affect appropriate

## 2023-12-20 NOTE — CONSULT NOTE ADULT - ASSESSMENT
#recommendations  please transition to heparin ggt, hold eliquis  needs aggressive airway clearance: humidified oxygen, standing guaifenesin, duonebs q6h, hyper sal q6h, chest PT q6 h, aerobika/acapella q6 h, deep NT suction q6 hr, chest vest q12h  please obtain if not already done  full RVP, sputum culture, blood cultures, U/A, urine legionella, nasal MRSA  pro BNP, ECHO with bubble study    titrate oxygen to O2 >88%, use humidified oxygen  incentive spirometry, OOB to chair, PT/OT  aspiration precautions as necessary  DVT ppx as tolerated  airway clearance as necessary      full consult to follow    Eugenio Barraza PGY5 PCCM Fellow 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 found to have hypoxemic respiratory failure secondary to pna vs copd vs AHRF    #recommendations    patient declining inhalers however amenable nebulizers please start standing duonebs and methylpred 40 BID starting tomorrow  patient with significant increased mucus production and sepsis please continue ceftriaxone and azithromycin  patient with increased leg swelling please start IV diuresis  POCUS with small right pleural effusion with atelectatic lung and moderate complex left pleural effusion  please transition to heparin ggt, hold eliquis, possible thoracentesis on friday  needs aggressive airway clearance: humidified oxygen, standing guaifenesin, duonebs q6h, hyper sal q6h, chest PT q6 h, aerobika/acapella q6 h, deep NT suction q6 hr, chest vest q12h  please obtain if not already done  full RVP, sputum culture, blood cultures, U/A, urine legionella, nasal MRSA  pro BNP, ECHO with bubble study    titrate oxygen to O2 >88%, use humidified oxygen  incentive spirometry, OOB to chair, PT/OT  aspiration precautions as necessary  DVT ppx as tolerated  airway clearance as necessary      full consult to follow    Eugenio Barraza PGY5 PCCM Fellow

## 2023-12-20 NOTE — ED ADULT NURSE REASSESSMENT NOTE - NS ED NURSE REASSESS COMMENT FT1
Handoff received from previous RN, pt AxO3, resting comfortably in bed, maintained on cardiac monitor and pulse ox. Breathing spontaneous and unlabored, updated on POC- awaiting CT read.

## 2023-12-20 NOTE — H&P ADULT - PROBLEM SELECTOR PLAN 1
Likely multifactorial - CAP and component of HFpEF  SIRS criteria at admission RR 36, WBC elevated  f/u blood cx, UA, urine cx, MRSA, sputum cx, procalcitonin, legionella and strep urine Ag  Start CTX + Azithromycin  Aspiration Precautions  Further plan per HFpEF below  CTPE chest showed loculated moderate L pleural effusion and small R pleural effusion  Consult pulm Likely multifactorial - CAP and component of HFpEF  SIRS criteria at admission RR 36, WBC elevated  f/u blood cx, UA, urine cx, MRSA, sputum cx, procalcitonin, legionella and strep urine Ag  Start Symbicort 2 puffs BID (standing) and Duonebs Q4-6H (Standing)  Start Duonebs PRN; Stop standing dose when appropriate  Start CTX + Azithromycin  Aspiration Precautions  Further plan per HFpEF below  CTPE chest showed loculated moderate L pleural effusion and small R pleural effusion  Consult pulm

## 2023-12-20 NOTE — CONSULT NOTE ADULT - SUBJECTIVE AND OBJECTIVE BOX
CHIEF COMPLAINT:    HPI:    PAST MEDICAL & SURGICAL HISTORY:  Asbestosis (ICD9 501)      HTN (Hypertension)      BPH (Benign Prostatic Hypertrophy)      Dyslipidemia      Localized Osteoarthrosis, Lower Leg  left      Obesity      COPD with emphysema      Pulmonary nodule      Thyroid nodule      Aortic ectasia      History of diastolic dysfunction      Venous insufficiency      S/P Cystoscopy (ICD9 V45.89)      Hyperlipidemia (ICD9 272.4)      S/P Arthroscopy of Left Knee  x 20 yrs ago      Cataract  right IOL      Inguinal Hernia x2  right      History of Tonsillectomy      History of appendectomy          FAMILY HISTORY:  No pertinent family history in first degree relatives        SOCIAL HISTORY:  Smoking: [ ] Never Smoked [ ] Former Smoker (__ packs x ___ years) [ ] Current Smoker  (__ packs x ___ years)  Substance Use: [ ] Never Used [ ] Used ____  EtOH Use:  Marital Status: [ ] Single [ ]  [ ]  [ ]   Sexual History:   Occupation:  Recent Travel:  Country of Birth:  Advance Directives:    Allergies    No Known Allergies    Intolerances        HOME MEDICATIONS:  Home Medications:  atorvastatin 10 mg oral tablet: 1 tab(s) orally once a day (09 Aug 2023 06:51)  clotrimazole-betamethasone dipropionate 1%-0.05% topical cream: Apply topically to affected area 2 times a day in the AM and PM. according to daughter, for his private part (11 Aug 2023 10:45)  finasteride 5 mg oral tablet: 1 tab(s) orally once a day (09 Aug 2023 06:51)  furosemide 80 mg oral tablet: 1 tab(s) orally once a day (23 Aug 2023 10:18)  losartan 100 mg oral tablet: 1 tab(s) orally once a day (09 Aug 2023 06:51)  Metoprolol Tartrate 100 mg oral tablet: 1 tab(s) orally once a day (09 Aug 2023 06:51)  nystatin 100,000 units/g topical powder: 1 Apply topically to affected area 2 times a day (23 Aug 2023 10:18)  oxyCODONE 5 mg oral tablet: 1 tab(s) orally every 4 hours As needed Severe Pain (7 - 10) (23 Aug 2023 10:18)  pantoprazole 40 mg oral delayed release tablet: 1 tab(s) orally every 12 hours (23 Aug 2023 10:18)  spironolactone 25 mg oral tablet: 1 tab(s) orally once a day (09 Aug 2023 06:51)  tamsulosin 0.4 mg oral capsule: 1 cap(s) orally once a day (at bedtime) (23 Aug 2023 10:18)      REVIEW OF SYSTEMS:  Constitutional: [ ] negative [ ] fevers [ ] chills [ ] weight loss [ ] weight gain  HEENT: [ ] negative [ ] dry eyes [ ] eye irritation [ ] postnasal drip [ ] nasal congestion  CV: [ ] negative  [ ] chest pain [ ] orthopnea [ ] palpitations [ ] murmur  Resp: [ ] negative [ ] cough [ ] shortness of breath [ ] dyspnea [ ] wheezing [ ] sputum [ ] hemoptysis  GI: [ ] negative [ ] nausea [ ] vomiting [ ] diarrhea [ ] constipation [ ] abd pain [ ] dysphagia   : [ ] negative [ ] dysuria [ ] nocturia [ ] hematuria [ ] increased urinary frequency  Musculoskeletal: [ ] negative [ ] back pain [ ] myalgias [ ] arthralgias [ ] fracture  Skin: [ ] negative [ ] rash [ ] itch  Neurological: [ ] negative [ ] headache [ ] dizziness [ ] syncope [ ] weakness [ ] numbness  Psychiatric: [ ] negative [ ] anxiety [ ] depression  Endocrine: [ ] negative [ ] diabetes [ ] thyroid problem  Hematologic/Lymphatic: [ ] negative [ ] anemia [ ] bleeding problem  Allergic/Immunologic: [ ] negative [ ] itchy eyes [ ] nasal discharge [ ] hives [ ] angioedema  [ ] All other systems negative  [ ] Unable to assess ROS because ________    OBJECTIVE:  ICU Vital Signs Last 24 Hrs  T(C): 37 (20 Dec 2023 09:34), Max: 37.9 (20 Dec 2023 04:00)  T(F): 98.6 (20 Dec 2023 09:34), Max: 100.2 (20 Dec 2023 04:00)  HR: 105 (20 Dec 2023 12:18) (99 - 130)  BP: 128/61 (20 Dec 2023 12:18) (102/66 - 132/56)  BP(mean): 69 (20 Dec 2023 09:34) (69 - 76)  ABP: --  ABP(mean): --  RR: 18 (20 Dec 2023 12:18) (18 - 36)  SpO2: 95% (20 Dec 2023 12:18) (91% - 97%)    O2 Parameters below as of 20 Dec 2023 12:18  Patient On (Oxygen Delivery Method): nasal cannula  O2 Flow (L/min): 2            CAPILLARY BLOOD GLUCOSE          PHYSICAL EXAM:  General: awake and alert, nontoxic appearing *** lying in bed  HEENT: NC/AT, EOMI b/l, conjunctiva normal, MMM  Lymph Nodes: no cervical LAD  Neck: supple. full range of motion  Respiratory: CTA b/l, no w/r/c, appears comfortable on ***, no conversational dyspnea or accessory muscle use  Cardiovascular: S1 S2 present, RRR, no m/r/g  Abdomen: soft, NT/ND, +BS  Extremities: no c/c/e  Skin: no rashes or lesions noted  Neurological: AAOx3, no focal deficits  Psychiatry: calm, cooperative    LINES:     HOSPITAL MEDICATIONS:  Standing Meds:      PRN Meds:      LABS:                        8.3    16.93 )-----------( 516      ( 20 Dec 2023 04:27 )             28.1     Hgb Trend: 8.3<--  12-20    137  |  98  |  33<H>  ----------------------------<  130<H>  3.6   |  26  |  1.28    Ca    8.9      20 Dec 2023 04:27    TPro  6.8  /  Alb  2.9<L>  /  TBili  0.9  /  DBili  x   /  AST  21  /  ALT  10  /  AlkPhos  104  12-20    Creatinine Trend: 1.28<--  PT/INR - ( 20 Dec 2023 04:27 )   PT: 24.0 sec;   INR: 2.23 ratio         PTT - ( 20 Dec 2023 04:27 )  PTT:37.3 sec  Urinalysis Basic - ( 20 Dec 2023 04:49 )    Color: Yellow / Appearance: Turbid / S.018 / pH: x  Gluc: x / Ketone: Negative mg/dL  / Bili: Negative / Urobili: 0.2 mg/dL   Blood: x / Protein: 30 mg/dL / Nitrite: Negative   Leuk Esterase: Large / RBC: 6 /HPF /  /HPF   Sq Epi: x / Non Sq Epi: 1 /HPF / Bacteria: Few /HPF        Venous Blood Gas:   @ 05:33  7.41/44/49/28/74.5  VBG Lactate: 1.6  Venous Blood Gas:   @ 04:00  7.37/50/23/29/25.6  VBG Lactate: 3.7      MICROBIOLOGY:       RADIOLOGY:  [ ] Reviewed and interpreted by me    PULMONARY FUNCTION TESTS:    EKG:   CHIEF COMPLAINT:    HPI:  HPI:  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 Reports he has been having SOB for past 7 days. Yesterday morning had an episode of chest pain similar to trying to get rid of sputum but unable to. Unable to state if the chest pain is sharp or pressure like. Endorses cough with sputum. Does not endorse any fever, chills, headache, neurological deficits, nausea, vomiting, palpitations, abdominal pain, hematochezia, melena, hematemesis, diarrhea or constipation currently (20 Dec 2023 14:30)    Follows with Dr. Thomas. former smoker, does not use inhalers at baseline carries diagnosis of COPD not on steroids chronically. has chronic productive cough which has worsened no hemopytsis. has leg swellingm no orthopnea although significant STEARNS.    no family history of blood clots, AI disease or lung cancer    PAST MEDICAL & SURGICAL HISTORY:  Asbestosis (ICD9 501)      HTN (Hypertension)      BPH (Benign Prostatic Hypertrophy)      Dyslipidemia      Localized Osteoarthrosis, Lower Leg  left      Obesity      COPD with emphysema      Pulmonary nodule      Thyroid nodule      Aortic ectasia      History of diastolic dysfunction      Venous insufficiency      S/P Cystoscopy (ICD9 V45.89)      Hyperlipidemia (ICD9 272.4)      S/P Arthroscopy of Left Knee  x 20 yrs ago      Cataract  right IOL      Inguinal Hernia x2  right      History of Tonsillectomy      History of appendectomy          FAMILY HISTORY:  No pertinent family history in first degree relatives        SOCIAL HISTORY:  former smoker    Allergies    No Known Allergies    Intolerances        HOME MEDICATIONS:  Home Medications:  atorvastatin 10 mg oral tablet: 1 tab(s) orally once a day (09 Aug 2023 06:51)  clotrimazole-betamethasone dipropionate 1%-0.05% topical cream: Apply topically to affected area 2 times a day in the AM and PM. according to daughter, for his private part (11 Aug 2023 10:45)  finasteride 5 mg oral tablet: 1 tab(s) orally once a day (09 Aug 2023 06:51)  furosemide 80 mg oral tablet: 1 tab(s) orally once a day (23 Aug 2023 10:18)  losartan 100 mg oral tablet: 1 tab(s) orally once a day (09 Aug 2023 06:51)  Metoprolol Tartrate 100 mg oral tablet: 1 tab(s) orally once a day (09 Aug 2023 06:51)  nystatin 100,000 units/g topical powder: 1 Apply topically to affected area 2 times a day (23 Aug 2023 10:18)  oxyCODONE 5 mg oral tablet: 1 tab(s) orally every 4 hours As needed Severe Pain (7 - 10) (23 Aug 2023 10:18)  pantoprazole 40 mg oral delayed release tablet: 1 tab(s) orally every 12 hours (23 Aug 2023 10:18)  spironolactone 25 mg oral tablet: 1 tab(s) orally once a day (09 Aug 2023 06:51)  tamsulosin 0.4 mg oral capsule: 1 cap(s) orally once a day (at bedtime) (23 Aug 2023 10:18)      REVIEW OF SYSTEMS:  Patient denies fevers, chills, chest pain, nausea, abdominal pain, diarrhea, constipation, dysuria, headache, light headedness    OBJECTIVE:  ICU Vital Signs Last 24 Hrs  T(C): 37 (20 Dec 2023 09:34), Max: 37.9 (20 Dec 2023 04:00)  T(F): 98.6 (20 Dec 2023 09:34), Max: 100.2 (20 Dec 2023 04:00)  HR: 105 (20 Dec 2023 12:18) (99 - 130)  BP: 128/61 (20 Dec 2023 12:18) (102/66 - 132/56)  BP(mean): 69 (20 Dec 2023 09:34) (69 - 76)  ABP: --  ABP(mean): --  RR: 18 (20 Dec 2023 12:18) (18 - 36)  SpO2: 95% (20 Dec 2023 12:18) (91% - 97%)    O2 Parameters below as of 20 Dec 2023 12:18  Patient On (Oxygen Delivery Method): nasal cannula  O2 Flow (L/min): 2            CAPILLARY BLOOD GLUCOSE          PHYSICAL EXAM:  General: awake and alert, nontoxic appearing *** lying in bed  HEENT: NC/AT, EOMI b/l, conjunctiva normal, MMM  Lymph Nodes: no cervical LAD  Neck: supple. full range of motion  Respiratory: rhonchi and wheezing  Cardiovascular: S1 S2 present, RRR, no m/r/g  Abdomen: soft, NT/ND, +BS. has ostomy   Extremities: no c/c. bilateral lower leg edema   Skin: no rashes or lesions noted  Neurological: AAOx3, no focal deficits  Psychiatry: calm, cooperative    LINES:     HOSPITAL MEDICATIONS:  Standing Meds:      PRN Meds:      LABS:                        8.3    16.93 )-----------( 516      ( 20 Dec 2023 04:27 )             28.1     Hgb Trend: 8.3<--  12    137  |  98  |  33<H>  ----------------------------<  130<H>  3.6   |  26  |  1.28    Ca    8.9      20 Dec 2023 04:27    TPro  6.8  /  Alb  2.9<L>  /  TBili  0.9  /  DBili  x   /  AST  21  /  ALT  10  /  AlkPhos  104  12-    Creatinine Trend: 1.28<--  PT/INR - ( 20 Dec 2023 04:27 )   PT: 24.0 sec;   INR: 2.23 ratio         PTT - ( 20 Dec 2023 04:27 )  PTT:37.3 sec  Urinalysis Basic - ( 20 Dec 2023 04:49 )    Color: Yellow / Appearance: Turbid / S.018 / pH: x  Gluc: x / Ketone: Negative mg/dL  / Bili: Negative / Urobili: 0.2 mg/dL   Blood: x / Protein: 30 mg/dL / Nitrite: Negative   Leuk Esterase: Large / RBC: 6 /HPF /  /HPF   Sq Epi: x / Non Sq Epi: 1 /HPF / Bacteria: Few /HPF        Venous Blood Gas:   @ 05:33  7.41/44/49/28/74.5  VBG Lactate: 1.6  Venous Blood Gas:   @ 04:00  7.37/50/23/29/25.6  VBG Lactate: 3.7      MICROBIOLOGY:       RADIOLOGY:  [ ] Reviewed and interpreted by me    PULMONARY FUNCTION TESTS:    EKG:

## 2023-12-20 NOTE — ED ADULT NURSE NOTE - NSSUHOSCREENINGYN_ED_ALL_ED
Yes - the patient is able to be screened Detail Level: Zone Render In Strict Bullet Format?: No Continue Regimen: topical clindamycin and Differin at bedtime. Initiate Treatment: minocycline 100 mg capsule\\nSig: Take one pill daily with a full glass of water. Do not lie down 1 hour after taking.

## 2023-12-20 NOTE — PATIENT PROFILE ADULT - FALL HARM RISK - HARM RISK INTERVENTIONS
Assistance with ambulation/Assistance OOB with selected safe patient handling equipment/Communicate Risk of Fall with Harm to all staff/Reinforce activity limits and safety measures with patient and family/Tailored Fall Risk Interventions/Visual Cue: Yellow wristband and red socks/Bed in lowest position, wheels locked, appropriate side rails in place/Call bell, personal items and telephone in reach/Instruct patient to call for assistance before getting out of bed or chair/Non-slip footwear when patient is out of bed/[place] to call system/Physically safe environment - no spills, clutter or unnecessary equipment/Purposeful Proactive Rounding/Room/bathroom lighting operational, light cord in reach Assistance with ambulation/Assistance OOB with selected safe patient handling equipment/Communicate Risk of Fall with Harm to all staff/Reinforce activity limits and safety measures with patient and family/Tailored Fall Risk Interventions/Visual Cue: Yellow wristband and red socks/Bed in lowest position, wheels locked, appropriate side rails in place/Call bell, personal items and telephone in reach/Instruct patient to call for assistance before getting out of bed or chair/Non-slip footwear when patient is out of bed/[place] to call system/Physically safe environment - no spills, clutter or unnecessary equipment/Purposeful Proactive Rounding/Room/bathroom lighting operational, light cord in reach

## 2023-12-20 NOTE — H&P ADULT - TIME BILLING
19 minutes spent on Goals of Care conversation and additional 81 minutes spent on patient care for following:  - Ordering, reviewing, and/or interpreting labs, testing, and/or imaging  - Independently obtaining a review of systems and performing a physical exam  - Reviewing prior records and where necessary, outpatient records  - Counselling and educating patient and/or family regarding interpretation of aforementioned items and plan of care

## 2023-12-20 NOTE — H&P ADULT - ASSESSMENT
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    #Acute hypercapnic respiratory failure secondary to COPD Exacerbation  - Likely trigger:   - Exacerbations in the last year:  - Severity of this exacerbation:   - ABG on admission:  - CXR on admission:   - Start Solumedrol 60mg IV Q12H; Taper to PO Prednisone with improved wheezing  - Start Symbicort 2 puffs BID (standing) and Duonebs Q4-6H (Standing)  - Start Duonebs PRN; Stop standing dose when appropriate  - Continue supplemental O2 via NC; Taper to baseline  - SpO2 goal >88 - 92%; Avoid hyperoxia   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

## 2023-12-21 DIAGNOSIS — Z29.9 ENCOUNTER FOR PROPHYLACTIC MEASURES, UNSPECIFIED: ICD-10-CM

## 2023-12-21 LAB
ALBUMIN SERPL ELPH-MCNC: 2.6 G/DL — LOW (ref 3.3–5)
ALBUMIN SERPL ELPH-MCNC: 2.6 G/DL — LOW (ref 3.3–5)
ALP SERPL-CCNC: 97 U/L — SIGNIFICANT CHANGE UP (ref 40–120)
ALP SERPL-CCNC: 97 U/L — SIGNIFICANT CHANGE UP (ref 40–120)
ALT FLD-CCNC: 11 U/L — SIGNIFICANT CHANGE UP (ref 10–45)
ALT FLD-CCNC: 11 U/L — SIGNIFICANT CHANGE UP (ref 10–45)
ANION GAP SERPL CALC-SCNC: 15 MMOL/L — SIGNIFICANT CHANGE UP (ref 5–17)
ANION GAP SERPL CALC-SCNC: 15 MMOL/L — SIGNIFICANT CHANGE UP (ref 5–17)
APTT BLD: 54 SEC — HIGH (ref 24.5–35.6)
APTT BLD: 54 SEC — HIGH (ref 24.5–35.6)
APTT BLD: 58.4 SEC — HIGH (ref 24.5–35.6)
APTT BLD: 58.4 SEC — HIGH (ref 24.5–35.6)
APTT BLD: 90.6 SEC — HIGH (ref 24.5–35.6)
APTT BLD: 90.6 SEC — HIGH (ref 24.5–35.6)
AST SERPL-CCNC: 18 U/L — SIGNIFICANT CHANGE UP (ref 10–40)
AST SERPL-CCNC: 18 U/L — SIGNIFICANT CHANGE UP (ref 10–40)
BILIRUB SERPL-MCNC: 0.6 MG/DL — SIGNIFICANT CHANGE UP (ref 0.2–1.2)
BILIRUB SERPL-MCNC: 0.6 MG/DL — SIGNIFICANT CHANGE UP (ref 0.2–1.2)
BUN SERPL-MCNC: 38 MG/DL — HIGH (ref 7–23)
BUN SERPL-MCNC: 38 MG/DL — HIGH (ref 7–23)
CALCIUM SERPL-MCNC: 9 MG/DL — SIGNIFICANT CHANGE UP (ref 8.4–10.5)
CALCIUM SERPL-MCNC: 9 MG/DL — SIGNIFICANT CHANGE UP (ref 8.4–10.5)
CHLORIDE SERPL-SCNC: 96 MMOL/L — SIGNIFICANT CHANGE UP (ref 96–108)
CHLORIDE SERPL-SCNC: 96 MMOL/L — SIGNIFICANT CHANGE UP (ref 96–108)
CO2 SERPL-SCNC: 26 MMOL/L — SIGNIFICANT CHANGE UP (ref 22–31)
CO2 SERPL-SCNC: 26 MMOL/L — SIGNIFICANT CHANGE UP (ref 22–31)
CREAT SERPL-MCNC: 1.62 MG/DL — HIGH (ref 0.5–1.3)
CREAT SERPL-MCNC: 1.62 MG/DL — HIGH (ref 0.5–1.3)
EGFR: 41 ML/MIN/1.73M2 — LOW
EGFR: 41 ML/MIN/1.73M2 — LOW
GLUCOSE BLDC GLUCOMTR-MCNC: 263 MG/DL — HIGH (ref 70–99)
GLUCOSE BLDC GLUCOMTR-MCNC: 263 MG/DL — HIGH (ref 70–99)
GLUCOSE BLDC GLUCOMTR-MCNC: 267 MG/DL — HIGH (ref 70–99)
GLUCOSE BLDC GLUCOMTR-MCNC: 267 MG/DL — HIGH (ref 70–99)
GLUCOSE SERPL-MCNC: 89 MG/DL — SIGNIFICANT CHANGE UP (ref 70–99)
GLUCOSE SERPL-MCNC: 89 MG/DL — SIGNIFICANT CHANGE UP (ref 70–99)
GRAM STN FLD: ABNORMAL
HCT VFR BLD CALC: 25.8 % — LOW (ref 39–50)
HCT VFR BLD CALC: 25.8 % — LOW (ref 39–50)
HGB BLD-MCNC: 7.7 G/DL — LOW (ref 13–17)
HGB BLD-MCNC: 7.7 G/DL — LOW (ref 13–17)
LEGIONELLA AG UR QL: NEGATIVE — SIGNIFICANT CHANGE UP
LEGIONELLA AG UR QL: NEGATIVE — SIGNIFICANT CHANGE UP
MCHC RBC-ENTMCNC: 21.3 PG — LOW (ref 27–34)
MCHC RBC-ENTMCNC: 21.3 PG — LOW (ref 27–34)
MCHC RBC-ENTMCNC: 29.8 GM/DL — LOW (ref 32–36)
MCHC RBC-ENTMCNC: 29.8 GM/DL — LOW (ref 32–36)
MCV RBC AUTO: 71.5 FL — LOW (ref 80–100)
MCV RBC AUTO: 71.5 FL — LOW (ref 80–100)
METHOD TYPE: SIGNIFICANT CHANGE UP
METHOD TYPE: SIGNIFICANT CHANGE UP
MRSA PCR RESULT.: SIGNIFICANT CHANGE UP
MRSA PCR RESULT.: SIGNIFICANT CHANGE UP
NRBC # BLD: 0 /100 WBCS — SIGNIFICANT CHANGE UP (ref 0–0)
NRBC # BLD: 0 /100 WBCS — SIGNIFICANT CHANGE UP (ref 0–0)
PLATELET # BLD AUTO: 475 K/UL — HIGH (ref 150–400)
PLATELET # BLD AUTO: 475 K/UL — HIGH (ref 150–400)
POTASSIUM SERPL-MCNC: 3.7 MMOL/L — SIGNIFICANT CHANGE UP (ref 3.5–5.3)
POTASSIUM SERPL-MCNC: 3.7 MMOL/L — SIGNIFICANT CHANGE UP (ref 3.5–5.3)
POTASSIUM SERPL-SCNC: 3.7 MMOL/L — SIGNIFICANT CHANGE UP (ref 3.5–5.3)
POTASSIUM SERPL-SCNC: 3.7 MMOL/L — SIGNIFICANT CHANGE UP (ref 3.5–5.3)
PROCALCITONIN SERPL-MCNC: 0.71 NG/ML — HIGH (ref 0.02–0.1)
PROCALCITONIN SERPL-MCNC: 0.71 NG/ML — HIGH (ref 0.02–0.1)
PROT SERPL-MCNC: 6.3 G/DL — SIGNIFICANT CHANGE UP (ref 6–8.3)
PROT SERPL-MCNC: 6.3 G/DL — SIGNIFICANT CHANGE UP (ref 6–8.3)
RAPID RVP RESULT: DETECTED
RAPID RVP RESULT: DETECTED
RBC # BLD: 3.61 M/UL — LOW (ref 4.2–5.8)
RBC # BLD: 3.61 M/UL — LOW (ref 4.2–5.8)
RBC # FLD: 19.4 % — HIGH (ref 10.3–14.5)
RBC # FLD: 19.4 % — HIGH (ref 10.3–14.5)
RV+EV RNA SPEC QL NAA+PROBE: DETECTED
RV+EV RNA SPEC QL NAA+PROBE: DETECTED
S AUREUS DNA NOSE QL NAA+PROBE: DETECTED
S AUREUS DNA NOSE QL NAA+PROBE: DETECTED
S PNEUM AG UR QL: NEGATIVE — SIGNIFICANT CHANGE UP
S PNEUM AG UR QL: NEGATIVE — SIGNIFICANT CHANGE UP
S PNEUM DNA BLD POS QL NAA+NON-PROBE: SIGNIFICANT CHANGE UP
S PNEUM DNA BLD POS QL NAA+NON-PROBE: SIGNIFICANT CHANGE UP
SARS-COV-2 RNA SPEC QL NAA+PROBE: SIGNIFICANT CHANGE UP
SARS-COV-2 RNA SPEC QL NAA+PROBE: SIGNIFICANT CHANGE UP
SODIUM SERPL-SCNC: 137 MMOL/L — SIGNIFICANT CHANGE UP (ref 135–145)
SODIUM SERPL-SCNC: 137 MMOL/L — SIGNIFICANT CHANGE UP (ref 135–145)
SPECIMEN SOURCE: SIGNIFICANT CHANGE UP
WBC # BLD: 13.94 K/UL — HIGH (ref 3.8–10.5)
WBC # BLD: 13.94 K/UL — HIGH (ref 3.8–10.5)
WBC # FLD AUTO: 13.94 K/UL — HIGH (ref 3.8–10.5)
WBC # FLD AUTO: 13.94 K/UL — HIGH (ref 3.8–10.5)

## 2023-12-21 PROCEDURE — 99233 SBSQ HOSP IP/OBS HIGH 50: CPT | Mod: GC

## 2023-12-21 PROCEDURE — 99223 1ST HOSP IP/OBS HIGH 75: CPT

## 2023-12-21 PROCEDURE — 93306 TTE W/DOPPLER COMPLETE: CPT | Mod: 26

## 2023-12-21 PROCEDURE — 99233 SBSQ HOSP IP/OBS HIGH 50: CPT

## 2023-12-21 RX ORDER — CEFTRIAXONE 500 MG/1
2000 INJECTION, POWDER, FOR SOLUTION INTRAMUSCULAR; INTRAVENOUS EVERY 24 HOURS
Refills: 0 | Status: COMPLETED | OUTPATIENT
Start: 2023-12-21 | End: 2023-12-28

## 2023-12-21 RX ORDER — MUPIROCIN 20 MG/G
1 OINTMENT TOPICAL
Refills: 0 | Status: COMPLETED | OUTPATIENT
Start: 2023-12-21 | End: 2023-12-26

## 2023-12-21 RX ORDER — CHLORHEXIDINE GLUCONATE 213 G/1000ML
1 SOLUTION TOPICAL
Refills: 0 | Status: DISCONTINUED | OUTPATIENT
Start: 2023-12-21 | End: 2024-01-02

## 2023-12-21 RX ORDER — ASCORBIC ACID 60 MG
500 TABLET,CHEWABLE ORAL DAILY
Refills: 0 | Status: DISCONTINUED | OUTPATIENT
Start: 2023-12-21 | End: 2024-01-02

## 2023-12-21 RX ORDER — AZITHROMYCIN 500 MG/1
500 TABLET, FILM COATED ORAL EVERY 24 HOURS
Refills: 0 | Status: COMPLETED | OUTPATIENT
Start: 2023-12-21 | End: 2023-12-22

## 2023-12-21 RX ADMIN — HEPARIN SODIUM 2000 UNIT(S)/HR: 5000 INJECTION INTRAVENOUS; SUBCUTANEOUS at 23:35

## 2023-12-21 RX ADMIN — HEPARIN SODIUM 2000 UNIT(S)/HR: 5000 INJECTION INTRAVENOUS; SUBCUTANEOUS at 19:23

## 2023-12-21 RX ADMIN — HEPARIN SODIUM 2000 UNIT(S)/HR: 5000 INJECTION INTRAVENOUS; SUBCUTANEOUS at 20:19

## 2023-12-21 RX ADMIN — Medication 40 MILLIGRAM(S): at 17:12

## 2023-12-21 RX ADMIN — Medication 3 MILLILITER(S): at 06:18

## 2023-12-21 RX ADMIN — CEFTRIAXONE 100 MILLIGRAM(S): 500 INJECTION, POWDER, FOR SOLUTION INTRAMUSCULAR; INTRAVENOUS at 15:10

## 2023-12-21 RX ADMIN — HEPARIN SODIUM 2000 UNIT(S)/HR: 5000 INJECTION INTRAVENOUS; SUBCUTANEOUS at 11:40

## 2023-12-21 RX ADMIN — HEPARIN SODIUM 1800 UNIT(S)/HR: 5000 INJECTION INTRAVENOUS; SUBCUTANEOUS at 04:26

## 2023-12-21 RX ADMIN — BUMETANIDE 2 MILLIGRAM(S): 0.25 INJECTION INTRAMUSCULAR; INTRAVENOUS at 08:15

## 2023-12-21 RX ADMIN — Medication 3 MILLILITER(S): at 17:11

## 2023-12-21 RX ADMIN — AZITHROMYCIN 255 MILLIGRAM(S): 500 TABLET, FILM COATED ORAL at 22:00

## 2023-12-21 RX ADMIN — CHLORHEXIDINE GLUCONATE 1 APPLICATION(S): 213 SOLUTION TOPICAL at 17:12

## 2023-12-21 RX ADMIN — TAMSULOSIN HYDROCHLORIDE 0.4 MILLIGRAM(S): 0.4 CAPSULE ORAL at 22:00

## 2023-12-21 RX ADMIN — Medication 60 MILLIGRAM(S): at 06:17

## 2023-12-21 RX ADMIN — BUDESONIDE AND FORMOTEROL FUMARATE DIHYDRATE 2 PUFF(S): 160; 4.5 AEROSOL RESPIRATORY (INHALATION) at 06:17

## 2023-12-21 RX ADMIN — HEPARIN SODIUM 2000 UNIT(S)/HR: 5000 INJECTION INTRAVENOUS; SUBCUTANEOUS at 19:25

## 2023-12-21 RX ADMIN — Medication 25 MILLIGRAM(S): at 17:11

## 2023-12-21 RX ADMIN — BUDESONIDE AND FORMOTEROL FUMARATE DIHYDRATE 2 PUFF(S): 160; 4.5 AEROSOL RESPIRATORY (INHALATION) at 17:12

## 2023-12-21 RX ADMIN — MUPIROCIN 1 APPLICATION(S): 20 OINTMENT TOPICAL at 17:12

## 2023-12-21 RX ADMIN — HEPARIN SODIUM 1800 UNIT(S)/HR: 5000 INJECTION INTRAVENOUS; SUBCUTANEOUS at 07:26

## 2023-12-21 RX ADMIN — PANTOPRAZOLE SODIUM 40 MILLIGRAM(S): 20 TABLET, DELAYED RELEASE ORAL at 06:18

## 2023-12-21 RX ADMIN — ATORVASTATIN CALCIUM 10 MILLIGRAM(S): 80 TABLET, FILM COATED ORAL at 22:00

## 2023-12-21 RX ADMIN — Medication 25 MILLIGRAM(S): at 06:18

## 2023-12-21 RX ADMIN — Medication 3 MILLILITER(S): at 11:59

## 2023-12-21 RX ADMIN — FINASTERIDE 5 MILLIGRAM(S): 5 TABLET, FILM COATED ORAL at 11:59

## 2023-12-21 NOTE — DIETITIAN INITIAL EVALUATION ADULT - PERSON TAUGHT/METHOD
Provided education on high-protein diet for wound healing and preservation of lean body mass. Encouraged consumption of HBV foods, prioritizing protein foods first at meal times, and importance of  meeting adequate protein-energy needs.     Patient was visited by RD for STAR CHF education. Heart failure education provided to the patient in detail. Discussed heart failure nutrition therapy, sodium and fluid intake, importance of diet adherence, daily weights monitoring with the patient. Reinforced importance of weight gain parameters and importance of contacting MD’s about weight changes. Provided handouts on heart failure nutrition therapy, reading heart healthy nutrition labels, heart healthy shopping tips and sodium (salt) content of foods. Patient verbalized understanding demonstrated by teach back method. Pt left with no further questions, aware RD remains available./verbal instruction/written material/patient instructed

## 2023-12-21 NOTE — DIETITIAN INITIAL EVALUATION ADULT - REASON
Nutrition focused physical exam deferred at this time as pt is eating well and has not reported any recent significant weight changes. Endorsed stable weight ~216 pounds.

## 2023-12-21 NOTE — PHARMACOTHERAPY INTERVENTION NOTE - COMMENTS
Recommended to discontinue azithromycin since patient blood culture came back positive for Streptococcus pneumoniae and patient is already covered on ceftriaxone source PNA.    Aby Guerra, PharmD   Clinical Pharmacy Specialist, Infectious Diseases  Tele-Antimicrobial Stewardship Program (Tele-ASP)  Tele-ASP Phone: [phone]   Recommended to discontinue azithromycin since patient blood culture came back positive for Streptococcus pneumoniae and patient is already covered on ceftriaxone source PNA.    Aby Guerra, PharmD   Clinical Pharmacy Specialist, Infectious Diseases  Tele-Antimicrobial Stewardship Program (Tele-ASP)  Tele-ASP Phone: [phone]

## 2023-12-21 NOTE — PHARMACOTHERAPY INTERVENTION NOTE - COMMENTS
Recommended to adjust ceftriaxone from 1g q24h to 2g q24h for patient with Streptococcus pneumoniae bacteremia.     Aby Guerra, PharmD   Clinical Pharmacy Specialist, Infectious Diseases  Tele-Antimicrobial Stewardship Program (Tele-[facility])  Tele-ASP Phone: [phone]   Recommended to adjust ceftriaxone from 1g q24h to 2g q24h for patient with Streptococcus pneumoniae bacteremia.     Aby Guerra, PharmD   Clinical Pharmacy Specialist, Infectious Diseases  Tele-Antimicrobial Stewardship Program (Tele-ASP)  Tele-ASP Phone: [phone]   Recommended to adjust ceftriaxone from 1g q24h to 2g q24h for patient with Streptococcus pneumoniae bacteremia. Source PNA.     Aby Guerra PharmD   Clinical Pharmacy Specialist, Infectious Diseases  Tele-Antimicrobial Stewardship Program (Tele-ASP)  Tele-ASP Phone: [phone]   Recommended to adjust ceftriaxone from 1g q24h to 2g q24h for patient with Streptococcus pneumoniae bacteremia. Source PNA.     Aby Guerra PharmD   Clinical Pharmacy Specialist, Infectious Diseases  Tele-Antimicrobial Stewardship Program (Tele-ASP)  Tele-ASP Phone: [phone]

## 2023-12-21 NOTE — CONSULT NOTE ADULT - ASSESSMENT
Pneumococcal pneumonia with septicemia  New large pleural effusion. Had some chest pain prior to coming to the hospital, now resolved  Concur with plan for thoracocentesis to assess for empyema  Continue ceftriaxone  F/U sensi of blood isolate  Would repeat blood cx to document control of bacteremia, or lack thereof  Pleural fluid for pH cell count, differential, LDH, protein, glucose, gram stain/cx  F/U ECHO results  COPD management as per pulmonary and primary team  Reviewed with Dr. Clark  Thank you for the courtesy of this referral.  Markel Morales MD  Attending Physician  [facility]  Division of Infectious Diseases  [phone] Pneumococcal pneumonia with septicemia  New large pleural effusion. Had some chest pain prior to coming to the hospital, now resolved  Concur with plan for thoracocentesis to assess for empyema  Continue ceftriaxone  F/U sensi of blood isolate  Would repeat blood cx to document control of bacteremia, or lack thereof  Pleural fluid for pH cell count, differential, LDH, protein, glucose, gram stain/cx  F/U ECHO results  COPD management as per pulmonary and primary team  Reviewed with Dr. Clark  Thank you for the courtesy of this referral.  Mrakel Morales MD  Attending Physician  [facility]  Division of Infectious Diseases  [phone]

## 2023-12-21 NOTE — CHART NOTE - NSCHARTNOTEFT_GEN_A_CORE
RD CHF education chart note    Patient was visited by RD for CHF education. Heart failure education provided to the patient in detail. Discussed heart failure nutrition therapy, sodium and fluid intake, importance of diet adherence, daily weights monitoring with the patient. Reinforced importance of weight gain parameters and importance of contacting MD’s about weight changes. Provided handouts on heart failure nutrition therapy, reading heart healthy nutrition labels, heart healthy shopping tips and low sodium food list. Patient verbalized understanding demonstrated by teach back method. RD contact information left with patient for any future questioning.    RD remains available and will follow up as able per protocol.   Elina Rascon, SOLISN, CDN  TEAMS

## 2023-12-21 NOTE — DIETITIAN INITIAL EVALUATION ADULT - OTHER CALCULATIONS
Estimated protein-energy needs calculated using IBW of 172 pounds with consideration for 3+ and 2+ edema, CHF exacerbation, and multiple deep tissue pressure injuries.   Defer fluid calculations to the medical team.

## 2023-12-21 NOTE — DIETITIAN INITIAL EVALUATION ADULT - SIGNS/SYMPTOMS
as evidenced by consult for STAR education.  as evidenced by pt with multiple deep tissue pressure injuries and CHF exacerbation.  as evidenced by pt being treated for acute on chronic heart failure exacerbation.

## 2023-12-21 NOTE — DIETITIAN INITIAL EVALUATION ADULT - ADD RECOMMEND
1) Recommend continue DASH/TLC diet as tolerated. Defer diet texture to medical team.   2) Recommend Liquid Protein Supplement 2x/day to optimize protein intake and assist with wound healing.  3) Recommend Multivitamin and Vitamin C daily for micronutrient coverage and for wound healing unless contraindicated.  4) Monitor and encourage adequate PO intake, obtain food preferences and honor as able.   5) Monitor glucose levels in setting of steroid use. Obtain updated A1C as medically feasible.   6) Monitor adherence to nutrition-related recommendations.   7) Monitor nutritional intake, tolerance to diet prescription, weights, labs, and skin integrity.

## 2023-12-21 NOTE — ADVANCED PRACTICE NURSE CONSULT - REASON FOR CONSULT
Consult to assess patient skin initiated by RN for sacrum deep tissue injury , present on admission.   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 Reports he has been having SOB for past 7 days. Yesterday morning had an episode of chest pain similar to trying to get rid of sputum but unable to. Unable to state if the chest pain is sharp or pressure like. Endorses cough with sputum. Does not endorse any fever, chills, headache, neurological deficits, nausea, vomiting, palpitations, abdominal pain, hematochezia, melena, hematemesis, diarrhea or constipation currently

## 2023-12-21 NOTE — DIETITIAN INITIAL EVALUATION ADULT - NSFNSADHERENCEPTAFT_GEN_A_CORE
Prior to admission, pt stated he followed a regular diet at home, limited his salt intake. Pt stated his aide weighs him daily at home. Pt reported no changes in appetite or PO intake prior to admission.

## 2023-12-21 NOTE — DIETITIAN INITIAL EVALUATION ADULT - PROBLEM SELECTOR PLAN 2
Baseline BNP ~ 2800. BNP ~ 2800 this admission  In chronic exacerbation state with 3+ pitting edema and on diuresis  HFpEF or HFrEF  EF = 68% on 8/11/23  Heart failure, CHF order set initiated  Guideline directed medical therapy as below: after med-rec completed  - Diuretic: on bumex PO 2 mg am 1 mg pm at home. Switch to IV bumex 2 mg QD while admitted  - BB:  resume home metoprolol tatrate 25 mg BID. Switch to xl at d/c  - ARNI/ACE-I/ARB: Consider starting at d/c  - Hydralazine/Nitrate: Not indicated at this time  - MRA: Not indicated at this time  - SGLT2: Consider starting at d/c  Maintain K > 4, Mg > 2  Consult primary cardiologist

## 2023-12-21 NOTE — DIETITIAN INITIAL EVALUATION ADULT - NS FNS REASON FOR WEIGHT CHANG
Pt with possible 35.5 pound weight loss x ~12 months (14%), not clinically significant at this time. Pt reported good appetite and PO intake prior to admission, endorsed stable weights ~216 pounds, no recent significant changes. RD to continue to monitor weights and trends as able.

## 2023-12-21 NOTE — DIETITIAN INITIAL EVALUATION ADULT - NSFNSGIIOFT_GEN_A_CORE
Pt reported no nausea or vomiting at this time.  - Note: Pt is ordered for Protonix (per orders).  Pt reported no GI distress. Pt with colostomy (per chart).   - Bowel Regimen: Pt not currently ordered for a bowel regimen at this time.       12-21-23 @ 07:01  -  12-21-23 @ 15:43  --------------------------------------------------------  OUT:    Colostomy (mL): 1 mL  Total OUT: 1 mL    Total NET: -1 mL

## 2023-12-21 NOTE — DIETITIAN INITIAL EVALUATION ADULT - ORAL INTAKE PTA/DIET HISTORY
Nutrition assessment completed at bedside. Pt reported good appetite and PO intake prior to admission, no recent significant changes. Pt reported UBW of ~216 pounds, reported no recent significant weight changes. Pt confirmed no known food allergies.

## 2023-12-21 NOTE — DIETITIAN INITIAL EVALUATION ADULT - ENERGY INTAKE
Fair (50-75%) Currently in-house, pt reported fair appetite and PO intake. Pt stated he is eating "enough." Limited intake available to assess in flow sheets. RD offered to obtain food preferences to optimize PO intake, pt declined. RD offered to provide pt with a menu to optimize protein-energy intake and assist with food preferences, pt declined. Pt amenable to receiving Liquid Protein Supplement 2x/day in-house to optimize protein intake and to aide in wound healing.

## 2023-12-21 NOTE — DIETITIAN INITIAL EVALUATION ADULT - REASON INDICATOR FOR ASSESSMENT
RD consult warranted for Pressure Injury Stage 2 or >, STAR CHF  Source: Patient, Electronic Medical Record  Chart reviewed, events noted.

## 2023-12-21 NOTE — DIETITIAN INITIAL EVALUATION ADULT - ETIOLOGY
related to lack of prior exposure to heart failure nutrition therapy related to increased physiological demand for nutrients

## 2023-12-21 NOTE — PROGRESS NOTE ADULT - ATTENDING COMMENTS
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 Will DC tomorrow  -ID consulted. Will appreciate recs  -Pulm to likely perform thoracentesis tomorrow given loculated effusion, c/f empeyema  -Monitor vitals. Pt currently afebrile and on room air    #COPD  -IV methylpred 40mg BID  -Duonebs ATC  -Symbicort  -Pulm following. Will continue to appreciate recs    #HFpEF  -C/W bumex 2mg IVP daily. Will continue to reassess volume status  -C/W home BB    Rest as per Dr. Vargas above

## 2023-12-21 NOTE — ADVANCED PRACTICE NURSE CONSULT - ASSESSMENT
arrived on unit, patient was found lying in a low air loss pressure redistribution support surface style bed.  patient  is unable to turn independently and staff assistance x 2 was provided. Once turned,   incontinence stool was apparent and pericare was provided. patient with condom cathter present on patient.   Sanjuanita rectal area with erythema and indurated consistent with incontinence dermatitis.  bilateral  sacrum /buttocks non-blanchable deep red and purple  discoloration and hyperpigmentation  and area of contracted hypopigmentation with partial -thickness skin loss with exposed dermis. consistent  with deep tissue injury  evolution size approximately 15 cm x15 cm x 0.1cm . Pressure injury present on admission.   left ischium   there is  non- blanchable maroon  and purple discoloration and hyperpigmentation  noted to measure approximately 10 cm x 5 cm x 0cm.  which  is consistent with deep tissue injuries. present on admission.   Right ischium   there is  non- blanchable maroon  and purple discoloration and hyperpigmentation  noted to measure approximately 10 cm x 5 cm x 0cm.  which  is consistent with deep tissue injuries. present on admission  right  heel  with non-blanchable deep red discoloration and hyperpigmentation size approximately  4cm x 3cm x 0cm,  Deep tissue injury. present on  admission.   left  heel  with non-blanchable deep red discoloration and hyperpigmentation size approximately  4cm x 3cm x 0cm,  Deep tissue injury. present on admission.   patient  was educated  on the importance  for turning  and positioning  every 2 hours. The use of waffle cushion when out of bed  to chair  and to shift their  Weight every 2 hours while in chair. The importance a keeping skin clean and dry and  to offload left  heel/ foot, and optimal nutrition. arrived on unit, patient was found lying in a low air loss pressure redistribution support surface style bed.  patient  is unable to turn independently and staff assistance x 2 was provided. Once turned,  able view his skin. patient with condom cathter present on patient. patient has colostomy present.    Sanjuanita rectal area with erythema and indurated consistent with incontinence dermatitis.  bilateral  sacrum /buttocks non-blanchable deep red and purple  discoloration and hyperpigmentation  and area of contracted hypopigmentation with partial -thickness skin loss with exposed dermis. consistent  with deep tissue injury  evolution size approximately 15 cm x15 cm x 0.1cm . Pressure injury present on admission.   left ischium   there is  non- blanchable maroon  and purple discoloration and hyperpigmentation  noted to measure approximately 10 cm x 5 cm x 0cm.  which  is consistent with deep tissue injuries. present on admission.   Right ischium   there is  non- blanchable maroon  and purple discoloration and hyperpigmentation  noted to measure approximately 10 cm x 5 cm x 0cm.  which  is consistent with deep tissue injuries. present on admission  right  heel  with non-blanchable deep red discoloration and hyperpigmentation size approximately  4cm x 3cm x 0cm,  Deep tissue injury. present on  admission.   left  heel  with non-blanchable deep red discoloration and hyperpigmentation size approximately  4cm x 3cm x 0cm,  Deep tissue injury. present on admission.   patient  was educated  on the importance  for turning  and positioning  every 2 hours. The use of waffle cushion when out of bed  to chair  and to shift their  Weight every 2 hours while in chair. The importance a keeping skin clean and dry and  to offload left  heel/ foot, and optimal nutrition.

## 2023-12-21 NOTE — DIETITIAN INITIAL EVALUATION ADULT - OTHER INFO
Weights:  - UBW (per patient): ~216 pounds (consistent this past year). Pt endorsed currently weighing ~214 pounds in setting of fluid shifts.   - Dosing Weight (per chart): 214.5 pounds (12/20)  - Daily Weights (per flow sheets): No daily weights noted to assess.  - Bed Scale Weight (taken by RD): 221.1 pounds (question accuracy) (12/21)  - Per [facility] HIE: 212.1 pounds (12/20), 212.15 pounds (10/8), 179.14 pounds (8/17), 250 pounds (12/15/2022)  Pt stated weight has been stable around ~216 pounds this year, question accuracy of weight from 8/17. Pt with possible 35.5 pound weight loss x ~12 months (14%), not clinically significant at this time. RD to continue to monitor weights and trends as able.     Pt with acute hypoxic respiratory failure (per chart).  - Per chart, "Likely multifactorial - CAP and component of HFpEF, entero/rhino virus +, COPD exacerbation."  - Per chart, "RVP pos for entero/rhino."  - Ordered for antibiotics in-house (per orders).   - Ordered for SOLU-Medrol Injectable (per orders).     Pt with acute on chronic heart failure with preserved ejection fraction (per chart).  - Ordered for BUMEX in-house (per orders).  Weights:  - UBW (per patient): ~216 pounds (consistent this past year). Pt endorsed currently weighing ~214 pounds in setting of fluid shifts.   - Dosing Weight (per chart): 214.5 pounds (12/20)  - Daily Weights (per flow sheets): No daily weights noted to assess.  - Bed Scale Weight (taken by RD): 221.1 pounds (question accuracy) (12/21)  - Per [facility] HIE: 212.1 pounds (12/20), 212.15 pounds (10/8), 179.14 pounds (8/17), 250 pounds (12/15/2022)  Pt stated weight has been stable around ~216 pounds this year, question accuracy of weight from 8/17. Pt with possible 35.5 pound weight loss x ~12 months (14%), not clinically significant at this time. RD to continue to monitor weights and trends as able.     Pt with acute hypoxic respiratory failure (per chart).  - Per chart, "Likely multifactorial - CAP and component of HFpEF, entero/rhino virus +, COPD exacerbation."  - Per chart, "RVP pos for entero/rhino."  - Ordered for antibiotics in-house (per orders).   - Ordered for SOLU-Medrol Injectable (per orders).     Pt with acute on chronic heart failure with preserved ejection fraction (per chart).  - Ordered for BUMEX in-house (per orders).  Weights:  - UBW (per patient): ~216 pounds (consistent this past year). Pt endorsed currently weighing ~214 pounds in setting of fluid shifts.   - Dosing Weight (per chart): 214.5 pounds (12/20)  - Daily Weights (per flow sheets): No daily weights noted to assess.  - Bed Scale Weight (taken by RD): 221.1 pounds (question accuracy as pt with 3+ edema).  - Per [facility] HIE: 212.1 pounds (12/20), 212.15 pounds (10/8), 179.14 pounds (8/17), 250 pounds (12/15/2022)  Pt stated weight has been stable around ~216 pounds this year, question accuracy of weight from 8/17. Pt with possible 35.5 pound weight loss x ~12 months (14%), not clinically significant at this time. RD to continue to monitor weights and trends as able.     Pt with acute hypoxic respiratory failure (per chart).  - Per chart, "Likely multifactorial - CAP and component of HFpEF, entero/rhino virus +, COPD exacerbation."  - Per chart, "RVP pos for entero/rhino."  - Ordered for antibiotics in-house (per orders).   - Ordered for SOLU-Medrol Injectable (per orders).     Pt with acute on chronic heart failure with preserved ejection fraction (per chart).  - Ordered for BUMEX in-house (per orders).  Weights:  - UBW (per patient): ~216 pounds (consistent this past year). Pt endorsed currently weighing ~214 pounds in setting of fluid shifts.   - Dosing Weight (per chart): 214.5 pounds (12/20)  - Daily Weights (per flow sheets): No daily weights noted to assess.  - Bed Scale Weight (taken by RD): 221.1 pounds (question accuracy as pt with 3+ edema).  - Per [facility] HIE: 212.1 pounds (12/20), 212.15 pounds (10/8), 179.14 pounds (8/17), 250 pounds (12/15/2022)  Pt stated weight has been stable around ~216 pounds this year, question accuracy of weight from 8/17. Pt with possible 35.5 pound weight loss x ~12 months (14%), not clinically significant at this time. RD to continue to monitor weights and trends as able.     Pt with acute hypoxic respiratory failure (per chart).  - Per chart, "Likely multifactorial - CAP and component of HFpEF, entero/rhino virus +, COPD exacerbation."  - Per chart, "RVP pos for entero/rhino."  - Ordered for antibiotics in-house (per orders).   - Ordered for SOLU-Medrol Injectable (per orders).     Pt with acute on chronic heart failure with preserved ejection fraction (per chart).  - Ordered for BUMEX in-house (per orders).

## 2023-12-21 NOTE — PROGRESS NOTE ADULT - ATTENDING COMMENTS
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 Also with a loculated left pleural effusion.  Thoracentesis deferred secondary to patient being on Eliquis.  Today he appears fatigued, denies any complaints.  Blood cultures - 2 sets positive for Strep pneumoniae.  He is ERV positive and MRSA positive by PCR.  Creatinine is slightly higher.  He is off eliquis and on IV heparin. Will reevaluate effusion tomorrow and likely tap- to rule out empyema and drain fluid.  Agree with plan as outlined above.

## 2023-12-21 NOTE — DIETITIAN INITIAL EVALUATION ADULT - NSFNSPHYEXAMSKINFT_GEN_A_CORE
Per Wound Care Note Today 12/21:  "Bilateral sacrum/buttock deep tissue injury with evolution. present on admission.   Sanjuanita rectal incontinence dermatitis    Left ischium deep tissue injury present on admission.   Right ischium deep tissue injury. present on admission.  left heel deep tissue injury. present on admission.  Right heel deep tissue injury, present on admission."

## 2023-12-21 NOTE — ADVANCED PRACTICE NURSE CONSULT - RECOMMEDATIONS
Impression :  Bilateral sacrum/buttock deep tissue injury with evolution. present on admission.   Prieto rectal incontinence dermatitis    Left ischium deep tissue injury present on admission.   Right ischium deep tissue injury. present on admission.  left heel deep tissue injury. present on admission    Recommendations:  1. Bilateral  , sacral / buttocks  deep tissue injury  with evolution     bilateral ischium deep tissue injury.     prieto rectal incontinence dermatitis.   Topical therapy- sacral/bilateral buttocks- cleanse w/incontinent cleanser, pat dry & apply traid  paste   twice daily & prn soiling . Monitor for changes.  2. Right and left heel  deep tissue injury  ( A).  cleans  with normal saline pat dry and apply no sting barrier film ( Cavilon ) daily and monitor for changes .  (B)Elevate heels; apply Complete Cair air fluidized boots; ensure that the soles of the feet are not resting on the foot board of the bed.  4.  Incontinent management - incontinent cleanser, pads,  prieto care  BID  5. Maintain on an alternating air with low air loss surface   6. Turn & reposition every 2 hr; Use positioning pillow to turn and reposition, soft pillow between bony prominences; continue measures to decrease friction/shear/pressure.  7. Nutrition optimization.  8. Place  waffle cushion when out of bed to chair .  Plan of care reviewed with covering staff BESSY Voss at bedside.    Impression :  Bilateral sacrum/buttock deep tissue injury with evolution. present on admission.   Prieto rectal incontinence dermatitis    Left ischium deep tissue injury present on admission.   Right ischium deep tissue injury. present on admission.  left heel deep tissue injury. present on admission.  Right heel deep tissue injury, present on admission.     Recommendations:  1. Bilateral  , sacral / buttocks  deep tissue injury  with evolution     bilateral ischium deep tissue injury.     prieto rectal incontinence dermatitis.   Topical therapy- sacral/bilateral buttocks- cleanse w/incontinent cleanser, pat dry & apply traid  paste   twice daily & prn soiling . Monitor for changes.  2. Right and left heel  deep tissue injury  ( A).  cleans  with normal saline pat dry and apply no sting barrier film ( Cavilon ) daily and monitor for changes .  (B)Elevate heels; apply Complete Cair air fluidized boots; ensure that the soles of the feet are not resting on the foot board of the bed.  4.  Incontinent management - incontinent cleanser, pads,  prieto care  BID  5. Maintain on an alternating air with low air loss surface   6. Turn & reposition every 2 hr; Use positioning pillow to turn and reposition, soft pillow between bony prominences; continue measures to decrease friction/shear/pressure.  7. Nutrition optimization.  8. Place  waffle cushion when out of bed to chair .  Plan of care reviewed with covering staff BESSY Voss at bedside.

## 2023-12-21 NOTE — DIETITIAN INITIAL EVALUATION ADULT - PERTINENT LABORATORY DATA
12-21    137  |  96  |  38<H>  ----------------------------<  89  3.7   |  26  |  1.62<H>    Ca    9.0      21 Dec 2023 07:44    TPro  6.3  /  Alb  2.6<L>  /  TBili  0.6  /  DBili  x   /  AST  18  /  ALT  11  /  AlkPhos  97  12-21  A1C with Estimated Average Glucose Result: 6.2 % (08-09-23 @ 07:59)

## 2023-12-21 NOTE — PROGRESS NOTE ADULT - SUBJECTIVE AND OBJECTIVE BOX
Renetta Vargas MD    Internal Medicine Resident (PGY-1)  ___________________________________________________________________________________________________      GLADIS LEONARD 83y Male    Overnight events/subjective: No acute overnight events. Patient seen and examined at bedside. No complaints. Denies fever, chills, chest pain, shortness of breath, abdominal pain, nausea, vomiting, changes in bowel habits, or urinary symptoms.    Vital Signs Last 24 Hrs  T(C): 36.6 (21 Dec 2023 04:47), Max: 37 (20 Dec 2023 09:34)  T(F): 97.8 (21 Dec 2023 04:47), Max: 98.6 (20 Dec 2023 09:34)  HR: 95 (21 Dec 2023 04:47) (95 - 114)  BP: 144/67 (21 Dec 2023 04:47) (102/66 - 144/85)  BP(mean): 69 (20 Dec 2023 09:34) (69 - 69)  RR: 18 (21 Dec 2023 04:47) (18 - 22)  SpO2: 93% (21 Dec 2023 04:47) (93% - 99%)    Parameters below as of 21 Dec 2023 04:47  Patient On (Oxygen Delivery Method): nasal cannula      PHYSICAL EXAM:  CONSTITUTIONAL: Well-groomed, in no apparent distress  EYES: No conjunctival or scleral injection, non-icteric  ENMT: No external nasal lesions; Normal outer ears  NECK: Supple; Trachea midline  RESPIRATORY: Normal respiratory effort; Decreased breathe sounds bilaterally without wheeze/rhonchi/rales;  CARDIOVASCULAR: Regular rate and rhythm, normal S1 and S2;  GASTROINTESTINAL: Non-distended; No palpable masses; No tenderness; No rebound/guarding  MUSCULOSKELETAL:  Normal gait;  EXTREMITIES:  Gross 3+ lower extremity edema;  NEUROLOGY: A+O to person, place, and time; Does respond to commands appropriately;  PSYCHIATRY: Mood and Affect appropriate      HOSPITAL MEDICATIONS:  MEDICATIONS  (STANDING):  albuterol/ipratropium for Nebulization 3 milliLiter(s) Nebulizer every 6 hours  atorvastatin 10 milliGRAM(s) Oral at bedtime  budesonide 160 MICROgram(s)/formoterol 4.5 MICROgram(s) Inhaler 2 Puff(s) Inhalation two times a day  buMETAnide Injectable 2 milliGRAM(s) IV Push daily  cefTRIAXone   IVPB 2000 milliGRAM(s) IV Intermittent every 24 hours  finasteride 5 milliGRAM(s) Oral daily  heparin  Infusion.  Unit(s)/Hr (18 mL/Hr) IV Continuous <Continuous>  influenza  Vaccine (HIGH DOSE) 0.7 milliLiter(s) IntraMuscular once  methylPREDNISolone sodium succinate Injectable 60 milliGRAM(s) IV Push every 12 hours  metoprolol tartrate 25 milliGRAM(s) Oral two times a day  pantoprazole    Tablet 40 milliGRAM(s) Oral before breakfast  tamsulosin 0.4 milliGRAM(s) Oral at bedtime    MEDICATIONS  (PRN):  acetaminophen     Tablet .. 650 milliGRAM(s) Oral every 6 hours PRN Temp greater or equal to 38C (100.4F), Mild Pain (1 - 3)  heparin   Injectable 4000 Unit(s) IV Push every 6 hours PRN For aPTT between 40 - 57  heparin   Injectable 8000 Unit(s) IV Push every 6 hours PRN For aPTT less than 40      LABS:                        7.7    13.94 )-----------( 475      ( 21 Dec 2023 03:43 )             25.8     12-20    137  |  98  |  33<H>  ----------------------------<  130<H>  3.6   |  26  |  1.28    Ca    8.9      20 Dec 2023 04:27    TPro  6.8  /  Alb  2.9<L>  /  TBili  0.9  /  DBili  x   /  AST  21  /  ALT  10  /  AlkPhos  104  12-20    PT/INR - ( 20 Dec 2023 04:27 )   PT: 24.0 sec;   INR: 2.23 ratio         PTT - ( 21 Dec 2023 03:43 )  PTT:58.4 sec  Urinalysis Basic - ( 20 Dec 2023 04:49 )    Color: Yellow / Appearance: Turbid / S.018 / pH: x  Gluc: x / Ketone: Negative mg/dL  / Bili: Negative / Urobili: 0.2 mg/dL   Blood: x / Protein: 30 mg/dL / Nitrite: Negative   Leuk Esterase: Large / RBC: 6 /HPF /  /HPF   Sq Epi: x / Non Sq Epi: 1 /HPF / Bacteria: Few /HPF         Renetta Vargas MD    Internal Medicine Resident (PGY-1)  ___________________________________________________________________________________________________      GLADIS LEONARD 83y Male    Overnight events/subjective: No acute overnight events. Patient seen and examined at bedside. This morning, patient is comfortable, he is coughing up yellow-green sputum, he is breathing comfortably on room air.   No complaints. Denies fever, chills, chest pain, shortness of breath, abdominal pain, nausea, vomiting, changes in bowel habits, or urinary symptoms.    Vital Signs Last 24 Hrs  T(C): 36.6 (21 Dec 2023 04:47), Max: 37 (20 Dec 2023 09:34)  T(F): 97.8 (21 Dec 2023 04:47), Max: 98.6 (20 Dec 2023 09:34)  HR: 95 (21 Dec 2023 04:47) (95 - 114)  BP: 144/67 (21 Dec 2023 04:47) (102/66 - 144/85)  BP(mean): 69 (20 Dec 2023 09:34) (69 - 69)  RR: 18 (21 Dec 2023 04:47) (18 - 22)  SpO2: 93% (21 Dec 2023 04:47) (93% - 99%)    Parameters below as of 21 Dec 2023 04:47  Patient On (Oxygen Delivery Method): nasal cannula      PHYSICAL EXAM:  CONSTITUTIONAL: in no apparent distress  EYES: No conjunctival or scleral injection, non-icteric  ENMT: No external nasal lesions; Normal outer ears  NECK: Supple; Trachea midline  RESPIRATORY: Normal respiratory effort; Decreased breathe sounds LL > RL  CARDIOVASCULAR: Regular rate and rhythm, normal S1 and S2;  GASTROINTESTINAL: Non-distended; No palpable masses; No tenderness; No rebound/guarding  EXTREMITIES:  Gross 3+ lower extremity edema;  NEUROLOGY: A+O to person, place, and time; Does respond to commands appropriately;  PSYCHIATRY: Mood and Affect appropriate      HOSPITAL MEDICATIONS:  MEDICATIONS  (STANDING):  albuterol/ipratropium for Nebulization 3 milliLiter(s) Nebulizer every 6 hours  atorvastatin 10 milliGRAM(s) Oral at bedtime  budesonide 160 MICROgram(s)/formoterol 4.5 MICROgram(s) Inhaler 2 Puff(s) Inhalation two times a day  buMETAnide Injectable 2 milliGRAM(s) IV Push daily  cefTRIAXone   IVPB 2000 milliGRAM(s) IV Intermittent every 24 hours  finasteride 5 milliGRAM(s) Oral daily  heparin  Infusion.  Unit(s)/Hr (18 mL/Hr) IV Continuous <Continuous>  influenza  Vaccine (HIGH DOSE) 0.7 milliLiter(s) IntraMuscular once  methylPREDNISolone sodium succinate Injectable 60 milliGRAM(s) IV Push every 12 hours  metoprolol tartrate 25 milliGRAM(s) Oral two times a day  pantoprazole    Tablet 40 milliGRAM(s) Oral before breakfast  tamsulosin 0.4 milliGRAM(s) Oral at bedtime    MEDICATIONS  (PRN):  acetaminophen     Tablet .. 650 milliGRAM(s) Oral every 6 hours PRN Temp greater or equal to 38C (100.4F), Mild Pain (1 - 3)  heparin   Injectable 4000 Unit(s) IV Push every 6 hours PRN For aPTT between 40 - 57  heparin   Injectable 8000 Unit(s) IV Push every 6 hours PRN For aPTT less than 40      LABS:                        7.7    13.94 )-----------( 475      ( 21 Dec 2023 03:43 )             25.8     12-20    137  |  98  |  33<H>  ----------------------------<  130<H>  3.6   |  26  |  1.28    Ca    8.9      20 Dec 2023 04:27    TPro  6.8  /  Alb  2.9<L>  /  TBili  0.9  /  DBili  x   /  AST  21  /  ALT  10  /  AlkPhos  104  12-20    PT/INR - ( 20 Dec 2023 04:27 )   PT: 24.0 sec;   INR: 2.23 ratio         PTT - ( 21 Dec 2023 03:43 )  PTT:58.4 sec  Urinalysis Basic - ( 20 Dec 2023 04:49 )    Color: Yellow / Appearance: Turbid / S.018 / pH: x  Gluc: x / Ketone: Negative mg/dL  / Bili: Negative / Urobili: 0.2 mg/dL   Blood: x / Protein: 30 mg/dL / Nitrite: Negative   Leuk Esterase: Large / RBC: 6 /HPF /  /HPF   Sq Epi: x / Non Sq Epi: 1 /HPF / Bacteria: Few /HPF         Renetta Vargas MD    Internal Medicine Resident (PGY-1)  ___________________________________________________________________________________________________      GLADIS LEONARD 83y Male    Overnight events/subjective: No acute overnight events. Patient seen and examined at bedside. This morning, patient is comfortable, he is coughing up yellow-green sputum, he is breathing comfortably on room air.   No complaints. Denies fever, chills, chest pain, shortness of breath, abdominal pain, nausea, vomiting, changes in bowel habits, or urinary symptoms.    Vital Signs Last 24 Hrs  T(C): 36.6 (21 Dec 2023 04:47), Max: 37 (20 Dec 2023 09:34)  T(F): 97.8 (21 Dec 2023 04:47), Max: 98.6 (20 Dec 2023 09:34)  HR: 95 (21 Dec 2023 04:47) (95 - 114)  BP: 144/67 (21 Dec 2023 04:47) (102/66 - 144/85)  BP(mean): 69 (20 Dec 2023 09:34) (69 - 69)  RR: 18 (21 Dec 2023 04:47) (18 - 22)  SpO2: 93% (21 Dec 2023 04:47) (93% - 99%)    Parameters below as of 21 Dec 2023 04:47  Patient On (Oxygen Delivery Method): nasal cannula      PHYSICAL EXAM:  CONSTITUTIONAL: in no apparent distress  EYES: No conjunctival or scleral injection, non-icteric  ENMT: No external nasal lesions; Normal outer ears, + throat erythema   NECK: Supple; Trachea midline  RESPIRATORY: Normal respiratory effort; Decreased breathe sounds LL > RL  CARDIOVASCULAR: Regular rate and rhythm, normal S1 and S2;  GASTROINTESTINAL: Non-distended; No palpable masses; No tenderness; No rebound/guarding  EXTREMITIES:  Gross 3+ lower extremity edema;  NEUROLOGY: A+O to person, place, and time; Does respond to commands appropriately;  PSYCHIATRY: Mood and Affect appropriate      HOSPITAL MEDICATIONS:  MEDICATIONS  (STANDING):  albuterol/ipratropium for Nebulization 3 milliLiter(s) Nebulizer every 6 hours  atorvastatin 10 milliGRAM(s) Oral at bedtime  budesonide 160 MICROgram(s)/formoterol 4.5 MICROgram(s) Inhaler 2 Puff(s) Inhalation two times a day  buMETAnide Injectable 2 milliGRAM(s) IV Push daily  cefTRIAXone   IVPB 2000 milliGRAM(s) IV Intermittent every 24 hours  finasteride 5 milliGRAM(s) Oral daily  heparin  Infusion.  Unit(s)/Hr (18 mL/Hr) IV Continuous <Continuous>  influenza  Vaccine (HIGH DOSE) 0.7 milliLiter(s) IntraMuscular once  methylPREDNISolone sodium succinate Injectable 60 milliGRAM(s) IV Push every 12 hours  metoprolol tartrate 25 milliGRAM(s) Oral two times a day  pantoprazole    Tablet 40 milliGRAM(s) Oral before breakfast  tamsulosin 0.4 milliGRAM(s) Oral at bedtime    MEDICATIONS  (PRN):  acetaminophen     Tablet .. 650 milliGRAM(s) Oral every 6 hours PRN Temp greater or equal to 38C (100.4F), Mild Pain (1 - 3)  heparin   Injectable 4000 Unit(s) IV Push every 6 hours PRN For aPTT between 40 - 57  heparin   Injectable 8000 Unit(s) IV Push every 6 hours PRN For aPTT less than 40      LABS:                        7.7    13.94 )-----------( 475      ( 21 Dec 2023 03:43 )             25.8     12-20    137  |  98  |  33<H>  ----------------------------<  130<H>  3.6   |  26  |  1.28    Ca    8.9      20 Dec 2023 04:27    TPro  6.8  /  Alb  2.9<L>  /  TBili  0.9  /  DBili  x   /  AST  21  /  ALT  10  /  AlkPhos  104  12-20    PT/INR - ( 20 Dec 2023 04:27 )   PT: 24.0 sec;   INR: 2.23 ratio         PTT - ( 21 Dec 2023 03:43 )  PTT:58.4 sec  Urinalysis Basic - ( 20 Dec 2023 04:49 )    Color: Yellow / Appearance: Turbid / S.018 / pH: x  Gluc: x / Ketone: Negative mg/dL  / Bili: Negative / Urobili: 0.2 mg/dL   Blood: x / Protein: 30 mg/dL / Nitrite: Negative   Leuk Esterase: Large / RBC: 6 /HPF /  /HPF   Sq Epi: x / Non Sq Epi: 1 /HPF / Bacteria: Few /HPF

## 2023-12-21 NOTE — PROGRESS NOTE ADULT - ASSESSMENT
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    #Acute hypercapnic respiratory failure secondary to COPD Exacerbation  - Likely trigger:   - Exacerbations in the last year:  - Severity of this exacerbation:   - ABG on admission:  - CXR on admission:   - Start Solumedrol 60mg IV Q12H; Taper to PO Prednisone with improved wheezing  - Start Symbicort 2 puffs BID (standing) and Duonebs Q4-6H (Standing)  - Start Duonebs PRN; Stop standing dose when appropriate  - Continue supplemental O2 via NC; Taper to baseline  - SpO2 goal >88 - 92%; Avoid hyperoxia   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

## 2023-12-21 NOTE — PROGRESS NOTE ADULT - SUBJECTIVE AND OBJECTIVE BOX
CHIEF COMPLAINT:Patient is a 83y old  Male who presents with a chief complaint of sob (21 Dec 2023 07:31)      Interval Events:    REVIEW OF SYSTEMS:  [x] All other systems negative except per HPI   [ ] Unable to assess ROS because ________    OBJECTIVE:  ICU Vital Signs Last 24 Hrs  T(C): 36.6 (21 Dec 2023 04:47), Max: 36.8 (20 Dec 2023 16:29)  T(F): 97.8 (21 Dec 2023 04:47), Max: 98.3 (20 Dec 2023 16:29)  HR: 94 (21 Dec 2023 08:03) (94 - 114)  BP: 132/71 (21 Dec 2023 08:03) (118/79 - 144/85)  BP(mean): --  ABP: --  ABP(mean): --  RR: 18 (21 Dec 2023 04:47) (18 - 19)  SpO2: 93% (21 Dec 2023 04:47) (93% - 99%)    O2 Parameters below as of 21 Dec 2023 04:47  Patient On (Oxygen Delivery Method): nasal cannula              12-20 @ 07:01  -  12-21 @ 07:00  --------------------------------------------------------  IN: 216 mL / OUT: 200 mL / NET: 16 mL        PHYSICAL EXAM:  GENERAL: NAD, well-groomed, well-developed  HEAD:  Atraumatic, Normocephalic  EYES: EOMI, PERRLA, conjunctiva and sclera clear  ENMT: No tonsillar erythema, exudates, or enlargement; Moist mucous membranes, Good dentition, No lesions  NECK: Supple, No JVD, Normal thyroid  CHEST/LUNG: Clear to auscultation bilaterally; No rales, rhonchi, wheezing, or rubs  HEART: Regular rate and rhythm; No murmurs, rubs, or gallops  ABDOMEN: Soft, Nontender, Nondistended; Bowel sounds present  VASCULAR:  2+ Peripheral Pulses, No clubbing, cyanosis, or edema  LYMPH: No lymphadenopathy noted  SKIN: No rashes or lesions  NERVOUS SYSTEM:  Alert & Oriented X3, Good concentration; Motor Strength 5/5 B/L upper and lower extremities; DTRs 2+ intact and symmetric    HOSPITAL MEDICATIONS:  MEDICATIONS  (STANDING):  albuterol/ipratropium for Nebulization 3 milliLiter(s) Nebulizer every 6 hours  atorvastatin 10 milliGRAM(s) Oral at bedtime  budesonide 160 MICROgram(s)/formoterol 4.5 MICROgram(s) Inhaler 2 Puff(s) Inhalation two times a day  buMETAnide Injectable 2 milliGRAM(s) IV Push daily  cefTRIAXone   IVPB 2000 milliGRAM(s) IV Intermittent every 24 hours  finasteride 5 milliGRAM(s) Oral daily  heparin  Infusion.  Unit(s)/Hr (18 mL/Hr) IV Continuous <Continuous>  influenza  Vaccine (HIGH DOSE) 0.7 milliLiter(s) IntraMuscular once  methylPREDNISolone sodium succinate Injectable 60 milliGRAM(s) IV Push every 12 hours  metoprolol tartrate 25 milliGRAM(s) Oral two times a day  pantoprazole    Tablet 40 milliGRAM(s) Oral before breakfast  tamsulosin 0.4 milliGRAM(s) Oral at bedtime    MEDICATIONS  (PRN):  acetaminophen     Tablet .. 650 milliGRAM(s) Oral every 6 hours PRN Temp greater or equal to 38C (100.4F), Mild Pain (1 - 3)  heparin   Injectable 8000 Unit(s) IV Push every 6 hours PRN For aPTT less than 40  heparin   Injectable 4000 Unit(s) IV Push every 6 hours PRN For aPTT between 40 - 57      LABS:    The Labs were reviewed by me   The Radiology was reviewed by me    EKG tracing reviewed by me    12-21    137  |  96  |  38<H>  ----------------------------<  89  3.7   |  26  |  1.62<H>  12-20    137  |  98  |  33<H>  ----------------------------<  130<H>  3.6   |  26  |  1.28    Ca    9.0      21 Dec 2023 07:44  Ca    8.9      20 Dec 2023 04:27    TPro  6.3  /  Alb  2.6<L>  /  TBili  0.6  /  DBili  x   /  AST  18  /  ALT  11  /  AlkPhos  97  12-21  TPro  6.8  /  Alb  2.9<L>  /  TBili  0.9  /  DBili  x   /  AST  21  /  ALT  10  /  AlkPhos  104  12-20          PT/INR - ( 20 Dec 2023 04:27 )   PT: 24.0 sec;   INR: 2.23 ratio         PTT - ( 21 Dec 2023 03:43 )  PTT:58.4 sec              Urinalysis Basic - ( 21 Dec 2023 07:44 )    Color: x / Appearance: x / SG: x / pH: x  Gluc: 89 mg/dL / Ketone: x  / Bili: x / Urobili: x   Blood: x / Protein: x / Nitrite: x   Leuk Esterase: x / RBC: x / WBC x   Sq Epi: x / Non Sq Epi: x / Bacteria: x                              7.7    13.94 )-----------( 475      ( 21 Dec 2023 03:43 )             25.8                         8.3    16.93 )-----------( 516      ( 20 Dec 2023 04:27 )             28.1     CAPILLARY BLOOD GLUCOSE        Blood Gas Source Venous: Venous (12-20-23 @ 05:33)      MICROBIOLOGY:     RADIOLOGY:  [ ] Reviewed and interpreted by me    Point of Care Ultrasound Findings:    PFT:    EKG: CHIEF COMPLAINT:Patient is a 83y old  Male who presents with a chief complaint of sob (21 Dec 2023 07:31)      Interval Events: no overnight events, feeling improved today. still with productive cough. Patient denies fevers, chills, chest pain, nausea, abdominal pain, diarrhea, constipation, dysuria, leg swelling, headache, light headedness    REVIEW OF SYSTEMS:  [x] All other systems negative except per HPI   [ ] Unable to assess ROS because ________    OBJECTIVE:  ICU Vital Signs Last 24 Hrs  T(C): 36.6 (21 Dec 2023 04:47), Max: 36.8 (20 Dec 2023 16:29)  T(F): 97.8 (21 Dec 2023 04:47), Max: 98.3 (20 Dec 2023 16:29)  HR: 94 (21 Dec 2023 08:03) (94 - 114)  BP: 132/71 (21 Dec 2023 08:03) (118/79 - 144/85)  BP(mean): --  ABP: --  ABP(mean): --  RR: 18 (21 Dec 2023 04:47) (18 - 19)  SpO2: 93% (21 Dec 2023 04:47) (93% - 99%)    O2 Parameters below as of 21 Dec 2023 04:47  Patient On (Oxygen Delivery Method): nasal cannula              12-20 @ 07:01  -  12-21 @ 07:00  --------------------------------------------------------  IN: 216 mL / OUT: 200 mL / NET: 16 mL        PHYSICAL EXAM:  GENERAL: NAD, well-groomed, well-developed  HEAD:  Atraumatic, Normocephalic  EYES: EOMI, PERRLA, conjunctiva and sclera clear  ENMT: No tonsillar erythema, exudates, or enlargement; Moist mucous membranes, Good dentition, No lesions  NECK: Supple, No JVD, Normal thyroid  CHEST/LUNG: rhonci and rales   HEART: Regular rate and rhythm; No murmurs, rubs, or gallops  ABDOMEN: Soft, Nontender, Nondistended; Bowel sounds present  VASCULAR:  2+ Peripheral Pulses, No clubbing, cyanosis. bilateral lower leg edema   LYMPH: No lymphadenopathy noted  SKIN: No rashes or lesions  NERVOUS SYSTEM:  Alert & Oriented X3, Good concentration; Motor Strength 5/5 B/L upper and lower extremities; DTRs 2+ intact and symmetric    HOSPITAL MEDICATIONS:  MEDICATIONS  (STANDING):  albuterol/ipratropium for Nebulization 3 milliLiter(s) Nebulizer every 6 hours  atorvastatin 10 milliGRAM(s) Oral at bedtime  budesonide 160 MICROgram(s)/formoterol 4.5 MICROgram(s) Inhaler 2 Puff(s) Inhalation two times a day  buMETAnide Injectable 2 milliGRAM(s) IV Push daily  cefTRIAXone   IVPB 2000 milliGRAM(s) IV Intermittent every 24 hours  finasteride 5 milliGRAM(s) Oral daily  heparin  Infusion.  Unit(s)/Hr (18 mL/Hr) IV Continuous <Continuous>  influenza  Vaccine (HIGH DOSE) 0.7 milliLiter(s) IntraMuscular once  methylPREDNISolone sodium succinate Injectable 60 milliGRAM(s) IV Push every 12 hours  metoprolol tartrate 25 milliGRAM(s) Oral two times a day  pantoprazole    Tablet 40 milliGRAM(s) Oral before breakfast  tamsulosin 0.4 milliGRAM(s) Oral at bedtime    MEDICATIONS  (PRN):  acetaminophen     Tablet .. 650 milliGRAM(s) Oral every 6 hours PRN Temp greater or equal to 38C (100.4F), Mild Pain (1 - 3)  heparin   Injectable 8000 Unit(s) IV Push every 6 hours PRN For aPTT less than 40  heparin   Injectable 4000 Unit(s) IV Push every 6 hours PRN For aPTT between 40 - 57      LABS:    The Labs were reviewed by me   The Radiology was reviewed by me    EKG tracing reviewed by me    12-21    137  |  96  |  38<H>  ----------------------------<  89  3.7   |  26  |  1.62<H>  12-20    137  |  98  |  33<H>  ----------------------------<  130<H>  3.6   |  26  |  1.28    Ca    9.0      21 Dec 2023 07:44  Ca    8.9      20 Dec 2023 04:27    TPro  6.3  /  Alb  2.6<L>  /  TBili  0.6  /  DBili  x   /  AST  18  /  ALT  11  /  AlkPhos  97  12-21  TPro  6.8  /  Alb  2.9<L>  /  TBili  0.9  /  DBili  x   /  AST  21  /  ALT  10  /  AlkPhos  104  12-20          PT/INR - ( 20 Dec 2023 04:27 )   PT: 24.0 sec;   INR: 2.23 ratio         PTT - ( 21 Dec 2023 03:43 )  PTT:58.4 sec              Urinalysis Basic - ( 21 Dec 2023 07:44 )    Color: x / Appearance: x / SG: x / pH: x  Gluc: 89 mg/dL / Ketone: x  / Bili: x / Urobili: x   Blood: x / Protein: x / Nitrite: x   Leuk Esterase: x / RBC: x / WBC x   Sq Epi: x / Non Sq Epi: x / Bacteria: x                              7.7    13.94 )-----------( 475      ( 21 Dec 2023 03:43 )             25.8                         8.3    16.93 )-----------( 516      ( 20 Dec 2023 04:27 )             28.1     CAPILLARY BLOOD GLUCOSE        Blood Gas Source Venous: Venous (12-20-23 @ 05:33)      MICROBIOLOGY:     RADIOLOGY:  [ ] Reviewed and interpreted by me    Point of Care Ultrasound Findings:    PFT:    EKG:

## 2023-12-21 NOTE — PROGRESS NOTE ADULT - PROBLEM SELECTOR PLAN 5
Resume home eliquis if no plan for thora by pulm - on heparin gtt iso possible thora tomorrow   - holding home eliquis if no plan for thora by pulm

## 2023-12-21 NOTE — DIETITIAN INITIAL EVALUATION ADULT - NUTRITIONGOAL OUTCOME2
Pt will be able to provide 2 teach back points to demonstrate understanding of nutrition education.

## 2023-12-21 NOTE — PROGRESS NOTE ADULT - PROBLEM SELECTOR PLAN 3
Rx sent in 11/21/17. Atypical chest pain 1 episode  HST 30 > 32  EKG without acute ischemic changes  Monitor for now

## 2023-12-21 NOTE — CONSULT NOTE ADULT - SUBJECTIVE AND OBJECTIVE BOX
Full note to follow  Pneumococcal pneumonia with septicemia  New large pleural effusion. Had some chest pain prior to coming to the hospital, now resolved  Concur with plan for thoracocentesis to assess for empyema  Continue ceftriaxone  F/U sensi of blood isolate  Would repeat blood cx to document control of bacteremia, or lack thereof  Pleural fluid for pH cell count, differential, LDH, protein, glucose, gram stain/cx  F/U ECHO results  COPD management as per pulmonary and primary team  Reviewed with Dr. Clark  Thank you for the courtesy of this referral.  Markel Morales MD  Attending Physician  [facility]  Division of Infectious Diseases  [phone]  ==============  [facility]  [phone]    GLADIS LEONARD  83y, Male  [id number]    HPI--      PMH/PSH--  Asbestosis (ICD9 501)    HTN (Hypertension)    BPH (Benign Prostatic Hypertrophy)    Dyslipidemia    Localized Osteoarthrosis, Lower Leg    Obesity    COPD with emphysema    Pulmonary nodule    Thyroid nodule    Aortic ectasia    History of diastolic dysfunction    Venous insufficiency    S/P Cystoscopy (ICD9 V45.89)    Hyperlipidemia (ICD9 272.4)    Localized Osteoarthrosis, Lower Leg    S/P Arthroscopy of Left Knee    Cataract    Inguinal Hernia x2    History of Tonsillectomy    History of appendectomy        Allergies--  No Known Allergies      Medications--  Antibiotics: azithromycin  IVPB 500 milliGRAM(s) IV Intermittent every 24 hours  cefTRIAXone   IVPB 2000 milliGRAM(s) IV Intermittent every 24 hours    Immunologic: influenza  Vaccine (HIGH DOSE) 0.7 milliLiter(s) IntraMuscular once    Other: acetaminophen     Tablet .. PRN  albuterol/ipratropium for Nebulization  atorvastatin  budesonide 160 MICROgram(s)/formoterol 4.5 MICROgram(s) Inhaler  buMETAnide Injectable  finasteride  heparin   Injectable PRN  heparin   Injectable PRN  heparin  Infusion.  methylPREDNISolone sodium succinate Injectable  metoprolol tartrate  pantoprazole    Tablet  tamsulosin    Antimicrobials last 90 days per EMR: MEDICATIONS  (STANDING):    azithromycin  IVPB   255 mL/Hr IV Intermittent (12-20-23 @ 16:39)    cefepime   IVPB   100 mL/Hr IV Intermittent (12-20-23 @ 04:44)    cefTRIAXone   IVPB   100 mL/Hr IV Intermittent (12-20-23 @ 20:15)    vancomycin  IVPB.   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     Pertinent positives and negatives--  Constitutional: No fevers. No Chills. No Rigors.   Eyes:  ENMT:  Cardiovascular: No chest pain. No palpitations.  Respiratory: No shortness of breath. No cough.  Gastrointestinal: No nausea or vomiting. No diarrhea or constipation.   Genitourinary:  Musculoskeletal:  Skin:  Neurologic:  Psychiatric: Pleasant. Appropriate affect.  Endocrine:  Heme/Lymphatic:  Allergy/Immunologic:    Review of systems otherwise negative except as previously noted.    Physical Exam--  Vital Signs: T(F): 97.5 (12-21-23 @ 11:43), Max: 98.3 (12-20-23 @ 16:29)  HR: 100 (12-21-23 @ 11:43)  BP: 119/65 (12-21-23 @ 11:43)  RR: 18 (12-21-23 @ 11:43)  SpO2: 93% (12-21-23 @ 11:43)  Wt(kg): --  General: Nontoxic-appearing Male in no acute distress.  HEENT: AT/NC. PERRL. EOMI. Anicteric. Conjunctiva pink and moist. Oropharynx clear. Dentition fair.  Neck: Not rigid. No sense of mass.  Nodes: None palpable.  Lungs: Clear bilaterally without rales, wheezing or rhonchi  Heart: Regular rate and rhythm. No Murmur. No rub. No gallop. No palpable thrill.  Abdomen: Bowel sounds present and normoactive. Soft. Nondistended. Nontender. No sense of mass. No organomegaly.  Back: No spinal tenderness. No costovertebral angle tenderness.   Extremities: No cyanosis or clubbing. No edema.   Skin: Warm. Dry. Good turgor. No rash. No vasculitic stigmata.  Psychiatric: Appropriate affect and mood for situation.         Laboratory & Imaging Data--  CBC                        7.7    13.94 )-----------( 475      ( 21 Dec 2023 03:43 )             25.8       Chemistries  12-21    137  |  96  |  38<H>  ----------------------------<  89  3.7   |  26  |  1.62<H>    Ca    9.0      21 Dec 2023 07:44    TPro  6.3  /  Alb  2.6<L>  /  TBili  0.6  /  DBili  x   /  AST  18  /  ALT  11  /  AlkPhos  97  12-21      Culture Data    Culture - Sputum (collected 21 Dec 2023 01:12)  Source: .Sputum Sputum  Gram Stain (21 Dec 2023 11:54):    Moderate polymorphonuclear leukocytes per low power field    Moderate Squamous epithelial cells per low power field    Rare Gram positive cocci in pairs per oil power field    Culture - Blood (collected 20 Dec 2023 04:35)  Source: .Blood Blood-Peripheral  Gram Stain (21 Dec 2023 03:27):    Growth in aerobic bottle: Gram Positive Cocci in Pairs and Chains    Growth in anaerobic bottle: Gram Positive Cocci in Pairs and Chains  Preliminary Report (21 Dec 2023 03:28):    Growth in aerobic bottle: Gram Positive Cocci in Pairs and Chains    Growth in anaerobic bottle: Gram Positive Cocci in Pairs and Chains    Direct identification is available within approximately 3-5    hours either by Blood Panel Multiplexed PCR or Direct    MALDI-TOF. Details: [email or web address]  Organism: Blood Culture PCR (21 Dec 2023 02:19)  Organism: Blood Culture PCR (21 Dec 2023 02:19)    Culture - Blood (collected 20 Dec 2023 04:20)  Source: .Blood Blood-Peripheral  Gram Stain (21 Dec 2023 04:03):    Growth in aerobic bottle: Gram Positive Cocci in Pairs and Chains    Growth in anaerobic bottle: Gram Positive Cocci in Pairs and Chains  Preliminary Report (21 Dec 2023 04:03):    Growth in aerobic bottle: Gram Positive Cocci in Pairs and Chains    Growth in anaerobic bottle: Gram Positive Cocci in Pairs and Chains         Full note to follow  Pneumococcal pneumonia with septicemia  New large pleural effusion. Had some chest pain prior to coming to the hospital, now resolved  Concur with plan for thoracocentesis to assess for empyema  Continue ceftriaxone  F/U sensi of blood isolate  Would repeat blood cx to document control of bacteremia, or lack thereof  Pleural fluid for pH cell count, differential, LDH, protein, glucose, gram stain/cx  F/U ECHO results  COPD management as per pulmonary and primary team  Reviewed with Dr. Clark  Thank you for the courtesy of this referral.  Markel Morales MD  Attending Physician  [facility]  Division of Infectious Diseases  [phone]  ==============  [facility] of Infectious Diseases  [phone]    GLADIS LEONARD  83y, Male  [id number]    HPI--      PMH/PSH--  Asbestosis (ICD9 501)    HTN (Hypertension)    BPH (Benign Prostatic Hypertrophy)    Dyslipidemia    Localized Osteoarthrosis, Lower Leg    Obesity    COPD with emphysema    Pulmonary nodule    Thyroid nodule    Aortic ectasia    History of diastolic dysfunction    Venous insufficiency    S/P Cystoscopy (ICD9 V45.89)    Hyperlipidemia (ICD9 272.4)    Localized Osteoarthrosis, Lower Leg    S/P Arthroscopy of Left Knee    Cataract    Inguinal Hernia x2    History of Tonsillectomy    History of appendectomy        Allergies--  No Known Allergies      Medications--  Antibiotics: azithromycin  IVPB 500 milliGRAM(s) IV Intermittent every 24 hours  cefTRIAXone   IVPB 2000 milliGRAM(s) IV Intermittent every 24 hours    Immunologic: influenza  Vaccine (HIGH DOSE) 0.7 milliLiter(s) IntraMuscular once    Other: acetaminophen     Tablet .. PRN  albuterol/ipratropium for Nebulization  atorvastatin  budesonide 160 MICROgram(s)/formoterol 4.5 MICROgram(s) Inhaler  buMETAnide Injectable  finasteride  heparin   Injectable PRN  heparin   Injectable PRN  heparin  Infusion.  methylPREDNISolone sodium succinate Injectable  metoprolol tartrate  pantoprazole    Tablet  tamsulosin    Antimicrobials last 90 days per EMR: MEDICATIONS  (STANDING):    azithromycin  IVPB   255 mL/Hr IV Intermittent (12-20-23 @ 16:39)    cefepime   IVPB   100 mL/Hr IV Intermittent (12-20-23 @ 04:44)    cefTRIAXone   IVPB   100 mL/Hr IV Intermittent (12-20-23 @ 20:15)    vancomycin  IVPB.   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     Pertinent positives and negatives--  Constitutional: No fevers. No Chills. No Rigors.   Eyes:  ENMT:  Cardiovascular: No chest pain. No palpitations.  Respiratory: No shortness of breath. No cough.  Gastrointestinal: No nausea or vomiting. No diarrhea or constipation.   Genitourinary:  Musculoskeletal:  Skin:  Neurologic:  Psychiatric: Pleasant. Appropriate affect.  Endocrine:  Heme/Lymphatic:  Allergy/Immunologic:    Review of systems otherwise negative except as previously noted.    Physical Exam--  Vital Signs: T(F): 97.5 (12-21-23 @ 11:43), Max: 98.3 (12-20-23 @ 16:29)  HR: 100 (12-21-23 @ 11:43)  BP: 119/65 (12-21-23 @ 11:43)  RR: 18 (12-21-23 @ 11:43)  SpO2: 93% (12-21-23 @ 11:43)  Wt(kg): --  General: Nontoxic-appearing Male in no acute distress.  HEENT: AT/NC. PERRL. EOMI. Anicteric. Conjunctiva pink and moist. Oropharynx clear. Dentition fair.  Neck: Not rigid. No sense of mass.  Nodes: None palpable.  Lungs: Clear bilaterally without rales, wheezing or rhonchi  Heart: Regular rate and rhythm. No Murmur. No rub. No gallop. No palpable thrill.  Abdomen: Bowel sounds present and normoactive. Soft. Nondistended. Nontender. No sense of mass. No organomegaly.  Back: No spinal tenderness. No costovertebral angle tenderness.   Extremities: No cyanosis or clubbing. No edema.   Skin: Warm. Dry. Good turgor. No rash. No vasculitic stigmata.  Psychiatric: Appropriate affect and mood for situation.         Laboratory & Imaging Data--  CBC                        7.7    13.94 )-----------( 475      ( 21 Dec 2023 03:43 )             25.8       Chemistries  12-21    137  |  96  |  38<H>  ----------------------------<  89  3.7   |  26  |  1.62<H>    Ca    9.0      21 Dec 2023 07:44    TPro  6.3  /  Alb  2.6<L>  /  TBili  0.6  /  DBili  x   /  AST  18  /  ALT  11  /  AlkPhos  97  12-21      Culture Data    Culture - Sputum (collected 21 Dec 2023 01:12)  Source: .Sputum Sputum  Gram Stain (21 Dec 2023 11:54):    Moderate polymorphonuclear leukocytes per low power field    Moderate Squamous epithelial cells per low power field    Rare Gram positive cocci in pairs per oil power field    Culture - Blood (collected 20 Dec 2023 04:35)  Source: .Blood Blood-Peripheral  Gram Stain (21 Dec 2023 03:27):    Growth in aerobic bottle: Gram Positive Cocci in Pairs and Chains    Growth in anaerobic bottle: Gram Positive Cocci in Pairs and Chains  Preliminary Report (21 Dec 2023 03:28):    Growth in aerobic bottle: Gram Positive Cocci in Pairs and Chains    Growth in anaerobic bottle: Gram Positive Cocci in Pairs and Chains    Direct identification is available within approximately 3-5    hours either by Blood Panel Multiplexed PCR or Direct    MALDI-TOF. Details: [email or web address]  Organism: Blood Culture PCR (21 Dec 2023 02:19)  Organism: Blood Culture PCR (21 Dec 2023 02:19)    Culture - Blood (collected 20 Dec 2023 04:20)  Source: .Blood Blood-Peripheral  Gram Stain (21 Dec 2023 04:03):    Growth in aerobic bottle: Gram Positive Cocci in Pairs and Chains    Growth in anaerobic bottle: Gram Positive Cocci in Pairs and Chains  Preliminary Report (21 Dec 2023 04:03):    Growth in aerobic bottle: Gram Positive Cocci in Pairs and Chains    Growth in anaerobic bottle: Gram Positive Cocci in Pairs and Chains         Full note to follow  Pneumococcal pneumonia with septicemia  New large pleural effusion. Had some chest pain prior to coming to the hospital, now resolved  Concur with plan for thoracocentesis to assess for empyema  Continue ceftriaxone  F/U sensi of blood isolate  Would repeat blood cx to document control of bacteremia, or lack thereof  Pleural fluid for pH cell count, differential, LDH, protein, glucose, gram stain/cx  F/U ECHO results  COPD management as per pulmonary and primary team  Reviewed with Dr. Clark  Thank you for the courtesy of this referral.  Markel Morales MD  Attending Physician  [facility]  Division of Infectious Diseases  [phone]  ==============  [facility] of Infectious Diseases  [phone]    GLADIS LEONARD  83y, Male  [id number]    HPI--  83M with PMH HTN, COPD, ILD/Asbestosis, CHF, AF, colon Ca s/p signoid colectomy/colostomy, presents with 2 days of feeling unwell with vague upper chest discomfort fatigue, sweats, nonproductive cough increased from baseline, and shortness of breath. No fevere. Here noted to hae large pleural effusion on chest imaging, and now also known to have +BC for S. pneumoniae. Thoracocentesis planned.    Patient feeling better overall    PMH/PSH--  Asbestosis (ICD9 501)    HTN (Hypertension)    BPH (Benign Prostatic Hypertrophy)    Dyslipidemia    Localized Osteoarthrosis, Lower Leg    Obesity    COPD with emphysema    Pulmonary nodule    Thyroid nodule    Aortic ectasia    History of diastolic dysfunction    Venous insufficiency    S/P Cystoscopy (ICD9 V45.89)    Hyperlipidemia (ICD9 272.4)    Localized Osteoarthrosis, Lower Leg    S/P Arthroscopy of Left Knee    Cataract    Inguinal Hernia x2    History of Tonsillectomy    History of appendectomy        Allergies--  No Known Allergies      Medications--  Antibiotics: azithromycin  IVPB 500 milliGRAM(s) IV Intermittent every 24 hours  cefTRIAXone   IVPB 2000 milliGRAM(s) IV Intermittent every 24 hours    Immunologic: influenza  Vaccine (HIGH DOSE) 0.7 milliLiter(s) IntraMuscular once    Other: acetaminophen     Tablet .. PRN  albuterol/ipratropium for Nebulization  atorvastatin  budesonide 160 MICROgram(s)/formoterol 4.5 MICROgram(s) Inhaler  buMETAnide Injectable  finasteride  heparin   Injectable PRN  heparin   Injectable PRN  heparin  Infusion.  methylPREDNISolone sodium succinate Injectable  metoprolol tartrate  pantoprazole    Tablet  tamsulosin    Antimicrobials last 90 days per EMR: MEDICATIONS  (STANDING):    azithromycin  IVPB   255 mL/Hr IV Intermittent (12-20-23 @ 16:39)    cefepime   IVPB   100 mL/Hr IV Intermittent (12-20-23 @ 04:44)    cefTRIAXone   IVPB   100 mL/Hr IV Intermittent (12-20-23 @ 20:15)    vancomycin  IVPB.   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   Review of systems otherwise negative except as previously noted.    Physical Exam--  Vital Signs: T(F): 97.5 (12-21-23 @ 11:43), Max: 98.3 (12-20-23 @ 16:29)  HR: 100 (12-21-23 @ 11:43)  BP: 119/65 (12-21-23 @ 11:43)  RR: 18 (12-21-23 @ 11:43)  SpO2: 93% (12-21-23 @ 11:43)  Wt(kg): --  General: Nontoxic-appearing Male in no acute distress.  HEENT: AT/NC. Anicteric. Conjunctiva pink and moist. Oropharynx clear. Dentition fair.  Neck: Not rigid. No sense of mass.  Nodes: None palpable.  Lungs: Decreased BS L >> R  Heart: Irreg irreg  Abdomen: Bowel sounds present and normoactive. Soft. Nondistended. Nontender. No sense of mass. No organomegaly.  Back: No spinal tenderness. No costovertebral angle tenderness.   Extremities: No cyanosis or clubbing. 1+ LEedema.   Skin: Warm. Dry. Good turgor. No rash. No vasculitic stigmata.  Psychiatric: Appropriate affect and mood for situation.         Laboratory & Imaging Data--  CBC                        7.7    13.94 )-----------( 475      ( 21 Dec 2023 03:43 )             25.8       Chemistries  12-21    137  |  96  |  38<H>  ----------------------------<  89  3.7   |  26  |  1.62<H>    Ca    9.0      21 Dec 2023 07:44    TPro  6.3  /  Alb  2.6<L>  /  TBili  0.6  /  DBili  x   /  AST  18  /  ALT  11  /  AlkPhos  97  12-21    Respiratory Viral Panel with COVID-19 by SUSANNAH (12.21.23 @ 01:12)    Rapid RVP Result: Detected   SARS-CoV-2: [facility]: This Respiratory Panel uses polymerase chain reaction (PCR) to detect for  adenovirus; coronavirus (HKU1, NL63, 229E, OC43); human metapneumovirus  (hMPV); human enterovirus/rhinovirus (Entero/RV); influenza A; influenza  A/H1; influenza A/H3; influenza A/H1-2009; influenza B; parainfluenza  viruses 1, 2, 3, 4; respiratory syncytial virus; Mycoplasma pneumoniae;  Chlamydophila pneumoniae; and SARS-CoV-2.   Entero/Rhinovirus (RapRVP): Detected        Culture Data    Culture - Sputum (collected 21 Dec 2023 01:12)  Source: .Sputum Sputum  Gram Stain (21 Dec 2023 11:54):    Moderate polymorphonuclear leukocytes per low power field    Moderate Squamous epithelial cells per low power field    Rare Gram positive cocci in pairs per oil power field    Culture - Blood (collected 20 Dec 2023 04:35)  Source: .Blood Blood-Peripheral  Gram Stain (21 Dec 2023 03:27):    Growth in aerobic bottle: Gram Positive Cocci in Pairs and Chains    Growth in anaerobic bottle: Gram Positive Cocci in Pairs and Chains  Preliminary Report (21 Dec 2023 03:28):    Growth in aerobic bottle: Gram Positive Cocci in Pairs and Chains    Growth in anaerobic bottle: Gram Positive Cocci in Pairs and Chains    Direct identification is available within approximately 3-5    hours either by Blood Panel Multiplexed PCR or Direct    MALDI-TOF. Details: [email or web address]  Organism: Blood Culture PCR (21 Dec 2023 02:19)  Organism: Blood Culture PCR (21 Dec 2023 02:19)    Culture - Blood (collected 20 Dec 2023 04:20)  Source: .Blood Blood-Peripheral  Gram Stain (21 Dec 2023 04:03):    Growth in aerobic bottle: Gram Positive Cocci in Pairs and Chains    Growth in anaerobic bottle: Gram Positive Cocci in Pairs and Chains  Preliminary Report (21 Dec 2023 04:03):    Growth in aerobic bottle: Gram Positive Cocci in Pairs and Chains    Growth in anaerobic bottle: Gram Positive Cocci in Pairs and Chains    CT (personally reviewed) < from: CT Angio Chest PE Protocol w/ IV Cont (12.20.23 @ 06:56) >  ACC: [id number] EXAM:  CT ANGIO CHEST PULM ART [facility]   ORDERED BY: AL DUNCAN   PROCEDURE DATE:  12/20/2023    INTERPRETATION:  CLINICAL INDICATION: Shortness of breath  TECHNIQUE: A volumetric acquisition of the chest was obtained from the   thoracic inlet to the upper abdomen during dynamic administration of 70cc   Omnipaque 350 intravenous contrast according to the PE protocol. 3D MIP   images were provided.  COMPARISON: Chest CT 10/7/2023    FINDINGS:  CTA: No main, lobar, orsegmental pulmonary embolism. Evaluation of   subsegmental branches is limited due to respiratory motion and suboptimal   opacification.Main pulmonary arterial segment is dilated up to 3.7 cm,   may be seen with pulmonary artery hypertension. Cardiomegaly. No   pericardial effusion. Aortic valvular calcifications. Coronary artery   calcifications. Calcific atherosclerosis of the ascending thoracic aorta.   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    Lungs/Airways/Pleura: Secretions are noted in left mainstem bronchus and   bilateral lower lobe segmental bronchi. Emphysema. Reticular opacities   with architectural distortion and left upper lobe with groundglass   opacity. Moderate left pleural effusion which is circumferential and   loculated in appearance with associated compressive atelectasis. There is   near complete collapse of the left lower lobe. Small right pleural   effusion. Calcified pleural plaques, bilaterally.    Mediastinum/Lymph nodes: No thoracic adenopathy.    Upper Abdomen: small upper pole left renal cyst.    Bones and Soft Tissues: Gynecomastia. Bilateral flank soft tissue edema.   Degenerative changes of the thoracic spineand left shoulder.        IMPRESSION:  1.  No main, lobar, or segmental pulmonary embolism.  2.  Moderate loculated left pleural effusion and small right pleural   effusion with associated compressive atelectasis.    --- End of Report ---           ASHLY HUYNH MD; Resident Radiologist  This document has been electronically signed.  REYNA STEIN MD; Attending Radiologist  This document has been electronically signed. Dec 20 2023  8:46AM    < end of copied text >        CT (personally reviewed) < from: CT Angio Chest PE Protocol w/ IV Cont (10.07.23 @ 19:16) >  ACC: [id number] EXAM:  CT ABDOMEN AND PELVIS IC   ORDERED BY: ARGENTINA BERGMAN   ACC: [id number] EXAM:  CT ANGIO CHEST PULM ART [facility]   ORDERED BY: ARGENTINA BERGMAN   PROCEDURE DATE:  10/07/2023      INTERPRETATION:  CLINICAL INFORMATION: 83-year-old man with upper and   lower extremity swelling. History COPD and interstitial lung disease   secondary to asbestosis. Status post Reyes's procedure 08/17/2023  COMPARISON: CT 8/21/2023 and 8/9/2023  CONTRAST/COMPLICATIONS:  IV Contrast: Omnipaque 350 (accession [id number]), IV contrast documented   in unlinked concurrent exam (accession [id number])  90 cc administered   10   cc discarded  Oral Contrast: NONE  Complications: None reported at time of study completion    PROCEDURE:  CT Angiography of the Chest was performed followed by portal venous phase   imaging of the Abdomen and Pelvis.  Sagittal and coronal reformats were performed as well as 3D (MIP)   reconstructions.    FINDINGS:  CHEST:  LUNGS AND LARGE AIRWAYS: Patent central airways. Mild-to-moderate   emphysema  PLEURA: Pleural calcification. Small bilateral pleural effusions.  VESSELS: No pulmonary embolus.  HEART: Heart size is normal. No pericardial effusion. Coronary artery   calcification.  MEDIASTINUM AND ROSANNA: No lymphadenopathy.  CHEST WALL AND LOWER NECK: Within normal limits.    ABDOMEN AND PELVIS:  LIVER: Within normal limits.  BILE DUCTS: Normal caliber.  GALLBLADDER: Within normal limits.  SPLEEN: Within normal limits.  PANCREAS: Within normal limits.  ADRENALS: Within normal limits.  KIDNEYS/URETERS: Left renal cyst.  BLADDER: Within normal limits.  REPRODUCTIVE ORGANS: Mildly enlarged prostate.    BOWEL: No bowel obstruction. Left lower quadrant colostomy. Appendix at   visualized. No evidenceof appendicitis.  PERITONEUM: No ascites.  VESSELS: Atherosclerosis. Ectatic abdominal aorta and common iliac   arteries.  RETROPERITONEUM/LYMPH NODES: No lymphadenopathy.  ABDOMINAL WALL: Within normal limits.  BONES: Degenerative change. Grade 2 spondylolisthesis of L5 on S1 with   bilateral spondylolysis.    IMPRESSION:  No pulmonary embolus.  Calcified pleural plaques consistent with history of asbestosis  Emphysema.  No acute intra-abdominal process        --- End of Report ---            PHONG LÓPEZ MD; Attending Radiologist  This document has been electronically signed. Oct  7 2023  7:46PM    < end of copied text >     Full note to follow  Pneumococcal pneumonia with septicemia  New large pleural effusion. Had some chest pain prior to coming to the hospital, now resolved  Concur with plan for thoracocentesis to assess for empyema  Continue ceftriaxone  F/U sensi of blood isolate  Would repeat blood cx to document control of bacteremia, or lack thereof  Pleural fluid for pH cell count, differential, LDH, protein, glucose, gram stain/cx  F/U ECHO results  COPD management as per pulmonary and primary team  Reviewed with Dr. Clark  Thank you for the courtesy of this referral.  Markel Morales MD  Attending Physician  [facility]  Division of Infectious Diseases  [phone]  ==============  [facility]  [phone]    GLADIS LEONARD  83y, Male  [id number]    HPI--  83M with PMH HTN, COPD, ILD/Asbestosis, CHF, AF, colon Ca s/p signoid colectomy/colostomy, presents with 2 days of feeling unwell with vague upper chest discomfort fatigue, sweats, nonproductive cough increased from baseline, and shortness of breath. No fevere. Here noted to hae large pleural effusion on chest imaging, and now also known to have +BC for S. pneumoniae. Thoracocentesis planned.    Patient feeling better overall    PMH/PSH--  Asbestosis (ICD9 501)    HTN (Hypertension)    BPH (Benign Prostatic Hypertrophy)    Dyslipidemia    Localized Osteoarthrosis, Lower Leg    Obesity    COPD with emphysema    Pulmonary nodule    Thyroid nodule    Aortic ectasia    History of diastolic dysfunction    Venous insufficiency    S/P Cystoscopy (ICD9 V45.89)    Hyperlipidemia (ICD9 272.4)    Localized Osteoarthrosis, Lower Leg    S/P Arthroscopy of Left Knee    Cataract    Inguinal Hernia x2    History of Tonsillectomy    History of appendectomy        Allergies--  No Known Allergies      Medications--  Antibiotics: azithromycin  IVPB 500 milliGRAM(s) IV Intermittent every 24 hours  cefTRIAXone   IVPB 2000 milliGRAM(s) IV Intermittent every 24 hours    Immunologic: influenza  Vaccine (HIGH DOSE) 0.7 milliLiter(s) IntraMuscular once    Other: acetaminophen     Tablet .. PRN  albuterol/ipratropium for Nebulization  atorvastatin  budesonide 160 MICROgram(s)/formoterol 4.5 MICROgram(s) Inhaler  buMETAnide Injectable  finasteride  heparin   Injectable PRN  heparin   Injectable PRN  heparin  Infusion.  methylPREDNISolone sodium succinate Injectable  metoprolol tartrate  pantoprazole    Tablet  tamsulosin    Antimicrobials last 90 days per EMR: MEDICATIONS  (STANDING):    azithromycin  IVPB   255 mL/Hr IV Intermittent (12-20-23 @ 16:39)    cefepime   IVPB   100 mL/Hr IV Intermittent (12-20-23 @ 04:44)    cefTRIAXone   IVPB   100 mL/Hr IV Intermittent (12-20-23 @ 20:15)    vancomycin  IVPB.   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   Review of systems otherwise negative except as previously noted.    Physical Exam--  Vital Signs: T(F): 97.5 (12-21-23 @ 11:43), Max: 98.3 (12-20-23 @ 16:29)  HR: 100 (12-21-23 @ 11:43)  BP: 119/65 (12-21-23 @ 11:43)  RR: 18 (12-21-23 @ 11:43)  SpO2: 93% (12-21-23 @ 11:43)  Wt(kg): --  General: Nontoxic-appearing Male in no acute distress.  HEENT: AT/NC. Anicteric. Conjunctiva pink and moist. Oropharynx clear. Dentition fair.  Neck: Not rigid. No sense of mass.  Nodes: None palpable.  Lungs: Decreased BS L >> R  Heart: Irreg irreg  Abdomen: Bowel sounds present and normoactive. Soft. Nondistended. Nontender. No sense of mass. No organomegaly.  Back: No spinal tenderness. No costovertebral angle tenderness.   Extremities: No cyanosis or clubbing. 1+ LEedema.   Skin: Warm. Dry. Good turgor. No rash. No vasculitic stigmata.  Psychiatric: Appropriate affect and mood for situation.         Laboratory & Imaging Data--  CBC                        7.7    13.94 )-----------( 475      ( 21 Dec 2023 03:43 )             25.8       Chemistries  12-21    137  |  96  |  38<H>  ----------------------------<  89  3.7   |  26  |  1.62<H>    Ca    9.0      21 Dec 2023 07:44    TPro  6.3  /  Alb  2.6<L>  /  TBili  0.6  /  DBili  x   /  AST  18  /  ALT  11  /  AlkPhos  97  12-21    Respiratory Viral Panel with COVID-19 by SUSANNAH (12.21.23 @ 01:12)    Rapid RVP Result: Detected   SARS-CoV-2: [facility]: This Respiratory Panel uses polymerase chain reaction (PCR) to detect for  adenovirus; coronavirus (HKU1, NL63, 229E, OC43); human metapneumovirus  (hMPV); human enterovirus/rhinovirus (Entero/RV); influenza A; influenza  A/H1; influenza A/H3; influenza A/H1-2009; influenza B; parainfluenza  viruses 1, 2, 3, 4; respiratory syncytial virus; Mycoplasma pneumoniae;  Chlamydophila pneumoniae; and SARS-CoV-2.   Entero/Rhinovirus (RapRVP): Detected        Culture Data    Culture - Sputum (collected 21 Dec 2023 01:12)  Source: .Sputum Sputum  Gram Stain (21 Dec 2023 11:54):    Moderate polymorphonuclear leukocytes per low power field    Moderate Squamous epithelial cells per low power field    Rare Gram positive cocci in pairs per oil power field    Culture - Blood (collected 20 Dec 2023 04:35)  Source: .Blood Blood-Peripheral  Gram Stain (21 Dec 2023 03:27):    Growth in aerobic bottle: Gram Positive Cocci in Pairs and Chains    Growth in anaerobic bottle: Gram Positive Cocci in Pairs and Chains  Preliminary Report (21 Dec 2023 03:28):    Growth in aerobic bottle: Gram Positive Cocci in Pairs and Chains    Growth in anaerobic bottle: Gram Positive Cocci in Pairs and Chains    Direct identification is available within approximately 3-5    hours either by Blood Panel Multiplexed PCR or Direct    MALDI-TOF. Details: [email or web address]  Organism: Blood Culture PCR (21 Dec 2023 02:19)  Organism: Blood Culture PCR (21 Dec 2023 02:19)    Culture - Blood (collected 20 Dec 2023 04:20)  Source: .Blood Blood-Peripheral  Gram Stain (21 Dec 2023 04:03):    Growth in aerobic bottle: Gram Positive Cocci in Pairs and Chains    Growth in anaerobic bottle: Gram Positive Cocci in Pairs and Chains  Preliminary Report (21 Dec 2023 04:03):    Growth in aerobic bottle: Gram Positive Cocci in Pairs and Chains    Growth in anaerobic bottle: Gram Positive Cocci in Pairs and Chains    CT (personally reviewed) < from: CT Angio Chest PE Protocol w/ IV Cont (12.20.23 @ 06:56) >  ACC: [id number] EXAM:  CT ANGIO CHEST PULM ART [facility]   ORDERED BY: AL DUNCAN   PROCEDURE DATE:  12/20/2023    INTERPRETATION:  CLINICAL INDICATION: Shortness of breath  TECHNIQUE: A volumetric acquisition of the chest was obtained from the   thoracic inlet to the upper abdomen during dynamic administration of 70cc   Omnipaque 350 intravenous contrast according to the PE protocol. 3D MIP   images were provided.  COMPARISON: Chest CT 10/7/2023    FINDINGS:  CTA: No main, lobar, orsegmental pulmonary embolism. Evaluation of   subsegmental branches is limited due to respiratory motion and suboptimal   opacification.Main pulmonary arterial segment is dilated up to 3.7 cm,   may be seen with pulmonary artery hypertension. Cardiomegaly. No   pericardial effusion. Aortic valvular calcifications. Coronary artery   calcifications. Calcific atherosclerosis of the ascending thoracic aorta.   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    Lungs/Airways/Pleura: Secretions are noted in left mainstem bronchus and   bilateral lower lobe segmental bronchi. Emphysema. Reticular opacities   with architectural distortion and left upper lobe with groundglass   opacity. Moderate left pleural effusion which is circumferential and   loculated in appearance with associated compressive atelectasis. There is   near complete collapse of the left lower lobe. Small right pleural   effusion. Calcified pleural plaques, bilaterally.    Mediastinum/Lymph nodes: No thoracic adenopathy.    Upper Abdomen: small upper pole left renal cyst.    Bones and Soft Tissues: Gynecomastia. Bilateral flank soft tissue edema.   Degenerative changes of the thoracic spineand left shoulder.        IMPRESSION:  1.  No main, lobar, or segmental pulmonary embolism.  2.  Moderate loculated left pleural effusion and small right pleural   effusion with associated compressive atelectasis.    --- End of Report ---           ASHLY HUYNH MD; Resident Radiologist  This document has been electronically signed.  REYNA STEIN MD; Attending Radiologist  This document has been electronically signed. Dec 20 2023  8:46AM    < end of copied text >        CT (personally reviewed) < from: CT Angio Chest PE Protocol w/ IV Cont (10.07.23 @ 19:16) >  ACC: [id number] EXAM:  CT ABDOMEN AND PELVIS IC   ORDERED BY: ARGENTINA BERGMAN   ACC: [id number] EXAM:  CT ANGIO CHEST PULM ART [facility]   ORDERED BY: ARGENTINA BERGMAN   PROCEDURE DATE:  10/07/2023      INTERPRETATION:  CLINICAL INFORMATION: 83-year-old man with upper and   lower extremity swelling. History COPD and interstitial lung disease   secondary to asbestosis. Status post Reyes's procedure 08/17/2023  COMPARISON: CT 8/21/2023 and 8/9/2023  CONTRAST/COMPLICATIONS:  IV Contrast: Omnipaque 350 (accession [id number]), IV contrast documented   in unlinked concurrent exam (accession [id number])  90 cc administered   10   cc discarded  Oral Contrast: NONE  Complications: None reported at time of study completion    PROCEDURE:  CT Angiography of the Chest was performed followed by portal venous phase   imaging of the Abdomen and Pelvis.  Sagittal and coronal reformats were performed as well as 3D (MIP)   reconstructions.    FINDINGS:  CHEST:  LUNGS AND LARGE AIRWAYS: Patent central airways. Mild-to-moderate   emphysema  PLEURA: Pleural calcification. Small bilateral pleural effusions.  VESSELS: No pulmonary embolus.  HEART: Heart size is normal. No pericardial effusion. Coronary artery   calcification.  MEDIASTINUM AND ROSANNA: No lymphadenopathy.  CHEST WALL AND LOWER NECK: Within normal limits.    ABDOMEN AND PELVIS:  LIVER: Within normal limits.  BILE DUCTS: Normal caliber.  GALLBLADDER: Within normal limits.  SPLEEN: Within normal limits.  PANCREAS: Within normal limits.  ADRENALS: Within normal limits.  KIDNEYS/URETERS: Left renal cyst.  BLADDER: Within normal limits.  REPRODUCTIVE ORGANS: Mildly enlarged prostate.    BOWEL: No bowel obstruction. Left lower quadrant colostomy. Appendix at   visualized. No evidenceof appendicitis.  PERITONEUM: No ascites.  VESSELS: Atherosclerosis. Ectatic abdominal aorta and common iliac   arteries.  RETROPERITONEUM/LYMPH NODES: No lymphadenopathy.  ABDOMINAL WALL: Within normal limits.  BONES: Degenerative change. Grade 2 spondylolisthesis of L5 on S1 with   bilateral spondylolysis.    IMPRESSION:  No pulmonary embolus.  Calcified pleural plaques consistent with history of asbestosis  Emphysema.  No acute intra-abdominal process        --- End of Report ---            PHONG LÓPEZ MD; Attending Radiologist  This document has been electronically signed. Oct  7 2023  7:46PM    < end of copied text >

## 2023-12-21 NOTE — PROGRESS NOTE ADULT - PROBLEM SELECTOR PLAN 1
Likely multifactorial - CAP and component of HFpEF  SIRS criteria at admission RR 36, WBC elevated  f/u blood cx, UA, urine cx, MRSA, sputum cx, procalcitonin, legionella and strep urine Ag  Start CTX + Azithromycin  Aspiration Precautions  Further plan per HFpEF below  CTPE chest showed loculated moderate L pleural effusion and small R pleural effusion  Consult pulm Likely multifactorial - CAP and component of HFpEF, entero/rhino virus +, COPD exacerbation   SIRS criteria at admission RR 36, WBC elevated  CTPE chest showed loculated moderate L pleural effusion and small R pleural effusion  RVP pos for entero/rhino   sputum culture positive, Bcx x2 positive for strep pneumo   procal elevated   UA positive with bacteria, WBCs  - pulm consulted - recs appreciated - potential tap on Friday    - started ceftriaxone 2000 q24   - started azithro 500 124  - ID consulted - will appreciate recs  - Aspiration Precautions  - Further plan per HFpEF below  - methylpred decreased from 60 IV BID started in the ED to 40 IV BID  - c/w Symbicort, duoneb

## 2023-12-21 NOTE — DIETITIAN INITIAL EVALUATION ADULT - LAB (SPECIFY)
BMP, Electrolytes, Glucose Levels (in setting of steroid use) BMP, Electrolytes, Glucose (in setting of steroid use)

## 2023-12-21 NOTE — PROGRESS NOTE ADULT - PROBLEM SELECTOR PLAN 2
Baseline BNP ~ 2800. BNP ~ 2800 this admission  In chronic exacerbation state with 3+ pitting edema and on diuresis  HFpEF or HFrEF  EF = 68% on 8/11/23  Heart failure, CHF order set initiated  Guideline directed medical therapy as below: after med-rec completed  - Diuretic: on bumex PO 2 mg am 1 mg pm at home. Switch to IV bumex 2 mg QD while admitted  - BB:  resume home metoprolol tatrate 25 mg BID. Switch to xl at d/c  - ARNI/ACE-I/ARB: Consider starting at d/c  - Hydralazine/Nitrate: Not indicated at this time  - MRA: Not indicated at this time  - SGLT2: Consider starting at d/c  Maintain K > 4, Mg > 2  Consult primary cardiologist Baseline BNP ~ 2800. BNP ~ 2800 this admission  In chronic exacerbation state with 3+ pitting edema and on diuresis  HFpEF or HFrEF  EF = 68% on 8/11/23  Heart failure, CHF order set initiated  Guideline directed medical therapy as below: after med-rec completed  - f/u TTE  - bumex 2 IV daily   - Diuretic: on bumex PO 2 mg am 1 mg pm at home. Switch to IV bumex 2 mg QD while admitted  - BB:  resume home metoprolol tatrate 25 mg BID. Switch to xl at d/c  - ARNI/ACE-I/ARB: Consider starting at d/c  - Hydralazine/Nitrate: Not indicated at this time  - MRA: Not indicated at this time  - SGLT2: Consider starting at d/c  - Maintain K > 4, Mg > 2

## 2023-12-21 NOTE — PROGRESS NOTE ADULT - ASSESSMENT
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 found to have hypoxemic respiratory failure secondary to pna vs copd vs AHRF    #recommendations    patient declining inhalers however amenable nebulizers please start standing duonebs and methylpred 40 BID starting tomorrow  patient with significant increased mucus production and sepsis please continue ceftriaxone and azithromycin  patient with increased leg swelling please start IV diuresis  POCUS with small right pleural effusion with atelectatic lung and moderate complex left pleural effusion  please transition to heparin ggt, hold eliquis, possible thoracentesis on friday  needs aggressive airway clearance: humidified oxygen, standing guaifenesin, duonebs q6h, hyper sal q6h, chest PT q6 h, aerobika/acapella q6 h, deep NT suction q6 hr, chest vest q12h  please obtain if not already done  full RVP, sputum culture, blood cultures, U/A, urine legionella, nasal MRSA: ERV positive  strep bactermia   pro BNP, ECHO with bubble study    titrate oxygen to O2 >88%, use humidified oxygen  incentive spirometry, OOB to chair, PT/OT  aspiration precautions as necessary  DVT ppx as tolerated  airway clearance as necessary      full consult to follow    Eugenio Barraza PGY5 PCCM Fellow 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 found to have hypoxemic respiratory failure secondary to pna vs copd vs AHRF    #recommendations    patient declining inhalers however amenable nebulizers please start standing duonebs and methylpred 40 BID starting tomorrow  patient with significant increased mucus production and sepsis please continue ceftriaxone and azithromycin  patient with increased leg swelling please start IV diuresis  POCUS with small right pleural effusion with atelectatic lung and moderate complex left pleural effusion  please transition to heparin ggt, hold eliquis, possible thoracentesis on friday  needs aggressive airway clearance: humidified oxygen, standing guaifenesin, duonebs q6h, hyper sal q6h, chest PT q6 h, aerobika/acapella q6 h, deep NT suction q6 hr, chest vest q12h  please obtain if not already done  f/u full RVP, sputum culture, blood cultures, U/A, urine legionella, nasal MRSA: ERV positive  strep bactermia   pro BNP, ECHO with bubble study    titrate oxygen to O2 >88%, use humidified oxygen  incentive spirometry, OOB to chair, PT/OT  aspiration precautions as necessary  DVT ppx as tolerated  airway clearance as necessary      full consult to follow    Eugenio Barraza PGY5 PCCM Fellow 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 found to have hypoxemic respiratory failure secondary to pna vs copd vs AHRF    #recommendations    patient declining inhalers however amenable nebulizers please start standing duonebs and methylpred 40 BID continue   patient with significant increased mucus production and sepsis please continue ceftriaxone and azithromycin  patient with increased leg swelling please start IV diuresis  POCUS with small right pleural effusion with atelectatic lung and moderate complex left pleural effusion  please transition to heparin ggt, hold eliquis, possible thoracentesis on friday  needs aggressive airway clearance: humidified oxygen, standing guaifenesin, duonebs q6h, hyper sal q6h, chest PT q6 h, aerobika/acapella q6 h, deep NT suction q6 hr, chest vest q12h  please obtain if not already done  f/u full RVP, sputum culture, blood cultures, U/A, urine legionella, nasal MRSA: ERV positive  strep bactermia   pro BNP, ECHO with bubble study    titrate oxygen to O2 >88%, use humidified oxygen  incentive spirometry, OOB to chair, PT/OT  aspiration precautions as necessary  DVT ppx as tolerated  airway clearance as necessary      full consult to follow    Eugenio Barraza PGY5 PCCM Fellow

## 2023-12-22 ENCOUNTER — RESULT REVIEW (OUTPATIENT)
Age: 83
End: 2023-12-22

## 2023-12-22 DIAGNOSIS — A40.3 SEPSIS DUE TO STREPTOCOCCUS PNEUMONIAE: ICD-10-CM

## 2023-12-22 DIAGNOSIS — N17.9 ACUTE KIDNEY FAILURE, UNSPECIFIED: ICD-10-CM

## 2023-12-22 DIAGNOSIS — J13 PNEUMONIA DUE TO STREPTOCOCCUS PNEUMONIAE: ICD-10-CM

## 2023-12-22 DIAGNOSIS — N17.0 ACUTE KIDNEY FAILURE WITH TUBULAR NECROSIS: ICD-10-CM

## 2023-12-22 DIAGNOSIS — J90 PLEURAL EFFUSION, NOT ELSEWHERE CLASSIFIED: ICD-10-CM

## 2023-12-22 LAB
-  CEFTRIAXONE (MENINGITIDIS): SIGNIFICANT CHANGE UP
-  CEFTRIAXONE (MENINGITIDIS): SIGNIFICANT CHANGE UP
-  CEFTRIAXONE (NON-MENINGITIDIS): SIGNIFICANT CHANGE UP
-  CEFTRIAXONE (NON-MENINGITIDIS): SIGNIFICANT CHANGE UP
-  ERYTHROMYCIN: SIGNIFICANT CHANGE UP
-  ERYTHROMYCIN: SIGNIFICANT CHANGE UP
-  LEVOFLOXACIN: SIGNIFICANT CHANGE UP
-  LEVOFLOXACIN: SIGNIFICANT CHANGE UP
-  PENICILLIN (MENINGITIDIS): SIGNIFICANT CHANGE UP
-  PENICILLIN (MENINGITIDIS): SIGNIFICANT CHANGE UP
-  PENICILLIN (NON-MENINGITIDIS): SIGNIFICANT CHANGE UP
-  PENICILLIN (NON-MENINGITIDIS): SIGNIFICANT CHANGE UP
-  PENICILLIN (ORAL PENICILLIN V): SIGNIFICANT CHANGE UP
-  PENICILLIN (ORAL PENICILLIN V): SIGNIFICANT CHANGE UP
-  TETRACYCLINE: SIGNIFICANT CHANGE UP
-  TETRACYCLINE: SIGNIFICANT CHANGE UP
-  TRIMETHOPRIM/SULFAMETHOXAZOLE: SIGNIFICANT CHANGE UP
-  TRIMETHOPRIM/SULFAMETHOXAZOLE: SIGNIFICANT CHANGE UP
-  VANCOMYCIN: SIGNIFICANT CHANGE UP
-  VANCOMYCIN: SIGNIFICANT CHANGE UP
A1C WITH ESTIMATED AVERAGE GLUCOSE RESULT: 5.9 % — HIGH (ref 4–5.6)
A1C WITH ESTIMATED AVERAGE GLUCOSE RESULT: 5.9 % — HIGH (ref 4–5.6)
ALBUMIN FLD-MCNC: 1.6 G/DL — SIGNIFICANT CHANGE UP
ALBUMIN FLD-MCNC: 1.6 G/DL — SIGNIFICANT CHANGE UP
ALBUMIN SERPL ELPH-MCNC: 2.7 G/DL — LOW (ref 3.3–5)
ALBUMIN SERPL ELPH-MCNC: 2.7 G/DL — LOW (ref 3.3–5)
ALP SERPL-CCNC: 98 U/L — SIGNIFICANT CHANGE UP (ref 40–120)
ALP SERPL-CCNC: 98 U/L — SIGNIFICANT CHANGE UP (ref 40–120)
ALT FLD-CCNC: 15 U/L — SIGNIFICANT CHANGE UP (ref 10–45)
ALT FLD-CCNC: 15 U/L — SIGNIFICANT CHANGE UP (ref 10–45)
ANION GAP SERPL CALC-SCNC: 14 MMOL/L — SIGNIFICANT CHANGE UP (ref 5–17)
ANION GAP SERPL CALC-SCNC: 14 MMOL/L — SIGNIFICANT CHANGE UP (ref 5–17)
APPEARANCE UR: ABNORMAL
APPEARANCE UR: ABNORMAL
APTT BLD: 124.3 SEC — CRITICAL HIGH (ref 24.5–35.6)
APTT BLD: 124.3 SEC — CRITICAL HIGH (ref 24.5–35.6)
APTT BLD: 34.8 SEC — SIGNIFICANT CHANGE UP (ref 24.5–35.6)
APTT BLD: 34.8 SEC — SIGNIFICANT CHANGE UP (ref 24.5–35.6)
AST SERPL-CCNC: 20 U/L — SIGNIFICANT CHANGE UP (ref 10–40)
AST SERPL-CCNC: 20 U/L — SIGNIFICANT CHANGE UP (ref 10–40)
B PERT IGG+IGM PNL SER: ABNORMAL
B PERT IGG+IGM PNL SER: ABNORMAL
BACTERIA # UR AUTO: NEGATIVE /HPF — SIGNIFICANT CHANGE UP
BACTERIA # UR AUTO: NEGATIVE /HPF — SIGNIFICANT CHANGE UP
BILIRUB SERPL-MCNC: 0.3 MG/DL — SIGNIFICANT CHANGE UP (ref 0.2–1.2)
BILIRUB SERPL-MCNC: 0.3 MG/DL — SIGNIFICANT CHANGE UP (ref 0.2–1.2)
BILIRUB UR-MCNC: NEGATIVE — SIGNIFICANT CHANGE UP
BILIRUB UR-MCNC: NEGATIVE — SIGNIFICANT CHANGE UP
BUN SERPL-MCNC: 60 MG/DL — HIGH (ref 7–23)
BUN SERPL-MCNC: 60 MG/DL — HIGH (ref 7–23)
CALCIUM SERPL-MCNC: 9 MG/DL — SIGNIFICANT CHANGE UP (ref 8.4–10.5)
CALCIUM SERPL-MCNC: 9 MG/DL — SIGNIFICANT CHANGE UP (ref 8.4–10.5)
CAST: 17 /LPF — HIGH (ref 0–4)
CAST: 17 /LPF — HIGH (ref 0–4)
CHLORIDE SERPL-SCNC: 91 MMOL/L — LOW (ref 96–108)
CHLORIDE SERPL-SCNC: 91 MMOL/L — LOW (ref 96–108)
CO2 SERPL-SCNC: 25 MMOL/L — SIGNIFICANT CHANGE UP (ref 22–31)
CO2 SERPL-SCNC: 25 MMOL/L — SIGNIFICANT CHANGE UP (ref 22–31)
COLOR FLD: ABNORMAL
COLOR FLD: ABNORMAL
COLOR SPEC: YELLOW — SIGNIFICANT CHANGE UP
COLOR SPEC: YELLOW — SIGNIFICANT CHANGE UP
CREAT ?TM UR-MCNC: 100 MG/DL — SIGNIFICANT CHANGE UP
CREAT ?TM UR-MCNC: 100 MG/DL — SIGNIFICANT CHANGE UP
CREAT SERPL-MCNC: 3.44 MG/DL — HIGH (ref 0.5–1.3)
CREAT SERPL-MCNC: 3.44 MG/DL — HIGH (ref 0.5–1.3)
CULTURE RESULTS: ABNORMAL
DIFF PNL FLD: NEGATIVE — SIGNIFICANT CHANGE UP
DIFF PNL FLD: NEGATIVE — SIGNIFICANT CHANGE UP
EGFR: 17 ML/MIN/1.73M2 — LOW
EGFR: 17 ML/MIN/1.73M2 — LOW
ESTIMATED AVERAGE GLUCOSE: 123 MG/DL — HIGH (ref 68–114)
ESTIMATED AVERAGE GLUCOSE: 123 MG/DL — HIGH (ref 68–114)
FLUID INTAKE SUBSTANCE CLASS: SIGNIFICANT CHANGE UP
FLUID INTAKE SUBSTANCE CLASS: SIGNIFICANT CHANGE UP
GLUCOSE BLDC GLUCOMTR-MCNC: 233 MG/DL — HIGH (ref 70–99)
GLUCOSE BLDC GLUCOMTR-MCNC: 233 MG/DL — HIGH (ref 70–99)
GLUCOSE BLDC GLUCOMTR-MCNC: 245 MG/DL — HIGH (ref 70–99)
GLUCOSE BLDC GLUCOMTR-MCNC: 245 MG/DL — HIGH (ref 70–99)
GLUCOSE FLD-MCNC: 190 MG/DL — SIGNIFICANT CHANGE UP
GLUCOSE FLD-MCNC: 190 MG/DL — SIGNIFICANT CHANGE UP
GLUCOSE SERPL-MCNC: 178 MG/DL — HIGH (ref 70–99)
GLUCOSE SERPL-MCNC: 178 MG/DL — HIGH (ref 70–99)
GLUCOSE UR QL: NEGATIVE MG/DL — SIGNIFICANT CHANGE UP
GLUCOSE UR QL: NEGATIVE MG/DL — SIGNIFICANT CHANGE UP
HCT VFR BLD CALC: 26.2 % — LOW (ref 39–50)
HCT VFR BLD CALC: 26.2 % — LOW (ref 39–50)
HGB BLD-MCNC: 8 G/DL — LOW (ref 13–17)
HGB BLD-MCNC: 8 G/DL — LOW (ref 13–17)
INR BLD: 1.52 RATIO — HIGH (ref 0.85–1.18)
INR BLD: 1.52 RATIO — HIGH (ref 0.85–1.18)
KETONES UR-MCNC: NEGATIVE MG/DL — SIGNIFICANT CHANGE UP
KETONES UR-MCNC: NEGATIVE MG/DL — SIGNIFICANT CHANGE UP
LDH SERPL L TO P-CCNC: 571 U/L — SIGNIFICANT CHANGE UP
LDH SERPL L TO P-CCNC: 571 U/L — SIGNIFICANT CHANGE UP
LEUKOCYTE ESTERASE UR-ACNC: ABNORMAL
LEUKOCYTE ESTERASE UR-ACNC: ABNORMAL
LYMPHOCYTES # FLD: 9 % — SIGNIFICANT CHANGE UP
LYMPHOCYTES # FLD: 9 % — SIGNIFICANT CHANGE UP
MAGNESIUM SERPL-MCNC: 1.9 MG/DL — SIGNIFICANT CHANGE UP (ref 1.6–2.6)
MAGNESIUM SERPL-MCNC: 1.9 MG/DL — SIGNIFICANT CHANGE UP (ref 1.6–2.6)
MCHC RBC-ENTMCNC: 21.5 PG — LOW (ref 27–34)
MCHC RBC-ENTMCNC: 21.5 PG — LOW (ref 27–34)
MCHC RBC-ENTMCNC: 30.5 GM/DL — LOW (ref 32–36)
MCHC RBC-ENTMCNC: 30.5 GM/DL — LOW (ref 32–36)
MCV RBC AUTO: 70.4 FL — LOW (ref 80–100)
MCV RBC AUTO: 70.4 FL — LOW (ref 80–100)
METHOD TYPE: SIGNIFICANT CHANGE UP
METHOD TYPE: SIGNIFICANT CHANGE UP
MONOS+MACROS # FLD: 13 % — SIGNIFICANT CHANGE UP
MONOS+MACROS # FLD: 13 % — SIGNIFICANT CHANGE UP
NEUTROPHILS-BODY FLUID: 78 % — SIGNIFICANT CHANGE UP
NEUTROPHILS-BODY FLUID: 78 % — SIGNIFICANT CHANGE UP
NITRITE UR-MCNC: NEGATIVE — SIGNIFICANT CHANGE UP
NITRITE UR-MCNC: NEGATIVE — SIGNIFICANT CHANGE UP
NRBC # BLD: 0 /100 WBCS — SIGNIFICANT CHANGE UP (ref 0–0)
NRBC # BLD: 0 /100 WBCS — SIGNIFICANT CHANGE UP (ref 0–0)
NT-PROBNP SERPL-SCNC: HIGH PG/ML (ref 0–300)
NT-PROBNP SERPL-SCNC: HIGH PG/ML (ref 0–300)
OSMOLALITY UR: 322 MOS/KG — SIGNIFICANT CHANGE UP (ref 300–900)
OSMOLALITY UR: 322 MOS/KG — SIGNIFICANT CHANGE UP (ref 300–900)
PH FLD: 7.47 — SIGNIFICANT CHANGE UP
PH FLD: 7.47 — SIGNIFICANT CHANGE UP
PH UR: 5 — SIGNIFICANT CHANGE UP (ref 5–8)
PH UR: 5 — SIGNIFICANT CHANGE UP (ref 5–8)
PHOSPHATE SERPL-MCNC: 5.5 MG/DL — HIGH (ref 2.5–4.5)
PHOSPHATE SERPL-MCNC: 5.5 MG/DL — HIGH (ref 2.5–4.5)
PLATELET # BLD AUTO: 505 K/UL — HIGH (ref 150–400)
PLATELET # BLD AUTO: 505 K/UL — HIGH (ref 150–400)
POTASSIUM SERPL-MCNC: 3.9 MMOL/L — SIGNIFICANT CHANGE UP (ref 3.5–5.3)
POTASSIUM SERPL-MCNC: 3.9 MMOL/L — SIGNIFICANT CHANGE UP (ref 3.5–5.3)
POTASSIUM SERPL-SCNC: 3.9 MMOL/L — SIGNIFICANT CHANGE UP (ref 3.5–5.3)
POTASSIUM SERPL-SCNC: 3.9 MMOL/L — SIGNIFICANT CHANGE UP (ref 3.5–5.3)
POTASSIUM UR-SCNC: 38 MMOL/L — SIGNIFICANT CHANGE UP
POTASSIUM UR-SCNC: 38 MMOL/L — SIGNIFICANT CHANGE UP
PROT ?TM UR-MCNC: 22 MG/DL — HIGH (ref 0–12)
PROT ?TM UR-MCNC: 22 MG/DL — HIGH (ref 0–12)
PROT FLD-MCNC: 3 G/DL — SIGNIFICANT CHANGE UP
PROT FLD-MCNC: 3 G/DL — SIGNIFICANT CHANGE UP
PROT SERPL-MCNC: 6.6 G/DL — SIGNIFICANT CHANGE UP (ref 6–8.3)
PROT SERPL-MCNC: 6.6 G/DL — SIGNIFICANT CHANGE UP (ref 6–8.3)
PROT UR-MCNC: SIGNIFICANT CHANGE UP MG/DL
PROT UR-MCNC: SIGNIFICANT CHANGE UP MG/DL
PROT/CREAT UR-RTO: 0.2 RATIO — SIGNIFICANT CHANGE UP (ref 0–0.2)
PROT/CREAT UR-RTO: 0.2 RATIO — SIGNIFICANT CHANGE UP (ref 0–0.2)
PROTHROM AB SERPL-ACNC: 15.8 SEC — HIGH (ref 9.5–13)
PROTHROM AB SERPL-ACNC: 15.8 SEC — HIGH (ref 9.5–13)
RBC # BLD: 3.72 M/UL — LOW (ref 4.2–5.8)
RBC # BLD: 3.72 M/UL — LOW (ref 4.2–5.8)
RBC # FLD: 19.4 % — HIGH (ref 10.3–14.5)
RBC # FLD: 19.4 % — HIGH (ref 10.3–14.5)
RBC CASTS # UR COMP ASSIST: 44 /HPF — HIGH (ref 0–4)
RBC CASTS # UR COMP ASSIST: 44 /HPF — HIGH (ref 0–4)
RCV VOL RI: HIGH CELLS/UL (ref 0–5)
RCV VOL RI: HIGH CELLS/UL (ref 0–5)
REVIEW: SIGNIFICANT CHANGE UP
REVIEW: SIGNIFICANT CHANGE UP
SODIUM SERPL-SCNC: 130 MMOL/L — LOW (ref 135–145)
SODIUM SERPL-SCNC: 130 MMOL/L — LOW (ref 135–145)
SODIUM UR-SCNC: 16 MMOL/L — SIGNIFICANT CHANGE UP
SODIUM UR-SCNC: 16 MMOL/L — SIGNIFICANT CHANGE UP
SP GR SPEC: >1.03 — HIGH (ref 1–1.03)
SP GR SPEC: >1.03 — HIGH (ref 1–1.03)
SPECIMEN SOURCE: SIGNIFICANT CHANGE UP
SQUAMOUS # UR AUTO: 1 /HPF — SIGNIFICANT CHANGE UP (ref 0–5)
SQUAMOUS # UR AUTO: 1 /HPF — SIGNIFICANT CHANGE UP (ref 0–5)
TOTAL NUCLEATED CELL COUNT, BODY FLUID: 2103 CELLS/UL — HIGH (ref 0–5)
TOTAL NUCLEATED CELL COUNT, BODY FLUID: 2103 CELLS/UL — HIGH (ref 0–5)
TUBE TYPE: SIGNIFICANT CHANGE UP
TUBE TYPE: SIGNIFICANT CHANGE UP
UROBILINOGEN FLD QL: 0.2 MG/DL — SIGNIFICANT CHANGE UP (ref 0.2–1)
UROBILINOGEN FLD QL: 0.2 MG/DL — SIGNIFICANT CHANGE UP (ref 0.2–1)
UUN UR-MCNC: 396 MG/DL — SIGNIFICANT CHANGE UP
UUN UR-MCNC: 396 MG/DL — SIGNIFICANT CHANGE UP
WBC # BLD: 15.95 K/UL — HIGH (ref 3.8–10.5)
WBC # BLD: 15.95 K/UL — HIGH (ref 3.8–10.5)
WBC # FLD AUTO: 15.95 K/UL — HIGH (ref 3.8–10.5)
WBC # FLD AUTO: 15.95 K/UL — HIGH (ref 3.8–10.5)
WBC UR QL: 76 /HPF — HIGH (ref 0–5)
WBC UR QL: 76 /HPF — HIGH (ref 0–5)
YEAST-LIKE CELLS: PRESENT
YEAST-LIKE CELLS: PRESENT

## 2023-12-22 PROCEDURE — 71045 X-RAY EXAM CHEST 1 VIEW: CPT | Mod: 26

## 2023-12-22 PROCEDURE — 88305 TISSUE EXAM BY PATHOLOGIST: CPT | Mod: 26

## 2023-12-22 PROCEDURE — 88189 FLOWCYTOMETRY/READ 16 & >: CPT

## 2023-12-22 PROCEDURE — 99223 1ST HOSP IP/OBS HIGH 75: CPT

## 2023-12-22 PROCEDURE — 99233 SBSQ HOSP IP/OBS HIGH 50: CPT

## 2023-12-22 PROCEDURE — 32557 INSERT CATH PLEURA W/ IMAGE: CPT | Mod: GC

## 2023-12-22 PROCEDURE — 88108 CYTOPATH CONCENTRATE TECH: CPT | Mod: 26,59

## 2023-12-22 PROCEDURE — 88112 CYTOPATH CELL ENHANCE TECH: CPT | Mod: 26

## 2023-12-22 PROCEDURE — 76770 US EXAM ABDO BACK WALL COMP: CPT | Mod: 26

## 2023-12-22 PROCEDURE — 99233 SBSQ HOSP IP/OBS HIGH 50: CPT | Mod: GC,25

## 2023-12-22 RX ORDER — BUMETANIDE 0.25 MG/ML
2 INJECTION INTRAMUSCULAR; INTRAVENOUS
Refills: 0 | Status: DISCONTINUED | OUTPATIENT
Start: 2023-12-22 | End: 2023-12-22

## 2023-12-22 RX ORDER — HEPARIN SODIUM 5000 [USP'U]/ML
1800 INJECTION INTRAVENOUS; SUBCUTANEOUS
Qty: 25000 | Refills: 0 | Status: DISCONTINUED | OUTPATIENT
Start: 2023-12-22 | End: 2023-12-22

## 2023-12-22 RX ORDER — BUMETANIDE 0.25 MG/ML
2 INJECTION INTRAMUSCULAR; INTRAVENOUS ONCE
Refills: 0 | Status: COMPLETED | OUTPATIENT
Start: 2023-12-22 | End: 2023-12-22

## 2023-12-22 RX ORDER — HEPARIN SODIUM 5000 [USP'U]/ML
1800 INJECTION INTRAVENOUS; SUBCUTANEOUS
Qty: 25000 | Refills: 0 | Status: DISCONTINUED | OUTPATIENT
Start: 2023-12-22 | End: 2023-12-23

## 2023-12-22 RX ADMIN — BUDESONIDE AND FORMOTEROL FUMARATE DIHYDRATE 2 PUFF(S): 160; 4.5 AEROSOL RESPIRATORY (INHALATION) at 06:20

## 2023-12-22 RX ADMIN — CEFTRIAXONE 100 MILLIGRAM(S): 500 INJECTION, POWDER, FOR SOLUTION INTRAMUSCULAR; INTRAVENOUS at 17:18

## 2023-12-22 RX ADMIN — Medication 3 MILLILITER(S): at 06:20

## 2023-12-22 RX ADMIN — PANTOPRAZOLE SODIUM 40 MILLIGRAM(S): 20 TABLET, DELAYED RELEASE ORAL at 06:19

## 2023-12-22 RX ADMIN — Medication 3 MILLILITER(S): at 17:19

## 2023-12-22 RX ADMIN — BUMETANIDE 2 MILLIGRAM(S): 0.25 INJECTION INTRAMUSCULAR; INTRAVENOUS at 13:40

## 2023-12-22 RX ADMIN — Medication 40 MILLIGRAM(S): at 17:20

## 2023-12-22 RX ADMIN — MUPIROCIN 1 APPLICATION(S): 20 OINTMENT TOPICAL at 17:19

## 2023-12-22 RX ADMIN — BUDESONIDE AND FORMOTEROL FUMARATE DIHYDRATE 2 PUFF(S): 160; 4.5 AEROSOL RESPIRATORY (INHALATION) at 17:19

## 2023-12-22 RX ADMIN — AZITHROMYCIN 255 MILLIGRAM(S): 500 TABLET, FILM COATED ORAL at 21:03

## 2023-12-22 RX ADMIN — Medication 3 MILLILITER(S): at 00:20

## 2023-12-22 RX ADMIN — ATORVASTATIN CALCIUM 10 MILLIGRAM(S): 80 TABLET, FILM COATED ORAL at 21:03

## 2023-12-22 RX ADMIN — Medication 40 MILLIGRAM(S): at 06:20

## 2023-12-22 RX ADMIN — Medication 500 MILLIGRAM(S): at 11:48

## 2023-12-22 RX ADMIN — BUMETANIDE 2 MILLIGRAM(S): 0.25 INJECTION INTRAMUSCULAR; INTRAVENOUS at 06:53

## 2023-12-22 RX ADMIN — HEPARIN SODIUM 18 UNIT(S)/HR: 5000 INJECTION INTRAVENOUS; SUBCUTANEOUS at 21:02

## 2023-12-22 RX ADMIN — CHLORHEXIDINE GLUCONATE 1 APPLICATION(S): 213 SOLUTION TOPICAL at 06:20

## 2023-12-22 RX ADMIN — Medication 3 MILLILITER(S): at 11:43

## 2023-12-22 RX ADMIN — MUPIROCIN 1 APPLICATION(S): 20 OINTMENT TOPICAL at 06:20

## 2023-12-22 RX ADMIN — Medication 25 MILLIGRAM(S): at 17:20

## 2023-12-22 RX ADMIN — Medication 25 MILLIGRAM(S): at 06:19

## 2023-12-22 RX ADMIN — Medication 1 TABLET(S): at 11:48

## 2023-12-22 RX ADMIN — FINASTERIDE 5 MILLIGRAM(S): 5 TABLET, FILM COATED ORAL at 11:48

## 2023-12-22 RX ADMIN — HEPARIN SODIUM 1800 UNIT(S)/HR: 5000 INJECTION INTRAVENOUS; SUBCUTANEOUS at 03:25

## 2023-12-22 RX ADMIN — TAMSULOSIN HYDROCHLORIDE 0.4 MILLIGRAM(S): 0.4 CAPSULE ORAL at 21:03

## 2023-12-22 NOTE — PROGRESS NOTE ADULT - SUBJECTIVE AND OBJECTIVE BOX
[facility]  Division of Infectious Diseases  [phone]    Name: GLADIS LEONARD  Age: 83y  Gender: Male  MRN: [id number]    Interval History--  Notes reviewed. Seen earlier today. Awaiting thoracocentesis (performed subsequent to encounter).  Pain not an issue. No fevers, chills, or rigors. Comfortable on RA.       Past Medical History--  Asbestosis (ICD9 501)    HTN (Hypertension)    BPH (Benign Prostatic Hypertrophy)    Dyslipidemia    Localized Osteoarthrosis, Lower Leg    Obesity    COPD with emphysema    Pulmonary nodule    Thyroid nodule    Aortic ectasia    History of diastolic dysfunction    Venous insufficiency    S/P Cystoscopy (ICD9 V45.89)    Hyperlipidemia (ICD9 272.4)    Localized Osteoarthrosis, Lower Leg    S/P Arthroscopy of Left Knee    Cataract    Inguinal Hernia x2    History of Tonsillectomy    History of appendectomy        For details regarding the patient's social history, family history, and other miscellaneous elements, please refer the initial infectious diseases consultation and/or the admitting history and physical examination for this admission.    Allergies    No Known Allergies    Intolerances        Medications--  Antibiotics:  azithromycin  IVPB 500 milliGRAM(s) IV Intermittent every 24 hours  cefTRIAXone   IVPB 2000 milliGRAM(s) IV Intermittent every 24 hours    Immunologic:  influenza  Vaccine (HIGH DOSE) 0.7 milliLiter(s) IntraMuscular once    Other:  acetaminophen     Tablet .. PRN  albuterol/ipratropium for Nebulization  ascorbic acid  atorvastatin  budesonide 160 MICROgram(s)/formoterol 4.5 MICROgram(s) Inhaler  chlorhexidine 4% Liquid  finasteride  methylPREDNISolone sodium succinate Injectable  metoprolol tartrate  multivitamin  mupirocin 2% Nasal  pantoprazole    Tablet  tamsulosin      Review of Systems--  A 10-point review of systems was obtained.   Review of systems otherwise negative except as previously noted.    Physical Examination--  Vital Signs: T(F): 97.6 (12-22-23 @ 12:02), Max: 98.5 (12-21-23 @ 20:53)  HR: 98 (12-22-23 @ 12:02)  BP: 119/56 (12-22-23 @ 12:02)  RR: 19 (12-22-23 @ 12:02)  SpO2: 92% (12-22-23 @ 12:02)  Wt(kg): --  General: Nontoxic-appearing Male in no acute distress.  HEENT: AT/NC. Anicteric. Conjunctiva pink and moist. Oropharynx clear. Dentition fair.  Neck: Not rigid. No sense of mass.  Nodes: None palpable.  Lungs: Decreased BS L >> R  Heart: Irreg irreg  Abdomen: Bowel sounds present and normoactive. Soft. Nondistended. Nontender. No sense of mass. No organomegaly.  Back: No spinal tenderness. No costovertebral angle tenderness.   Extremities: No cyanosis or clubbing. 1+ LEedema.   Skin: Warm. Dry. Good turgor. No rash. No vasculitic stigmata.  Psychiatric: Appropriate affect and mood for situation.         Laboratory Studies--  CBC                        8.0    15.95 )-----------( 505      ( 22 Dec 2023 11:35 )             26.2       Chemistries  12-22    130<L>  |  91<L>  |  60<H>  ----------------------------<  178<H>  3.9   |  25  |  3.44<H>    Creatinine Trend  3.44 mg/dL (12-22-23 @ 11:35)  1.62 mg/dL (12-21-23 @ 07:44)  1.28 mg/dL (12-20-23 @ 04:27)      Ca    9.0      22 Dec 2023 11:35  Phos  5.5     12-22  Mg     1.9     12-22    TPro  6.6  /  Alb  2.7<L>  /  TBili  0.3  /  DBili  x   /  AST  20  /  ALT  15  /  AlkPhos  98  12-22    Albumin, Fluid: 1.6 g/dL (12-22-23 @ 16:15)  Glucose, Fluid: 190 mg/dL (12-22-23 @ 16:15)  Lactate Dehydrogenase, Fluid: 571 U/L (12-22-23 @ 16:15)  Protein Total, Fluid: 3.0 g/dL (12-22-23 @ 16:15)    No cell counts yet    Culture Data    Culture - Sputum (collected 21 Dec 2023 01:12)  Source: .Sputum Sputum  Gram Stain (21 Dec 2023 11:54):    Moderate polymorphonuclear leukocytes per low power field    Moderate Squamous epithelial cells per low power field    Rare Gram positive cocci in pairs per oil power field  Preliminary Report (22 Dec 2023 09:39):    Normal Respiratory Milly present    Culture - Urine (collected 20 Dec 2023 04:49)  Source: Clean Catch Clean Catch (Midstream)  Final Report (22 Dec 2023 05:08):    10,000 - 49,000 CFU/mL Candida albicans "Susceptibilities not performed"    Culture - Blood (collected 20 Dec 2023 04:35)  Source: .Blood Blood-Peripheral  Gram Stain (21 Dec 2023 03:27):    Growth in aerobic bottle: Gram Positive Cocci in Pairs and Chains    Growth in anaerobic bottle: Gram Positive Cocci in Pairs and Chains  Preliminary Report (21 Dec 2023 22:41):    Growth in aerobic and anaerobic bottles: Streptococcus pneumoniae    Direct identification is available within approximately 3-5    hours either by Blood Panel Multiplexed PCR or Direct    MALDI-TOF. Details: [email or web address]  Organism: Blood Culture PCR (21 Dec 2023 02:19)  Organism: Blood Culture PCR (21 Dec 2023 02:19)    Culture - Blood (collected 20 Dec 2023 04:20)  Source: .Blood Blood-Peripheral  Gram Stain (21 Dec 2023 04:03):    Growth in aerobic bottle: Gram Positive Cocci in Pairs and Chains    Growth in anaerobic bottle: Gram Positive Cocci in Pairs and Chains  Preliminary Report (21 Dec 2023 22:42):    Growth in aerobic and anaerobic bottles: Streptococcus pneumoniae    See previous culture [id number]    < from: TTE W or WO Ultrasound Enhancing Agent (12.21.23 @ 12:56) >  CONCLUSIONS:      1. Left ventricular systolic function is hyperdynamic with an ejection fraction of 80 % by Henao's method of disks.   2. Agitated saline injection was negative for intracardiac shunt.    ________________________________________________________________________________________  FINDINGS:     Left Ventricle:  After obtaining consent, Definity ultrasound enhancing agent was given for enhanced left ventricular opacification and improved delineation of the left ventricular endocardial borders. Left ventricular systolic function is hyperdynamic with a calculated ejection fraction of 80 % by the Henao's biplane method of disks.     Right Ventricle:  The right ventricle is not well visualized. Probably normal systolic function. Tricuspid annular plane systolic excursion (TAPSE) is 2.4 cm (normal >=1.7 cm).     Left Atrium:  The left atrium is mildly dilated.     Right Atrium:  The right atrium is dilated in size with an indexed volume of 39.73 ml/m².     Interatrial Septum:  Agitated saline injection was negative for intracardiac shunt.     Aortic Valve:  There is mild calcification of the aortic valve leaflets.     Mitral Valve:  There is calcification of the mitral valve annulus.     Tricuspid Valve:  Normal tricuspid valve.     Pulmonic Valve:  Normal pulmonic valve.     Aorta:  The aortic annulus and aortic root appear normal in size.     Pleura:  Leftpleural effusion noted.     Additional Findings and Comments:  The presence of abdominal ascites is incidentally noted.    < end of copied text >           [facility]  Division of Infectious Diseases  [phone]    Name: GLADIS LEONARD  Age: 83y  Gender: Male  MRN: [id number]    Interval History--  Notes reviewed. Seen earlier today. Awaiting thoracocentesis (performed subsequent to encounter).  Pain not an issue. No fevers, chills, or rigors. Comfortable on RA.       Past Medical History--  Asbestosis (ICD9 501)    HTN (Hypertension)    BPH (Benign Prostatic Hypertrophy)    Dyslipidemia    Localized Osteoarthrosis, Lower Leg    Obesity    COPD with emphysema    Pulmonary nodule    Thyroid nodule    Aortic ectasia    History of diastolic dysfunction    Venous insufficiency    S/P Cystoscopy (ICD9 V45.89)    Hyperlipidemia (ICD9 272.4)    Localized Osteoarthrosis, Lower Leg    S/P Arthroscopy of Left Knee    Cataract    Inguinal Hernia x2    History of Tonsillectomy    History of appendectomy        For details regarding the patient's social history, family history, and other miscellaneous elements, please refer the initial infectious diseases consultation and/or the admitting history and physical examination for this admission.    Allergies    No Known Allergies    Intolerances        Medications--  Antibiotics:  azithromycin  IVPB 500 milliGRAM(s) IV Intermittent every 24 hours  cefTRIAXone   IVPB 2000 milliGRAM(s) IV Intermittent every 24 hours    Immunologic:  influenza  Vaccine (HIGH DOSE) 0.7 milliLiter(s) IntraMuscular once    Other:  acetaminophen     Tablet .. PRN  albuterol/ipratropium for Nebulization  ascorbic acid  atorvastatin  budesonide 160 MICROgram(s)/formoterol 4.5 MICROgram(s) Inhaler  chlorhexidine 4% Liquid  finasteride  methylPREDNISolone sodium succinate Injectable  metoprolol tartrate  multivitamin  mupirocin 2% Nasal  pantoprazole    Tablet  tamsulosin      Review of Systems--  A 10-point review of systems was obtained.   Review of systems otherwise negative except as previously noted.    Physical Examination--  Vital Signs: T(F): 97.6 (12-22-23 @ 12:02), Max: 98.5 (12-21-23 @ 20:53)  HR: 98 (12-22-23 @ 12:02)  BP: 119/56 (12-22-23 @ 12:02)  RR: 19 (12-22-23 @ 12:02)  SpO2: 92% (12-22-23 @ 12:02)  Wt(kg): --  General: Nontoxic-appearing Male in no acute distress.  HEENT: AT/NC. Anicteric. Conjunctiva pink and moist. Oropharynx clear. Dentition fair.  Neck: Not rigid. No sense of mass.  Nodes: None palpable.  Lungs: Decreased BS L >> R  Heart: Irreg irreg  Abdomen: Bowel sounds present and normoactive. Soft. Nondistended. Nontender. No sense of mass. No organomegaly.  Back: No spinal tenderness. No costovertebral angle tenderness.   Extremities: No cyanosis or clubbing. 1+ LEedema.   Skin: Warm. Dry. Good turgor. No rash. No vasculitic stigmata.  Psychiatric: Appropriate affect and mood for situation.         Laboratory Studies--  CBC                        8.0    15.95 )-----------( 505      ( 22 Dec 2023 11:35 )             26.2       Chemistries  12-22    130<L>  |  91<L>  |  60<H>  ----------------------------<  178<H>  3.9   |  25  |  3.44<H>    Creatinine Trend  3.44 mg/dL (12-22-23 @ 11:35)  1.62 mg/dL (12-21-23 @ 07:44)  1.28 mg/dL (12-20-23 @ 04:27)      Ca    9.0      22 Dec 2023 11:35  Phos  5.5     12-22  Mg     1.9     12-22    TPro  6.6  /  Alb  2.7<L>  /  TBili  0.3  /  DBili  x   /  AST  20  /  ALT  15  /  AlkPhos  98  12-22    Albumin, Fluid: 1.6 g/dL (12-22-23 @ 16:15)  Glucose, Fluid: 190 mg/dL (12-22-23 @ 16:15)  Lactate Dehydrogenase, Fluid: 571 U/L (12-22-23 @ 16:15)  Protein Total, Fluid: 3.0 g/dL (12-22-23 @ 16:15)    No cell counts yet    Culture Data    Culture - Sputum (collected 21 Dec 2023 01:12)  Source: .Sputum Sputum  Gram Stain (21 Dec 2023 11:54):    Moderate polymorphonuclear leukocytes per low power field    Moderate Squamous epithelial cells per low power field    Rare Gram positive cocci in pairs per oil power field  Preliminary Report (22 Dec 2023 09:39):    Normal Respiratory Milly present    Culture - Urine (collected 20 Dec 2023 04:49)  Source: Clean Catch Clean Catch (Midstream)  Final Report (22 Dec 2023 05:08):    10,000 - 49,000 CFU/mL Candida albicans "Susceptibilities not performed"    Culture - Blood (collected 20 Dec 2023 04:35)  Source: .Blood Blood-Peripheral  Gram Stain (21 Dec 2023 03:27):    Growth in aerobic bottle: Gram Positive Cocci in Pairs and Chains    Growth in anaerobic bottle: Gram Positive Cocci in Pairs and Chains  Preliminary Report (21 Dec 2023 22:41):    Growth in aerobic and anaerobic bottles: Streptococcus pneumoniae    Direct identification is available within approximately 3-5    hours either by Blood Panel Multiplexed PCR or Direct    MALDI-TOF. Details: [email or web address]  Organism: Blood Culture PCR (21 Dec 2023 02:19)  Organism: Blood Culture PCR (21 Dec 2023 02:19)    Culture - Blood (collected 20 Dec 2023 04:20)  Source: .Blood Blood-Peripheral  Gram Stain (21 Dec 2023 04:03):    Growth in aerobic bottle: Gram Positive Cocci in Pairs and Chains    Growth in anaerobic bottle: Gram Positive Cocci in Pairs and Chains  Preliminary Report (21 Dec 2023 22:42):    Growth in aerobic and anaerobic bottles: Streptococcus pneumoniae    See previous culture [id number]    < from: TTE W or WO Ultrasound Enhancing Agent (12.21.23 @ 12:56) >  CONCLUSIONS:      1. Left ventricular systolic function is hyperdynamic with an ejection fraction of 80 % by Henao's method of disks.   2. Agitated saline injection was negative for intracardiac shunt.    ________________________________________________________________________________________  FINDINGS:     Left Ventricle:  After obtaining consent, Definity ultrasound enhancing agent was given for enhanced left ventricular opacification and improved delineation of the left ventricular endocardial borders. Left ventricular systolic function is hyperdynamic with a calculated ejection fraction of 80 % by the Henao's biplane method of disks.     Right Ventricle:  The right ventricle is not well visualized. Probably normal systolic function. Tricuspid annular plane systolic excursion (TAPSE) is 2.4 cm (normal >=1.7 cm).     Left Atrium:  The left atrium is mildly dilated.     Right Atrium:  The right atrium is dilated in size with an indexed volume of 39.73 ml/m².     Interatrial Septum:  Agitated saline injection was negative for intracardiac shunt.     Aortic Valve:  There is mild calcification of the aortic valve leaflets.     Mitral Valve:  There is calcification of the mitral valve annulus.     Tricuspid Valve:  Normal tricuspid valve.     Pulmonic Valve:  Normal pulmonic valve.     Aorta:  The aortic annulus and aortic root appear normal in size.     Pleura:  Leftpleural effusion noted.     Additional Findings and Comments:  The presence of abdominal ascites is incidentally noted.    < end of copied text >

## 2023-12-22 NOTE — PROCEDURAL SAFETY CHECKLIST WITH OR WITHOUT SEDATION - NSCNFIRMBLDLOSS_GEN_ALL_CORE
Quality 130: Documentation Of Current Medications In The Medical Record: Current Medications Documented Detail Level: Detailed Quality 431: Preventive Care And Screening: Unhealthy Alcohol Use - Screening: Patient did not receive brief counseling if identified as an unhealthy alcohol user Quality 226: Preventive Care And Screening: Tobacco Use: Screening And Cessation Intervention: Tobacco Screening not Performed n/a

## 2023-12-22 NOTE — PROGRESS NOTE ADULT - ATTENDING COMMENTS
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 Will DC tomorrow  -ID consulted. Will appreciate recs  -Pulm to likely perform thoracentesis tomorrow given loculated effusion, c/f empeyema  -Monitor vitals. Pt currently afebrile and on room air    #COPD  -IV methylpred 40mg BID  -Duonebs ATC  -Symbicort  -Pulm following. Will continue to appreciate recs    #BESSY  -Likely ATN in setting of infection + contrast nephropathy in setting of CTPA on admission  -UOP downtrending with rising Cr and proBNP  -Will challenge with additional IV bumex today and consult nephropathy  -Renal US  -Avoid nephrotoxic agents and trend BMP  -If continuing to uptrend with minimal UOP and volume overload, will need to discuss possibility of HD and whether that would be in line with GOC    #HFpEF  -C/W bumex 2mg IVP daily. Will continue to reassess volume status  -C/W home BB    Rest as per Dr. Vargas above . 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 Will appreciate recs  -Pulm to perform thoracentesis today.   -Monitor vitals. Pt currently afebrile and on room air  -F/U repeat cx    #COPD  -IV methylpred 40mg BID  -Duonebs ATC  -Symbicort  -Pulm following. Will continue to appreciate recs    #BESSY  -Likely ATN in setting of infection + contrast nephropathy in setting of CTPA on admission  -UOP downtrending with rising Cr and proBNP  -Will challenge with additional IV bumex today and consult nephropathy  -Renal US  -Avoid nephrotoxic agents and trend BMP  -If continuing to uptrend with minimal UOP and volume overload, will need to discuss possibility of HD and whether that would be in line with GOC    #HFpEF  -C/W bumex 2mg IVP daily. Will continue to reassess volume status  -C/W home BB    Rest as per Dr. Vargas above .

## 2023-12-22 NOTE — CONSULT NOTE ADULT - SUBJECTIVE AND OBJECTIVE BOX
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 Nephrology consulted for BESSY. Pt seen and examined at bedside. Reports few days of chest discomfort prior to admission. Also endorsing urge to urinate but nothing coming out. Denies any personal or family h/o renal dz, or NSAID use. Denies recreational drug/ETOH/tobacco use. Pt is tolerating po intake, denies any headaches, nausea, vomiting, fevers/chills, chest pain, palpitations, SOB, abdominal pain.    PAST HISTORY  --------------------------------------------------------------------------------  PAST MEDICAL & SURGICAL HISTORY:  Asbestosis (ICD9 501)  HTN (Hypertension)  BPH (Benign Prostatic Hypertrophy)  Dyslipidemia  Localized Osteoarthrosis, Lower Leg  left  Obesity  COPD with emphysema  Pulmonary nodule  Thyroid nodule  Aortic ectasia  History of diastolic dysfunction  Venous insufficiency  S/P Cystoscopy (ICD9 V45.89)  Hyperlipidemia (ICD9 272.4)  S/P Arthroscopy of Left Knee  x 20 yrs ago  Cataract  right IOL  Inguinal Hernia x2  right  History of Tonsillectomy  History of appendectomy    FAMILY HISTORY:  No pertinent family history in first degree relatives    PAST SOCIAL HISTORY:    ALLERGIES & MEDICATIONS  --------------------------------------------------------------------------------  Allergies  No Known Allergies  Intolerances    Standing Inpatient Medications  albuterol/ipratropium for Nebulization 3 milliLiter(s) Nebulizer every 6 hours  ascorbic acid 500 milliGRAM(s) Oral daily  atorvastatin 10 milliGRAM(s) Oral at bedtime  azithromycin  IVPB 500 milliGRAM(s) IV Intermittent every 24 hours  budesonide 160 MICROgram(s)/formoterol 4.5 MICROgram(s) Inhaler 2 Puff(s) Inhalation two times a day  cefTRIAXone   IVPB 2000 milliGRAM(s) IV Intermittent every 24 hours  chlorhexidine 4% Liquid 1 Application(s) Topical <User Schedule>  finasteride 5 milliGRAM(s) Oral daily  influenza  Vaccine (HIGH DOSE) 0.7 milliLiter(s) IntraMuscular once  methylPREDNISolone sodium succinate Injectable 40 milliGRAM(s) IV Push every 12 hours  metoprolol tartrate 25 milliGRAM(s) Oral two times a day  multivitamin 1 Tablet(s) Oral daily  mupirocin 2% Nasal 1 Application(s) Both Nostrils two times a day  pantoprazole    Tablet 40 milliGRAM(s) Oral before breakfast  tamsulosin 0.4 milliGRAM(s) Oral at bedtime    PRN Inpatient Medications  acetaminophen     Tablet .. 650 milliGRAM(s) Oral every 6 hours PRN    REVIEW OF SYSTEMS  --------------------------------------------------------------------------------  Gen: No fevers/chills  Skin: No rashes  Head/Eyes/Ears: No HA  Respiratory: No SOB, cough  CV: No CP  GI: No abdominal pain, diarrhea, N/V  : No dysuria, hematuria  MSK: No edema  Heme: No easy bruising or bleeding  Psych: No significant depression    All other systems were reviewed and are negative, except as noted.    VITALS/PHYSICAL EXAM  --------------------------------------------------------------------------------  T(C): 36.4 (12-22-23 @ 12:02), Max: 36.9 (12-21-23 @ 20:53)  HR: 98 (12-22-23 @ 12:02) (81 - 102)  BP: 119/56 (12-22-23 @ 12:02) (111/54 - 119/56)  RR: 19 (12-22-23 @ 12:02) (18 - 19)  SpO2: 92% (12-22-23 @ 12:02) (92% - 93%)  Wt(kg): --    Weight (kg): 97.7 (12-22-23 @ 11:00)    12-21-23 @ 07:01  -  12-22-23 @ 07:00  --------------------------------------------------------  IN: 0 mL / OUT: 1 mL / NET: -1 mL    12-22-23 @ 07:01  -  12-22-23 @ 16:45  --------------------------------------------------------  IN: 0 mL / OUT: 101 mL / NET: -101 mL    Physical Exam:  Gen: NAD  HEENT: MMCARLOS  Pulm: CTA B/L  CV: S1S2  Abd: Soft, +BS   Ext: +LE edema B/L  Neuro: Awake  Skin: Warm and dry    LABS/STUDIES  --------------------------------------------------------------------------------              8.0    15.95 >-----------<  505      [12-22-23 @ 11:35]              26.2     130  |  91  |  60  ----------------------------<  178      [12-22-23 @ 11:35]  3.9   |  25  |  3.44        Ca     9.0     [12-22-23 @ 11:35]      Mg     1.9     [12-22-23 @ 11:35]      Phos  5.5     [12-22-23 @ 11:35]    TPro  6.6  /  Alb  2.7  /  TBili  0.3  /  DBili  x   /  AST  20  /  ALT  15  /  AlkPhos  98  [12-22-23 @ 11:35]    PT/INR: PT 15.8 , INR 1.52       [12-22-23 @ 12:58]  PTT: 34.8       [12-22-23 @ 12:58]    Creatinine Trend:  SCr 3.44 [12-22 @ 11:35]  SCr 1.62 [12-21 @ 07:44]  SCr 1.28 [12-20 @ 04:27]    Urine Creatinine 100      [12-22-23 @ 13:04]  Urine Protein 22      [12-22-23 @ 13:04]  Urine Sodium 16      [12-22-23 @ 13:04]  Urine Potassium 38      [12-22-23 @ 13:04]  Urine Osmolality 322      [12-22-23 @ 13:04]    TSH 0.02      [10-07-23 @ 17:06]     [facility] DIVISION OF KIDNEY DISEASES AND HYPERTENSION -- [phone]  -- INITIAL CONSULT NOTE  --------------------------------------------------------------------------------  HPI: 82yo M w/ PMH of HLD, HTN, Colon CA, COPD, 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 Nephrology consulted for BESSY. Pt seen and examined at bedside. Reports few days of chest discomfort prior to admission. Also endorsing urge to urinate but nothing coming out. Denies any personal or family h/o renal dz, or NSAID use. Denies recreational drug/ETOH/tobacco use. Pt is tolerating po intake, denies any headaches, nausea, vomiting, fevers/chills, chest pain, palpitations, SOB, abdominal pain.    PAST HISTORY  --------------------------------------------------------------------------------  PAST MEDICAL & SURGICAL HISTORY:  Asbestosis (ICD9 501)  HTN (Hypertension)  BPH (Benign Prostatic Hypertrophy)  Dyslipidemia  Localized Osteoarthrosis, Lower Leg  left  Obesity  COPD with emphysema  Pulmonary nodule  Thyroid nodule  Aortic ectasia  History of diastolic dysfunction  Venous insufficiency  S/P Cystoscopy (ICD9 V45.89)  Hyperlipidemia (ICD9 272.4)  S/P Arthroscopy of Left Knee  x 20 yrs ago  Cataract  right IOL  Inguinal Hernia x2  right  History of Tonsillectomy  History of appendectomy    FAMILY HISTORY:  No pertinent family history in first degree relatives    PAST SOCIAL HISTORY:    ALLERGIES & MEDICATIONS  --------------------------------------------------------------------------------  Allergies  No Known Allergies  Intolerances    Standing Inpatient Medications  albuterol/ipratropium for Nebulization 3 milliLiter(s) Nebulizer every 6 hours  ascorbic acid 500 milliGRAM(s) Oral daily  atorvastatin 10 milliGRAM(s) Oral at bedtime  azithromycin  IVPB 500 milliGRAM(s) IV Intermittent every 24 hours  budesonide 160 MICROgram(s)/formoterol 4.5 MICROgram(s) Inhaler 2 Puff(s) Inhalation two times a day  cefTRIAXone   IVPB 2000 milliGRAM(s) IV Intermittent every 24 hours  chlorhexidine 4% Liquid 1 Application(s) Topical <User Schedule>  finasteride 5 milliGRAM(s) Oral daily  influenza  Vaccine (HIGH DOSE) 0.7 milliLiter(s) IntraMuscular once  methylPREDNISolone sodium succinate Injectable 40 milliGRAM(s) IV Push every 12 hours  metoprolol tartrate 25 milliGRAM(s) Oral two times a day  multivitamin 1 Tablet(s) Oral daily  mupirocin 2% Nasal 1 Application(s) Both Nostrils two times a day  pantoprazole    Tablet 40 milliGRAM(s) Oral before breakfast  tamsulosin 0.4 milliGRAM(s) Oral at bedtime    PRN Inpatient Medications  acetaminophen     Tablet .. 650 milliGRAM(s) Oral every 6 hours PRN    REVIEW OF SYSTEMS  --------------------------------------------------------------------------------  Gen: No fevers/chills  Skin: No rashes  Head/Eyes/Ears: No HA  Respiratory: No SOB, cough  CV: No CP  GI: No abdominal pain, diarrhea, N/V  : No dysuria, hematuria  MSK: No edema  Heme: No easy bruising or bleeding  Psych: No significant depression    All other systems were reviewed and are negative, except as noted.    VITALS/PHYSICAL EXAM  --------------------------------------------------------------------------------  T(C): 36.4 (12-22-23 @ 12:02), Max: 36.9 (12-21-23 @ 20:53)  HR: 98 (12-22-23 @ 12:02) (81 - 102)  BP: 119/56 (12-22-23 @ 12:02) (111/54 - 119/56)  RR: 19 (12-22-23 @ 12:02) (18 - 19)  SpO2: 92% (12-22-23 @ 12:02) (92% - 93%)  Wt(kg): --    Weight (kg): 97.7 (12-22-23 @ 11:00)    12-21-23 @ 07:01  -  12-22-23 @ 07:00  --------------------------------------------------------  IN: 0 mL / OUT: 1 mL / NET: -1 mL    12-22-23 @ 07:01  -  12-22-23 @ 16:45  --------------------------------------------------------  IN: 0 mL / OUT: 101 mL / NET: -101 mL    Physical Exam:  Gen: NAD  HEENT: MMCARLOS  Pulm: CTA B/L  CV: S1S2  Abd: Soft, +BS   Ext: +LE edema B/L  Neuro: Awake  Skin: Warm and dry    LABS/STUDIES  --------------------------------------------------------------------------------              8.0    15.95 >-----------<  505      [12-22-23 @ 11:35]              26.2     130  |  91  |  60  ----------------------------<  178      [12-22-23 @ 11:35]  3.9   |  25  |  3.44        Ca     9.0     [12-22-23 @ 11:35]      Mg     1.9     [12-22-23 @ 11:35]      Phos  5.5     [12-22-23 @ 11:35]    TPro  6.6  /  Alb  2.7  /  TBili  0.3  /  DBili  x   /  AST  20  /  ALT  15  /  AlkPhos  98  [12-22-23 @ 11:35]    PT/INR: PT 15.8 , INR 1.52       [12-22-23 @ 12:58]  PTT: 34.8       [12-22-23 @ 12:58]    Creatinine Trend:  SCr 3.44 [12-22 @ 11:35]  SCr 1.62 [12-21 @ 07:44]  SCr 1.28 [12-20 @ 04:27]    Urine Creatinine 100      [12-22-23 @ 13:04]  Urine Protein 22      [12-22-23 @ 13:04]  Urine Sodium 16      [12-22-23 @ 13:04]  Urine Potassium 38      [12-22-23 @ 13:04]  Urine Osmolality 322      [12-22-23 @ 13:04]    TSH 0.02      [10-07-23 @ 17:06]     [facility] DIVISION OF KIDNEY DISEASES AND HYPERTENSION -- [phone]  -- INITIAL CONSULT NOTE  --------------------------------------------------------------------------------  HPI: 82yo M w/ PMH of HLD, HTN, Colon CA, COPD, 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 Nephrology consulted for BESSY. Pt seen and examined at bedside. Reports few days of chest discomfort prior to admission. Also endorsing urge to urinate but nothing coming out. Denies any personal or family h/o renal dz, or NSAID use. Denies recreational drug/ETOH/tobacco use. Pt is tolerating po intake, denies any headaches, nausea, vomiting, fevers/chills, chest pain, palpitations, SOB, abdominal pain.    PAST HISTORY  --------------------------------------------------------------------------------  PAST MEDICAL & SURGICAL HISTORY:  Asbestosis (ICD9 501)  HTN (Hypertension)  BPH (Benign Prostatic Hypertrophy)  Dyslipidemia  Localized Osteoarthrosis, Lower Leg  left  Obesity  COPD with emphysema  Pulmonary nodule  Thyroid nodule  Aortic ectasia  History of diastolic dysfunction  Venous insufficiency  S/P Cystoscopy (ICD9 V45.89)  Hyperlipidemia (ICD9 272.4)  S/P Arthroscopy of Left Knee  x 20 yrs ago  Cataract  right IOL  Inguinal Hernia x2  right  History of Tonsillectomy  History of appendectomy    FAMILY HISTORY:  No pertinent family history in first degree relatives    PAST SOCIAL HISTORY:    ALLERGIES & MEDICATIONS  --------------------------------------------------------------------------------  Allergies  No Known Allergies  Intolerances    Standing Inpatient Medications  albuterol/ipratropium for Nebulization 3 milliLiter(s) Nebulizer every 6 hours  ascorbic acid 500 milliGRAM(s) Oral daily  atorvastatin 10 milliGRAM(s) Oral at bedtime  azithromycin  IVPB 500 milliGRAM(s) IV Intermittent every 24 hours  budesonide 160 MICROgram(s)/formoterol 4.5 MICROgram(s) Inhaler 2 Puff(s) Inhalation two times a day  cefTRIAXone   IVPB 2000 milliGRAM(s) IV Intermittent every 24 hours  chlorhexidine 4% Liquid 1 Application(s) Topical <User Schedule>  finasteride 5 milliGRAM(s) Oral daily  influenza  Vaccine (HIGH DOSE) 0.7 milliLiter(s) IntraMuscular once  methylPREDNISolone sodium succinate Injectable 40 milliGRAM(s) IV Push every 12 hours  metoprolol tartrate 25 milliGRAM(s) Oral two times a day  multivitamin 1 Tablet(s) Oral daily  mupirocin 2% Nasal 1 Application(s) Both Nostrils two times a day  pantoprazole    Tablet 40 milliGRAM(s) Oral before breakfast  tamsulosin 0.4 milliGRAM(s) Oral at bedtime    PRN Inpatient Medications  acetaminophen     Tablet .. 650 milliGRAM(s) Oral every 6 hours PRN    REVIEW OF SYSTEMS  --------------------------------------------------------------------------------  Gen: No fevers/chills  Skin: No rashes  Head/Eyes/Ears: No HA  Respiratory: Not SOB, +cough  CV: No CP  GI: No abdominal pain, diarrhea, N/V  : feels like he cant pee  MSK: No edema  Heme: No easy bruising or bleeding  Psych: No significant depression    All other systems were reviewed and are negative, except as noted.    VITALS/PHYSICAL EXAM  --------------------------------------------------------------------------------  T(C): 36.4 (12-22-23 @ 12:02), Max: 36.9 (12-21-23 @ 20:53)  HR: 98 (12-22-23 @ 12:02) (81 - 102)  BP: 119/56 (12-22-23 @ 12:02) (111/54 - 119/56)  RR: 19 (12-22-23 @ 12:02) (18 - 19)  SpO2: 92% (12-22-23 @ 12:02) (92% - 93%)  Wt(kg): --    Weight (kg): 97.7 (12-22-23 @ 11:00)    12-21-23 @ 07:01  -  12-22-23 @ 07:00  --------------------------------------------------------  IN: 0 mL / OUT: 1 mL / NET: -1 mL    12-22-23 @ 07:01  -  12-22-23 @ 16:45  --------------------------------------------------------  IN: 0 mL / OUT: 101 mL / NET: -101 mL    Physical Exam:  Gen: NAD  HEENT: MMCARLOS  Pulm: CTA B/L  CV: S1S2  Abd: Soft, +BS + ostomy noted  Ext: +LE edema B/L  Neuro: Awake  Skin: Warm and dry    LABS/STUDIES  --------------------------------------------------------------------------------              8.0    15.95 >-----------<  505      [12-22-23 @ 11:35]              26.2     130  |  91  |  60  ----------------------------<  178      [12-22-23 @ 11:35]  3.9   |  25  |  3.44        Ca     9.0     [12-22-23 @ 11:35]      Mg     1.9     [12-22-23 @ 11:35]      Phos  5.5     [12-22-23 @ 11:35]    TPro  6.6  /  Alb  2.7  /  TBili  0.3  /  DBili  x   /  AST  20  /  ALT  15  /  AlkPhos  98  [12-22-23 @ 11:35]    PT/INR: PT 15.8 , INR 1.52       [12-22-23 @ 12:58]  PTT: 34.8       [12-22-23 @ 12:58]    Creatinine Trend:  SCr 3.44 [12-22 @ 11:35]  SCr 1.62 [12-21 @ 07:44]  SCr 1.28 [12-20 @ 04:27]    Urine Creatinine 100      [12-22-23 @ 13:04]  Urine Protein 22      [12-22-23 @ 13:04]  Urine Sodium 16      [12-22-23 @ 13:04]  Urine Potassium 38      [12-22-23 @ 13:04]  Urine Osmolality 322      [12-22-23 @ 13:04]    TSH 0.02      [10-07-23 @ 17:06]     [facility] DIVISION OF KIDNEY DISEASES AND HYPERTENSION -- [phone]  -- INITIAL CONSULT NOTE  --------------------------------------------------------------------------------  HPI: 84yo M w/ PMH of HLD, HTN, Colon CA, COPD, 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 Nephrology consulted for BESSY. Pt seen and examined at bedside. Reports few days of chest discomfort prior to admission. Also endorsing urge to urinate but nothing coming out. Denies any personal or family h/o renal dz, or NSAID use. Denies recreational drug/ETOH/tobacco use. Pt is tolerating po intake, denies any headaches, nausea, vomiting, fevers/chills, chest pain, palpitations, SOB, abdominal pain.    PAST HISTORY  --------------------------------------------------------------------------------  PAST MEDICAL & SURGICAL HISTORY:  Asbestosis (ICD9 501)  HTN (Hypertension)  BPH (Benign Prostatic Hypertrophy)  Dyslipidemia  Localized Osteoarthrosis, Lower Leg  left  Obesity  COPD with emphysema  Pulmonary nodule  Thyroid nodule  Aortic ectasia  History of diastolic dysfunction  Venous insufficiency  S/P Cystoscopy (ICD9 V45.89)  Hyperlipidemia (ICD9 272.4)  S/P Arthroscopy of Left Knee  x 20 yrs ago  Cataract  right IOL  Inguinal Hernia x2  right  History of Tonsillectomy  History of appendectomy    FAMILY HISTORY:  No pertinent family history in first degree relatives    PAST SOCIAL HISTORY:    ALLERGIES & MEDICATIONS  --------------------------------------------------------------------------------  Allergies  No Known Allergies  Intolerances    Standing Inpatient Medications  albuterol/ipratropium for Nebulization 3 milliLiter(s) Nebulizer every 6 hours  ascorbic acid 500 milliGRAM(s) Oral daily  atorvastatin 10 milliGRAM(s) Oral at bedtime  azithromycin  IVPB 500 milliGRAM(s) IV Intermittent every 24 hours  budesonide 160 MICROgram(s)/formoterol 4.5 MICROgram(s) Inhaler 2 Puff(s) Inhalation two times a day  cefTRIAXone   IVPB 2000 milliGRAM(s) IV Intermittent every 24 hours  chlorhexidine 4% Liquid 1 Application(s) Topical <User Schedule>  finasteride 5 milliGRAM(s) Oral daily  influenza  Vaccine (HIGH DOSE) 0.7 milliLiter(s) IntraMuscular once  methylPREDNISolone sodium succinate Injectable 40 milliGRAM(s) IV Push every 12 hours  metoprolol tartrate 25 milliGRAM(s) Oral two times a day  multivitamin 1 Tablet(s) Oral daily  mupirocin 2% Nasal 1 Application(s) Both Nostrils two times a day  pantoprazole    Tablet 40 milliGRAM(s) Oral before breakfast  tamsulosin 0.4 milliGRAM(s) Oral at bedtime    PRN Inpatient Medications  acetaminophen     Tablet .. 650 milliGRAM(s) Oral every 6 hours PRN    REVIEW OF SYSTEMS  --------------------------------------------------------------------------------  Gen: No fevers/chills  Skin: No rashes  Head/Eyes/Ears: No HA  Respiratory: Not SOB, +cough  CV: No CP  GI: No abdominal pain, diarrhea, N/V  : feels like he cant pee  MSK: No edema  Heme: No easy bruising or bleeding  Psych: No significant depression    All other systems were reviewed and are negative, except as noted.    VITALS/PHYSICAL EXAM  --------------------------------------------------------------------------------  T(C): 36.4 (12-22-23 @ 12:02), Max: 36.9 (12-21-23 @ 20:53)  HR: 98 (12-22-23 @ 12:02) (81 - 102)  BP: 119/56 (12-22-23 @ 12:02) (111/54 - 119/56)  RR: 19 (12-22-23 @ 12:02) (18 - 19)  SpO2: 92% (12-22-23 @ 12:02) (92% - 93%)  Wt(kg): --    Weight (kg): 97.7 (12-22-23 @ 11:00)    12-21-23 @ 07:01  -  12-22-23 @ 07:00  --------------------------------------------------------  IN: 0 mL / OUT: 1 mL / NET: -1 mL    12-22-23 @ 07:01  -  12-22-23 @ 16:45  --------------------------------------------------------  IN: 0 mL / OUT: 101 mL / NET: -101 mL    Physical Exam:  Gen: NAD  HEENT: MMCARLOS  Pulm: CTA B/L  CV: S1S2  Abd: Soft, +BS + ostomy noted  Ext: +LE edema B/L  Neuro: Awake  Skin: Warm and dry    LABS/STUDIES  --------------------------------------------------------------------------------              8.0    15.95 >-----------<  505      [12-22-23 @ 11:35]              26.2     130  |  91  |  60  ----------------------------<  178      [12-22-23 @ 11:35]  3.9   |  25  |  3.44        Ca     9.0     [12-22-23 @ 11:35]      Mg     1.9     [12-22-23 @ 11:35]      Phos  5.5     [12-22-23 @ 11:35]    TPro  6.6  /  Alb  2.7  /  TBili  0.3  /  DBili  x   /  AST  20  /  ALT  15  /  AlkPhos  98  [12-22-23 @ 11:35]    PT/INR: PT 15.8 , INR 1.52       [12-22-23 @ 12:58]  PTT: 34.8       [12-22-23 @ 12:58]    Creatinine Trend:  SCr 3.44 [12-22 @ 11:35]  SCr 1.62 [12-21 @ 07:44]  SCr 1.28 [12-20 @ 04:27]    Urine Creatinine 100      [12-22-23 @ 13:04]  Urine Protein 22      [12-22-23 @ 13:04]  Urine Sodium 16      [12-22-23 @ 13:04]  Urine Potassium 38      [12-22-23 @ 13:04]  Urine Osmolality 322      [12-22-23 @ 13:04]    TSH 0.02      [10-07-23 @ 17:06]

## 2023-12-22 NOTE — CONSULT NOTE ADULT - PROBLEM SELECTOR RECOMMENDATION 9
Pt with ATN, multifactorial 2/2 EPI (s/p IV contrast 12/21), sepsis, vancomycin (12/20), and diuretic use with hyperdynamic EF (hypovolemia). Pt w/ baseline Scr 0.86-1.29, prior to admission Scr 1.12 on 10/7/23.     Labs and VS reviewed. No hypotensive episodes. On admission Scr at baseline 1.28 on 12/20. Scr increased to 1.62 on 12/21 and today increased further to Scr 3.44. UA +trace protein, casts, bld neg but RBC 14. CT, CXR and ECHO reviewed. Though proBNP elevated, ECHO showing hyperdynamic LVEF. Would hold any further diuretics. Monitor fluid and respiratory status daily. Recommend to obtain urine urea and renal US. Monitor PVR daily. Monitor labs and urine output. Avoid nephrotoxins. Dose medications as per CrCl.    Final recommendations pending attending signature.  If you have any questions, please feel free to contact me  Luh Mott  Nephrology Fellow  [employer]/Page [phone]  (After 5pm or on weekends please page the on-call fellow) Pt with ATN, multifactorial 2/2 EPI (s/p IV contrast 12/21), sepsis, vancomycin (12/20), and diuretic use with hyperdynamic EF (hypovolemia). Pt w/ baseline Scr 0.86-1.29, prior to admission Scr 1.12 on 10/7/23.     Labs and VS reviewed. No hypotensive episodes. On admission Scr at baseline 1.28 on 12/20. Scr increased to 1.62 on 12/21 and today increased further to Scr 3.44. UA +trace protein, casts, bld neg but RBC 14. CT, CXR and ECHO reviewed. Though proBNP elevated, ECHO showing hyperdynamic LVEF. Would hold any further diuretics. Monitor fluid and respiratory status daily. Recommend to obtain urine urea and renal US. Monitor PVR daily. Monitor labs and urine output. Avoid nephrotoxins. Dose medications as per CrCl.    Final recommendations pending attending signature.  If you have any questions, please feel free to contact me  Luh Mott  Nephrology Fellow  [employer]/Page [phone]  (After 5pm or on weekends please page the on-call fellow)

## 2023-12-22 NOTE — PROGRESS NOTE ADULT - PROBLEM SELECTOR PLAN 6
DVT proph: heparin gtt  Diet: DASH  Dispo: pending med clearance - on heparin gtt iso possible thora tomorrow   - holding home eliquis if no plan for thora by pulm

## 2023-12-22 NOTE — CHART NOTE - NSCHARTNOTEFT_GEN_A_CORE
: Dr. Barraza, Dr. Obregon    INDICATION: pleural effusion    PROCEDURE:  [ ] LIMITED ECHO  [x] LIMITED CHEST  [ ] LIMITED RETROPERITONEAL  [ ] LIMITED ABDOMINAL  [ ] LIMITED DVT  [ ] NEEDLE GUIDANCE VASCULAR  [ ] NEEDLE GUIDANCE THORACENTESIS  [ ] NEEDLE GUIDANCE PARACENTESIS  [ ] NEEDLE GUIDANCE PERICARDIOCENTESIS  [ ] OTHER    FINDINGS:  trace right pleural effusion, moderate left complex pleural effusion with atelectatic lung, post proc lung sliding and b lines, trace residual pleural effusion left    INTERPRETATION:  safe pocket, unlikely ptx      Images uploaded to [employer] : Dr. Barraza, Dr. Obregon    INDICATION: pleural effusion    PROCEDURE:  [ ] LIMITED ECHO  [x] LIMITED CHEST  [ ] LIMITED RETROPERITONEAL  [ ] LIMITED ABDOMINAL  [ ] LIMITED DVT  [ ] NEEDLE GUIDANCE VASCULAR  [ ] NEEDLE GUIDANCE THORACENTESIS  [ ] NEEDLE GUIDANCE PARACENTESIS  [ ] NEEDLE GUIDANCE PERICARDIOCENTESIS  [ ] OTHER    FINDINGS:  trace right pleural effusion, moderate left complex pleural effusion with atelectatic lung, post proc lung sliding and b lines, trace residual pleural effusion left    INTERPRETATION:  safe pocket, unlikely ptx      Images uploaded to [employer] : Dr. Barraza, Dr. Obregon    INDICATION: pleural effusion    PROCEDURE:  [ ] LIMITED ECHO  [x] LIMITED CHEST  [ ] LIMITED RETROPERITONEAL  [ ] LIMITED ABDOMINAL  [ ] LIMITED DVT  [ ] NEEDLE GUIDANCE VASCULAR  [ ] NEEDLE GUIDANCE THORACENTESIS  [ ] NEEDLE GUIDANCE PARACENTESIS  [ ] NEEDLE GUIDANCE PERICARDIOCENTESIS  [ ] OTHER    FINDINGS:  trace right pleural effusion, moderate left complex pleural effusion with atelectatic lung, post proc lung sliding and b lines, trace residual pleural effusion left    INTERPRETATION:  safe pocket, unlikely ptx      Images uploaded to [employer]    Pulmonary attending  I personally reviewed images and agree with findings and interpretation as outlined above

## 2023-12-22 NOTE — PROGRESS NOTE ADULT - PROBLEM SELECTOR PLAN 2
Baseline BNP ~ 2800. BNP ~ 2800 this admission  In chronic exacerbation state with 3+ pitting edema and on diuresis  HFpEF or HFrEF  EF = 68% on 8/11/23  Heart failure, CHF order set initiated  Guideline directed medical therapy as below: after med-rec completed  - f/u TTE  - bumex 2 IV daily   - Diuretic: on bumex PO 2 mg am 1 mg pm at home. Switch to IV bumex 2 mg QD while admitted  - BB:  resume home metoprolol tatrate 25 mg BID. Switch to xl at d/c  - ARNI/ACE-I/ARB: Consider starting at d/c  - Hydralazine/Nitrate: Not indicated at this time  - MRA: Not indicated at this time  - SGLT2: Consider starting at d/c  - Maintain K > 4, Mg > 2 Baseline BNP ~ 2800. BNP ~ 2800 this admission  In chronic exacerbation state with 3+ pitting edema and on diuresis  HFpEF or HFrEF  EF = 68% on 8/11/23  Heart failure, CHF order set initiated  Guideline directed medical therapy as below: after med-rec completed  TTE systolic function is hyperdynamic with an ejection fraction of 80 %  - increased bumex to 2 mg IV BID and PRN    - Diuretic: on bumex PO 2 mg am 1 mg pm at home. Switch to IV bumex 2 mg QD while admitted  - BB:  resume home metoprolol tatrate 25 mg BID. Switch to xl at d/c  - ARNI/ACE-I/ARB: Consider starting at d/c  - Hydralazine/Nitrate: Not indicated at this time  - MRA: Not indicated at this time  - SGLT2: Consider starting at d/c  - Maintain K > 4, Mg > 2

## 2023-12-22 NOTE — CONSULT NOTE ADULT - ATTENDING COMMENTS
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  He is low grade febrile, has slightly rising Cr and no evidence of hypercapnea.  Has enlarging bilateral pleural effusions and volume overloaded clinically with LE edema.  This is likely secondary to volume overload.  If he does not improve with diuresis will peform thoracentesis.  Agree with plan/recommendations as outlined above.
seen and evaluated with fellow case d/w multi disciplinary team.  sepsis, contrast now with BESSY hyperdynamic LV.  low urine sodium c/w contrast nephropathy.  recommend optimize hemodynamics.  supportive care check renal ultrasound.

## 2023-12-22 NOTE — PROGRESS NOTE ADULT - PROBLEM SELECTOR PLAN 1
Likely multifactorial - CAP and component of HFpEF, entero/rhino virus +, COPD exacerbation   SIRS criteria at admission RR 36, WBC elevated  CTPE chest showed loculated moderate L pleural effusion and small R pleural effusion  RVP pos for entero/rhino   sputum culture positive, Bcx x2 positive for strep pneumo   procal elevated   UA positive with bacteria, WBCs  - pulm consulted - recs appreciated - potential tap on Friday    - started ceftriaxone 2000 q24   - started azithro 500 124  - ID consulted - will appreciate recs  - Aspiration Precautions  - Further plan per HFpEF below  - methylpred decreased from 60 IV BID started in the ED to 40 IV BID  - c/w Symbicort, duoneb Likely multifactorial - CAP and component of HFpEF, entero/rhino virus +, COPD exacerbation   SIRS criteria at admission RR 36, WBC elevated  CTPE chest showed loculated moderate L pleural effusion and small R pleural effusion  RVP pos for entero/rhino   sputum culture positive, Bcx x2 positive for strep pneumo   procal elevated   UA positive with bacteria, WBCs  - thoracentesis today   - pulm consulted - recs appreciated   - started ceftriaxone 2000 q24   - s/p azithro 500 q24  - ID consulted - will appreciate recs  - Aspiration Precautions  - Further plan per HFpEF below  - methylpred decreased from 60 IV BID started in the ED to 40 IV BID  - c/w Symbicort, duoneb  - increased bumex to 2 mg IV BID

## 2023-12-22 NOTE — CONSULT NOTE ADULT - ASSESSMENT
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 Nephrology consulted for BESSY.  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 Nephrology consulted for BESSY.

## 2023-12-22 NOTE — PROGRESS NOTE ADULT - ATTENDING COMMENTS
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 Also with a loculated left pleural effusion.  Thoracentesis deferred secondary to patient being on Eliquis. Blood cultures - 2 sets positive for Strep pneumoniae.  He is ERV positive and MRSA positive by PCR.  Creatinine is slightly higher.  He is off eliquis and on IV heparin. IV heparin held today.  We are planning on thoracentesis this afternoon.  Agree with plan as outlined above.

## 2023-12-22 NOTE — PROGRESS NOTE ADULT - PROBLEM SELECTOR PLAN 5
- on heparin gtt iso possible thora tomorrow   - holding home eliquis if no plan for thora by pulm - Resume home metoprolol 25 BID

## 2023-12-22 NOTE — PROGRESS NOTE ADULT - ASSESSMENT
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

## 2023-12-22 NOTE — PROGRESS NOTE ADULT - PROBLEM SELECTOR PLAN 4
- Resume home metoprolol 25 BID baseline Cre <1  ddx pre-renal iso infection, poor PO, cardio-renal causes, contrast-induced, post renal needs to be ruled out   - US bladder, kidneys   - nephro consult - f/u recs   - urine lytes

## 2023-12-22 NOTE — PROGRESS NOTE ADULT - ASSESSMENT
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 found to have hypoxemic respiratory failure secondary to pna vs copd vs AHRF    #recommendations    patient declining inhalers however amenable nebulizers continue standing duonebs and methylpred 40 BID continue   patient with significant increased mucus production and sepsis please continue ceftriaxone can discontinue azitho at this point  s/p 1200 left thoracentesis exudative, unlikely infectious pleural effusion  f/u post op CXR  can continue heparin without initial bolus 2 hours post CXR if stable  can transition tomorrow if stable   continue airway clearance: humidified oxygen, standing guaifenesin, duonebs q6h, hyper sal q6h, chest PT q6 h, aerobika/acapella q6 h, deep NT suction q6 hr, chest vest q12h  f/u U/A, urine legionella,:   ERV positive, strep bactermia , nml resp sukhjinder, MRSA negative  pro BNP, ECHO with bubble study: nml LV nml RV    titrate oxygen to O2 >88%, use humidified oxygen  incentive spirometry, OOB to chair, PT/OT  aspiration precautions as necessary  DVT ppx as tolerated  airway clearance as necessary    Prior to discharge:  Please email: [email or web address] to setup an appointment prior to discharge. Include the patient's name, , MRN and contact information in the email.      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 found to have hypoxemic respiratory failure secondary to pna vs copd vs AHRF    #recommendations    patient declining inhalers however amenable nebulizers continue standing duonebs and methylpred 40 BID continue   patient with significant increased mucus production and sepsis please continue ceftriaxone can discontinue azitho at this point  s/p 1200 left thoracentesis exudative, unlikely infectious pleural effusion  f/u post op CXR  can continue heparin without initial bolus 2 hours post CXR if stable  can transition tomorrow if stable   continue airway clearance: humidified oxygen, standing guaifenesin, duonebs q6h, hyper sal q6h, chest PT q6 h, aerobika/acapella q6 h, deep NT suction q6 hr, chest vest q12h  f/u U/A, urine legionella,:   ERV positive, strep bactermia , nml resp sukhjinder, MRSA negative  pro BNP, ECHO with bubble study: nml LV nml RV    titrate oxygen to O2 >88%, use humidified oxygen  incentive spirometry, OOB to chair, PT/OT  aspiration precautions as necessary  DVT ppx as tolerated  airway clearance as necessary    Prior to discharge:  Please email: [email or web address] to setup an appointment prior to discharge. Include the patient's name, , MRN and contact information in the email.      Pulmonary[facility]Sleep Clinic  [address] [place] [address]  [phone]    Eugenio Barraza PGY5 PCCM Fellow

## 2023-12-22 NOTE — PROGRESS NOTE ADULT - ASSESSMENT
Pneumococcal pneumonia with septicemia  New large pleural effusion. Had some chest pain prior to coming to the hospital, now resolved    12/22: Overall stable. Afebrile with leukocytosis, steroids potentially contributing to both. No cell counts on fluid yet but pH Ok. LDH elevated. Now also with BESSY, likley multifactorial but contrast playing a role.     SUggestions--  F/U thoracocentesis studies  Continue ceftriaxone  F/U sensi of blood isolate  F/U repeat blood cx to document control of bacteremia, or lack thereof  COPD management as per pulmonary and primary team  BESSY per primary team, ceftriaxone does not require dose adjustment    Left message for Dr. Clark    If any ID input is needed over the weekend, please call [phone]. Thanks.    Markel Morales MD  Attending Physician  [facility]  Division of Infectious Diseases  [phone]   Pneumococcal pneumonia with septicemia  New large pleural effusion. Had some chest pain prior to coming to the hospital, now resolved    12/22: Overall stable. Afebrile with leukocytosis, steroids potentially contributing to both. No cell counts on fluid yet but pH Ok. LDH elevated. Now also with BESSY, likley multifactorial but contrast playing a role.     SUggestions--  F/U thoracocentesis studies  Continue ceftriaxone  F/U sensi of blood isolate  F/U repeat blood cx to document control of bacteremia, or lack thereof  COPD management as per pulmonary and primary team  BESSY per primary team, ceftriaxone does not require dose adjustment    Left message for Dr. Clark    If any ID input is needed over the weekend, please call [phone]. Thanks.    Markel Morales MD  Attending Physician  [facility]  Division of Infectious Diseases  [phone]

## 2023-12-22 NOTE — PROGRESS NOTE ADULT - SUBJECTIVE AND OBJECTIVE BOX
Renetta Vargas MD    Internal Medicine Resident (PGY-1)  ___________________________________________________________________________________________________      HORACIOGLADIS 83y Male    Overnight events/subjective: No acute overnight events. Patient seen and examined at bedside. No complaints. Denies fever, chills, chest pain, shortness of breath, abdominal pain, nausea, vomiting, changes in bowel habits, or urinary symptoms.    Vital Signs Last 24 Hrs  T(C): 36.8 (22 Dec 2023 04:31), Max: 36.9 (21 Dec 2023 20:53)  T(F): 98.3 (22 Dec 2023 04:31), Max: 98.5 (21 Dec 2023 20:53)  HR: 81 (22 Dec 2023 04:31) (81 - 102)  BP: 111/54 (22 Dec 2023 04:31) (111/54 - 132/71)  BP(mean): --  RR: 18 (22 Dec 2023 04:31) (18 - 18)  SpO2: 93% (22 Dec 2023 04:31) (92% - 93%)    Parameters below as of 22 Dec 2023 04:31  Patient On (Oxygen Delivery Method): room air      PHYSICAL EXAM:  CONSTITUTIONAL: in no apparent distress  EYES: No conjunctival or scleral injection, non-icteric  ENMT: No external nasal lesions; Normal outer ears, + throat erythema   NECK: Supple; Trachea midline  RESPIRATORY: Normal respiratory effort; Decreased breathe sounds LL > RL  CARDIOVASCULAR: Regular rate and rhythm, normal S1 and S2;  GASTROINTESTINAL: Non-distended; No palpable masses; No tenderness; No rebound/guarding  EXTREMITIES:  Gross 3+ lower extremity edema;  NEUROLOGY: A+O to person, place, and time; Does respond to commands appropriately;  PSYCHIATRY: Mood and Affect appropriate      HOSPITAL MEDICATIONS:  MEDICATIONS  (STANDING):  albuterol/ipratropium for Nebulization 3 milliLiter(s) Nebulizer every 6 hours  ascorbic acid 500 milliGRAM(s) Oral daily  atorvastatin 10 milliGRAM(s) Oral at bedtime  azithromycin  IVPB 500 milliGRAM(s) IV Intermittent every 24 hours  budesonide 160 MICROgram(s)/formoterol 4.5 MICROgram(s) Inhaler 2 Puff(s) Inhalation two times a day  buMETAnide Injectable 2 milliGRAM(s) IV Push daily  cefTRIAXone   IVPB 2000 milliGRAM(s) IV Intermittent every 24 hours  chlorhexidine 4% Liquid 1 Application(s) Topical <User Schedule>  finasteride 5 milliGRAM(s) Oral daily  heparin  Infusion. 1800 Unit(s)/Hr (18 mL/Hr) IV Continuous <Continuous>  influenza  Vaccine (HIGH DOSE) 0.7 milliLiter(s) IntraMuscular once  methylPREDNISolone sodium succinate Injectable 40 milliGRAM(s) IV Push every 12 hours  metoprolol tartrate 25 milliGRAM(s) Oral two times a day  multivitamin 1 Tablet(s) Oral daily  mupirocin 2% Nasal 1 Application(s) Both Nostrils two times a day  pantoprazole    Tablet 40 milliGRAM(s) Oral before breakfast  tamsulosin 0.4 milliGRAM(s) Oral at bedtime    MEDICATIONS  (PRN):  acetaminophen     Tablet .. 650 milliGRAM(s) Oral every 6 hours PRN Temp greater or equal to 38C (100.4F), Mild Pain (1 - 3)  heparin   Injectable 8000 Unit(s) IV Push every 6 hours PRN For aPTT less than 40  heparin   Injectable 4000 Unit(s) IV Push every 6 hours PRN For aPTT between 40 - 57      LABS:                        7.7    13.94 )-----------( 475      ( 21 Dec 2023 03:43 )             25.8     12-21    137  |  96  |  38<H>  ----------------------------<  89  3.7   |  26  |  1.62<H>    Ca    9.0      21 Dec 2023 07:44    TPro  6.3  /  Alb  2.6<L>  /  TBili  0.6  /  DBili  x   /  AST  18  /  ALT  11  /  AlkPhos  97  12-21    PTT - ( 22 Dec 2023 02:05 )  PTT:124.3 sec  Urinalysis Basic - ( 21 Dec 2023 07:44 )    Color: x / Appearance: x / SG: x / pH: x  Gluc: 89 mg/dL / Ketone: x  / Bili: x / Urobili: x   Blood: x / Protein: x / Nitrite: x   Leuk Esterase: x / RBC: x / WBC x   Sq Epi: x / Non Sq Epi: x / Bacteria: x         Renetta Vargas MD    Internal Medicine Resident (PGY-1)  ___________________________________________________________________________________________________      GLADIS LEONARD 83y Male    Overnight events/subjective: No acute overnight events. Patient seen and examined at bedside. This morning, patient is complaining of cough, which is about the same as before and some difficulty breathing, which is mildly worse today. Patient was placed on 2L NC due to desatting to 89% with improvement in sat. Patient is asking when his thoracentesis will take place.   Denies fever, chills, chest pain, abdominal pain, nausea, vomiting, changes in bowel habits, or urinary symptoms.  Patient has minimal urine output, about 200 over 24 hours.      Vital Signs Last 24 Hrs  T(C): 36.8 (22 Dec 2023 04:31), Max: 36.9 (21 Dec 2023 20:53)  T(F): 98.3 (22 Dec 2023 04:31), Max: 98.5 (21 Dec 2023 20:53)  HR: 81 (22 Dec 2023 04:31) (81 - 102)  BP: 111/54 (22 Dec 2023 04:31) (111/54 - 132/71)  BP(mean): --  RR: 18 (22 Dec 2023 04:31) (18 - 18)  SpO2: 93% (22 Dec 2023 04:31) (92% - 93%)    Parameters below as of 22 Dec 2023 04:31  Patient On (Oxygen Delivery Method): room air      PHYSICAL EXAM:  CONSTITUTIONAL: in no apparent distress  EYES: No conjunctival or scleral injection, non-icteric  ENMT: No external nasal lesions; Normal outer ears, + throat erythema   NECK: Supple; Trachea midline  RESPIRATORY: Normal respiratory effort; Decreased breathe sounds LL > RL  CARDIOVASCULAR: Regular rate and rhythm, normal S1 and S2;  GASTROINTESTINAL: Non-distended; No palpable masses; No tenderness; No rebound/guarding  EXTREMITIES:  Gross 3+ lower extremity edema;  NEUROLOGY: A+O to person, place, and time; Does respond to commands appropriately;  PSYCHIATRY: Mood and Affect appropriate      HOSPITAL MEDICATIONS:  MEDICATIONS  (STANDING):  albuterol/ipratropium for Nebulization 3 milliLiter(s) Nebulizer every 6 hours  ascorbic acid 500 milliGRAM(s) Oral daily  atorvastatin 10 milliGRAM(s) Oral at bedtime  azithromycin  IVPB 500 milliGRAM(s) IV Intermittent every 24 hours  budesonide 160 MICROgram(s)/formoterol 4.5 MICROgram(s) Inhaler 2 Puff(s) Inhalation two times a day  buMETAnide Injectable 2 milliGRAM(s) IV Push daily  cefTRIAXone   IVPB 2000 milliGRAM(s) IV Intermittent every 24 hours  chlorhexidine 4% Liquid 1 Application(s) Topical <User Schedule>  finasteride 5 milliGRAM(s) Oral daily  heparin  Infusion. 1800 Unit(s)/Hr (18 mL/Hr) IV Continuous <Continuous>  influenza  Vaccine (HIGH DOSE) 0.7 milliLiter(s) IntraMuscular once  methylPREDNISolone sodium succinate Injectable 40 milliGRAM(s) IV Push every 12 hours  metoprolol tartrate 25 milliGRAM(s) Oral two times a day  multivitamin 1 Tablet(s) Oral daily  mupirocin 2% Nasal 1 Application(s) Both Nostrils two times a day  pantoprazole    Tablet 40 milliGRAM(s) Oral before breakfast  tamsulosin 0.4 milliGRAM(s) Oral at bedtime    MEDICATIONS  (PRN):  acetaminophen     Tablet .. 650 milliGRAM(s) Oral every 6 hours PRN Temp greater or equal to 38C (100.4F), Mild Pain (1 - 3)  heparin   Injectable 8000 Unit(s) IV Push every 6 hours PRN For aPTT less than 40  heparin   Injectable 4000 Unit(s) IV Push every 6 hours PRN For aPTT between 40 - 57      LABS:                        7.7    13.94 )-----------( 475      ( 21 Dec 2023 03:43 )             25.8     12-21    137  |  96  |  38<H>  ----------------------------<  89  3.7   |  26  |  1.62<H>    Ca    9.0      21 Dec 2023 07:44    TPro  6.3  /  Alb  2.6<L>  /  TBili  0.6  /  DBili  x   /  AST  18  /  ALT  11  /  AlkPhos  97  12-21    PTT - ( 22 Dec 2023 02:05 )  PTT:124.3 sec  Urinalysis Basic - ( 21 Dec 2023 07:44 )    Color: x / Appearance: x / SG: x / pH: x  Gluc: 89 mg/dL / Ketone: x  / Bili: x / Urobili: x   Blood: x / Protein: x / Nitrite: x   Leuk Esterase: x / RBC: x / WBC x   Sq Epi: x / Non Sq Epi: x / Bacteria: x

## 2023-12-22 NOTE — PROGRESS NOTE ADULT - SUBJECTIVE AND OBJECTIVE BOX
CHIEF COMPLAINT:Patient is a 83y old  Male who presents with a chief complaint of sob (22 Dec 2023 07:09)      Interval Events:    REVIEW OF SYSTEMS:  [x] All other systems negative except per HPI   [ ] Unable to assess ROS because ________    OBJECTIVE:  ICU Vital Signs Last 24 Hrs  T(C): 36.8 (22 Dec 2023 04:31), Max: 36.9 (21 Dec 2023 20:53)  T(F): 98.3 (22 Dec 2023 04:31), Max: 98.5 (21 Dec 2023 20:53)  HR: 81 (22 Dec 2023 04:31) (81 - 102)  BP: 111/54 (22 Dec 2023 04:31) (111/54 - 132/71)  BP(mean): --  ABP: --  ABP(mean): --  RR: 18 (22 Dec 2023 04:31) (18 - 18)  SpO2: 93% (22 Dec 2023 04:31) (92% - 93%)    O2 Parameters below as of 22 Dec 2023 04:31  Patient On (Oxygen Delivery Method): room air              12-21 @ 07:01  -  12-22 @ 07:00  --------------------------------------------------------  IN: 0 mL / OUT: 1 mL / NET: -1 mL        PHYSICAL EXAM:  GENERAL: NAD, well-groomed, well-developed  HEAD:  Atraumatic, Normocephalic  EYES: EOMI, PERRLA, conjunctiva and sclera clear  ENMT: No tonsillar erythema, exudates, or enlargement; Moist mucous membranes, Good dentition, No lesions  NECK: Supple, No JVD, Normal thyroid  CHEST/LUNG: Clear to auscultation bilaterally; No rales, rhonchi, wheezing, or rubs  HEART: Regular rate and rhythm; No murmurs, rubs, or gallops  ABDOMEN: Soft, Nontender, Nondistended; Bowel sounds present  VASCULAR:  2+ Peripheral Pulses, No clubbing, cyanosis, or edema  LYMPH: No lymphadenopathy noted  SKIN: No rashes or lesions  NERVOUS SYSTEM:  Alert & Oriented X3, Good concentration; Motor Strength 5/5 B/L upper and lower extremities; DTRs 2+ intact and symmetric    HOSPITAL MEDICATIONS:  MEDICATIONS  (STANDING):  albuterol/ipratropium for Nebulization 3 milliLiter(s) Nebulizer every 6 hours  ascorbic acid 500 milliGRAM(s) Oral daily  atorvastatin 10 milliGRAM(s) Oral at bedtime  azithromycin  IVPB 500 milliGRAM(s) IV Intermittent every 24 hours  budesonide 160 MICROgram(s)/formoterol 4.5 MICROgram(s) Inhaler 2 Puff(s) Inhalation two times a day  buMETAnide Injectable 2 milliGRAM(s) IV Push daily  cefTRIAXone   IVPB 2000 milliGRAM(s) IV Intermittent every 24 hours  chlorhexidine 4% Liquid 1 Application(s) Topical <User Schedule>  finasteride 5 milliGRAM(s) Oral daily  influenza  Vaccine (HIGH DOSE) 0.7 milliLiter(s) IntraMuscular once  methylPREDNISolone sodium succinate Injectable 40 milliGRAM(s) IV Push every 12 hours  metoprolol tartrate 25 milliGRAM(s) Oral two times a day  multivitamin 1 Tablet(s) Oral daily  mupirocin 2% Nasal 1 Application(s) Both Nostrils two times a day  pantoprazole    Tablet 40 milliGRAM(s) Oral before breakfast  tamsulosin 0.4 milliGRAM(s) Oral at bedtime    MEDICATIONS  (PRN):  acetaminophen     Tablet .. 650 milliGRAM(s) Oral every 6 hours PRN Temp greater or equal to 38C (100.4F), Mild Pain (1 - 3)      LABS:    The Labs were reviewed by me   The Radiology was reviewed by me    EKG tracing reviewed by me    12-21    137  |  96  |  38<H>  ----------------------------<  89  3.7   |  26  |  1.62<H>  12-20    137  |  98  |  33<H>  ----------------------------<  130<H>  3.6   |  26  |  1.28    Ca    9.0      21 Dec 2023 07:44  Ca    8.9      20 Dec 2023 04:27    TPro  6.3  /  Alb  2.6<L>  /  TBili  0.6  /  DBili  x   /  AST  18  /  ALT  11  /  AlkPhos  97  12-21  TPro  6.8  /  Alb  2.9<L>  /  TBili  0.9  /  DBili  x   /  AST  21  /  ALT  10  /  AlkPhos  104  12-20          PTT - ( 22 Dec 2023 02:05 )  PTT:124.3 sec              Urinalysis Basic - ( 21 Dec 2023 07:44 )    Color: x / Appearance: x / SG: x / pH: x  Gluc: 89 mg/dL / Ketone: x  / Bili: x / Urobili: x   Blood: x / Protein: x / Nitrite: x   Leuk Esterase: x / RBC: x / WBC x   Sq Epi: x / Non Sq Epi: x / Bacteria: x                              7.7    13.94 )-----------( 475      ( 21 Dec 2023 03:43 )             25.8                         8.3    16.93 )-----------( 516      ( 20 Dec 2023 04:27 )             28.1     CAPILLARY BLOOD GLUCOSE      POCT Blood Glucose.: 267 mg/dL (21 Dec 2023 22:32)  POCT Blood Glucose.: 263 mg/dL (21 Dec 2023 17:43)    Blood Gas Source Venous: Venous (12-20-23 @ 05:33)      MICROBIOLOGY:     RADIOLOGY:  [ ] Reviewed and interpreted by me    Point of Care Ultrasound Findings:    PFT:    EKG: CHIEF COMPLAINT:Patient is a 83y old  Male who presents with a chief complaint of sob (22 Dec 2023 07:09)      Interval Events: breathing improved with diuresis and post procedure. mucus has improved. cough improved. Patient denies fevers, chills, chest pain, shortness of breath, nausea, abdominal pain, diarrhea, constipation, dysuria, headache, light headedness    REVIEW OF SYSTEMS:  [x] All other systems negative except per HPI   [ ] Unable to assess ROS because ________    OBJECTIVE:  ICU Vital Signs Last 24 Hrs  T(C): 36.8 (22 Dec 2023 04:31), Max: 36.9 (21 Dec 2023 20:53)  T(F): 98.3 (22 Dec 2023 04:31), Max: 98.5 (21 Dec 2023 20:53)  HR: 81 (22 Dec 2023 04:31) (81 - 102)  BP: 111/54 (22 Dec 2023 04:31) (111/54 - 132/71)  BP(mean): --  ABP: --  ABP(mean): --  RR: 18 (22 Dec 2023 04:31) (18 - 18)  SpO2: 93% (22 Dec 2023 04:31) (92% - 93%)    O2 Parameters below as of 22 Dec 2023 04:31  Patient On (Oxygen Delivery Method): room air              12-21 @ 07:01  -  12-22 @ 07:00  --------------------------------------------------------  IN: 0 mL / OUT: 1 mL / NET: -1 mL        PHYSICAL EXAM:  GENERAL: NAD, well-groomed, well-developed  HEAD:  Atraumatic, Normocephalic  EYES: EOMI, PERRLA, conjunctiva and sclera clear  ENMT: No tonsillar erythema, exudates, or enlargement; Moist mucous membranes, Good dentition, No lesions  NECK: Supple, No JVD, Normal thyroid  CHEST/LUNG: decreased breathe sounds on the left  HEART: Regular rate and rhythm; No murmurs, rubs, or gallops  ABDOMEN: Soft, Nontender, Nondistended; Bowel sounds present  VASCULAR:  2+ Peripheral Pulses, No clubbing, cyanosis. bilateral leg edema  LYMPH: No lymphadenopathy noted  SKIN: No rashes or lesions  NERVOUS SYSTEM:  Alert & Oriented X3, Good concentration; Motor Strength 5/5 B/L upper and lower extremities; DTRs 2+ intact and symmetric    HOSPITAL MEDICATIONS:  MEDICATIONS  (STANDING):  albuterol/ipratropium for Nebulization 3 milliLiter(s) Nebulizer every 6 hours  ascorbic acid 500 milliGRAM(s) Oral daily  atorvastatin 10 milliGRAM(s) Oral at bedtime  azithromycin  IVPB 500 milliGRAM(s) IV Intermittent every 24 hours  budesonide 160 MICROgram(s)/formoterol 4.5 MICROgram(s) Inhaler 2 Puff(s) Inhalation two times a day  buMETAnide Injectable 2 milliGRAM(s) IV Push daily  cefTRIAXone   IVPB 2000 milliGRAM(s) IV Intermittent every 24 hours  chlorhexidine 4% Liquid 1 Application(s) Topical <User Schedule>  finasteride 5 milliGRAM(s) Oral daily  influenza  Vaccine (HIGH DOSE) 0.7 milliLiter(s) IntraMuscular once  methylPREDNISolone sodium succinate Injectable 40 milliGRAM(s) IV Push every 12 hours  metoprolol tartrate 25 milliGRAM(s) Oral two times a day  multivitamin 1 Tablet(s) Oral daily  mupirocin 2% Nasal 1 Application(s) Both Nostrils two times a day  pantoprazole    Tablet 40 milliGRAM(s) Oral before breakfast  tamsulosin 0.4 milliGRAM(s) Oral at bedtime    MEDICATIONS  (PRN):  acetaminophen     Tablet .. 650 milliGRAM(s) Oral every 6 hours PRN Temp greater or equal to 38C (100.4F), Mild Pain (1 - 3)      LABS:    The Labs were reviewed by me   The Radiology was reviewed by me    EKG tracing reviewed by me    12-21    137  |  96  |  38<H>  ----------------------------<  89  3.7   |  26  |  1.62<H>  12-20    137  |  98  |  33<H>  ----------------------------<  130<H>  3.6   |  26  |  1.28    Ca    9.0      21 Dec 2023 07:44  Ca    8.9      20 Dec 2023 04:27    TPro  6.3  /  Alb  2.6<L>  /  TBili  0.6  /  DBili  x   /  AST  18  /  ALT  11  /  AlkPhos  97  12-21  TPro  6.8  /  Alb  2.9<L>  /  TBili  0.9  /  DBili  x   /  AST  21  /  ALT  10  /  AlkPhos  104  12-20          PTT - ( 22 Dec 2023 02:05 )  PTT:124.3 sec              Urinalysis Basic - ( 21 Dec 2023 07:44 )    Color: x / Appearance: x / SG: x / pH: x  Gluc: 89 mg/dL / Ketone: x  / Bili: x / Urobili: x   Blood: x / Protein: x / Nitrite: x   Leuk Esterase: x / RBC: x / WBC x   Sq Epi: x / Non Sq Epi: x / Bacteria: x                              7.7    13.94 )-----------( 475      ( 21 Dec 2023 03:43 )             25.8                         8.3    16.93 )-----------( 516      ( 20 Dec 2023 04:27 )             28.1     CAPILLARY BLOOD GLUCOSE      POCT Blood Glucose.: 267 mg/dL (21 Dec 2023 22:32)  POCT Blood Glucose.: 263 mg/dL (21 Dec 2023 17:43)    Blood Gas Source Venous: Venous (12-20-23 @ 05:33)      MICROBIOLOGY:     RADIOLOGY:  [ ] Reviewed and interpreted by me    Point of Care Ultrasound Findings:    PFT:    EKG:

## 2023-12-23 ENCOUNTER — TRANSCRIPTION ENCOUNTER (OUTPATIENT)
Age: 83
End: 2023-12-23

## 2023-12-23 LAB
-  CLINDAMYCIN: SIGNIFICANT CHANGE UP
-  CLINDAMYCIN: SIGNIFICANT CHANGE UP
ALBUMIN SERPL ELPH-MCNC: 2.6 G/DL — LOW (ref 3.3–5)
ALBUMIN SERPL ELPH-MCNC: 2.6 G/DL — LOW (ref 3.3–5)
ALP SERPL-CCNC: 88 U/L — SIGNIFICANT CHANGE UP (ref 40–120)
ALP SERPL-CCNC: 88 U/L — SIGNIFICANT CHANGE UP (ref 40–120)
ALT FLD-CCNC: 38 U/L — SIGNIFICANT CHANGE UP (ref 10–45)
ALT FLD-CCNC: 38 U/L — SIGNIFICANT CHANGE UP (ref 10–45)
ANION GAP SERPL CALC-SCNC: 15 MMOL/L — SIGNIFICANT CHANGE UP (ref 5–17)
ANION GAP SERPL CALC-SCNC: 15 MMOL/L — SIGNIFICANT CHANGE UP (ref 5–17)
APTT BLD: 164.9 SEC — CRITICAL HIGH (ref 24.5–35.6)
APTT BLD: 164.9 SEC — CRITICAL HIGH (ref 24.5–35.6)
APTT BLD: 47.9 SEC — HIGH (ref 24.5–35.6)
APTT BLD: 47.9 SEC — HIGH (ref 24.5–35.6)
APTT BLD: >200 SEC — CRITICAL HIGH (ref 24.5–35.6)
APTT BLD: >200 SEC — CRITICAL HIGH (ref 24.5–35.6)
AST SERPL-CCNC: 54 U/L — HIGH (ref 10–40)
AST SERPL-CCNC: 54 U/L — HIGH (ref 10–40)
BILIRUB SERPL-MCNC: 0.2 MG/DL — SIGNIFICANT CHANGE UP (ref 0.2–1.2)
BILIRUB SERPL-MCNC: 0.2 MG/DL — SIGNIFICANT CHANGE UP (ref 0.2–1.2)
BUN SERPL-MCNC: 74 MG/DL — HIGH (ref 7–23)
BUN SERPL-MCNC: 74 MG/DL — HIGH (ref 7–23)
CALCIUM SERPL-MCNC: 8.4 MG/DL — SIGNIFICANT CHANGE UP (ref 8.4–10.5)
CALCIUM SERPL-MCNC: 8.4 MG/DL — SIGNIFICANT CHANGE UP (ref 8.4–10.5)
CHLORIDE SERPL-SCNC: 90 MMOL/L — LOW (ref 96–108)
CHLORIDE SERPL-SCNC: 90 MMOL/L — LOW (ref 96–108)
CHOLEST FLD-MCNC: 32 MG/DL — SIGNIFICANT CHANGE UP
CHOLEST FLD-MCNC: 32 MG/DL — SIGNIFICANT CHANGE UP
CO2 SERPL-SCNC: 23 MMOL/L — SIGNIFICANT CHANGE UP (ref 22–31)
CO2 SERPL-SCNC: 23 MMOL/L — SIGNIFICANT CHANGE UP (ref 22–31)
CREAT SERPL-MCNC: 4.32 MG/DL — HIGH (ref 0.5–1.3)
CREAT SERPL-MCNC: 4.32 MG/DL — HIGH (ref 0.5–1.3)
CULTURE RESULTS: ABNORMAL
CULTURE RESULTS: ABNORMAL
EGFR: 13 ML/MIN/1.73M2 — LOW
EGFR: 13 ML/MIN/1.73M2 — LOW
GLUCOSE BLDC GLUCOMTR-MCNC: 242 MG/DL — HIGH (ref 70–99)
GLUCOSE BLDC GLUCOMTR-MCNC: 242 MG/DL — HIGH (ref 70–99)
GLUCOSE BLDC GLUCOMTR-MCNC: 245 MG/DL — HIGH (ref 70–99)
GLUCOSE BLDC GLUCOMTR-MCNC: 245 MG/DL — HIGH (ref 70–99)
GLUCOSE SERPL-MCNC: 188 MG/DL — HIGH (ref 70–99)
GLUCOSE SERPL-MCNC: 188 MG/DL — HIGH (ref 70–99)
GRAM STN FLD: SIGNIFICANT CHANGE UP
GRAM STN FLD: SIGNIFICANT CHANGE UP
HCT VFR BLD CALC: 23.1 % — LOW (ref 39–50)
HCT VFR BLD CALC: 23.1 % — LOW (ref 39–50)
HCT VFR BLD CALC: 25.7 % — LOW (ref 39–50)
HCT VFR BLD CALC: 25.7 % — LOW (ref 39–50)
HGB BLD-MCNC: 7.1 G/DL — LOW (ref 13–17)
HGB BLD-MCNC: 7.1 G/DL — LOW (ref 13–17)
HGB BLD-MCNC: 8 G/DL — LOW (ref 13–17)
HGB BLD-MCNC: 8 G/DL — LOW (ref 13–17)
LDH SERPL L TO P-CCNC: 305 U/L — HIGH (ref 50–242)
LDH SERPL L TO P-CCNC: 305 U/L — HIGH (ref 50–242)
MCHC RBC-ENTMCNC: 21.1 PG — LOW (ref 27–34)
MCHC RBC-ENTMCNC: 21.1 PG — LOW (ref 27–34)
MCHC RBC-ENTMCNC: 21.5 PG — LOW (ref 27–34)
MCHC RBC-ENTMCNC: 21.5 PG — LOW (ref 27–34)
MCHC RBC-ENTMCNC: 30.7 GM/DL — LOW (ref 32–36)
MCHC RBC-ENTMCNC: 30.7 GM/DL — LOW (ref 32–36)
MCHC RBC-ENTMCNC: 31.1 GM/DL — LOW (ref 32–36)
MCHC RBC-ENTMCNC: 31.1 GM/DL — LOW (ref 32–36)
MCV RBC AUTO: 68.8 FL — LOW (ref 80–100)
MCV RBC AUTO: 68.8 FL — LOW (ref 80–100)
MCV RBC AUTO: 69.1 FL — LOW (ref 80–100)
MCV RBC AUTO: 69.1 FL — LOW (ref 80–100)
NIGHT BLUE STAIN TISS: SIGNIFICANT CHANGE UP
NIGHT BLUE STAIN TISS: SIGNIFICANT CHANGE UP
NRBC # BLD: 0 /100 WBCS — SIGNIFICANT CHANGE UP (ref 0–0)
ORGANISM # SPEC MICROSCOPIC CNT: ABNORMAL
PLATELET # BLD AUTO: 514 K/UL — HIGH (ref 150–400)
PLATELET # BLD AUTO: 514 K/UL — HIGH (ref 150–400)
PLATELET # BLD AUTO: 572 K/UL — HIGH (ref 150–400)
PLATELET # BLD AUTO: 572 K/UL — HIGH (ref 150–400)
POTASSIUM SERPL-MCNC: 4 MMOL/L — SIGNIFICANT CHANGE UP (ref 3.5–5.3)
POTASSIUM SERPL-MCNC: 4 MMOL/L — SIGNIFICANT CHANGE UP (ref 3.5–5.3)
POTASSIUM SERPL-SCNC: 4 MMOL/L — SIGNIFICANT CHANGE UP (ref 3.5–5.3)
POTASSIUM SERPL-SCNC: 4 MMOL/L — SIGNIFICANT CHANGE UP (ref 3.5–5.3)
PROT SERPL-MCNC: 6 G/DL — SIGNIFICANT CHANGE UP (ref 6–8.3)
PROT SERPL-MCNC: 6 G/DL — SIGNIFICANT CHANGE UP (ref 6–8.3)
RBC # BLD: 3.36 M/UL — LOW (ref 4.2–5.8)
RBC # BLD: 3.36 M/UL — LOW (ref 4.2–5.8)
RBC # BLD: 3.72 M/UL — LOW (ref 4.2–5.8)
RBC # BLD: 3.72 M/UL — LOW (ref 4.2–5.8)
RBC # FLD: 19.3 % — HIGH (ref 10.3–14.5)
RBC # FLD: 19.3 % — HIGH (ref 10.3–14.5)
RBC # FLD: 19.5 % — HIGH (ref 10.3–14.5)
RBC # FLD: 19.5 % — HIGH (ref 10.3–14.5)
SODIUM SERPL-SCNC: 128 MMOL/L — LOW (ref 135–145)
SODIUM SERPL-SCNC: 128 MMOL/L — LOW (ref 135–145)
SPECIMEN SOURCE: SIGNIFICANT CHANGE UP
TRIGL FLD-MCNC: 18 MG/DL — SIGNIFICANT CHANGE UP
TRIGL FLD-MCNC: 18 MG/DL — SIGNIFICANT CHANGE UP
WBC # BLD: 13.92 K/UL — HIGH (ref 3.8–10.5)
WBC # BLD: 13.92 K/UL — HIGH (ref 3.8–10.5)
WBC # BLD: 14.93 K/UL — HIGH (ref 3.8–10.5)
WBC # BLD: 14.93 K/UL — HIGH (ref 3.8–10.5)
WBC # FLD AUTO: 13.92 K/UL — HIGH (ref 3.8–10.5)
WBC # FLD AUTO: 13.92 K/UL — HIGH (ref 3.8–10.5)
WBC # FLD AUTO: 14.93 K/UL — HIGH (ref 3.8–10.5)
WBC # FLD AUTO: 14.93 K/UL — HIGH (ref 3.8–10.5)

## 2023-12-23 PROCEDURE — 99232 SBSQ HOSP IP/OBS MODERATE 35: CPT | Mod: GC

## 2023-12-23 PROCEDURE — 99232 SBSQ HOSP IP/OBS MODERATE 35: CPT

## 2023-12-23 PROCEDURE — 99233 SBSQ HOSP IP/OBS HIGH 50: CPT

## 2023-12-23 RX ORDER — DEXTROSE 50 % IN WATER 50 %
15 SYRINGE (ML) INTRAVENOUS ONCE
Refills: 0 | Status: DISCONTINUED | OUTPATIENT
Start: 2023-12-23 | End: 2024-01-02

## 2023-12-23 RX ORDER — INSULIN LISPRO 100/ML
VIAL (ML) SUBCUTANEOUS
Refills: 0 | Status: DISCONTINUED | OUTPATIENT
Start: 2023-12-23 | End: 2024-01-02

## 2023-12-23 RX ORDER — SODIUM CHLORIDE 9 MG/ML
1000 INJECTION, SOLUTION INTRAVENOUS
Refills: 0 | Status: DISCONTINUED | OUTPATIENT
Start: 2023-12-23 | End: 2024-01-02

## 2023-12-23 RX ORDER — HEPARIN SODIUM 5000 [USP'U]/ML
3500 INJECTION INTRAVENOUS; SUBCUTANEOUS EVERY 6 HOURS
Refills: 0 | Status: DISCONTINUED | OUTPATIENT
Start: 2023-12-23 | End: 2023-12-24

## 2023-12-23 RX ORDER — DEXTROSE 50 % IN WATER 50 %
25 SYRINGE (ML) INTRAVENOUS ONCE
Refills: 0 | Status: DISCONTINUED | OUTPATIENT
Start: 2023-12-23 | End: 2024-01-02

## 2023-12-23 RX ORDER — HEPARIN SODIUM 5000 [USP'U]/ML
4000 INJECTION INTRAVENOUS; SUBCUTANEOUS EVERY 6 HOURS
Refills: 0 | Status: DISCONTINUED | OUTPATIENT
Start: 2023-12-23 | End: 2023-12-23

## 2023-12-23 RX ORDER — GLUCAGON INJECTION, SOLUTION 0.5 MG/.1ML
1 INJECTION, SOLUTION SUBCUTANEOUS ONCE
Refills: 0 | Status: DISCONTINUED | OUTPATIENT
Start: 2023-12-23 | End: 2024-01-02

## 2023-12-23 RX ORDER — HEPARIN SODIUM 5000 [USP'U]/ML
1200 INJECTION INTRAVENOUS; SUBCUTANEOUS
Qty: 25000 | Refills: 0 | Status: DISCONTINUED | OUTPATIENT
Start: 2023-12-23 | End: 2023-12-23

## 2023-12-23 RX ORDER — HEPARIN SODIUM 5000 [USP'U]/ML
1500 INJECTION INTRAVENOUS; SUBCUTANEOUS
Qty: 25000 | Refills: 0 | Status: DISCONTINUED | OUTPATIENT
Start: 2023-12-23 | End: 2023-12-23

## 2023-12-23 RX ORDER — HEPARIN SODIUM 5000 [USP'U]/ML
7000 INJECTION INTRAVENOUS; SUBCUTANEOUS EVERY 6 HOURS
Refills: 0 | Status: DISCONTINUED | OUTPATIENT
Start: 2023-12-23 | End: 2023-12-24

## 2023-12-23 RX ORDER — DEXTROSE 50 % IN WATER 50 %
12.5 SYRINGE (ML) INTRAVENOUS ONCE
Refills: 0 | Status: DISCONTINUED | OUTPATIENT
Start: 2023-12-23 | End: 2024-01-02

## 2023-12-23 RX ORDER — HEPARIN SODIUM 5000 [USP'U]/ML
INJECTION INTRAVENOUS; SUBCUTANEOUS
Qty: 25000 | Refills: 0 | Status: DISCONTINUED | OUTPATIENT
Start: 2023-12-23 | End: 2023-12-23

## 2023-12-23 RX ORDER — HEPARIN SODIUM 5000 [USP'U]/ML
1600 INJECTION INTRAVENOUS; SUBCUTANEOUS
Qty: 25000 | Refills: 0 | Status: DISCONTINUED | OUTPATIENT
Start: 2023-12-23 | End: 2023-12-24

## 2023-12-23 RX ORDER — INSULIN LISPRO 100/ML
VIAL (ML) SUBCUTANEOUS AT BEDTIME
Refills: 0 | Status: DISCONTINUED | OUTPATIENT
Start: 2023-12-23 | End: 2024-01-02

## 2023-12-23 RX ADMIN — HEPARIN SODIUM 1800 UNIT(S)/HR: 5000 INJECTION INTRAVENOUS; SUBCUTANEOUS at 11:24

## 2023-12-23 RX ADMIN — HEPARIN SODIUM 2000 UNIT(S)/HR: 5000 INJECTION INTRAVENOUS; SUBCUTANEOUS at 13:04

## 2023-12-23 RX ADMIN — Medication 1 TABLET(S): at 11:20

## 2023-12-23 RX ADMIN — Medication 25 MILLIGRAM(S): at 05:58

## 2023-12-23 RX ADMIN — CHLORHEXIDINE GLUCONATE 1 APPLICATION(S): 213 SOLUTION TOPICAL at 05:59

## 2023-12-23 RX ADMIN — Medication 25 MILLIGRAM(S): at 17:12

## 2023-12-23 RX ADMIN — TAMSULOSIN HYDROCHLORIDE 0.4 MILLIGRAM(S): 0.4 CAPSULE ORAL at 22:44

## 2023-12-23 RX ADMIN — HEPARIN SODIUM 1600 UNIT(S)/HR: 5000 INJECTION INTRAVENOUS; SUBCUTANEOUS at 21:09

## 2023-12-23 RX ADMIN — Medication 40 MILLIGRAM(S): at 05:58

## 2023-12-23 RX ADMIN — HEPARIN SODIUM 1800 UNIT(S)/HR: 5000 INJECTION INTRAVENOUS; SUBCUTANEOUS at 13:02

## 2023-12-23 RX ADMIN — Medication 3 MILLILITER(S): at 00:05

## 2023-12-23 RX ADMIN — Medication 3 MILLILITER(S): at 05:59

## 2023-12-23 RX ADMIN — Medication 3 MILLILITER(S): at 23:06

## 2023-12-23 RX ADMIN — FINASTERIDE 5 MILLIGRAM(S): 5 TABLET, FILM COATED ORAL at 11:21

## 2023-12-23 RX ADMIN — Medication 3 MILLILITER(S): at 11:21

## 2023-12-23 RX ADMIN — BUDESONIDE AND FORMOTEROL FUMARATE DIHYDRATE 2 PUFF(S): 160; 4.5 AEROSOL RESPIRATORY (INHALATION) at 17:13

## 2023-12-23 RX ADMIN — BUDESONIDE AND FORMOTEROL FUMARATE DIHYDRATE 2 PUFF(S): 160; 4.5 AEROSOL RESPIRATORY (INHALATION) at 06:00

## 2023-12-23 RX ADMIN — Medication 2: at 17:16

## 2023-12-23 RX ADMIN — HEPARIN SODIUM 2000 UNIT(S)/HR: 5000 INJECTION INTRAVENOUS; SUBCUTANEOUS at 19:32

## 2023-12-23 RX ADMIN — HEPARIN SODIUM 18 UNIT(S)/HR: 5000 INJECTION INTRAVENOUS; SUBCUTANEOUS at 04:36

## 2023-12-23 RX ADMIN — HEPARIN SODIUM 4000 UNIT(S): 5000 INJECTION INTRAVENOUS; SUBCUTANEOUS at 13:48

## 2023-12-23 RX ADMIN — HEPARIN SODIUM 0 UNIT(S)/HR: 5000 INJECTION INTRAVENOUS; SUBCUTANEOUS at 20:04

## 2023-12-23 RX ADMIN — Medication 40 MILLIGRAM(S): at 17:13

## 2023-12-23 RX ADMIN — MUPIROCIN 1 APPLICATION(S): 20 OINTMENT TOPICAL at 06:00

## 2023-12-23 RX ADMIN — PANTOPRAZOLE SODIUM 40 MILLIGRAM(S): 20 TABLET, DELAYED RELEASE ORAL at 06:05

## 2023-12-23 RX ADMIN — Medication 3 MILLILITER(S): at 17:12

## 2023-12-23 RX ADMIN — CEFTRIAXONE 100 MILLIGRAM(S): 500 INJECTION, POWDER, FOR SOLUTION INTRAMUSCULAR; INTRAVENOUS at 15:19

## 2023-12-23 RX ADMIN — MUPIROCIN 1 APPLICATION(S): 20 OINTMENT TOPICAL at 17:14

## 2023-12-23 RX ADMIN — Medication 500 MILLIGRAM(S): at 11:20

## 2023-12-23 RX ADMIN — HEPARIN SODIUM 1800 UNIT(S)/HR: 5000 INJECTION INTRAVENOUS; SUBCUTANEOUS at 05:57

## 2023-12-23 RX ADMIN — ATORVASTATIN CALCIUM 10 MILLIGRAM(S): 80 TABLET, FILM COATED ORAL at 22:44

## 2023-12-23 NOTE — DISCHARGE NOTE PROVIDER - CARE PROVIDERS DIRECT ADDRESSES
[email or web address]

## 2023-12-23 NOTE — DISCHARGE NOTE PROVIDER - NSFOLLOWUPCLINICS_GEN_ALL_ED_FT
[facility]  Nephrology  [address] [facility], [address]nd [place] [address]  Phone: [phone]  Fax:   Follow Up Time: 1 week    [place]  Internal Medicine  [address] [place] [id number]  Phone: [phone]  Fax:   Follow Up Time: 1 week     [facility]  Nephrology  [address] [facility], [address]nd [place] [address]  Phone: [phone]  Fax:   Follow Up Time: 1 week    [place]  Internal Medicine  [address] [place] [address]  Phone: [phone]  Fax:   Follow Up Time: 1 week     [facility]  Nephrology  [address] [facility], [address]nd [place] [address]  Phone: [phone]  Fax:   Follow Up Time: 1 week    [place]  Internal Medicine  [address] [place] [address]  Phone: [phone]  Fax:   Follow Up Time: 1 week

## 2023-12-23 NOTE — DISCHARGE NOTE PROVIDER - HOSPITAL COURSE
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 Reports he has been having SOB for past 7 days. Yesterday morning had an episode of chest pain similar to trying to get rid of sputum but unable to. Unable to state if the chest pain is sharp or pressure like. Endorses cough with sputum. Does not endorse any fever, chills, headache, neurological deficits, nausea, vomiting, palpitations, abdominal pain, hematochezia, melena, hematemesis, diarrhea or constipation currently.    On admission, patient febrile, CTA showed b/l pleural effusions with L>R. Bcx and sputum cx grew strep pneumo. Patient was started on ceftriaxone and azithro. Infectious disease was consulted. Pulmonology was consulted and performed a thoracentesis on 12/22 with 1.2 L drainage of an exudative effusion. Patient was diuresed with bumex 2 mg IV given cruz fluid overload. Patient was also started on steroids for suspected COPD exacerbation.     Hospital stay complicated by an BESSY, nephrology was consulted. US bladder and kidneys showed _____. Patient was treated with___     Patient is medically stable on the day of discharge and will follow up with nephrology, cardiology, pulmonology, PCP outpatient. 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 Reports he has been having SOB for past 7 days. Yesterday morning had an episode of chest pain similar to trying to get rid of sputum but unable to. Unable to state if the chest pain is sharp or pressure like. Endorses cough with sputum. Does not endorse any fever, chills, headache, neurological deficits, nausea, vomiting, palpitations, abdominal pain, hematochezia, melena, hematemesis, diarrhea or constipation currently.    On admission, patient febrile, CTA showed b/l pleural effusions with L>R. Bcx and sputum cx grew strep pneumo. Patient was started on ceftriaxone and azithro. Infectious disease was consulted. Pulmonology was consulted and performed a thoracentesis on 12/22 with 1.2 L drainage of an exudative effusion. Patient was diuresed with bumex 2 mg IV given for cruz fluid overload. TTE was repeated with hyperdynamic LV with EF 80%. Patient was also started on steroids for suspected COPD exacerbation.     Hospital stay complicated by an BESSY, nephrology was consulted. US bladder and kidneys showed _____. Patient was treated with___     Patient is medically stable on the day of discharge and will follow up with nephrology, cardiology, pulmonology, PCP outpatient. 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 Reports he has been having SOB for past 7 days. Yesterday morning had an episode of chest pain similar to trying to get rid of sputum but unable to. Unable to state if the chest pain is sharp or pressure like. Endorses cough with sputum. Does not endorse any fever, chills, headache, neurological deficits, nausea, vomiting, palpitations, abdominal pain, hematochezia, melena, hematemesis, diarrhea or constipation currently.    On admission, patient febrile, CTA showed b/l pleural effusions with L>R. Bcx and sputum cx grew strep pneumo. Patient was started on ceftriaxone and azithro. Infectious disease was consulted. Pulmonology was consulted and performed a thoracentesis on 12/22 with 1.2 L drainage of an exudative effusion. Patient was diuresed with bumex 2 mg IV given for cruz fluid overload. TTE was repeated with hyperdynamic LV with EF 80%. Patient was also started on steroids and nebulizer treatment for suspected COPD exacerbation.     Hospital stay complicated by ATN, nephrology was consulted and the patient was managed with supportive care.       Patient is medically stable on the day of discharge and will follow up with nephrology, cardiology, pulmonology, PCP outpatient. 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 Reports he has been having SOB for past 7 days. Yesterday morning had an episode of chest pain similar to trying to get rid of sputum but unable to. Unable to state if the chest pain is sharp or pressure like. Endorses cough with sputum. Does not endorse any fever, chills, headache, neurological deficits, nausea, vomiting, palpitations, abdominal pain, hematochezia, melena, hematemesis, diarrhea or constipation currently.    On admission, patient febrile, CTA showed b/l pleural effusions with L>R. Bcx and sputum cx grew strep pneumo. Patient was started on ceftriaxone and azithro. Infectious disease was consulted. Pulmonology was consulted and performed a thoracentesis on 12/22 with 1.2 L drainage of an exudative effusion. Patient was diuresed with bumex 2 mg IV given for cruz fluid overload. TTE was repeated with hyperdynamic LV with EF 80%. Patient was also started on steroids and nebulizer treatment for suspected COPD exacerbation.     Hospital stay complicated by ATN, nephrology was consulted and the patient was managed with supportive care with resolution.     Patient is medically stable on the day of discharge and will follow up with nephrology, cardiology, pulmonology, PCP outpatient.     Important Medication Changes and Reason: Ceftriaxone 2g for strep bacteremia, Nystatin and BLM mouth wash for thrush.    Active or Pending Issues Requiring Follow-up: nephrology, cardiology, pulmonology, PCP outpatient.     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 Reports he has been having SOB for past 7 days. Yesterday morning had an episode of chest pain similar to trying to get rid of sputum but unable to. Unable to state if the chest pain is sharp or pressure like. Endorses cough with sputum. Does not endorse any fever, chills, headache, neurological deficits, nausea, vomiting, palpitations, abdominal pain, hematochezia, melena, hematemesis, diarrhea or constipation currently.    On admission, patient febrile, CTA showed b/l pleural effusions with L>R. Bcx and sputum cx grew strep pneumo. Patient was started on ceftriaxone and azithro. Infectious disease was consulted. Pulmonology was consulted and performed a thoracentesis on 12/22 with 1.2 L drainage of an exudative effusion. Patient was diuresed with bumex 2 mg IV given for cruz fluid overload. TTE was repeated with hyperdynamic LV with EF 80%. Patient was also started on steroids and nebulizer treatment for suspected COPD exacerbation.     Hospital stay complicated by ATN, nephrology was consulted and the patient was managed with supportive care with resolution.     Patient is medically stable on the day of discharge and will follow up with nephrology, cardiology, pulmonology, PCP outpatient.     Important Medication Changes and Reason: Ceftriaxone 2g for strep bacteremia, Nystatin and BLM mouth wash for thrush    Active or Pending Issues Requiring Follow-up: nephrology, cardiology, pulmonology, PCP outpatient.     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 Reports he has been having SOB for past 7 days. Yesterday morning had an episode of chest pain similar to trying to get rid of sputum but unable to. Unable to state if the chest pain is sharp or pressure like. Endorses cough with sputum. Does not endorse any fever, chills, headache, neurological deficits, nausea, vomiting, palpitations, abdominal pain, hematochezia, melena, hematemesis, diarrhea or constipation currently.    On admission, patient febrile, CTA showed b/l pleural effusions with L>R. Bcx and sputum cx grew strep pneumo. Patient was started on ceftriaxone and azithro. Infectious disease was consulted. Pulmonology was consulted and performed a thoracentesis on 12/22 with 1.2 L drainage of an exudative effusion. Patient was diuresed with bumex 2 mg IV given for cruz fluid overload. TTE was repeated with hyperdynamic LV with EF 80%. Patient was also started on steroids and nebulizer treatment for suspected COPD exacerbation.     Hospital stay complicated by ATN, nephrology was consulted and the patient was managed with supportive care with resolution.     Patient is medically stable on the day of discharge and will follow up with nephrology, cardiology, pulmonology, PCP outpatient.     Important Medication Changes and Reason: Cefpodoxime for strep bacteremia till 1/10, Nystatin and BLM mouth wash for thrush    Active or Pending Issues Requiring Follow-up: nephrology, cardiology, pulmonology, PCP outpatient.     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 Reports he has been having SOB for past 7 days. Yesterday morning had an episode of chest pain similar to trying to get rid of sputum but unable to. Unable to state if the chest pain is sharp or pressure like. Endorses cough with sputum. Does not endorse any fever, chills, headache, neurological deficits, nausea, vomiting, palpitations, abdominal pain, hematochezia, melena, hematemesis, diarrhea or constipation currently.    On admission, patient febrile, CTA showed b/l pleural effusions with L>R. Bcx and sputum cx grew strep pneumo. Patient was started on ceftriaxone and azithro. Infectious disease was consulted. Pulmonology was consulted and performed a thoracentesis on 12/22 with 1.2 L drainage of an exudative effusion. Patient was diuresed with bumex 2 mg IV given for cruz fluid overload. TTE was repeated with hyperdynamic LV with EF 80%. Patient was also started on steroids and nebulizer treatment for suspected COPD exacerbation.     Hospital stay complicated by ATN, nephrology was consulted and the patient was managed with supportive care with resolution.     Patient is medically stable on the day of discharge and will follow up with nephrology, cardiology, pulmonology, PCP outpatient.     Important Medication Changes and Reason: Cefpodoxime for strep bacteremia till 1/10, Nystatin and BLM mouth wash for thrush, prednisone     Active or Pending Issues Requiring Follow-up: nephrology, cardiology, pulmonology, PCP outpatient.     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 Reports he has been having SOB for past 7 days. Yesterday morning had an episode of chest pain similar to trying to get rid of sputum but unable to. Unable to state if the chest pain is sharp or pressure like. Endorses cough with sputum. Does not endorse any fever, chills, headache, neurological deficits, nausea, vomiting, palpitations, abdominal pain, hematochezia, melena, hematemesis, diarrhea or constipation currently.    On admission, patient febrile, CTA showed b/l pleural effusions with L>R. Bcx and sputum cx grew strep pneumo. Patient was started on ceftriaxone and azithro. Infectious disease was consulted. Pulmonology was consulted and performed a thoracentesis on 12/22 with 1.2 L drainage of an exudative effusion. Patient was diuresed with bumex 2 mg IV given for cruz fluid overload. TTE was repeated with hyperdynamic LV with EF 80%. Patient was also started on steroids and nebulizer treatment for suspected COPD exacerbation.     Hospital stay complicated by ATN, nephrology was consulted and the patient was managed with supportive care with resolution.     Patient is medically stable on the day of discharge and will follow up with nephrology, cardiology, pulmonology, PCP outpatient.     Important Medication Changes and Reason: Cefpodoxime for strep bacteremia till 1/10, Nystatin and BLM mouth wash for thrush, prednisone     Active or Pending Issues Requiring Follow-up: nephrology, cardiology, pulmonology, PCP outpatient.     Advanced Directives:   [ ] Full code  [ x] DNR  [ ] Hospice    Discharge Diagnoses:   Acute hypoxic respiratory failure likely multifactorial CAP HFpEF, COPD exacerbation, HFpEF  Sepsis 2/2 strep pneumo bacteremia 2/2 to CAP- resolved  BESSY resolved  JAMAL

## 2023-12-23 NOTE — DISCHARGE NOTE PROVIDER - ATTENDING DISCHARGE PHYSICAL EXAMINATION:
Vitals: Vital Signs Last 24 Hrs  T(C): 36.3 (01-01-24 @ 05:29), Max: 36.7 (12-31-23 @ 21:58)  T(F): 97.3 (01-01-24 @ 05:29), Max: 98.1 (12-31-23 @ 21:58)  HR: 85 (01-01-24 @ 05:29) (80 - 85)  BP: 122/68 (01-01-24 @ 05:29) (122/68 - 139/70)  BP(mean): --  RR: 16 (01-01-24 @ 05:29) (16 - 18)  SpO2: 96% (01-01-24 @ 05:29) (93% - 96%)                I&O's Summary    31 Dec 2023 07:01  -  01 Jan 2024 07:00  --------------------------------------------------------  IN: 160 mL / OUT: 2200 mL / NET: -2040 mL        PHYSICAL EXAM:  GENERAL: NAD  HEAD:  Atraumatic, Normocephalic  EYES: EOMI, conjunctiva and sclera clear  CHEST/LUNG: Clear to auscultation bilaterally; No rales, rhonchi, wheezing, or rubs  HEART: Regular rate and rhythm; No murmurs, rubs, or gallops  ABDOMEN: Soft, Nontender, Nondistended   SKIN: 1+ pedal edema bilaterally  NERVOUS SYSTEM:  Alert & Oriented X3, no focal deficits

## 2023-12-23 NOTE — PROGRESS NOTE ADULT - SUBJECTIVE AND OBJECTIVE BOX
Renetta Vargas MD    Internal Medicine Resident (PGY-1)  ___________________________________________________________________________________________________      HORACIOGLADIS 83y Male    Overnight events/subjective: No acute overnight events. Patient seen and examined at bedside. No complaints. Denies fever, chills, chest pain, shortness of breath, abdominal pain, nausea, vomiting, changes in bowel habits, or urinary symptoms.    Vital Signs Last 24 Hrs  T(C): 36.4 (23 Dec 2023 04:12), Max: 37 (22 Dec 2023 20:12)  T(F): 97.6 (23 Dec 2023 04:12), Max: 98.6 (22 Dec 2023 20:12)  HR: 92 (23 Dec 2023 04:12) (92 - 106)  BP: 119/60 (23 Dec 2023 04:12) (116/73 - 126/69)  BP(mean): --  RR: 18 (23 Dec 2023 04:12) (18 - 19)  SpO2: 96% (23 Dec 2023 04:12) (92% - 97%)    Parameters below as of 23 Dec 2023 04:12  Patient On (Oxygen Delivery Method): room air      PHYSICAL EXAM:  CONSTITUTIONAL: in no apparent distress  EYES: No conjunctival or scleral injection, non-icteric  ENMT: No external nasal lesions; Normal outer ears, + throat erythema   NECK: Supple; Trachea midline  RESPIRATORY: Normal respiratory effort; Decreased breathe sounds LL > RL  CARDIOVASCULAR: Regular rate and rhythm, normal S1 and S2;  GASTROINTESTINAL: Non-distended; No palpable masses; No tenderness; No rebound/guarding  EXTREMITIES:  Gross 3+ lower extremity edema;  NEUROLOGY: A+O to person, place, and time; Does respond to commands appropriately;  PSYCHIATRY: Mood and Affect appropriate      HOSPITAL MEDICATIONS:  MEDICATIONS  (STANDING):  albuterol/ipratropium for Nebulization 3 milliLiter(s) Nebulizer every 6 hours  ascorbic acid 500 milliGRAM(s) Oral daily  atorvastatin 10 milliGRAM(s) Oral at bedtime  budesonide 160 MICROgram(s)/formoterol 4.5 MICROgram(s) Inhaler 2 Puff(s) Inhalation two times a day  cefTRIAXone   IVPB 2000 milliGRAM(s) IV Intermittent every 24 hours  chlorhexidine 4% Liquid 1 Application(s) Topical <User Schedule>  finasteride 5 milliGRAM(s) Oral daily  heparin  Infusion.  Unit(s)/Hr (18 mL/Hr) IV Continuous <Continuous>  influenza  Vaccine (HIGH DOSE) 0.7 milliLiter(s) IntraMuscular once  methylPREDNISolone sodium succinate Injectable 40 milliGRAM(s) IV Push every 12 hours  metoprolol tartrate 25 milliGRAM(s) Oral two times a day  multivitamin 1 Tablet(s) Oral daily  mupirocin 2% Nasal 1 Application(s) Both Nostrils two times a day  pantoprazole    Tablet 40 milliGRAM(s) Oral before breakfast  tamsulosin 0.4 milliGRAM(s) Oral at bedtime    MEDICATIONS  (PRN):  acetaminophen     Tablet .. 650 milliGRAM(s) Oral every 6 hours PRN Temp greater or equal to 38C (100.4F), Mild Pain (1 - 3)      LABS:                        7.1    13.92 )-----------( 514      ( 23 Dec 2023 03:39 )             23.1     12-23    128<L>  |  90<L>  |  74<H>  ----------------------------<  188<H>  4.0   |  23  |  4.32<H>    Ca    8.4      23 Dec 2023 03:39  Phos  5.5     12-22  Mg     1.9     12-22    TPro  6.0  /  Alb  2.6<L>  /  TBili  0.2  /  DBili  x   /  AST  54<H>  /  ALT  38  /  AlkPhos  88  12-23    PT/INR - ( 22 Dec 2023 12:58 )   PT: 15.8 sec;   INR: 1.52 ratio         PTT - ( 23 Dec 2023 03:39 )  PTT:62.8 sec  Urinalysis Basic - ( 23 Dec 2023 03:39 )    Color: x / Appearance: x / SG: x / pH: x  Gluc: 188 mg/dL / Ketone: x  / Bili: x / Urobili: x   Blood: x / Protein: x / Nitrite: x   Leuk Esterase: x / RBC: x / WBC x   Sq Epi: x / Non Sq Epi: x / Bacteria: x         Renetta Vargas MD    Internal Medicine Resident (PGY-1)  ___________________________________________________________________________________________________      GLADIS LEONARD 83y Male    Overnight events/subjective: No acute overnight events. Patient seen and examined at bedside. This morning, patient is feeling better in terms of his breathing, he is on RA. States his cough has improved but is concerned about his kidney function.   Denies fever, chills, chest pain, shortness of breath, abdominal pain, nausea, vomiting, changes in bowel habits, or urinary symptoms.    Vital Signs Last 24 Hrs  T(C): 36.4 (23 Dec 2023 04:12), Max: 37 (22 Dec 2023 20:12)  T(F): 97.6 (23 Dec 2023 04:12), Max: 98.6 (22 Dec 2023 20:12)  HR: 92 (23 Dec 2023 04:12) (92 - 106)  BP: 119/60 (23 Dec 2023 04:12) (116/73 - 126/69)  BP(mean): --  RR: 18 (23 Dec 2023 04:12) (18 - 19)  SpO2: 96% (23 Dec 2023 04:12) (92% - 97%)    Parameters below as of 23 Dec 2023 04:12  Patient On (Oxygen Delivery Method): room air      PHYSICAL EXAM:  CONSTITUTIONAL: in no apparent distress  EYES: No conjunctival or scleral injection, non-icteric  ENMT: No external nasal lesions; Normal outer ears, + throat erythema   NECK: Supple; Trachea midline  RESPIRATORY: Normal respiratory effort; CTAB  CARDIOVASCULAR: Regular rate and rhythm, normal S1 and S2;  GASTROINTESTINAL: Non-distended; No palpable masses; No tenderness; No rebound/guarding  EXTREMITIES:  Gross 3+ lower extremity edema;  NEUROLOGY: A+O to person, place, and time; Does respond to commands appropriately;  PSYCHIATRY: Mood and Affect appropriate      HOSPITAL MEDICATIONS:  MEDICATIONS  (STANDING):  albuterol/ipratropium for Nebulization 3 milliLiter(s) Nebulizer every 6 hours  ascorbic acid 500 milliGRAM(s) Oral daily  atorvastatin 10 milliGRAM(s) Oral at bedtime  budesonide 160 MICROgram(s)/formoterol 4.5 MICROgram(s) Inhaler 2 Puff(s) Inhalation two times a day  cefTRIAXone   IVPB 2000 milliGRAM(s) IV Intermittent every 24 hours  chlorhexidine 4% Liquid 1 Application(s) Topical <User Schedule>  finasteride 5 milliGRAM(s) Oral daily  heparin  Infusion.  Unit(s)/Hr (18 mL/Hr) IV Continuous <Continuous>  influenza  Vaccine (HIGH DOSE) 0.7 milliLiter(s) IntraMuscular once  methylPREDNISolone sodium succinate Injectable 40 milliGRAM(s) IV Push every 12 hours  metoprolol tartrate 25 milliGRAM(s) Oral two times a day  multivitamin 1 Tablet(s) Oral daily  mupirocin 2% Nasal 1 Application(s) Both Nostrils two times a day  pantoprazole    Tablet 40 milliGRAM(s) Oral before breakfast  tamsulosin 0.4 milliGRAM(s) Oral at bedtime    MEDICATIONS  (PRN):  acetaminophen     Tablet .. 650 milliGRAM(s) Oral every 6 hours PRN Temp greater or equal to 38C (100.4F), Mild Pain (1 - 3)      LABS:                        7.1    13.92 )-----------( 514      ( 23 Dec 2023 03:39 )             23.1     12-23    128<L>  |  90<L>  |  74<H>  ----------------------------<  188<H>  4.0   |  23  |  4.32<H>    Ca    8.4      23 Dec 2023 03:39  Phos  5.5     12-22  Mg     1.9     12-22    TPro  6.0  /  Alb  2.6<L>  /  TBili  0.2  /  DBili  x   /  AST  54<H>  /  ALT  38  /  AlkPhos  88  12-23    PT/INR - ( 22 Dec 2023 12:58 )   PT: 15.8 sec;   INR: 1.52 ratio         PTT - ( 23 Dec 2023 03:39 )  PTT:62.8 sec  Urinalysis Basic - ( 23 Dec 2023 03:39 )    Color: x / Appearance: x / SG: x / pH: x  Gluc: 188 mg/dL / Ketone: x  / Bili: x / Urobili: x   Blood: x / Protein: x / Nitrite: x   Leuk Esterase: x / RBC: x / WBC x   Sq Epi: x / Non Sq Epi: x / Bacteria: x

## 2023-12-23 NOTE — DISCHARGE NOTE PROVIDER - NSDCCPCAREPLAN_GEN_ALL_CORE_FT
PRINCIPAL DISCHARGE DIAGNOSIS  Diagnosis: Pleural effusion  Assessment and Plan of Treatment: You came in with difficulty breathing. You were found to have bacteria in your blood and sputum that we treated with antibiotics through the vein. We also found fluid accumulated around your lungs that was drained by pulmonology with improvement in symptoms.   You were also started on steroids for concern for COPD exacerbation.  Your hospital stay was complicated by kidney injury, Nephrology helped manage your came, recommending ___ . We were slowly taking your fluid off with diuresis.   If you experience any worsening shortness of breath, fevers, chills, cough, please come back to the [facility] emergency department.      SECONDARY DISCHARGE DIAGNOSES  Diagnosis: Acute UTI  Assessment and Plan of Treatment:     Diagnosis: Hypoxia  Assessment and Plan of Treatment:      PRINCIPAL DISCHARGE DIAGNOSIS  Diagnosis: Pleural effusion  Assessment and Plan of Treatment: You came in with difficulty breathing. You were found to have bacteria in your blood and sputum that we treated with antibiotics through the vein. We also found fluid accumulated around your lungs that was drained by pulmonology with improvement in symptoms.   You were also started on steroids for concern for COPD exacerbation.  Your hospital stay was complicated by kidney injury, Nephrology helped manage your came, recommending ___ . We were slowly taking your fluid off with diuresis.   If you experience any worsening shortness of breath, fevers, chills, cough, please come back to the [facility] emergency department.      SECONDARY DISCHARGE DIAGNOSES  Diagnosis: Acute UTI  Assessment and Plan of Treatment:     Diagnosis: Hypoxia  Assessment and Plan of Treatment:      PRINCIPAL DISCHARGE DIAGNOSIS  Diagnosis: Pleural effusion  Assessment and Plan of Treatment: You came in with difficulty breathing. You were found to have bacteria in your blood and sputum that we treated with antibiotics through the vein. We also found fluid accumulated around your lungs that was drained by pulmonology with improvement in symptoms.   You were also started on steroids for concern for COPD exacerbation.  Your hospital stay was complicated by kidney injury, Nephrology helped manage your came, recommending ___ . We were slowly taking your fluid off with diuresis.   If you experience any worsening shortness of breath, fevers, chills, cough, please come back to the [facility] emergency department.      SECONDARY DISCHARGE DIAGNOSES  Diagnosis: Acute UTI  Assessment and Plan of Treatment:     Diagnosis: Hypoxia  Assessment and Plan of Treatment:      PRINCIPAL DISCHARGE DIAGNOSIS  Diagnosis: Pleural effusion  Assessment and Plan of Treatment: You came in with difficulty breathing. You were found to have bacteria in your blood and sputum that we treated with antibiotics through the vein. We also found fluid accumulated around your lungs that was drained by pulmonology with improvement in symptoms.   You were also started on steroids for concern for COPD exacerbation.  Your hospital stay was complicated by kidney injury, Nephrology helped manage your care, recommending ___ . We were slowly taking your fluid off with diuresis.   If you experience any worsening shortness of breath, fevers, chills, cough, [facility]     PRINCIPAL DISCHARGE DIAGNOSIS  Diagnosis: Pleural effusion  Assessment and Plan of Treatment: You came in with difficulty breathing. You were found to have bacteria in your blood and sputum that we treated with antibiotics through the vein. We also found fluid accumulated around your lungs that was drained by pulmonology with improvement in symptoms.   You were also started on steroids for concern for COPD exacerbation.  Your hospital stay was complicated by kidney injury, Nephrology helped manage your care, recommending ___ . We were slowly taking your fluid off with diuresis.   If you experience any worsening shortness of breath, fevers, chills, cough, [facility]     PRINCIPAL DISCHARGE DIAGNOSIS  Diagnosis: Pleural effusion  Assessment and Plan of Treatment: You came in with difficulty breathing. You were found to have bacteria in your blood and sputum that we treated with antibiotics through the vein. We also found fluid accumulated around your lungs that was drained by pulmonology with improvement in symptoms.   You were also started on steroids for concern for COPD exacerbation.  Your hospital stay was complicated by kidney injury, Nephrology helped manage your care, recommending ___ . We were slowly taking your fluid off with diuresis.   If you experience any worsening shortness of breath, fevers, chills, cough, [facility]     PRINCIPAL DISCHARGE DIAGNOSIS  Diagnosis: Acute hypoxic respiratory failure  Assessment and Plan of Treatment: You came in with difficulty breathing. You were found to have bacteria in your blood and sputum that we treated with antibiotics through the vein. We also found fluid accumulated around your lungs that was drained by pulmonology with improvement in symptoms. This fluid could have also accumulted due to acute heart failure exacerbation so we helped off load some of the fluids with diuretics. Another component of you breathing issues could have been to a COPD exacerbation for which we gave you nebulizer treatment and steroids.   If you experience any worsening shortness of breath, fevers, chills, cough, please come back to the [facility] emergency department.      SECONDARY DISCHARGE DIAGNOSES  Diagnosis: Acute renal tubular necrosis  Assessment and Plan of Treatment: Your hospital stay was complicated by kidney injury, Nephrology helped manage your care, recommending fluids and avoiding medicines which harm you kidneys.        PRINCIPAL DISCHARGE DIAGNOSIS  Diagnosis: Acute hypoxic respiratory failure  Assessment and Plan of Treatment: You came in with difficulty breathing. You were found to have bacteria in your blood and sputum that we treated with antibiotics through the vein. We also found fluid accumulated around your lungs that was drained by pulmonology with improvement in symptoms. This fluid could have also accumulted due to acute heart failure exacerbation so we helped off load some of the fluids with diuretics. Another component of you breathing issues could have been to a COPD exacerbation for which we gave you nebulizer treatment and steroids.   If you experience any worsening shortness of breath, fevers, chills, cough, please come back to the [facility] emergency department.      SECONDARY DISCHARGE DIAGNOSES  Diagnosis: Acute renal tubular necrosis  Assessment and Plan of Treatment: Your hospital stay was complicated by kidney injury, Nephrology helped manage your care, recommending fluids and avoiding medicines which harm you kidneys.        PRINCIPAL DISCHARGE DIAGNOSIS  Diagnosis: Acute hypoxic respiratory failure  Assessment and Plan of Treatment: You came in with difficulty breathing. You were found to have bacteria in your blood and sputum that we treated with antibiotics through the vein. We also found fluid accumulated around your lungs that was drained by pulmonology with improvement in symptoms. This fluid could have also accumulted due to acute heart failure exacerbation so we helped off load some of the fluids with diuretics. Another component of you breathing issues could have been to a COPD exacerbation for which we gave you nebulizer treatment and steroids.   If you experience any worsening shortness of breath, fevers, chills, cough, please come back to the [facility] emergency department.      SECONDARY DISCHARGE DIAGNOSES  Diagnosis: Acute renal tubular necrosis  Assessment and Plan of Treatment: Your hospital stay was complicated by kidney injury, Nephrology helped manage your care, recommending fluids and avoiding medicines which harm you kidneys.        PRINCIPAL DISCHARGE DIAGNOSIS  Diagnosis: Acute hypoxic respiratory failure  Assessment and Plan of Treatment: You came in with difficulty breathing. You were found to have bacteria in your blood and sputum that we treated with antibiotics through the vein. We also found fluid accumulated around your lungs that was drained by pulmonology with improvement in symptoms. This fluid could have also accumulted due to acute heart failure exacerbation so we helped off load some of the fluids with diuretics. Another component of you breathing issues could have been to a COPD exacerbation for which we gave you nebulizer treatment and steroids.   Contact a health care provider if:  Your symptoms get worse, and medicines do not help.  You have severe pain.  Get help right away if you have:  Chest pain or trouble breathing.  A fever or chills.  A fast heart rate.  Skin that is blotchy, pale, or clammy.  Confusion.  Weakness, a lack of energy, or unusual sleepiness.  New symptoms that develop after treatment has started.  These symptoms may represent a serious problem that is an emergency. Do not wait to see if the symptoms will go away. Get medical help right away. Call your local emergency services (911 in the [place] Do not drive yourself to the hospital.      SECONDARY DISCHARGE DIAGNOSES  Diagnosis: Acute renal tubular necrosis  Assessment and Plan of Treatment: Your hospital stay was complicated by kidney injury, Nephrology helped manage your care, recommending fluids and avoiding medicines which harm you kidneys.

## 2023-12-23 NOTE — DISCHARGE NOTE PROVIDER - NSFOLLOWUPCLINICSTOKEN_GEN_ALL_ED_FT
871775:1 week|| ||00\01||False;444813:1 week|| ||00\01||False; 014181:1 week|| ||00\01||False;415158:1 week|| ||00\01||False; 258122:1 week|| ||00\01||False;204579:1 week|| ||00\01||False;

## 2023-12-23 NOTE — DISCHARGE NOTE PROVIDER - NSDCFUADDAPPT_GEN_ALL_CORE_FT
APPTS ARE READY TO BE MADE: [ ] YES    Best Family or Patient Contact (if needed):    Additional Information about above appointments (if needed):    1:   2:   3:     Other comments or requests:    APPTS ARE READY TO BE MADE: [ ] YES    Best Family or Patient Contact (if needed):    Additional Information about above appointments (if needed):    1: Nephrology  2: Internal Medicine   3: Pulmonology     Other comments or requests:

## 2023-12-23 NOTE — DISCHARGE NOTE PROVIDER - CARE PROVIDER_API CALL
William, Zev  Pulmonary Disease  [address], [address]  [place] [address]  Phone: [phone]  Fax: [phone]  Established Patient  Follow Up Time:    William, Zev  Pulmonary Disease  [address], [address]  [place] [address]  Phone: [phone]  Fax: [phone]  Established Patient  Follow Up Time:    William, Zev  Pulmonary Disease  [address], [address]  [place] [address]  Phone: [phone]  Fax: [phone]  Established Patient  Follow Up Time:

## 2023-12-23 NOTE — DISCHARGE NOTE PROVIDER - NSDCCPTREATMENT_GEN_ALL_CORE_FT
PRINCIPAL PROCEDURE  Procedure: CTA chest w/w/o contrast  Findings and Treatment: CTA: No main, lobar, orsegmental pulmonary embolism. Evaluation of   subsegmental branches is limited due to respiratory motion and suboptimal   opacification.Main pulmonary arterial segment is dilated up to 3.7 cm,   may be seen with pulmonary artery hypertension. Cardiomegaly. No   pericardial effusion. Aortic valvular calcifications. Coronary artery   calcifications. Calcific atherosclerosis of the ascending thoracic aorta.   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  Lungs/Airways/Pleura: Secretions are noted in left mainstem bronchus and   bilateral lower lobe segmental bronchi. Emphysema. Reticular opacities   with architectural distortion and left upper lobe with groundglass   opacity. Moderate left pleural effusion which is circumferential and   loculated in appearance with associated compressive atelectasis. There is   near complete collapse of the left lower lobe. Small right pleural   effusion. Calcified pleural plaques, bilaterally.  Mediastinum/Lymph nodes: No thoracic adenopathy.  Upper Abdomen: small upper pole left renal cyst.  Bones and Soft Tissues: Gynecomastia. Bilateral flank soft tissue edema.   Degenerative changes of the thoracic spineand left shoulder.  IMPRESSION:  1.  No main, lobar, or segmental pulmonary embolism.  2.  Moderate loculated left pleural effusion and small right pleural   effusion with associated compressive atelectasis.       PRINCIPAL PROCEDURE  Procedure: CTA chest w/w/o contrast  Findings and Treatment: CTA: No main, lobar, orsegmental pulmonary embolism. Evaluation of   subsegmental branches is limited due to respiratory motion and suboptimal   opacification.Main pulmonary arterial segment is dilated up to 3.7 cm,   may be seen with pulmonary artery hypertension. Cardiomegaly. No   pericardial effusion. Aortic valvular calcifications. Coronary artery   calcifications. Calcific atherosclerosis of the ascending thoracic aorta.   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  Lungs/Airways/Pleura: Secretions are noted in left mainstem bronchus and   bilateral lower lobe segmental bronchi. Emphysema. Reticular opacities   with architectural distortion and left upper lobe with groundglass   opacity. Moderate left pleural effusion which is circumferential and   loculated in appearance with associated compressive atelectasis. There is   near complete collapse of the left lower lobe. Small right pleural   effusion. Calcified pleural plaques, bilaterally.  Mediastinum/Lymph nodes: No thoracic adenopathy.  Upper Abdomen: small upper pole left renal cyst.  Bones and Soft Tissues: Gynecomastia. Bilateral flank soft tissue edema.   Degenerative changes of the thoracic spineand left shoulder.  IMPRESSION:  1.  No main, lobar, or segmental pulmonary embolism.  2.  Moderate loculated left pleural effusion and small right pleural   effusion with associated compressive atelectasis.        SECONDARY PROCEDURE  Procedure: Transthoracic echocardiography (TTE)  Findings and Treatment: CONCLUSIONS:      1. Left ventricular systolic function is hyperdynamic with an ejection fraction of 80 % by Henao's method of disks.   2. Agitated saline injection was negative for intracardiac shunt.

## 2023-12-23 NOTE — PROGRESS NOTE ADULT - SUBJECTIVE AND OBJECTIVE BOX
Interval Events: s/p thora, no complications. no pulm complaints today  c/o urinary retention    REVIEW OF SYSTEMS:  Constitutional: No fevers or chills. No weight loss. No fatigue or generalised malaise.  Eyes: No itching or discharge from the eyes  ENT: No ear pain. No ear discharge. No nasal congestion. No post nasal drip. No epistaxis. No throat pain. No sore throat. No difficulty swallowing.   CV: No chest pain. No palpitations. No lightheadedness or dizziness.   Resp: No dyspnea at rest. No dyspnea on exertion. No orthopnea. No wheezing. No cough. No stridor. No sputum production. No chest pain with respiration.  GI: No nausea. No vomiting. No diarrhea.  MSK: No joint pain or pain in any extremities  Integumentary: No skin lesions. No pedal edema.  Neurological: No gross motor weakness. No sensory changes.   - HPI    OBJECTIVE:  ICU Vital Signs Last 24 Hrs  T(C): 36.4 (23 Dec 2023 04:12), Max: 37 (22 Dec 2023 20:12)  T(F): 97.6 (23 Dec 2023 04:12), Max: 98.6 (22 Dec 2023 20:12)  HR: 92 (23 Dec 2023 04:12) (92 - 106)  BP: 119/60 (23 Dec 2023 04:12) (116/73 - 126/69)  BP(mean): --  ABP: --  ABP(mean): --  RR: 18 (23 Dec 2023 04:12) (18 - 19)  SpO2: 96% (23 Dec 2023 04:12) (92% - 97%)    O2 Parameters below as of 23 Dec 2023 04:12  Patient On (Oxygen Delivery Method): room air              12-22 @ 07:01  -  12-23 @ 07:00  --------------------------------------------------------  IN: 0 mL / OUT: 101 mL / NET: -101 mL      CAPILLARY BLOOD GLUCOSE      POCT Blood Glucose.: 233 mg/dL (22 Dec 2023 21:19)      PHYSICAL EXAM:  General: Awake, alert, oriented X 3.   HEENT: Atraumatic, normocephalic.                 Mallampatti Grade                 No nasal congestion.                No tonsillar or pharyngeal exudates.  Lymph Nodes: No palpable lymphadenopathy  Neck: No JVD. No carotid bruit.   Respiratory: Normal chest expansion                         Normal percussion                         Normal and equal air entry                         No wheeze, rhonchi or rales.  Cardiovascular: S1 S2 normal. No murmurs, rubs or gallops.   Abdomen: Soft, non-tender, non-distended. No organomegaly.  Extremities: Warm to touch. Peripheral pulse palpable. No pedal edema.   Skin: No rashes or skin lesions  Neurological: Motor and sensory examination equal and normal in all four extremities.  Psychiatry: Appropriate mood and affect.    HOSPITAL MEDICATIONS:  MEDICATIONS  (STANDING):  albuterol/ipratropium for Nebulization 3 milliLiter(s) Nebulizer every 6 hours  ascorbic acid 500 milliGRAM(s) Oral daily  atorvastatin 10 milliGRAM(s) Oral at bedtime  budesonide 160 MICROgram(s)/formoterol 4.5 MICROgram(s) Inhaler 2 Puff(s) Inhalation two times a day  cefTRIAXone   IVPB 2000 milliGRAM(s) IV Intermittent every 24 hours  chlorhexidine 4% Liquid 1 Application(s) Topical <User Schedule>  finasteride 5 milliGRAM(s) Oral daily  heparin  Infusion.  Unit(s)/Hr (18 mL/Hr) IV Continuous <Continuous>  influenza  Vaccine (HIGH DOSE) 0.7 milliLiter(s) IntraMuscular once  methylPREDNISolone sodium succinate Injectable 40 milliGRAM(s) IV Push every 12 hours  metoprolol tartrate 25 milliGRAM(s) Oral two times a day  multivitamin 1 Tablet(s) Oral daily  mupirocin 2% Nasal 1 Application(s) Both Nostrils two times a day  pantoprazole    Tablet 40 milliGRAM(s) Oral before breakfast  tamsulosin 0.4 milliGRAM(s) Oral at bedtime    MEDICATIONS  (PRN):  acetaminophen     Tablet .. 650 milliGRAM(s) Oral every 6 hours PRN Temp greater or equal to 38C (100.4F), Mild Pain (1 - 3)      LABS:                        7.1    13.92 )-----------( 514      ( 23 Dec 2023 03:39 )             23.1     12-23    128<L>  |  90<L>  |  74<H>  ----------------------------<  188<H>  4.0   |  23  |  4.32<H>    Ca    8.4      23 Dec 2023 03:39  Phos  5.5     12-22  Mg     1.9     12-22    TPro  6.0  /  Alb  2.6<L>  /  TBili  0.2  /  DBili  x   /  AST  54<H>  /  ALT  38  /  AlkPhos  88  12-23    PT/INR - ( 22 Dec 2023 12:58 )   PT: 15.8 sec;   INR: 1.52 ratio         PTT - ( 23 Dec 2023 03:39 )  PTT:62.8 sec  Urinalysis Basic - ( 23 Dec 2023 03:39 )    Color: x / Appearance: x / SG: x / pH: x  Gluc: 188 mg/dL / Ketone: x  / Bili: x / Urobili: x   Blood: x / Protein: x / Nitrite: x   Leuk Esterase: x / RBC: x / WBC x   Sq Epi: x / Non Sq Epi: x / Bacteria: x

## 2023-12-23 NOTE — PROGRESS NOTE ADULT - ASSESSMENT
84 y/o male, admitted  strep pneumo pna and sepsis, BESSY. underlying copd, ILD (asb), HFpEF, AF  On ceftriaxone, hemodyn stable and afebrile. wbc trending down today  s/p left thora yesterday, 1200 cc. Feels good. exudate. Thus far cx negative  lungs are clear  can change to po steroids, cont symbicort,  pt is on duoneb - c/o urinary retention, can stop the ipratrop and just use albuterol  continue ceftriaxone  BESSY -- creat cont to increase.. cont renal f/u

## 2023-12-23 NOTE — PROGRESS NOTE ADULT - ASSESSMENT
acute kidney injury- likely ATN due to contrast/sepsis  -150. Recommend one time straight cath  no fluids needed at this time   discussed with patient     meron day  nephrology attending   please contact me on TEAMS   Office- [phone]   acute kidney injury- likely ATN due to contrast/sepsis  -150. Recommend one time straight cath  no fluids needed at this time   discussed with patient     meron day  nephrology attending   please contact me on TEAMS   Office- [phone]

## 2023-12-23 NOTE — PROGRESS NOTE ADULT - SUBJECTIVE AND OBJECTIVE BOX
[facility] Division of Kidney Diseases & Hypertension  FOLLOW UP NOTE  --------------------------------------------------------------------------------  Chief Complaint:    24 hour events/subjective:    feels ok  -150 cc  has condom catheter         PAST HISTORY  --------------------------------------------------------------------------------  No significant changes to PMH, PSH, FHx, SHx, unless otherwise noted    ALLERGIES & MEDICATIONS  --------------------------------------------------------------------------------  Allergies    No Known Allergies    Intolerances      Standing Inpatient Medications  albuterol/ipratropium for Nebulization 3 milliLiter(s) Nebulizer every 6 hours  ascorbic acid 500 milliGRAM(s) Oral daily  atorvastatin 10 milliGRAM(s) Oral at bedtime  budesonide 160 MICROgram(s)/formoterol 4.5 MICROgram(s) Inhaler 2 Puff(s) Inhalation two times a day  cefTRIAXone   IVPB 2000 milliGRAM(s) IV Intermittent every 24 hours  chlorhexidine 4% Liquid 1 Application(s) Topical <User Schedule>  dextrose 5%. 1000 milliLiter(s) IV Continuous <Continuous>  dextrose 5%. 1000 milliLiter(s) IV Continuous <Continuous>  dextrose 50% Injectable 12.5 Gram(s) IV Push once  dextrose 50% Injectable 25 Gram(s) IV Push once  dextrose 50% Injectable 25 Gram(s) IV Push once  finasteride 5 milliGRAM(s) Oral daily  glucagon  Injectable 1 milliGRAM(s) IntraMuscular once  heparin  Infusion.  Unit(s)/Hr IV Continuous <Continuous>  influenza  Vaccine (HIGH DOSE) 0.7 milliLiter(s) IntraMuscular once  insulin lispro (ADMELOG) corrective regimen sliding scale   SubCutaneous three times a day before meals  insulin lispro (ADMELOG) corrective regimen sliding scale   SubCutaneous at bedtime  metoprolol tartrate 25 milliGRAM(s) Oral two times a day  multivitamin 1 Tablet(s) Oral daily  mupirocin 2% Nasal 1 Application(s) Both Nostrils two times a day  pantoprazole    Tablet 40 milliGRAM(s) Oral before breakfast  predniSONE   Tablet 40 milliGRAM(s) Oral two times a day  tamsulosin 0.4 milliGRAM(s) Oral at bedtime    PRN Inpatient Medications  acetaminophen     Tablet .. 650 milliGRAM(s) Oral every 6 hours PRN  dextrose Oral Gel 15 Gram(s) Oral once PRN  heparin   Injectable 4000 Unit(s) IV Push every 6 hours PRN      VITALS/PHYSICAL EXAM  --------------------------------------------------------------------------------  T(C): 36.4 (12-23-23 @ 04:12), Max: 37 (12-22-23 @ 20:12)  HR: 92 (12-23-23 @ 04:12) (92 - 106)  BP: 119/60 (12-23-23 @ 04:12) (116/73 - 126/69)  RR: 18 (12-23-23 @ 04:12) (18 - 18)  SpO2: 96% (12-23-23 @ 04:12) (96% - 97%)  Wt(kg): --    Weight (kg): 97.7 (12-22-23 @ 11:00)      12-22-23 @ 07:01  -  12-23-23 @ 07:00  --------------------------------------------------------  IN: 0 mL / OUT: 101 mL / NET: -101 mL      Physical Exam:  	Gen: NAD  	HEENT:  supple neck, clear oropharynx  	Pulm: CTA B/L  	CV: RRR, S1S2; no rub  	Back: No spinal or CVA tenderness; no sacral edema  	Abd: +BS, soft, nontender/nondistended  	: No suprapubic tenderness  	UE: Warm, no asterixis  	LE: Warm, no edema  	  LABS/STUDIES  --------------------------------------------------------------------------------              8.0    14.93 >-----------<  572      [12-23-23 @ 12:30]              25.7     128  |  90  |  74  ----------------------------<  188      [12-23-23 @ 03:39]  4.0   |  23  |  4.32        Ca     8.4     [12-23-23 @ 03:39]      Mg     1.9     [12-22-23 @ 11:35]      Phos  5.5     [12-22-23 @ 11:35]    TPro  6.0  /  Alb  2.6  /  TBili  0.2  /  DBili  x   /  AST  54  /  ALT  38  /  AlkPhos  88  [12-23-23 @ 03:39]    PT/INR: PT 15.8 , INR 1.52       [12-22-23 @ 12:58]  PTT: 47.9       [12-23-23 @ 12:30]          [12-23-23 @ 03:39]    Creatinine Trend:  SCr 4.32 [12-23 @ 03:39]  SCr 3.44 [12-22 @ 11:35]  SCr 1.62 [12-21 @ 07:44]  SCr 1.28 [12-20 @ 04:27]    Urinalysis - [12-23-23 @ 03:39]      Color  / Appearance  / SG  / pH       Gluc 188 / Ketone   / Bili  / Urobili        Blood  / Protein  / Leuk Est  / Nitrite       RBC  / WBC  / Hyaline  / Gran  / Sq Epi  / Non Sq Epi  / Bacteria     Urine Creatinine 100      [12-22-23 @ 13:04]  Urine Protein 22      [12-22-23 @ 13:04]  Urine Sodium 16      [12-22-23 @ 13:04]  Urine Urea Nitrogen 396      [12-22-23 @ 13:04]  Urine Potassium 38      [12-22-23 @ 13:04]  Urine Osmolality 322      [12-22-23 @ 13:04]         [facility] Division of Kidney Diseases & Hypertension  FOLLOW UP NOTE  --------------------------------------------------------------------------------  Chief Complaint:    24 hour events/subjective:    feels ok  -150 cc  has condom catheter         PAST HISTORY  --------------------------------------------------------------------------------  No significant changes to PMH, PSH, FHx, SHx, unless otherwise noted    ALLERGIES & MEDICATIONS  --------------------------------------------------------------------------------  Allergies    No Known Allergies    Intolerances      Standing Inpatient Medications  albuterol/ipratropium for Nebulization 3 milliLiter(s) Nebulizer every 6 hours  ascorbic acid 500 milliGRAM(s) Oral daily  atorvastatin 10 milliGRAM(s) Oral at bedtime  budesonide 160 MICROgram(s)/formoterol 4.5 MICROgram(s) Inhaler 2 Puff(s) Inhalation two times a day  cefTRIAXone   IVPB 2000 milliGRAM(s) IV Intermittent every 24 hours  chlorhexidine 4% Liquid 1 Application(s) Topical <User Schedule>  dextrose 5%. 1000 milliLiter(s) IV Continuous <Continuous>  dextrose 5%. 1000 milliLiter(s) IV Continuous <Continuous>  dextrose 50% Injectable 12.5 Gram(s) IV Push once  dextrose 50% Injectable 25 Gram(s) IV Push once  dextrose 50% Injectable 25 Gram(s) IV Push once  finasteride 5 milliGRAM(s) Oral daily  glucagon  Injectable 1 milliGRAM(s) IntraMuscular once  heparin  Infusion.  Unit(s)/Hr IV Continuous <Continuous>  influenza  Vaccine (HIGH DOSE) 0.7 milliLiter(s) IntraMuscular once  insulin lispro (ADMELOG) corrective regimen sliding scale   SubCutaneous three times a day before meals  insulin lispro (ADMELOG) corrective regimen sliding scale   SubCutaneous at bedtime  metoprolol tartrate 25 milliGRAM(s) Oral two times a day  multivitamin 1 Tablet(s) Oral daily  mupirocin 2% Nasal 1 Application(s) Both Nostrils two times a day  pantoprazole    Tablet 40 milliGRAM(s) Oral before breakfast  predniSONE   Tablet 40 milliGRAM(s) Oral two times a day  tamsulosin 0.4 milliGRAM(s) Oral at bedtime    PRN Inpatient Medications  acetaminophen     Tablet .. 650 milliGRAM(s) Oral every 6 hours PRN  dextrose Oral Gel 15 Gram(s) Oral once PRN  heparin   Injectable 4000 Unit(s) IV Push every 6 hours PRN      VITALS/PHYSICAL EXAM  --------------------------------------------------------------------------------  T(C): 36.4 (12-23-23 @ 04:12), Max: 37 (12-22-23 @ 20:12)  HR: 92 (12-23-23 @ 04:12) (92 - 106)  BP: 119/60 (12-23-23 @ 04:12) (116/73 - 126/69)  RR: 18 (12-23-23 @ 04:12) (18 - 18)  SpO2: 96% (12-23-23 @ 04:12) (96% - 97%)  Wt(kg): --    Weight (kg): 97.7 (12-22-23 @ 11:00)      12-22-23 @ 07:01  -  12-23-23 @ 07:00  --------------------------------------------------------  IN: 0 mL / OUT: 101 mL / NET: -101 mL      Physical Exam:  	Gen: NAD  	HEENT:  supple neck, clear oropharynx  	Pulm: CTA B/L  	CV: RRR, S1S2; no rub  	Back: No spinal or CVA tenderness; no sacral edema  	Abd: +BS, soft, nontender/nondistended  	: No suprapubic tenderness  	UE: Warm, no asterixis  	LE: Warm, no edema  	  LABS/STUDIES  --------------------------------------------------------------------------------              8.0    14.93 >-----------<  572      [12-23-23 @ 12:30]              25.7     128  |  90  |  74  ----------------------------<  188      [12-23-23 @ 03:39]  4.0   |  23  |  4.32        Ca     8.4     [12-23-23 @ 03:39]      Mg     1.9     [12-22-23 @ 11:35]      Phos  5.5     [12-22-23 @ 11:35]    TPro  6.0  /  Alb  2.6  /  TBili  0.2  /  DBili  x   /  AST  54  /  ALT  38  /  AlkPhos  88  [12-23-23 @ 03:39]    PT/INR: PT 15.8 , INR 1.52       [12-22-23 @ 12:58]  PTT: 47.9       [12-23-23 @ 12:30]          [12-23-23 @ 03:39]    Creatinine Trend:  SCr 4.32 [12-23 @ 03:39]  SCr 3.44 [12-22 @ 11:35]  SCr 1.62 [12-21 @ 07:44]  SCr 1.28 [12-20 @ 04:27]    Urinalysis - [12-23-23 @ 03:39]      Color  / Appearance  / SG  / pH       Gluc 188 / Ketone   / Bili  / Urobili        Blood  / Protein  / Leuk Est  / Nitrite       RBC  / WBC  / Hyaline  / Gran  / Sq Epi  / Non Sq Epi  / Bacteria     Urine Creatinine 100      [12-22-23 @ 13:04]  Urine Protein 22      [12-22-23 @ 13:04]  Urine Sodium 16      [12-22-23 @ 13:04]  Urine Urea Nitrogen 396      [12-22-23 @ 13:04]  Urine Potassium 38      [12-22-23 @ 13:04]  Urine Osmolality 322      [12-22-23 @ 13:04]

## 2023-12-23 NOTE — PROGRESS NOTE ADULT - PROBLEM SELECTOR PLAN 4
baseline Cre <1  ddx pre-renal iso infection, poor PO, cardio-renal causes, contrast-induced, post renal needs to be ruled out   - US bladder, kidneys   - nephro consult - f/u recs   - urine lytes baseline Cre <1  ddx pre-renal iso infection, poor PO, cardio-renal causes, contrast-induced, post renal needs to be ruled out   - f/u US bladder, kidneys read   - nephro consulted - f/u recs   - avoid nephrotoxic agents

## 2023-12-23 NOTE — PROGRESS NOTE ADULT - ATTENDING COMMENTS
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 Will continue to appreciate recs    #BESSY  -Likely ATN in setting of infection + contrast nephropathy in setting of CTPA on admission  -UOP downtrending with rising Cr and proBNP  -Renal US results pending  -Avoid nephrotoxic agents and trend BMP  -If continuing to uptrend with minimal UOP and volume overload, will need to discuss possibility of HD and whether that would be in line with GOC    #HFpEF  -C/W bumex 2mg IVP daily0-- very little output not retaining-- renal f/u pending-- needs fluid challenge?  -C/W home BB    Sophia Alvarado D.O.  Division of Hospital Medicine  Available on MS Teams

## 2023-12-23 NOTE — PROGRESS NOTE ADULT - PROBLEM SELECTOR PLAN 1
Likely multifactorial - CAP and component of HFpEF, entero/rhino virus +, COPD exacerbation   SIRS criteria at admission RR 36, WBC elevated  CTPE chest showed loculated moderate L pleural effusion and small R pleural effusion  RVP pos for entero/rhino   sputum culture positive, Bcx x2 positive for strep pneumo   procal elevated   UA positive with bacteria, WBCs  - thoracentesis today   - pulm consulted - recs appreciated   - started ceftriaxone 2000 q24   - s/p azithro 500 q24  - ID consulted - will appreciate recs  - Aspiration Precautions  - Further plan per HFpEF below  - methylpred decreased from 60 IV BID started in the ED to 40 IV BID  - c/w Symbicort, duoneb  - increased bumex to 2 mg IV BID Likely multifactorial - CAP and component of HFpEF, entero/rhino virus +, COPD exacerbation   SIRS criteria at admission RR 36, WBC elevated  CTPE chest showed loculated moderate L pleural effusion and small R pleural effusion  RVP pos for entero/rhino   sputum culture positive, Bcx x2 positive for strep pneumo   procal elevated   UA positive with bacteria, WBCs  - thoracentesis today   - pulm consulted - recs appreciated   - started ceftriaxone 2000 q24   - s/p azithro 500 q24  - ID consulted - will appreciate recs  - Aspiration Precautions  - Further plan per HFpEF below  - methylpred decreased from 60 IV BID started in the ED to 40 IV BID  - c/w Symbicort, duoneb  - increased bumex to 2 mg IV BID - holding

## 2023-12-23 NOTE — PROGRESS NOTE ADULT - PROBLEM SELECTOR PLAN 2
Baseline BNP ~ 2800. BNP ~ 2800 this admission  In chronic exacerbation state with 3+ pitting edema and on diuresis  HFpEF or HFrEF  EF = 68% on 8/11/23  Heart failure, CHF order set initiated  Guideline directed medical therapy as below: after med-rec completed  TTE systolic function is hyperdynamic with an ejection fraction of 80 %  - increased bumex to 2 mg IV BID and PRN    - Diuretic: on bumex PO 2 mg am 1 mg pm at home. Switch to IV bumex 2 mg QD while admitted  - BB:  resume home metoprolol tatrate 25 mg BID. Switch to xl at d/c  - ARNI/ACE-I/ARB: Consider starting at d/c  - Hydralazine/Nitrate: Not indicated at this time  - MRA: Not indicated at this time  - SGLT2: Consider starting at d/c  - Maintain K > 4, Mg > 2

## 2023-12-23 NOTE — DISCHARGE NOTE PROVIDER - NSDCMRMEDTOKEN_GEN_ALL_CORE_FT
apixaban 5 mg oral tablet: 1 tab(s) orally 2 times a day  atorvastatin 10 mg oral tablet: 1 tab(s) orally once a day  bumetanide 1 mg oral tablet: 2 tab(s) orally in the morning, and 1 tablet at night  finasteride 5 mg oral tablet: 1 tab(s) orally once a day  metoprolol tartrate 25 mg oral tablet: 1 tab(s) orally 2 times a day  pantoprazole 40 mg oral delayed release tablet: 1 tab(s) orally every 12 hours  potassium chloride 20 mEq oral tablet, extended release: 1 tab(s) orally once a day (in the morning)  tamsulosin 0.4 mg oral capsule: 1 cap(s) orally once a day (at bedtime)   apixaban 5 mg oral tablet: 1 tab(s) orally 2 times a day  atorvastatin 10 mg oral tablet: 1 tab(s) orally once a day  bumetanide 1 mg oral tablet: 2 tab(s) orally in the morning, and 1 tablet at night  cefpodoxime 200 mg oral tablet: 1 tab(s) orally 2 times a day  finasteride 5 mg oral tablet: 1 tab(s) orally once a day  metoprolol tartrate 25 mg oral tablet: 1 tab(s) orally 2 times a day  pantoprazole 40 mg oral delayed release tablet: 1 tab(s) orally every 12 hours  potassium chloride 20 mEq oral tablet, extended release: 1 tab(s) orally once a day (in the morning)  tamsulosin 0.4 mg oral capsule: 1 cap(s) orally once a day (at bedtime)   albuterol 2.5 mg/3 mL (0.083%) inhalation solution: 2.5 milligram(s) inhaled every 6 hours  apixaban 2.5 mg oral tablet: 1 tab(s) orally 2 times a day  ascorbic acid 500 mg oral tablet: 1 tab(s) orally once a day  atorvastatin 10 mg oral tablet: 1 tab(s) orally once a day  benzonatate 100 mg oral capsule: 1 cap(s) orally every 8 hours  budesonide-formoterol 160 mcg-4.5 mcg/inh inhalation aerosol: 2 puff(s) inhaled 2 times a day  bumetanide 1 mg oral tablet: 2 tab(s) orally once a day in the morning, and 1 tablet at night  cefTRIAXone: 2 gram(s) intravenous once a day  finasteride 5 mg oral tablet: 1 tab(s) orally once a day  FIRST Mouthwash BLM mucous membrane suspension: 5 milliliter(s) mucous membrane every 6 hours Swish and spit  metoprolol tartrate 25 mg oral tablet: 1 tab(s) orally 2 times a day  Mucinex 600 mg oral tablet, extended release: 1 tab(s) orally every 12 hours  Multiple Vitamins oral tablet: 1 tab(s) orally once a day  nystatin 100,000 units/mL oral suspension: 5 milliliter(s) orally 4 times a day  pantoprazole 40 mg oral granule, delayed release: 1 tab(s) orally once a day  predniSONE 20 mg oral tablet: 1 tab(s) orally once a day  tamsulosin 0.4 mg oral capsule: 1 cap(s) orally once a day (at bedtime)   albuterol 2.5 mg/3 mL (0.083%) inhalation solution: 2.5 milligram(s) inhaled every 6 hours  apixaban 2.5 mg oral tablet: 1 tab(s) orally 2 times a day  ascorbic acid 500 mg oral tablet: 1 tab(s) orally once a day  atorvastatin 10 mg oral tablet: 1 tab(s) orally once a day  benzonatate 100 mg oral capsule: 1 cap(s) orally every 8 hours  budesonide-formoterol 160 mcg-4.5 mcg/inh inhalation aerosol: 2 puff(s) inhaled 2 times a day  bumetanide 1 mg oral tablet: 2 tab(s) orally once a day in the morning, and 1 tablet at night  cefpodoxime 200 mg oral tablet: 1 tab(s) orally 2 times a day  finasteride 5 mg oral tablet: 1 tab(s) orally once a day  FIRST Mouthwash BLM mucous membrane suspension: 5 milliliter(s) mucous membrane every 6 hours Swish and spit  metoprolol tartrate 25 mg oral tablet: 1 tab(s) orally 2 times a day  Mucinex 600 mg oral tablet, extended release: 1 tab(s) orally every 12 hours  Multiple Vitamins oral tablet: 1 tab(s) orally once a day  nystatin 100,000 units/mL oral suspension: 5 milliliter(s) orally 4 times a day  pantoprazole 40 mg oral granule, delayed release: 1 tab(s) orally once a day  predniSONE 20 mg oral tablet: 1 tab(s) orally once a day  tamsulosin 0.4 mg oral capsule: 1 cap(s) orally once a day (at bedtime)   albuterol 2.5 mg/3 mL (0.083%) inhalation solution: 2.5 milligram(s) inhaled every 6 hours  apixaban 2.5 mg oral tablet: 1 tab(s) orally 2 times a day  ascorbic acid 500 mg oral tablet: 1 tab(s) orally once a day  atorvastatin 10 mg oral tablet: 1 tab(s) orally once a day  benzonatate 100 mg oral capsule: 1 cap(s) orally every 8 hours  budesonide-formoterol 160 mcg-4.5 mcg/inh inhalation aerosol: 2 puff(s) inhaled 2 times a day  bumetanide 1 mg oral tablet: 2 tab(s) orally once a day in the morning, and 1 tablet at night  finasteride 5 mg oral tablet: 1 tab(s) orally once a day  FIRST Mouthwash BLM mucous membrane suspension: 5 milliliter(s) mucous membrane every 6 hours Swish and spit  metoprolol tartrate 25 mg oral tablet: 1 tab(s) orally 2 times a day  Mucinex 600 mg oral tablet, extended release: 1 tab(s) orally every 12 hours  Multiple Vitamins oral tablet: 1 tab(s) orally once a day  nystatin 100,000 units/mL oral suspension: 5 milliliter(s) orally 4 times a day  pantoprazole 40 mg oral granule, delayed release: 1 tab(s) orally once a day  predniSONE 20 mg oral tablet: 1 tab(s) orally once a day  tamsulosin 0.4 mg oral capsule: 1 cap(s) orally once a day (at bedtime)

## 2023-12-24 LAB
A1C WITH ESTIMATED AVERAGE GLUCOSE RESULT: 6 % — HIGH (ref 4–5.6)
A1C WITH ESTIMATED AVERAGE GLUCOSE RESULT: 6 % — HIGH (ref 4–5.6)
ALBUMIN SERPL ELPH-MCNC: 2.5 G/DL — LOW (ref 3.3–5)
ALBUMIN SERPL ELPH-MCNC: 2.5 G/DL — LOW (ref 3.3–5)
ALP SERPL-CCNC: 98 U/L — SIGNIFICANT CHANGE UP (ref 40–120)
ALP SERPL-CCNC: 98 U/L — SIGNIFICANT CHANGE UP (ref 40–120)
ALT FLD-CCNC: 53 U/L — HIGH (ref 10–45)
ALT FLD-CCNC: 53 U/L — HIGH (ref 10–45)
ANION GAP SERPL CALC-SCNC: 19 MMOL/L — HIGH (ref 5–17)
ANION GAP SERPL CALC-SCNC: 19 MMOL/L — HIGH (ref 5–17)
APTT BLD: 169.3 SEC — CRITICAL HIGH (ref 24.5–35.6)
APTT BLD: 169.3 SEC — CRITICAL HIGH (ref 24.5–35.6)
APTT BLD: 29 SEC — SIGNIFICANT CHANGE UP (ref 24.5–35.6)
APTT BLD: 29 SEC — SIGNIFICANT CHANGE UP (ref 24.5–35.6)
APTT BLD: 39.6 SEC — HIGH (ref 24.5–35.6)
APTT BLD: 39.6 SEC — HIGH (ref 24.5–35.6)
APTT BLD: 54.7 SEC — HIGH (ref 24.5–35.6)
APTT BLD: 54.7 SEC — HIGH (ref 24.5–35.6)
AST SERPL-CCNC: 49 U/L — HIGH (ref 10–40)
AST SERPL-CCNC: 49 U/L — HIGH (ref 10–40)
BILIRUB SERPL-MCNC: 0.2 MG/DL — SIGNIFICANT CHANGE UP (ref 0.2–1.2)
BILIRUB SERPL-MCNC: 0.2 MG/DL — SIGNIFICANT CHANGE UP (ref 0.2–1.2)
BUN SERPL-MCNC: 92 MG/DL — HIGH (ref 7–23)
BUN SERPL-MCNC: 92 MG/DL — HIGH (ref 7–23)
CALCIUM SERPL-MCNC: 8.6 MG/DL — SIGNIFICANT CHANGE UP (ref 8.4–10.5)
CALCIUM SERPL-MCNC: 8.6 MG/DL — SIGNIFICANT CHANGE UP (ref 8.4–10.5)
CHLORIDE SERPL-SCNC: 89 MMOL/L — LOW (ref 96–108)
CHLORIDE SERPL-SCNC: 89 MMOL/L — LOW (ref 96–108)
CO2 SERPL-SCNC: 21 MMOL/L — LOW (ref 22–31)
CO2 SERPL-SCNC: 21 MMOL/L — LOW (ref 22–31)
CREAT SERPL-MCNC: 4.97 MG/DL — HIGH (ref 0.5–1.3)
CREAT SERPL-MCNC: 4.97 MG/DL — HIGH (ref 0.5–1.3)
EGFR: 11 ML/MIN/1.73M2 — LOW
EGFR: 11 ML/MIN/1.73M2 — LOW
ESTIMATED AVERAGE GLUCOSE: 126 MG/DL — HIGH (ref 68–114)
ESTIMATED AVERAGE GLUCOSE: 126 MG/DL — HIGH (ref 68–114)
GLUCOSE BLDC GLUCOMTR-MCNC: 186 MG/DL — HIGH (ref 70–99)
GLUCOSE BLDC GLUCOMTR-MCNC: 186 MG/DL — HIGH (ref 70–99)
GLUCOSE BLDC GLUCOMTR-MCNC: 216 MG/DL — HIGH (ref 70–99)
GLUCOSE BLDC GLUCOMTR-MCNC: 216 MG/DL — HIGH (ref 70–99)
GLUCOSE BLDC GLUCOMTR-MCNC: 235 MG/DL — HIGH (ref 70–99)
GLUCOSE BLDC GLUCOMTR-MCNC: 235 MG/DL — HIGH (ref 70–99)
GLUCOSE BLDC GLUCOMTR-MCNC: 245 MG/DL — HIGH (ref 70–99)
GLUCOSE BLDC GLUCOMTR-MCNC: 245 MG/DL — HIGH (ref 70–99)
GLUCOSE SERPL-MCNC: 196 MG/DL — HIGH (ref 70–99)
GLUCOSE SERPL-MCNC: 196 MG/DL — HIGH (ref 70–99)
HCT VFR BLD CALC: 22.7 % — LOW (ref 39–50)
HCT VFR BLD CALC: 22.7 % — LOW (ref 39–50)
HCT VFR BLD CALC: 23.1 % — LOW (ref 39–50)
HCT VFR BLD CALC: 23.1 % — LOW (ref 39–50)
HCT VFR BLD CALC: 23.4 % — LOW (ref 39–50)
HCT VFR BLD CALC: 23.4 % — LOW (ref 39–50)
HGB BLD-MCNC: 7.2 G/DL — LOW (ref 13–17)
HGB BLD-MCNC: 7.2 G/DL — LOW (ref 13–17)
HGB BLD-MCNC: 7.3 G/DL — LOW (ref 13–17)
HGB BLD-MCNC: 7.3 G/DL — LOW (ref 13–17)
HGB BLD-MCNC: 7.4 G/DL — LOW (ref 13–17)
HGB BLD-MCNC: 7.4 G/DL — LOW (ref 13–17)
MAGNESIUM SERPL-MCNC: 1.9 MG/DL — SIGNIFICANT CHANGE UP (ref 1.6–2.6)
MAGNESIUM SERPL-MCNC: 1.9 MG/DL — SIGNIFICANT CHANGE UP (ref 1.6–2.6)
MCHC RBC-ENTMCNC: 21.5 PG — LOW (ref 27–34)
MCHC RBC-ENTMCNC: 21.6 PG — LOW (ref 27–34)
MCHC RBC-ENTMCNC: 21.6 PG — LOW (ref 27–34)
MCHC RBC-ENTMCNC: 31.6 GM/DL — LOW (ref 32–36)
MCHC RBC-ENTMCNC: 31.7 GM/DL — LOW (ref 32–36)
MCHC RBC-ENTMCNC: 31.7 GM/DL — LOW (ref 32–36)
MCV RBC AUTO: 67.8 FL — LOW (ref 80–100)
MCV RBC AUTO: 67.8 FL — LOW (ref 80–100)
MCV RBC AUTO: 68 FL — LOW (ref 80–100)
MCV RBC AUTO: 68 FL — LOW (ref 80–100)
MCV RBC AUTO: 68.3 FL — LOW (ref 80–100)
MCV RBC AUTO: 68.3 FL — LOW (ref 80–100)
NRBC # BLD: 0 /100 WBCS — SIGNIFICANT CHANGE UP (ref 0–0)
PHOSPHATE SERPL-MCNC: 6.7 MG/DL — HIGH (ref 2.5–4.5)
PHOSPHATE SERPL-MCNC: 6.7 MG/DL — HIGH (ref 2.5–4.5)
PLATELET # BLD AUTO: 548 K/UL — HIGH (ref 150–400)
PLATELET # BLD AUTO: 548 K/UL — HIGH (ref 150–400)
PLATELET # BLD AUTO: 570 K/UL — HIGH (ref 150–400)
PLATELET # BLD AUTO: 570 K/UL — HIGH (ref 150–400)
PLATELET # BLD AUTO: 590 K/UL — HIGH (ref 150–400)
PLATELET # BLD AUTO: 590 K/UL — HIGH (ref 150–400)
POTASSIUM SERPL-MCNC: 4.3 MMOL/L — SIGNIFICANT CHANGE UP (ref 3.5–5.3)
POTASSIUM SERPL-MCNC: 4.3 MMOL/L — SIGNIFICANT CHANGE UP (ref 3.5–5.3)
POTASSIUM SERPL-SCNC: 4.3 MMOL/L — SIGNIFICANT CHANGE UP (ref 3.5–5.3)
POTASSIUM SERPL-SCNC: 4.3 MMOL/L — SIGNIFICANT CHANGE UP (ref 3.5–5.3)
PROT SERPL-MCNC: 5.9 G/DL — LOW (ref 6–8.3)
PROT SERPL-MCNC: 5.9 G/DL — LOW (ref 6–8.3)
RBC # BLD: 3.35 M/UL — LOW (ref 4.2–5.8)
RBC # BLD: 3.35 M/UL — LOW (ref 4.2–5.8)
RBC # BLD: 3.38 M/UL — LOW (ref 4.2–5.8)
RBC # BLD: 3.38 M/UL — LOW (ref 4.2–5.8)
RBC # BLD: 3.44 M/UL — LOW (ref 4.2–5.8)
RBC # BLD: 3.44 M/UL — LOW (ref 4.2–5.8)
RBC # FLD: 19.2 % — HIGH (ref 10.3–14.5)
RBC # FLD: 19.2 % — HIGH (ref 10.3–14.5)
RBC # FLD: 19.5 % — HIGH (ref 10.3–14.5)
RBC # FLD: 19.5 % — HIGH (ref 10.3–14.5)
RBC # FLD: 19.6 % — HIGH (ref 10.3–14.5)
RBC # FLD: 19.6 % — HIGH (ref 10.3–14.5)
SODIUM SERPL-SCNC: 129 MMOL/L — LOW (ref 135–145)
SODIUM SERPL-SCNC: 129 MMOL/L — LOW (ref 135–145)
WBC # BLD: 15.85 K/UL — HIGH (ref 3.8–10.5)
WBC # BLD: 15.85 K/UL — HIGH (ref 3.8–10.5)
WBC # BLD: 16.2 K/UL — HIGH (ref 3.8–10.5)
WBC # BLD: 16.2 K/UL — HIGH (ref 3.8–10.5)
WBC # BLD: 17.89 K/UL — HIGH (ref 3.8–10.5)
WBC # BLD: 17.89 K/UL — HIGH (ref 3.8–10.5)
WBC # FLD AUTO: 15.85 K/UL — HIGH (ref 3.8–10.5)
WBC # FLD AUTO: 15.85 K/UL — HIGH (ref 3.8–10.5)
WBC # FLD AUTO: 16.2 K/UL — HIGH (ref 3.8–10.5)
WBC # FLD AUTO: 16.2 K/UL — HIGH (ref 3.8–10.5)
WBC # FLD AUTO: 17.89 K/UL — HIGH (ref 3.8–10.5)
WBC # FLD AUTO: 17.89 K/UL — HIGH (ref 3.8–10.5)

## 2023-12-24 PROCEDURE — 99233 SBSQ HOSP IP/OBS HIGH 50: CPT | Mod: GC

## 2023-12-24 PROCEDURE — 99233 SBSQ HOSP IP/OBS HIGH 50: CPT

## 2023-12-24 RX ORDER — HEPARIN SODIUM 5000 [USP'U]/ML
3500 INJECTION INTRAVENOUS; SUBCUTANEOUS EVERY 6 HOURS
Refills: 0 | Status: DISCONTINUED | OUTPATIENT
Start: 2023-12-24 | End: 2023-12-24

## 2023-12-24 RX ORDER — HEPARIN SODIUM 5000 [USP'U]/ML
1800 INJECTION INTRAVENOUS; SUBCUTANEOUS
Qty: 25000 | Refills: 0 | Status: DISCONTINUED | OUTPATIENT
Start: 2023-12-24 | End: 2023-12-24

## 2023-12-24 RX ORDER — HEPARIN SODIUM 5000 [USP'U]/ML
1700 INJECTION INTRAVENOUS; SUBCUTANEOUS
Qty: 25000 | Refills: 0 | Status: DISCONTINUED | OUTPATIENT
Start: 2023-12-24 | End: 2023-12-24

## 2023-12-24 RX ORDER — HEPARIN SODIUM 5000 [USP'U]/ML
2200 INJECTION INTRAVENOUS; SUBCUTANEOUS
Qty: 25000 | Refills: 0 | Status: DISCONTINUED | OUTPATIENT
Start: 2023-12-24 | End: 2023-12-27

## 2023-12-24 RX ORDER — HEPARIN SODIUM 5000 [USP'U]/ML
7000 INJECTION INTRAVENOUS; SUBCUTANEOUS EVERY 6 HOURS
Refills: 0 | Status: DISCONTINUED | OUTPATIENT
Start: 2023-12-24 | End: 2023-12-24

## 2023-12-24 RX ORDER — HEPARIN SODIUM 5000 [USP'U]/ML
INJECTION INTRAVENOUS; SUBCUTANEOUS
Qty: 25000 | Refills: 0 | Status: DISCONTINUED | OUTPATIENT
Start: 2023-12-24 | End: 2023-12-24

## 2023-12-24 RX ORDER — BENZOCAINE AND MENTHOL 5; 1 G/100ML; G/100ML
1 LIQUID ORAL THREE TIMES A DAY
Refills: 0 | Status: DISCONTINUED | OUTPATIENT
Start: 2023-12-24 | End: 2024-01-02

## 2023-12-24 RX ORDER — ALBUTEROL 90 UG/1
2.5 AEROSOL, METERED ORAL EVERY 6 HOURS
Refills: 0 | Status: DISCONTINUED | OUTPATIENT
Start: 2023-12-24 | End: 2024-01-02

## 2023-12-24 RX ADMIN — MUPIROCIN 1 APPLICATION(S): 20 OINTMENT TOPICAL at 17:31

## 2023-12-24 RX ADMIN — Medication 500 MILLIGRAM(S): at 12:15

## 2023-12-24 RX ADMIN — FINASTERIDE 5 MILLIGRAM(S): 5 TABLET, FILM COATED ORAL at 12:15

## 2023-12-24 RX ADMIN — ALBUTEROL 2.5 MILLIGRAM(S): 90 AEROSOL, METERED ORAL at 17:31

## 2023-12-24 RX ADMIN — Medication 3 MILLILITER(S): at 05:38

## 2023-12-24 RX ADMIN — ALBUTEROL 2.5 MILLIGRAM(S): 90 AEROSOL, METERED ORAL at 12:16

## 2023-12-24 RX ADMIN — HEPARIN SODIUM 2200 UNIT(S)/HR: 5000 INJECTION INTRAVENOUS; SUBCUTANEOUS at 16:18

## 2023-12-24 RX ADMIN — Medication 1 TABLET(S): at 12:15

## 2023-12-24 RX ADMIN — Medication 25 MILLIGRAM(S): at 17:31

## 2023-12-24 RX ADMIN — CEFTRIAXONE 100 MILLIGRAM(S): 500 INJECTION, POWDER, FOR SOLUTION INTRAMUSCULAR; INTRAVENOUS at 16:54

## 2023-12-24 RX ADMIN — Medication 1: at 13:00

## 2023-12-24 RX ADMIN — PANTOPRAZOLE SODIUM 40 MILLIGRAM(S): 20 TABLET, DELAYED RELEASE ORAL at 05:37

## 2023-12-24 RX ADMIN — BUDESONIDE AND FORMOTEROL FUMARATE DIHYDRATE 2 PUFF(S): 160; 4.5 AEROSOL RESPIRATORY (INHALATION) at 05:37

## 2023-12-24 RX ADMIN — CHLORHEXIDINE GLUCONATE 1 APPLICATION(S): 213 SOLUTION TOPICAL at 05:40

## 2023-12-24 RX ADMIN — Medication 2: at 08:50

## 2023-12-24 RX ADMIN — Medication 2: at 17:59

## 2023-12-24 RX ADMIN — Medication 25 MILLIGRAM(S): at 05:40

## 2023-12-24 RX ADMIN — HEPARIN SODIUM 1600 UNIT(S)/HR: 5000 INJECTION INTRAVENOUS; SUBCUTANEOUS at 02:18

## 2023-12-24 RX ADMIN — HEPARIN SODIUM 2200 UNIT(S)/HR: 5000 INJECTION INTRAVENOUS; SUBCUTANEOUS at 19:14

## 2023-12-24 RX ADMIN — HEPARIN SODIUM 0 UNIT(S)/HR: 5000 INJECTION INTRAVENOUS; SUBCUTANEOUS at 22:36

## 2023-12-24 RX ADMIN — BUDESONIDE AND FORMOTEROL FUMARATE DIHYDRATE 2 PUFF(S): 160; 4.5 AEROSOL RESPIRATORY (INHALATION) at 17:31

## 2023-12-24 RX ADMIN — HEPARIN SODIUM 18 UNIT(S)/HR: 5000 INJECTION INTRAVENOUS; SUBCUTANEOUS at 08:34

## 2023-12-24 RX ADMIN — MUPIROCIN 1 APPLICATION(S): 20 OINTMENT TOPICAL at 05:38

## 2023-12-24 RX ADMIN — HEPARIN SODIUM 1900 UNIT(S)/HR: 5000 INJECTION INTRAVENOUS; SUBCUTANEOUS at 23:55

## 2023-12-24 RX ADMIN — HEPARIN SODIUM 18 UNIT(S)/HR: 5000 INJECTION INTRAVENOUS; SUBCUTANEOUS at 12:20

## 2023-12-24 RX ADMIN — Medication 40 MILLIGRAM(S): at 05:39

## 2023-12-24 NOTE — PROVIDER CONTACT NOTE (OTHER) - ACTION/TREATMENT ORDERED:
Keep rate at 18 ml/hr. Obtain aptt 6 hours after bag scanned. No bolus needed Keep rate at 18 ml/hr. Obtain aptt 6 hours after bag scanned. No bolus needed. keep patient specific nomogram

## 2023-12-24 NOTE — PROGRESS NOTE ADULT - SUBJECTIVE AND OBJECTIVE BOX
[facility] DIVISION OF KIDNEY DISEASES AND HYPERTENSION   FOLLOW UP NOTE    --------------------------------------------------------------------------------  Chief Complaint:    24 hour events/subjective: Pt. was seen and examined today. Cr worsening.      PAST HISTORY  --------------------------------------------------------------------------------  No significant changes to PMH, PSH, FHx, SHx, unless otherwise noted    ALLERGIES & MEDICATIONS  --------------------------------------------------------------------------------  Allergies    No Known Allergies    Intolerances      Standing Inpatient Medications  albuterol    0.083% 2.5 milliGRAM(s) Nebulizer every 6 hours  ascorbic acid 500 milliGRAM(s) Oral daily  atorvastatin 10 milliGRAM(s) Oral at bedtime  budesonide 160 MICROgram(s)/formoterol 4.5 MICROgram(s) Inhaler 2 Puff(s) Inhalation two times a day  cefTRIAXone   IVPB 2000 milliGRAM(s) IV Intermittent every 24 hours  chlorhexidine 4% Liquid 1 Application(s) Topical <User Schedule>  dextrose 5%. 1000 milliLiter(s) IV Continuous <Continuous>  dextrose 5%. 1000 milliLiter(s) IV Continuous <Continuous>  dextrose 50% Injectable 25 Gram(s) IV Push once  dextrose 50% Injectable 25 Gram(s) IV Push once  dextrose 50% Injectable 12.5 Gram(s) IV Push once  finasteride 5 milliGRAM(s) Oral daily  glucagon  Injectable 1 milliGRAM(s) IntraMuscular once  heparin  Infusion. 2200 Unit(s)/Hr IV Continuous <Continuous>  influenza  Vaccine (HIGH DOSE) 0.7 milliLiter(s) IntraMuscular once  insulin lispro (ADMELOG) corrective regimen sliding scale   SubCutaneous three times a day before meals  insulin lispro (ADMELOG) corrective regimen sliding scale   SubCutaneous at bedtime  metoprolol tartrate 25 milliGRAM(s) Oral two times a day  multivitamin 1 Tablet(s) Oral daily  mupirocin 2% Nasal 1 Application(s) Both Nostrils two times a day  pantoprazole    Tablet 40 milliGRAM(s) Oral before breakfast  tamsulosin 0.4 milliGRAM(s) Oral at bedtime    PRN Inpatient Medications  acetaminophen     Tablet .. 650 milliGRAM(s) Oral every 6 hours PRN  benzocaine/menthol Lozenge 1 Lozenge Oral three times a day PRN  dextrose Oral Gel 15 Gram(s) Oral once PRN        VITALS/PHYSICAL EXAM  --------------------------------------------------------------------------------  T(C): 36.4 (12-24-23 @ 13:49), Max: 36.5 (12-24-23 @ 04:55)  HR: 90 (12-24-23 @ 17:55) (87 - 101)  BP: 132/69 (12-24-23 @ 17:55) (122/58 - 133/62)  RR: 18 (12-24-23 @ 17:55) (18 - 18)  SpO2: 99% (12-24-23 @ 17:55) (94% - 99%)  Wt(kg): --    Weight (kg): 90 (12-23-23 @ 14:13)      12-23-23 @ 07:01  -  12-24-23 @ 07:00  --------------------------------------------------------  IN: 0 mL / OUT: 400 mL / NET: -400 mL    12-24-23 @ 07:01  -  12-24-23 @ 20:14  --------------------------------------------------------  IN: 0 mL / OUT: 550 mL / NET: -550 mL        Physical Exam:  	Gen: NAD  	HEENT: Anicteric  	Pulm: CTA B/L  	CV: S1S2+  	Abd: Soft, +BS   	Ext: No LE edema B/L  	Neuro: Awake          : External urinary capture with bag  	Skin: Warm and dry      LABS/STUDIES  --------------------------------------------------------------------------------              7.3    15.85 >-----------<  570      [12-24-23 @ 06:50]              23.1     129  |  89  |  92  ----------------------------<  196      [12-24-23 @ 07:00]  4.3   |  21  |  4.97        Ca     8.6     [12-24-23 @ 07:00]      Mg     1.9     [12-24-23 @ 07:00]      Phos  6.7     [12-24-23 @ 07:00]    TPro  5.9  /  Alb  2.5  /  TBili  0.2  /  DBili  x   /  AST  49  /  ALT  53  /  AlkPhos  98  [12-24-23 @ 07:00]      PTT: 29.0       [12-24-23 @ 14:45]          [12-23-23 @ 03:39]    Creatinine Trend:  SCr 4.97 [12-24 @ 07:00]  SCr 4.32 [12-23 @ 03:39]  SCr 3.44 [12-22 @ 11:35]  SCr 1.62 [12-21 @ 07:44]  SCr 1.28 [12-20 @ 04:27]    Urinalysis - [12-24-23 @ 07:00]      Color  / Appearance  / SG  / pH       Gluc 196 / Ketone   / Bili  / Urobili        Blood  / Protein  / Leuk Est  / Nitrite       RBC  / WBC  / Hyaline  / Gran  / Sq Epi  / Non Sq Epi  / Bacteria     Urine Creatinine 100      [12-22-23 @ 13:04]  Urine Protein 22      [12-22-23 @ 13:04]  Urine Sodium 16      [12-22-23 @ 13:04]  Urine Urea Nitrogen 396      [12-22-23 @ 13:04]  Urine Potassium 38      [12-22-23 @ 13:04]  Urine Osmolality 322      [12-22-23 @ 13:04]        Tacrolimus  Cyclosporine  Sirolimus  Mycophenolate  BK PCR  CMV PCR  Parvo PCR  EBV PCR [facility] DIVISION OF KIDNEY DISEASES AND HYPERTENSION   FOLLOW UP NOTE    --------------------------------------------------------------------------------  Chief Complaint:    24 hour events/subjective: Pt. was seen and examined today. Cr worsening.      PAST HISTORY  --------------------------------------------------------------------------------  No significant changes to PMH, PSH, FHx, SHx, unless otherwise noted    ALLERGIES & MEDICATIONS  --------------------------------------------------------------------------------  Allergies    No Known Allergies    Intolerances      Standing Inpatient Medications  albuterol    0.083% 2.5 milliGRAM(s) Nebulizer every 6 hours  ascorbic acid 500 milliGRAM(s) Oral daily  atorvastatin 10 milliGRAM(s) Oral at bedtime  budesonide 160 MICROgram(s)/formoterol 4.5 MICROgram(s) Inhaler 2 Puff(s) Inhalation two times a day  cefTRIAXone   IVPB 2000 milliGRAM(s) IV Intermittent every 24 hours  chlorhexidine 4% Liquid 1 Application(s) Topical <User Schedule>  dextrose 5%. 1000 milliLiter(s) IV Continuous <Continuous>  dextrose 5%. 1000 milliLiter(s) IV Continuous <Continuous>  dextrose 50% Injectable 25 Gram(s) IV Push once  dextrose 50% Injectable 25 Gram(s) IV Push once  dextrose 50% Injectable 12.5 Gram(s) IV Push once  finasteride 5 milliGRAM(s) Oral daily  glucagon  Injectable 1 milliGRAM(s) IntraMuscular once  heparin  Infusion. 2200 Unit(s)/Hr IV Continuous <Continuous>  influenza  Vaccine (HIGH DOSE) 0.7 milliLiter(s) IntraMuscular once  insulin lispro (ADMELOG) corrective regimen sliding scale   SubCutaneous three times a day before meals  insulin lispro (ADMELOG) corrective regimen sliding scale   SubCutaneous at bedtime  metoprolol tartrate 25 milliGRAM(s) Oral two times a day  multivitamin 1 Tablet(s) Oral daily  mupirocin 2% Nasal 1 Application(s) Both Nostrils two times a day  pantoprazole    Tablet 40 milliGRAM(s) Oral before breakfast  tamsulosin 0.4 milliGRAM(s) Oral at bedtime    PRN Inpatient Medications  acetaminophen     Tablet .. 650 milliGRAM(s) Oral every 6 hours PRN  benzocaine/menthol Lozenge 1 Lozenge Oral three times a day PRN  dextrose Oral Gel 15 Gram(s) Oral once PRN        VITALS/PHYSICAL EXAM  --------------------------------------------------------------------------------  T(C): 36.4 (12-24-23 @ 13:49), Max: 36.5 (12-24-23 @ 04:55)  HR: 90 (12-24-23 @ 17:55) (87 - 101)  BP: 132/69 (12-24-23 @ 17:55) (122/58 - 133/62)  RR: 18 (12-24-23 @ 17:55) (18 - 18)  SpO2: 99% (12-24-23 @ 17:55) (94% - 99%)  Wt(kg): --    Weight (kg): 90 (12-23-23 @ 14:13)      12-23-23 @ 07:01  -  12-24-23 @ 07:00  --------------------------------------------------------  IN: 0 mL / OUT: 400 mL / NET: -400 mL    12-24-23 @ 07:01  -  12-24-23 @ 20:14  --------------------------------------------------------  IN: 0 mL / OUT: 550 mL / NET: -550 mL        Physical Exam:  	Gen: NAD  	HEENT: Anicteric  	Pulm: CTA B/L  	CV: S1S2+  	Abd: Soft, +BS   	Ext: No LE edema B/L  	Neuro: Awake          : External urinary capture with bag  	Skin: Warm and dry      LABS/STUDIES  --------------------------------------------------------------------------------              7.3    15.85 >-----------<  570      [12-24-23 @ 06:50]              23.1     129  |  89  |  92  ----------------------------<  196      [12-24-23 @ 07:00]  4.3   |  21  |  4.97        Ca     8.6     [12-24-23 @ 07:00]      Mg     1.9     [12-24-23 @ 07:00]      Phos  6.7     [12-24-23 @ 07:00]    TPro  5.9  /  Alb  2.5  /  TBili  0.2  /  DBili  x   /  AST  49  /  ALT  53  /  AlkPhos  98  [12-24-23 @ 07:00]      PTT: 29.0       [12-24-23 @ 14:45]          [12-23-23 @ 03:39]    Creatinine Trend:  SCr 4.97 [12-24 @ 07:00]  SCr 4.32 [12-23 @ 03:39]  SCr 3.44 [12-22 @ 11:35]  SCr 1.62 [12-21 @ 07:44]  SCr 1.28 [12-20 @ 04:27]    Urinalysis - [12-24-23 @ 07:00]      Color  / Appearance  / SG  / pH       Gluc 196 / Ketone   / Bili  / Urobili        Blood  / Protein  / Leuk Est  / Nitrite       RBC  / WBC  / Hyaline  / Gran  / Sq Epi  / Non Sq Epi  / Bacteria     Urine Creatinine 100      [12-22-23 @ 13:04]  Urine Protein 22      [12-22-23 @ 13:04]  Urine Sodium 16      [12-22-23 @ 13:04]  Urine Urea Nitrogen 396      [12-22-23 @ 13:04]  Urine Potassium 38      [12-22-23 @ 13:04]  Urine Osmolality 322      [12-22-23 @ 13:04]        Tacrolimus  Cyclosporine  Sirolimus  Mycophenolate  BK PCR  CMV PCR  Parvo PCR  EBV PCR

## 2023-12-24 NOTE — PROVIDER CONTACT NOTE (OTHER) - ACTION/TREATMENT ORDERED:
MD notified. Suggested to MD to draw new APTT as  Jessy DOHERTY suggested. MD ordered stat APTT and new APTT drawn.

## 2023-12-24 NOTE — PROGRESS NOTE ADULT - ASSESSMENT
Acute kidney injury- likely ATN due to contrast/sepsis    Discussed with patient and patient's both daughters the need for dialysis if renal fx keep worsening. All of them agree for dialysis, consent obtained and placed in patient's chart. If bladder volume noticed to be close to 300 then recommend placing gomez.     *THIS NOTE IS NOT FINALIZED UNTIL SIGNED BY THE ATTENDING*  Mac Siddiqi  Nephrology Fellow  Feel free to contact me on TEAMS  After 4 pm please contact the on-call Fellow.

## 2023-12-24 NOTE — PROGRESS NOTE ADULT - PROBLEM SELECTOR PLAN 1
Likely multifactorial - CAP and component of HFpEF, entero/rhino virus +, COPD exacerbation   SIRS criteria at admission RR 36, WBC elevated  CTPE chest showed loculated moderate L pleural effusion and small R pleural effusion  RVP pos for entero/rhino   sputum culture positive, Bcx x2 positive for strep pneumo   procal elevated   UA positive with bacteria, WBCs  - s/p thoracentesis  - pulm consulted - recs appreciated   - started ceftriaxone 2000 q24   - s/p azithro 500 q24  - ID consulted - will appreciate recs  - Aspiration Precautions  - Further plan per HFpEF below  - methylpred 40 IV BID  - c/w Symbicort, duoneb  - increased bumex to 2 mg IV BID - holding given BESSY on CKD

## 2023-12-24 NOTE — PROGRESS NOTE ADULT - ATTENDING COMMENTS
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 Will continue to appreciate recs    #BESSY  -Likely ATN in setting of infection + contrast nephropathy in setting of CTPA on admission  -UOP downtrending-- no fluid challenge per renal  -Renal US results unremarkable  -Avoid nephrotoxic agents and trend BMP  -no HD for now, per renal    #HFpEF  -holding diuretics   -C/W home BB    Sophia Alvarado D.O.  Division of Hospital Medicine  Available on MS Teams

## 2023-12-24 NOTE — PROGRESS NOTE ADULT - ATTENDING COMMENTS
meron day  nephrology attending   please contact me on TEAMS   Office- [phone] meron day  nephrology attending   please contact me on TEAMS   Office- [phone]

## 2023-12-24 NOTE — PROGRESS NOTE ADULT - SUBJECTIVE AND OBJECTIVE BOX
PROGRESS NOTE:   Authored by Juan Carlos Ramon MD     Patient is a 83y old  Male who presents with a chief complaint of sob (23 Dec 2023 14:04)      SUBJECTIVE / OVERNIGHT EVENTS:  No acute events overnight. Heparin gtt adjusted as needed. Denies new complaints this morning.    MEDICATIONS  (STANDING):  albuterol/ipratropium for Nebulization 3 milliLiter(s) Nebulizer every 6 hours  ascorbic acid 500 milliGRAM(s) Oral daily  atorvastatin 10 milliGRAM(s) Oral at bedtime  budesonide 160 MICROgram(s)/formoterol 4.5 MICROgram(s) Inhaler 2 Puff(s) Inhalation two times a day  cefTRIAXone   IVPB 2000 milliGRAM(s) IV Intermittent every 24 hours  chlorhexidine 4% Liquid 1 Application(s) Topical <User Schedule>  dextrose 5%. 1000 milliLiter(s) (50 mL/Hr) IV Continuous <Continuous>  dextrose 5%. 1000 milliLiter(s) (100 mL/Hr) IV Continuous <Continuous>  dextrose 50% Injectable 25 Gram(s) IV Push once  dextrose 50% Injectable 25 Gram(s) IV Push once  dextrose 50% Injectable 12.5 Gram(s) IV Push once  finasteride 5 milliGRAM(s) Oral daily  glucagon  Injectable 1 milliGRAM(s) IntraMuscular once  heparin  Infusion 1800 Unit(s)/Hr (18 mL/Hr) IV Continuous <Continuous>  influenza  Vaccine (HIGH DOSE) 0.7 milliLiter(s) IntraMuscular once  insulin lispro (ADMELOG) corrective regimen sliding scale   SubCutaneous at bedtime  insulin lispro (ADMELOG) corrective regimen sliding scale   SubCutaneous three times a day before meals  metoprolol tartrate 25 milliGRAM(s) Oral two times a day  multivitamin 1 Tablet(s) Oral daily  mupirocin 2% Nasal 1 Application(s) Both Nostrils two times a day  pantoprazole    Tablet 40 milliGRAM(s) Oral before breakfast  predniSONE   Tablet 40 milliGRAM(s) Oral two times a day  tamsulosin 0.4 milliGRAM(s) Oral at bedtime    MEDICATIONS  (PRN):  acetaminophen     Tablet .. 650 milliGRAM(s) Oral every 6 hours PRN Temp greater or equal to 38C (100.4F), Mild Pain (1 - 3)  dextrose Oral Gel 15 Gram(s) Oral once PRN Blood Glucose LESS THAN 70 milliGRAM(s)/deciliter      CAPILLARY BLOOD GLUCOSE      POCT Blood Glucose.: 242 mg/dL (23 Dec 2023 21:59)  POCT Blood Glucose.: 245 mg/dL (23 Dec 2023 17:08)    I&O's Summary    23 Dec 2023 07:01  -  24 Dec 2023 07:00  --------------------------------------------------------  IN: 0 mL / OUT: 400 mL / NET: -400 mL        PHYSICAL EXAM:  Vital Signs Last 24 Hrs  T(C): 36.5 (24 Dec 2023 04:55), Max: 36.5 (24 Dec 2023 04:55)  T(F): 97.7 (24 Dec 2023 04:55), Max: 97.7 (24 Dec 2023 04:55)  HR: 101 (24 Dec 2023 04:55) (87 - 101)  BP: 129/63 (24 Dec 2023 04:55) (119/67 - 129/63)  BP(mean): --  RR: 18 (24 Dec 2023 04:55) (18 - 18)  SpO2: 97% (24 Dec 2023 04:55) (96% - 97%)    Parameters below as of 24 Dec 2023 04:55  Patient On (Oxygen Delivery Method): room air        CONSTITUTIONAL: in no apparent distress  EYES: No conjunctival or scleral injection, non-icteric  ENMT: No external nasal lesions; Normal outer ears, + throat erythema   NECK: Supple; Trachea midline  RESPIRATORY: Normal respiratory effort; CTAB  CARDIOVASCULAR: Regular rate and rhythm, normal S1 and S2;  GASTROINTESTINAL: Non-distended; No palpable masses; No tenderness; No rebound/guarding  EXTREMITIES:  Gross 3+ lower extremity edema;  NEUROLOGY: A+O to person, place, and time; Does respond to commands appropriately;  PSYCHIATRY: Mood and Affect appropriate    LABS:                        7.3    15.85 )-----------( 570      ( 24 Dec 2023 06:50 )             23.1     12-23    128<L>  |  90<L>  |  74<H>  ----------------------------<  188<H>  4.0   |  23  |  4.32<H>    Ca    8.4      23 Dec 2023 03:39  Phos  5.5     12-22  Mg     1.9     12-22    TPro  6.0  /  Alb  2.6<L>  /  TBili  0.2  /  DBili  x   /  AST  54<H>  /  ALT  38  /  AlkPhos  88  12-23    PT/INR - ( 22 Dec 2023 12:58 )   PT: 15.8 sec;   INR: 1.52 ratio         PTT - ( 24 Dec 2023 05:46 )  PTT:54.7 sec      Urinalysis Basic - ( 23 Dec 2023 03:39 )    Color: x / Appearance: x / SG: x / pH: x  Gluc: 188 mg/dL / Ketone: x  / Bili: x / Urobili: x   Blood: x / Protein: x / Nitrite: x   Leuk Esterase: x / RBC: x / WBC x   Sq Epi: x / Non Sq Epi: x / Bacteria: x        Culture - Acid Fast - Body Fluid w/Smear (collected 22 Dec 2023 16:18)  Source: .Body Fluid Thoracentesis Fluid    Culture - Body Fluid with Gram Stain (collected 22 Dec 2023 16:18)  Source: Pleural Fl Pleural Fluid  Gram Stain (23 Dec 2023 01:42):    polymorphonuclear leukocytes seen    No organisms seen    by cytocentrifuge  Preliminary Report (23 Dec 2023 17:25):    No growth    Culture - Blood (collected 22 Dec 2023 10:43)  Source: .Blood Blood-Peripheral  Preliminary Report (23 Dec 2023 18:02):    No growth at 24 hours    Culture - Blood (collected 22 Dec 2023 10:43)  Source: .Blood Blood-Peripheral  Preliminary Report (23 Dec 2023 18:02):    No growth at 24 hours        RADIOLOGY & ADDITIONAL TESTS:

## 2023-12-24 NOTE — PROGRESS NOTE ADULT - SUBJECTIVE AND OBJECTIVE BOX
REVIEW OF SYSTEMS:  Constitutional: No fevers or chills. No weight loss. No fatigue or generalised malaise.  Eyes: No itching or discharge from the eyes  ENT: No ear pain. No ear discharge. No nasal congestion. No post nasal drip. No epistaxis. No throat pain. No sore throat. No difficulty swallowing.   CV: No chest pain. No palpitations. No lightheadedness or dizziness.   Resp: No dyspnea at rest. No dyspnea on exertion. No orthopnea. No wheezing. No cough. No stridor. No sputum production. No chest pain with respiration.  GI: No nausea. No vomiting. No diarrhea.  MSK: No joint pain or pain in any extremities  Integumentary: No skin lesions. No pedal edema.  Neurological: No gross motor weakness. No sensory changes.   - frustrated with straight cath  [ x] All other systems negative  [ ] Unable to assess ROS because ________    OBJECTIVE:  ICU Vital Signs Last 24 Hrs  T(C): 36.5 (24 Dec 2023 04:55), Max: 36.5 (24 Dec 2023 04:55)  T(F): 97.7 (24 Dec 2023 04:55), Max: 97.7 (24 Dec 2023 04:55)  HR: 101 (24 Dec 2023 04:55) (87 - 101)  BP: 129/63 (24 Dec 2023 04:55) (119/67 - 129/63)  BP(mean): --  ABP: --  ABP(mean): --  RR: 18 (24 Dec 2023 04:55) (18 - 18)  SpO2: 97% (24 Dec 2023 04:55) (96% - 97%)    O2 Parameters below as of 24 Dec 2023 04:55  Patient On (Oxygen Delivery Method): room air              12-23 @ 07:01  -  12-24 @ 07:00  --------------------------------------------------------  IN: 0 mL / OUT: 400 mL / NET: -400 mL      CAPILLARY BLOOD GLUCOSE      POCT Blood Glucose.: 216 mg/dL (24 Dec 2023 08:40)      PHYSICAL EXAM:  General: Awake, alert, oriented X 3.   HEENT: Atraumatic, normocephalic.                 Mallampatti Grade                 No nasal congestion.                No tonsillar or pharyngeal exudates.  Lymph Nodes: No palpable lymphadenopathy  Neck: No JVD. No carotid bruit.   Respiratory: Normal chest expansion                         Normal percussion                         Normal and equal air entry                         No wheeze, rhonchi or rales.  Cardiovascular: S1 S2 normal. No murmurs, rubs or gallops.   Abdomen: Soft, non-tender, non-distended. No organomegaly.  Extremities: Warm to touch. Peripheral pulse palpable. No pedal edema.   Skin: No rashes or skin lesions  Neurological: Motor and sensory examination equal and normal in all four extremities.  Psychiatry: Appropriate mood and affect.    HOSPITAL MEDICATIONS:  MEDICATIONS  (STANDING):  albuterol    0.083% 2.5 milliGRAM(s) Nebulizer every 6 hours  ascorbic acid 500 milliGRAM(s) Oral daily  atorvastatin 10 milliGRAM(s) Oral at bedtime  budesonide 160 MICROgram(s)/formoterol 4.5 MICROgram(s) Inhaler 2 Puff(s) Inhalation two times a day  cefTRIAXone   IVPB 2000 milliGRAM(s) IV Intermittent every 24 hours  chlorhexidine 4% Liquid 1 Application(s) Topical <User Schedule>  dextrose 5%. 1000 milliLiter(s) (100 mL/Hr) IV Continuous <Continuous>  dextrose 5%. 1000 milliLiter(s) (50 mL/Hr) IV Continuous <Continuous>  dextrose 50% Injectable 12.5 Gram(s) IV Push once  dextrose 50% Injectable 25 Gram(s) IV Push once  dextrose 50% Injectable 25 Gram(s) IV Push once  finasteride 5 milliGRAM(s) Oral daily  glucagon  Injectable 1 milliGRAM(s) IntraMuscular once  heparin  Infusion 1800 Unit(s)/Hr (18 mL/Hr) IV Continuous <Continuous>  influenza  Vaccine (HIGH DOSE) 0.7 milliLiter(s) IntraMuscular once  insulin lispro (ADMELOG) corrective regimen sliding scale   SubCutaneous three times a day before meals  insulin lispro (ADMELOG) corrective regimen sliding scale   SubCutaneous at bedtime  metoprolol tartrate 25 milliGRAM(s) Oral two times a day  multivitamin 1 Tablet(s) Oral daily  mupirocin 2% Nasal 1 Application(s) Both Nostrils two times a day  pantoprazole    Tablet 40 milliGRAM(s) Oral before breakfast  predniSONE   Tablet 40 milliGRAM(s) Oral two times a day  tamsulosin 0.4 milliGRAM(s) Oral at bedtime    MEDICATIONS  (PRN):  acetaminophen     Tablet .. 650 milliGRAM(s) Oral every 6 hours PRN Temp greater or equal to 38C (100.4F), Mild Pain (1 - 3)  dextrose Oral Gel 15 Gram(s) Oral once PRN Blood Glucose LESS THAN 70 milliGRAM(s)/deciliter      LABS:                        7.3    15.85 )-----------( 570      ( 24 Dec 2023 06:50 )             23.1     12-24    129<L>  |  89<L>  |  92<H>  ----------------------------<  196<H>  4.3   |  21<L>  |  4.97<H>    Ca    8.6      24 Dec 2023 07:00  Phos  6.7     12-24  Mg     1.9     12-24    TPro  5.9<L>  /  Alb  2.5<L>  /  TBili  0.2  /  DBili  x   /  AST  49<H>  /  ALT  53<H>  /  AlkPhos  98  12-24    PT/INR - ( 22 Dec 2023 12:58 )   PT: 15.8 sec;   INR: 1.52 ratio         PTT - ( 24 Dec 2023 05:46 )  PTT:54.7 sec  Urinalysis Basic - ( 24 Dec 2023 07:00 )    Color: x / Appearance: x / SG: x / pH: x  Gluc: 196 mg/dL / Ketone: x  / Bili: x / Urobili: x   Blood: x / Protein: x / Nitrite: x   Leuk Esterase: x / RBC: x / WBC x   Sq Epi: x / Non Sq Epi: x / Bacteria: x            MICROBIOLOGY:     RADIOLOGY:  [ ] Reviewed and interpreted by me    Point of Care Ultrasound Findings:    PFT:    EKG:

## 2023-12-24 NOTE — PROGRESS NOTE ADULT - PROBLEM SELECTOR PLAN 4
baseline Cre <1  ddx pre-renal iso infection, poor PO, cardio-renal causes, contrast-induced, post renal needs to be ruled out   - f/u US bladder, kidneys read   - nephro consulted - f/u recs   - avoid nephrotoxic agents

## 2023-12-24 NOTE — PROGRESS NOTE ADULT - ASSESSMENT
Pneumococcal pna. Underlying copd and ild  Parapneumonic pleural effusion  Pleural culture remains negative  Pt is stable on room air. Denies sob, no pulmonary complaints  Afebrile. Lungs are clear  Continue ceftriaxone  Taper prednisone  Continue symbicort and BD  BESSY is worsening, currently the most acute issue

## 2023-12-24 NOTE — PROVIDER CONTACT NOTE (OTHER) - SITUATION
Patient APTT 39.6
pt heparin drip @16 and when RN scan bag said to keep at 16 units,based on heparin nonogram you are to increase it to 18 units, pt has new order for heparin drip at time of scanning bag ordered for 16
Questioning heparin drip rate. Verifying if correct rate is ordered for patient
pt aptt was 29.

## 2023-12-25 LAB
ALBUMIN SERPL ELPH-MCNC: 2.6 G/DL — LOW (ref 3.3–5)
ALBUMIN SERPL ELPH-MCNC: 2.6 G/DL — LOW (ref 3.3–5)
ALP SERPL-CCNC: 108 U/L — SIGNIFICANT CHANGE UP (ref 40–120)
ALP SERPL-CCNC: 108 U/L — SIGNIFICANT CHANGE UP (ref 40–120)
ALT FLD-CCNC: 77 U/L — HIGH (ref 10–45)
ALT FLD-CCNC: 77 U/L — HIGH (ref 10–45)
ANION GAP SERPL CALC-SCNC: 19 MMOL/L — HIGH (ref 5–17)
ANION GAP SERPL CALC-SCNC: 19 MMOL/L — HIGH (ref 5–17)
APTT BLD: 108.9 SEC — HIGH (ref 24.5–35.6)
APTT BLD: 108.9 SEC — HIGH (ref 24.5–35.6)
APTT BLD: 78.6 SEC — HIGH (ref 24.5–35.6)
APTT BLD: 78.6 SEC — HIGH (ref 24.5–35.6)
APTT BLD: 81.4 SEC — HIGH (ref 24.5–35.6)
APTT BLD: 81.4 SEC — HIGH (ref 24.5–35.6)
AST SERPL-CCNC: 52 U/L — HIGH (ref 10–40)
AST SERPL-CCNC: 52 U/L — HIGH (ref 10–40)
BASOPHILS # BLD AUTO: 0.02 K/UL — SIGNIFICANT CHANGE UP (ref 0–0.2)
BASOPHILS # BLD AUTO: 0.02 K/UL — SIGNIFICANT CHANGE UP (ref 0–0.2)
BASOPHILS NFR BLD AUTO: 0.1 % — SIGNIFICANT CHANGE UP (ref 0–2)
BASOPHILS NFR BLD AUTO: 0.1 % — SIGNIFICANT CHANGE UP (ref 0–2)
BILIRUB SERPL-MCNC: 0.2 MG/DL — SIGNIFICANT CHANGE UP (ref 0.2–1.2)
BILIRUB SERPL-MCNC: 0.2 MG/DL — SIGNIFICANT CHANGE UP (ref 0.2–1.2)
BUN SERPL-MCNC: 104 MG/DL — HIGH (ref 7–23)
BUN SERPL-MCNC: 104 MG/DL — HIGH (ref 7–23)
CALCIUM SERPL-MCNC: 8.2 MG/DL — LOW (ref 8.4–10.5)
CALCIUM SERPL-MCNC: 8.2 MG/DL — LOW (ref 8.4–10.5)
CHLORIDE SERPL-SCNC: 91 MMOL/L — LOW (ref 96–108)
CHLORIDE SERPL-SCNC: 91 MMOL/L — LOW (ref 96–108)
CO2 SERPL-SCNC: 21 MMOL/L — LOW (ref 22–31)
CO2 SERPL-SCNC: 21 MMOL/L — LOW (ref 22–31)
CREAT SERPL-MCNC: 4.9 MG/DL — HIGH (ref 0.5–1.3)
CREAT SERPL-MCNC: 4.9 MG/DL — HIGH (ref 0.5–1.3)
EGFR: 11 ML/MIN/1.73M2 — LOW
EGFR: 11 ML/MIN/1.73M2 — LOW
EOSINOPHIL # BLD AUTO: 0 K/UL — SIGNIFICANT CHANGE UP (ref 0–0.5)
EOSINOPHIL # BLD AUTO: 0 K/UL — SIGNIFICANT CHANGE UP (ref 0–0.5)
EOSINOPHIL NFR BLD AUTO: 0 % — SIGNIFICANT CHANGE UP (ref 0–6)
EOSINOPHIL NFR BLD AUTO: 0 % — SIGNIFICANT CHANGE UP (ref 0–6)
GLUCOSE BLDC GLUCOMTR-MCNC: 159 MG/DL — HIGH (ref 70–99)
GLUCOSE BLDC GLUCOMTR-MCNC: 159 MG/DL — HIGH (ref 70–99)
GLUCOSE BLDC GLUCOMTR-MCNC: 244 MG/DL — HIGH (ref 70–99)
GLUCOSE BLDC GLUCOMTR-MCNC: 244 MG/DL — HIGH (ref 70–99)
GLUCOSE BLDC GLUCOMTR-MCNC: 248 MG/DL — HIGH (ref 70–99)
GLUCOSE BLDC GLUCOMTR-MCNC: 248 MG/DL — HIGH (ref 70–99)
GLUCOSE BLDC GLUCOMTR-MCNC: 273 MG/DL — HIGH (ref 70–99)
GLUCOSE BLDC GLUCOMTR-MCNC: 273 MG/DL — HIGH (ref 70–99)
GLUCOSE SERPL-MCNC: 211 MG/DL — HIGH (ref 70–99)
GLUCOSE SERPL-MCNC: 211 MG/DL — HIGH (ref 70–99)
HCT VFR BLD CALC: 24.9 % — LOW (ref 39–50)
HCT VFR BLD CALC: 24.9 % — LOW (ref 39–50)
HGB BLD-MCNC: 7.9 G/DL — LOW (ref 13–17)
HGB BLD-MCNC: 7.9 G/DL — LOW (ref 13–17)
IMM GRANULOCYTES NFR BLD AUTO: 2.6 % — HIGH (ref 0–0.9)
IMM GRANULOCYTES NFR BLD AUTO: 2.6 % — HIGH (ref 0–0.9)
LYMPHOCYTES # BLD AUTO: 0.63 K/UL — LOW (ref 1–3.3)
LYMPHOCYTES # BLD AUTO: 0.63 K/UL — LOW (ref 1–3.3)
LYMPHOCYTES # BLD AUTO: 3.7 % — LOW (ref 13–44)
LYMPHOCYTES # BLD AUTO: 3.7 % — LOW (ref 13–44)
MAGNESIUM SERPL-MCNC: 1.9 MG/DL — SIGNIFICANT CHANGE UP (ref 1.6–2.6)
MAGNESIUM SERPL-MCNC: 1.9 MG/DL — SIGNIFICANT CHANGE UP (ref 1.6–2.6)
MCHC RBC-ENTMCNC: 21.7 PG — LOW (ref 27–34)
MCHC RBC-ENTMCNC: 21.7 PG — LOW (ref 27–34)
MCHC RBC-ENTMCNC: 31.7 GM/DL — LOW (ref 32–36)
MCHC RBC-ENTMCNC: 31.7 GM/DL — LOW (ref 32–36)
MCV RBC AUTO: 68.4 FL — LOW (ref 80–100)
MCV RBC AUTO: 68.4 FL — LOW (ref 80–100)
MONOCYTES # BLD AUTO: 1.08 K/UL — HIGH (ref 0–0.9)
MONOCYTES # BLD AUTO: 1.08 K/UL — HIGH (ref 0–0.9)
MONOCYTES NFR BLD AUTO: 6.3 % — SIGNIFICANT CHANGE UP (ref 2–14)
MONOCYTES NFR BLD AUTO: 6.3 % — SIGNIFICANT CHANGE UP (ref 2–14)
NEUTROPHILS # BLD AUTO: 15.05 K/UL — HIGH (ref 1.8–7.4)
NEUTROPHILS # BLD AUTO: 15.05 K/UL — HIGH (ref 1.8–7.4)
NEUTROPHILS NFR BLD AUTO: 87.3 % — HIGH (ref 43–77)
NEUTROPHILS NFR BLD AUTO: 87.3 % — HIGH (ref 43–77)
NRBC # BLD: 0 /100 WBCS — SIGNIFICANT CHANGE UP (ref 0–0)
NRBC # BLD: 0 /100 WBCS — SIGNIFICANT CHANGE UP (ref 0–0)
PHOSPHATE SERPL-MCNC: 6.4 MG/DL — HIGH (ref 2.5–4.5)
PHOSPHATE SERPL-MCNC: 6.4 MG/DL — HIGH (ref 2.5–4.5)
PLATELET # BLD AUTO: 623 K/UL — HIGH (ref 150–400)
PLATELET # BLD AUTO: 623 K/UL — HIGH (ref 150–400)
POTASSIUM SERPL-MCNC: 4.1 MMOL/L — SIGNIFICANT CHANGE UP (ref 3.5–5.3)
POTASSIUM SERPL-MCNC: 4.1 MMOL/L — SIGNIFICANT CHANGE UP (ref 3.5–5.3)
POTASSIUM SERPL-SCNC: 4.1 MMOL/L — SIGNIFICANT CHANGE UP (ref 3.5–5.3)
POTASSIUM SERPL-SCNC: 4.1 MMOL/L — SIGNIFICANT CHANGE UP (ref 3.5–5.3)
PROT SERPL-MCNC: 6 G/DL — SIGNIFICANT CHANGE UP (ref 6–8.3)
PROT SERPL-MCNC: 6 G/DL — SIGNIFICANT CHANGE UP (ref 6–8.3)
RBC # BLD: 3.64 M/UL — LOW (ref 4.2–5.8)
RBC # BLD: 3.64 M/UL — LOW (ref 4.2–5.8)
RBC # FLD: 19.8 % — HIGH (ref 10.3–14.5)
RBC # FLD: 19.8 % — HIGH (ref 10.3–14.5)
SODIUM SERPL-SCNC: 131 MMOL/L — LOW (ref 135–145)
SODIUM SERPL-SCNC: 131 MMOL/L — LOW (ref 135–145)
WBC # BLD: 17.23 K/UL — HIGH (ref 3.8–10.5)
WBC # BLD: 17.23 K/UL — HIGH (ref 3.8–10.5)
WBC # FLD AUTO: 17.23 K/UL — HIGH (ref 3.8–10.5)
WBC # FLD AUTO: 17.23 K/UL — HIGH (ref 3.8–10.5)

## 2023-12-25 PROCEDURE — 99233 SBSQ HOSP IP/OBS HIGH 50: CPT | Mod: GC

## 2023-12-25 RX ORDER — SODIUM BICARBONATE 1 MEQ/ML
325 SYRINGE (ML) INTRAVENOUS EVERY 8 HOURS
Refills: 0 | Status: DISCONTINUED | OUTPATIENT
Start: 2023-12-25 | End: 2024-01-02

## 2023-12-25 RX ORDER — BUMETANIDE 0.25 MG/ML
2 INJECTION INTRAMUSCULAR; INTRAVENOUS EVERY 12 HOURS
Refills: 0 | Status: DISCONTINUED | OUTPATIENT
Start: 2023-12-25 | End: 2023-12-26

## 2023-12-25 RX ADMIN — ATORVASTATIN CALCIUM 10 MILLIGRAM(S): 80 TABLET, FILM COATED ORAL at 00:15

## 2023-12-25 RX ADMIN — ALBUTEROL 2.5 MILLIGRAM(S): 90 AEROSOL, METERED ORAL at 14:20

## 2023-12-25 RX ADMIN — ALBUTEROL 2.5 MILLIGRAM(S): 90 AEROSOL, METERED ORAL at 07:08

## 2023-12-25 RX ADMIN — ALBUTEROL 2.5 MILLIGRAM(S): 90 AEROSOL, METERED ORAL at 00:19

## 2023-12-25 RX ADMIN — HEPARIN SODIUM 1900 UNIT(S)/HR: 5000 INJECTION INTRAVENOUS; SUBCUTANEOUS at 06:44

## 2023-12-25 RX ADMIN — HEPARIN SODIUM 1900 UNIT(S)/HR: 5000 INJECTION INTRAVENOUS; SUBCUTANEOUS at 14:57

## 2023-12-25 RX ADMIN — Medication 2: at 08:56

## 2023-12-25 RX ADMIN — Medication 25 MILLIGRAM(S): at 07:08

## 2023-12-25 RX ADMIN — ALBUTEROL 2.5 MILLIGRAM(S): 90 AEROSOL, METERED ORAL at 17:17

## 2023-12-25 RX ADMIN — Medication 1: at 13:04

## 2023-12-25 RX ADMIN — BUMETANIDE 2 MILLIGRAM(S): 0.25 INJECTION INTRAMUSCULAR; INTRAVENOUS at 21:30

## 2023-12-25 RX ADMIN — Medication 3: at 17:38

## 2023-12-25 RX ADMIN — TAMSULOSIN HYDROCHLORIDE 0.4 MILLIGRAM(S): 0.4 CAPSULE ORAL at 21:30

## 2023-12-25 RX ADMIN — Medication 500 MILLIGRAM(S): at 11:07

## 2023-12-25 RX ADMIN — Medication 325 MILLIGRAM(S): at 14:17

## 2023-12-25 RX ADMIN — Medication 40 MILLIGRAM(S): at 07:08

## 2023-12-25 RX ADMIN — MUPIROCIN 1 APPLICATION(S): 20 OINTMENT TOPICAL at 07:09

## 2023-12-25 RX ADMIN — BUDESONIDE AND FORMOTEROL FUMARATE DIHYDRATE 2 PUFF(S): 160; 4.5 AEROSOL RESPIRATORY (INHALATION) at 07:09

## 2023-12-25 RX ADMIN — BUMETANIDE 2 MILLIGRAM(S): 0.25 INJECTION INTRAMUSCULAR; INTRAVENOUS at 14:17

## 2023-12-25 RX ADMIN — FINASTERIDE 5 MILLIGRAM(S): 5 TABLET, FILM COATED ORAL at 11:07

## 2023-12-25 RX ADMIN — BUDESONIDE AND FORMOTEROL FUMARATE DIHYDRATE 2 PUFF(S): 160; 4.5 AEROSOL RESPIRATORY (INHALATION) at 17:17

## 2023-12-25 RX ADMIN — Medication 25 MILLIGRAM(S): at 17:17

## 2023-12-25 RX ADMIN — CEFTRIAXONE 100 MILLIGRAM(S): 500 INJECTION, POWDER, FOR SOLUTION INTRAMUSCULAR; INTRAVENOUS at 15:51

## 2023-12-25 RX ADMIN — HEPARIN SODIUM 1700 UNIT(S)/HR: 5000 INJECTION INTRAVENOUS; SUBCUTANEOUS at 21:29

## 2023-12-25 RX ADMIN — CHLORHEXIDINE GLUCONATE 1 APPLICATION(S): 213 SOLUTION TOPICAL at 07:09

## 2023-12-25 RX ADMIN — MUPIROCIN 1 APPLICATION(S): 20 OINTMENT TOPICAL at 17:17

## 2023-12-25 RX ADMIN — ATORVASTATIN CALCIUM 10 MILLIGRAM(S): 80 TABLET, FILM COATED ORAL at 21:30

## 2023-12-25 RX ADMIN — HEPARIN SODIUM 1700 UNIT(S)/HR: 5000 INJECTION INTRAVENOUS; SUBCUTANEOUS at 15:04

## 2023-12-25 RX ADMIN — TAMSULOSIN HYDROCHLORIDE 0.4 MILLIGRAM(S): 0.4 CAPSULE ORAL at 00:15

## 2023-12-25 RX ADMIN — ALBUTEROL 2.5 MILLIGRAM(S): 90 AEROSOL, METERED ORAL at 23:01

## 2023-12-25 RX ADMIN — PANTOPRAZOLE SODIUM 40 MILLIGRAM(S): 20 TABLET, DELAYED RELEASE ORAL at 07:08

## 2023-12-25 RX ADMIN — Medication 1 TABLET(S): at 14:16

## 2023-12-25 RX ADMIN — HEPARIN SODIUM 1900 UNIT(S)/HR: 5000 INJECTION INTRAVENOUS; SUBCUTANEOUS at 04:13

## 2023-12-25 RX ADMIN — Medication 325 MILLIGRAM(S): at 21:30

## 2023-12-25 NOTE — PROGRESS NOTE ADULT - PROBLEM SELECTOR PLAN 1
Pt with ATN, multifactorial 2/2 EPI (s/p IV contrast 12/21), sepsis, vancomycin (12/20), and diuretic use with hyperdynamic EF (hypovolemia). Pt w/ baseline Scr 0.86-1.29, prior to admission Scr 1.12 on 10/7/23.     Labs and VS reviewed. No hypotensive episodes. On admission Scr at baseline 1.28 on 12/20. Scr increased to 1.62 on 12/21 peaked to Scr 4.97 on 12/24. Scr plateaued at 4.90 today. UA +trace protein, casts, bld neg but RBC 14. CT, CXR and ECHO reviewed. Renal US no hydronephrosis, but Bilateral pleural effusions noted. HD consent obtained and in chart. No acute indication for HD. Start bumex 2mg q12 IVP. Monitor fluid and respiratory status daily. Monitor PVR daily. Monitor labs and urine output. Avoid nephrotoxins. Dose medications as per CrCl.

## 2023-12-25 NOTE — PROGRESS NOTE ADULT - ATTENDING COMMENTS
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 Will continue to appreciate recs    #BESSY  -Likely ATN in setting of infection + contrast nephropathy in setting of CTPA on admission  -UOP downtrending, overloaded and becoming acidotic-- will start sodium bicarb tab and bumex 2 mg IV BID  -discussed with dr meron day-- no HD for now, however if no improvement in overload and acidosis by 12/26 will likely start HD   -Renal US results unremarkable  -Avoid nephrotoxic agents and trend BMP    #HFpEF  -remains overloaded  - will start bumex 2 mg IV BID  -C/W home BB    Sophia Alvarado D.O.  Division of Hospital Medicine  Available on MS Teams .

## 2023-12-25 NOTE — PROVIDER CONTACT NOTE (CRITICAL VALUE NOTIFICATION) - ACTION/TREATMENT ORDERED:
MD notified. MD reordered heparin at rate of 16, even though when scanned it said to lower it by 3 and run at 17. Heparin dripped paused for an hour and then scanned with Socorro DOHERTY and restarted at 16ml/hr.
Heparin restart at 18 cc we will endorse to oncoming shift for follow up
MD notified. Patient's heparin bag scanned with Jessy DOHERTY. Instructions followed to pause the heparin for 60 minutes and then restart 19ml/hr.
pt already on Ceftriaxone no further intervention.
Heparin gtt Held awaiting for order

## 2023-12-25 NOTE — PROVIDER CONTACT NOTE (CRITICAL VALUE NOTIFICATION) - TEST AND RESULT REPORTED:
Bld CX growth in Aerobic bot gram (+) Cocci in pairs & chainschains
APTT 164.9
APTT 169.3
correction of Aptt prior result is 62.8 not > 200
Aptt > 200

## 2023-12-25 NOTE — PROGRESS NOTE ADULT - ATTENDING COMMENTS
BESSY- ATN related to contrast use   worsening now with fluid overload   creatinine seems to have plateaued. NO urgent indication to dialyze  Bumex 2 mg iv BID today.   He has consented for dialysis in case the need arises     meron day  nephrology attending   please contact me on TEAMS   Office- [phone] BESSY- ATN related to contrast use   worsening now with fluid overload   creatinine seems to have plateaued. NO urgent indication to dialyze  Bumex 2 mg iv BID today.   He has consented for dialysis in case the need arises     meron day  nephrology attending   please contact me on TEAMS   Office- [phone]

## 2023-12-25 NOTE — PROVIDER CONTACT NOTE (CRITICAL VALUE NOTIFICATION) - SITUATION
pt admitted for Pleural Effusion
Aptt > 200
correction of prior Aptt result is 62.8 not > 200
APTT 164.9
APTT 169.3

## 2023-12-25 NOTE — PROGRESS NOTE ADULT - SUBJECTIVE AND OBJECTIVE BOX
***************************************************************  Blas Rubio, PGY1  Internal Medicine   Preferred contact via Microsoft Teams   ***************************************************************    GLADIS LEONARD  83y  MRN: [id number]    Patient is a 83y old  Male who presents with a chief complaint of sob (24 Dec 2023 20:14)      Interval/Overnight Events: no events ON.     Subjective: Pt seen and examined at bedside. Denies fever, CP, SOB, abn pain, N/V, dysuria. Tolerating diet.      MEDICATIONS  (STANDING):  albuterol    0.083% 2.5 milliGRAM(s) Nebulizer every 6 hours  ascorbic acid 500 milliGRAM(s) Oral daily  atorvastatin 10 milliGRAM(s) Oral at bedtime  budesonide 160 MICROgram(s)/formoterol 4.5 MICROgram(s) Inhaler 2 Puff(s) Inhalation two times a day  cefTRIAXone   IVPB 2000 milliGRAM(s) IV Intermittent every 24 hours  chlorhexidine 4% Liquid 1 Application(s) Topical <User Schedule>  dextrose 5%. 1000 milliLiter(s) (50 mL/Hr) IV Continuous <Continuous>  dextrose 5%. 1000 milliLiter(s) (100 mL/Hr) IV Continuous <Continuous>  dextrose 50% Injectable 12.5 Gram(s) IV Push once  dextrose 50% Injectable 25 Gram(s) IV Push once  dextrose 50% Injectable 25 Gram(s) IV Push once  finasteride 5 milliGRAM(s) Oral daily  glucagon  Injectable 1 milliGRAM(s) IntraMuscular once  heparin  Infusion. 2200 Unit(s)/Hr (22 mL/Hr) IV Continuous <Continuous>  influenza  Vaccine (HIGH DOSE) 0.7 milliLiter(s) IntraMuscular once  insulin lispro (ADMELOG) corrective regimen sliding scale   SubCutaneous three times a day before meals  insulin lispro (ADMELOG) corrective regimen sliding scale   SubCutaneous at bedtime  metoprolol tartrate 25 milliGRAM(s) Oral two times a day  multivitamin 1 Tablet(s) Oral daily  mupirocin 2% Nasal 1 Application(s) Both Nostrils two times a day  pantoprazole    Tablet 40 milliGRAM(s) Oral before breakfast  predniSONE   Tablet 40 milliGRAM(s) Oral daily  tamsulosin 0.4 milliGRAM(s) Oral at bedtime    MEDICATIONS  (PRN):  acetaminophen     Tablet .. 650 milliGRAM(s) Oral every 6 hours PRN Temp greater or equal to 38C (100.4F), Mild Pain (1 - 3)  benzocaine/menthol Lozenge 1 Lozenge Oral three times a day PRN Sore Throat  dextrose Oral Gel 15 Gram(s) Oral once PRN Blood Glucose LESS THAN 70 milliGRAM(s)/deciliter      Objective:    Vitals: Vital Signs Last 24 Hrs  T(C): 36.5 (12-25-23 @ 04:13), Max: 36.5 (12-24-23 @ 20:33)  T(F): 97.7 (12-25-23 @ 04:13), Max: 97.7 (12-24-23 @ 20:33)  HR: 93 (12-25-23 @ 04:13) (69 - 93)  BP: 135/84 (12-25-23 @ 04:13) (130/55 - 135/84)  BP(mean): --  RR: 18 (12-25-23 @ 04:13) (18 - 18)  SpO2: 95% (12-25-23 @ 04:13) (93% - 99%)                I&O's Summary    23 Dec 2023 07:01  -  24 Dec 2023 07:00  --------------------------------------------------------  IN: 0 mL / OUT: 400 mL / NET: -400 mL    24 Dec 2023 07:01  -  25 Dec 2023 06:42  --------------------------------------------------------  IN: 0 mL / OUT: 700 mL / NET: -700 mL        PHYSICAL EXAM:  GENERAL: NAD  HEAD:  Atraumatic, Normocephalic  EYES: EOMI, conjunctiva and sclera clear  CHEST/LUNG: Clear to auscultation bilaterally; No rales, rhonchi, wheezing, or rubs  HEART: Regular rate and rhythm; No murmurs, rubs, or gallops  ABDOMEN: Soft, Nontender, Nondistended;   SKIN: No rashes or lesions  NERVOUS SYSTEM:  Alert & Oriented X3, no focal deficits    LABS:                        7.4    17.89 )-----------( 590      ( 24 Dec 2023 22:12 )             23.4                         7.3    15.85 )-----------( 570      ( 24 Dec 2023 06:50 )             23.1                         7.2    16.20 )-----------( 548      ( 24 Dec 2023 03:22 )             22.7     12-24    129<L>  |  89<L>  |  92<H>  ----------------------------<  196<H>  4.3   |  21<L>  |  4.97<H>  12-23    128<L>  |  90<L>  |  74<H>  ----------------------------<  188<H>  4.0   |  23  |  4.32<H>  12-22    130<L>  |  91<L>  |  60<H>  ----------------------------<  178<H>  3.9   |  25  |  3.44<H>    Ca    8.6      24 Dec 2023 07:00  Ca    8.4      23 Dec 2023 03:39  Ca    9.0      22 Dec 2023 11:35  Phos  6.7     12-24  Mg     1.9     12-24    TPro  5.9<L>  /  Alb  2.5<L>  /  TBili  0.2  /  DBili  x   /  AST  49<H>  /  ALT  53<H>  /  AlkPhos  98  12-24  TPro  6.0  /  Alb  2.6<L>  /  TBili  0.2  /  DBili  x   /  AST  54<H>  /  ALT  38  /  AlkPhos  88  12-23  TPro  6.6  /  Alb  2.7<L>  /  TBili  0.3  /  DBili  x   /  AST  20  /  ALT  15  /  AlkPhos  98  12-22    CAPILLARY BLOOD GLUCOSE      POCT Blood Glucose.: 235 mg/dL (24 Dec 2023 21:09)  POCT Blood Glucose.: 245 mg/dL (24 Dec 2023 17:52)  POCT Blood Glucose.: 186 mg/dL (24 Dec 2023 12:38)  POCT Blood Glucose.: 216 mg/dL (24 Dec 2023 08:40)    PTT - ( 25 Dec 2023 06:09 )  PTT:81.4 sec    Urinalysis Basic - ( 24 Dec 2023 07:00 )    Color: x / Appearance: x / SG: x / pH: x  Gluc: 196 mg/dL / Ketone: x  / Bili: x / Urobili: x   Blood: x / Protein: x / Nitrite: x   Leuk Esterase: x / RBC: x / WBC x   Sq Epi: x / Non Sq Epi: x / Bacteria: x          RADIOLOGY & ADDITIONAL TESTS:    Imaging Personally Reviewed:  [x ] YES  [ ] NO    Consultants involved in case:   Consultant(s) Notes Reviewed:  [ x] YES  [ ] NO:   Care Discussed with Consultants/Other Providers [x ] YES  [ ] NO         ***************************************************************  Blas Rubio, PGY1  Internal Medicine   Preferred contact via Microsoft Teams   ***************************************************************    GLADIS LEONARD  83y  MRN: [id number]    Patient is a 83y old  Male who presents with a chief complaint of sob (24 Dec 2023 20:14)      Interval/Overnight Events: no events ON.     Subjective: Pt seen and examined at bedside. Denies fever, CP, SOB, abn pain, N/V, dysuria. Tolerating diet.      MEDICATIONS  (STANDING):  albuterol    0.083% 2.5 milliGRAM(s) Nebulizer every 6 hours  ascorbic acid 500 milliGRAM(s) Oral daily  atorvastatin 10 milliGRAM(s) Oral at bedtime  budesonide 160 MICROgram(s)/formoterol 4.5 MICROgram(s) Inhaler 2 Puff(s) Inhalation two times a day  cefTRIAXone   IVPB 2000 milliGRAM(s) IV Intermittent every 24 hours  chlorhexidine 4% Liquid 1 Application(s) Topical <User Schedule>  dextrose 5%. 1000 milliLiter(s) (50 mL/Hr) IV Continuous <Continuous>  dextrose 5%. 1000 milliLiter(s) (100 mL/Hr) IV Continuous <Continuous>  dextrose 50% Injectable 12.5 Gram(s) IV Push once  dextrose 50% Injectable 25 Gram(s) IV Push once  dextrose 50% Injectable 25 Gram(s) IV Push once  finasteride 5 milliGRAM(s) Oral daily  glucagon  Injectable 1 milliGRAM(s) IntraMuscular once  heparin  Infusion. 2200 Unit(s)/Hr (22 mL/Hr) IV Continuous <Continuous>  influenza  Vaccine (HIGH DOSE) 0.7 milliLiter(s) IntraMuscular once  insulin lispro (ADMELOG) corrective regimen sliding scale   SubCutaneous three times a day before meals  insulin lispro (ADMELOG) corrective regimen sliding scale   SubCutaneous at bedtime  metoprolol tartrate 25 milliGRAM(s) Oral two times a day  multivitamin 1 Tablet(s) Oral daily  mupirocin 2% Nasal 1 Application(s) Both Nostrils two times a day  pantoprazole    Tablet 40 milliGRAM(s) Oral before breakfast  predniSONE   Tablet 40 milliGRAM(s) Oral daily  tamsulosin 0.4 milliGRAM(s) Oral at bedtime    MEDICATIONS  (PRN):  acetaminophen     Tablet .. 650 milliGRAM(s) Oral every 6 hours PRN Temp greater or equal to 38C (100.4F), Mild Pain (1 - 3)  benzocaine/menthol Lozenge 1 Lozenge Oral three times a day PRN Sore Throat  dextrose Oral Gel 15 Gram(s) Oral once PRN Blood Glucose LESS THAN 70 milliGRAM(s)/deciliter      Objective:    Vitals: Vital Signs Last 24 Hrs  T(C): 36.5 (12-25-23 @ 04:13), Max: 36.5 (12-24-23 @ 20:33)  T(F): 97.7 (12-25-23 @ 04:13), Max: 97.7 (12-24-23 @ 20:33)  HR: 93 (12-25-23 @ 04:13) (69 - 93)  BP: 135/84 (12-25-23 @ 04:13) (130/55 - 135/84)  BP(mean): --  RR: 18 (12-25-23 @ 04:13) (18 - 18)  SpO2: 95% (12-25-23 @ 04:13) (93% - 99%)                I&O's Summary    23 Dec 2023 07:01  -  24 Dec 2023 07:00  --------------------------------------------------------  IN: 0 mL / OUT: 400 mL / NET: -400 mL    24 Dec 2023 07:01  -  25 Dec 2023 06:42  --------------------------------------------------------  IN: 0 mL / OUT: 700 mL / NET: -700 mL        PHYSICAL EXAM:  GENERAL: NAD  HEAD:  Atraumatic, Normocephalic  EYES: EOMI, conjunctiva and sclera clear  CHEST/LUNG: Clear to auscultation bilaterally; No rales, rhonchi, wheezing, or rubs  HEART: Regular rate and rhythm; No murmurs, rubs, or gallops  ABDOMEN: Soft, Nontender, Nondistended;   SKIN: No rashes or lesions  NERVOUS SYSTEM:  Alert & Oriented X3, no focal deficits    LABS:                        7.4    17.89 )-----------( 590      ( 24 Dec 2023 22:12 )             23.4                         7.3    15.85 )-----------( 570      ( 24 Dec 2023 06:50 )             23.1                         7.2    16.20 )-----------( 548      ( 24 Dec 2023 03:22 )             22.7     12-24    129<L>  |  89<L>  |  92<H>  ----------------------------<  196<H>  4.3   |  21<L>  |  4.97<H>  12-23    128<L>  |  90<L>  |  74<H>  ----------------------------<  188<H>  4.0   |  23  |  4.32<H>  12-22    130<L>  |  91<L>  |  60<H>  ----------------------------<  178<H>  3.9   |  25  |  3.44<H>    Ca    8.6      24 Dec 2023 07:00  Ca    8.4      23 Dec 2023 03:39  Ca    9.0      22 Dec 2023 11:35  Phos  6.7     12-24  Mg     1.9     12-24    TPro  5.9<L>  /  Alb  2.5<L>  /  TBili  0.2  /  DBili  x   /  AST  49<H>  /  ALT  53<H>  /  AlkPhos  98  12-24  TPro  6.0  /  Alb  2.6<L>  /  TBili  0.2  /  DBili  x   /  AST  54<H>  /  ALT  38  /  AlkPhos  88  12-23  TPro  6.6  /  Alb  2.7<L>  /  TBili  0.3  /  DBili  x   /  AST  20  /  ALT  15  /  AlkPhos  98  12-22    CAPILLARY BLOOD GLUCOSE      POCT Blood Glucose.: 235 mg/dL (24 Dec 2023 21:09)  POCT Blood Glucose.: 245 mg/dL (24 Dec 2023 17:52)  POCT Blood Glucose.: 186 mg/dL (24 Dec 2023 12:38)  POCT Blood Glucose.: 216 mg/dL (24 Dec 2023 08:40)    PTT - ( 25 Dec 2023 06:09 )  PTT:81.4 sec    Urinalysis Basic - ( 24 Dec 2023 07:00 )    Color: x / Appearance: x / SG: x / pH: x  Gluc: 196 mg/dL / Ketone: x  / Bili: x / Urobili: x   Blood: x / Protein: x / Nitrite: x   Leuk Esterase: x / RBC: x / WBC x   Sq Epi: x / Non Sq Epi: x / Bacteria: x          RADIOLOGY & ADDITIONAL TESTS:    Imaging Personally Reviewed:  [x ] YES  [ ] NO    Consultants involved in case:   Consultant(s) Notes Reviewed:  [ x] YES  [ ] NO:   Care Discussed with Consultants/Other Providers [x ] YES  [ ] NO         ***************************************************************  Blas Rubio, PGY1  Internal Medicine   Preferred contact via [employer] Teams   ***************************************************************    GLADIS LEONARD  83y  MRN: [id number]    Patient is a 83y old  Male who presents with a chief complaint of sob (24 Dec 2023 20:14)      Interval/Overnight Events: no events ON.     Subjective: Pt seen and examined at bedside. Denies fever, CP, SOB, abn pain, N/V, dysuria. Tolerating diet.      MEDICATIONS  (STANDING):  albuterol    0.083% 2.5 milliGRAM(s) Nebulizer every 6 hours  ascorbic acid 500 milliGRAM(s) Oral daily  atorvastatin 10 milliGRAM(s) Oral at bedtime  budesonide 160 MICROgram(s)/formoterol 4.5 MICROgram(s) Inhaler 2 Puff(s) Inhalation two times a day  cefTRIAXone   IVPB 2000 milliGRAM(s) IV Intermittent every 24 hours  chlorhexidine 4% Liquid 1 Application(s) Topical <User Schedule>  dextrose 5%. 1000 milliLiter(s) (50 mL/Hr) IV Continuous <Continuous>  dextrose 5%. 1000 milliLiter(s) (100 mL/Hr) IV Continuous <Continuous>  dextrose 50% Injectable 12.5 Gram(s) IV Push once  dextrose 50% Injectable 25 Gram(s) IV Push once  dextrose 50% Injectable 25 Gram(s) IV Push once  finasteride 5 milliGRAM(s) Oral daily  glucagon  Injectable 1 milliGRAM(s) IntraMuscular once  heparin  Infusion. 2200 Unit(s)/Hr (22 mL/Hr) IV Continuous <Continuous>  influenza  Vaccine (HIGH DOSE) 0.7 milliLiter(s) IntraMuscular once  insulin lispro (ADMELOG) corrective regimen sliding scale   SubCutaneous three times a day before meals  insulin lispro (ADMELOG) corrective regimen sliding scale   SubCutaneous at bedtime  metoprolol tartrate 25 milliGRAM(s) Oral two times a day  multivitamin 1 Tablet(s) Oral daily  mupirocin 2% Nasal 1 Application(s) Both Nostrils two times a day  pantoprazole    Tablet 40 milliGRAM(s) Oral before breakfast  predniSONE   Tablet 40 milliGRAM(s) Oral daily  tamsulosin 0.4 milliGRAM(s) Oral at bedtime    MEDICATIONS  (PRN):  acetaminophen     Tablet .. 650 milliGRAM(s) Oral every 6 hours PRN Temp greater or equal to 38C (100.4F), Mild Pain (1 - 3)  benzocaine/menthol Lozenge 1 Lozenge Oral three times a day PRN Sore Throat  dextrose Oral Gel 15 Gram(s) Oral once PRN Blood Glucose LESS THAN 70 milliGRAM(s)/deciliter      Objective:    Vitals: Vital Signs Last 24 Hrs  T(C): 36.5 (12-25-23 @ 04:13), Max: 36.5 (12-24-23 @ 20:33)  T(F): 97.7 (12-25-23 @ 04:13), Max: 97.7 (12-24-23 @ 20:33)  HR: 93 (12-25-23 @ 04:13) (69 - 93)  BP: 135/84 (12-25-23 @ 04:13) (130/55 - 135/84)  BP(mean): --  RR: 18 (12-25-23 @ 04:13) (18 - 18)  SpO2: 95% (12-25-23 @ 04:13) (93% - 99%)                I&O's Summary    23 Dec 2023 07:01  -  24 Dec 2023 07:00  --------------------------------------------------------  IN: 0 mL / OUT: 400 mL / NET: -400 mL    24 Dec 2023 07:01  -  25 Dec 2023 06:42  --------------------------------------------------------  IN: 0 mL / OUT: 700 mL / NET: -700 mL        PHYSICAL EXAM:  GENERAL: NAD  HEAD:  Atraumatic, Normocephalic  EYES: EOMI, conjunctiva and sclera clear  CHEST/LUNG: crackles   HEART: Regular rate and rhythm; No murmurs, rubs, or gallops  ABDOMEN: Soft, Nontender, Nondistended;   SKIN:2+ PE BL   NERVOUS SYSTEM:  Alert & Oriented X3, no focal deficits    LABS:                        7.4    17.89 )-----------( 590      ( 24 Dec 2023 22:12 )             23.4                         7.3    15.85 )-----------( 570      ( 24 Dec 2023 06:50 )             23.1                         7.2    16.20 )-----------( 548      ( 24 Dec 2023 03:22 )             22.7     12-24    129<L>  |  89<L>  |  92<H>  ----------------------------<  196<H>  4.3   |  21<L>  |  4.97<H>  12-23    128<L>  |  90<L>  |  74<H>  ----------------------------<  188<H>  4.0   |  23  |  4.32<H>  12-22    130<L>  |  91<L>  |  60<H>  ----------------------------<  178<H>  3.9   |  25  |  3.44<H>    Ca    8.6      24 Dec 2023 07:00  Ca    8.4      23 Dec 2023 03:39  Ca    9.0      22 Dec 2023 11:35  Phos  6.7     12-24  Mg     1.9     12-24    TPro  5.9<L>  /  Alb  2.5<L>  /  TBili  0.2  /  DBili  x   /  AST  49<H>  /  ALT  53<H>  /  AlkPhos  98  12-24  TPro  6.0  /  Alb  2.6<L>  /  TBili  0.2  /  DBili  x   /  AST  54<H>  /  ALT  38  /  AlkPhos  88  12-23  TPro  6.6  /  Alb  2.7<L>  /  TBili  0.3  /  DBili  x   /  AST  20  /  ALT  15  /  AlkPhos  98  12-22    CAPILLARY BLOOD GLUCOSE      POCT Blood Glucose.: 235 mg/dL (24 Dec 2023 21:09)  POCT Blood Glucose.: 245 mg/dL (24 Dec 2023 17:52)  POCT Blood Glucose.: 186 mg/dL (24 Dec 2023 12:38)  POCT Blood Glucose.: 216 mg/dL (24 Dec 2023 08:40)    PTT - ( 25 Dec 2023 06:09 )  PTT:81.4 sec    Urinalysis Basic - ( 24 Dec 2023 07:00 )    Color: x / Appearance: x / SG: x / pH: x  Gluc: 196 mg/dL / Ketone: x  / Bili: x / Urobili: x   Blood: x / Protein: x / Nitrite: x   Leuk Esterase: x / RBC: x / WBC x   Sq Epi: x / Non Sq Epi: x / Bacteria: x          RADIOLOGY & ADDITIONAL TESTS:    Imaging Personally Reviewed:  [x ] YES  [ ] NO    Consultants involved in case:   Consultant(s) Notes Reviewed:  [ x] YES  [ ] NO:   Care Discussed with Consultants/Other Providers [x ] YES  [ ] NO         ***************************************************************  Blas Rubio, PGY1  Internal Medicine   Preferred contact via [employer] Teams   ***************************************************************    GLADIS LEONARD  83y  MRN: [id number]    Patient is a 83y old  Male who presents with a chief complaint of sob (24 Dec 2023 20:14)      Interval/Overnight Events: no events ON.     Subjective: Pt seen and examined at bedside. Denies fever, CP, SOB, abn pain, N/V, dysuria. Tolerating diet.      MEDICATIONS  (STANDING):  albuterol    0.083% 2.5 milliGRAM(s) Nebulizer every 6 hours  ascorbic acid 500 milliGRAM(s) Oral daily  atorvastatin 10 milliGRAM(s) Oral at bedtime  budesonide 160 MICROgram(s)/formoterol 4.5 MICROgram(s) Inhaler 2 Puff(s) Inhalation two times a day  cefTRIAXone   IVPB 2000 milliGRAM(s) IV Intermittent every 24 hours  chlorhexidine 4% Liquid 1 Application(s) Topical <User Schedule>  dextrose 5%. 1000 milliLiter(s) (50 mL/Hr) IV Continuous <Continuous>  dextrose 5%. 1000 milliLiter(s) (100 mL/Hr) IV Continuous <Continuous>  dextrose 50% Injectable 12.5 Gram(s) IV Push once  dextrose 50% Injectable 25 Gram(s) IV Push once  dextrose 50% Injectable 25 Gram(s) IV Push once  finasteride 5 milliGRAM(s) Oral daily  glucagon  Injectable 1 milliGRAM(s) IntraMuscular once  heparin  Infusion. 2200 Unit(s)/Hr (22 mL/Hr) IV Continuous <Continuous>  influenza  Vaccine (HIGH DOSE) 0.7 milliLiter(s) IntraMuscular once  insulin lispro (ADMELOG) corrective regimen sliding scale   SubCutaneous three times a day before meals  insulin lispro (ADMELOG) corrective regimen sliding scale   SubCutaneous at bedtime  metoprolol tartrate 25 milliGRAM(s) Oral two times a day  multivitamin 1 Tablet(s) Oral daily  mupirocin 2% Nasal 1 Application(s) Both Nostrils two times a day  pantoprazole    Tablet 40 milliGRAM(s) Oral before breakfast  predniSONE   Tablet 40 milliGRAM(s) Oral daily  tamsulosin 0.4 milliGRAM(s) Oral at bedtime    MEDICATIONS  (PRN):  acetaminophen     Tablet .. 650 milliGRAM(s) Oral every 6 hours PRN Temp greater or equal to 38C (100.4F), Mild Pain (1 - 3)  benzocaine/menthol Lozenge 1 Lozenge Oral three times a day PRN Sore Throat  dextrose Oral Gel 15 Gram(s) Oral once PRN Blood Glucose LESS THAN 70 milliGRAM(s)/deciliter      Objective:    Vitals: Vital Signs Last 24 Hrs  T(C): 36.5 (12-25-23 @ 04:13), Max: 36.5 (12-24-23 @ 20:33)  T(F): 97.7 (12-25-23 @ 04:13), Max: 97.7 (12-24-23 @ 20:33)  HR: 93 (12-25-23 @ 04:13) (69 - 93)  BP: 135/84 (12-25-23 @ 04:13) (130/55 - 135/84)  BP(mean): --  RR: 18 (12-25-23 @ 04:13) (18 - 18)  SpO2: 95% (12-25-23 @ 04:13) (93% - 99%)                I&O's Summary    23 Dec 2023 07:01  -  24 Dec 2023 07:00  --------------------------------------------------------  IN: 0 mL / OUT: 400 mL / NET: -400 mL    24 Dec 2023 07:01  -  25 Dec 2023 06:42  --------------------------------------------------------  IN: 0 mL / OUT: 700 mL / NET: -700 mL        PHYSICAL EXAM:  GENERAL: NAD  HEAD:  Atraumatic, Normocephalic  EYES: EOMI, conjunctiva and sclera clear  CHEST/LUNG: crackles   HEART: Regular rate and rhythm; No murmurs, rubs, or gallops  ABDOMEN: Soft, Nontender, Nondistended;   SKIN:2+ PE BL   NERVOUS SYSTEM:  Alert & Oriented X3, no focal deficits    LABS:                        7.4    17.89 )-----------( 590      ( 24 Dec 2023 22:12 )             23.4                         7.3    15.85 )-----------( 570      ( 24 Dec 2023 06:50 )             23.1                         7.2    16.20 )-----------( 548      ( 24 Dec 2023 03:22 )             22.7     12-24    129<L>  |  89<L>  |  92<H>  ----------------------------<  196<H>  4.3   |  21<L>  |  4.97<H>  12-23    128<L>  |  90<L>  |  74<H>  ----------------------------<  188<H>  4.0   |  23  |  4.32<H>  12-22    130<L>  |  91<L>  |  60<H>  ----------------------------<  178<H>  3.9   |  25  |  3.44<H>    Ca    8.6      24 Dec 2023 07:00  Ca    8.4      23 Dec 2023 03:39  Ca    9.0      22 Dec 2023 11:35  Phos  6.7     12-24  Mg     1.9     12-24    TPro  5.9<L>  /  Alb  2.5<L>  /  TBili  0.2  /  DBili  x   /  AST  49<H>  /  ALT  53<H>  /  AlkPhos  98  12-24  TPro  6.0  /  Alb  2.6<L>  /  TBili  0.2  /  DBili  x   /  AST  54<H>  /  ALT  38  /  AlkPhos  88  12-23  TPro  6.6  /  Alb  2.7<L>  /  TBili  0.3  /  DBili  x   /  AST  20  /  ALT  15  /  AlkPhos  98  12-22    CAPILLARY BLOOD GLUCOSE      POCT Blood Glucose.: 235 mg/dL (24 Dec 2023 21:09)  POCT Blood Glucose.: 245 mg/dL (24 Dec 2023 17:52)  POCT Blood Glucose.: 186 mg/dL (24 Dec 2023 12:38)  POCT Blood Glucose.: 216 mg/dL (24 Dec 2023 08:40)    PTT - ( 25 Dec 2023 06:09 )  PTT:81.4 sec    Urinalysis Basic - ( 24 Dec 2023 07:00 )    Color: x / Appearance: x / SG: x / pH: x  Gluc: 196 mg/dL / Ketone: x  / Bili: x / Urobili: x   Blood: x / Protein: x / Nitrite: x   Leuk Esterase: x / RBC: x / WBC x   Sq Epi: x / Non Sq Epi: x / Bacteria: x          RADIOLOGY & ADDITIONAL TESTS:    Imaging Personally Reviewed:  [x ] YES  [ ] NO    Consultants involved in case:   Consultant(s) Notes Reviewed:  [ x] YES  [ ] NO:   Care Discussed with Consultants/Other Providers [x ] YES  [ ] NO

## 2023-12-25 NOTE — PROGRESS NOTE ADULT - PROBLEM SELECTOR PLAN 1
Likely multifactorial - CAP and component of HFpEF, entero/rhino virus +, COPD exacerbation   SIRS criteria at admission RR 36, WBC elevated  CTPE chest showed loculated moderate L pleural effusion and small R pleural effusion  RVP pos for entero/rhino   sputum culture positive, Bcx x2 positive for strep pneumo   procal elevated   UA positive with bacteria, WBCs  - s/p thoracentesis  - pulm consulted - recs appreciated   - started ceftriaxone 2000 q24   - s/p azithro 500 q24  - ID consulted - will appreciate recs  - Aspiration Precautions  - Further plan per HFpEF below  - methylpred 40 IV BID  - c/w Symbicort, duoneb  - increased bumex to 2 mg IV BID - holding given BESSY on CKD Likely multifactorial - CAP and component of HFpEF, entero/rhino virus +, COPD exacerbation   SIRS criteria at admission RR 36, WBC elevated  CTPE chest showed loculated moderate L pleural effusion and small R pleural effusion  RVP pos for entero/rhino   sputum culture positive, Bcx x2 positive for strep pneumo   procal elevated   UA positive with bacteria, WBCs  - s/p thoracentesis  - pulm consulted - recs appreciated   - started ceftriaxone 2000 q24   - s/p azithro 500 q24  - ID consulted - will appreciate recs  - Aspiration Precautions  - Further plan per HFpEF below  - methylpred 40 IV BID  - c/w Symbicort, duoneb

## 2023-12-25 NOTE — PROGRESS NOTE ADULT - SUBJECTIVE AND OBJECTIVE BOX
[facility] Division of Kidney Diseases & Hypertension  FOLLOW UP NOTE  [phone]--------------------------------------------------------------------------------  Chief Complaint: BESSY    24 hour events/subjective: Pt seen and evaluated bedside this morning. Reports feeling well. Endorses LE edema, otherwise no complaints. Denies any headaches, nausea, vomiting, fevers/chills, chest pain, SOB, abdominal pain.     PAST HISTORY  --------------------------------------------------------------------------------  No significant changes to PMH, PSH, FHx, SHx, unless otherwise noted    ALLERGIES & MEDICATIONS  --------------------------------------------------------------------------------  Allergies  No Known Allergies  Intolerances  Standing Inpatient Medications  albuterol    0.083% 2.5 milliGRAM(s) Nebulizer every 6 hours  ascorbic acid 500 milliGRAM(s) Oral daily  atorvastatin 10 milliGRAM(s) Oral at bedtime  budesonide 160 MICROgram(s)/formoterol 4.5 MICROgram(s) Inhaler 2 Puff(s) Inhalation two times a day  buMETAnide Injectable 2 milliGRAM(s) IV Push every 12 hours  cefTRIAXone   IVPB 2000 milliGRAM(s) IV Intermittent every 24 hours  chlorhexidine 4% Liquid 1 Application(s) Topical <User Schedule>  dextrose 5%. 1000 milliLiter(s) IV Continuous <Continuous>  dextrose 5%. 1000 milliLiter(s) IV Continuous <Continuous>  dextrose 50% Injectable 12.5 Gram(s) IV Push once  dextrose 50% Injectable 25 Gram(s) IV Push once  dextrose 50% Injectable 25 Gram(s) IV Push once  finasteride 5 milliGRAM(s) Oral daily  glucagon  Injectable 1 milliGRAM(s) IntraMuscular once  heparin  Infusion. 2200 Unit(s)/Hr IV Continuous <Continuous>  influenza  Vaccine (HIGH DOSE) 0.7 milliLiter(s) IntraMuscular once  insulin lispro (ADMELOG) corrective regimen sliding scale   SubCutaneous three times a day before meals  insulin lispro (ADMELOG) corrective regimen sliding scale   SubCutaneous at bedtime  metoprolol tartrate 25 milliGRAM(s) Oral two times a day  multivitamin 1 Tablet(s) Oral daily  mupirocin 2% Nasal 1 Application(s) Both Nostrils two times a day  pantoprazole    Tablet 40 milliGRAM(s) Oral before breakfast  predniSONE   Tablet 40 milliGRAM(s) Oral daily  sodium bicarbonate 325 milliGRAM(s) Oral every 8 hours  tamsulosin 0.4 milliGRAM(s) Oral at bedtime    PRN Inpatient Medications  acetaminophen     Tablet .. 650 milliGRAM(s) Oral every 6 hours PRN  benzocaine/menthol Lozenge 1 Lozenge Oral three times a day PRN  dextrose Oral Gel 15 Gram(s) Oral once PRN    REVIEW OF SYSTEMS  --------------------------------------------------------------------------------  as above    VITALS/PHYSICAL EXAM  --------------------------------------------------------------------------------  T(C): 36.6 (12-25-23 @ 12:28), Max: 36.6 (12-25-23 @ 12:28)  HR: 78 (12-25-23 @ 12:28) (69 - 93)  BP: 132/74 (12-25-23 @ 12:28) (130/55 - 145/74)  RR: 18 (12-25-23 @ 12:28) (18 - 18)  SpO2: 96% (12-25-23 @ 12:28) (93% - 99%)  Wt(kg): --    Weight (kg): 90 (12-23-23 @ 14:13)    12-24-23 @ 07:01  -  12-25-23 @ 07:00  --------------------------------------------------------  IN: 0 mL / OUT: 1250 mL / NET: -1250 mL    Physical Exam:  Gen: NAD  HEENT: Anicteric  Pulm: CTA B/L  CV: S1S2+  Abd: Soft, +BS   Ext: +B/L LE Pitting edema  Neuro: Awake  : External urinary capture with bag  Skin: Warm and dry    LABS/STUDIES  --------------------------------------------------------------------------------              7.9    17.23 >-----------<  623      [12-25-23 @ 07:20]              24.9     131  |  91  |  104  ----------------------------<  211      [12-25-23 @ 07:20]  4.1   |  21  |  4.90        Ca     8.2     [12-25-23 @ 07:20]      Mg     1.9     [12-25-23 @ 07:20]      Phos  6.4     [12-25-23 @ 07:20]    TPro  6.0  /  Alb  2.6  /  TBili  0.2  /  DBili  x   /  AST  52  /  ALT  77  /  AlkPhos  108  [12-25-23 @ 07:20]    PTT: 81.4       [12-25-23 @ 06:09]    Creatinine Trend:  SCr 4.90 [12-25 @ 07:20]  SCr 4.97 [12-24 @ 07:00]  SCr 4.32 [12-23 @ 03:39]  SCr 3.44 [12-22 @ 11:35]  SCr 1.62 [12-21 @ 07:44]   [facility] Division of Kidney Diseases & Hypertension  FOLLOW UP NOTE  [phone]--------------------------------------------------------------------------------  Chief Complaint: BESSY    24 hour events/subjective: Pt seen and evaluated bedside this morning. Reports feeling well. Endorses LE edema, otherwise no complaints. Denies any headaches, nausea, vomiting, fevers/chills, chest pain, SOB, abdominal pain.     PAST HISTORY  --------------------------------------------------------------------------------  No significant changes to PMH, PSH, FHx, SHx, unless otherwise noted    ALLERGIES & MEDICATIONS  --------------------------------------------------------------------------------  Allergies  No Known Allergies  Intolerances  Standing Inpatient Medications  albuterol    0.083% 2.5 milliGRAM(s) Nebulizer every 6 hours  ascorbic acid 500 milliGRAM(s) Oral daily  atorvastatin 10 milliGRAM(s) Oral at bedtime  budesonide 160 MICROgram(s)/formoterol 4.5 MICROgram(s) Inhaler 2 Puff(s) Inhalation two times a day  buMETAnide Injectable 2 milliGRAM(s) IV Push every 12 hours  cefTRIAXone   IVPB 2000 milliGRAM(s) IV Intermittent every 24 hours  chlorhexidine 4% Liquid 1 Application(s) Topical <User Schedule>  dextrose 5%. 1000 milliLiter(s) IV Continuous <Continuous>  dextrose 5%. 1000 milliLiter(s) IV Continuous <Continuous>  dextrose 50% Injectable 12.5 Gram(s) IV Push once  dextrose 50% Injectable 25 Gram(s) IV Push once  dextrose 50% Injectable 25 Gram(s) IV Push once  finasteride 5 milliGRAM(s) Oral daily  glucagon  Injectable 1 milliGRAM(s) IntraMuscular once  heparin  Infusion. 2200 Unit(s)/Hr IV Continuous <Continuous>  influenza  Vaccine (HIGH DOSE) 0.7 milliLiter(s) IntraMuscular once  insulin lispro (ADMELOG) corrective regimen sliding scale   SubCutaneous three times a day before meals  insulin lispro (ADMELOG) corrective regimen sliding scale   SubCutaneous at bedtime  metoprolol tartrate 25 milliGRAM(s) Oral two times a day  multivitamin 1 Tablet(s) Oral daily  mupirocin 2% Nasal 1 Application(s) Both Nostrils two times a day  pantoprazole    Tablet 40 milliGRAM(s) Oral before breakfast  predniSONE   Tablet 40 milliGRAM(s) Oral daily  sodium bicarbonate 325 milliGRAM(s) Oral every 8 hours  tamsulosin 0.4 milliGRAM(s) Oral at bedtime    PRN Inpatient Medications  acetaminophen     Tablet .. 650 milliGRAM(s) Oral every 6 hours PRN  benzocaine/menthol Lozenge 1 Lozenge Oral three times a day PRN  dextrose Oral Gel 15 Gram(s) Oral once PRN    REVIEW OF SYSTEMS  --------------------------------------------------------------------------------  as above    VITALS/PHYSICAL EXAM  --------------------------------------------------------------------------------  T(C): 36.6 (12-25-23 @ 12:28), Max: 36.6 (12-25-23 @ 12:28)  HR: 78 (12-25-23 @ 12:28) (69 - 93)  BP: 132/74 (12-25-23 @ 12:28) (130/55 - 145/74)  RR: 18 (12-25-23 @ 12:28) (18 - 18)  SpO2: 96% (12-25-23 @ 12:28) (93% - 99%)  Wt(kg): --    Weight (kg): 90 (12-23-23 @ 14:13)    12-24-23 @ 07:01  -  12-25-23 @ 07:00  --------------------------------------------------------  IN: 0 mL / OUT: 1250 mL / NET: -1250 mL    Physical Exam:  Gen: NAD  HEENT: Anicteric  Pulm: CTA B/L  CV: S1S2+  Abd: Soft, +BS   Ext: +B/L LE Pitting edema  Neuro: Awake  : External urinary capture with bag  Skin: Warm and dry    LABS/STUDIES  --------------------------------------------------------------------------------              7.9    17.23 >-----------<  623      [12-25-23 @ 07:20]              24.9     131  |  91  |  104  ----------------------------<  211      [12-25-23 @ 07:20]  4.1   |  21  |  4.90        Ca     8.2     [12-25-23 @ 07:20]      Mg     1.9     [12-25-23 @ 07:20]      Phos  6.4     [12-25-23 @ 07:20]    TPro  6.0  /  Alb  2.6  /  TBili  0.2  /  DBili  x   /  AST  52  /  ALT  77  /  AlkPhos  108  [12-25-23 @ 07:20]    PTT: 81.4       [12-25-23 @ 06:09]    Creatinine Trend:  SCr 4.90 [12-25 @ 07:20]  SCr 4.97 [12-24 @ 07:00]  SCr 4.32 [12-23 @ 03:39]  SCr 3.44 [12-22 @ 11:35]  SCr 1.62 [12-21 @ 07:44]

## 2023-12-25 NOTE — PROGRESS NOTE ADULT - ASSESSMENT
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 Nephrology consulted for BESSY.    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 Nephrology consulted for BESSY.

## 2023-12-26 LAB
ACANTHOCYTES BLD QL SMEAR: SLIGHT — SIGNIFICANT CHANGE UP
ACANTHOCYTES BLD QL SMEAR: SLIGHT — SIGNIFICANT CHANGE UP
ALBUMIN SERPL ELPH-MCNC: 2.7 G/DL — LOW (ref 3.3–5)
ALBUMIN SERPL ELPH-MCNC: 2.7 G/DL — LOW (ref 3.3–5)
ALP SERPL-CCNC: 104 U/L — SIGNIFICANT CHANGE UP (ref 40–120)
ALP SERPL-CCNC: 104 U/L — SIGNIFICANT CHANGE UP (ref 40–120)
ALT FLD-CCNC: 77 U/L — HIGH (ref 10–45)
ALT FLD-CCNC: 77 U/L — HIGH (ref 10–45)
ANION GAP SERPL CALC-SCNC: 14 MMOL/L — SIGNIFICANT CHANGE UP (ref 5–17)
ANION GAP SERPL CALC-SCNC: 14 MMOL/L — SIGNIFICANT CHANGE UP (ref 5–17)
ANISOCYTOSIS BLD QL: SLIGHT — SIGNIFICANT CHANGE UP
ANISOCYTOSIS BLD QL: SLIGHT — SIGNIFICANT CHANGE UP
APTT BLD: 105.1 SEC — HIGH (ref 24.5–35.6)
APTT BLD: 105.1 SEC — HIGH (ref 24.5–35.6)
APTT BLD: 72.4 SEC — HIGH (ref 24.5–35.6)
APTT BLD: 72.4 SEC — HIGH (ref 24.5–35.6)
APTT BLD: 84.2 SEC — HIGH (ref 24.5–35.6)
APTT BLD: 84.2 SEC — HIGH (ref 24.5–35.6)
AST SERPL-CCNC: 36 U/L — SIGNIFICANT CHANGE UP (ref 10–40)
AST SERPL-CCNC: 36 U/L — SIGNIFICANT CHANGE UP (ref 10–40)
BASOPHILS # BLD AUTO: 0 K/UL — SIGNIFICANT CHANGE UP (ref 0–0.2)
BASOPHILS # BLD AUTO: 0 K/UL — SIGNIFICANT CHANGE UP (ref 0–0.2)
BASOPHILS NFR BLD AUTO: 0 % — SIGNIFICANT CHANGE UP (ref 0–2)
BASOPHILS NFR BLD AUTO: 0 % — SIGNIFICANT CHANGE UP (ref 0–2)
BILIRUB SERPL-MCNC: 0.3 MG/DL — SIGNIFICANT CHANGE UP (ref 0.2–1.2)
BILIRUB SERPL-MCNC: 0.3 MG/DL — SIGNIFICANT CHANGE UP (ref 0.2–1.2)
BUN SERPL-MCNC: 93 MG/DL — HIGH (ref 7–23)
BUN SERPL-MCNC: 93 MG/DL — HIGH (ref 7–23)
BURR CELLS BLD QL SMEAR: PRESENT — SIGNIFICANT CHANGE UP
BURR CELLS BLD QL SMEAR: PRESENT — SIGNIFICANT CHANGE UP
CALCIUM SERPL-MCNC: 8.1 MG/DL — LOW (ref 8.4–10.5)
CALCIUM SERPL-MCNC: 8.1 MG/DL — LOW (ref 8.4–10.5)
CHLORIDE SERPL-SCNC: 96 MMOL/L — SIGNIFICANT CHANGE UP (ref 96–108)
CHLORIDE SERPL-SCNC: 96 MMOL/L — SIGNIFICANT CHANGE UP (ref 96–108)
CO2 SERPL-SCNC: 27 MMOL/L — SIGNIFICANT CHANGE UP (ref 22–31)
CO2 SERPL-SCNC: 27 MMOL/L — SIGNIFICANT CHANGE UP (ref 22–31)
CREAT SERPL-MCNC: 3.59 MG/DL — HIGH (ref 0.5–1.3)
CREAT SERPL-MCNC: 3.59 MG/DL — HIGH (ref 0.5–1.3)
EGFR: 16 ML/MIN/1.73M2 — LOW
EGFR: 16 ML/MIN/1.73M2 — LOW
ELLIPTOCYTES BLD QL SMEAR: SLIGHT — SIGNIFICANT CHANGE UP
ELLIPTOCYTES BLD QL SMEAR: SLIGHT — SIGNIFICANT CHANGE UP
EOSINOPHIL # BLD AUTO: 0 K/UL — SIGNIFICANT CHANGE UP (ref 0–0.5)
EOSINOPHIL # BLD AUTO: 0 K/UL — SIGNIFICANT CHANGE UP (ref 0–0.5)
EOSINOPHIL NFR BLD AUTO: 0 % — SIGNIFICANT CHANGE UP (ref 0–6)
EOSINOPHIL NFR BLD AUTO: 0 % — SIGNIFICANT CHANGE UP (ref 0–6)
GLUCOSE BLDC GLUCOMTR-MCNC: 194 MG/DL — HIGH (ref 70–99)
GLUCOSE BLDC GLUCOMTR-MCNC: 194 MG/DL — HIGH (ref 70–99)
GLUCOSE BLDC GLUCOMTR-MCNC: 197 MG/DL — HIGH (ref 70–99)
GLUCOSE BLDC GLUCOMTR-MCNC: 197 MG/DL — HIGH (ref 70–99)
GLUCOSE BLDC GLUCOMTR-MCNC: 206 MG/DL — HIGH (ref 70–99)
GLUCOSE BLDC GLUCOMTR-MCNC: 206 MG/DL — HIGH (ref 70–99)
GLUCOSE BLDC GLUCOMTR-MCNC: 295 MG/DL — HIGH (ref 70–99)
GLUCOSE BLDC GLUCOMTR-MCNC: 295 MG/DL — HIGH (ref 70–99)
GLUCOSE SERPL-MCNC: 214 MG/DL — HIGH (ref 70–99)
GLUCOSE SERPL-MCNC: 214 MG/DL — HIGH (ref 70–99)
HCT VFR BLD CALC: 25.8 % — LOW (ref 39–50)
HCT VFR BLD CALC: 25.8 % — LOW (ref 39–50)
HGB BLD-MCNC: 8.1 G/DL — LOW (ref 13–17)
HGB BLD-MCNC: 8.1 G/DL — LOW (ref 13–17)
HYPOCHROMIA BLD QL: SLIGHT — SIGNIFICANT CHANGE UP
HYPOCHROMIA BLD QL: SLIGHT — SIGNIFICANT CHANGE UP
LYMPHOCYTES # BLD AUTO: 0.47 K/UL — LOW (ref 1–3.3)
LYMPHOCYTES # BLD AUTO: 0.47 K/UL — LOW (ref 1–3.3)
LYMPHOCYTES # BLD AUTO: 2.6 % — LOW (ref 13–44)
LYMPHOCYTES # BLD AUTO: 2.6 % — LOW (ref 13–44)
MAGNESIUM SERPL-MCNC: 1.6 MG/DL — SIGNIFICANT CHANGE UP (ref 1.6–2.6)
MAGNESIUM SERPL-MCNC: 1.6 MG/DL — SIGNIFICANT CHANGE UP (ref 1.6–2.6)
MANUAL SMEAR VERIFICATION: SIGNIFICANT CHANGE UP
MANUAL SMEAR VERIFICATION: SIGNIFICANT CHANGE UP
MCHC RBC-ENTMCNC: 21.4 PG — LOW (ref 27–34)
MCHC RBC-ENTMCNC: 21.4 PG — LOW (ref 27–34)
MCHC RBC-ENTMCNC: 31.4 GM/DL — LOW (ref 32–36)
MCHC RBC-ENTMCNC: 31.4 GM/DL — LOW (ref 32–36)
MCV RBC AUTO: 68.3 FL — LOW (ref 80–100)
MCV RBC AUTO: 68.3 FL — LOW (ref 80–100)
MONOCYTES # BLD AUTO: 0.78 K/UL — SIGNIFICANT CHANGE UP (ref 0–0.9)
MONOCYTES # BLD AUTO: 0.78 K/UL — SIGNIFICANT CHANGE UP (ref 0–0.9)
MONOCYTES NFR BLD AUTO: 4.3 % — SIGNIFICANT CHANGE UP (ref 2–14)
MONOCYTES NFR BLD AUTO: 4.3 % — SIGNIFICANT CHANGE UP (ref 2–14)
MYELOCYTES NFR BLD: 1.7 % — HIGH (ref 0–0)
MYELOCYTES NFR BLD: 1.7 % — HIGH (ref 0–0)
NEUTROPHILS # BLD AUTO: 16.64 K/UL — HIGH (ref 1.8–7.4)
NEUTROPHILS # BLD AUTO: 16.64 K/UL — HIGH (ref 1.8–7.4)
NEUTROPHILS NFR BLD AUTO: 91.4 % — HIGH (ref 43–77)
NEUTROPHILS NFR BLD AUTO: 91.4 % — HIGH (ref 43–77)
OVALOCYTES BLD QL SMEAR: SIGNIFICANT CHANGE UP
OVALOCYTES BLD QL SMEAR: SIGNIFICANT CHANGE UP
PHOSPHATE SERPL-MCNC: 4.8 MG/DL — HIGH (ref 2.5–4.5)
PHOSPHATE SERPL-MCNC: 4.8 MG/DL — HIGH (ref 2.5–4.5)
PLAT MORPH BLD: ABNORMAL
PLAT MORPH BLD: ABNORMAL
PLATELET # BLD AUTO: 632 K/UL — HIGH (ref 150–400)
PLATELET # BLD AUTO: 632 K/UL — HIGH (ref 150–400)
POIKILOCYTOSIS BLD QL AUTO: SIGNIFICANT CHANGE UP
POIKILOCYTOSIS BLD QL AUTO: SIGNIFICANT CHANGE UP
POTASSIUM SERPL-MCNC: 3.9 MMOL/L — SIGNIFICANT CHANGE UP (ref 3.5–5.3)
POTASSIUM SERPL-MCNC: 3.9 MMOL/L — SIGNIFICANT CHANGE UP (ref 3.5–5.3)
POTASSIUM SERPL-SCNC: 3.9 MMOL/L — SIGNIFICANT CHANGE UP (ref 3.5–5.3)
POTASSIUM SERPL-SCNC: 3.9 MMOL/L — SIGNIFICANT CHANGE UP (ref 3.5–5.3)
PROT SERPL-MCNC: 5.9 G/DL — LOW (ref 6–8.3)
PROT SERPL-MCNC: 5.9 G/DL — LOW (ref 6–8.3)
RBC # BLD: 3.78 M/UL — LOW (ref 4.2–5.8)
RBC # BLD: 3.78 M/UL — LOW (ref 4.2–5.8)
RBC # FLD: 19.9 % — HIGH (ref 10.3–14.5)
RBC # FLD: 19.9 % — HIGH (ref 10.3–14.5)
RBC BLD AUTO: ABNORMAL
RBC BLD AUTO: ABNORMAL
SCHISTOCYTES BLD QL AUTO: SLIGHT — SIGNIFICANT CHANGE UP
SCHISTOCYTES BLD QL AUTO: SLIGHT — SIGNIFICANT CHANGE UP
SODIUM SERPL-SCNC: 137 MMOL/L — SIGNIFICANT CHANGE UP (ref 135–145)
SODIUM SERPL-SCNC: 137 MMOL/L — SIGNIFICANT CHANGE UP (ref 135–145)
TM INTERPRETATION: SIGNIFICANT CHANGE UP
TM INTERPRETATION: SIGNIFICANT CHANGE UP
WBC # BLD: 18.21 K/UL — HIGH (ref 3.8–10.5)
WBC # BLD: 18.21 K/UL — HIGH (ref 3.8–10.5)
WBC # FLD AUTO: 18.21 K/UL — HIGH (ref 3.8–10.5)
WBC # FLD AUTO: 18.21 K/UL — HIGH (ref 3.8–10.5)

## 2023-12-26 PROCEDURE — 99232 SBSQ HOSP IP/OBS MODERATE 35: CPT | Mod: GC

## 2023-12-26 PROCEDURE — 99233 SBSQ HOSP IP/OBS HIGH 50: CPT

## 2023-12-26 PROCEDURE — 99232 SBSQ HOSP IP/OBS MODERATE 35: CPT

## 2023-12-26 RX ORDER — MAGNESIUM SULFATE 500 MG/ML
2 VIAL (ML) INJECTION ONCE
Refills: 0 | Status: COMPLETED | OUTPATIENT
Start: 2023-12-26 | End: 2023-12-26

## 2023-12-26 RX ADMIN — ATORVASTATIN CALCIUM 10 MILLIGRAM(S): 80 TABLET, FILM COATED ORAL at 21:50

## 2023-12-26 RX ADMIN — BUDESONIDE AND FORMOTEROL FUMARATE DIHYDRATE 2 PUFF(S): 160; 4.5 AEROSOL RESPIRATORY (INHALATION) at 05:08

## 2023-12-26 RX ADMIN — Medication 325 MILLIGRAM(S): at 21:49

## 2023-12-26 RX ADMIN — HEPARIN SODIUM 1500 UNIT(S)/HR: 5000 INJECTION INTRAVENOUS; SUBCUTANEOUS at 23:51

## 2023-12-26 RX ADMIN — Medication 1: at 08:17

## 2023-12-26 RX ADMIN — PANTOPRAZOLE SODIUM 40 MILLIGRAM(S): 20 TABLET, DELAYED RELEASE ORAL at 05:12

## 2023-12-26 RX ADMIN — ALBUTEROL 2.5 MILLIGRAM(S): 90 AEROSOL, METERED ORAL at 11:29

## 2023-12-26 RX ADMIN — HEPARIN SODIUM 1500 UNIT(S)/HR: 5000 INJECTION INTRAVENOUS; SUBCUTANEOUS at 11:28

## 2023-12-26 RX ADMIN — Medication 3: at 17:36

## 2023-12-26 RX ADMIN — Medication 25 MILLIGRAM(S): at 05:08

## 2023-12-26 RX ADMIN — HEPARIN SODIUM 1500 UNIT(S)/HR: 5000 INJECTION INTRAVENOUS; SUBCUTANEOUS at 18:15

## 2023-12-26 RX ADMIN — BUDESONIDE AND FORMOTEROL FUMARATE DIHYDRATE 2 PUFF(S): 160; 4.5 AEROSOL RESPIRATORY (INHALATION) at 17:16

## 2023-12-26 RX ADMIN — Medication 500 MILLIGRAM(S): at 11:29

## 2023-12-26 RX ADMIN — Medication 40 MILLIGRAM(S): at 05:08

## 2023-12-26 RX ADMIN — Medication 25 MILLIGRAM(S): at 17:17

## 2023-12-26 RX ADMIN — ALBUTEROL 2.5 MILLIGRAM(S): 90 AEROSOL, METERED ORAL at 17:16

## 2023-12-26 RX ADMIN — TAMSULOSIN HYDROCHLORIDE 0.4 MILLIGRAM(S): 0.4 CAPSULE ORAL at 21:49

## 2023-12-26 RX ADMIN — BUMETANIDE 2 MILLIGRAM(S): 0.25 INJECTION INTRAMUSCULAR; INTRAVENOUS at 05:07

## 2023-12-26 RX ADMIN — Medication 2: at 12:22

## 2023-12-26 RX ADMIN — Medication 25 GRAM(S): at 15:54

## 2023-12-26 RX ADMIN — Medication 1 TABLET(S): at 11:29

## 2023-12-26 RX ADMIN — Medication 325 MILLIGRAM(S): at 05:08

## 2023-12-26 RX ADMIN — HEPARIN SODIUM 1500 UNIT(S)/HR: 5000 INJECTION INTRAVENOUS; SUBCUTANEOUS at 19:18

## 2023-12-26 RX ADMIN — FINASTERIDE 5 MILLIGRAM(S): 5 TABLET, FILM COATED ORAL at 11:29

## 2023-12-26 RX ADMIN — Medication 325 MILLIGRAM(S): at 14:03

## 2023-12-26 RX ADMIN — ALBUTEROL 2.5 MILLIGRAM(S): 90 AEROSOL, METERED ORAL at 05:07

## 2023-12-26 RX ADMIN — CHLORHEXIDINE GLUCONATE 1 APPLICATION(S): 213 SOLUTION TOPICAL at 05:09

## 2023-12-26 RX ADMIN — CEFTRIAXONE 100 MILLIGRAM(S): 500 INJECTION, POWDER, FOR SOLUTION INTRAMUSCULAR; INTRAVENOUS at 15:54

## 2023-12-26 RX ADMIN — HEPARIN SODIUM 1500 UNIT(S)/HR: 5000 INJECTION INTRAVENOUS; SUBCUTANEOUS at 04:21

## 2023-12-26 RX ADMIN — MUPIROCIN 1 APPLICATION(S): 20 OINTMENT TOPICAL at 05:09

## 2023-12-26 NOTE — PROGRESS NOTE ADULT - PROBLEM SELECTOR PLAN 1
Pt with ATN, multifactorial 2/2 EPI (s/p IV contrast 12/21), sepsis, vancomycin (12/20), and diuretic use with hyperdynamic EF (hypovolemia). Pt w/ baseline Scr 0.86-1.29, prior to admission Scr 1.12 on 10/7/23.     Labs and VS reviewed. No hypotensive episodes. On admission Scr at baseline 1.28 on 12/20. Scr increased to 1.62 on 12/21 peaked to Scr 4.97 on 12/24. Scr seemed to have plateaued at 4.90 on 12/25. Labs pending today. UA +trace protein, casts, bld neg but RBC 14. CT, CXR and ECHO reviewed. Renal US no hydronephrosis, but Bilateral pleural effusions noted. HD consent obtained and in chart. No acute indication for HD. c/w bumex 2mg q12 IVP. Monitor fluid and respiratory status daily. Monitor PVR daily. Monitor labs and urine output. Avoid nephrotoxins. Dose medications as per CrCl.    Final recommendations pending attending signature.  If you have any questions, please feel free to contact me  Luh Mott  Nephrology Fellow  [employer]/Page [phone]  (After 5pm or on weekends please page the on-call fellow) Pt with ATN, multifactorial 2/2 EPI (s/p IV contrast 12/21), sepsis, vancomycin (12/20), and diuretic use with hyperdynamic EF (hypovolemia). Pt w/ baseline Scr 0.86-1.29, prior to admission Scr 1.12 on 10/7/23.     Labs and VS reviewed. No hypotensive episodes. On admission Scr at baseline 1.28 on 12/20. Scr increased to 1.62 on 12/21 peaked to Scr 4.97 on 12/24. Scr seemed to have plateaued at 4.90 on 12/25. Labs pending today. UA +trace protein, casts, bld neg but RBC 14. CT, CXR and ECHO reviewed. Renal US no hydronephrosis, but Bilateral pleural effusions noted. HD consent obtained and in chart. No acute indication for HD. c/w bumex 2mg q12 IVP. Monitor fluid and respiratory status daily. Monitor PVR daily. Monitor labs and urine output. Avoid nephrotoxins. Dose medications as per CrCl.    Final recommendations pending attending signature.  If you have any questions, please feel free to contact me  Luh Mott  Nephrology Fellow  [employer]/Page [phone]  (After 5pm or on weekends please page the on-call fellow) Pt with ATN, multifactorial 2/2 EPI (s/p IV contrast 12/21), sepsis, vancomycin (12/20), and diuretic use with hyperdynamic EF (hypovolemia). Pt w/ baseline Scr 0.86-1.29, prior to admission Scr 1.12 on 10/7/23.     Labs and VS reviewed. No hypotensive episodes. On admission Scr at baseline 1.28 on 12/20. Scr increased to 1.62 on 12/21 peaked to Scr 4.97 on 12/24. Scr seemed to have plateaued at 4.90 on 12/25. Labs pending today. UA +trace protein, casts, bld neg but RBC 14. CT, CXR and ECHO reviewed. Renal US no hydronephrosis, but Bilateral pleural effusions noted. HD consent obtained and in chart. No acute indication for HD. 4700 cc of urine would recommend hold diuretics for today.  Monitor PVR daily. Monitor labs and urine output. Avoid nephrotoxins. Dose medications as per CrCl.    Final recommendations pending attending signature.  If you have any questions, please feel free to contact me  Luh Mott  Nephrology Fellow  [employer]/Page [phone]  (After 5pm or on weekends please page the on-call fellow) Pt with ATN, multifactorial 2/2 EPI (s/p IV contrast 12/21), sepsis, vancomycin (12/20), and diuretic use with hyperdynamic EF (hypovolemia). Pt w/ baseline Scr 0.86-1.29, prior to admission Scr 1.12 on 10/7/23.     Labs and VS reviewed. No hypotensive episodes. On admission Scr at baseline 1.28 on 12/20. Scr increased to 1.62 on 12/21 peaked to Scr 4.97 on 12/24. Scr seemed to have plateaued at 4.90 on 12/25. Labs pending today. UA +trace protein, casts, bld neg but RBC 14. CT, CXR and ECHO reviewed. Renal US no hydronephrosis, but Bilateral pleural effusions noted. HD consent obtained and in chart. No acute indication for HD. 4700 cc of urine would recommend hold diuretics for today.  Monitor PVR daily. Monitor labs and urine output. Avoid nephrotoxins. Dose medications as per CrCl.    Final recommendations pending attending signature.  If you have any questions, please feel free to contact me  Luh Mott  Nephrology Fellow  [employer]/Page [phone]  (After 5pm or on weekends please page the on-call fellow)

## 2023-12-26 NOTE — PROGRESS NOTE ADULT - PROBLEM SELECTOR PLAN 1
Likely multifactorial - CAP and component of HFpEF, entero/rhino virus +, COPD exacerbation   SIRS criteria at admission RR 36, WBC elevated  CTPE chest showed loculated moderate L pleural effusion and small R pleural effusion  RVP pos for entero/rhino   sputum culture positive, Bcx x2 positive for strep pneumo   procal elevated   UA positive with bacteria, WBCs  - s/p thoracentesis  - pulm consulted - recs appreciated   - started ceftriaxone 2000 q24   - s/p azithro 500 q24  - ID consulted - will appreciate recs  - Aspiration Precautions  - Further plan per HFpEF below  - methylpred 40 IV BID  - c/w Symbicort, duoneb Likely multifactorial - CAP and component of HFpEF, entero/rhino virus +, COPD exacerbation   CTPE chest showed loculated moderate L pleural effusion and small R pleural effusion  - s/p thoracentesis  - pulm consulted - recs appreciated   - started ceftriaxone 2000 q24   - s/p azithro 500 q24  - ID consulted - will appreciate recs  - Aspiration Precautions  - Further plan per HFpEF below  - methylpred 40 IV BID-> follow up with pulmonology

## 2023-12-26 NOTE — PROGRESS NOTE ADULT - ASSESSMENT
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 Nephrology consulted for BESSY.

## 2023-12-26 NOTE — PROGRESS NOTE ADULT - PROBLEM SELECTOR PLAN 2
No Baseline BNP ~ 2800. BNP ~ 2800 this admission  In chronic exacerbation state with 3+ pitting edema and on diuresis  HFpEF or HFrEF  EF = 68% on 8/11/23  Heart failure, CHF order set initiated  Guideline directed medical therapy as below: after med-rec completed  TTE systolic function is hyperdynamic with an ejection fraction of 80 %  - increased bumex to 2 mg IV BID and PRN    - Diuretic: on bumex PO 2 mg am 1 mg pm at home. Switch to IV bumex 2 mg QD while admitted  - BB:  resume home metoprolol tatrate 25 mg BID. Switch to xl at d/c  - ARNI/ACE-I/ARB: Consider starting at d/c  - Hydralazine/Nitrate: Not indicated at this time  - MRA: Not indicated at this time  - SGLT2: Consider starting at d/c  - Maintain K > 4, Mg > 2 Baseline BNP ~ 2800. BNP ~ 2800 this admission  In chronic exacerbation state with 3+ pitting edema and on diuresis  HFpEF or HFrEF  EF = 68% on 8/11/23  Heart failure, CHF order set initiated  Guideline directed medical therapy as below: after med-rec completed  TTE systolic function is hyperdynamic with an ejection fraction of 80 %  - Bumex 2 mg IV BID and PRN    - Diuretic: on bumex PO 2 mg am 1 mg pm at home.   - BB:  resume home metoprolol tatrate 25 mg BID.  - ARNI/ACE-I/ARB: Consider starting at d/c  - Hydralazine/Nitrate: Not indicated at this time  - MRA: Not indicated at this time  - SGLT2: Consider starting at d/c  - Maintain K > 4, Mg > 2

## 2023-12-26 NOTE — PROGRESS NOTE ADULT - ATTENDING COMMENTS
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   Also with contribution of decompensated heart failure.     Would continue antibiotics and supportive care for pneumonia as described above. Continue steroid course for COPD exacerbation.   He appears clinically overloaded on exam and would continue to diurese to net negative until euvolemic.

## 2023-12-26 NOTE — PROGRESS NOTE ADULT - ATTENDING COMMENTS
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     1. Acute Hypoxic Respiratory Failure 2/2 s/p thoracentesis CAP and component of HFpEF, entero/rhino virus +, COPD exacerbation: started ceftriaxone 2000 q24, s/p azithro 500 q24. Taper steroids  2. TTE systolic function is hyperdynamic with an ejection fraction of 80 %. Bumex 2 mg IV BID and PRN.   3. Afib on Heparin gtt for possible thora. Monitor ptt  Other details as above

## 2023-12-26 NOTE — PROGRESS NOTE ADULT - SUBJECTIVE AND OBJECTIVE BOX
[facility]  Division of Infectious Diseases  [phone]    Name: GLADIS LEONARD  Age: 83y  Gender: Male  MRN: [id number]    Interval History--  Notes reviewed.     Past Medical History--  Asbestosis (ICD9 501)    HTN (Hypertension)    BPH (Benign Prostatic Hypertrophy)    Dyslipidemia    Localized Osteoarthrosis, Lower Leg    Obesity    COPD with emphysema    Pulmonary nodule    Thyroid nodule    Aortic ectasia    History of diastolic dysfunction    Venous insufficiency    S/P Cystoscopy (ICD9 V45.89)    Hyperlipidemia (ICD9 272.4)    Localized Osteoarthrosis, Lower Leg    S/P Arthroscopy of Left Knee    Cataract    Inguinal Hernia x2    History of Tonsillectomy    History of appendectomy        For details regarding the patient's social history, family history, and other miscellaneous elements, please refer the initial infectious diseases consultation and/or the admitting history and physical examination for this admission.    Allergies    No Known Allergies    Intolerances        Medications--  Antibiotics:  cefTRIAXone   IVPB 2000 milliGRAM(s) IV Intermittent every 24 hours    Immunologic:  influenza  Vaccine (HIGH DOSE) 0.7 milliLiter(s) IntraMuscular once    Other:  acetaminophen     Tablet .. PRN  albuterol    0.083%  ascorbic acid  atorvastatin  benzocaine/menthol Lozenge PRN  budesonide 160 MICROgram(s)/formoterol 4.5 MICROgram(s) Inhaler  buMETAnide Injectable  chlorhexidine 4% Liquid  dextrose 5%.  dextrose 5%.  dextrose 50% Injectable  dextrose 50% Injectable  dextrose 50% Injectable  dextrose Oral Gel PRN  finasteride  glucagon  Injectable  heparin  Infusion.  insulin lispro (ADMELOG) corrective regimen sliding scale  insulin lispro (ADMELOG) corrective regimen sliding scale  metoprolol tartrate  multivitamin  pantoprazole    Tablet  predniSONE   Tablet  sodium bicarbonate  tamsulosin      Review of Systems--  A 10-point review of systems was obtained.     Pertinent positives and negatives--  Constitutional: No fevers. No Chills. No Rigors.   Cardiovascular: No chest pain. No palpitations.  Respiratory: No shortness of breath. No cough.  Gastrointestinal: No nausea or vomiting. No diarrhea or constipation.   Psychiatric: Pleasant. Appropriate affect.    Review of systems otherwise negative except as previously noted.    Physical Examination--  Vital Signs: T(F): 97.2 (12-26-23 @ 03:21), Max: 97.9 (12-25-23 @ 12:28)  HR: 90 (12-26-23 @ 05:13)  BP: 131/67 (12-26-23 @ 05:13)  RR: 20 (12-26-23 @ 03:21)  SpO2: 95% (12-26-23 @ 03:21)  Wt(kg): --  General: Nontoxic-appearing Male in no acute distress.  HEENT: AT/NC. PERRL. EOMI. Anicteric. Conjunctiva pink and moist. Oropharynx clear. Dentition fair.  Neck: Not rigid. No sense of mass.  Nodes: None palpable.  Lungs: Clear bilaterally without rales, wheezing or rhonchi  Heart: Regular rate and rhythm. No Murmur. No rub. No gallop. No palpable thrill.  Abdomen: Bowel sounds present and normoactive. Soft. Nondistended. Nontender. No sense of mass. No organomegaly.  Back: No spinal tenderness. No costovertebral angle tenderness.   Extremities: No cyanosis or clubbing. No edema.   Skin: Warm. Dry. Good turgor. No rash. No vasculitic stigmata.  Psychiatric: Appropriate affect and mood for situation.         Laboratory Studies--  CBC                        7.9    17.23 )-----------( 623      ( 25 Dec 2023 07:20 )             24.9       Chemistries  12-25    131<L>  |  91<L>  |  104<H>  ----------------------------<  211<H>  4.1   |  21<L>  |  4.90<H>    Ca    8.2<L>      25 Dec 2023 07:20  Phos  6.4     12-25  Mg     1.9     12-25    TPro  6.0  /  Alb  2.6<L>  /  TBili  0.2  /  DBili  x   /  AST  52<H>  /  ALT  77<H>  /  AlkPhos  108  12-25      Culture Data    Culture - Acid Fast - Body Fluid w/Smear (collected 22 Dec 2023 16:18)  Source: .Body Fluid Thoracentesis Fluid    Culture - Body Fluid with Gram Stain (collected 22 Dec 2023 16:18)  Source: Pleural Fl Pleural Fluid  Gram Stain (23 Dec 2023 01:42):    polymorphonuclear leukocytes seen    No organisms seen    by cytocentrifuge  Preliminary Report (23 Dec 2023 17:25):    No growth    Culture - Blood (collected 22 Dec 2023 10:43)  Source: .Blood Blood-Peripheral  Preliminary Report (25 Dec 2023 18:01):    No growth at 72 Hours    Culture - Blood (collected 22 Dec 2023 10:43)  Source: .Blood Blood-Peripheral  Preliminary Report (25 Dec 2023 18:01):    No growth at 72 Hours    Culture - Sputum (collected 21 Dec 2023 01:12)  Source: .Sputum Sputum  Gram Stain (21 Dec 2023 11:54):    Moderate polymorphonuclear leukocytes per low power field    Moderate Squamous epithelial cells per low power field    Rare Gram positive cocci in pairs per oil power field  Final Report (22 Dec 2023 18:43):    Normal Respiratory Milly present    Culture - Urine (collected 20 Dec 2023 04:49)  Source: Clean Catch Clean Catch (Midstream)  Final Report (22 Dec 2023 05:08):    10,000 - 49,000 CFU/mL Candida albicans "Susceptibilities not performed"    Culture - Blood (collected 20 Dec 2023 04:35)  Source: .Blood Blood-Peripheral  Gram Stain (21 Dec 2023 03:27):    Growth in aerobic bottle: Gram Positive Cocci in Pairs and Chains    Growth in anaerobic bottle: Gram Positive Cocci in Pairs and Chains  Final Report (23 Dec 2023 15:15):    Growth in aerobic and anaerobic bottles: Streptococcus pneumoniae    Direct identification is available within approximately 3-5    hours either by Blood Panel Multiplexed PCR or Direct    MALDI-TOF. Details: [email or web address]  Organism: Blood Culture PCR  Streptococcus pneumoniae (23 Dec 2023 15:15)  Organism: Streptococcus pneumoniae (23 Dec 2023 15:15)  Organism: Blood Culture PCR (23 Dec 2023 15:15)    Culture - Blood (collected 20 Dec 2023 04:20)  Source: .Blood Blood-Peripheral  Gram Stain (21 Dec 2023 04:03):    Growth in aerobic bottle: Gram Positive Cocci in Pairs and Chains    Growth in anaerobic bottle: Gram Positive Cocci in Pairs and Chains  Final Report (22 Dec 2023 22:38):    Growth in aerobic and anaerobic bottles: Streptococcus pneumoniae    See previous culture [id number]    < from: TTE W or WO Ultrasound Enhancing Agent (12.21.23 @ 12:56) >  CONCLUSIONS:      1. Left ventricular systolic function is hyperdynamic with an ejection fraction of 80 % by Henao's method of disks.   2. Agitated saline injection was negative for intracardiac shunt.    ________________________________________________________________________________________  FINDINGS:     Left Ventricle:  After obtaining consent, Definity ultrasound enhancing agent was given for enhanced left ventricular opacification and improved delineation of the left ventricular endocardial borders. Left ventricular systolic function is hyperdynamic with a calculated ejection fraction of 80 % by the Henao's biplane method of disks.     Right Ventricle:  The right ventricle is not well visualized. Probably normal systolic function. Tricuspid annular plane systolic excursion (TAPSE) is 2.4 cm (normal >=1.7 cm).     Left Atrium:  The left atrium is mildly dilated.     Right Atrium:  The right atrium is dilated in size with an indexed volume of 39.73 ml/m².     Interatrial Septum:  Agitated saline injection was negative for intracardiac shunt.     Aortic Valve:  There is mild calcification of the aortic valve leaflets.     Mitral Valve:  There is calcification of the mitral valve annulus.     Tricuspid Valve:  Normal tricuspid valve.     Pulmonic Valve:  Normal pulmonic valve.     Aorta:  The aortic annulus and aortic root appear normal in size.     Pleura:  Leftpleural effusion noted.     Additional Findings and Comments:  The presence of abdominal ascites is incidentally noted.    < end of copied text >           [facility]  Division of Infectious Diseases  [phone]    Name: GLADIS LEONARD  Age: 83y  Gender: Male  MRN: [id number]    Interval History--  Notes reviewed.     Past Medical History--  Asbestosis (ICD9 501)    HTN (Hypertension)    BPH (Benign Prostatic Hypertrophy)    Dyslipidemia    Localized Osteoarthrosis, Lower Leg    Obesity    COPD with emphysema    Pulmonary nodule    Thyroid nodule    Aortic ectasia    History of diastolic dysfunction    Venous insufficiency    S/P Cystoscopy (ICD9 V45.89)    Hyperlipidemia (ICD9 272.4)    Localized Osteoarthrosis, Lower Leg    S/P Arthroscopy of Left Knee    Cataract    Inguinal Hernia x2    History of Tonsillectomy    History of appendectomy        For details regarding the patient's social history, family history, and other miscellaneous elements, please refer the initial infectious diseases consultation and/or the admitting history and physical examination for this admission.    Allergies    No Known Allergies    Intolerances        Medications--  Antibiotics:  cefTRIAXone   IVPB 2000 milliGRAM(s) IV Intermittent every 24 hours    Immunologic:  influenza  Vaccine (HIGH DOSE) 0.7 milliLiter(s) IntraMuscular once    Other:  acetaminophen     Tablet .. PRN  albuterol    0.083%  ascorbic acid  atorvastatin  benzocaine/menthol Lozenge PRN  budesonide 160 MICROgram(s)/formoterol 4.5 MICROgram(s) Inhaler  buMETAnide Injectable  chlorhexidine 4% Liquid  dextrose 5%.  dextrose 5%.  dextrose 50% Injectable  dextrose 50% Injectable  dextrose 50% Injectable  dextrose Oral Gel PRN  finasteride  glucagon  Injectable  heparin  Infusion.  insulin lispro (ADMELOG) corrective regimen sliding scale  insulin lispro (ADMELOG) corrective regimen sliding scale  metoprolol tartrate  multivitamin  pantoprazole    Tablet  predniSONE   Tablet  sodium bicarbonate  tamsulosin      Review of Systems--  A 10-point review of systems was obtained.     Pertinent positives and negatives--  Constitutional: No fevers. No Chills. No Rigors.   Cardiovascular: No chest pain. No palpitations.  Respiratory: No shortness of breath. No cough.  Gastrointestinal: No nausea or vomiting. No diarrhea or constipation.   Psychiatric: Pleasant. Appropriate affect.    Review of systems otherwise negative except as previously noted.    Physical Examination--  Vital Signs: T(F): 97.2 (12-26-23 @ 03:21), Max: 97.9 (12-25-23 @ 12:28)  HR: 90 (12-26-23 @ 05:13)  BP: 131/67 (12-26-23 @ 05:13)  RR: 20 (12-26-23 @ 03:21)  SpO2: 95% (12-26-23 @ 03:21)  Wt(kg): --  General: Nontoxic-appearing Male in no acute distress.  HEENT: AT/NC. PERRL. EOMI. Anicteric. Conjunctiva pink and moist. Oropharynx clear. Dentition fair.  Neck: Not rigid. No sense of mass.  Nodes: None palpable.  Lungs: Clear bilaterally without rales, wheezing or rhonchi  Heart: Regular rate and rhythm. No Murmur. No rub. No gallop. No palpable thrill.  Abdomen: Bowel sounds present and normoactive. Soft. Nondistended. Nontender. No sense of mass. No organomegaly.  Back: No spinal tenderness. No costovertebral angle tenderness.   Extremities: No cyanosis or clubbing. No edema.   Skin: Warm. Dry. Good turgor. No rash. No vasculitic stigmata.  Psychiatric: Appropriate affect and mood for situation.         Laboratory Studies--  CBC                        7.9    17.23 )-----------( 623      ( 25 Dec 2023 07:20 )             24.9       Chemistries  12-25    131<L>  |  91<L>  |  104<H>  ----------------------------<  211<H>  4.1   |  21<L>  |  4.90<H>    Ca    8.2<L>      25 Dec 2023 07:20  Phos  6.4     12-25  Mg     1.9     12-25    TPro  6.0  /  Alb  2.6<L>  /  TBili  0.2  /  DBili  x   /  AST  52<H>  /  ALT  77<H>  /  AlkPhos  108  12-25      Culture Data    Culture - Acid Fast - Body Fluid w/Smear (collected 22 Dec 2023 16:18)  Source: .Body Fluid Thoracentesis Fluid    Culture - Body Fluid with Gram Stain (collected 22 Dec 2023 16:18)  Source: Pleural Fl Pleural Fluid  Gram Stain (23 Dec 2023 01:42):    polymorphonuclear leukocytes seen    No organisms seen    by cytocentrifuge  Preliminary Report (23 Dec 2023 17:25):    No growth    Culture - Blood (collected 22 Dec 2023 10:43)  Source: .Blood Blood-Peripheral  Preliminary Report (25 Dec 2023 18:01):    No growth at 72 Hours    Culture - Blood (collected 22 Dec 2023 10:43)  Source: .Blood Blood-Peripheral  Preliminary Report (25 Dec 2023 18:01):    No growth at 72 Hours    Culture - Sputum (collected 21 Dec 2023 01:12)  Source: .Sputum Sputum  Gram Stain (21 Dec 2023 11:54):    Moderate polymorphonuclear leukocytes per low power field    Moderate Squamous epithelial cells per low power field    Rare Gram positive cocci in pairs per oil power field  Final Report (22 Dec 2023 18:43):    Normal Respiratory Milly present    Culture - Urine (collected 20 Dec 2023 04:49)  Source: Clean Catch Clean Catch (Midstream)  Final Report (22 Dec 2023 05:08):    10,000 - 49,000 CFU/mL Candida albicans "Susceptibilities not performed"    Culture - Blood (collected 20 Dec 2023 04:35)  Source: .Blood Blood-Peripheral  Gram Stain (21 Dec 2023 03:27):    Growth in aerobic bottle: Gram Positive Cocci in Pairs and Chains    Growth in anaerobic bottle: Gram Positive Cocci in Pairs and Chains  Final Report (23 Dec 2023 15:15):    Growth in aerobic and anaerobic bottles: Streptococcus pneumoniae    Direct identification is available within approximately 3-5    hours either by Blood Panel Multiplexed PCR or Direct    MALDI-TOF. Details: [email or web address]  Organism: Blood Culture PCR  Streptococcus pneumoniae (23 Dec 2023 15:15)  Organism: Streptococcus pneumoniae (23 Dec 2023 15:15)  Organism: Blood Culture PCR (23 Dec 2023 15:15)    Culture - Blood (collected 20 Dec 2023 04:20)  Source: .Blood Blood-Peripheral  Gram Stain (21 Dec 2023 04:03):    Growth in aerobic bottle: Gram Positive Cocci in Pairs and Chains    Growth in anaerobic bottle: Gram Positive Cocci in Pairs and Chains  Final Report (22 Dec 2023 22:38):    Growth in aerobic and anaerobic bottles: Streptococcus pneumoniae    See previous culture [id number]    < from: TTE W or WO Ultrasound Enhancing Agent (12.21.23 @ 12:56) >  CONCLUSIONS:      1. Left ventricular systolic function is hyperdynamic with an ejection fraction of 80 % by Henao's method of disks.   2. Agitated saline injection was negative for intracardiac shunt.    ________________________________________________________________________________________  FINDINGS:     Left Ventricle:  After obtaining consent, Definity ultrasound enhancing agent was given for enhanced left ventricular opacification and improved delineation of the left ventricular endocardial borders. Left ventricular systolic function is hyperdynamic with a calculated ejection fraction of 80 % by the Henao's biplane method of disks.     Right Ventricle:  The right ventricle is not well visualized. Probably normal systolic function. Tricuspid annular plane systolic excursion (TAPSE) is 2.4 cm (normal >=1.7 cm).     Left Atrium:  The left atrium is mildly dilated.     Right Atrium:  The right atrium is dilated in size with an indexed volume of 39.73 ml/m².     Interatrial Septum:  Agitated saline injection was negative for intracardiac shunt.     Aortic Valve:  There is mild calcification of the aortic valve leaflets.     Mitral Valve:  There is calcification of the mitral valve annulus.     Tricuspid Valve:  Normal tricuspid valve.     Pulmonic Valve:  Normal pulmonic valve.     Aorta:  The aortic annulus and aortic root appear normal in size.     Pleura:  Leftpleural effusion noted.     Additional Findings and Comments:  The presence of abdominal ascites is incidentally noted.    < end of copied text >           [facility]  Division of Infectious Diseases  [phone]    Name: GLADIS LEONARD  Age: 83y  Gender: Male  MRN: [id number]    Interval History--  Notes reviewed. Seen earlier today. Feels ok. No pain. Denies SOB. No fevers, chills, or rigors.     Past Medical History--  Asbestosis (ICD9 501)    HTN (Hypertension)    BPH (Benign Prostatic Hypertrophy)    Dyslipidemia    Localized Osteoarthrosis, Lower Leg    Obesity    COPD with emphysema    Pulmonary nodule    Thyroid nodule    Aortic ectasia    History of diastolic dysfunction    Venous insufficiency    S/P Cystoscopy (ICD9 V45.89)    Hyperlipidemia (ICD9 272.4)    Localized Osteoarthrosis, Lower Leg    S/P Arthroscopy of Left Knee    Cataract    Inguinal Hernia x2    History of Tonsillectomy    History of appendectomy        For details regarding the patient's social history, family history, and other miscellaneous elements, please refer the initial infectious diseases consultation and/or the admitting history and physical examination for this admission.    Allergies    No Known Allergies    Intolerances        Medications--  Antibiotics:  cefTRIAXone   IVPB 2000 milliGRAM(s) IV Intermittent every 24 hours    Immunologic:  influenza  Vaccine (HIGH DOSE) 0.7 milliLiter(s) IntraMuscular once    Other:  acetaminophen     Tablet .. PRN  albuterol    0.083%  ascorbic acid  atorvastatin  benzocaine/menthol Lozenge PRN  budesonide 160 MICROgram(s)/formoterol 4.5 MICROgram(s) Inhaler  buMETAnide Injectable  chlorhexidine 4% Liquid  dextrose 5%.  dextrose 5%.  dextrose 50% Injectable  dextrose 50% Injectable  dextrose 50% Injectable  dextrose Oral Gel PRN  finasteride  glucagon  Injectable  heparin  Infusion.  insulin lispro (ADMELOG) corrective regimen sliding scale  insulin lispro (ADMELOG) corrective regimen sliding scale  metoprolol tartrate  multivitamin  pantoprazole    Tablet  predniSONE   Tablet  sodium bicarbonate  tamsulosin      Review of Systems--  A 10-point review of systems was obtained.   Review of systems otherwise negative except as previously noted.    Physical Examination--  Vital Signs: T(F): 97.2 (12-26-23 @ 03:21), Max: 97.9 (12-25-23 @ 12:28)  HR: 90 (12-26-23 @ 05:13)  BP: 131/67 (12-26-23 @ 05:13)  RR: 20 (12-26-23 @ 03:21)  SpO2: 95% (12-26-23 @ 03:21)  Wt(kg): --  General: Nontoxic-appearing Male in no acute distress.  HEENT: AT/NC. Anicteric. Conjunctiva pink and moist. Oropharynx clear.   Neck: Not rigid. No sense of mass.  Nodes: None palpable.  Lungs: Decreased BS B L > R  Heart: Irreg irreg  Abdomen: Bowel sounds present and normoactive. Soft. Nondistended. Nontender.  Extremities: No cyanosis or clubbing. 1+ LE edema.   Skin: Warm. Dry. Good turgor. No rash. No vasculitic stigmata.  Psychiatric: Appropriate affect and mood for situation.       Laboratory Studies--  CBC                        7.9    17.23 )-----------( 623      ( 25 Dec 2023 07:20 )             24.9     WBC Count: 17.89 K/uL (12-24-23 @ 22:12)  WBC Count: 15.85 K/uL (12-24-23 @ 06:50)  WBC Count: 16.20 K/uL (12-24-23 @ 03:22)  WBC Count: 14.93 K/uL (12-23-23 @ 12:30)  WBC Count: 13.92 K/uL (12-23-23 @ 03:39)  WBC Count: 15.95 K/uL (12-22-23 @ 11:35)      Chemistries  12-25    131<L>  |  91<L>  |  104<H>  ----------------------------<  211<H>  4.1   |  21<L>  |  4.90<H>      4.97 mg/dL (12-24-23 @ 07:00)  4.32 mg/dL (12-23-23 @ 03:39)  3.44 mg/dL (12-22-23 @ 11:35)  1.62 mg/dL (12-21-23 @ 07:44)  1.28 mg/dL (12-20-23 @ 04:27)      Ca    8.2<L>      25 Dec 2023 07:20  Phos  6.4     12-25  Mg     1.9     12-25    TPro  6.0  /  Alb  2.6<L>  /  TBili  0.2  /  DBili  x   /  AST  52<H>  /  ALT  77<H>  /  AlkPhos  108  12-25      Total Nucleated Cell Count, Body Fluid: 2103 cells/uL (12-22-23 @ 16:15)  Albumin, Fluid: 1.6 g/dL (12-22-23 @ 16:15)  Glucose, Fluid: 190 mg/dL (12-22-23 @ 16:15)  Lactate Dehydrogenase, Fluid: 571 U/L (12-22-23 @ 16:15)  Protein Total, Fluid: 3.0 g/dL (12-22-23 @ 16:15)  pH, Fluid: 7.47: Reference Ranges have NOT been established for analytes in body fluids  because of variability in body fluid composition.  The  has not determined the efficacy of this test when  performed on fluid specimens. The performance characteristics of this  test were determined by [employer]. (12.22.23 @ 16:15)        Culture Data      Culture - Acid Fast - Body Fluid w/Smear (collected 22 Dec 2023 16:18)  Source: .Body Fluid Thoracentesis Fluid    Culture - Body Fluid with Gram Stain (collected 22 Dec 2023 16:18)  Source: Pleural Fl Pleural Fluid  Gram Stain (23 Dec 2023 01:42):    polymorphonuclear leukocytes seen    No organisms seen    by cytocentrifuge  Preliminary Report (23 Dec 2023 17:25):    No growth    Culture - Blood (collected 22 Dec 2023 10:43)  Source: .Blood Blood-Peripheral  Preliminary Report (25 Dec 2023 18:01):    No growth at 72 Hours    Culture - Blood (collected 22 Dec 2023 10:43)  Source: .Blood Blood-Peripheral  Preliminary Report (25 Dec 2023 18:01):    No growth at 72 Hours    Culture - Sputum (collected 21 Dec 2023 01:12)  Source: .Sputum Sputum  Gram Stain (21 Dec 2023 11:54):    Moderate polymorphonuclear leukocytes per low power field    Moderate Squamous epithelial cells per low power field    Rare Gram positive cocci in pairs per oil power field  Final Report (22 Dec 2023 18:43):    Normal Respiratory Milly present    Culture - Urine (collected 20 Dec 2023 04:49)  Source: Clean Catch Clean Catch (Midstream)  Final Report (22 Dec 2023 05:08):    10,000 - 49,000 CFU/mL Candida albicans "Susceptibilities not performed"    Culture - Blood (collected 20 Dec 2023 04:35)  Source: .Blood Blood-Peripheral  Gram Stain (21 Dec 2023 03:27):    Growth in aerobic bottle: Gram Positive Cocci in Pairs and Chains    Growth in anaerobic bottle: Gram Positive Cocci in Pairs and Chains  Final Report (23 Dec 2023 15:15):    Growth in aerobic and anaerobic bottles: Streptococcus pneumoniae    Direct identification is available within approximately 3-5    hours either by Blood Panel Multiplexed PCR or Direct    MALDI-TOF. Details: [email or web address]  Organism: Blood Culture PCR  Streptococcus pneumoniae (23 Dec 2023 15:15)  Organism: Streptococcus pneumoniae (23 Dec 2023 15:15)  Organism: Blood Culture PCR (23 Dec 2023 15:15)    Culture - Blood (collected 20 Dec 2023 04:20)  Source: .Blood Blood-Peripheral  Gram Stain (21 Dec 2023 04:03):    Growth in aerobic bottle: Gram Positive Cocci in Pairs and Chains    Growth in anaerobic bottle: Gram Positive Cocci in Pairs and Chains  Final Report (22 Dec 2023 22:38):    Growth in aerobic and anaerobic bottles: Streptococcus pneumoniae    See previous culture [id number]    < from: TTE W or WO Ultrasound Enhancing Agent (12.21.23 @ 12:56) >  CONCLUSIONS:      1. Left ventricular systolic function is hyperdynamic with an ejection fraction of 80 % by Henao's method of disks.   2. Agitated saline injection was negative for intracardiac shunt.    ________________________________________________________________________________________  FINDINGS:     Left Ventricle:  After obtaining consent, Definity ultrasound enhancing agent was given for enhanced left ventricular opacification and improved delineation of the left ventricular endocardial borders. Left ventricular systolic function is hyperdynamic with a calculated ejection fraction of 80 % by the Henao's biplane method of disks.     Right Ventricle:  The right ventricle is not well visualized. Probably normal systolic function. Tricuspid annular plane systolic excursion (TAPSE) is 2.4 cm (normal >=1.7 cm).     Left Atrium:  The left atrium is mildly dilated.     Right Atrium:  The right atrium is dilated in size with an indexed volume of 39.73 ml/m².     Interatrial Septum:  Agitated saline injection was negative for intracardiac shunt.     Aortic Valve:  There is mild calcification of the aortic valve leaflets.     Mitral Valve:  There is calcification of the mitral valve annulus.     Tricuspid Valve:  Normal tricuspid valve.     Pulmonic Valve:  Normal pulmonic valve.     Aorta:  The aortic annulus and aortic root appear normal in size.     Pleura:  Leftpleural effusion noted.     Additional Findings and Comments:  The presence of abdominal ascites is incidentally noted.    < end of copied text >           [facility]  Division of Infectious Diseases  [phone]    Name: GLADIS LEONARD  Age: 83y  Gender: Male  MRN: [id number]    Interval History--  Notes reviewed. Seen earlier today. Feels ok. No pain. Denies SOB. No fevers, chills, or rigors.     Past Medical History--  Asbestosis (ICD9 501)    HTN (Hypertension)    BPH (Benign Prostatic Hypertrophy)    Dyslipidemia    Localized Osteoarthrosis, Lower Leg    Obesity    COPD with emphysema    Pulmonary nodule    Thyroid nodule    Aortic ectasia    History of diastolic dysfunction    Venous insufficiency    S/P Cystoscopy (ICD9 V45.89)    Hyperlipidemia (ICD9 272.4)    Localized Osteoarthrosis, Lower Leg    S/P Arthroscopy of Left Knee    Cataract    Inguinal Hernia x2    History of Tonsillectomy    History of appendectomy        For details regarding the patient's social history, family history, and other miscellaneous elements, please refer the initial infectious diseases consultation and/or the admitting history and physical examination for this admission.    Allergies    No Known Allergies    Intolerances        Medications--  Antibiotics:  cefTRIAXone   IVPB 2000 milliGRAM(s) IV Intermittent every 24 hours    Immunologic:  influenza  Vaccine (HIGH DOSE) 0.7 milliLiter(s) IntraMuscular once    Other:  acetaminophen     Tablet .. PRN  albuterol    0.083%  ascorbic acid  atorvastatin  benzocaine/menthol Lozenge PRN  budesonide 160 MICROgram(s)/formoterol 4.5 MICROgram(s) Inhaler  buMETAnide Injectable  chlorhexidine 4% Liquid  dextrose 5%.  dextrose 5%.  dextrose 50% Injectable  dextrose 50% Injectable  dextrose 50% Injectable  dextrose Oral Gel PRN  finasteride  glucagon  Injectable  heparin  Infusion.  insulin lispro (ADMELOG) corrective regimen sliding scale  insulin lispro (ADMELOG) corrective regimen sliding scale  metoprolol tartrate  multivitamin  pantoprazole    Tablet  predniSONE   Tablet  sodium bicarbonate  tamsulosin      Review of Systems--  A 10-point review of systems was obtained.   Review of systems otherwise negative except as previously noted.    Physical Examination--  Vital Signs: T(F): 97.2 (12-26-23 @ 03:21), Max: 97.9 (12-25-23 @ 12:28)  HR: 90 (12-26-23 @ 05:13)  BP: 131/67 (12-26-23 @ 05:13)  RR: 20 (12-26-23 @ 03:21)  SpO2: 95% (12-26-23 @ 03:21)  Wt(kg): --  General: Nontoxic-appearing Male in no acute distress.  HEENT: AT/NC. Anicteric. Conjunctiva pink and moist. Oropharynx clear.   Neck: Not rigid. No sense of mass.  Nodes: None palpable.  Lungs: Decreased BS B L > R  Heart: Irreg irreg  Abdomen: Bowel sounds present and normoactive. Soft. Nondistended. Nontender.  Extremities: No cyanosis or clubbing. 1+ LE edema.   Skin: Warm. Dry. Good turgor. No rash. No vasculitic stigmata.  Psychiatric: Appropriate affect and mood for situation.       Laboratory Studies--  CBC                        7.9    17.23 )-----------( 623      ( 25 Dec 2023 07:20 )             24.9     WBC Count: 17.89 K/uL (12-24-23 @ 22:12)  WBC Count: 15.85 K/uL (12-24-23 @ 06:50)  WBC Count: 16.20 K/uL (12-24-23 @ 03:22)  WBC Count: 14.93 K/uL (12-23-23 @ 12:30)  WBC Count: 13.92 K/uL (12-23-23 @ 03:39)  WBC Count: 15.95 K/uL (12-22-23 @ 11:35)      Chemistries  12-25    131<L>  |  91<L>  |  104<H>  ----------------------------<  211<H>  4.1   |  21<L>  |  4.90<H>      4.97 mg/dL (12-24-23 @ 07:00)  4.32 mg/dL (12-23-23 @ 03:39)  3.44 mg/dL (12-22-23 @ 11:35)  1.62 mg/dL (12-21-23 @ 07:44)  1.28 mg/dL (12-20-23 @ 04:27)      Ca    8.2<L>      25 Dec 2023 07:20  Phos  6.4     12-25  Mg     1.9     12-25    TPro  6.0  /  Alb  2.6<L>  /  TBili  0.2  /  DBili  x   /  AST  52<H>  /  ALT  77<H>  /  AlkPhos  108  12-25      Total Nucleated Cell Count, Body Fluid: 2103 cells/uL (12-22-23 @ 16:15)  Albumin, Fluid: 1.6 g/dL (12-22-23 @ 16:15)  Glucose, Fluid: 190 mg/dL (12-22-23 @ 16:15)  Lactate Dehydrogenase, Fluid: 571 U/L (12-22-23 @ 16:15)  Protein Total, Fluid: 3.0 g/dL (12-22-23 @ 16:15)  pH, Fluid: 7.47: Reference Ranges have NOT been established for analytes in body fluids  because of variability in body fluid composition.  The  has not determined the efficacy of this test when  performed on fluid specimens. The performance characteristics of this  test were determined by [facility]. (12.22.23 @ 16:15)        Culture Data      Culture - Acid Fast - Body Fluid w/Smear (collected 22 Dec 2023 16:18)  Source: .Body Fluid Thoracentesis Fluid    Culture - Body Fluid with Gram Stain (collected 22 Dec 2023 16:18)  Source: Pleural Fl Pleural Fluid  Gram Stain (23 Dec 2023 01:42):    polymorphonuclear leukocytes seen    No organisms seen    by cytocentrifuge  Preliminary Report (23 Dec 2023 17:25):    No growth    Culture - Blood (collected 22 Dec 2023 10:43)  Source: .Blood Blood-Peripheral  Preliminary Report (25 Dec 2023 18:01):    No growth at 72 Hours    Culture - Blood (collected 22 Dec 2023 10:43)  Source: .Blood Blood-Peripheral  Preliminary Report (25 Dec 2023 18:01):    No growth at 72 Hours    Culture - Sputum (collected 21 Dec 2023 01:12)  Source: .Sputum Sputum  Gram Stain (21 Dec 2023 11:54):    Moderate polymorphonuclear leukocytes per low power field    Moderate Squamous epithelial cells per low power field    Rare Gram positive cocci in pairs per oil power field  Final Report (22 Dec 2023 18:43):    Normal Respiratory Milly present    Culture - Urine (collected 20 Dec 2023 04:49)  Source: Clean Catch Clean Catch (Midstream)  Final Report (22 Dec 2023 05:08):    10,000 - 49,000 CFU/mL Candida albicans "Susceptibilities not performed"    Culture - Blood (collected 20 Dec 2023 04:35)  Source: .Blood Blood-Peripheral  Gram Stain (21 Dec 2023 03:27):    Growth in aerobic bottle: Gram Positive Cocci in Pairs and Chains    Growth in anaerobic bottle: Gram Positive Cocci in Pairs and Chains  Final Report (23 Dec 2023 15:15):    Growth in aerobic and anaerobic bottles: Streptococcus pneumoniae    Direct identification is available within approximately 3-5    hours either by Blood Panel Multiplexed PCR or Direct    MALDI-TOF. Details: [email or web address]  Organism: Blood Culture PCR  Streptococcus pneumoniae (23 Dec 2023 15:15)  Organism: Streptococcus pneumoniae (23 Dec 2023 15:15)  Organism: Blood Culture PCR (23 Dec 2023 15:15)    Culture - Blood (collected 20 Dec 2023 04:20)  Source: .Blood Blood-Peripheral  Gram Stain (21 Dec 2023 04:03):    Growth in aerobic bottle: Gram Positive Cocci in Pairs and Chains    Growth in anaerobic bottle: Gram Positive Cocci in Pairs and Chains  Final Report (22 Dec 2023 22:38):    Growth in aerobic and anaerobic bottles: Streptococcus pneumoniae    See previous culture [id number]    < from: TTE W or WO Ultrasound Enhancing Agent (12.21.23 @ 12:56) >  CONCLUSIONS:      1. Left ventricular systolic function is hyperdynamic with an ejection fraction of 80 % by Henao's method of disks.   2. Agitated saline injection was negative for intracardiac shunt.    ________________________________________________________________________________________  FINDINGS:     Left Ventricle:  After obtaining consent, Definity ultrasound enhancing agent was given for enhanced left ventricular opacification and improved delineation of the left ventricular endocardial borders. Left ventricular systolic function is hyperdynamic with a calculated ejection fraction of 80 % by the Henao's biplane method of disks.     Right Ventricle:  The right ventricle is not well visualized. Probably normal systolic function. Tricuspid annular plane systolic excursion (TAPSE) is 2.4 cm (normal >=1.7 cm).     Left Atrium:  The left atrium is mildly dilated.     Right Atrium:  The right atrium is dilated in size with an indexed volume of 39.73 ml/m².     Interatrial Septum:  Agitated saline injection was negative for intracardiac shunt.     Aortic Valve:  There is mild calcification of the aortic valve leaflets.     Mitral Valve:  There is calcification of the mitral valve annulus.     Tricuspid Valve:  Normal tricuspid valve.     Pulmonic Valve:  Normal pulmonic valve.     Aorta:  The aortic annulus and aortic root appear normal in size.     Pleura:  Leftpleural effusion noted.     Additional Findings and Comments:  The presence of abdominal ascites is incidentally noted.    < end of copied text >

## 2023-12-26 NOTE — PROGRESS NOTE ADULT - PROBLEM SELECTOR PLAN 5
- Resume home metoprolol 25 BID - on heparin gtt iso possible thora tomorrow-> will follow up with pulm about thoracentesis   - holding home eliquis for now

## 2023-12-26 NOTE — PROGRESS NOTE ADULT - PROBLEM SELECTOR PLAN 6
- on heparin gtt iso possible thora tomorrow   - holding home eliquis for now - on heparin gtt iso possible thora tomorrow-> will follow up with pulm about thoracentesis   - holding home eliquis for now DVT proph: heparin gtt  Diet: DASH  Dispo: pending med clearance

## 2023-12-26 NOTE — PROGRESS NOTE ADULT - SUBJECTIVE AND OBJECTIVE BOX
[facility] Division of Kidney Diseases & Hypertension  FOLLOW UP NOTE  [phone]--------------------------------------------------------------------------------  Chief Complaint: BESSY    24 hour events/subjective: Pt seen and evaluated bedside this morning. Reports feeling well and urinating a lot. Endorses LE edema, otherwise no complaints. Denies any headaches, nausea, vomiting, fevers/chills, chest pain, SOB, abdominal pain.       PAST HISTORY  --------------------------------------------------------------------------------  No significant changes to PMH, PSH, FHx, SHx, unless otherwise noted    ALLERGIES & MEDICATIONS  --------------------------------------------------------------------------------  Allergies  No Known Allergies  Intolerances    Standing Inpatient Medications  albuterol    0.083% 2.5 milliGRAM(s) Nebulizer every 6 hours  ascorbic acid 500 milliGRAM(s) Oral daily  atorvastatin 10 milliGRAM(s) Oral at bedtime  budesonide 160 MICROgram(s)/formoterol 4.5 MICROgram(s) Inhaler 2 Puff(s) Inhalation two times a day  buMETAnide Injectable 2 milliGRAM(s) IV Push every 12 hours  cefTRIAXone   IVPB 2000 milliGRAM(s) IV Intermittent every 24 hours  chlorhexidine 4% Liquid 1 Application(s) Topical <User Schedule>  dextrose 5%. 1000 milliLiter(s) IV Continuous <Continuous>  dextrose 5%. 1000 milliLiter(s) IV Continuous <Continuous>  dextrose 50% Injectable 12.5 Gram(s) IV Push once  dextrose 50% Injectable 25 Gram(s) IV Push once  dextrose 50% Injectable 25 Gram(s) IV Push once  finasteride 5 milliGRAM(s) Oral daily  glucagon  Injectable 1 milliGRAM(s) IntraMuscular once  heparin  Infusion. 2200 Unit(s)/Hr IV Continuous <Continuous>  influenza  Vaccine (HIGH DOSE) 0.7 milliLiter(s) IntraMuscular once  insulin lispro (ADMELOG) corrective regimen sliding scale   SubCutaneous three times a day before meals  insulin lispro (ADMELOG) corrective regimen sliding scale   SubCutaneous at bedtime  metoprolol tartrate 25 milliGRAM(s) Oral two times a day  multivitamin 1 Tablet(s) Oral daily  pantoprazole    Tablet 40 milliGRAM(s) Oral before breakfast  predniSONE   Tablet 40 milliGRAM(s) Oral daily  sodium bicarbonate 325 milliGRAM(s) Oral every 8 hours  tamsulosin 0.4 milliGRAM(s) Oral at bedtime    PRN Inpatient Medications  acetaminophen     Tablet .. 650 milliGRAM(s) Oral every 6 hours PRN  benzocaine/menthol Lozenge 1 Lozenge Oral three times a day PRN  dextrose Oral Gel 15 Gram(s) Oral once PRN    REVIEW OF SYSTEMS  --------------------------------------------------------------------------------  as above    VITALS/PHYSICAL EXAM  --------------------------------------------------------------------------------  T(C): 36.2 (12-26-23 @ 03:21), Max: 36.6 (12-25-23 @ 12:28)  HR: 90 (12-26-23 @ 05:13) (78 - 97)  BP: 131/67 (12-26-23 @ 05:13) (130/62 - 151/79)  RR: 20 (12-26-23 @ 03:21) (18 - 20)  SpO2: 95% (12-26-23 @ 03:21) (95% - 96%)  Wt(kg): --    12-25-23 @ 07:01  -  12-26-23 @ 07:00  --------------------------------------------------------  IN: 296 mL / OUT: 4700 mL / NET: -4404 mL    Physical Exam:  Gen: NAD  HEENT: Anicteric  Pulm: CTA B/L  CV: S1S2+  Abd: Soft, +BS   Ext: +B/L LE Pitting edema  Neuro: Awake  : External urinary capture with bag  Skin: Warm and dry      LABS/STUDIES  --------------------------------------------------------------------------------              7.9    17.23 >-----------<  623      [12-25-23 @ 07:20]              24.9     131  |  91  |  104  ----------------------------<  211      [12-25-23 @ 07:20]  4.1   |  21  |  4.90        Ca     8.2     [12-25-23 @ 07:20]      Mg     1.9     [12-25-23 @ 07:20]      Phos  6.4     [12-25-23 @ 07:20]    TPro  6.0  /  Alb  2.6  /  TBili  0.2  /  DBili  x   /  AST  52  /  ALT  77  /  AlkPhos  108  [12-25-23 @ 07:20]    PTT: 105.1      [12-26-23 @ 03:37]    Creatinine Trend:  SCr 4.90 [12-25 @ 07:20]  SCr 4.97 [12-24 @ 07:00]  SCr 4.32 [12-23 @ 03:39]  SCr 3.44 [12-22 @ 11:35]  SCr 1.62 [12-21 @ 07:44]    Urine Creatinine 100      [12-22-23 @ 13:04]  Urine Protein 22      [12-22-23 @ 13:04]  Urine Sodium 16      [12-22-23 @ 13:04]  Urine Urea Nitrogen 396      [12-22-23 @ 13:04]  Urine Potassium 38      [12-22-23 @ 13:04]  Urine Osmolality 322      [12-22-23 @ 13:04]   [facility] system Division of Kidney Diseases & Hypertension  FOLLOW UP NOTE  [phone]--------------------------------------------------------------------------------  Chief Complaint: BESSY    24 hour events/subjective: Pt seen and evaluated bedside this morning. Reports feeling well and urinating a lot. Endorses LE edema, otherwise no complaints. Denies any headaches, nausea, vomiting, fevers/chills, chest pain, SOB, abdominal pain.       PAST HISTORY  --------------------------------------------------------------------------------  No significant changes to PMH, PSH, FHx, SHx, unless otherwise noted    ALLERGIES & MEDICATIONS  --------------------------------------------------------------------------------  Allergies  No Known Allergies  Intolerances    Standing Inpatient Medications  albuterol    0.083% 2.5 milliGRAM(s) Nebulizer every 6 hours  ascorbic acid 500 milliGRAM(s) Oral daily  atorvastatin 10 milliGRAM(s) Oral at bedtime  budesonide 160 MICROgram(s)/formoterol 4.5 MICROgram(s) Inhaler 2 Puff(s) Inhalation two times a day  buMETAnide Injectable 2 milliGRAM(s) IV Push every 12 hours  cefTRIAXone   IVPB 2000 milliGRAM(s) IV Intermittent every 24 hours  chlorhexidine 4% Liquid 1 Application(s) Topical <User Schedule>  dextrose 5%. 1000 milliLiter(s) IV Continuous <Continuous>  dextrose 5%. 1000 milliLiter(s) IV Continuous <Continuous>  dextrose 50% Injectable 12.5 Gram(s) IV Push once  dextrose 50% Injectable 25 Gram(s) IV Push once  dextrose 50% Injectable 25 Gram(s) IV Push once  finasteride 5 milliGRAM(s) Oral daily  glucagon  Injectable 1 milliGRAM(s) IntraMuscular once  heparin  Infusion. 2200 Unit(s)/Hr IV Continuous <Continuous>  influenza  Vaccine (HIGH DOSE) 0.7 milliLiter(s) IntraMuscular once  insulin lispro (ADMELOG) corrective regimen sliding scale   SubCutaneous three times a day before meals  insulin lispro (ADMELOG) corrective regimen sliding scale   SubCutaneous at bedtime  metoprolol tartrate 25 milliGRAM(s) Oral two times a day  multivitamin 1 Tablet(s) Oral daily  pantoprazole    Tablet 40 milliGRAM(s) Oral before breakfast  predniSONE   Tablet 40 milliGRAM(s) Oral daily  sodium bicarbonate 325 milliGRAM(s) Oral every 8 hours  tamsulosin 0.4 milliGRAM(s) Oral at bedtime    PRN Inpatient Medications  acetaminophen     Tablet .. 650 milliGRAM(s) Oral every 6 hours PRN  benzocaine/menthol Lozenge 1 Lozenge Oral three times a day PRN  dextrose Oral Gel 15 Gram(s) Oral once PRN    REVIEW OF SYSTEMS  --------------------------------------------------------------------------------  as above    VITALS/PHYSICAL EXAM  --------------------------------------------------------------------------------  T(C): 36.2 (12-26-23 @ 03:21), Max: 36.6 (12-25-23 @ 12:28)  HR: 90 (12-26-23 @ 05:13) (78 - 97)  BP: 131/67 (12-26-23 @ 05:13) (130/62 - 151/79)  RR: 20 (12-26-23 @ 03:21) (18 - 20)  SpO2: 95% (12-26-23 @ 03:21) (95% - 96%)  Wt(kg): --    12-25-23 @ 07:01  -  12-26-23 @ 07:00  --------------------------------------------------------  IN: 296 mL / OUT: 4700 mL / NET: -4404 mL    Physical Exam:  Gen: NAD  HEENT: Anicteric  Pulm: CTA B/L  CV: S1S2+  Abd: Soft, +BS   Ext: +B/L LE Pitting edema  Neuro: Awake  : External urinary capture with bag  Skin: Warm and dry      LABS/STUDIES  --------------------------------------------------------------------------------              7.9    17.23 >-----------<  623      [12-25-23 @ 07:20]              24.9     131  |  91  |  104  ----------------------------<  211      [12-25-23 @ 07:20]  4.1   |  21  |  4.90        Ca     8.2     [12-25-23 @ 07:20]      Mg     1.9     [12-25-23 @ 07:20]      Phos  6.4     [12-25-23 @ 07:20]    TPro  6.0  /  Alb  2.6  /  TBili  0.2  /  DBili  x   /  AST  52  /  ALT  77  /  AlkPhos  108  [12-25-23 @ 07:20]    PTT: 105.1      [12-26-23 @ 03:37]    Creatinine Trend:  SCr 4.90 [12-25 @ 07:20]  SCr 4.97 [12-24 @ 07:00]  SCr 4.32 [12-23 @ 03:39]  SCr 3.44 [12-22 @ 11:35]  SCr 1.62 [12-21 @ 07:44]    Urine Creatinine 100      [12-22-23 @ 13:04]  Urine Protein 22      [12-22-23 @ 13:04]  Urine Sodium 16      [12-22-23 @ 13:04]  Urine Urea Nitrogen 396      [12-22-23 @ 13:04]  Urine Potassium 38      [12-22-23 @ 13:04]  Urine Osmolality 322      [12-22-23 @ 13:04]   [facility] Division of Kidney Diseases & Hypertension  FOLLOW UP NOTE  [phone]--------------------------------------------------------------------------------  Chief Complaint: BESSY    24 hour events/subjective: Pt seen and evaluated bedside this morning. Reports feeling well and urinating a lot. Endorses LE edema, otherwise no complaints. Denies any headaches, nausea, vomiting, fevers/chills, chest pain, SOB, abdominal pain.     PAST HISTORY  --------------------------------------------------------------------------------  No significant changes to PMH, PSH, FHx, SHx, unless otherwise noted    ALLERGIES & MEDICATIONS  --------------------------------------------------------------------------------  Allergies  No Known Allergies  Intolerances    Standing Inpatient Medications  albuterol    0.083% 2.5 milliGRAM(s) Nebulizer every 6 hours  ascorbic acid 500 milliGRAM(s) Oral daily  atorvastatin 10 milliGRAM(s) Oral at bedtime  budesonide 160 MICROgram(s)/formoterol 4.5 MICROgram(s) Inhaler 2 Puff(s) Inhalation two times a day  buMETAnide Injectable 2 milliGRAM(s) IV Push every 12 hours  cefTRIAXone   IVPB 2000 milliGRAM(s) IV Intermittent every 24 hours  chlorhexidine 4% Liquid 1 Application(s) Topical <User Schedule>  dextrose 5%. 1000 milliLiter(s) IV Continuous <Continuous>  dextrose 5%. 1000 milliLiter(s) IV Continuous <Continuous>  dextrose 50% Injectable 12.5 Gram(s) IV Push once  dextrose 50% Injectable 25 Gram(s) IV Push once  dextrose 50% Injectable 25 Gram(s) IV Push once  finasteride 5 milliGRAM(s) Oral daily  glucagon  Injectable 1 milliGRAM(s) IntraMuscular once  heparin  Infusion. 2200 Unit(s)/Hr IV Continuous <Continuous>  influenza  Vaccine (HIGH DOSE) 0.7 milliLiter(s) IntraMuscular once  insulin lispro (ADMELOG) corrective regimen sliding scale   SubCutaneous three times a day before meals  insulin lispro (ADMELOG) corrective regimen sliding scale   SubCutaneous at bedtime  metoprolol tartrate 25 milliGRAM(s) Oral two times a day  multivitamin 1 Tablet(s) Oral daily  pantoprazole    Tablet 40 milliGRAM(s) Oral before breakfast  predniSONE   Tablet 40 milliGRAM(s) Oral daily  sodium bicarbonate 325 milliGRAM(s) Oral every 8 hours  tamsulosin 0.4 milliGRAM(s) Oral at bedtime    PRN Inpatient Medications  acetaminophen     Tablet .. 650 milliGRAM(s) Oral every 6 hours PRN  benzocaine/menthol Lozenge 1 Lozenge Oral three times a day PRN  dextrose Oral Gel 15 Gram(s) Oral once PRN    REVIEW OF SYSTEMS  --------------------------------------------------------------------------------  as above    VITALS/PHYSICAL EXAM  --------------------------------------------------------------------------------  T(C): 36.2 (12-26-23 @ 03:21), Max: 36.6 (12-25-23 @ 12:28)  HR: 90 (12-26-23 @ 05:13) (78 - 97)  BP: 131/67 (12-26-23 @ 05:13) (130/62 - 151/79)  RR: 20 (12-26-23 @ 03:21) (18 - 20)  SpO2: 95% (12-26-23 @ 03:21) (95% - 96%)  Wt(kg): --    12-25-23 @ 07:01  -  12-26-23 @ 07:00  --------------------------------------------------------  IN: 296 mL / OUT: 4700 mL / NET: -4404 mL    Physical Exam:  Gen: NAD  HEENT: Anicteric  Pulm: CTA B/L  CV: S1S2+  Abd: Soft, +BS   Ext: +B/L LE Pitting edema  Neuro: Awake  : External urinary capture with bag  Skin: Warm and dry      LABS/STUDIES  --------------------------------------------------------------------------------              7.9    17.23 >-----------<  623      [12-25-23 @ 07:20]              24.9     131  |  91  |  104  ----------------------------<  211      [12-25-23 @ 07:20]  4.1   |  21  |  4.90        Ca     8.2     [12-25-23 @ 07:20]      Mg     1.9     [12-25-23 @ 07:20]      Phos  6.4     [12-25-23 @ 07:20]    TPro  6.0  /  Alb  2.6  /  TBili  0.2  /  DBili  x   /  AST  52  /  ALT  77  /  AlkPhos  108  [12-25-23 @ 07:20]    PTT: 105.1      [12-26-23 @ 03:37]    Creatinine Trend:  SCr 4.90 [12-25 @ 07:20]  SCr 4.97 [12-24 @ 07:00]  SCr 4.32 [12-23 @ 03:39]  SCr 3.44 [12-22 @ 11:35]  SCr 1.62 [12-21 @ 07:44]    Urine Creatinine 100      [12-22-23 @ 13:04]  Urine Protein 22      [12-22-23 @ 13:04]  Urine Sodium 16      [12-22-23 @ 13:04]  Urine Urea Nitrogen 396      [12-22-23 @ 13:04]  Urine Potassium 38      [12-22-23 @ 13:04]  Urine Osmolality 322      [12-22-23 @ 13:04]   [facility] Division of Kidney Diseases & Hypertension  FOLLOW UP NOTE  [phone]--------------------------------------------------------------------------------  Chief Complaint: BESSY    24 hour events/subjective: Pt seen and evaluated bedside this morning. Reports feeling well and urinating a lot. Endorses LE edema, otherwise no complaints. Denies any headaches, nausea, vomiting, fevers/chills, chest pain, SOB, abdominal pain.     PAST HISTORY  --------------------------------------------------------------------------------  No significant changes to PMH, PSH, FHx, SHx, unless otherwise noted    ALLERGIES & MEDICATIONS  --------------------------------------------------------------------------------  Allergies  No Known Allergies  Intolerances    Standing Inpatient Medications  albuterol    0.083% 2.5 milliGRAM(s) Nebulizer every 6 hours  ascorbic acid 500 milliGRAM(s) Oral daily  atorvastatin 10 milliGRAM(s) Oral at bedtime  budesonide 160 MICROgram(s)/formoterol 4.5 MICROgram(s) Inhaler 2 Puff(s) Inhalation two times a day  buMETAnide Injectable 2 milliGRAM(s) IV Push every 12 hours  cefTRIAXone   IVPB 2000 milliGRAM(s) IV Intermittent every 24 hours  chlorhexidine 4% Liquid 1 Application(s) Topical <User Schedule>  dextrose 5%. 1000 milliLiter(s) IV Continuous <Continuous>  dextrose 5%. 1000 milliLiter(s) IV Continuous <Continuous>  dextrose 50% Injectable 12.5 Gram(s) IV Push once  dextrose 50% Injectable 25 Gram(s) IV Push once  dextrose 50% Injectable 25 Gram(s) IV Push once  finasteride 5 milliGRAM(s) Oral daily  glucagon  Injectable 1 milliGRAM(s) IntraMuscular once  heparin  Infusion. 2200 Unit(s)/Hr IV Continuous <Continuous>  influenza  Vaccine (HIGH DOSE) 0.7 milliLiter(s) IntraMuscular once  insulin lispro (ADMELOG) corrective regimen sliding scale   SubCutaneous three times a day before meals  insulin lispro (ADMELOG) corrective regimen sliding scale   SubCutaneous at bedtime  metoprolol tartrate 25 milliGRAM(s) Oral two times a day  multivitamin 1 Tablet(s) Oral daily  pantoprazole    Tablet 40 milliGRAM(s) Oral before breakfast  predniSONE   Tablet 40 milliGRAM(s) Oral daily  sodium bicarbonate 325 milliGRAM(s) Oral every 8 hours  tamsulosin 0.4 milliGRAM(s) Oral at bedtime    PRN Inpatient Medications  acetaminophen     Tablet .. 650 milliGRAM(s) Oral every 6 hours PRN  benzocaine/menthol Lozenge 1 Lozenge Oral three times a day PRN  dextrose Oral Gel 15 Gram(s) Oral once PRN    REVIEW OF SYSTEMS  --------------------------------------------------------------------------------  as above    VITALS/PHYSICAL EXAM  --------------------------------------------------------------------------------  T(C): 36.2 (12-26-23 @ 03:21), Max: 36.6 (12-25-23 @ 12:28)  HR: 90 (12-26-23 @ 05:13) (78 - 97)  BP: 131/67 (12-26-23 @ 05:13) (130/62 - 151/79)  RR: 20 (12-26-23 @ 03:21) (18 - 20)  SpO2: 95% (12-26-23 @ 03:21) (95% - 96%)  Wt(kg): --    12-25-23 @ 07:01  -  12-26-23 @ 07:00  --------------------------------------------------------  IN: 296 mL / OUT: 4700 mL / NET: -4404 mL    Physical Exam:  Gen: NAD  HEENT: Anicteric  Pulm: CTA B/L  CV: S1S2+  Abd: Soft, +BS   Ext: +B/L LE Pitting edema  Neuro: Awake  : External urinary capture with bag  Skin: Warm and dry      LABS/STUDIES  --------------------------------------------------------------------------------              7.9    17.23 >-----------<  623      [12-25-23 @ 07:20]              24.9     131  |  91  |  104  ----------------------------<  211      [12-25-23 @ 07:20]  4.1   |  21  |  4.90        Ca     8.2     [12-25-23 @ 07:20]      Mg     1.9     [12-25-23 @ 07:20]      Phos  6.4     [12-25-23 @ 07:20]    TPro  6.0  /  Alb  2.6  /  TBili  0.2  /  DBili  x   /  AST  52  /  ALT  77  /  AlkPhos  108  [12-25-23 @ 07:20]    PTT: 105.1      [12-26-23 @ 03:37]    Creatinine Trend:  SCr 4.90 [12-25 @ 07:20]  SCr 4.97 [12-24 @ 07:00]  SCr 4.32 [12-23 @ 03:39]  SCr 3.44 [12-22 @ 11:35]  SCr 1.62 [12-21 @ 07:44]    Urine Creatinine 100      [12-22-23 @ 13:04]  Urine Protein 22      [12-22-23 @ 13:04]  Urine Sodium 16      [12-22-23 @ 13:04]  Urine Urea Nitrogen 396      [12-22-23 @ 13:04]  Urine Potassium 38      [12-22-23 @ 13:04]  Urine Osmolality 322      [12-22-23 @ 13:04]

## 2023-12-26 NOTE — PROGRESS NOTE ADULT - ASSESSMENT
Pneumococcal pneumonia with septicemia  New large pleural effusion. Had some chest pain prior to coming to the hospital, now resolved    12/22: Overall stable. Afebrile with leukocytosis, steroids potentially contributing to both. No cell counts on fluid yet but pH Ok. LDH elevated. Now also with BESSY, likley multifactorial but contrast playing a role.   12/26: Overall stable. BESSY hopefully plateaued and will begin to decline. Parapneumonic effusion, not infected appearing. ECHO no overt concern for IE, f/u blood cx NTD. Leukocytosis likely multifactorial including sterids and reactive component.       SUggestions--  Continue ceftriaxone  COPD management as per pulmonary and primary team  BESSY per primary team, ceftriaxone does not require dose adjustment      Markel Morales MD  Attending Physician  [facility]  Division of Infectious Diseases  [phone]   Pneumococcal pneumonia with septicemia  New large pleural effusion. Had some chest pain prior to coming to the hospital, now resolved    12/22: Overall stable. Afebrile with leukocytosis, steroids potentially contributing to both. No cell counts on fluid yet but pH Ok. LDH elevated. Now also with BESSY, likley multifactorial but contrast playing a role.   12/26: Overall stable. BESSY hopefully plateaued and will begin to decline. Parapneumonic effusion, not infected appearing. ECHO no overt concern for IE, f/u blood cx NTD. Leukocytosis likely multifactorial including sterids and reactive component.       SUggestions--  Continue ceftriaxone  COPD management as per pulmonary and primary team  BESSY per primary team, ceftriaxone does not require dose adjustment      Markel Morales MD  Attending Physician  [facility]  Division of Infectious Diseases  [phone]

## 2023-12-26 NOTE — PROGRESS NOTE ADULT - PROBLEM SELECTOR PLAN 4
baseline Cre <1  ddx pre-renal iso infection, poor PO, cardio-renal causes, contrast-induced, post renal needs to be ruled out   - f/u US bladder, kidneys read   - nephro consulted - f/u recs   - avoid nephrotoxic agents baseline Cre <1  ddx pre-renal iso infection, poor PO, cardio-renal causes, contrast-induced, post renal needs to be ruled out   - nephro consulted - f/u recs -> consented for HD not candidate at the time   - avoid nephrotoxic agents - Resume home metoprolol 25 BID

## 2023-12-26 NOTE — PROGRESS NOTE ADULT - SUBJECTIVE AND OBJECTIVE BOX
***************************************************************  Blas Rubio, PGY1  Internal Medicine   Preferred contact via [employer] Teams   ***************************************************************    GLADIS LEONARD  83y  MRN: [id number]    Patient is a 83y old  Male who presents with a chief complaint of sob (25 Dec 2023 13:17)      Interval/Overnight Events: no events ON.     Subjective: Pt seen and examined at bedside. Denies fever, CP, SOB, abn pain, N/V, dysuria. Tolerating diet.      MEDICATIONS  (STANDING):  albuterol    0.083% 2.5 milliGRAM(s) Nebulizer every 6 hours  ascorbic acid 500 milliGRAM(s) Oral daily  atorvastatin 10 milliGRAM(s) Oral at bedtime  budesonide 160 MICROgram(s)/formoterol 4.5 MICROgram(s) Inhaler 2 Puff(s) Inhalation two times a day  buMETAnide Injectable 2 milliGRAM(s) IV Push every 12 hours  cefTRIAXone   IVPB 2000 milliGRAM(s) IV Intermittent every 24 hours  chlorhexidine 4% Liquid 1 Application(s) Topical <User Schedule>  dextrose 5%. 1000 milliLiter(s) (50 mL/Hr) IV Continuous <Continuous>  dextrose 5%. 1000 milliLiter(s) (100 mL/Hr) IV Continuous <Continuous>  dextrose 50% Injectable 25 Gram(s) IV Push once  dextrose 50% Injectable 25 Gram(s) IV Push once  dextrose 50% Injectable 12.5 Gram(s) IV Push once  finasteride 5 milliGRAM(s) Oral daily  glucagon  Injectable 1 milliGRAM(s) IntraMuscular once  heparin  Infusion. 2200 Unit(s)/Hr (22 mL/Hr) IV Continuous <Continuous>  influenza  Vaccine (HIGH DOSE) 0.7 milliLiter(s) IntraMuscular once  insulin lispro (ADMELOG) corrective regimen sliding scale   SubCutaneous at bedtime  insulin lispro (ADMELOG) corrective regimen sliding scale   SubCutaneous three times a day before meals  metoprolol tartrate 25 milliGRAM(s) Oral two times a day  multivitamin 1 Tablet(s) Oral daily  pantoprazole    Tablet 40 milliGRAM(s) Oral before breakfast  predniSONE   Tablet 40 milliGRAM(s) Oral daily  sodium bicarbonate 325 milliGRAM(s) Oral every 8 hours  tamsulosin 0.4 milliGRAM(s) Oral at bedtime    MEDICATIONS  (PRN):  acetaminophen     Tablet .. 650 milliGRAM(s) Oral every 6 hours PRN Temp greater or equal to 38C (100.4F), Mild Pain (1 - 3)  benzocaine/menthol Lozenge 1 Lozenge Oral three times a day PRN Sore Throat  dextrose Oral Gel 15 Gram(s) Oral once PRN Blood Glucose LESS THAN 70 milliGRAM(s)/deciliter      Objective:    Vitals: Vital Signs Last 24 Hrs  T(C): 36.2 (12-26-23 @ 03:21), Max: 36.6 (12-25-23 @ 12:28)  T(F): 97.2 (12-26-23 @ 03:21), Max: 97.9 (12-25-23 @ 12:28)  HR: 90 (12-26-23 @ 05:13) (78 - 97)  BP: 131/67 (12-26-23 @ 05:13) (130/62 - 151/79)  BP(mean): --  RR: 20 (12-26-23 @ 03:21) (18 - 20)  SpO2: 95% (12-26-23 @ 03:21) (93% - 96%)                I&O's Summary    25 Dec 2023 07:01  -  26 Dec 2023 07:00  --------------------------------------------------------  IN: 296 mL / OUT: 4700 mL / NET: -4404 mL        PHYSICAL EXAM:  GENERAL: NAD  HEAD:  Atraumatic, Normocephalic  EYES: EOMI, conjunctiva and sclera clear  CHEST/LUNG: Clear to auscultation bilaterally; No rales, rhonchi, wheezing, or rubs  HEART: Regular rate and rhythm; No murmurs, rubs, or gallops  ABDOMEN: Soft, Nontender, Nondistended;   SKIN: No rashes or lesions  NERVOUS SYSTEM:  Alert & Oriented X3, no focal deficits    LABS:                        7.9    17.23 )-----------( 623      ( 25 Dec 2023 07:20 )             24.9                         7.4    17.89 )-----------( 590      ( 24 Dec 2023 22:12 )             23.4                         7.3    15.85 )-----------( 570      ( 24 Dec 2023 06:50 )             23.1     12-25    131<L>  |  91<L>  |  104<H>  ----------------------------<  211<H>  4.1   |  21<L>  |  4.90<H>  12-24    129<L>  |  89<L>  |  92<H>  ----------------------------<  196<H>  4.3   |  21<L>  |  4.97<H>    Ca    8.2<L>      25 Dec 2023 07:20  Ca    8.6      24 Dec 2023 07:00  Phos  6.4     12-25  Mg     1.9     12-25    TPro  6.0  /  Alb  2.6<L>  /  TBili  0.2  /  DBili  x   /  AST  52<H>  /  ALT  77<H>  /  AlkPhos  108  12-25  TPro  5.9<L>  /  Alb  2.5<L>  /  TBili  0.2  /  DBili  x   /  AST  49<H>  /  ALT  53<H>  /  AlkPhos  98  12-24    CAPILLARY BLOOD GLUCOSE      POCT Blood Glucose.: 248 mg/dL (25 Dec 2023 20:59)  POCT Blood Glucose.: 273 mg/dL (25 Dec 2023 17:32)  POCT Blood Glucose.: 159 mg/dL (25 Dec 2023 12:56)  POCT Blood Glucose.: 244 mg/dL (25 Dec 2023 08:33)    PTT - ( 26 Dec 2023 03:37 )  PTT:105.1 sec    Urinalysis Basic - ( 25 Dec 2023 07:20 )    Color: x / Appearance: x / SG: x / pH: x  Gluc: 211 mg/dL / Ketone: x  / Bili: x / Urobili: x   Blood: x / Protein: x / Nitrite: x   Leuk Esterase: x / RBC: x / WBC x   Sq Epi: x / Non Sq Epi: x / Bacteria: x          RADIOLOGY & ADDITIONAL TESTS:    Imaging Personally Reviewed:  [x ] YES  [ ] NO    Consultants involved in case:   Consultant(s) Notes Reviewed:  [ x] YES  [ ] NO:   Care Discussed with Consultants/Other Providers [x ] YES  [ ] NO         ***************************************************************  Blas Rubio, PGY1  Internal Medicine   Preferred contact via [employer] Teams   ***************************************************************    GLADIS LEONARD  83y  MRN: [id number]    Patient is a 83y old  Male who presents with a chief complaint of sob (25 Dec 2023 13:17)      Interval/Overnight Events: no events ON.     Subjective: Pt seen and examined at bedside. Denies fever, CP, SOB, abn pain, N/V, dysuria. Tolerating diet.      MEDICATIONS  (STANDING):  albuterol    0.083% 2.5 milliGRAM(s) Nebulizer every 6 hours  ascorbic acid 500 milliGRAM(s) Oral daily  atorvastatin 10 milliGRAM(s) Oral at bedtime  budesonide 160 MICROgram(s)/formoterol 4.5 MICROgram(s) Inhaler 2 Puff(s) Inhalation two times a day  buMETAnide Injectable 2 milliGRAM(s) IV Push every 12 hours  cefTRIAXone   IVPB 2000 milliGRAM(s) IV Intermittent every 24 hours  chlorhexidine 4% Liquid 1 Application(s) Topical <User Schedule>  dextrose 5%. 1000 milliLiter(s) (50 mL/Hr) IV Continuous <Continuous>  dextrose 5%. 1000 milliLiter(s) (100 mL/Hr) IV Continuous <Continuous>  dextrose 50% Injectable 25 Gram(s) IV Push once  dextrose 50% Injectable 25 Gram(s) IV Push once  dextrose 50% Injectable 12.5 Gram(s) IV Push once  finasteride 5 milliGRAM(s) Oral daily  glucagon  Injectable 1 milliGRAM(s) IntraMuscular once  heparin  Infusion. 2200 Unit(s)/Hr (22 mL/Hr) IV Continuous <Continuous>  influenza  Vaccine (HIGH DOSE) 0.7 milliLiter(s) IntraMuscular once  insulin lispro (ADMELOG) corrective regimen sliding scale   SubCutaneous at bedtime  insulin lispro (ADMELOG) corrective regimen sliding scale   SubCutaneous three times a day before meals  metoprolol tartrate 25 milliGRAM(s) Oral two times a day  multivitamin 1 Tablet(s) Oral daily  pantoprazole    Tablet 40 milliGRAM(s) Oral before breakfast  predniSONE   Tablet 40 milliGRAM(s) Oral daily  sodium bicarbonate 325 milliGRAM(s) Oral every 8 hours  tamsulosin 0.4 milliGRAM(s) Oral at bedtime    MEDICATIONS  (PRN):  acetaminophen     Tablet .. 650 milliGRAM(s) Oral every 6 hours PRN Temp greater or equal to 38C (100.4F), Mild Pain (1 - 3)  benzocaine/menthol Lozenge 1 Lozenge Oral three times a day PRN Sore Throat  dextrose Oral Gel 15 Gram(s) Oral once PRN Blood Glucose LESS THAN 70 milliGRAM(s)/deciliter      Objective:    Vitals: Vital Signs Last 24 Hrs  T(C): 36.2 (12-26-23 @ 03:21), Max: 36.6 (12-25-23 @ 12:28)  T(F): 97.2 (12-26-23 @ 03:21), Max: 97.9 (12-25-23 @ 12:28)  HR: 90 (12-26-23 @ 05:13) (78 - 97)  BP: 131/67 (12-26-23 @ 05:13) (130/62 - 151/79)  BP(mean): --  RR: 20 (12-26-23 @ 03:21) (18 - 20)  SpO2: 95% (12-26-23 @ 03:21) (93% - 96%)                I&O's Summary    25 Dec 2023 07:01  -  26 Dec 2023 07:00  --------------------------------------------------------  IN: 296 mL / OUT: 4700 mL / NET: -4404 mL        PHYSICAL EXAM:  GENERAL: NAD  HEAD:  Atraumatic, Normocephalic  EYES: EOMI, conjunctiva and sclera clear  CHEST/LUNG: Clear to auscultation bilaterally; No rales, rhonchi, wheezing, or rubs  HEART: Regular rate and rhythm; No murmurs, rubs, or gallops  ABDOMEN: Soft, Nontender, Nondistended;   SKIN: No rashes or lesions  NERVOUS SYSTEM:  Alert & Oriented X3, no focal deficits    LABS:                        7.9    17.23 )-----------( 623      ( 25 Dec 2023 07:20 )             24.9                         7.4    17.89 )-----------( 590      ( 24 Dec 2023 22:12 )             23.4                         7.3    15.85 )-----------( 570      ( 24 Dec 2023 06:50 )             23.1     12-25    131<L>  |  91<L>  |  104<H>  ----------------------------<  211<H>  4.1   |  21<L>  |  4.90<H>  12-24    129<L>  |  89<L>  |  92<H>  ----------------------------<  196<H>  4.3   |  21<L>  |  4.97<H>    Ca    8.2<L>      25 Dec 2023 07:20  Ca    8.6      24 Dec 2023 07:00  Phos  6.4     12-25  Mg     1.9     12-25    TPro  6.0  /  Alb  2.6<L>  /  TBili  0.2  /  DBili  x   /  AST  52<H>  /  ALT  77<H>  /  AlkPhos  108  12-25  TPro  5.9<L>  /  Alb  2.5<L>  /  TBili  0.2  /  DBili  x   /  AST  49<H>  /  ALT  53<H>  /  AlkPhos  98  12-24    CAPILLARY BLOOD GLUCOSE      POCT Blood Glucose.: 248 mg/dL (25 Dec 2023 20:59)  POCT Blood Glucose.: 273 mg/dL (25 Dec 2023 17:32)  POCT Blood Glucose.: 159 mg/dL (25 Dec 2023 12:56)  POCT Blood Glucose.: 244 mg/dL (25 Dec 2023 08:33)    PTT - ( 26 Dec 2023 03:37 )  PTT:105.1 sec    Urinalysis Basic - ( 25 Dec 2023 07:20 )    Color: x / Appearance: x / SG: x / pH: x  Gluc: 211 mg/dL / Ketone: x  / Bili: x / Urobili: x   Blood: x / Protein: x / Nitrite: x   Leuk Esterase: x / RBC: x / WBC x   Sq Epi: x / Non Sq Epi: x / Bacteria: x          RADIOLOGY & ADDITIONAL TESTS:    Imaging Personally Reviewed:  [x ] YES  [ ] NO    Consultants involved in case:   Consultant(s) Notes Reviewed:  [ x] YES  [ ] NO:   Care Discussed with Consultants/Other Providers [x ] YES  [ ] NO         ***************************************************************  Blas Rubio, PGY1  Internal Medicine   Preferred contact via [employer] Teams   ***************************************************************    GLADIS LEONARD  83y  MRN: [id number]    Patient is a 83y old  Male who presents with a chief complaint of sob (25 Dec 2023 13:17)      Interval/Overnight Events: no events ON.     Subjective: Pt seen and examined at bedside. Pt pleasant and resting calmly.     MEDICATIONS  (STANDING):  albuterol    0.083% 2.5 milliGRAM(s) Nebulizer every 6 hours  ascorbic acid 500 milliGRAM(s) Oral daily  atorvastatin 10 milliGRAM(s) Oral at bedtime  budesonide 160 MICROgram(s)/formoterol 4.5 MICROgram(s) Inhaler 2 Puff(s) Inhalation two times a day  buMETAnide Injectable 2 milliGRAM(s) IV Push every 12 hours  cefTRIAXone   IVPB 2000 milliGRAM(s) IV Intermittent every 24 hours  chlorhexidine 4% Liquid 1 Application(s) Topical <User Schedule>  dextrose 5%. 1000 milliLiter(s) (50 mL/Hr) IV Continuous <Continuous>  dextrose 5%. 1000 milliLiter(s) (100 mL/Hr) IV Continuous <Continuous>  dextrose 50% Injectable 25 Gram(s) IV Push once  dextrose 50% Injectable 25 Gram(s) IV Push once  dextrose 50% Injectable 12.5 Gram(s) IV Push once  finasteride 5 milliGRAM(s) Oral daily  glucagon  Injectable 1 milliGRAM(s) IntraMuscular once  heparin  Infusion. 2200 Unit(s)/Hr (22 mL/Hr) IV Continuous <Continuous>  influenza  Vaccine (HIGH DOSE) 0.7 milliLiter(s) IntraMuscular once  insulin lispro (ADMELOG) corrective regimen sliding scale   SubCutaneous at bedtime  insulin lispro (ADMELOG) corrective regimen sliding scale   SubCutaneous three times a day before meals  metoprolol tartrate 25 milliGRAM(s) Oral two times a day  multivitamin 1 Tablet(s) Oral daily  pantoprazole    Tablet 40 milliGRAM(s) Oral before breakfast  predniSONE   Tablet 40 milliGRAM(s) Oral daily  sodium bicarbonate 325 milliGRAM(s) Oral every 8 hours  tamsulosin 0.4 milliGRAM(s) Oral at bedtime    MEDICATIONS  (PRN):  acetaminophen     Tablet .. 650 milliGRAM(s) Oral every 6 hours PRN Temp greater or equal to 38C (100.4F), Mild Pain (1 - 3)  benzocaine/menthol Lozenge 1 Lozenge Oral three times a day PRN Sore Throat  dextrose Oral Gel 15 Gram(s) Oral once PRN Blood Glucose LESS THAN 70 milliGRAM(s)/deciliter      Objective:    Vitals: Vital Signs Last 24 Hrs  T(C): 36.2 (12-26-23 @ 03:21), Max: 36.6 (12-25-23 @ 12:28)  T(F): 97.2 (12-26-23 @ 03:21), Max: 97.9 (12-25-23 @ 12:28)  HR: 90 (12-26-23 @ 05:13) (78 - 97)  BP: 131/67 (12-26-23 @ 05:13) (130/62 - 151/79)  BP(mean): --  RR: 20 (12-26-23 @ 03:21) (18 - 20)  SpO2: 95% (12-26-23 @ 03:21) (93% - 96%)                I&O's Summary    25 Dec 2023 07:01  -  26 Dec 2023 07:00  --------------------------------------------------------  IN: 296 mL / OUT: 4700 mL / NET: -4404 mL        PHYSICAL EXAM:  GENERAL: NAD  HEAD:  Atraumatic, Normocephalic  EYES: EOMI, conjunctiva and sclera clear  CHEST/LUNG: Crackles +  HEART: Regular rate and rhythm; No murmurs, rubs, or gallops  ABDOMEN: Soft, Nontender, Nondistended;   SKIN:2-3+ BL PE   NERVOUS SYSTEM:  Alert & Oriented X3, no focal deficits    LABS:                        7.9    17.23 )-----------( 623      ( 25 Dec 2023 07:20 )             24.9                         7.4    17.89 )-----------( 590      ( 24 Dec 2023 22:12 )             23.4                         7.3    15.85 )-----------( 570      ( 24 Dec 2023 06:50 )             23.1     12-25    131<L>  |  91<L>  |  104<H>  ----------------------------<  211<H>  4.1   |  21<L>  |  4.90<H>  12-24    129<L>  |  89<L>  |  92<H>  ----------------------------<  196<H>  4.3   |  21<L>  |  4.97<H>    Ca    8.2<L>      25 Dec 2023 07:20  Ca    8.6      24 Dec 2023 07:00  Phos  6.4     12-25  Mg     1.9     12-25    TPro  6.0  /  Alb  2.6<L>  /  TBili  0.2  /  DBili  x   /  AST  52<H>  /  ALT  77<H>  /  AlkPhos  108  12-25  TPro  5.9<L>  /  Alb  2.5<L>  /  TBili  0.2  /  DBili  x   /  AST  49<H>  /  ALT  53<H>  /  AlkPhos  98  12-24    CAPILLARY BLOOD GLUCOSE      POCT Blood Glucose.: 248 mg/dL (25 Dec 2023 20:59)  POCT Blood Glucose.: 273 mg/dL (25 Dec 2023 17:32)  POCT Blood Glucose.: 159 mg/dL (25 Dec 2023 12:56)  POCT Blood Glucose.: 244 mg/dL (25 Dec 2023 08:33)    PTT - ( 26 Dec 2023 03:37 )  PTT:105.1 sec    Urinalysis Basic - ( 25 Dec 2023 07:20 )    Color: x / Appearance: x / SG: x / pH: x  Gluc: 211 mg/dL / Ketone: x  / Bili: x / Urobili: x   Blood: x / Protein: x / Nitrite: x   Leuk Esterase: x / RBC: x / WBC x   Sq Epi: x / Non Sq Epi: x / Bacteria: x          RADIOLOGY & ADDITIONAL TESTS:    Imaging Personally Reviewed:  [x ] YES  [ ] NO    Consultants involved in case:   Consultant(s) Notes Reviewed:  [ x] YES  [ ] NO:   Care Discussed with Consultants/Other Providers [x ] YES  [ ] NO         ***************************************************************  Blas Rubio, PGY1  Internal Medicine   Preferred contact via [employer] Teams   ***************************************************************    GLADIS LEONARD  83y  MRN: [id number]    Patient is a 83y old  Male who presents with a chief complaint of sob (25 Dec 2023 13:17)      Interval/Overnight Events: no events ON.     Subjective: Pt seen and examined at bedside. Pt pleasant and resting calmly.     MEDICATIONS  (STANDING):  albuterol    0.083% 2.5 milliGRAM(s) Nebulizer every 6 hours  ascorbic acid 500 milliGRAM(s) Oral daily  atorvastatin 10 milliGRAM(s) Oral at bedtime  budesonide 160 MICROgram(s)/formoterol 4.5 MICROgram(s) Inhaler 2 Puff(s) Inhalation two times a day  buMETAnide Injectable 2 milliGRAM(s) IV Push every 12 hours  cefTRIAXone   IVPB 2000 milliGRAM(s) IV Intermittent every 24 hours  chlorhexidine 4% Liquid 1 Application(s) Topical <User Schedule>  dextrose 5%. 1000 milliLiter(s) (50 mL/Hr) IV Continuous <Continuous>  dextrose 5%. 1000 milliLiter(s) (100 mL/Hr) IV Continuous <Continuous>  dextrose 50% Injectable 25 Gram(s) IV Push once  dextrose 50% Injectable 25 Gram(s) IV Push once  dextrose 50% Injectable 12.5 Gram(s) IV Push once  finasteride 5 milliGRAM(s) Oral daily  glucagon  Injectable 1 milliGRAM(s) IntraMuscular once  heparin  Infusion. 2200 Unit(s)/Hr (22 mL/Hr) IV Continuous <Continuous>  influenza  Vaccine (HIGH DOSE) 0.7 milliLiter(s) IntraMuscular once  insulin lispro (ADMELOG) corrective regimen sliding scale   SubCutaneous at bedtime  insulin lispro (ADMELOG) corrective regimen sliding scale   SubCutaneous three times a day before meals  metoprolol tartrate 25 milliGRAM(s) Oral two times a day  multivitamin 1 Tablet(s) Oral daily  pantoprazole    Tablet 40 milliGRAM(s) Oral before breakfast  predniSONE   Tablet 40 milliGRAM(s) Oral daily  sodium bicarbonate 325 milliGRAM(s) Oral every 8 hours  tamsulosin 0.4 milliGRAM(s) Oral at bedtime    MEDICATIONS  (PRN):  acetaminophen     Tablet .. 650 milliGRAM(s) Oral every 6 hours PRN Temp greater or equal to 38C (100.4F), Mild Pain (1 - 3)  benzocaine/menthol Lozenge 1 Lozenge Oral three times a day PRN Sore Throat  dextrose Oral Gel 15 Gram(s) Oral once PRN Blood Glucose LESS THAN 70 milliGRAM(s)/deciliter      Objective:    Vitals: Vital Signs Last 24 Hrs  T(C): 36.2 (12-26-23 @ 03:21), Max: 36.6 (12-25-23 @ 12:28)  T(F): 97.2 (12-26-23 @ 03:21), Max: 97.9 (12-25-23 @ 12:28)  HR: 90 (12-26-23 @ 05:13) (78 - 97)  BP: 131/67 (12-26-23 @ 05:13) (130/62 - 151/79)  BP(mean): --  RR: 20 (12-26-23 @ 03:21) (18 - 20)  SpO2: 95% (12-26-23 @ 03:21) (93% - 96%)                I&O's Summary    25 Dec 2023 07:01  -  26 Dec 2023 07:00  --------------------------------------------------------  IN: 296 mL / OUT: 4700 mL / NET: -4404 mL        PHYSICAL EXAM:  GENERAL: NAD  HEAD:  Atraumatic, Normocephalic  EYES: EOMI, conjunctiva and sclera clear  CHEST/LUNG: Crackles +  HEART: Regular rate and rhythm; No murmurs, rubs, or gallops  ABDOMEN: Soft, Nontender, Nondistended;   SKIN:2-3+ BL PE   NERVOUS SYSTEM:  Alert & Oriented X3, no focal deficits    LABS:                        7.9    17.23 )-----------( 623      ( 25 Dec 2023 07:20 )             24.9                         7.4    17.89 )-----------( 590      ( 24 Dec 2023 22:12 )             23.4                         7.3    15.85 )-----------( 570      ( 24 Dec 2023 06:50 )             23.1     12-25    131<L>  |  91<L>  |  104<H>  ----------------------------<  211<H>  4.1   |  21<L>  |  4.90<H>  12-24    129<L>  |  89<L>  |  92<H>  ----------------------------<  196<H>  4.3   |  21<L>  |  4.97<H>    Ca    8.2<L>      25 Dec 2023 07:20  Ca    8.6      24 Dec 2023 07:00  Phos  6.4     12-25  Mg     1.9     12-25    TPro  6.0  /  Alb  2.6<L>  /  TBili  0.2  /  DBili  x   /  AST  52<H>  /  ALT  77<H>  /  AlkPhos  108  12-25  TPro  5.9<L>  /  Alb  2.5<L>  /  TBili  0.2  /  DBili  x   /  AST  49<H>  /  ALT  53<H>  /  AlkPhos  98  12-24    CAPILLARY BLOOD GLUCOSE      POCT Blood Glucose.: 248 mg/dL (25 Dec 2023 20:59)  POCT Blood Glucose.: 273 mg/dL (25 Dec 2023 17:32)  POCT Blood Glucose.: 159 mg/dL (25 Dec 2023 12:56)  POCT Blood Glucose.: 244 mg/dL (25 Dec 2023 08:33)    PTT - ( 26 Dec 2023 03:37 )  PTT:105.1 sec    Urinalysis Basic - ( 25 Dec 2023 07:20 )    Color: x / Appearance: x / SG: x / pH: x  Gluc: 211 mg/dL / Ketone: x  / Bili: x / Urobili: x   Blood: x / Protein: x / Nitrite: x   Leuk Esterase: x / RBC: x / WBC x   Sq Epi: x / Non Sq Epi: x / Bacteria: x          RADIOLOGY & ADDITIONAL TESTS:    Imaging Personally Reviewed:  [x ] YES  [ ] NO    Consultants involved in case:   Consultant(s) Notes Reviewed:  [ x] YES  [ ] NO:   Care Discussed with Consultants/Other Providers [x ] YES  [ ] NO

## 2023-12-26 NOTE — PROGRESS NOTE ADULT - ATTENDING COMMENTS
Plateau phase ATN recommend monitor BMP daily.  no emergent HD indication 4700 cc urine would hold diuretics for today.

## 2023-12-26 NOTE — PROGRESS NOTE ADULT - ASSESSMENT
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 found to have hypoxemic respiratory failure secondary to pna vs copd vs AHRF    #recommendations    patient declining inhalers however amenable nebulizers continue standing duonebs and methylpred 40 BID continue   patient with significant increased mucus production and sepsis please continue ceftriaxone can discontinue azitho at this point  s/p 1200 left thoracentesis exudative, unlikely infectious pleural effusion, no ptx post op  flow negative, cultures negative, f/u cytopath  continue airway clearance: humidified oxygen, standing guaifenesin, duonebs q6h, hyper sal q6h, chest PT q6 h, aerobika/acapella q6 h,   f/u U/A, urine legionella,:   ERV positive, strep bactermia , nml resp sukhjinder, MRSA negative  pro BNP, ECHO with bubble study: nml LV nml RV  continue diuresis as per nephro  titrate oxygen to O2 >88%, use humidified oxygen  incentive spirometry, OOB to chair, PT/OT  aspiration precautions as necessary  DVT ppx as tolerated  airway clearance as necessary  no further pulmonary recommendations     Prior to discharge:  Please email: [email or web address] to setup an appointment prior to discharge. Include the patient's name, , MRN and contact information in the email.      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 found to have hypoxemic respiratory failure secondary to pna vs copd vs AHRF    #recommendations    patient declining inhalers however amenable nebulizers continue standing duonebs and methylpred 40 BID continue   patient with significant increased mucus production and sepsis please continue ceftriaxone can discontinue azitho at this point  s/p 1200 left thoracentesis exudative, unlikely infectious pleural effusion, no ptx post op  flow negative, cultures negative, f/u cytopath  continue airway clearance: humidified oxygen, standing guaifenesin, duonebs q6h, hyper sal q6h, chest PT q6 h, aerobika/acapella q6 h,   f/u U/A, urine legionella,:   ERV positive, strep bactermia , nml resp sukhjinder, MRSA negative  pro BNP, ECHO with bubble study: nml LV nml RV  continue diuresis as per nephro  titrate oxygen to O2 >88%, use humidified oxygen  incentive spirometry, OOB to chair, PT/OT  aspiration precautions as necessary  DVT ppx as tolerated  airway clearance as necessary  no further pulmonary recommendations     Prior to discharge:  Please email: [email or web address] to setup an appointment prior to discharge. Include the patient's name, , MRN and contact information in the email.      P[facility]  [address] [place] [address]  [phone]    Eugenio Barraza PGY5 PCCM Fellow

## 2023-12-26 NOTE — PROGRESS NOTE ADULT - SUBJECTIVE AND OBJECTIVE BOX
CHIEF COMPLAINT:Patient is a 83y old  Male who presents with a chief complaint of sob (26 Dec 2023 10:04)      Interval Events:    REVIEW OF SYSTEMS:  [x] All other systems negative except per HPI   [ ] Unable to assess ROS because ________    OBJECTIVE:  ICU Vital Signs Last 24 Hrs  T(C): 36.2 (26 Dec 2023 03:21), Max: 36.6 (25 Dec 2023 12:28)  T(F): 97.2 (26 Dec 2023 03:21), Max: 97.9 (25 Dec 2023 12:28)  HR: 90 (26 Dec 2023 05:13) (78 - 97)  BP: 131/67 (26 Dec 2023 05:13) (130/62 - 151/79)  BP(mean): --  ABP: --  ABP(mean): --  RR: 20 (26 Dec 2023 03:21) (18 - 20)  SpO2: 95% (26 Dec 2023 03:21) (95% - 96%)    O2 Parameters below as of 26 Dec 2023 03:21  Patient On (Oxygen Delivery Method): room air              12-25 @ 07:01  -  12-26 @ 07:00  --------------------------------------------------------  IN: 296 mL / OUT: 4700 mL / NET: -4404 mL        PHYSICAL EXAM:  GENERAL: NAD, well-groomed, well-developed  HEAD:  Atraumatic, Normocephalic  EYES: EOMI, PERRLA, conjunctiva and sclera clear  ENMT: No tonsillar erythema, exudates, or enlargement; Moist mucous membranes, Good dentition, No lesions  NECK: Supple, No JVD, Normal thyroid  CHEST/LUNG: Clear to auscultation bilaterally; No rales, rhonchi, wheezing, or rubs  HEART: Regular rate and rhythm; No murmurs, rubs, or gallops  ABDOMEN: Soft, Nontender, Nondistended; Bowel sounds present  VASCULAR:  2+ Peripheral Pulses, No clubbing, cyanosis, or edema  LYMPH: No lymphadenopathy noted  SKIN: No rashes or lesions  NERVOUS SYSTEM:  Alert & Oriented X3, Good concentration; Motor Strength 5/5 B/L upper and lower extremities; DTRs 2+ intact and symmetric    HOSPITAL MEDICATIONS:  MEDICATIONS  (STANDING):  albuterol    0.083% 2.5 milliGRAM(s) Nebulizer every 6 hours  ascorbic acid 500 milliGRAM(s) Oral daily  atorvastatin 10 milliGRAM(s) Oral at bedtime  budesonide 160 MICROgram(s)/formoterol 4.5 MICROgram(s) Inhaler 2 Puff(s) Inhalation two times a day  buMETAnide Injectable 2 milliGRAM(s) IV Push every 12 hours  cefTRIAXone   IVPB 2000 milliGRAM(s) IV Intermittent every 24 hours  chlorhexidine 4% Liquid 1 Application(s) Topical <User Schedule>  dextrose 5%. 1000 milliLiter(s) (50 mL/Hr) IV Continuous <Continuous>  dextrose 5%. 1000 milliLiter(s) (100 mL/Hr) IV Continuous <Continuous>  dextrose 50% Injectable 12.5 Gram(s) IV Push once  dextrose 50% Injectable 25 Gram(s) IV Push once  dextrose 50% Injectable 25 Gram(s) IV Push once  finasteride 5 milliGRAM(s) Oral daily  glucagon  Injectable 1 milliGRAM(s) IntraMuscular once  heparin  Infusion. 2200 Unit(s)/Hr (22 mL/Hr) IV Continuous <Continuous>  influenza  Vaccine (HIGH DOSE) 0.7 milliLiter(s) IntraMuscular once  insulin lispro (ADMELOG) corrective regimen sliding scale   SubCutaneous three times a day before meals  insulin lispro (ADMELOG) corrective regimen sliding scale   SubCutaneous at bedtime  metoprolol tartrate 25 milliGRAM(s) Oral two times a day  multivitamin 1 Tablet(s) Oral daily  pantoprazole    Tablet 40 milliGRAM(s) Oral before breakfast  sodium bicarbonate 325 milliGRAM(s) Oral every 8 hours  tamsulosin 0.4 milliGRAM(s) Oral at bedtime    MEDICATIONS  (PRN):  acetaminophen     Tablet .. 650 milliGRAM(s) Oral every 6 hours PRN Temp greater or equal to 38C (100.4F), Mild Pain (1 - 3)  benzocaine/menthol Lozenge 1 Lozenge Oral three times a day PRN Sore Throat  dextrose Oral Gel 15 Gram(s) Oral once PRN Blood Glucose LESS THAN 70 milliGRAM(s)/deciliter      LABS:    The Labs were reviewed by me   The Radiology was reviewed by me    EKG tracing reviewed by me    12-25    131<L>  |  91<L>  |  104<H>  ----------------------------<  211<H>  4.1   |  21<L>  |  4.90<H>  12-24    129<L>  |  89<L>  |  92<H>  ----------------------------<  196<H>  4.3   |  21<L>  |  4.97<H>    Ca    8.2<L>      25 Dec 2023 07:20  Ca    8.6      24 Dec 2023 07:00  Phos  6.4     12-25  Mg     1.9     12-25    TPro  6.0  /  Alb  2.6<L>  /  TBili  0.2  /  DBili  x   /  AST  52<H>  /  ALT  77<H>  /  AlkPhos  108  12-25  TPro  5.9<L>  /  Alb  2.5<L>  /  TBili  0.2  /  DBili  x   /  AST  49<H>  /  ALT  53<H>  /  AlkPhos  98  12-24    Magnesium: 1.9 mg/dL (12-25-23 @ 07:20)  Magnesium: 1.9 mg/dL (12-24-23 @ 07:00)    Phosphorus: 6.4 mg/dL (12-25-23 @ 07:20)  Phosphorus: 6.7 mg/dL (12-24-23 @ 07:00)      PTT - ( 26 Dec 2023 10:25 )  PTT:72.4 sec              Urinalysis Basic - ( 25 Dec 2023 07:20 )    Color: x / Appearance: x / SG: x / pH: x  Gluc: 211 mg/dL / Ketone: x  / Bili: x / Urobili: x   Blood: x / Protein: x / Nitrite: x   Leuk Esterase: x / RBC: x / WBC x   Sq Epi: x / Non Sq Epi: x / Bacteria: x                              7.9    17.23 )-----------( 623      ( 25 Dec 2023 07:20 )             24.9                         7.4    17.89 )-----------( 590      ( 24 Dec 2023 22:12 )             23.4                         7.3    15.85 )-----------( 570      ( 24 Dec 2023 06:50 )             23.1     CAPILLARY BLOOD GLUCOSE      POCT Blood Glucose.: 206 mg/dL (26 Dec 2023 12:16)  POCT Blood Glucose.: 194 mg/dL (26 Dec 2023 08:15)  POCT Blood Glucose.: 248 mg/dL (25 Dec 2023 20:59)  POCT Blood Glucose.: 273 mg/dL (25 Dec 2023 17:32)  POCT Blood Glucose.: 159 mg/dL (25 Dec 2023 12:56)        MICROBIOLOGY:     RADIOLOGY:  [ ] Reviewed and interpreted by me    Point of Care Ultrasound Findings:    PFT:    EKG: CHIEF COMPLAINT:Patient is a 83y old  Male who presents with a chief complaint of sob (26 Dec 2023 10:04)      Interval Events: cough has greatly improved Patient denies fevers, chills, chest pain, shortness of breath, nausea, abdominal pain, diarrhea, constipation, dysuria, headache, light headedness    REVIEW OF SYSTEMS:  [x] All other systems negative except per HPI   [ ] Unable to assess ROS because ________    OBJECTIVE:  ICU Vital Signs Last 24 Hrs  T(C): 36.2 (26 Dec 2023 03:21), Max: 36.6 (25 Dec 2023 12:28)  T(F): 97.2 (26 Dec 2023 03:21), Max: 97.9 (25 Dec 2023 12:28)  HR: 90 (26 Dec 2023 05:13) (78 - 97)  BP: 131/67 (26 Dec 2023 05:13) (130/62 - 151/79)  BP(mean): --  ABP: --  ABP(mean): --  RR: 20 (26 Dec 2023 03:21) (18 - 20)  SpO2: 95% (26 Dec 2023 03:21) (95% - 96%)    O2 Parameters below as of 26 Dec 2023 03:21  Patient On (Oxygen Delivery Method): room air              12-25 @ 07:01  -  12-26 @ 07:00  --------------------------------------------------------  IN: 296 mL / OUT: 4700 mL / NET: -4404 mL        PHYSICAL EXAM:  GENERAL: NAD, well-groomed, well-developed  HEAD:  Atraumatic, Normocephalic  EYES: EOMI, PERRLA, conjunctiva and sclera clear  ENMT: No tonsillar erythema, exudates, or enlargement; Moist mucous membranes, Good dentition, No lesions  NECK: Supple, No JVD, Normal thyroid  CHEST/LUNG: Clear to auscultation bilaterally; No rales, rhonchi, wheezing, or rubs  HEART: Regular rate and rhythm; No murmurs, rubs, or gallops  ABDOMEN: Soft, Nontender, Nondistended; Bowel sounds present  VASCULAR:  2+ Peripheral Pulses, No clubbing, cyanosis,. still with edema  LYMPH: No lymphadenopathy noted  SKIN: No rashes or lesions  NERVOUS SYSTEM:  Alert & Oriented X3, Good concentration; Motor Strength 5/5 B/L upper and lower extremities; DTRs 2+ intact and symmetric    HOSPITAL MEDICATIONS:  MEDICATIONS  (STANDING):  albuterol    0.083% 2.5 milliGRAM(s) Nebulizer every 6 hours  ascorbic acid 500 milliGRAM(s) Oral daily  atorvastatin 10 milliGRAM(s) Oral at bedtime  budesonide 160 MICROgram(s)/formoterol 4.5 MICROgram(s) Inhaler 2 Puff(s) Inhalation two times a day  buMETAnide Injectable 2 milliGRAM(s) IV Push every 12 hours  cefTRIAXone   IVPB 2000 milliGRAM(s) IV Intermittent every 24 hours  chlorhexidine 4% Liquid 1 Application(s) Topical <User Schedule>  dextrose 5%. 1000 milliLiter(s) (50 mL/Hr) IV Continuous <Continuous>  dextrose 5%. 1000 milliLiter(s) (100 mL/Hr) IV Continuous <Continuous>  dextrose 50% Injectable 12.5 Gram(s) IV Push once  dextrose 50% Injectable 25 Gram(s) IV Push once  dextrose 50% Injectable 25 Gram(s) IV Push once  finasteride 5 milliGRAM(s) Oral daily  glucagon  Injectable 1 milliGRAM(s) IntraMuscular once  heparin  Infusion. 2200 Unit(s)/Hr (22 mL/Hr) IV Continuous <Continuous>  influenza  Vaccine (HIGH DOSE) 0.7 milliLiter(s) IntraMuscular once  insulin lispro (ADMELOG) corrective regimen sliding scale   SubCutaneous three times a day before meals  insulin lispro (ADMELOG) corrective regimen sliding scale   SubCutaneous at bedtime  metoprolol tartrate 25 milliGRAM(s) Oral two times a day  multivitamin 1 Tablet(s) Oral daily  pantoprazole    Tablet 40 milliGRAM(s) Oral before breakfast  sodium bicarbonate 325 milliGRAM(s) Oral every 8 hours  tamsulosin 0.4 milliGRAM(s) Oral at bedtime    MEDICATIONS  (PRN):  acetaminophen     Tablet .. 650 milliGRAM(s) Oral every 6 hours PRN Temp greater or equal to 38C (100.4F), Mild Pain (1 - 3)  benzocaine/menthol Lozenge 1 Lozenge Oral three times a day PRN Sore Throat  dextrose Oral Gel 15 Gram(s) Oral once PRN Blood Glucose LESS THAN 70 milliGRAM(s)/deciliter      LABS:    The Labs were reviewed by me   The Radiology was reviewed by me    EKG tracing reviewed by me    12-25    131<L>  |  91<L>  |  104<H>  ----------------------------<  211<H>  4.1   |  21<L>  |  4.90<H>  12-24    129<L>  |  89<L>  |  92<H>  ----------------------------<  196<H>  4.3   |  21<L>  |  4.97<H>    Ca    8.2<L>      25 Dec 2023 07:20  Ca    8.6      24 Dec 2023 07:00  Phos  6.4     12-25  Mg     1.9     12-25    TPro  6.0  /  Alb  2.6<L>  /  TBili  0.2  /  DBili  x   /  AST  52<H>  /  ALT  77<H>  /  AlkPhos  108  12-25  TPro  5.9<L>  /  Alb  2.5<L>  /  TBili  0.2  /  DBili  x   /  AST  49<H>  /  ALT  53<H>  /  AlkPhos  98  12-24    Magnesium: 1.9 mg/dL (12-25-23 @ 07:20)  Magnesium: 1.9 mg/dL (12-24-23 @ 07:00)    Phosphorus: 6.4 mg/dL (12-25-23 @ 07:20)  Phosphorus: 6.7 mg/dL (12-24-23 @ 07:00)      PTT - ( 26 Dec 2023 10:25 )  PTT:72.4 sec              Urinalysis Basic - ( 25 Dec 2023 07:20 )    Color: x / Appearance: x / SG: x / pH: x  Gluc: 211 mg/dL / Ketone: x  / Bili: x / Urobili: x   Blood: x / Protein: x / Nitrite: x   Leuk Esterase: x / RBC: x / WBC x   Sq Epi: x / Non Sq Epi: x / Bacteria: x                              7.9    17.23 )-----------( 623      ( 25 Dec 2023 07:20 )             24.9                         7.4    17.89 )-----------( 590      ( 24 Dec 2023 22:12 )             23.4                         7.3    15.85 )-----------( 570      ( 24 Dec 2023 06:50 )             23.1     CAPILLARY BLOOD GLUCOSE      POCT Blood Glucose.: 206 mg/dL (26 Dec 2023 12:16)  POCT Blood Glucose.: 194 mg/dL (26 Dec 2023 08:15)  POCT Blood Glucose.: 248 mg/dL (25 Dec 2023 20:59)  POCT Blood Glucose.: 273 mg/dL (25 Dec 2023 17:32)  POCT Blood Glucose.: 159 mg/dL (25 Dec 2023 12:56)        MICROBIOLOGY:     RADIOLOGY:  [ ] Reviewed and interpreted by me    Point of Care Ultrasound Findings:    PFT:    EKG:

## 2023-12-26 NOTE — PROGRESS NOTE ADULT - PROBLEM SELECTOR PLAN 3
Atypical chest pain 1 episode  HST 30 > 32  EKG without acute ischemic changes  Monitor for now baseline Cre <1  - nephro consulted - f/u recs -> consented for HD not candidate at the time   - avoid nephrotoxic agents

## 2023-12-27 DIAGNOSIS — D64.9 ANEMIA, UNSPECIFIED: ICD-10-CM

## 2023-12-27 LAB
ALBUMIN SERPL ELPH-MCNC: 2.7 G/DL — LOW (ref 3.3–5)
ALBUMIN SERPL ELPH-MCNC: 2.7 G/DL — LOW (ref 3.3–5)
ALP SERPL-CCNC: 113 U/L — SIGNIFICANT CHANGE UP (ref 40–120)
ALP SERPL-CCNC: 113 U/L — SIGNIFICANT CHANGE UP (ref 40–120)
ALT FLD-CCNC: 70 U/L — HIGH (ref 10–45)
ALT FLD-CCNC: 70 U/L — HIGH (ref 10–45)
ANION GAP SERPL CALC-SCNC: 14 MMOL/L — SIGNIFICANT CHANGE UP (ref 5–17)
ANION GAP SERPL CALC-SCNC: 14 MMOL/L — SIGNIFICANT CHANGE UP (ref 5–17)
APTT BLD: 108.9 SEC — HIGH (ref 24.5–35.6)
APTT BLD: 108.9 SEC — HIGH (ref 24.5–35.6)
APTT BLD: 82.5 SEC — HIGH (ref 24.5–35.6)
APTT BLD: 82.5 SEC — HIGH (ref 24.5–35.6)
AST SERPL-CCNC: 32 U/L — SIGNIFICANT CHANGE UP (ref 10–40)
AST SERPL-CCNC: 32 U/L — SIGNIFICANT CHANGE UP (ref 10–40)
BASOPHILS # BLD AUTO: 0.02 K/UL — SIGNIFICANT CHANGE UP (ref 0–0.2)
BASOPHILS # BLD AUTO: 0.02 K/UL — SIGNIFICANT CHANGE UP (ref 0–0.2)
BASOPHILS NFR BLD AUTO: 0.1 % — SIGNIFICANT CHANGE UP (ref 0–2)
BASOPHILS NFR BLD AUTO: 0.1 % — SIGNIFICANT CHANGE UP (ref 0–2)
BILIRUB SERPL-MCNC: 0.2 MG/DL — SIGNIFICANT CHANGE UP (ref 0.2–1.2)
BILIRUB SERPL-MCNC: 0.2 MG/DL — SIGNIFICANT CHANGE UP (ref 0.2–1.2)
BUN SERPL-MCNC: 87 MG/DL — HIGH (ref 7–23)
BUN SERPL-MCNC: 87 MG/DL — HIGH (ref 7–23)
CALCIUM SERPL-MCNC: 8 MG/DL — LOW (ref 8.4–10.5)
CALCIUM SERPL-MCNC: 8 MG/DL — LOW (ref 8.4–10.5)
CHLORIDE SERPL-SCNC: 97 MMOL/L — SIGNIFICANT CHANGE UP (ref 96–108)
CHLORIDE SERPL-SCNC: 97 MMOL/L — SIGNIFICANT CHANGE UP (ref 96–108)
CO2 SERPL-SCNC: 27 MMOL/L — SIGNIFICANT CHANGE UP (ref 22–31)
CO2 SERPL-SCNC: 27 MMOL/L — SIGNIFICANT CHANGE UP (ref 22–31)
CREAT SERPL-MCNC: 3.01 MG/DL — HIGH (ref 0.5–1.3)
CREAT SERPL-MCNC: 3.01 MG/DL — HIGH (ref 0.5–1.3)
CULTURE RESULTS: SIGNIFICANT CHANGE UP
EGFR: 20 ML/MIN/1.73M2 — LOW
EGFR: 20 ML/MIN/1.73M2 — LOW
EOSINOPHIL # BLD AUTO: 0.04 K/UL — SIGNIFICANT CHANGE UP (ref 0–0.5)
EOSINOPHIL # BLD AUTO: 0.04 K/UL — SIGNIFICANT CHANGE UP (ref 0–0.5)
EOSINOPHIL NFR BLD AUTO: 0.2 % — SIGNIFICANT CHANGE UP (ref 0–6)
EOSINOPHIL NFR BLD AUTO: 0.2 % — SIGNIFICANT CHANGE UP (ref 0–6)
GLUCOSE BLDC GLUCOMTR-MCNC: 172 MG/DL — HIGH (ref 70–99)
GLUCOSE BLDC GLUCOMTR-MCNC: 172 MG/DL — HIGH (ref 70–99)
GLUCOSE BLDC GLUCOMTR-MCNC: 199 MG/DL — HIGH (ref 70–99)
GLUCOSE BLDC GLUCOMTR-MCNC: 199 MG/DL — HIGH (ref 70–99)
GLUCOSE BLDC GLUCOMTR-MCNC: 247 MG/DL — HIGH (ref 70–99)
GLUCOSE BLDC GLUCOMTR-MCNC: 247 MG/DL — HIGH (ref 70–99)
GLUCOSE BLDC GLUCOMTR-MCNC: 290 MG/DL — HIGH (ref 70–99)
GLUCOSE BLDC GLUCOMTR-MCNC: 290 MG/DL — HIGH (ref 70–99)
GLUCOSE BLDC GLUCOMTR-MCNC: 316 MG/DL — HIGH (ref 70–99)
GLUCOSE BLDC GLUCOMTR-MCNC: 316 MG/DL — HIGH (ref 70–99)
GLUCOSE SERPL-MCNC: 149 MG/DL — HIGH (ref 70–99)
GLUCOSE SERPL-MCNC: 149 MG/DL — HIGH (ref 70–99)
HCT VFR BLD CALC: 25 % — LOW (ref 39–50)
HCT VFR BLD CALC: 25 % — LOW (ref 39–50)
HGB BLD-MCNC: 7.9 G/DL — LOW (ref 13–17)
HGB BLD-MCNC: 7.9 G/DL — LOW (ref 13–17)
IMM GRANULOCYTES NFR BLD AUTO: 3.5 % — HIGH (ref 0–0.9)
IMM GRANULOCYTES NFR BLD AUTO: 3.5 % — HIGH (ref 0–0.9)
LYMPHOCYTES # BLD AUTO: 1.37 K/UL — SIGNIFICANT CHANGE UP (ref 1–3.3)
LYMPHOCYTES # BLD AUTO: 1.37 K/UL — SIGNIFICANT CHANGE UP (ref 1–3.3)
LYMPHOCYTES # BLD AUTO: 7.4 % — LOW (ref 13–44)
LYMPHOCYTES # BLD AUTO: 7.4 % — LOW (ref 13–44)
MAGNESIUM SERPL-MCNC: 1.7 MG/DL — SIGNIFICANT CHANGE UP (ref 1.6–2.6)
MAGNESIUM SERPL-MCNC: 1.7 MG/DL — SIGNIFICANT CHANGE UP (ref 1.6–2.6)
MCHC RBC-ENTMCNC: 21.9 PG — LOW (ref 27–34)
MCHC RBC-ENTMCNC: 21.9 PG — LOW (ref 27–34)
MCHC RBC-ENTMCNC: 31.6 GM/DL — LOW (ref 32–36)
MCHC RBC-ENTMCNC: 31.6 GM/DL — LOW (ref 32–36)
MCV RBC AUTO: 69.3 FL — LOW (ref 80–100)
MCV RBC AUTO: 69.3 FL — LOW (ref 80–100)
MONOCYTES # BLD AUTO: 1.6 K/UL — HIGH (ref 0–0.9)
MONOCYTES # BLD AUTO: 1.6 K/UL — HIGH (ref 0–0.9)
MONOCYTES NFR BLD AUTO: 8.7 % — SIGNIFICANT CHANGE UP (ref 2–14)
MONOCYTES NFR BLD AUTO: 8.7 % — SIGNIFICANT CHANGE UP (ref 2–14)
NEUTROPHILS # BLD AUTO: 14.8 K/UL — HIGH (ref 1.8–7.4)
NEUTROPHILS # BLD AUTO: 14.8 K/UL — HIGH (ref 1.8–7.4)
NEUTROPHILS NFR BLD AUTO: 80.1 % — HIGH (ref 43–77)
NEUTROPHILS NFR BLD AUTO: 80.1 % — HIGH (ref 43–77)
NRBC # BLD: 0 /100 WBCS — SIGNIFICANT CHANGE UP (ref 0–0)
NRBC # BLD: 0 /100 WBCS — SIGNIFICANT CHANGE UP (ref 0–0)
PHOSPHATE SERPL-MCNC: 4.5 MG/DL — SIGNIFICANT CHANGE UP (ref 2.5–4.5)
PHOSPHATE SERPL-MCNC: 4.5 MG/DL — SIGNIFICANT CHANGE UP (ref 2.5–4.5)
PLATELET # BLD AUTO: 599 K/UL — HIGH (ref 150–400)
PLATELET # BLD AUTO: 599 K/UL — HIGH (ref 150–400)
POTASSIUM SERPL-MCNC: 3.5 MMOL/L — SIGNIFICANT CHANGE UP (ref 3.5–5.3)
POTASSIUM SERPL-MCNC: 3.5 MMOL/L — SIGNIFICANT CHANGE UP (ref 3.5–5.3)
POTASSIUM SERPL-SCNC: 3.5 MMOL/L — SIGNIFICANT CHANGE UP (ref 3.5–5.3)
POTASSIUM SERPL-SCNC: 3.5 MMOL/L — SIGNIFICANT CHANGE UP (ref 3.5–5.3)
PROT SERPL-MCNC: 5.5 G/DL — LOW (ref 6–8.3)
PROT SERPL-MCNC: 5.5 G/DL — LOW (ref 6–8.3)
RBC # BLD: 3.61 M/UL — LOW (ref 4.2–5.8)
RBC # BLD: 3.61 M/UL — LOW (ref 4.2–5.8)
RBC # FLD: 19.9 % — HIGH (ref 10.3–14.5)
RBC # FLD: 19.9 % — HIGH (ref 10.3–14.5)
SODIUM SERPL-SCNC: 138 MMOL/L — SIGNIFICANT CHANGE UP (ref 135–145)
SODIUM SERPL-SCNC: 138 MMOL/L — SIGNIFICANT CHANGE UP (ref 135–145)
SPECIMEN SOURCE: SIGNIFICANT CHANGE UP
WBC # BLD: 18.47 K/UL — HIGH (ref 3.8–10.5)
WBC # BLD: 18.47 K/UL — HIGH (ref 3.8–10.5)
WBC # FLD AUTO: 18.47 K/UL — HIGH (ref 3.8–10.5)
WBC # FLD AUTO: 18.47 K/UL — HIGH (ref 3.8–10.5)

## 2023-12-27 PROCEDURE — 99233 SBSQ HOSP IP/OBS HIGH 50: CPT | Mod: GC

## 2023-12-27 PROCEDURE — 99232 SBSQ HOSP IP/OBS MODERATE 35: CPT

## 2023-12-27 PROCEDURE — 99233 SBSQ HOSP IP/OBS HIGH 50: CPT

## 2023-12-27 RX ORDER — DIPHENHYDRAMINE HYDROCHLORIDE AND LIDOCAINE HYDROCHLORIDE AND ALUMINUM HYDROXIDE AND MAGNESIUM HYDRO
5 KIT EVERY 6 HOURS
Refills: 0 | Status: DISCONTINUED | OUTPATIENT
Start: 2023-12-27 | End: 2024-01-02

## 2023-12-27 RX ORDER — MAGNESIUM SULFATE 500 MG/ML
2 VIAL (ML) INJECTION ONCE
Refills: 0 | Status: COMPLETED | OUTPATIENT
Start: 2023-12-27 | End: 2023-12-27

## 2023-12-27 RX ORDER — DIPHENHYDRAMINE HYDROCHLORIDE AND LIDOCAINE HYDROCHLORIDE AND ALUMINUM HYDROXIDE AND MAGNESIUM HYDRO
30 KIT EVERY 6 HOURS
Refills: 0 | Status: DISCONTINUED | OUTPATIENT
Start: 2023-12-27 | End: 2023-12-27

## 2023-12-27 RX ORDER — HEPARIN SODIUM 5000 [USP'U]/ML
1300 INJECTION INTRAVENOUS; SUBCUTANEOUS
Qty: 25000 | Refills: 0 | Status: DISCONTINUED | OUTPATIENT
Start: 2023-12-27 | End: 2023-12-28

## 2023-12-27 RX ADMIN — Medication 25 GRAM(S): at 12:02

## 2023-12-27 RX ADMIN — TAMSULOSIN HYDROCHLORIDE 0.4 MILLIGRAM(S): 0.4 CAPSULE ORAL at 21:54

## 2023-12-27 RX ADMIN — CHLORHEXIDINE GLUCONATE 1 APPLICATION(S): 213 SOLUTION TOPICAL at 06:19

## 2023-12-27 RX ADMIN — Medication 325 MILLIGRAM(S): at 13:11

## 2023-12-27 RX ADMIN — Medication 1: at 12:36

## 2023-12-27 RX ADMIN — Medication 500 MILLIGRAM(S): at 12:02

## 2023-12-27 RX ADMIN — BUDESONIDE AND FORMOTEROL FUMARATE DIHYDRATE 2 PUFF(S): 160; 4.5 AEROSOL RESPIRATORY (INHALATION) at 06:18

## 2023-12-27 RX ADMIN — FINASTERIDE 5 MILLIGRAM(S): 5 TABLET, FILM COATED ORAL at 12:01

## 2023-12-27 RX ADMIN — BUDESONIDE AND FORMOTEROL FUMARATE DIHYDRATE 2 PUFF(S): 160; 4.5 AEROSOL RESPIRATORY (INHALATION) at 17:30

## 2023-12-27 RX ADMIN — ALBUTEROL 2.5 MILLIGRAM(S): 90 AEROSOL, METERED ORAL at 12:02

## 2023-12-27 RX ADMIN — Medication 25 MILLIGRAM(S): at 06:18

## 2023-12-27 RX ADMIN — DIPHENHYDRAMINE HYDROCHLORIDE AND LIDOCAINE HYDROCHLORIDE AND ALUMINUM HYDROXIDE AND MAGNESIUM HYDRO 5 MILLILITER(S): KIT at 17:29

## 2023-12-27 RX ADMIN — Medication 1: at 09:08

## 2023-12-27 RX ADMIN — PANTOPRAZOLE SODIUM 40 MILLIGRAM(S): 20 TABLET, DELAYED RELEASE ORAL at 06:18

## 2023-12-27 RX ADMIN — ALBUTEROL 2.5 MILLIGRAM(S): 90 AEROSOL, METERED ORAL at 06:12

## 2023-12-27 RX ADMIN — HEPARIN SODIUM 1500 UNIT(S)/HR: 5000 INJECTION INTRAVENOUS; SUBCUTANEOUS at 15:34

## 2023-12-27 RX ADMIN — ALBUTEROL 2.5 MILLIGRAM(S): 90 AEROSOL, METERED ORAL at 17:29

## 2023-12-27 RX ADMIN — Medication 1 TABLET(S): at 12:01

## 2023-12-27 RX ADMIN — ATORVASTATIN CALCIUM 10 MILLIGRAM(S): 80 TABLET, FILM COATED ORAL at 21:54

## 2023-12-27 RX ADMIN — Medication 25 MILLIGRAM(S): at 17:29

## 2023-12-27 RX ADMIN — Medication 325 MILLIGRAM(S): at 21:53

## 2023-12-27 RX ADMIN — CEFTRIAXONE 100 MILLIGRAM(S): 500 INJECTION, POWDER, FOR SOLUTION INTRAMUSCULAR; INTRAVENOUS at 17:29

## 2023-12-27 RX ADMIN — Medication 3: at 19:02

## 2023-12-27 RX ADMIN — HEPARIN SODIUM 1500 UNIT(S)/HR: 5000 INJECTION INTRAVENOUS; SUBCUTANEOUS at 07:13

## 2023-12-27 RX ADMIN — ALBUTEROL 2.5 MILLIGRAM(S): 90 AEROSOL, METERED ORAL at 01:06

## 2023-12-27 RX ADMIN — HEPARIN SODIUM 1300 UNIT(S)/HR: 5000 INJECTION INTRAVENOUS; SUBCUTANEOUS at 19:07

## 2023-12-27 RX ADMIN — Medication 20 MILLIGRAM(S): at 06:18

## 2023-12-27 RX ADMIN — HEPARIN SODIUM 1500 UNIT(S)/HR: 5000 INJECTION INTRAVENOUS; SUBCUTANEOUS at 19:03

## 2023-12-27 RX ADMIN — HEPARIN SODIUM 1300 UNIT(S)/HR: 5000 INJECTION INTRAVENOUS; SUBCUTANEOUS at 22:12

## 2023-12-27 RX ADMIN — Medication 325 MILLIGRAM(S): at 06:18

## 2023-12-27 NOTE — PROGRESS NOTE ADULT - SUBJECTIVE AND OBJECTIVE BOX
[facility]  Division of Infectious Diseases  [phone]    Name: GLADIS LEONARD  Age: 83y  Gender: Male  MRN: [id number]    Interval History--  Notes reviewed.     Past Medical History--  Asbestosis (ICD9 501)    HTN (Hypertension)    BPH (Benign Prostatic Hypertrophy)    Dyslipidemia    Localized Osteoarthrosis, Lower Leg    Obesity    COPD with emphysema    Pulmonary nodule    Thyroid nodule    Aortic ectasia    History of diastolic dysfunction    Venous insufficiency    S/P Cystoscopy (ICD9 V45.89)    Hyperlipidemia (ICD9 272.4)    Localized Osteoarthrosis, Lower Leg    S/P Arthroscopy of Left Knee    Cataract    Inguinal Hernia x2    History of Tonsillectomy    History of appendectomy        For details regarding the patient's social history, family history, and other miscellaneous elements, please refer the initial infectious diseases consultation and/or the admitting history and physical examination for this admission.    Allergies    No Known Allergies    Intolerances        Medications--  Antibiotics:  cefTRIAXone   IVPB 2000 milliGRAM(s) IV Intermittent every 24 hours    Immunologic:  influenza  Vaccine (HIGH DOSE) 0.7 milliLiter(s) IntraMuscular once    Other:  acetaminophen     Tablet .. PRN  albuterol    0.083%  ascorbic acid  atorvastatin  benzocaine/menthol Lozenge PRN  budesonide 160 MICROgram(s)/formoterol 4.5 MICROgram(s) Inhaler  chlorhexidine 4% Liquid  dextrose 5%.  dextrose 5%.  dextrose 50% Injectable  dextrose 50% Injectable  dextrose 50% Injectable  dextrose Oral Gel PRN  finasteride  glucagon  Injectable  heparin  Infusion.  insulin lispro (ADMELOG) corrective regimen sliding scale  insulin lispro (ADMELOG) corrective regimen sliding scale  magnesium sulfate  IVPB  metoprolol tartrate  multivitamin  pantoprazole    Tablet  predniSONE   Tablet  sodium bicarbonate  tamsulosin      Review of Systems--  A 10-point review of systems was obtained.     Pertinent positives and negatives--  Constitutional: No fevers. No Chills. No Rigors.   Cardiovascular: No chest pain. No palpitations.  Respiratory: No shortness of breath. No cough.  Gastrointestinal: No nausea or vomiting. No diarrhea or constipation.   Psychiatric: Pleasant. Appropriate affect.    Review of systems otherwise negative except as previously noted.    Physical Examination--  Vital Signs: T(F): 98.4 (12-27-23 @ 06:47), Max: 98.4 (12-27-23 @ 06:47)  HR: 85 (12-27-23 @ 06:47)  BP: 131/65 (12-27-23 @ 06:47)  RR: 19 (12-27-23 @ 06:47)  SpO2: 96% (12-27-23 @ 06:47)  Wt(kg): --  General: Nontoxic-appearing Male in no acute distress.  HEENT: AT/NC. PERRL. EOMI. Anicteric. Conjunctiva pink and moist. Oropharynx clear. Dentition fair.  Neck: Not rigid. No sense of mass.  Nodes: None palpable.  Lungs: Clear bilaterally without rales, wheezing or rhonchi  Heart: Regular rate and rhythm. No Murmur. No rub. No gallop. No palpable thrill.  Abdomen: Bowel sounds present and normoactive. Soft. Nondistended. Nontender. No sense of mass. No organomegaly.  Back: No spinal tenderness. No costovertebral angle tenderness.   Extremities: No cyanosis or clubbing. No edema.   Skin: Warm. Dry. Good turgor. No rash. No vasculitic stigmata.  Psychiatric: Appropriate affect and mood for situation.         Laboratory Studies--  CBC                        7.9    18.47 )-----------( 599      ( 27 Dec 2023 07:19 )             25.0       Chemistries  12-27    138  |  97  |  87<H>  ----------------------------<  149<H>  3.5   |  27  |  3.01<H>    Ca    8.0<L>      27 Dec 2023 07:19  Phos  4.5     12-27  Mg     1.7     12-27    TPro  5.5<L>  /  Alb  2.7<L>  /  TBili  0.2  /  DBili  x   /  AST  32  /  ALT  70<H>  /  AlkPhos  113  12-27      Culture Data    Culture - Acid Fast - Body Fluid w/Smear (collected 22 Dec 2023 16:18)  Source: .Body Fluid Thoracentesis Fluid    Culture - Body Fluid with Gram Stain (collected 22 Dec 2023 16:18)  Source: Pleural Fl Pleural Fluid  Gram Stain (23 Dec 2023 01:42):    polymorphonuclear leukocytes seen    No organisms seen    by cytocentrifuge  Preliminary Report (23 Dec 2023 17:25):    No growth    Culture - Fungal, Body Fluid (collected 22 Dec 2023 16:18)  Source: Pleural Fl Pleural Fluid  Preliminary Report (26 Dec 2023 10:55):    Testing in progress    Culture - Blood (collected 22 Dec 2023 10:43)  Source: .Blood Blood-Peripheral  Preliminary Report (26 Dec 2023 18:00):    No growth at 4 days    Culture - Blood (collected 22 Dec 2023 10:43)  Source: .Blood Blood-Peripheral  Preliminary Report (26 Dec 2023 18:00):    No growth at 4 days    Culture - Sputum (collected 21 Dec 2023 01:12)  Source: .Sputum Sputum  Gram Stain (21 Dec 2023 11:54):    Moderate polymorphonuclear leukocytes per low power field    Moderate Squamous epithelial cells per low power field    Rare Gram positive cocci in pairs per oil power field  Final Report (22 Dec 2023 18:43):    Normal Respiratory Milly present             [facility]  Division of Infectious Diseases  [phone]    Name: GLADIS LEONARD  Age: 83y  Gender: Male  MRN: [id number]    Interval History--  Notes reviewed.     Past Medical History--  Asbestosis (ICD9 501)    HTN (Hypertension)    BPH (Benign Prostatic Hypertrophy)    Dyslipidemia    Localized Osteoarthrosis, Lower Leg    Obesity    COPD with emphysema    Pulmonary nodule    Thyroid nodule    Aortic ectasia    History of diastolic dysfunction    Venous insufficiency    S/P Cystoscopy (ICD9 V45.89)    Hyperlipidemia (ICD9 272.4)    Localized Osteoarthrosis, Lower Leg    S/P Arthroscopy of Left Knee    Cataract    Inguinal Hernia x2    History of Tonsillectomy    History of appendectomy        For details regarding the patient's social history, family history, and other miscellaneous elements, please refer the initial infectious diseases consultation and/or the admitting history and physical examination for this admission.    Allergies    No Known Allergies    Intolerances        Medications--  Antibiotics:  cefTRIAXone   IVPB 2000 milliGRAM(s) IV Intermittent every 24 hours    Immunologic:  influenza  Vaccine (HIGH DOSE) 0.7 milliLiter(s) IntraMuscular once    Other:  acetaminophen     Tablet .. PRN  albuterol    0.083%  ascorbic acid  atorvastatin  benzocaine/menthol Lozenge PRN  budesonide 160 MICROgram(s)/formoterol 4.5 MICROgram(s) Inhaler  chlorhexidine 4% Liquid  dextrose 5%.  dextrose 5%.  dextrose 50% Injectable  dextrose 50% Injectable  dextrose 50% Injectable  dextrose Oral Gel PRN  finasteride  glucagon  Injectable  heparin  Infusion.  insulin lispro (ADMELOG) corrective regimen sliding scale  insulin lispro (ADMELOG) corrective regimen sliding scale  magnesium sulfate  IVPB  metoprolol tartrate  multivitamin  pantoprazole    Tablet  predniSONE   Tablet  sodium bicarbonate  tamsulosin      Review of Systems--  A 10-point review of systems was obtained.     Pertinent positives and negatives--  Constitutional: No fevers. No Chills. No Rigors.   Cardiovascular: No chest pain. No palpitations.  Respiratory: No shortness of breath. No cough.  Gastrointestinal: No nausea or vomiting. No diarrhea or constipation.   Psychiatric: Pleasant. Appropriate affect.    Review of systems otherwise negative except as previously noted.    Physical Examination--  Vital Signs: T(F): 98.4 (12-27-23 @ 06:47), Max: 98.4 (12-27-23 @ 06:47)  HR: 85 (12-27-23 @ 06:47)  BP: 131/65 (12-27-23 @ 06:47)  RR: 19 (12-27-23 @ 06:47)  SpO2: 96% (12-27-23 @ 06:47)  Wt(kg): --  General: Nontoxic-appearing Male in no acute distress.  HEENT: AT/NC. PERRL. EOMI. Anicteric. Conjunctiva pink and moist. Oropharynx clear. Dentition fair.  Neck: Not rigid. No sense of mass.  Nodes: None palpable.  Lungs: Clear bilaterally without rales, wheezing or rhonchi  Heart: Regular rate and rhythm. No Murmur. No rub. No gallop. No palpable thrill.  Abdomen: Bowel sounds present and normoactive. Soft. Nondistended. Nontender. No sense of mass. No organomegaly.  Back: No spinal tenderness. No costovertebral angle tenderness.   Extremities: No cyanosis or clubbing. No edema.   Skin: Warm. Dry. Good turgor. No rash. No vasculitic stigmata.  Psychiatric: Appropriate affect and mood for situation.         Laboratory Studies--  CBC                        7.9    18.47 )-----------( 599      ( 27 Dec 2023 07:19 )             25.0       Chemistries  12-27    138  |  97  |  87<H>  ----------------------------<  149<H>  3.5   |  27  |  3.01<H>    Ca    8.0<L>      27 Dec 2023 07:19  Phos  4.5     12-27  Mg     1.7     12-27    TPro  5.5<L>  /  Alb  2.7<L>  /  TBili  0.2  /  DBili  x   /  AST  32  /  ALT  70<H>  /  AlkPhos  113  12-27      Culture Data    Culture - Acid Fast - Body Fluid w/Smear (collected 22 Dec 2023 16:18)  Source: .Body Fluid Thoracentesis Fluid    Culture - Body Fluid with Gram Stain (collected 22 Dec 2023 16:18)  Source: Pleural Fl Pleural Fluid  Gram Stain (23 Dec 2023 01:42):    polymorphonuclear leukocytes seen    No organisms seen    by cytocentrifuge  Preliminary Report (23 Dec 2023 17:25):    No growth    Culture - Fungal, Body Fluid (collected 22 Dec 2023 16:18)  Source: Pleural Fl Pleural Fluid  Preliminary Report (26 Dec 2023 10:55):    Testing in progress    Culture - Blood (collected 22 Dec 2023 10:43)  Source: .Blood Blood-Peripheral  Preliminary Report (26 Dec 2023 18:00):    No growth at 4 days    Culture - Blood (collected 22 Dec 2023 10:43)  Source: .Blood Blood-Peripheral  Preliminary Report (26 Dec 2023 18:00):    No growth at 4 days    Culture - Sputum (collected 21 Dec 2023 01:12)  Source: .Sputum Sputum  Gram Stain (21 Dec 2023 11:54):    Moderate polymorphonuclear leukocytes per low power field    Moderate Squamous epithelial cells per low power field    Rare Gram positive cocci in pairs per oil power field  Final Report (22 Dec 2023 18:43):    Normal Respiratory Milly present             [facility]  Division of Infectious Diseases  [phone]    Name: GLADIS LEONARD  Age: 83y  Gender: Male  MRN: [id number]    Interval History--  Notes reviewed. Seen earlier today. C/O tongue feeling like sandpaper/oral pain. No SOB. Comfortable on RA. No CP.       Past Medical History--  Asbestosis (ICD9 501)    HTN (Hypertension)    BPH (Benign Prostatic Hypertrophy)    Dyslipidemia    Localized Osteoarthrosis, Lower Leg    Obesity    COPD with emphysema    Pulmonary nodule    Thyroid nodule    Aortic ectasia    History of diastolic dysfunction    Venous insufficiency    S/P Cystoscopy (ICD9 V45.89)    Hyperlipidemia (ICD9 272.4)    Localized Osteoarthrosis, Lower Leg    S/P Arthroscopy of Left Knee    Cataract    Inguinal Hernia x2    History of Tonsillectomy    History of appendectomy        For details regarding the patient's social history, family history, and other miscellaneous elements, please refer the initial infectious diseases consultation and/or the admitting history and physical examination for this admission.    Allergies    No Known Allergies    Intolerances        Medications--  Antibiotics:  cefTRIAXone   IVPB 2000 milliGRAM(s) IV Intermittent every 24 hours    Immunologic:  influenza  Vaccine (HIGH DOSE) 0.7 milliLiter(s) IntraMuscular once    Other:  acetaminophen     Tablet .. PRN  albuterol    0.083%  ascorbic acid  atorvastatin  benzocaine/menthol Lozenge PRN  budesonide 160 MICROgram(s)/formoterol 4.5 MICROgram(s) Inhaler  chlorhexidine 4% Liquid  dextrose 5%.  dextrose 5%.  dextrose 50% Injectable  dextrose 50% Injectable  dextrose 50% Injectable  dextrose Oral Gel PRN  finasteride  glucagon  Injectable  heparin  Infusion.  insulin lispro (ADMELOG) corrective regimen sliding scale  insulin lispro (ADMELOG) corrective regimen sliding scale  magnesium sulfate  IVPB  metoprolol tartrate  multivitamin  pantoprazole    Tablet  predniSONE   Tablet  sodium bicarbonate  tamsulosin      Review of Systems--  A 10-point review of systems was obtained.   Review of systems otherwise negative except as previously noted.    Physical Examination--  Vital Signs: T(F): 98.4 (12-27-23 @ 06:47), Max: 98.4 (12-27-23 @ 06:47)  HR: 85 (12-27-23 @ 06:47)  BP: 131/65 (12-27-23 @ 06:47)  RR: 19 (12-27-23 @ 06:47)  SpO2: 96% (12-27-23 @ 06:47)  Wt(kg): --  General: Nontoxic-appearing Male in no acute distress.  HEENT: AT/NC. Anicteric. Conjunctiva pink and moist. Oropharynx with aphthous looking ulcers on soft palate, tongue. No thrush.   Neck: Not rigid. No sense of mass.  Nodes: None palpable.  Lungs: Decreased BS B bases  Heart: Irreg irreg  Abdomen: Bowel sounds present and normoactive. Soft. Nondistended. Nontender.  Extremities: No cyanosis or clubbing. 1+ LE edema.   Skin: Warm. Dry. Good turgor. No rash. No vasculitic stigmata.  Psychiatric: Appropriate affect and mood for situation.       Laboratory Studies--  CBC                        7.9    18.47 )-----------( 599      ( 27 Dec 2023 07:19 )             25.0       WBC Count: 18.21 K/uL (12-26-23 @ 14:43)  WBC Count: 17.23 K/uL (12-25-23 @ 07:20)  WBC Count: 17.89 K/uL (12-24-23 @ 22:12)  WBC Count: 15.85 K/uL (12-24-23 @ 06:50)  WBC Count: 16.20 K/uL (12-24-23 @ 03:22)  WBC Count: 14.93 K/uL (12-23-23 @ 12:30)  WBC Count: 13.92 K/uL (12-23-23 @ 03:39)  WBC Count: 15.95 K/uL (12-22-23 @ 11:35)  WBC Count: 13.94 K/uL (12-21-23 @ 03:43)  WBC Count: 16.93 K/uL (12-20-23 @ 04:27)    Chemistries  12-27    138  |  97  |  87<H>  ----------------------------<  149<H>  3.5   |  27  |  3.01<H>    3.59 mg/dL (12-26-23 @ 14:43)  4.90 mg/dL (12-25-23 @ 07:20)  4.97 mg/dL (12-24-23 @ 07:00)  4.32 mg/dL (12-23-23 @ 03:39)  3.44 mg/dL (12-22-23 @ 11:35)  1.62 mg/dL (12-21-23 @ 07:44)      Ca    8.0<L>      27 Dec 2023 07:19  Phos  4.5     12-27  Mg     1.7     12-27    TPro  5.5<L>  /  Alb  2.7<L>  /  TBili  0.2  /  DBili  x   /  AST  32  /  ALT  70<H>  /  AlkPhos  113  12-27      Culture Data    Culture - Acid Fast - Body Fluid w/Smear (collected 22 Dec 2023 16:18)  Source: .Body Fluid Thoracentesis Fluid    Culture - Body Fluid with Gram Stain (collected 22 Dec 2023 16:18)  Source: Pleural Fl Pleural Fluid  Gram Stain (23 Dec 2023 01:42):    polymorphonuclear leukocytes seen    No organisms seen    by cytocentrifuge  Preliminary Report (23 Dec 2023 17:25):    No growth    Culture - Fungal, Body Fluid (collected 22 Dec 2023 16:18)  Source: Pleural Fl Pleural Fluid  Preliminary Report (26 Dec 2023 10:55):    Testing in progress    Culture - Blood (collected 22 Dec 2023 10:43)  Source: .Blood Blood-Peripheral  Preliminary Report (26 Dec 2023 18:00):    No growth at 4 days    Culture - Blood (collected 22 Dec 2023 10:43)  Source: .Blood Blood-Peripheral  Preliminary Report (26 Dec 2023 18:00):    No growth at 4 days    Culture - Sputum (collected 21 Dec 2023 01:12)  Source: .Sputum Sputum  Gram Stain (21 Dec 2023 11:54):    Moderate polymorphonuclear leukocytes per low power field    Moderate Squamous epithelial cells per low power field    Rare Gram positive cocci in pairs per oil power field  Final Report (22 Dec 2023 18:43):    Normal Respiratory Milly present             [facility] [place]  Division of Infectious Diseases  [phone]    Name: GLADIS LEONARD  Age: 83y  Gender: Male  MRN: [id number]    Interval History--  Notes reviewed. Seen earlier today. C/O tongue feeling like sandpaper/oral pain. No SOB. Comfortable on RA. No CP.       Past Medical History--  Asbestosis (ICD9 501)    HTN (Hypertension)    BPH (Benign Prostatic Hypertrophy)    Dyslipidemia    Localized Osteoarthrosis, Lower Leg    Obesity    COPD with emphysema    Pulmonary nodule    Thyroid nodule    Aortic ectasia    History of diastolic dysfunction    Venous insufficiency    S/P Cystoscopy (ICD9 V45.89)    Hyperlipidemia (ICD9 272.4)    Localized Osteoarthrosis, Lower Leg    S/P Arthroscopy of Left Knee    Cataract    Inguinal Hernia x2    History of Tonsillectomy    History of appendectomy        For details regarding the patient's social history, family history, and other miscellaneous elements, please refer the initial infectious diseases consultation and/or the admitting history and physical examination for this admission.    Allergies    No Known Allergies    Intolerances        Medications--  Antibiotics:  cefTRIAXone   IVPB 2000 milliGRAM(s) IV Intermittent every 24 hours    Immunologic:  influenza  Vaccine (HIGH DOSE) 0.7 milliLiter(s) IntraMuscular once    Other:  acetaminophen     Tablet .. PRN  albuterol    0.083%  ascorbic acid  atorvastatin  benzocaine/menthol Lozenge PRN  budesonide 160 MICROgram(s)/formoterol 4.5 MICROgram(s) Inhaler  chlorhexidine 4% Liquid  dextrose 5%.  dextrose 5%.  dextrose 50% Injectable  dextrose 50% Injectable  dextrose 50% Injectable  dextrose Oral Gel PRN  finasteride  glucagon  Injectable  heparin  Infusion.  insulin lispro (ADMELOG) corrective regimen sliding scale  insulin lispro (ADMELOG) corrective regimen sliding scale  magnesium sulfate  IVPB  metoprolol tartrate  multivitamin  pantoprazole    Tablet  predniSONE   Tablet  sodium bicarbonate  tamsulosin      Review of Systems--  A 10-point review of systems was obtained.   Review of systems otherwise negative except as previously noted.    Physical Examination--  Vital Signs: T(F): 98.4 (12-27-23 @ 06:47), Max: 98.4 (12-27-23 @ 06:47)  HR: 85 (12-27-23 @ 06:47)  BP: 131/65 (12-27-23 @ 06:47)  RR: 19 (12-27-23 @ 06:47)  SpO2: 96% (12-27-23 @ 06:47)  Wt(kg): --  General: Nontoxic-appearing Male in no acute distress.  HEENT: AT/NC. Anicteric. Conjunctiva pink and moist. Oropharynx with aphthous looking ulcers on soft palate, tongue. No thrush.   Neck: Not rigid. No sense of mass.  Nodes: None palpable.  Lungs: Decreased BS B bases  Heart: Irreg irreg  Abdomen: Bowel sounds present and normoactive. Soft. Nondistended. Nontender.  Extremities: No cyanosis or clubbing. 1+ LE edema.   Skin: Warm. Dry. Good turgor. No rash. No vasculitic stigmata.  Psychiatric: Appropriate affect and mood for situation.       Laboratory Studies--  CBC                        7.9    18.47 )-----------( 599      ( 27 Dec 2023 07:19 )             25.0       WBC Count: 18.21 K/uL (12-26-23 @ 14:43)  WBC Count: 17.23 K/uL (12-25-23 @ 07:20)  WBC Count: 17.89 K/uL (12-24-23 @ 22:12)  WBC Count: 15.85 K/uL (12-24-23 @ 06:50)  WBC Count: 16.20 K/uL (12-24-23 @ 03:22)  WBC Count: 14.93 K/uL (12-23-23 @ 12:30)  WBC Count: 13.92 K/uL (12-23-23 @ 03:39)  WBC Count: 15.95 K/uL (12-22-23 @ 11:35)  WBC Count: 13.94 K/uL (12-21-23 @ 03:43)  WBC Count: 16.93 K/uL (12-20-23 @ 04:27)    Chemistries  12-27    138  |  97  |  87<H>  ----------------------------<  149<H>  3.5   |  27  |  3.01<H>    3.59 mg/dL (12-26-23 @ 14:43)  4.90 mg/dL (12-25-23 @ 07:20)  4.97 mg/dL (12-24-23 @ 07:00)  4.32 mg/dL (12-23-23 @ 03:39)  3.44 mg/dL (12-22-23 @ 11:35)  1.62 mg/dL (12-21-23 @ 07:44)      Ca    8.0<L>      27 Dec 2023 07:19  Phos  4.5     12-27  Mg     1.7     12-27    TPro  5.5<L>  /  Alb  2.7<L>  /  TBili  0.2  /  DBili  x   /  AST  32  /  ALT  70<H>  /  AlkPhos  113  12-27      Culture Data    Culture - Acid Fast - Body Fluid w/Smear (collected 22 Dec 2023 16:18)  Source: .Body Fluid Thoracentesis Fluid    Culture - Body Fluid with Gram Stain (collected 22 Dec 2023 16:18)  Source: Pleural Fl Pleural Fluid  Gram Stain (23 Dec 2023 01:42):    polymorphonuclear leukocytes seen    No organisms seen    by cytocentrifuge  Preliminary Report (23 Dec 2023 17:25):    No growth    Culture - Fungal, Body Fluid (collected 22 Dec 2023 16:18)  Source: Pleural Fl Pleural Fluid  Preliminary Report (26 Dec 2023 10:55):    Testing in progress    Culture - Blood (collected 22 Dec 2023 10:43)  Source: .Blood Blood-Peripheral  Preliminary Report (26 Dec 2023 18:00):    No growth at 4 days    Culture - Blood (collected 22 Dec 2023 10:43)  Source: .Blood Blood-Peripheral  Preliminary Report (26 Dec 2023 18:00):    No growth at 4 days    Culture - Sputum (collected 21 Dec 2023 01:12)  Source: .Sputum Sputum  Gram Stain (21 Dec 2023 11:54):    Moderate polymorphonuclear leukocytes per low power field    Moderate Squamous epithelial cells per low power field    Rare Gram positive cocci in pairs per oil power field  Final Report (22 Dec 2023 18:43):    Normal Respiratory Milly present

## 2023-12-27 NOTE — PROGRESS NOTE ADULT - ATTENDING COMMENTS
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     1. Acute Hypoxic Respiratory Failure 2/2 s/p thoracentesis CAP and component of HFpEF, entero/rhino virus +, COPD exacerbation: started ceftriaxone 2000 q24, s/p azithro 500 q24. Taper steroids Leukocytosis likely related to steroids as discussed with ID. Thrombocytosis likely 2/2 to infection. Will send home with Cefpodoxime- appreciate ID recs  2. TTE systolic function is hyperdynamic with an ejection fraction of 80 %. Bumex DC'd   3. Afib on Heparin gtt   Other details as above .

## 2023-12-27 NOTE — PROGRESS NOTE ADULT - PROBLEM SELECTOR PLAN 6
DVT proph: heparin gtt  Diet: DASH  Dispo: pending med clearance - on heparin gtt iso possible thora tomorrow-> will follow up with pulm about thoracentesis   - holding home eliquis for now-> herapin drip

## 2023-12-27 NOTE — PROGRESS NOTE ADULT - ASSESSMENT
Pneumococcal pneumonia with septicemia  New large pleural effusion. Had some chest pain prior to coming to the hospital, now resolved    12/22: Overall stable. Afebrile with leukocytosis, steroids potentially contributing to both. No cell counts on fluid yet but pH Ok. LDH elevated. Now also with BESSY, likley multifactorial but contrast playing a role.   12/26: Overall stable. BESSY hopefully plateaued and will begin to decline. Parapneumonic effusion, not infected appearing. ECHO no overt concern for IE, f/u blood cx NTD. Leukocytosis likely multifactorial including sterids and reactive component.   12/27: Appears to be making progress. F/U cx blood cx remain NTD, and pleural fluid cx remains sterile. Leukocytosis ~ same range- reactive component + steroids. Doubt uncontrolled infection at this point in time playing a role. Reactive thrombocytosis, microcytic anemia (etiology? new since October). Presence of bacteremia in this setting does not alter antibiotic management, but there is no defined standard for duration of treatment for pneumonia with parapneumonic effusion. 3 weeks reasonable, with follow up of laboratory data. Repeat chest imaging to assess for resolution f changes essential here. D/W patient in detail all questions answered to the best of my ability.      Suggestions--  Continue ceftriaxone  When ready for discharge change to cefpodoxime 200mg PO Q12H to complete 3 weeks total therapy (drug does not require dose modification for eGFR >10).  OPD follow up with pulmonary and serial imaging  Happy to see patient in office in follow up nonurgently  Reviewed with housestaff    I'll sign off at this time. Thank you for the courtesy of this referral.     Markel Morales MD  Attending Physician  [facility]  Division of Infectious Diseases  [phone]   Pneumococcal pneumonia with septicemia  New large pleural effusion. Had some chest pain prior to coming to the hospital, now resolved    12/22: Overall stable. Afebrile with leukocytosis, steroids potentially contributing to both. No cell counts on fluid yet but pH Ok. LDH elevated. Now also with BESSY, likley multifactorial but contrast playing a role.   12/26: Overall stable. BESSY hopefully plateaued and will begin to decline. Parapneumonic effusion, not infected appearing. ECHO no overt concern for IE, f/u blood cx NTD. Leukocytosis likely multifactorial including sterids and reactive component.   12/27: Appears to be making progress. F/U cx blood cx remain NTD, and pleural fluid cx remains sterile. Leukocytosis ~ same range- reactive component + steroids. Doubt uncontrolled infection at this point in time playing a role. Reactive thrombocytosis, microcytic anemia (etiology? new since October). Presence of bacteremia in this setting does not alter antibiotic management, but there is no defined standard for duration of treatment for pneumonia with parapneumonic effusion. 3 weeks reasonable, with follow up of laboratory data. Repeat chest imaging to assess for resolution f changes essential here. D/W patient in detail all questions answered to the best of my ability.      Suggestions--  Continue ceftriaxone  When ready for discharge change to cefpodoxime 200mg PO Q12H to complete 3 weeks total therapy (drug does not require dose modification for eGFR >10).  OPD follow up with pulmonary and serial imaging  Happy to see patient in office in follow up nonurgently  Reviewed with housestaff    I'll sign off at this time. Thank you for the courtesy of this referral.     Markel Morales MD  Attending Physician  [facility]  Division of Infectious Diseases  [phone]

## 2023-12-27 NOTE — PROGRESS NOTE ADULT - PROBLEM SELECTOR PLAN 3
baseline Cre <1  - nephro consulted - f/u recs -> consented for HD not candidate at the time   - avoid nephrotoxic agents

## 2023-12-27 NOTE — PROGRESS NOTE ADULT - PROBLEM SELECTOR PLAN 1
Pt with ATN, multifactorial 2/2 EPI (s/p IV contrast 12/21), sepsis, vancomycin (12/20), and diuretic use with hyperdynamic EF (hypovolemia). Pt w/ baseline Scr 0.86-1.29, prior to admission Scr 1.12 on 10/7/23.     Labs and VS reviewed. No hypotensive episodes. On admission Scr at baseline 1.28 on 12/20. Scr increased to 1.62 on 12/21 peaked to Scr 4.97 on 12/24. Scr improved to 3.01 today. 24hr UOP 4.3L. UA +trace protein, casts, bld neg but RBC 14. CT, CXR and ECHO reviewed. Renal US no hydronephrosis, but Bilateral pleural effusions noted. HD consent obtained and in chart. No acute indication for HD. Monitor labs and urine output. Avoid nephrotoxins. dose medications as per CrCl.    Final recommendations pending attending signature.  If you have any questions, please feel free to contact me  Luh Mott  Nephrology Fellow  [place]/Page [phone]  (After 5pm or on weekends please page the on-call fellow) Pt with ATN, multifactorial 2/2 EPI (s/p IV contrast 12/21), sepsis, vancomycin (12/20), and diuretic use with hyperdynamic EF (hypovolemia). Pt w/ baseline Scr 0.86-1.29, prior to admission Scr 1.12 on 10/7/23.     Labs and VS reviewed. No hypotensive episodes. On admission Scr at baseline 1.28 on 12/20. Scr increased to 1.62 on 12/21 peaked to Scr 4.97 on 12/24. Scr improved to 3.01 today. 24hr UOP 4.3L. UA +trace protein, casts, bld neg but RBC 14. CT, CXR and ECHO reviewed. Renal US no hydronephrosis, but Bilateral pleural effusions noted. HD consent obtained and in chart. No acute indication for HD. Monitor labs and urine output. Avoid nephrotoxins. dose medications as per CrCl.    Final recommendations pending attending signature.  If you have any questions, please feel free to contact me  Luh Mott  Nephrology Fellow  [place]/Page [phone]  (After 5pm or on weekends please page the on-call fellow)

## 2023-12-27 NOTE — PROGRESS NOTE ADULT - SUBJECTIVE AND OBJECTIVE BOX
[facility] Division of Kidney Diseases & Hypertension  FOLLOW UP NOTE  [phone]--------------------------------------------------------------------------------  Chief Complaint: BESSY    24 hour events/subjective: Pt seen and evaluated bedside this morning. Reports feeling well. Continues to endorse LE edema, otherwise no complaints. Denies any headaches, nausea, vomiting, fevers/chills, chest pain, SOB, abdominal pain.       PAST HISTORY  --------------------------------------------------------------------------------  No significant changes to PMH, PSH, FHx, SHx, unless otherwise noted    ALLERGIES & MEDICATIONS  --------------------------------------------------------------------------------  Allergies  No Known Allergies  Intolerances    Standing Inpatient Medications  albuterol    0.083% 2.5 milliGRAM(s) Nebulizer every 6 hours  ascorbic acid 500 milliGRAM(s) Oral daily  atorvastatin 10 milliGRAM(s) Oral at bedtime  budesonide 160 MICROgram(s)/formoterol 4.5 MICROgram(s) Inhaler 2 Puff(s) Inhalation two times a day  cefTRIAXone   IVPB 2000 milliGRAM(s) IV Intermittent every 24 hours  chlorhexidine 4% Liquid 1 Application(s) Topical <User Schedule>  dextrose 5%. 1000 milliLiter(s) IV Continuous <Continuous>  dextrose 5%. 1000 milliLiter(s) IV Continuous <Continuous>  dextrose 50% Injectable 12.5 Gram(s) IV Push once  dextrose 50% Injectable 25 Gram(s) IV Push once  dextrose 50% Injectable 25 Gram(s) IV Push once  finasteride 5 milliGRAM(s) Oral daily  glucagon  Injectable 1 milliGRAM(s) IntraMuscular once  heparin  Infusion. 2200 Unit(s)/Hr IV Continuous <Continuous>  influenza  Vaccine (HIGH DOSE) 0.7 milliLiter(s) IntraMuscular once  insulin lispro (ADMELOG) corrective regimen sliding scale   SubCutaneous three times a day before meals  insulin lispro (ADMELOG) corrective regimen sliding scale   SubCutaneous at bedtime  magnesium sulfate  IVPB 2 Gram(s) IV Intermittent once  metoprolol tartrate 25 milliGRAM(s) Oral two times a day  multivitamin 1 Tablet(s) Oral daily  pantoprazole    Tablet 40 milliGRAM(s) Oral before breakfast  predniSONE   Tablet 20 milliGRAM(s) Oral daily  sodium bicarbonate 325 milliGRAM(s) Oral every 8 hours  tamsulosin 0.4 milliGRAM(s) Oral at bedtime    PRN Inpatient Medications  acetaminophen     Tablet .. 650 milliGRAM(s) Oral every 6 hours PRN  benzocaine/menthol Lozenge 1 Lozenge Oral three times a day PRN  dextrose Oral Gel 15 Gram(s) Oral once PRN    REVIEW OF SYSTEMS  --------------------------------------------------------------------------------  as above    VITALS/PHYSICAL EXAM  --------------------------------------------------------------------------------  T(C): 36.9 (12-27-23 @ 06:47), Max: 36.9 (12-27-23 @ 06:47)  HR: 85 (12-27-23 @ 06:47) (85 - 96)  BP: 131/65 (12-27-23 @ 06:47) (113/64 - 131/65)  RR: 19 (12-27-23 @ 06:47) (19 - 20)  SpO2: 96% (12-27-23 @ 06:47) (92% - 96%)  Wt(kg): --    12-26-23 @ 07:01  -  12-27-23 @ 07:00  --------------------------------------------------------  IN: 0 mL / OUT: 4675 mL / NET: -4675 mL    Physical Exam:  Gen: NAD  HEENT: Anicteric  Pulm: CTA B/L  CV: S1S2+  Abd: Soft, +BS   Ext: +B/L LE Pitting edema  Neuro: Awake  : External urinary capture with bag  Skin: Warm and dry    LABS/STUDIES  --------------------------------------------------------------------------------              7.9    18.47 >-----------<  599      [12-27-23 @ 07:19]              25.0     138  |  97  |  87  ----------------------------<  149      [12-27-23 @ 07:19]  3.5   |  27  |  3.01        Ca     8.0     [12-27-23 @ 07:19]      Mg     1.7     [12-27-23 @ 07:19]      Phos  4.5     [12-27-23 @ 07:19]    TPro  5.5  /  Alb  2.7  /  TBili  0.2  /  DBili  x   /  AST  32  /  ALT  70  /  AlkPhos  113  [12-27-23 @ 07:19]    PTT: 108.9      [12-27-23 @ 07:19]    Creatinine Trend:  SCr 3.01 [12-27 @ 07:19]  SCr 3.59 [12-26 @ 14:43]  SCr 4.90 [12-25 @ 07:20]  SCr 4.97 [12-24 @ 07:00]  SCr 4.32 [12-23 @ 03:39]    Urine Creatinine 100      [12-22-23 @ 13:04]  Urine Protein 22      [12-22-23 @ 13:04]  Urine Sodium 16      [12-22-23 @ 13:04]  Urine Urea Nitrogen 396      [12-22-23 @ 13:04]  Urine Potassium 38      [12-22-23 @ 13:04]  Urine Osmolality 322      [12-22-23 @ 13:04]   [facility] Division of Kidney Diseases & Hypertension  FOLLOW UP NOTE  [phone]--------------------------------------------------------------------------------  Chief Complaint: BESSY    24 hour events/subjective: Pt seen and evaluated bedside this morning. Reports feeling well. Continues to endorse LE edema, otherwise no complaints. Denies any headaches, nausea, vomiting, fevers/chills, chest pain, SOB, abdominal pain.       PAST HISTORY  --------------------------------------------------------------------------------  No significant changes to PMH, PSH, FHx, SHx, unless otherwise noted    ALLERGIES & MEDICATIONS  --------------------------------------------------------------------------------  Allergies  No Known Allergies  Intolerances    Standing Inpatient Medications  albuterol    0.083% 2.5 milliGRAM(s) Nebulizer every 6 hours  ascorbic acid 500 milliGRAM(s) Oral daily  atorvastatin 10 milliGRAM(s) Oral at bedtime  budesonide 160 MICROgram(s)/formoterol 4.5 MICROgram(s) Inhaler 2 Puff(s) Inhalation two times a day  cefTRIAXone   IVPB 2000 milliGRAM(s) IV Intermittent every 24 hours  chlorhexidine 4% Liquid 1 Application(s) Topical <User Schedule>  dextrose 5%. 1000 milliLiter(s) IV Continuous <Continuous>  dextrose 5%. 1000 milliLiter(s) IV Continuous <Continuous>  dextrose 50% Injectable 12.5 Gram(s) IV Push once  dextrose 50% Injectable 25 Gram(s) IV Push once  dextrose 50% Injectable 25 Gram(s) IV Push once  finasteride 5 milliGRAM(s) Oral daily  glucagon  Injectable 1 milliGRAM(s) IntraMuscular once  heparin  Infusion. 2200 Unit(s)/Hr IV Continuous <Continuous>  influenza  Vaccine (HIGH DOSE) 0.7 milliLiter(s) IntraMuscular once  insulin lispro (ADMELOG) corrective regimen sliding scale   SubCutaneous three times a day before meals  insulin lispro (ADMELOG) corrective regimen sliding scale   SubCutaneous at bedtime  magnesium sulfate  IVPB 2 Gram(s) IV Intermittent once  metoprolol tartrate 25 milliGRAM(s) Oral two times a day  multivitamin 1 Tablet(s) Oral daily  pantoprazole    Tablet 40 milliGRAM(s) Oral before breakfast  predniSONE   Tablet 20 milliGRAM(s) Oral daily  sodium bicarbonate 325 milliGRAM(s) Oral every 8 hours  tamsulosin 0.4 milliGRAM(s) Oral at bedtime    PRN Inpatient Medications  acetaminophen     Tablet .. 650 milliGRAM(s) Oral every 6 hours PRN  benzocaine/menthol Lozenge 1 Lozenge Oral three times a day PRN  dextrose Oral Gel 15 Gram(s) Oral once PRN    REVIEW OF SYSTEMS  --------------------------------------------------------------------------------  as above    VITALS/PHYSICAL EXAM  --------------------------------------------------------------------------------  T(C): 36.9 (12-27-23 @ 06:47), Max: 36.9 (12-27-23 @ 06:47)  HR: 85 (12-27-23 @ 06:47) (85 - 96)  BP: 131/65 (12-27-23 @ 06:47) (113/64 - 131/65)  RR: 19 (12-27-23 @ 06:47) (19 - 20)  SpO2: 96% (12-27-23 @ 06:47) (92% - 96%)  Wt(kg): --    12-26-23 @ 07:01  -  12-27-23 @ 07:00  --------------------------------------------------------  IN: 0 mL / OUT: 4675 mL / NET: -4675 mL    Physical Exam:  Gen: NAD  HEENT: Anicteric  Pulm: CTA B/L  CV: S1S2+  Abd: Soft, +BS   Ext: +B/L LE Pitting edema  Neuro: Awake  : External urinary capture with bag  Skin: Warm and dry    LABS/STUDIES  --------------------------------------------------------------------------------              7.9    18.47 >-----------<  599      [12-27-23 @ 07:19]              25.0     138  |  97  |  87  ----------------------------<  149      [12-27-23 @ 07:19]  3.5   |  27  |  3.01        Ca     8.0     [12-27-23 @ 07:19]      Mg     1.7     [12-27-23 @ 07:19]      Phos  4.5     [12-27-23 @ 07:19]    TPro  5.5  /  Alb  2.7  /  TBili  0.2  /  DBili  x   /  AST  32  /  ALT  70  /  AlkPhos  113  [12-27-23 @ 07:19]    PTT: 108.9      [12-27-23 @ 07:19]    Creatinine Trend:  SCr 3.01 [12-27 @ 07:19]  SCr 3.59 [12-26 @ 14:43]  SCr 4.90 [12-25 @ 07:20]  SCr 4.97 [12-24 @ 07:00]  SCr 4.32 [12-23 @ 03:39]    Urine Creatinine 100      [12-22-23 @ 13:04]  Urine Protein 22      [12-22-23 @ 13:04]  Urine Sodium 16      [12-22-23 @ 13:04]  Urine Urea Nitrogen 396      [12-22-23 @ 13:04]  Urine Potassium 38      [12-22-23 @ 13:04]  Urine Osmolality 322      [12-22-23 @ 13:04]

## 2023-12-27 NOTE — PROGRESS NOTE ADULT - PROBLEM SELECTOR PLAN 2
Baseline BNP ~ 2800. BNP ~ 2800 this admission  In chronic exacerbation state with 3+ pitting edema and on diuresis  HFpEF or HFrEF  EF = 68% on 8/11/23  Heart failure, CHF order set initiated  Guideline directed medical therapy as below: after med-rec completed  TTE systolic function is hyperdynamic with an ejection fraction of 80 %  - Bumex 2 mg IV BID and PRN    - Diuretic: on bumex PO 2 mg am 1 mg pm at home.   - BB:  resume home metoprolol tatrate 25 mg BID.  - ARNI/ACE-I/ARB: Consider starting at d/c  - Hydralazine/Nitrate: Not indicated at this time  - MRA: Not indicated at this time  - SGLT2: Consider starting at d/c  - Maintain K > 4, Mg > 2 Baseline BNP ~ 2800. BNP ~ 2800 this admission  In chronic exacerbation state with 3+ pitting edema and on diuresis  HFpEF or HFrEF  EF = 68% on 8/11/23  Heart failure, CHF order set initiated  Guideline directed medical therapy as below: after med-rec completed  TTE systolic function is hyperdynamic with an ejection fraction of 80 %  - Bumex 2 mg IV BID and PRN -> Hold diuresis   - Diuretic: on bumex PO 2 mg am 1 mg pm at home.   - BB:  resume home metoprolol tatrate 25 mg BID.  - ARNI/ACE-I/ARB: Consider starting at d/c  - Hydralazine/Nitrate: Not indicated at this time  - MRA: Not indicated at this time  - SGLT2: Consider starting at d/c  - Maintain K > 4, Mg > 2

## 2023-12-27 NOTE — PROGRESS NOTE ADULT - PROBLEM SELECTOR PLAN 1
Likely multifactorial - CAP and component of HFpEF, entero/rhino virus +, COPD exacerbation   CTPE chest showed loculated moderate L pleural effusion and small R pleural effusion  - s/p thoracentesis  - pulm consulted - recs appreciated   - started ceftriaxone 2000 q24   - s/p azithro 500 q24  - ID consulted - will appreciate recs  - Aspiration Precautions  - Further plan per HFpEF below  - methylpred 40 IV BID-> follow up with pulmonology Likely multifactorial - CAP and component of HFpEF, entero/rhino virus +, COPD exacerbation   CTPE chest showed loculated moderate L pleural effusion and small R pleural effusion  - s/p thoracentesis  - pulm consulted - recs appreciated   - started ceftriaxone 2000 q24   - s/p azithro 500 q24  - ID consulted - will appreciate recs  - Aspiration Precautions  - Further plan per HFpEF below  - methylpred taper per Pulm recs

## 2023-12-28 LAB
ALBUMIN SERPL ELPH-MCNC: 2.7 G/DL — LOW (ref 3.3–5)
ALBUMIN SERPL ELPH-MCNC: 2.7 G/DL — LOW (ref 3.3–5)
ALP SERPL-CCNC: 100 U/L — SIGNIFICANT CHANGE UP (ref 40–120)
ALP SERPL-CCNC: 100 U/L — SIGNIFICANT CHANGE UP (ref 40–120)
ALT FLD-CCNC: 58 U/L — HIGH (ref 10–45)
ALT FLD-CCNC: 58 U/L — HIGH (ref 10–45)
ANION GAP SERPL CALC-SCNC: 10 MMOL/L — SIGNIFICANT CHANGE UP (ref 5–17)
ANION GAP SERPL CALC-SCNC: 10 MMOL/L — SIGNIFICANT CHANGE UP (ref 5–17)
APTT BLD: 101.3 SEC — HIGH (ref 24.5–35.6)
APTT BLD: 101.3 SEC — HIGH (ref 24.5–35.6)
APTT BLD: 67 SEC — HIGH (ref 24.5–35.6)
APTT BLD: 67 SEC — HIGH (ref 24.5–35.6)
AST SERPL-CCNC: 24 U/L — SIGNIFICANT CHANGE UP (ref 10–40)
AST SERPL-CCNC: 24 U/L — SIGNIFICANT CHANGE UP (ref 10–40)
BASOPHILS # BLD AUTO: 0.01 K/UL — SIGNIFICANT CHANGE UP (ref 0–0.2)
BASOPHILS # BLD AUTO: 0.01 K/UL — SIGNIFICANT CHANGE UP (ref 0–0.2)
BASOPHILS NFR BLD AUTO: 0.1 % — SIGNIFICANT CHANGE UP (ref 0–2)
BASOPHILS NFR BLD AUTO: 0.1 % — SIGNIFICANT CHANGE UP (ref 0–2)
BILIRUB SERPL-MCNC: 0.2 MG/DL — SIGNIFICANT CHANGE UP (ref 0.2–1.2)
BILIRUB SERPL-MCNC: 0.2 MG/DL — SIGNIFICANT CHANGE UP (ref 0.2–1.2)
BLD GP AB SCN SERPL QL: NEGATIVE — SIGNIFICANT CHANGE UP
BLD GP AB SCN SERPL QL: NEGATIVE — SIGNIFICANT CHANGE UP
BUN SERPL-MCNC: 71 MG/DL — HIGH (ref 7–23)
BUN SERPL-MCNC: 71 MG/DL — HIGH (ref 7–23)
CALCIUM SERPL-MCNC: 8.2 MG/DL — LOW (ref 8.4–10.5)
CALCIUM SERPL-MCNC: 8.2 MG/DL — LOW (ref 8.4–10.5)
CHLORIDE SERPL-SCNC: 100 MMOL/L — SIGNIFICANT CHANGE UP (ref 96–108)
CHLORIDE SERPL-SCNC: 100 MMOL/L — SIGNIFICANT CHANGE UP (ref 96–108)
CO2 SERPL-SCNC: 29 MMOL/L — SIGNIFICANT CHANGE UP (ref 22–31)
CO2 SERPL-SCNC: 29 MMOL/L — SIGNIFICANT CHANGE UP (ref 22–31)
CREAT SERPL-MCNC: 2.13 MG/DL — HIGH (ref 0.5–1.3)
CREAT SERPL-MCNC: 2.13 MG/DL — HIGH (ref 0.5–1.3)
CRP SERPL-MCNC: 15 MG/L — HIGH (ref 0–4)
CRP SERPL-MCNC: 15 MG/L — HIGH (ref 0–4)
EGFR: 30 ML/MIN/1.73M2 — LOW
EGFR: 30 ML/MIN/1.73M2 — LOW
EOSINOPHIL # BLD AUTO: 0.2 K/UL — SIGNIFICANT CHANGE UP (ref 0–0.5)
EOSINOPHIL # BLD AUTO: 0.2 K/UL — SIGNIFICANT CHANGE UP (ref 0–0.5)
EOSINOPHIL NFR BLD AUTO: 1.2 % — SIGNIFICANT CHANGE UP (ref 0–6)
EOSINOPHIL NFR BLD AUTO: 1.2 % — SIGNIFICANT CHANGE UP (ref 0–6)
ERYTHROCYTE [SEDIMENTATION RATE] IN BLOOD: 54 MM/HR — HIGH (ref 0–20)
ERYTHROCYTE [SEDIMENTATION RATE] IN BLOOD: 54 MM/HR — HIGH (ref 0–20)
FERRITIN SERPL-MCNC: 96 NG/ML — SIGNIFICANT CHANGE UP (ref 30–400)
FERRITIN SERPL-MCNC: 96 NG/ML — SIGNIFICANT CHANGE UP (ref 30–400)
FOLATE SERPL-MCNC: 9.6 NG/ML — SIGNIFICANT CHANGE UP
FOLATE SERPL-MCNC: 9.6 NG/ML — SIGNIFICANT CHANGE UP
GLUCOSE BLDC GLUCOMTR-MCNC: 135 MG/DL — HIGH (ref 70–99)
GLUCOSE BLDC GLUCOMTR-MCNC: 135 MG/DL — HIGH (ref 70–99)
GLUCOSE BLDC GLUCOMTR-MCNC: 153 MG/DL — HIGH (ref 70–99)
GLUCOSE BLDC GLUCOMTR-MCNC: 153 MG/DL — HIGH (ref 70–99)
GLUCOSE BLDC GLUCOMTR-MCNC: 163 MG/DL — HIGH (ref 70–99)
GLUCOSE BLDC GLUCOMTR-MCNC: 163 MG/DL — HIGH (ref 70–99)
GLUCOSE BLDC GLUCOMTR-MCNC: 193 MG/DL — HIGH (ref 70–99)
GLUCOSE BLDC GLUCOMTR-MCNC: 193 MG/DL — HIGH (ref 70–99)
GLUCOSE SERPL-MCNC: 162 MG/DL — HIGH (ref 70–99)
GLUCOSE SERPL-MCNC: 162 MG/DL — HIGH (ref 70–99)
HCT VFR BLD CALC: 26.1 % — LOW (ref 39–50)
HCT VFR BLD CALC: 26.1 % — LOW (ref 39–50)
HCT VFR BLD CALC: 26.6 % — LOW (ref 39–50)
HCT VFR BLD CALC: 26.6 % — LOW (ref 39–50)
HGB BLD-MCNC: 8.2 G/DL — LOW (ref 13–17)
HGB BLD-MCNC: 8.2 G/DL — LOW (ref 13–17)
HGB BLD-MCNC: 8.3 G/DL — LOW (ref 13–17)
HGB BLD-MCNC: 8.3 G/DL — LOW (ref 13–17)
IMM GRANULOCYTES NFR BLD AUTO: 2.7 % — HIGH (ref 0–0.9)
IMM GRANULOCYTES NFR BLD AUTO: 2.7 % — HIGH (ref 0–0.9)
IRON SATN MFR SERPL: 12 % — LOW (ref 16–55)
IRON SATN MFR SERPL: 12 % — LOW (ref 16–55)
IRON SATN MFR SERPL: 28 UG/DL — LOW (ref 45–165)
IRON SATN MFR SERPL: 28 UG/DL — LOW (ref 45–165)
LYMPHOCYTES # BLD AUTO: 12.4 % — LOW (ref 13–44)
LYMPHOCYTES # BLD AUTO: 12.4 % — LOW (ref 13–44)
LYMPHOCYTES # BLD AUTO: 2.02 K/UL — SIGNIFICANT CHANGE UP (ref 1–3.3)
LYMPHOCYTES # BLD AUTO: 2.02 K/UL — SIGNIFICANT CHANGE UP (ref 1–3.3)
MAGNESIUM SERPL-MCNC: 2.1 MG/DL — SIGNIFICANT CHANGE UP (ref 1.6–2.6)
MAGNESIUM SERPL-MCNC: 2.1 MG/DL — SIGNIFICANT CHANGE UP (ref 1.6–2.6)
MCHC RBC-ENTMCNC: 21.7 PG — LOW (ref 27–34)
MCHC RBC-ENTMCNC: 31.2 GM/DL — LOW (ref 32–36)
MCHC RBC-ENTMCNC: 31.2 GM/DL — LOW (ref 32–36)
MCHC RBC-ENTMCNC: 31.4 GM/DL — LOW (ref 32–36)
MCHC RBC-ENTMCNC: 31.4 GM/DL — LOW (ref 32–36)
MCV RBC AUTO: 69 FL — LOW (ref 80–100)
MCV RBC AUTO: 69 FL — LOW (ref 80–100)
MCV RBC AUTO: 69.6 FL — LOW (ref 80–100)
MCV RBC AUTO: 69.6 FL — LOW (ref 80–100)
MONOCYTES # BLD AUTO: 1.43 K/UL — HIGH (ref 0–0.9)
MONOCYTES # BLD AUTO: 1.43 K/UL — HIGH (ref 0–0.9)
MONOCYTES NFR BLD AUTO: 8.8 % — SIGNIFICANT CHANGE UP (ref 2–14)
MONOCYTES NFR BLD AUTO: 8.8 % — SIGNIFICANT CHANGE UP (ref 2–14)
NEUTROPHILS # BLD AUTO: 12.19 K/UL — HIGH (ref 1.8–7.4)
NEUTROPHILS # BLD AUTO: 12.19 K/UL — HIGH (ref 1.8–7.4)
NEUTROPHILS NFR BLD AUTO: 74.8 % — SIGNIFICANT CHANGE UP (ref 43–77)
NEUTROPHILS NFR BLD AUTO: 74.8 % — SIGNIFICANT CHANGE UP (ref 43–77)
NRBC # BLD: 0 /100 WBCS — SIGNIFICANT CHANGE UP (ref 0–0)
PHOSPHATE SERPL-MCNC: 3.7 MG/DL — SIGNIFICANT CHANGE UP (ref 2.5–4.5)
PHOSPHATE SERPL-MCNC: 3.7 MG/DL — SIGNIFICANT CHANGE UP (ref 2.5–4.5)
PLATELET # BLD AUTO: 647 K/UL — HIGH (ref 150–400)
PLATELET # BLD AUTO: 647 K/UL — HIGH (ref 150–400)
PLATELET # BLD AUTO: 659 K/UL — HIGH (ref 150–400)
PLATELET # BLD AUTO: 659 K/UL — HIGH (ref 150–400)
POTASSIUM SERPL-MCNC: 3.6 MMOL/L — SIGNIFICANT CHANGE UP (ref 3.5–5.3)
POTASSIUM SERPL-MCNC: 3.6 MMOL/L — SIGNIFICANT CHANGE UP (ref 3.5–5.3)
POTASSIUM SERPL-SCNC: 3.6 MMOL/L — SIGNIFICANT CHANGE UP (ref 3.5–5.3)
POTASSIUM SERPL-SCNC: 3.6 MMOL/L — SIGNIFICANT CHANGE UP (ref 3.5–5.3)
PROT SERPL-MCNC: 5.5 G/DL — LOW (ref 6–8.3)
PROT SERPL-MCNC: 5.5 G/DL — LOW (ref 6–8.3)
RBC # BLD: 3.78 M/UL — LOW (ref 4.2–5.8)
RBC # BLD: 3.78 M/UL — LOW (ref 4.2–5.8)
RBC # BLD: 3.82 M/UL — LOW (ref 4.2–5.8)
RBC # BLD: 3.82 M/UL — LOW (ref 4.2–5.8)
RBC # FLD: 20.2 % — HIGH (ref 10.3–14.5)
RBC # FLD: 20.2 % — HIGH (ref 10.3–14.5)
RBC # FLD: 20.4 % — HIGH (ref 10.3–14.5)
RBC # FLD: 20.4 % — HIGH (ref 10.3–14.5)
RH IG SCN BLD-IMP: POSITIVE — SIGNIFICANT CHANGE UP
RH IG SCN BLD-IMP: POSITIVE — SIGNIFICANT CHANGE UP
SODIUM SERPL-SCNC: 139 MMOL/L — SIGNIFICANT CHANGE UP (ref 135–145)
SODIUM SERPL-SCNC: 139 MMOL/L — SIGNIFICANT CHANGE UP (ref 135–145)
TIBC SERPL-MCNC: 245 UG/DL — SIGNIFICANT CHANGE UP (ref 220–430)
TIBC SERPL-MCNC: 245 UG/DL — SIGNIFICANT CHANGE UP (ref 220–430)
TRANSFERRIN SERPL-MCNC: 216 MG/DL — SIGNIFICANT CHANGE UP (ref 200–360)
TRANSFERRIN SERPL-MCNC: 216 MG/DL — SIGNIFICANT CHANGE UP (ref 200–360)
UIBC SERPL-MCNC: 216 UG/DL — SIGNIFICANT CHANGE UP (ref 110–370)
UIBC SERPL-MCNC: 216 UG/DL — SIGNIFICANT CHANGE UP (ref 110–370)
VIT B12 SERPL-MCNC: 968 PG/ML — SIGNIFICANT CHANGE UP (ref 232–1245)
VIT B12 SERPL-MCNC: 968 PG/ML — SIGNIFICANT CHANGE UP (ref 232–1245)
WBC # BLD: 16.29 K/UL — HIGH (ref 3.8–10.5)
WBC # BLD: 16.29 K/UL — HIGH (ref 3.8–10.5)
WBC # BLD: 16.62 K/UL — HIGH (ref 3.8–10.5)
WBC # BLD: 16.62 K/UL — HIGH (ref 3.8–10.5)
WBC # FLD AUTO: 16.29 K/UL — HIGH (ref 3.8–10.5)
WBC # FLD AUTO: 16.29 K/UL — HIGH (ref 3.8–10.5)
WBC # FLD AUTO: 16.62 K/UL — HIGH (ref 3.8–10.5)
WBC # FLD AUTO: 16.62 K/UL — HIGH (ref 3.8–10.5)

## 2023-12-28 PROCEDURE — 71045 X-RAY EXAM CHEST 1 VIEW: CPT | Mod: 26

## 2023-12-28 PROCEDURE — 99232 SBSQ HOSP IP/OBS MODERATE 35: CPT | Mod: GC

## 2023-12-28 RX ORDER — APIXABAN 2.5 MG/1
2.5 TABLET, FILM COATED ORAL
Refills: 0 | Status: DISCONTINUED | OUTPATIENT
Start: 2023-12-28 | End: 2024-01-02

## 2023-12-28 RX ORDER — CEFPODOXIME PROXETIL 100 MG
1 TABLET ORAL
Qty: 28 | Refills: 0
Start: 2023-12-28 | End: 2024-01-10

## 2023-12-28 RX ORDER — IRON SUCROSE 20 MG/ML
300 INJECTION, SOLUTION INTRAVENOUS EVERY 24 HOURS
Refills: 0 | Status: COMPLETED | OUTPATIENT
Start: 2023-12-28 | End: 2024-01-01

## 2023-12-28 RX ORDER — NYSTATIN 500MM UNIT
500000 POWDER (EA) MISCELLANEOUS
Refills: 0 | Status: DISCONTINUED | OUTPATIENT
Start: 2023-12-28 | End: 2024-01-02

## 2023-12-28 RX ADMIN — DIPHENHYDRAMINE HYDROCHLORIDE AND LIDOCAINE HYDROCHLORIDE AND ALUMINUM HYDROXIDE AND MAGNESIUM HYDRO 5 MILLILITER(S): KIT at 05:33

## 2023-12-28 RX ADMIN — Medication 500000 UNIT(S): at 23:44

## 2023-12-28 RX ADMIN — HEPARIN SODIUM 1100 UNIT(S)/HR: 5000 INJECTION INTRAVENOUS; SUBCUTANEOUS at 05:07

## 2023-12-28 RX ADMIN — Medication 25 MILLIGRAM(S): at 17:00

## 2023-12-28 RX ADMIN — BUDESONIDE AND FORMOTEROL FUMARATE DIHYDRATE 2 PUFF(S): 160; 4.5 AEROSOL RESPIRATORY (INHALATION) at 17:09

## 2023-12-28 RX ADMIN — ATORVASTATIN CALCIUM 10 MILLIGRAM(S): 80 TABLET, FILM COATED ORAL at 21:13

## 2023-12-28 RX ADMIN — ALBUTEROL 2.5 MILLIGRAM(S): 90 AEROSOL, METERED ORAL at 23:44

## 2023-12-28 RX ADMIN — Medication 500000 UNIT(S): at 17:03

## 2023-12-28 RX ADMIN — IRON SUCROSE 176.67 MILLIGRAM(S): 20 INJECTION, SOLUTION INTRAVENOUS at 16:36

## 2023-12-28 RX ADMIN — APIXABAN 2.5 MILLIGRAM(S): 2.5 TABLET, FILM COATED ORAL at 12:53

## 2023-12-28 RX ADMIN — Medication 325 MILLIGRAM(S): at 21:14

## 2023-12-28 RX ADMIN — ALBUTEROL 2.5 MILLIGRAM(S): 90 AEROSOL, METERED ORAL at 17:03

## 2023-12-28 RX ADMIN — Medication 325 MILLIGRAM(S): at 17:00

## 2023-12-28 RX ADMIN — Medication 25 MILLIGRAM(S): at 05:32

## 2023-12-28 RX ADMIN — Medication 325 MILLIGRAM(S): at 05:33

## 2023-12-28 RX ADMIN — DIPHENHYDRAMINE HYDROCHLORIDE AND LIDOCAINE HYDROCHLORIDE AND ALUMINUM HYDROXIDE AND MAGNESIUM HYDRO 5 MILLILITER(S): KIT at 00:20

## 2023-12-28 RX ADMIN — HEPARIN SODIUM 1100 UNIT(S)/HR: 5000 INJECTION INTRAVENOUS; SUBCUTANEOUS at 07:04

## 2023-12-28 RX ADMIN — APIXABAN 2.5 MILLIGRAM(S): 2.5 TABLET, FILM COATED ORAL at 17:01

## 2023-12-28 RX ADMIN — DIPHENHYDRAMINE HYDROCHLORIDE AND LIDOCAINE HYDROCHLORIDE AND ALUMINUM HYDROXIDE AND MAGNESIUM HYDRO 5 MILLILITER(S): KIT at 17:03

## 2023-12-28 RX ADMIN — DIPHENHYDRAMINE HYDROCHLORIDE AND LIDOCAINE HYDROCHLORIDE AND ALUMINUM HYDROXIDE AND MAGNESIUM HYDRO 5 MILLILITER(S): KIT at 23:44

## 2023-12-28 RX ADMIN — Medication 1: at 17:09

## 2023-12-28 RX ADMIN — ALBUTEROL 2.5 MILLIGRAM(S): 90 AEROSOL, METERED ORAL at 00:20

## 2023-12-28 RX ADMIN — PANTOPRAZOLE SODIUM 40 MILLIGRAM(S): 20 TABLET, DELAYED RELEASE ORAL at 05:34

## 2023-12-28 RX ADMIN — CHLORHEXIDINE GLUCONATE 1 APPLICATION(S): 213 SOLUTION TOPICAL at 05:34

## 2023-12-28 RX ADMIN — ALBUTEROL 2.5 MILLIGRAM(S): 90 AEROSOL, METERED ORAL at 05:32

## 2023-12-28 RX ADMIN — BUDESONIDE AND FORMOTEROL FUMARATE DIHYDRATE 2 PUFF(S): 160; 4.5 AEROSOL RESPIRATORY (INHALATION) at 05:33

## 2023-12-28 RX ADMIN — Medication 10 MILLIGRAM(S): at 18:29

## 2023-12-28 RX ADMIN — CEFTRIAXONE 100 MILLIGRAM(S): 500 INJECTION, POWDER, FOR SOLUTION INTRAMUSCULAR; INTRAVENOUS at 17:08

## 2023-12-28 RX ADMIN — TAMSULOSIN HYDROCHLORIDE 0.4 MILLIGRAM(S): 0.4 CAPSULE ORAL at 21:13

## 2023-12-28 NOTE — PHYSICAL THERAPY INITIAL EVALUATION ADULT - PERTINENT HX OF CURRENT PROBLEM, REHAB EVAL
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 Dx: Acute hypoxic respiratory failure,   Acute on chronic heart failure with preserved ejection fraction (HFpEF), BESSY, Anemia, Chronic Afib, HTN. 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 Dx: Acute hypoxic respiratory failure,   Acute on chronic heart failure with preserved ejection fraction (HFpEF), BESSY, Anemia, Chronic Afib, HTN. XrayChest: No focal consolidation. Trace right pleural effusion. Small left pleural effusion with adjacent atelectasis.

## 2023-12-28 NOTE — PROGRESS NOTE ADULT - PROBLEM SELECTOR PLAN 1
Pt with ATN, multifactorial 2/2 EPI (s/p IV contrast 12/21), sepsis, vancomycin (12/20), and diuretic use with hyperdynamic EF (hypovolemia). Pt w/ baseline Scr 0.86-1.29, prior to admission Scr 1.12 on 10/7/23.     Labs and VS reviewed. No hypotensive episodes. On admission Scr at baseline 1.28 on 12/20. Scr increased to 1.62 on 12/21 peaked to Scr 4.97 on 12/24. Scr improved to 2.13 today. 24hr UOP 2.7L. UA +trace protein, casts, bld neg but RBC 14. CT, CXR and ECHO reviewed. Renal US no hydronephrosis. POCUS (12/28) without b-lines. No acute indication for HD. Hold diuretics. Monitor labs and urine output. Avoid nephrotoxins. dose medications as per CrCl.    Final recommendations pending attending signature.  If you have any questions, please feel free to contact me  Luh Mott  Nephrology Fellow  [place]/Page [phone]  (After 5pm or on weekends please page the on-call fellow) Pt with ATN, multifactorial 2/2 EPI (s/p IV contrast 12/21), sepsis, vancomycin (12/20), and diuretic use with hyperdynamic EF (hypovolemia). Pt w/ baseline Scr 0.86-1.29, prior to admission Scr 1.12 on 10/7/23.     Labs and VS reviewed. No hypotensive episodes. On admission Scr at baseline 1.28 on 12/20. Scr increased to 1.62 on 12/21 peaked to Scr 4.97 on 12/24. Scr improved to 2.13 today. 24hr UOP 2.7L. UA +trace protein, casts, bld neg but RBC 14. CT, CXR and ECHO reviewed. Renal US no hydronephrosis. POCUS (12/28) without b-lines. No acute indication for HD. Hold diuretics. Monitor labs and urine output. Avoid nephrotoxins. dose medications as per CrCl.    Final recommendations pending attending signature.  If you have any questions, please feel free to contact me  Luh Mott  Nephrology Fellow  [place]/Page [phone]  (After 5pm or on weekends please page the on-call fellow)

## 2023-12-28 NOTE — PROGRESS NOTE ADULT - ASSESSMENT
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 Nephrology consulted for BESSY.

## 2023-12-28 NOTE — PROGRESS NOTE ADULT - ATTENDING COMMENTS
Patient seen and examined POCUS performed by me at bedside today.  A line pattern.  no evidence of extravascular water (images are archived on qpathe).  Resolving ATN resolving.  Hold fluid and diuretics.

## 2023-12-28 NOTE — PROGRESS NOTE ADULT - ATTENDING COMMENTS
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     1. Acute Hypoxic Respiratory Failure 2/2 s/p thoracentesis CAP and component of HFpEF, entero/rhino virus +, COPD exacerbation: started ceftriaxone 2000 q24, s/p azithro 500 q24. Taper steroids Leukocytosis likely related to steroids as discussed with ID. Thrombocytosis likely 2/2 to infection. Will send home with Cefpodoxime- appreciate ID recs  2. TTE systolic function is hyperdynamic with an ejection fraction of 80 %. Bumex DC'd   3. Afib on Heparin gtt now transitioned to eliquis adjusted dose    4. Thrombocytosis/ anemia: likely reactive plt count, JAMAL anemia/ hx of colon ca- possible slow bleed. Heme onc consulted.   Other details as above .     Pending PT evaluation and rec

## 2023-12-28 NOTE — PROGRESS NOTE ADULT - PROBLEM SELECTOR PLAN 1
Likely multifactorial - CAP and component of HFpEF, entero/rhino virus +, COPD exacerbation   CTPE chest showed loculated moderate L pleural effusion and small R pleural effusion  - s/p thoracentesis  - pulm consulted - recs appreciated   - started ceftriaxone 2000 q24   - s/p azithro 500 q24  - ID consulted - will appreciate recs  - Aspiration Precautions  - Further plan per HFpEF below  - methylpred taper per Pulm recs Statement Selected Likely multifactorial - CAP and component of HFpEF, entero/rhino virus +, COPD exacerbation   CTPE chest showed loculated moderate L pleural effusion and small R pleural effusion  - s/p thoracentesis  - pulm consulted - recs appreciated   - started ceftriaxone 2000 q24   - s/p azithro 500 q24  - ID consulted recs appreciated   - Aspiration Precautions  - Further plan per HFpEF below  - methylpred taper per Pulm recs-> 12/28 last day

## 2023-12-28 NOTE — PHYSICAL THERAPY INITIAL EVALUATION ADULT - THERAPY FREQUENCY, PT EVAL
2-3x/week Skin Substitute Text: The defect edges were debeveled with a #15 scalpel blade.  Given the location of the defect, shape of the defect and the proximity to free margins a skin substitute graft was deemed most appropriate.  The graft material was trimmed to fit the size of the defect. The graft was then placed in the primary defect and oriented appropriately.

## 2023-12-28 NOTE — PROGRESS NOTE ADULT - ASSESSMENT
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 found to have hypoxemic respiratory failure secondary to pna vs copd vs AHRF    #recommendations    patient declining inhalers however amenable nebulizers continue standing duonebs and methylpred 40 BID continue   patient with significant increased mucus production and sepsis please continue ceftriaxone can discontinue azitho at this point  s/p 1200 left thoracentesis exudative, unlikely infectious pleural effusion, no ptx post op  flow negative, cultures negative, f/u cytopath  continue airway clearance: humidified oxygen, standing guaifenesin, duonebs q6h, hyper sal q6h, chest PT q6 h, aerobika/acapella q6 h,   f/u U/A, urine legionella,:   ERV positive, strep bactermia , nml resp sukhjinder, MRSA negative  pro BNP, ECHO with bubble study: nml LV nml RV  continue diuresis as per nephro  titrate oxygen to O2 >88%, use humidified oxygen  incentive spirometry, OOB to chair, PT/OT  aspiration precautions as necessary  DVT ppx as tolerated  airway clearance as necessary  no further pulmonary recommendations     Prior to discharge:  Please email: [email or web address] to setup an appointment prior to discharge. Include the patient's name, , MRN and contact information in the email.      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 found to have hypoxemic respiratory failure secondary to pna vs copd vs AHRF    #recommendations    patient declining inhalers however amenable nebulizers continue standing duonebs and methylpred 40 BID continue   patient with significant increased mucus production and sepsis please continue ceftriaxone can discontinue azitho at this point  s/p 1200 left thoracentesis exudative, unlikely infectious pleural effusion, no ptx post op  flow negative, cultures negative, f/u cytopath  continue airway clearance: humidified oxygen, standing guaifenesin, duonebs q6h, hyper sal q6h, chest PT q6 h, aerobika/acapella q6 h,   f/u U/A, urine legionella,:   ERV positive, strep bactermia , nml resp sukhjinder, MRSA negative  pro BNP, ECHO with bubble study: nml LV nml RV  continue diuresis as per nephro  titrate oxygen to O2 >88%, use humidified oxygen  incentive spirometry, OOB to chair, PT/OT  aspiration precautions as necessary  DVT ppx as tolerated  airway clearance as necessary  no further pulmonary recommendations     Prior to discharge:  Please email: [email or web address] to setup an appointment prior to discharge. Include the patient's name, , MRN and contact information in the email.      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 found to have hypoxemic respiratory failure secondary to pna vs copd vs AHRF    #recommendations    patient declining inhalers however amenable nebulizers continue standing duonebs and continuing prednisone taper  patient with significant increased mucus production and sepsis please continue ceftriaxone can discontinue azitho at this point  s/p 1200 left thoracentesis exudative, unlikely infectious pleural effusion, no ptx post op  flow negative, cultures negative,   cytopath negative for malignancy   continue airway clearance: humidified oxygen, standing guaifenesin, duonebs q6h, hyper sal q6h, chest PT q6 h, aerobika/acapella q6 h,   f/u U/A, urine legionella,:   ERV positive, strep bactermia , nml resp sukhjinder, MRSA negative  pro BNP, ECHO with bubble study: nml LV nml RV  continue diuresis as per nephro  titrate oxygen to O2 >88%, use humidified oxygen  incentive spirometry, OOB to chair, PT/OT  aspiration precautions as necessary  DVT ppx as tolerated  airway clearance as necessary  no further pulmonary recommendations     Prior to discharge:  Please email: [email or web address] to setup an appointment prior to discharge. Include the patient's name, , MRN and contact information in the email.    please specify with Dr. 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 found to have hypoxemic respiratory failure secondary to pna vs copd vs AHRF    #recommendations    patient declining inhalers however amenable nebulizers continue standing duonebs and continuing prednisone taper  patient with significant increased mucus production and sepsis please continue ceftriaxone can discontinue azitho at this point  s/p 1200 left thoracentesis exudative, unlikely infectious pleural effusion, no ptx post op  flow negative, cultures negative,   cytopath negative for malignancy   continue airway clearance: humidified oxygen, standing guaifenesin, duonebs q6h, hyper sal q6h, chest PT q6 h, aerobika/acapella q6 h,   f/u U/A, urine legionella,:   ERV positive, strep bactermia , nml resp sukhjinder, MRSA negative  pro BNP, ECHO with bubble study: nml LV nml RV  continue diuresis as per nephro  titrate oxygen to O2 >88%, use humidified oxygen  incentive spirometry, OOB to chair, PT/OT  aspiration precautions as necessary  DVT ppx as tolerated  airway clearance as necessary  no further pulmonary recommendations     Prior to discharge:  Please email: [email or web address] to setup an appointment prior to discharge. Include the patient's name, , MRN and contact information in the email.    please specify with Dr. Gant    Pulmonary/Sleep Clinic  [address] [place] [address] [phone]    Eugenio Barraza PGY5 PCCM Fellow

## 2023-12-28 NOTE — PROGRESS NOTE ADULT - SUBJECTIVE AND OBJECTIVE BOX
***************************************************************  Blas Rubio, PGY1  Internal Medicine   Preferred contact via [employer] Teams   ***************************************************************    GLADIS LEONARD  83y  MRN: [id number]    Patient is a 83y old  Male who presents with a chief complaint of sob (27 Dec 2023 10:22)      Interval/Overnight Events: no events ON.     Subjective: Pt seen and examined at bedside. Denies fever, CP, SOB, abn pain, N/V, dysuria. Tolerating diet.      MEDICATIONS  (STANDING):  albuterol    0.083% 2.5 milliGRAM(s) Nebulizer every 6 hours  ascorbic acid 500 milliGRAM(s) Oral daily  atorvastatin 10 milliGRAM(s) Oral at bedtime  budesonide 160 MICROgram(s)/formoterol 4.5 MICROgram(s) Inhaler 2 Puff(s) Inhalation two times a day  cefTRIAXone   IVPB 2000 milliGRAM(s) IV Intermittent every 24 hours  chlorhexidine 4% Liquid 1 Application(s) Topical <User Schedule>  dextrose 5%. 1000 milliLiter(s) (50 mL/Hr) IV Continuous <Continuous>  dextrose 5%. 1000 milliLiter(s) (100 mL/Hr) IV Continuous <Continuous>  dextrose 50% Injectable 25 Gram(s) IV Push once  dextrose 50% Injectable 25 Gram(s) IV Push once  dextrose 50% Injectable 12.5 Gram(s) IV Push once  finasteride 5 milliGRAM(s) Oral daily  FIRST- Mouthwash  BLM 5 milliLiter(s) Swish and Spit every 6 hours  glucagon  Injectable 1 milliGRAM(s) IntraMuscular once  heparin  Infusion. 1300 Unit(s)/Hr (13 mL/Hr) IV Continuous <Continuous>  influenza  Vaccine (HIGH DOSE) 0.7 milliLiter(s) IntraMuscular once  insulin lispro (ADMELOG) corrective regimen sliding scale   SubCutaneous three times a day before meals  insulin lispro (ADMELOG) corrective regimen sliding scale   SubCutaneous at bedtime  metoprolol tartrate 25 milliGRAM(s) Oral two times a day  multivitamin 1 Tablet(s) Oral daily  pantoprazole    Tablet 40 milliGRAM(s) Oral before breakfast  predniSONE   Tablet 10 milliGRAM(s) Oral once  sodium bicarbonate 325 milliGRAM(s) Oral every 8 hours  tamsulosin 0.4 milliGRAM(s) Oral at bedtime    MEDICATIONS  (PRN):  acetaminophen     Tablet .. 650 milliGRAM(s) Oral every 6 hours PRN Temp greater or equal to 38C (100.4F), Mild Pain (1 - 3)  benzocaine/menthol Lozenge 1 Lozenge Oral three times a day PRN Sore Throat  dextrose Oral Gel 15 Gram(s) Oral once PRN Blood Glucose LESS THAN 70 milliGRAM(s)/deciliter      Objective:    Vitals: Vital Signs Last 24 Hrs  T(C): 36.3 (12-28-23 @ 04:20), Max: 36.3 (12-27-23 @ 14:45)  T(F): 97.4 (12-28-23 @ 04:20), Max: 97.4 (12-28-23 @ 04:20)  HR: 83 (12-28-23 @ 04:20) (82 - 86)  BP: 134/65 (12-28-23 @ 04:20) (116/55 - 134/65)  BP(mean): --  RR: 18 (12-28-23 @ 04:20) (17 - 18)  SpO2: 96% (12-28-23 @ 04:20) (94% - 96%)                I&O's Summary    27 Dec 2023 07:01  -  28 Dec 2023 07:00  --------------------------------------------------------  IN: 600 mL / OUT: 2700 mL / NET: -2100 mL        PHYSICAL EXAM:  GENERAL: NAD  HEAD:  Atraumatic, Normocephalic  EYES: EOMI, conjunctiva and sclera clear  CHEST/LUNG: Clear to auscultation bilaterally; No rales, rhonchi, wheezing, or rubs  HEART: Regular rate and rhythm; No murmurs, rubs, or gallops  ABDOMEN: Soft, Nontender, Nondistended;   SKIN: No rashes or lesions  NERVOUS SYSTEM:  Alert & Oriented X3, no focal deficits    LABS:                        8.2    16.29 )-----------( 659      ( 28 Dec 2023 04:37 )             26.1                         7.9    18.47 )-----------( 599      ( 27 Dec 2023 07:19 )             25.0                         8.1    18.21 )-----------( 632      ( 26 Dec 2023 14:43 )             25.8     12-28    139  |  100  |  71<H>  ----------------------------<  162<H>  3.6   |  29  |  2.13<H>  12-27    138  |  97  |  87<H>  ----------------------------<  149<H>  3.5   |  27  |  3.01<H>  12-26    137  |  96  |  93<H>  ----------------------------<  214<H>  3.9   |  27  |  3.59<H>    Ca    8.2<L>      28 Dec 2023 04:37  Ca    8.0<L>      27 Dec 2023 07:19  Ca    8.1<L>      26 Dec 2023 14:43  Phos  3.7     12-28  Mg     2.1     12-28    TPro  5.5<L>  /  Alb  2.7<L>  /  TBili  0.2  /  DBili  x   /  AST  24  /  ALT  58<H>  /  AlkPhos  100  12-28  TPro  5.5<L>  /  Alb  2.7<L>  /  TBili  0.2  /  DBili  x   /  AST  32  /  ALT  70<H>  /  AlkPhos  113  12-27  TPro  5.9<L>  /  Alb  2.7<L>  /  TBili  0.3  /  DBili  x   /  AST  36  /  ALT  77<H>  /  AlkPhos  104  12-26    CAPILLARY BLOOD GLUCOSE      POCT Blood Glucose.: 247 mg/dL (27 Dec 2023 21:13)  POCT Blood Glucose.: 290 mg/dL (27 Dec 2023 19:00)  POCT Blood Glucose.: 316 mg/dL (27 Dec 2023 17:34)  POCT Blood Glucose.: 199 mg/dL (27 Dec 2023 12:13)  POCT Blood Glucose.: 172 mg/dL (27 Dec 2023 08:39)    PTT - ( 28 Dec 2023 04:37 )  PTT:101.3 sec    Urinalysis Basic - ( 28 Dec 2023 04:37 )    Color: x / Appearance: x / SG: x / pH: x  Gluc: 162 mg/dL / Ketone: x  / Bili: x / Urobili: x   Blood: x / Protein: x / Nitrite: x   Leuk Esterase: x / RBC: x / WBC x   Sq Epi: x / Non Sq Epi: x / Bacteria: x          RADIOLOGY & ADDITIONAL TESTS:    Imaging Personally Reviewed:  [x ] YES  [ ] NO    Consultants involved in case:   Consultant(s) Notes Reviewed:  [ x] YES  [ ] NO:   Care Discussed with Consultants/Other Providers [x ] YES  [ ] NO         ***************************************************************  Blas Ruibo, PGY1  Internal Medicine   Preferred contact via [employer] Teams   ***************************************************************    GLADIS LEONARD  83y  MRN: [id number]    Patient is a 83y old  Male who presents with a chief complaint of sob (27 Dec 2023 10:22)      Interval/Overnight Events: no events ON.     Subjective: Pt seen and examined at bedside. Denies fever, CP, SOB, abn pain, N/V, dysuria. Tolerating diet.      MEDICATIONS  (STANDING):  albuterol    0.083% 2.5 milliGRAM(s) Nebulizer every 6 hours  ascorbic acid 500 milliGRAM(s) Oral daily  atorvastatin 10 milliGRAM(s) Oral at bedtime  budesonide 160 MICROgram(s)/formoterol 4.5 MICROgram(s) Inhaler 2 Puff(s) Inhalation two times a day  cefTRIAXone   IVPB 2000 milliGRAM(s) IV Intermittent every 24 hours  chlorhexidine 4% Liquid 1 Application(s) Topical <User Schedule>  dextrose 5%. 1000 milliLiter(s) (50 mL/Hr) IV Continuous <Continuous>  dextrose 5%. 1000 milliLiter(s) (100 mL/Hr) IV Continuous <Continuous>  dextrose 50% Injectable 25 Gram(s) IV Push once  dextrose 50% Injectable 25 Gram(s) IV Push once  dextrose 50% Injectable 12.5 Gram(s) IV Push once  finasteride 5 milliGRAM(s) Oral daily  FIRST- Mouthwash  BLM 5 milliLiter(s) Swish and Spit every 6 hours  glucagon  Injectable 1 milliGRAM(s) IntraMuscular once  heparin  Infusion. 1300 Unit(s)/Hr (13 mL/Hr) IV Continuous <Continuous>  influenza  Vaccine (HIGH DOSE) 0.7 milliLiter(s) IntraMuscular once  insulin lispro (ADMELOG) corrective regimen sliding scale   SubCutaneous three times a day before meals  insulin lispro (ADMELOG) corrective regimen sliding scale   SubCutaneous at bedtime  metoprolol tartrate 25 milliGRAM(s) Oral two times a day  multivitamin 1 Tablet(s) Oral daily  pantoprazole    Tablet 40 milliGRAM(s) Oral before breakfast  predniSONE   Tablet 10 milliGRAM(s) Oral once  sodium bicarbonate 325 milliGRAM(s) Oral every 8 hours  tamsulosin 0.4 milliGRAM(s) Oral at bedtime    MEDICATIONS  (PRN):  acetaminophen     Tablet .. 650 milliGRAM(s) Oral every 6 hours PRN Temp greater or equal to 38C (100.4F), Mild Pain (1 - 3)  benzocaine/menthol Lozenge 1 Lozenge Oral three times a day PRN Sore Throat  dextrose Oral Gel 15 Gram(s) Oral once PRN Blood Glucose LESS THAN 70 milliGRAM(s)/deciliter      Objective:    Vitals: Vital Signs Last 24 Hrs  T(C): 36.3 (12-28-23 @ 04:20), Max: 36.3 (12-27-23 @ 14:45)  T(F): 97.4 (12-28-23 @ 04:20), Max: 97.4 (12-28-23 @ 04:20)  HR: 83 (12-28-23 @ 04:20) (82 - 86)  BP: 134/65 (12-28-23 @ 04:20) (116/55 - 134/65)  BP(mean): --  RR: 18 (12-28-23 @ 04:20) (17 - 18)  SpO2: 96% (12-28-23 @ 04:20) (94% - 96%)                I&O's Summary    27 Dec 2023 07:01  -  28 Dec 2023 07:00  --------------------------------------------------------  IN: 600 mL / OUT: 2700 mL / NET: -2100 mL        PHYSICAL EXAM:  GENERAL: NAD  HEAD:  Atraumatic, Normocephalic  EYES: EOMI, conjunctiva and sclera clear  CHEST/LUNG: Clear to auscultation bilaterally; No rales, rhonchi, wheezing, or rubs  HEART: Regular rate and rhythm; No murmurs, rubs, or gallops  ABDOMEN: Soft, Nontender, Nondistended;   SKIN: No rashes or lesions  NERVOUS SYSTEM:  Alert & Oriented X3, no focal deficits    LABS:                        8.2    16.29 )-----------( 659      ( 28 Dec 2023 04:37 )             26.1                         7.9    18.47 )-----------( 599      ( 27 Dec 2023 07:19 )             25.0                         8.1    18.21 )-----------( 632      ( 26 Dec 2023 14:43 )             25.8     12-28    139  |  100  |  71<H>  ----------------------------<  162<H>  3.6   |  29  |  2.13<H>  12-27    138  |  97  |  87<H>  ----------------------------<  149<H>  3.5   |  27  |  3.01<H>  12-26    137  |  96  |  93<H>  ----------------------------<  214<H>  3.9   |  27  |  3.59<H>    Ca    8.2<L>      28 Dec 2023 04:37  Ca    8.0<L>      27 Dec 2023 07:19  Ca    8.1<L>      26 Dec 2023 14:43  Phos  3.7     12-28  Mg     2.1     12-28    TPro  5.5<L>  /  Alb  2.7<L>  /  TBili  0.2  /  DBili  x   /  AST  24  /  ALT  58<H>  /  AlkPhos  100  12-28  TPro  5.5<L>  /  Alb  2.7<L>  /  TBili  0.2  /  DBili  x   /  AST  32  /  ALT  70<H>  /  AlkPhos  113  12-27  TPro  5.9<L>  /  Alb  2.7<L>  /  TBili  0.3  /  DBili  x   /  AST  36  /  ALT  77<H>  /  AlkPhos  104  12-26    CAPILLARY BLOOD GLUCOSE      POCT Blood Glucose.: 247 mg/dL (27 Dec 2023 21:13)  POCT Blood Glucose.: 290 mg/dL (27 Dec 2023 19:00)  POCT Blood Glucose.: 316 mg/dL (27 Dec 2023 17:34)  POCT Blood Glucose.: 199 mg/dL (27 Dec 2023 12:13)  POCT Blood Glucose.: 172 mg/dL (27 Dec 2023 08:39)    PTT - ( 28 Dec 2023 04:37 )  PTT:101.3 sec    Urinalysis Basic - ( 28 Dec 2023 04:37 )    Color: x / Appearance: x / SG: x / pH: x  Gluc: 162 mg/dL / Ketone: x  / Bili: x / Urobili: x   Blood: x / Protein: x / Nitrite: x   Leuk Esterase: x / RBC: x / WBC x   Sq Epi: x / Non Sq Epi: x / Bacteria: x          RADIOLOGY & ADDITIONAL TESTS:    Imaging Personally Reviewed:  [x ] YES  [ ] NO    Consultants involved in case:   Consultant(s) Notes Reviewed:  [ x] YES  [ ] NO:   Care Discussed with Consultants/Other Providers [x ] YES  [ ] NO         ***************************************************************  Blas Rubio, PGY1  Internal Medicine   Preferred contact via [employer] Teams   ***************************************************************    GLADIS LEONARD  83y  MRN: [id number]    Patient is a 83y old  Male who presents with a chief complaint of sob (27 Dec 2023 10:22)      Interval/Overnight Events: no events ON.     Subjective: Pt seen and examined at bedside. Pt pleasant and resting calmly.     MEDICATIONS  (STANDING):  albuterol    0.083% 2.5 milliGRAM(s) Nebulizer every 6 hours  ascorbic acid 500 milliGRAM(s) Oral daily  atorvastatin 10 milliGRAM(s) Oral at bedtime  budesonide 160 MICROgram(s)/formoterol 4.5 MICROgram(s) Inhaler 2 Puff(s) Inhalation two times a day  cefTRIAXone   IVPB 2000 milliGRAM(s) IV Intermittent every 24 hours  chlorhexidine 4% Liquid 1 Application(s) Topical <User Schedule>  dextrose 5%. 1000 milliLiter(s) (50 mL/Hr) IV Continuous <Continuous>  dextrose 5%. 1000 milliLiter(s) (100 mL/Hr) IV Continuous <Continuous>  dextrose 50% Injectable 25 Gram(s) IV Push once  dextrose 50% Injectable 25 Gram(s) IV Push once  dextrose 50% Injectable 12.5 Gram(s) IV Push once  finasteride 5 milliGRAM(s) Oral daily  FIRST- Mouthwash  BLM 5 milliLiter(s) Swish and Spit every 6 hours  glucagon  Injectable 1 milliGRAM(s) IntraMuscular once  heparin  Infusion. 1300 Unit(s)/Hr (13 mL/Hr) IV Continuous <Continuous>  influenza  Vaccine (HIGH DOSE) 0.7 milliLiter(s) IntraMuscular once  insulin lispro (ADMELOG) corrective regimen sliding scale   SubCutaneous three times a day before meals  insulin lispro (ADMELOG) corrective regimen sliding scale   SubCutaneous at bedtime  metoprolol tartrate 25 milliGRAM(s) Oral two times a day  multivitamin 1 Tablet(s) Oral daily  pantoprazole    Tablet 40 milliGRAM(s) Oral before breakfast  predniSONE   Tablet 10 milliGRAM(s) Oral once  sodium bicarbonate 325 milliGRAM(s) Oral every 8 hours  tamsulosin 0.4 milliGRAM(s) Oral at bedtime    MEDICATIONS  (PRN):  acetaminophen     Tablet .. 650 milliGRAM(s) Oral every 6 hours PRN Temp greater or equal to 38C (100.4F), Mild Pain (1 - 3)  benzocaine/menthol Lozenge 1 Lozenge Oral three times a day PRN Sore Throat  dextrose Oral Gel 15 Gram(s) Oral once PRN Blood Glucose LESS THAN 70 milliGRAM(s)/deciliter      Objective:    Vitals: Vital Signs Last 24 Hrs  T(C): 36.3 (12-28-23 @ 04:20), Max: 36.3 (12-27-23 @ 14:45)  T(F): 97.4 (12-28-23 @ 04:20), Max: 97.4 (12-28-23 @ 04:20)  HR: 83 (12-28-23 @ 04:20) (82 - 86)  BP: 134/65 (12-28-23 @ 04:20) (116/55 - 134/65)  BP(mean): --  RR: 18 (12-28-23 @ 04:20) (17 - 18)  SpO2: 96% (12-28-23 @ 04:20) (94% - 96%)                I&O's Summary    27 Dec 2023 07:01  -  28 Dec 2023 07:00  --------------------------------------------------------  IN: 600 mL / OUT: 2700 mL / NET: -2100 mL        PHYSICAL EXAM:  GENERAL: NAD  HEAD:  Atraumatic, Normocephalic  EYES: EOMI, conjunctiva and sclera clear  CHEST/LUNG: Crackles   HEART: Regular rate and rhythm; No murmurs, rubs, or gallops  ABDOMEN: Soft, Nontender, Nondistended;   SKIN: 1-2+ PE FRANKLYN   NERVOUS SYSTEM:  Alert & Oriented X3, no focal deficits    LABS:                        8.2    16.29 )-----------( 659      ( 28 Dec 2023 04:37 )             26.1                         7.9    18.47 )-----------( 599      ( 27 Dec 2023 07:19 )             25.0                         8.1    18.21 )-----------( 632      ( 26 Dec 2023 14:43 )             25.8     12-28    139  |  100  |  71<H>  ----------------------------<  162<H>  3.6   |  29  |  2.13<H>  12-27    138  |  97  |  87<H>  ----------------------------<  149<H>  3.5   |  27  |  3.01<H>  12-26    137  |  96  |  93<H>  ----------------------------<  214<H>  3.9   |  27  |  3.59<H>    Ca    8.2<L>      28 Dec 2023 04:37  Ca    8.0<L>      27 Dec 2023 07:19  Ca    8.1<L>      26 Dec 2023 14:43  Phos  3.7     12-28  Mg     2.1     12-28    TPro  5.5<L>  /  Alb  2.7<L>  /  TBili  0.2  /  DBili  x   /  AST  24  /  ALT  58<H>  /  AlkPhos  100  12-28  TPro  5.5<L>  /  Alb  2.7<L>  /  TBili  0.2  /  DBili  x   /  AST  32  /  ALT  70<H>  /  AlkPhos  113  12-27  TPro  5.9<L>  /  Alb  2.7<L>  /  TBili  0.3  /  DBili  x   /  AST  36  /  ALT  77<H>  /  AlkPhos  104  12-26    CAPILLARY BLOOD GLUCOSE      POCT Blood Glucose.: 247 mg/dL (27 Dec 2023 21:13)  POCT Blood Glucose.: 290 mg/dL (27 Dec 2023 19:00)  POCT Blood Glucose.: 316 mg/dL (27 Dec 2023 17:34)  POCT Blood Glucose.: 199 mg/dL (27 Dec 2023 12:13)  POCT Blood Glucose.: 172 mg/dL (27 Dec 2023 08:39)    PTT - ( 28 Dec 2023 04:37 )  PTT:101.3 sec    Urinalysis Basic - ( 28 Dec 2023 04:37 )    Color: x / Appearance: x / SG: x / pH: x  Gluc: 162 mg/dL / Ketone: x  / Bili: x / Urobili: x   Blood: x / Protein: x / Nitrite: x   Leuk Esterase: x / RBC: x / WBC x   Sq Epi: x / Non Sq Epi: x / Bacteria: x          RADIOLOGY & ADDITIONAL TESTS:    Imaging Personally Reviewed:  [x ] YES  [ ] NO    Consultants involved in case:   Consultant(s) Notes Reviewed:  [ x] YES  [ ] NO:   Care Discussed with Consultants/Other Providers [x ] YES  [ ] NO         ***************************************************************  Blas Rubio, PGY1  Internal Medicine   Preferred contact via [employer] Teams   ***************************************************************    GLADIS LEONARD  83y  MRN: [id number]    Patient is a 83y old  Male who presents with a chief complaint of sob (27 Dec 2023 10:22)      Interval/Overnight Events: no events ON.     Subjective: Pt seen and examined at bedside. Pt pleasant and resting calmly.     MEDICATIONS  (STANDING):  albuterol    0.083% 2.5 milliGRAM(s) Nebulizer every 6 hours  ascorbic acid 500 milliGRAM(s) Oral daily  atorvastatin 10 milliGRAM(s) Oral at bedtime  budesonide 160 MICROgram(s)/formoterol 4.5 MICROgram(s) Inhaler 2 Puff(s) Inhalation two times a day  cefTRIAXone   IVPB 2000 milliGRAM(s) IV Intermittent every 24 hours  chlorhexidine 4% Liquid 1 Application(s) Topical <User Schedule>  dextrose 5%. 1000 milliLiter(s) (50 mL/Hr) IV Continuous <Continuous>  dextrose 5%. 1000 milliLiter(s) (100 mL/Hr) IV Continuous <Continuous>  dextrose 50% Injectable 25 Gram(s) IV Push once  dextrose 50% Injectable 25 Gram(s) IV Push once  dextrose 50% Injectable 12.5 Gram(s) IV Push once  finasteride 5 milliGRAM(s) Oral daily  FIRST- Mouthwash  BLM 5 milliLiter(s) Swish and Spit every 6 hours  glucagon  Injectable 1 milliGRAM(s) IntraMuscular once  heparin  Infusion. 1300 Unit(s)/Hr (13 mL/Hr) IV Continuous <Continuous>  influenza  Vaccine (HIGH DOSE) 0.7 milliLiter(s) IntraMuscular once  insulin lispro (ADMELOG) corrective regimen sliding scale   SubCutaneous three times a day before meals  insulin lispro (ADMELOG) corrective regimen sliding scale   SubCutaneous at bedtime  metoprolol tartrate 25 milliGRAM(s) Oral two times a day  multivitamin 1 Tablet(s) Oral daily  pantoprazole    Tablet 40 milliGRAM(s) Oral before breakfast  predniSONE   Tablet 10 milliGRAM(s) Oral once  sodium bicarbonate 325 milliGRAM(s) Oral every 8 hours  tamsulosin 0.4 milliGRAM(s) Oral at bedtime    MEDICATIONS  (PRN):  acetaminophen     Tablet .. 650 milliGRAM(s) Oral every 6 hours PRN Temp greater or equal to 38C (100.4F), Mild Pain (1 - 3)  benzocaine/menthol Lozenge 1 Lozenge Oral three times a day PRN Sore Throat  dextrose Oral Gel 15 Gram(s) Oral once PRN Blood Glucose LESS THAN 70 milliGRAM(s)/deciliter      Objective:    Vitals: Vital Signs Last 24 Hrs  T(C): 36.3 (12-28-23 @ 04:20), Max: 36.3 (12-27-23 @ 14:45)  T(F): 97.4 (12-28-23 @ 04:20), Max: 97.4 (12-28-23 @ 04:20)  HR: 83 (12-28-23 @ 04:20) (82 - 86)  BP: 134/65 (12-28-23 @ 04:20) (116/55 - 134/65)  BP(mean): --  RR: 18 (12-28-23 @ 04:20) (17 - 18)  SpO2: 96% (12-28-23 @ 04:20) (94% - 96%)                I&O's Summary    27 Dec 2023 07:01  -  28 Dec 2023 07:00  --------------------------------------------------------  IN: 600 mL / OUT: 2700 mL / NET: -2100 mL        PHYSICAL EXAM:  GENERAL: NAD  HEAD:  Atraumatic, Normocephalic  EYES: EOMI, conjunctiva and sclera clear  CHEST/LUNG: Crackles   HEART: Regular rate and rhythm; No murmurs, rubs, or gallops  ABDOMEN: Soft, Nontender, Nondistended;   SKIN: 1-2+ PE FRANKLYN   NERVOUS SYSTEM:  Alert & Oriented X3, no focal deficits    LABS:                        8.2    16.29 )-----------( 659      ( 28 Dec 2023 04:37 )             26.1                         7.9    18.47 )-----------( 599      ( 27 Dec 2023 07:19 )             25.0                         8.1    18.21 )-----------( 632      ( 26 Dec 2023 14:43 )             25.8     12-28    139  |  100  |  71<H>  ----------------------------<  162<H>  3.6   |  29  |  2.13<H>  12-27    138  |  97  |  87<H>  ----------------------------<  149<H>  3.5   |  27  |  3.01<H>  12-26    137  |  96  |  93<H>  ----------------------------<  214<H>  3.9   |  27  |  3.59<H>    Ca    8.2<L>      28 Dec 2023 04:37  Ca    8.0<L>      27 Dec 2023 07:19  Ca    8.1<L>      26 Dec 2023 14:43  Phos  3.7     12-28  Mg     2.1     12-28    TPro  5.5<L>  /  Alb  2.7<L>  /  TBili  0.2  /  DBili  x   /  AST  24  /  ALT  58<H>  /  AlkPhos  100  12-28  TPro  5.5<L>  /  Alb  2.7<L>  /  TBili  0.2  /  DBili  x   /  AST  32  /  ALT  70<H>  /  AlkPhos  113  12-27  TPro  5.9<L>  /  Alb  2.7<L>  /  TBili  0.3  /  DBili  x   /  AST  36  /  ALT  77<H>  /  AlkPhos  104  12-26    CAPILLARY BLOOD GLUCOSE      POCT Blood Glucose.: 247 mg/dL (27 Dec 2023 21:13)  POCT Blood Glucose.: 290 mg/dL (27 Dec 2023 19:00)  POCT Blood Glucose.: 316 mg/dL (27 Dec 2023 17:34)  POCT Blood Glucose.: 199 mg/dL (27 Dec 2023 12:13)  POCT Blood Glucose.: 172 mg/dL (27 Dec 2023 08:39)    PTT - ( 28 Dec 2023 04:37 )  PTT:101.3 sec    Urinalysis Basic - ( 28 Dec 2023 04:37 )    Color: x / Appearance: x / SG: x / pH: x  Gluc: 162 mg/dL / Ketone: x  / Bili: x / Urobili: x   Blood: x / Protein: x / Nitrite: x   Leuk Esterase: x / RBC: x / WBC x   Sq Epi: x / Non Sq Epi: x / Bacteria: x          RADIOLOGY & ADDITIONAL TESTS:    Imaging Personally Reviewed:  [x ] YES  [ ] NO    Consultants involved in case:   Consultant(s) Notes Reviewed:  [ x] YES  [ ] NO:   Care Discussed with Consultants/Other Providers [x ] YES  [ ] NO

## 2023-12-28 NOTE — PHYSICAL THERAPY INITIAL EVALUATION ADULT - GENERAL OBSERVATIONS, REHAB EVAL
Pt received semi-supine in bed in NAD, +IV Lock, + O2. +Walls .... BG=XX Pt received semi-supine in bed in NAD, +IV Lock, + O2. +Walls .... GM=027. Pt AOx4, pleasant and cooperative. Pt received semi-supine in bed in NAD, +IV Lock, + O2. +Walls .... CM=860. Pt AOx4, pleasant and cooperative.

## 2023-12-28 NOTE — PROGRESS NOTE ADULT - PROBLEM SELECTOR PLAN 2
Baseline BNP ~ 2800. BNP ~ 2800 this admission  In chronic exacerbation state with 3+ pitting edema and on diuresis  HFpEF or HFrEF  EF = 68% on 8/11/23  Heart failure, CHF order set initiated  Guideline directed medical therapy as below: after med-rec completed  TTE systolic function is hyperdynamic with an ejection fraction of 80 %  - Bumex 2 mg IV BID and PRN -> Hold diuresis   - Diuretic: on bumex PO 2 mg am 1 mg pm at home.   - BB:  resume home metoprolol tatrate 25 mg BID.  - ARNI/ACE-I/ARB: Consider starting at d/c  - Hydralazine/Nitrate: Not indicated at this time  - MRA: Not indicated at this time  - SGLT2: Consider starting at d/c  - Maintain K > 4, Mg > 2 Baseline BNP ~ 2800. BNP ~ 2800 this admission  In chronic exacerbation state with 3+ pitting edema and on diuresis  HFpEF or HFrEF  EF = 68% on 8/11/23  Heart failure, CHF order set initiated  Guideline directed medical therapy as below: after med-rec completed  TTE systolic function is hyperdynamic with an ejection fraction of 80 %  - Bumex 2 mg IV BID and PRN -> Hold diuresis-> Will consider resuming with increasing LE edema and crackles   - Diuretic: on bumex PO 2 mg am 1 mg pm at home.   - BB:  resume home metoprolol tatrate 25 mg BID.  - ARNI/ACE-I/ARB: Consider starting at d/c  - Hydralazine/Nitrate: Not indicated at this time  - MRA: Not indicated at this time  - SGLT2: Consider starting at d/c  - Maintain K > 4, Mg > 2

## 2023-12-28 NOTE — PROGRESS NOTE ADULT - PROBLEM SELECTOR PLAN 4
HgB 7.9  Will evaluate B12, Folate, and Iron studies HgB 7.9  Will evaluate B12, Folate, and Iron studies-> Yosef JAMAL due to low ferritin will consult H/O for thrombocytosis likely reactive in context of steroids/infection

## 2023-12-28 NOTE — PROGRESS NOTE ADULT - SUBJECTIVE AND OBJECTIVE BOX
[facility] Division of Kidney Diseases & Hypertension  FOLLOW UP NOTE  [phone]--------------------------------------------------------------------------------  Chief Complaint: BESSY    24 hour events/subjective: Pt seen and evaluated. Reports feeling well. Continues to endorse LE edema, otherwise no complaints. Denies any headaches, nausea, vomiting, fevers/chills, chest pain, SOB, abdominal pain.     PAST HISTORY  --------------------------------------------------------------------------------  No significant changes to PMH, PSH, FHx, SHx, unless otherwise noted    ALLERGIES & MEDICATIONS  --------------------------------------------------------------------------------  Allergies  No Known Allergies  Intolerances    Standing Inpatient Medications  albuterol    0.083% 2.5 milliGRAM(s) Nebulizer every 6 hours  apixaban 2.5 milliGRAM(s) Oral two times a day  ascorbic acid 500 milliGRAM(s) Oral daily  atorvastatin 10 milliGRAM(s) Oral at bedtime  budesonide 160 MICROgram(s)/formoterol 4.5 MICROgram(s) Inhaler 2 Puff(s) Inhalation two times a day  cefTRIAXone   IVPB 2000 milliGRAM(s) IV Intermittent every 24 hours  chlorhexidine 4% Liquid 1 Application(s) Topical <User Schedule>  dextrose 5%. 1000 milliLiter(s) IV Continuous <Continuous>  dextrose 5%. 1000 milliLiter(s) IV Continuous <Continuous>  dextrose 50% Injectable 25 Gram(s) IV Push once  dextrose 50% Injectable 25 Gram(s) IV Push once  dextrose 50% Injectable 12.5 Gram(s) IV Push once  finasteride 5 milliGRAM(s) Oral daily  FIRST- Mouthwash  BLM 5 milliLiter(s) Swish and Spit every 6 hours  glucagon  Injectable 1 milliGRAM(s) IntraMuscular once  influenza  Vaccine (HIGH DOSE) 0.7 milliLiter(s) IntraMuscular once  insulin lispro (ADMELOG) corrective regimen sliding scale   SubCutaneous three times a day before meals  insulin lispro (ADMELOG) corrective regimen sliding scale   SubCutaneous at bedtime  iron sucrose IVPB 300 milliGRAM(s) IV Intermittent every 24 hours  metoprolol tartrate 25 milliGRAM(s) Oral two times a day  multivitamin 1 Tablet(s) Oral daily  pantoprazole    Tablet 40 milliGRAM(s) Oral before breakfast  predniSONE   Tablet 10 milliGRAM(s) Oral once  sodium bicarbonate 325 milliGRAM(s) Oral every 8 hours  tamsulosin 0.4 milliGRAM(s) Oral at bedtime    PRN Inpatient Medications  acetaminophen     Tablet .. 650 milliGRAM(s) Oral every 6 hours PRN  benzocaine/menthol Lozenge 1 Lozenge Oral three times a day PRN  dextrose Oral Gel 15 Gram(s) Oral once PRN    REVIEW OF SYSTEMS  --------------------------------------------------------------------------------  as above    VITALS/PHYSICAL EXAM  --------------------------------------------------------------------------------  T(C): 36.2 (12-28-23 @ 13:04), Max: 36.3 (12-27-23 @ 14:45)  HR: 80 (12-28-23 @ 13:04) (80 - 86)  BP: 131/71 (12-28-23 @ 13:04) (116/55 - 134/65)  RR: 19 (12-28-23 @ 13:04) (17 - 19)  SpO2: 93% (12-28-23 @ 13:04) (93% - 96%)  Wt(kg): --    12-27-23 @ 07:01  -  12-28-23 @ 07:00  --------------------------------------------------------  IN: 600 mL / OUT: 2700 mL / NET: -2100 mL    12-28-23 @ 07:01  -  12-28-23 @ 13:14  --------------------------------------------------------  IN: 0 mL / OUT: 850 mL / NET: -850 mL    Physical Exam:  Gen: NAD  HEENT: Anicteric  Pulm: CTA B/L  CV: S1S2+  Abd: Soft, +BS   Ext: +B/L LE Pitting edema  Neuro: Awake  : External urinary capture with bag  Skin: Warm and dry    LABS/STUDIES  --------------------------------------------------------------------------------              8.2    16.29 >-----------<  659      [12-28-23 @ 04:37]              26.1     139  |  100  |  71  ----------------------------<  162      [12-28-23 @ 04:37]  3.6   |  29  |  2.13        Ca     8.2     [12-28-23 @ 04:37]      Mg     2.1     [12-28-23 @ 04:37]      Phos  3.7     [12-28-23 @ 04:37]    TPro  5.5  /  Alb  2.7  /  TBili  0.2  /  DBili  x   /  AST  24  /  ALT  58  /  AlkPhos  100  [12-28-23 @ 04:37]    PTT: 67.0       [12-28-23 @ 11:25]    Creatinine Trend:  SCr 2.13 [12-28 @ 04:37]  SCr 3.01 [12-27 @ 07:19]  SCr 3.59 [12-26 @ 14:43]  SCr 4.90 [12-25 @ 07:20]  SCr 4.97 [12-24 @ 07:00]    Urine Creatinine 100      [12-22-23 @ 13:04]  Urine Protein 22      [12-22-23 @ 13:04]  Urine Sodium 16      [12-22-23 @ 13:04]  Urine Urea Nitrogen 396      [12-22-23 @ 13:04]  Urine Potassium 38      [12-22-23 @ 13:04]  Urine Osmolality 322      [12-22-23 @ 13:04]    Iron 28, TIBC 245, %sat 12      [12-28-23 @ 04:37]  Ferritin 96      [12-28-23 @ 04:37]   [facility] Division of Kidney Diseases & Hypertension  FOLLOW UP NOTE  [phone]--------------------------------------------------------------------------------  Chief Complaint: BESSY    24 hour events/subjective: Pt seen and evaluated. Reports feeling well. Continues to endorse LE edema, otherwise no complaints. Denies any headaches, nausea, vomiting, fevers/chills, chest pain, SOB, abdominal pain.     PAST HISTORY  --------------------------------------------------------------------------------  No significant changes to PMH, PSH, FHx, SHx, unless otherwise noted    ALLERGIES & MEDICATIONS  --------------------------------------------------------------------------------  Allergies  No Known Allergies  Intolerances    Standing Inpatient Medications  albuterol    0.083% 2.5 milliGRAM(s) Nebulizer every 6 hours  apixaban 2.5 milliGRAM(s) Oral two times a day  ascorbic acid 500 milliGRAM(s) Oral daily  atorvastatin 10 milliGRAM(s) Oral at bedtime  budesonide 160 MICROgram(s)/formoterol 4.5 MICROgram(s) Inhaler 2 Puff(s) Inhalation two times a day  cefTRIAXone   IVPB 2000 milliGRAM(s) IV Intermittent every 24 hours  chlorhexidine 4% Liquid 1 Application(s) Topical <User Schedule>  dextrose 5%. 1000 milliLiter(s) IV Continuous <Continuous>  dextrose 5%. 1000 milliLiter(s) IV Continuous <Continuous>  dextrose 50% Injectable 25 Gram(s) IV Push once  dextrose 50% Injectable 25 Gram(s) IV Push once  dextrose 50% Injectable 12.5 Gram(s) IV Push once  finasteride 5 milliGRAM(s) Oral daily  FIRST- Mouthwash  BLM 5 milliLiter(s) Swish and Spit every 6 hours  glucagon  Injectable 1 milliGRAM(s) IntraMuscular once  influenza  Vaccine (HIGH DOSE) 0.7 milliLiter(s) IntraMuscular once  insulin lispro (ADMELOG) corrective regimen sliding scale   SubCutaneous three times a day before meals  insulin lispro (ADMELOG) corrective regimen sliding scale   SubCutaneous at bedtime  iron sucrose IVPB 300 milliGRAM(s) IV Intermittent every 24 hours  metoprolol tartrate 25 milliGRAM(s) Oral two times a day  multivitamin 1 Tablet(s) Oral daily  pantoprazole    Tablet 40 milliGRAM(s) Oral before breakfast  predniSONE   Tablet 10 milliGRAM(s) Oral once  sodium bicarbonate 325 milliGRAM(s) Oral every 8 hours  tamsulosin 0.4 milliGRAM(s) Oral at bedtime    PRN Inpatient Medications  acetaminophen     Tablet .. 650 milliGRAM(s) Oral every 6 hours PRN  benzocaine/menthol Lozenge 1 Lozenge Oral three times a day PRN  dextrose Oral Gel 15 Gram(s) Oral once PRN    REVIEW OF SYSTEMS  --------------------------------------------------------------------------------  as above    VITALS/PHYSICAL EXAM  --------------------------------------------------------------------------------  T(C): 36.2 (12-28-23 @ 13:04), Max: 36.3 (12-27-23 @ 14:45)  HR: 80 (12-28-23 @ 13:04) (80 - 86)  BP: 131/71 (12-28-23 @ 13:04) (116/55 - 134/65)  RR: 19 (12-28-23 @ 13:04) (17 - 19)  SpO2: 93% (12-28-23 @ 13:04) (93% - 96%)  Wt(kg): --    12-27-23 @ 07:01  -  12-28-23 @ 07:00  --------------------------------------------------------  IN: 600 mL / OUT: 2700 mL / NET: -2100 mL    12-28-23 @ 07:01  -  12-28-23 @ 13:14  --------------------------------------------------------  IN: 0 mL / OUT: 850 mL / NET: -850 mL    Physical Exam:  Gen: NAD  HEENT: Anicteric  Pulm: CTA B/L  CV: S1S2+  Abd: Soft, +BS   Ext: +B/L LE Pitting edema  Neuro: Awake  : External urinary capture with bag  Skin: Warm and dry    LABS/STUDIES  --------------------------------------------------------------------------------              8.2    16.29 >-----------<  659      [12-28-23 @ 04:37]              26.1     139  |  100  |  71  ----------------------------<  162      [12-28-23 @ 04:37]  3.6   |  29  |  2.13        Ca     8.2     [12-28-23 @ 04:37]      Mg     2.1     [12-28-23 @ 04:37]      Phos  3.7     [12-28-23 @ 04:37]    TPro  5.5  /  Alb  2.7  /  TBili  0.2  /  DBili  x   /  AST  24  /  ALT  58  /  AlkPhos  100  [12-28-23 @ 04:37]    PTT: 67.0       [12-28-23 @ 11:25]    Creatinine Trend:  SCr 2.13 [12-28 @ 04:37]  SCr 3.01 [12-27 @ 07:19]  SCr 3.59 [12-26 @ 14:43]  SCr 4.90 [12-25 @ 07:20]  SCr 4.97 [12-24 @ 07:00]    Urine Creatinine 100      [12-22-23 @ 13:04]  Urine Protein 22      [12-22-23 @ 13:04]  Urine Sodium 16      [12-22-23 @ 13:04]  Urine Urea Nitrogen 396      [12-22-23 @ 13:04]  Urine Potassium 38      [12-22-23 @ 13:04]  Urine Osmolality 322      [12-22-23 @ 13:04]    Iron 28, TIBC 245, %sat 12      [12-28-23 @ 04:37]  Ferritin 96      [12-28-23 @ 04:37]   [facility] Division of Kidney Diseases & Hypertension  FOLLOW UP NOTE  [phone]--------------------------------------------------------------------------------    Chief Complaint: BESSY    24 hour events/subjective: Pt seen and evaluated. Reports feeling well. Continues to endorse LE edema, otherwise no complaints. Denies any headaches, nausea, vomiting, fevers/chills, chest pain, SOB, abdominal pain.     PAST HISTORY  --------------------------------------------------------------------------------  No significant changes to PMH, PSH, FHx, SHx, unless otherwise noted    ALLERGIES & MEDICATIONS  --------------------------------------------------------------------------------  Allergies  No Known Allergies  Intolerances    Standing Inpatient Medications  albuterol    0.083% 2.5 milliGRAM(s) Nebulizer every 6 hours  apixaban 2.5 milliGRAM(s) Oral two times a day  ascorbic acid 500 milliGRAM(s) Oral daily  atorvastatin 10 milliGRAM(s) Oral at bedtime  budesonide 160 MICROgram(s)/formoterol 4.5 MICROgram(s) Inhaler 2 Puff(s) Inhalation two times a day  cefTRIAXone   IVPB 2000 milliGRAM(s) IV Intermittent every 24 hours  chlorhexidine 4% Liquid 1 Application(s) Topical <User Schedule>  dextrose 5%. 1000 milliLiter(s) IV Continuous <Continuous>  dextrose 5%. 1000 milliLiter(s) IV Continuous <Continuous>  dextrose 50% Injectable 25 Gram(s) IV Push once  dextrose 50% Injectable 25 Gram(s) IV Push once  dextrose 50% Injectable 12.5 Gram(s) IV Push once  finasteride 5 milliGRAM(s) Oral daily  FIRST- Mouthwash  BLM 5 milliLiter(s) Swish and Spit every 6 hours  glucagon  Injectable 1 milliGRAM(s) IntraMuscular once  influenza  Vaccine (HIGH DOSE) 0.7 milliLiter(s) IntraMuscular once  insulin lispro (ADMELOG) corrective regimen sliding scale   SubCutaneous three times a day before meals  insulin lispro (ADMELOG) corrective regimen sliding scale   SubCutaneous at bedtime  iron sucrose IVPB 300 milliGRAM(s) IV Intermittent every 24 hours  metoprolol tartrate 25 milliGRAM(s) Oral two times a day  multivitamin 1 Tablet(s) Oral daily  pantoprazole    Tablet 40 milliGRAM(s) Oral before breakfast  predniSONE   Tablet 10 milliGRAM(s) Oral once  sodium bicarbonate 325 milliGRAM(s) Oral every 8 hours  tamsulosin 0.4 milliGRAM(s) Oral at bedtime    PRN Inpatient Medications  acetaminophen     Tablet .. 650 milliGRAM(s) Oral every 6 hours PRN  benzocaine/menthol Lozenge 1 Lozenge Oral three times a day PRN  dextrose Oral Gel 15 Gram(s) Oral once PRN    REVIEW OF SYSTEMS  --------------------------------------------------------------------------------  as above    VITALS/PHYSICAL EXAM  --------------------------------------------------------------------------------  T(C): 36.2 (12-28-23 @ 13:04), Max: 36.3 (12-27-23 @ 14:45)  HR: 80 (12-28-23 @ 13:04) (80 - 86)  BP: 131/71 (12-28-23 @ 13:04) (116/55 - 134/65)  RR: 19 (12-28-23 @ 13:04) (17 - 19)  SpO2: 93% (12-28-23 @ 13:04) (93% - 96%)  Wt(kg): --    12-27-23 @ 07:01  -  12-28-23 @ 07:00  --------------------------------------------------------  IN: 600 mL / OUT: 2700 mL / NET: -2100 mL    12-28-23 @ 07:01  -  12-28-23 @ 13:14  --------------------------------------------------------  IN: 0 mL / OUT: 850 mL / NET: -850 mL    Physical Exam:  Gen: NAD  HEENT: Anicteric  Pulm: CTA B/L  CV: S1S2+  Abd: Soft, +BS   Ext: +B/L LE Pitting edema  Neuro: Awake  : External urinary capture with bag  Skin: Warm and dry    LABS/STUDIES  --------------------------------------------------------------------------------              8.2    16.29 >-----------<  659      [12-28-23 @ 04:37]              26.1     139  |  100  |  71  ----------------------------<  162      [12-28-23 @ 04:37]  3.6   |  29  |  2.13        Ca     8.2     [12-28-23 @ 04:37]      Mg     2.1     [12-28-23 @ 04:37]      Phos  3.7     [12-28-23 @ 04:37]    TPro  5.5  /  Alb  2.7  /  TBili  0.2  /  DBili  x   /  AST  24  /  ALT  58  /  AlkPhos  100  [12-28-23 @ 04:37]    PTT: 67.0       [12-28-23 @ 11:25]    Creatinine Trend:  SCr 2.13 [12-28 @ 04:37]  SCr 3.01 [12-27 @ 07:19]  SCr 3.59 [12-26 @ 14:43]  SCr 4.90 [12-25 @ 07:20]  SCr 4.97 [12-24 @ 07:00]    Urine Creatinine 100      [12-22-23 @ 13:04]  Urine Protein 22      [12-22-23 @ 13:04]  Urine Sodium 16      [12-22-23 @ 13:04]  Urine Urea Nitrogen 396      [12-22-23 @ 13:04]  Urine Potassium 38      [12-22-23 @ 13:04]  Urine Osmolality 322      [12-22-23 @ 13:04]    Iron 28, TIBC 245, %sat 12      [12-28-23 @ 04:37]  Ferritin 96      [12-28-23 @ 04:37]   [facility] Division of Kidney Diseases & Hypertension  FOLLOW UP NOTE  [phone]--------------------------------------------------------------------------------    Chief Complaint: BESSY    24 hour events/subjective: Pt seen and evaluated. Reports feeling well. Continues to endorse LE edema, otherwise no complaints. Denies any headaches, nausea, vomiting, fevers/chills, chest pain, SOB, abdominal pain.     PAST HISTORY  --------------------------------------------------------------------------------  No significant changes to PMH, PSH, FHx, SHx, unless otherwise noted    ALLERGIES & MEDICATIONS  --------------------------------------------------------------------------------  Allergies  No Known Allergies  Intolerances    Standing Inpatient Medications  albuterol    0.083% 2.5 milliGRAM(s) Nebulizer every 6 hours  apixaban 2.5 milliGRAM(s) Oral two times a day  ascorbic acid 500 milliGRAM(s) Oral daily  atorvastatin 10 milliGRAM(s) Oral at bedtime  budesonide 160 MICROgram(s)/formoterol 4.5 MICROgram(s) Inhaler 2 Puff(s) Inhalation two times a day  cefTRIAXone   IVPB 2000 milliGRAM(s) IV Intermittent every 24 hours  chlorhexidine 4% Liquid 1 Application(s) Topical <User Schedule>  dextrose 5%. 1000 milliLiter(s) IV Continuous <Continuous>  dextrose 5%. 1000 milliLiter(s) IV Continuous <Continuous>  dextrose 50% Injectable 25 Gram(s) IV Push once  dextrose 50% Injectable 25 Gram(s) IV Push once  dextrose 50% Injectable 12.5 Gram(s) IV Push once  finasteride 5 milliGRAM(s) Oral daily  FIRST- Mouthwash  BLM 5 milliLiter(s) Swish and Spit every 6 hours  glucagon  Injectable 1 milliGRAM(s) IntraMuscular once  influenza  Vaccine (HIGH DOSE) 0.7 milliLiter(s) IntraMuscular once  insulin lispro (ADMELOG) corrective regimen sliding scale   SubCutaneous three times a day before meals  insulin lispro (ADMELOG) corrective regimen sliding scale   SubCutaneous at bedtime  iron sucrose IVPB 300 milliGRAM(s) IV Intermittent every 24 hours  metoprolol tartrate 25 milliGRAM(s) Oral two times a day  multivitamin 1 Tablet(s) Oral daily  pantoprazole    Tablet 40 milliGRAM(s) Oral before breakfast  predniSONE   Tablet 10 milliGRAM(s) Oral once  sodium bicarbonate 325 milliGRAM(s) Oral every 8 hours  tamsulosin 0.4 milliGRAM(s) Oral at bedtime    PRN Inpatient Medications  acetaminophen     Tablet .. 650 milliGRAM(s) Oral every 6 hours PRN  benzocaine/menthol Lozenge 1 Lozenge Oral three times a day PRN  dextrose Oral Gel 15 Gram(s) Oral once PRN    REVIEW OF SYSTEMS  --------------------------------------------------------------------------------  as above    VITALS/PHYSICAL EXAM  --------------------------------------------------------------------------------  T(C): 36.2 (12-28-23 @ 13:04), Max: 36.3 (12-27-23 @ 14:45)  HR: 80 (12-28-23 @ 13:04) (80 - 86)  BP: 131/71 (12-28-23 @ 13:04) (116/55 - 134/65)  RR: 19 (12-28-23 @ 13:04) (17 - 19)  SpO2: 93% (12-28-23 @ 13:04) (93% - 96%)  Wt(kg): --    12-27-23 @ 07:01  -  12-28-23 @ 07:00  --------------------------------------------------------  IN: 600 mL / OUT: 2700 mL / NET: -2100 mL    12-28-23 @ 07:01  -  12-28-23 @ 13:14  --------------------------------------------------------  IN: 0 mL / OUT: 850 mL / NET: -850 mL    Physical Exam:  Gen: NAD  HEENT: Anicteric  Pulm: CTA B/L  CV: S1S2+  Abd: Soft, +BS   Ext: +B/L LE Pitting edema  Neuro: Awake  : External urinary capture with bag  Skin: Warm and dry    LABS/STUDIES  --------------------------------------------------------------------------------              8.2    16.29 >-----------<  659      [12-28-23 @ 04:37]              26.1     139  |  100  |  71  ----------------------------<  162      [12-28-23 @ 04:37]  3.6   |  29  |  2.13        Ca     8.2     [12-28-23 @ 04:37]      Mg     2.1     [12-28-23 @ 04:37]      Phos  3.7     [12-28-23 @ 04:37]    TPro  5.5  /  Alb  2.7  /  TBili  0.2  /  DBili  x   /  AST  24  /  ALT  58  /  AlkPhos  100  [12-28-23 @ 04:37]    PTT: 67.0       [12-28-23 @ 11:25]    Creatinine Trend:  SCr 2.13 [12-28 @ 04:37]  SCr 3.01 [12-27 @ 07:19]  SCr 3.59 [12-26 @ 14:43]  SCr 4.90 [12-25 @ 07:20]  SCr 4.97 [12-24 @ 07:00]    Urine Creatinine 100      [12-22-23 @ 13:04]  Urine Protein 22      [12-22-23 @ 13:04]  Urine Sodium 16      [12-22-23 @ 13:04]  Urine Urea Nitrogen 396      [12-22-23 @ 13:04]  Urine Potassium 38      [12-22-23 @ 13:04]  Urine Osmolality 322      [12-22-23 @ 13:04]    Iron 28, TIBC 245, %sat 12      [12-28-23 @ 04:37]  Ferritin 96      [12-28-23 @ 04:37]

## 2023-12-28 NOTE — PROGRESS NOTE ADULT - PROBLEM SELECTOR PLAN 6
- on heparin gtt iso possible thora tomorrow-> will follow up with pulm about thoracentesis   - holding home eliquis for now-> herapin drip - on heparin gtt iso possible thora tomorrow-> will follow up with pulm about thoracentesis   - holding home eliquis for now-> herapin drip-> Can consider switching to DOAC

## 2023-12-28 NOTE — PHYSICAL THERAPY INITIAL EVALUATION ADULT - ADDITIONAL COMMENTS
Pt states he lives on the first floor of a 2 family house and has daughter-in-law and grandson upstairs in apt above. Pt has HHA 5 hrs a day 9-1pm and two daughters that live nearby. Pt owns and use a RW and has a shower chair.

## 2023-12-28 NOTE — PHYSICAL THERAPY INITIAL EVALUATION ADULT - NSPTDISCHREC_GEN_A_CORE
Sub-acute Rehab Subacute Rehab - Pt will benefit from subacute rehab prior to D/C home to maximize functional independence. If pt d/c home pt requires assist for all functional mobility & home PT for progressive ambulation training, transfers, bed mobs. DME: transport chair/polyfly, 3:1 commode/Sub-acute Rehab

## 2023-12-28 NOTE — PHYSICAL THERAPY INITIAL EVALUATION ADULT - NSACTIVITYREC_GEN_A_PT
Patient will require a commode upon discharge secondary to patient confined to single room/first floor without a bathroom.

## 2023-12-29 LAB
ALBUMIN SERPL ELPH-MCNC: 2.5 G/DL — LOW (ref 3.3–5)
ALBUMIN SERPL ELPH-MCNC: 2.5 G/DL — LOW (ref 3.3–5)
ALP SERPL-CCNC: 87 U/L — SIGNIFICANT CHANGE UP (ref 40–120)
ALP SERPL-CCNC: 87 U/L — SIGNIFICANT CHANGE UP (ref 40–120)
ALT FLD-CCNC: 47 U/L — HIGH (ref 10–45)
ALT FLD-CCNC: 47 U/L — HIGH (ref 10–45)
AST SERPL-CCNC: 22 U/L — SIGNIFICANT CHANGE UP (ref 10–40)
AST SERPL-CCNC: 22 U/L — SIGNIFICANT CHANGE UP (ref 10–40)
BASOPHILS # BLD AUTO: 0.02 K/UL — SIGNIFICANT CHANGE UP (ref 0–0.2)
BASOPHILS # BLD AUTO: 0.02 K/UL — SIGNIFICANT CHANGE UP (ref 0–0.2)
BASOPHILS NFR BLD AUTO: 0.1 % — SIGNIFICANT CHANGE UP (ref 0–2)
BASOPHILS NFR BLD AUTO: 0.1 % — SIGNIFICANT CHANGE UP (ref 0–2)
BILIRUB SERPL-MCNC: 0.3 MG/DL — SIGNIFICANT CHANGE UP (ref 0.2–1.2)
BILIRUB SERPL-MCNC: 0.3 MG/DL — SIGNIFICANT CHANGE UP (ref 0.2–1.2)
BUN SERPL-MCNC: 51 MG/DL — HIGH (ref 7–23)
BUN SERPL-MCNC: 51 MG/DL — HIGH (ref 7–23)
CALCIUM SERPL-MCNC: 8.3 MG/DL — LOW (ref 8.4–10.5)
CALCIUM SERPL-MCNC: 8.3 MG/DL — LOW (ref 8.4–10.5)
CHLORIDE SERPL-SCNC: 100 MMOL/L — SIGNIFICANT CHANGE UP (ref 96–108)
CHLORIDE SERPL-SCNC: 100 MMOL/L — SIGNIFICANT CHANGE UP (ref 96–108)
CO2 SERPL-SCNC: 30 MMOL/L — SIGNIFICANT CHANGE UP (ref 22–31)
CO2 SERPL-SCNC: 30 MMOL/L — SIGNIFICANT CHANGE UP (ref 22–31)
CREAT SERPL-MCNC: 1.61 MG/DL — HIGH (ref 0.5–1.3)
CREAT SERPL-MCNC: 1.61 MG/DL — HIGH (ref 0.5–1.3)
EGFR: 42 ML/MIN/1.73M2 — LOW
EGFR: 42 ML/MIN/1.73M2 — LOW
EOSINOPHIL # BLD AUTO: 0.09 K/UL — SIGNIFICANT CHANGE UP (ref 0–0.5)
EOSINOPHIL # BLD AUTO: 0.09 K/UL — SIGNIFICANT CHANGE UP (ref 0–0.5)
EOSINOPHIL NFR BLD AUTO: 0.5 % — SIGNIFICANT CHANGE UP (ref 0–6)
EOSINOPHIL NFR BLD AUTO: 0.5 % — SIGNIFICANT CHANGE UP (ref 0–6)
FLUAV AG NPH QL: SIGNIFICANT CHANGE UP
FLUAV AG NPH QL: SIGNIFICANT CHANGE UP
FLUBV AG NPH QL: SIGNIFICANT CHANGE UP
FLUBV AG NPH QL: SIGNIFICANT CHANGE UP
GLUCOSE BLDC GLUCOMTR-MCNC: 130 MG/DL — HIGH (ref 70–99)
GLUCOSE BLDC GLUCOMTR-MCNC: 130 MG/DL — HIGH (ref 70–99)
GLUCOSE BLDC GLUCOMTR-MCNC: 133 MG/DL — HIGH (ref 70–99)
GLUCOSE BLDC GLUCOMTR-MCNC: 133 MG/DL — HIGH (ref 70–99)
GLUCOSE BLDC GLUCOMTR-MCNC: 156 MG/DL — HIGH (ref 70–99)
GLUCOSE BLDC GLUCOMTR-MCNC: 156 MG/DL — HIGH (ref 70–99)
GLUCOSE BLDC GLUCOMTR-MCNC: 291 MG/DL — HIGH (ref 70–99)
GLUCOSE BLDC GLUCOMTR-MCNC: 291 MG/DL — HIGH (ref 70–99)
GLUCOSE SERPL-MCNC: 129 MG/DL — HIGH (ref 70–99)
GLUCOSE SERPL-MCNC: 129 MG/DL — HIGH (ref 70–99)
HCT VFR BLD CALC: 28.2 % — LOW (ref 39–50)
HCT VFR BLD CALC: 28.2 % — LOW (ref 39–50)
HGB BLD-MCNC: 8.3 G/DL — LOW (ref 13–17)
HGB BLD-MCNC: 8.3 G/DL — LOW (ref 13–17)
IMM GRANULOCYTES NFR BLD AUTO: 1.3 % — HIGH (ref 0–0.9)
IMM GRANULOCYTES NFR BLD AUTO: 1.3 % — HIGH (ref 0–0.9)
LYMPHOCYTES # BLD AUTO: 1.22 K/UL — SIGNIFICANT CHANGE UP (ref 1–3.3)
LYMPHOCYTES # BLD AUTO: 1.22 K/UL — SIGNIFICANT CHANGE UP (ref 1–3.3)
LYMPHOCYTES # BLD AUTO: 6.4 % — LOW (ref 13–44)
LYMPHOCYTES # BLD AUTO: 6.4 % — LOW (ref 13–44)
MAGNESIUM SERPL-MCNC: 2.1 MG/DL — SIGNIFICANT CHANGE UP (ref 1.6–2.6)
MAGNESIUM SERPL-MCNC: 2.1 MG/DL — SIGNIFICANT CHANGE UP (ref 1.6–2.6)
MCHC RBC-ENTMCNC: 21 PG — LOW (ref 27–34)
MCHC RBC-ENTMCNC: 21 PG — LOW (ref 27–34)
MCHC RBC-ENTMCNC: 29.4 GM/DL — LOW (ref 32–36)
MCHC RBC-ENTMCNC: 29.4 GM/DL — LOW (ref 32–36)
MCV RBC AUTO: 71.2 FL — LOW (ref 80–100)
MCV RBC AUTO: 71.2 FL — LOW (ref 80–100)
MONOCYTES # BLD AUTO: 1.01 K/UL — HIGH (ref 0–0.9)
MONOCYTES # BLD AUTO: 1.01 K/UL — HIGH (ref 0–0.9)
MONOCYTES NFR BLD AUTO: 5.3 % — SIGNIFICANT CHANGE UP (ref 2–14)
MONOCYTES NFR BLD AUTO: 5.3 % — SIGNIFICANT CHANGE UP (ref 2–14)
NEUTROPHILS # BLD AUTO: 16.59 K/UL — HIGH (ref 1.8–7.4)
NEUTROPHILS # BLD AUTO: 16.59 K/UL — HIGH (ref 1.8–7.4)
NEUTROPHILS NFR BLD AUTO: 86.4 % — HIGH (ref 43–77)
NEUTROPHILS NFR BLD AUTO: 86.4 % — HIGH (ref 43–77)
NRBC # BLD: 0 /100 WBCS — SIGNIFICANT CHANGE UP (ref 0–0)
NRBC # BLD: 0 /100 WBCS — SIGNIFICANT CHANGE UP (ref 0–0)
PHOSPHATE SERPL-MCNC: 3.3 MG/DL — SIGNIFICANT CHANGE UP (ref 2.5–4.5)
PHOSPHATE SERPL-MCNC: 3.3 MG/DL — SIGNIFICANT CHANGE UP (ref 2.5–4.5)
PLATELET # BLD AUTO: 602 K/UL — HIGH (ref 150–400)
PLATELET # BLD AUTO: 602 K/UL — HIGH (ref 150–400)
POTASSIUM SERPL-MCNC: 4.1 MMOL/L — SIGNIFICANT CHANGE UP (ref 3.5–5.3)
POTASSIUM SERPL-MCNC: 4.1 MMOL/L — SIGNIFICANT CHANGE UP (ref 3.5–5.3)
POTASSIUM SERPL-SCNC: 4.1 MMOL/L — SIGNIFICANT CHANGE UP (ref 3.5–5.3)
POTASSIUM SERPL-SCNC: 4.1 MMOL/L — SIGNIFICANT CHANGE UP (ref 3.5–5.3)
PROT SERPL-MCNC: 5.6 G/DL — LOW (ref 6–8.3)
PROT SERPL-MCNC: 5.6 G/DL — LOW (ref 6–8.3)
RBC # BLD: 3.96 M/UL — LOW (ref 4.2–5.8)
RBC # BLD: 3.96 M/UL — LOW (ref 4.2–5.8)
RBC # FLD: 20.8 % — HIGH (ref 10.3–14.5)
RBC # FLD: 20.8 % — HIGH (ref 10.3–14.5)
RSV RNA NPH QL NAA+NON-PROBE: SIGNIFICANT CHANGE UP
RSV RNA NPH QL NAA+NON-PROBE: SIGNIFICANT CHANGE UP
SARS-COV-2 RNA SPEC QL NAA+PROBE: SIGNIFICANT CHANGE UP
SARS-COV-2 RNA SPEC QL NAA+PROBE: SIGNIFICANT CHANGE UP
SODIUM SERPL-SCNC: 139 MMOL/L — SIGNIFICANT CHANGE UP (ref 135–145)
SODIUM SERPL-SCNC: 139 MMOL/L — SIGNIFICANT CHANGE UP (ref 135–145)
WBC # BLD: 19.18 K/UL — HIGH (ref 3.8–10.5)
WBC # BLD: 19.18 K/UL — HIGH (ref 3.8–10.5)
WBC # FLD AUTO: 19.18 K/UL — HIGH (ref 3.8–10.5)
WBC # FLD AUTO: 19.18 K/UL — HIGH (ref 3.8–10.5)

## 2023-12-29 PROCEDURE — 99233 SBSQ HOSP IP/OBS HIGH 50: CPT | Mod: GC

## 2023-12-29 PROCEDURE — 99232 SBSQ HOSP IP/OBS MODERATE 35: CPT | Mod: GC

## 2023-12-29 RX ORDER — CEFTRIAXONE 500 MG/1
2000 INJECTION, POWDER, FOR SOLUTION INTRAMUSCULAR; INTRAVENOUS EVERY 24 HOURS
Refills: 0 | Status: DISCONTINUED | OUTPATIENT
Start: 2023-12-29 | End: 2024-01-02

## 2023-12-29 RX ADMIN — Medication 100 MILLIGRAM(S): at 21:13

## 2023-12-29 RX ADMIN — Medication 1 TABLET(S): at 11:20

## 2023-12-29 RX ADMIN — PANTOPRAZOLE SODIUM 40 MILLIGRAM(S): 20 TABLET, DELAYED RELEASE ORAL at 05:34

## 2023-12-29 RX ADMIN — IRON SUCROSE 176.67 MILLIGRAM(S): 20 INJECTION, SOLUTION INTRAVENOUS at 13:15

## 2023-12-29 RX ADMIN — Medication 100 MILLIGRAM(S): at 13:15

## 2023-12-29 RX ADMIN — DIPHENHYDRAMINE HYDROCHLORIDE AND LIDOCAINE HYDROCHLORIDE AND ALUMINUM HYDROXIDE AND MAGNESIUM HYDRO 5 MILLILITER(S): KIT at 17:53

## 2023-12-29 RX ADMIN — Medication 500000 UNIT(S): at 11:19

## 2023-12-29 RX ADMIN — Medication 325 MILLIGRAM(S): at 13:14

## 2023-12-29 RX ADMIN — APIXABAN 2.5 MILLIGRAM(S): 2.5 TABLET, FILM COATED ORAL at 17:54

## 2023-12-29 RX ADMIN — BUDESONIDE AND FORMOTEROL FUMARATE DIHYDRATE 2 PUFF(S): 160; 4.5 AEROSOL RESPIRATORY (INHALATION) at 05:33

## 2023-12-29 RX ADMIN — FINASTERIDE 5 MILLIGRAM(S): 5 TABLET, FILM COATED ORAL at 11:19

## 2023-12-29 RX ADMIN — ALBUTEROL 2.5 MILLIGRAM(S): 90 AEROSOL, METERED ORAL at 11:20

## 2023-12-29 RX ADMIN — Medication 1: at 21:19

## 2023-12-29 RX ADMIN — Medication 325 MILLIGRAM(S): at 21:12

## 2023-12-29 RX ADMIN — Medication 1: at 17:54

## 2023-12-29 RX ADMIN — BUDESONIDE AND FORMOTEROL FUMARATE DIHYDRATE 2 PUFF(S): 160; 4.5 AEROSOL RESPIRATORY (INHALATION) at 17:55

## 2023-12-29 RX ADMIN — CHLORHEXIDINE GLUCONATE 1 APPLICATION(S): 213 SOLUTION TOPICAL at 05:36

## 2023-12-29 RX ADMIN — ATORVASTATIN CALCIUM 10 MILLIGRAM(S): 80 TABLET, FILM COATED ORAL at 21:12

## 2023-12-29 RX ADMIN — ALBUTEROL 2.5 MILLIGRAM(S): 90 AEROSOL, METERED ORAL at 05:34

## 2023-12-29 RX ADMIN — APIXABAN 2.5 MILLIGRAM(S): 2.5 TABLET, FILM COATED ORAL at 05:34

## 2023-12-29 RX ADMIN — Medication 600 MILLIGRAM(S): at 17:54

## 2023-12-29 RX ADMIN — Medication 325 MILLIGRAM(S): at 05:34

## 2023-12-29 RX ADMIN — Medication 500 MILLIGRAM(S): at 11:19

## 2023-12-29 RX ADMIN — Medication 25 MILLIGRAM(S): at 17:54

## 2023-12-29 RX ADMIN — Medication 600 MILLIGRAM(S): at 11:25

## 2023-12-29 RX ADMIN — TAMSULOSIN HYDROCHLORIDE 0.4 MILLIGRAM(S): 0.4 CAPSULE ORAL at 21:12

## 2023-12-29 RX ADMIN — Medication 25 MILLIGRAM(S): at 05:34

## 2023-12-29 RX ADMIN — DIPHENHYDRAMINE HYDROCHLORIDE AND LIDOCAINE HYDROCHLORIDE AND ALUMINUM HYDROXIDE AND MAGNESIUM HYDRO 5 MILLILITER(S): KIT at 11:20

## 2023-12-29 RX ADMIN — CEFTRIAXONE 100 MILLIGRAM(S): 500 INJECTION, POWDER, FOR SOLUTION INTRAMUSCULAR; INTRAVENOUS at 11:20

## 2023-12-29 RX ADMIN — ALBUTEROL 2.5 MILLIGRAM(S): 90 AEROSOL, METERED ORAL at 17:54

## 2023-12-29 RX ADMIN — Medication 40 MILLIGRAM(S): at 13:14

## 2023-12-29 RX ADMIN — Medication 500000 UNIT(S): at 05:34

## 2023-12-29 RX ADMIN — Medication 500000 UNIT(S): at 17:53

## 2023-12-29 RX ADMIN — DIPHENHYDRAMINE HYDROCHLORIDE AND LIDOCAINE HYDROCHLORIDE AND ALUMINUM HYDROXIDE AND MAGNESIUM HYDRO 5 MILLILITER(S): KIT at 05:36

## 2023-12-29 NOTE — PHARMACOTHERAPY INTERVENTION NOTE - COMMENTS
GLADIS LEONARD, 83y Male with Streptococcus pneumoniae bacteremia, source likely secondary to PNA. Patient has been on ceftriaxone 2 grams IV Q24H, order completed before anticipated stop date.    Recommendation(s):  Reached out to covering MD, recommended to re-order ceftriaxone 2 grams IV Q24H to c/w therapy for S. pneumoniae bacteremia.    With kind regards,  Cristian Cantor, PharmD, BCIDP  Infectious Diseases Clinical Pharmacist  Available on [employer] Teams  . GLAIDS LEONARD, 83y Male with Streptococcus pneumoniae bacteremia, source likely secondary to PNA. Patient has been on ceftriaxone 2 grams IV Q24H, order completed before anticipated stop date.    Recommendation(s):  Reached out to covering MD, recommended to re-order ceftriaxone 2 grams IV Q24H to c/w therapy for S. pneumoniae bacteremia.    With kind regards,  Cristian Cantor, PharmD, BCIDP  Infectious Diseases Clinical Pharmacist  Available on [employer] Teams  .

## 2023-12-29 NOTE — PROGRESS NOTE ADULT - PROBLEM SELECTOR PLAN 1
Likely multifactorial - CAP and component of HFpEF, entero/rhino virus +, COPD exacerbation   CTPE chest showed loculated moderate L pleural effusion and small R pleural effusion  - s/p thoracentesis  - pulm consulted - recs appreciated   - started ceftriaxone 2000 q24   - s/p azithro 500 q24  - ID consulted recs appreciated   - Aspiration Precautions  - Further plan per HFpEF below  - methylpred taper per Pulm recs-> 12/28 last day Likely multifactorial - CAP and component of HFpEF, entero/rhino virus +, COPD exacerbation   CTPE chest showed loculated moderate L pleural effusion and small R pleural effusion  - s/p thoracentesis  - pulm consulted - recs appreciated   - started ceftriaxone 2000 q24   - s/p azithro 500 q24  - ID consulted recs appreciated   - Aspiration Precautions  - Further plan per HFpEF below  - methylpred taper per Pulm recs-> 12/28 last day-> resumed 12/29 for increased wheezing also ordered RSV and COVID test

## 2023-12-29 NOTE — PROGRESS NOTE ADULT - PROBLEM SELECTOR PLAN 6
- on heparin gtt iso possible thora tomorrow-> will follow up with pulm about thoracentesis   - holding home eliquis for now-> herapin drip-> Can consider switching to DOAC - on heparin gtt iso possible thora tomorrow-> will follow up with pulm about thoracentesis   - holding home eliquis for now-> herapin drip-> Eliquis started 12/28

## 2023-12-29 NOTE — PROGRESS NOTE ADULT - ASSESSMENT
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 found to have hypoxemic respiratory failure secondary to pna vs copd vs AHRF    #recommendations    patient declining inhalers however amenable nebulizers continue standing duonebs and continuing prednisone taper  patient with significant increased mucus production and sepsis please continue ceftriaxone can discontinue azitho at this point  s/p 1200 left thoracentesis exudative, unlikely infectious pleural effusion, no ptx post op  flow negative, cultures negative,   cytopath negative for malignancy   continue airway clearance: humidified oxygen, standing guaifenesin, duonebs q6h, hyper sal q6h, chest PT q6 h, aerobika/acapella q6 h,   f/u U/A, urine legionella,:   ERV positive, strep bactermia , nml resp sukhjinder, MRSA negative  pro BNP, ECHO with bubble study: nml LV nml RV  continue diuresis as per nephro  titrate oxygen to O2 >88%, use humidified oxygen  incentive spirometry, OOB to chair, PT/OT  aspiration precautions as necessary  DVT ppx as tolerated  airway clearance as necessary  no further pulmonary recommendations     Prior to discharge:  Please email: [email or web address] to setup an appointment prior to discharge. Include the patient's name, , MRN and contact information in the email.    please specify with Dr. 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 found to have hypoxemic respiratory failure secondary to pna vs copd vs AHRF    #recommendations    patient declining inhalers however amenable nebulizers continue standing duonebs and continuing prednisone taper  patient with significant increased mucus production and sepsis please continue ceftriaxone can discontinue azitho at this point  s/p 1200 left thoracentesis exudative, unlikely infectious pleural effusion, no ptx post op  flow negative, cultures negative,   cytopath negative for malignancy   continue airway clearance: humidified oxygen, standing guaifenesin, duonebs q6h, hyper sal q6h, chest PT q6 h, aerobika/acapella q6 h,   f/u U/A, urine legionella,:   ERV positive, strep bactermia , nml resp sukhjinder, MRSA negative  pro BNP, ECHO with bubble study: nml LV nml RV  continue diuresis as per nephro  titrate oxygen to O2 >88%, use humidified oxygen  incentive spirometry, OOB to chair, PT/OT  aspiration precautions as necessary  DVT ppx as tolerated  airway clearance as necessary  no further pulmonary recommendations     Prior to discharge:  Please email: [email or web address] to setup an appointment prior to discharge. Include the patient's name, , MRN and contact information in the email.    please specify with Dr. 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 found to have hypoxemic respiratory failure secondary to pna vs copd vs AHRF    #recommendations    patient declining inhalers however amenable nebulizers continue standing duonebs, please start pred 40 for 5 days then 20 for 3 days then off  patient with significant increased mucus production and sepsis please continue ceftriaxone can discontinue azitho at this point  s/p 1200 left thoracentesis exudative, unlikely infectious pleural effusion, no ptx post op  flow negative, cultures negative,   cytopath negative for malignancy   continue airway clearance: humidified oxygen, standing guaifenesin, duonebs q6h, hyper sal q6h, chest PT q6 h, aerobika/acapella q6 h,   f/u U/A, urine legionella,:   ERV positive, strep bactermia , nml resp sukhjinder, MRSA negative  pro BNP, ECHO with bubble study: nml LV nml RV  continue diuresis as per nephro  titrate oxygen to O2 >88%, use humidified oxygen  incentive spirometry, OOB to chair, PT/OT  aspiration precautions as necessary  DVT ppx as tolerated  airway clearance as necessary  no further pulmonary recommendations     Prior to discharge:  Please email: [email or web address] to setup an appointment prior to discharge. Include the patient's name, , MRN and contact information in the email.    please specify with Dr. 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 found to have hypoxemic respiratory failure secondary to pna vs copd vs AHRF    #recommendations    patient declining inhalers however amenable nebulizers continue standing duonebs, please start pred 40 for 5 days then 20 for 3 days then off  patient with significant increased mucus production and sepsis please continue ceftriaxone can discontinue azitho at this point  s/p 1200 left thoracentesis exudative, unlikely infectious pleural effusion, no ptx post op  flow negative, cultures negative,   cytopath negative for malignancy   continue airway clearance: humidified oxygen, standing guaifenesin, duonebs q6h, hyper sal q6h, chest PT q6 h, aerobika/acapella q6 h,   f/u U/A, urine legionella,:   ERV positive, strep bactermia , nml resp sukhjinder, MRSA negative  pro BNP, ECHO with bubble study: nml LV nml RV  continue diuresis as per nephro  titrate oxygen to O2 >88%, use humidified oxygen  incentive spirometry, OOB to chair, PT/OT  aspiration precautions as necessary  DVT ppx as tolerated  airway clearance as necessary  no further pulmonary recommendations     Prior to discharge:  Please email: [email or web address] to setup an appointment prior to discharge. Include the patient's name, , MRN and contact information in the email.    please specify with Dr. Gant    Pulmonary/Sleep Clinic  [address] [place] [address]  [phone]    Eugenio Barraza PGY5 PCCM Fellow

## 2023-12-29 NOTE — PROGRESS NOTE ADULT - PROBLEM SELECTOR PLAN 4
HgB 7.9  Will evaluate B12, Folate, and Iron studies-> Yosef JAMAL due to low ferritin will consult H/O for thrombocytosis likely reactive in context of steroids/infection HgB 7.9  Will evaluate B12, Folate, and Iron studies-> Yosef JAMAL due to low ferritin-> Venofer started 12/28

## 2023-12-29 NOTE — CHART NOTE - NSCHARTNOTEFT_GEN_A_CORE
Patient seen yesterday reviewed labs from today.  creatinine improving.  resolving ATN.  continue to monitor BMP.  nephrology will sign off but please feel free to call with quesitons.

## 2023-12-29 NOTE — PROGRESS NOTE ADULT - SUBJECTIVE AND OBJECTIVE BOX
CHIEF COMPLAINT:Patient is a 83y old  Male who presents with a chief complaint of sob (29 Dec 2023 07:02)      Interval Events:    REVIEW OF SYSTEMS:  [x] All other systems negative except per HPI   [ ] Unable to assess ROS because ________    OBJECTIVE:  ICU Vital Signs Last 24 Hrs  T(C): 36.3 (29 Dec 2023 04:44), Max: 36.3 (28 Dec 2023 22:08)  T(F): 97.4 (29 Dec 2023 04:44), Max: 97.4 (29 Dec 2023 04:44)  HR: 84 (29 Dec 2023 04:44) (80 - 86)  BP: 140/74 (29 Dec 2023 04:44) (129/70 - 140/74)  BP(mean): --  ABP: --  ABP(mean): --  RR: 19 (29 Dec 2023 04:44) (19 - 19)  SpO2: 95% (29 Dec 2023 04:44) (93% - 96%)    O2 Parameters below as of 29 Dec 2023 04:44  Patient On (Oxygen Delivery Method): room air              12-28 @ 07:01  -  12-29 @ 07:00  --------------------------------------------------------  IN: 0 mL / OUT: 1900 mL / NET: -1900 mL        PHYSICAL EXAM:  GENERAL: NAD, well-groomed, well-developed  HEAD:  Atraumatic, Normocephalic  EYES: EOMI, PERRLA, conjunctiva and sclera clear  ENMT: No tonsillar erythema, exudates, or enlargement; Moist mucous membranes, Good dentition, No lesions  NECK: Supple, No JVD, Normal thyroid  CHEST/LUNG: Clear to auscultation bilaterally; No rales, rhonchi, wheezing, or rubs  HEART: Regular rate and rhythm; No murmurs, rubs, or gallops  ABDOMEN: Soft, Nontender, Nondistended; Bowel sounds present  VASCULAR:  2+ Peripheral Pulses, No clubbing, cyanosis, or edema  LYMPH: No lymphadenopathy noted  SKIN: No rashes or lesions  NERVOUS SYSTEM:  Alert & Oriented X3, Good concentration; Motor Strength 5/5 B/L upper and lower extremities; DTRs 2+ intact and symmetric    HOSPITAL MEDICATIONS:  MEDICATIONS  (STANDING):  albuterol    0.083% 2.5 milliGRAM(s) Nebulizer every 6 hours  apixaban 2.5 milliGRAM(s) Oral two times a day  ascorbic acid 500 milliGRAM(s) Oral daily  atorvastatin 10 milliGRAM(s) Oral at bedtime  budesonide 160 MICROgram(s)/formoterol 4.5 MICROgram(s) Inhaler 2 Puff(s) Inhalation two times a day  chlorhexidine 4% Liquid 1 Application(s) Topical <User Schedule>  dextrose 5%. 1000 milliLiter(s) (50 mL/Hr) IV Continuous <Continuous>  dextrose 5%. 1000 milliLiter(s) (100 mL/Hr) IV Continuous <Continuous>  dextrose 50% Injectable 25 Gram(s) IV Push once  dextrose 50% Injectable 25 Gram(s) IV Push once  dextrose 50% Injectable 12.5 Gram(s) IV Push once  finasteride 5 milliGRAM(s) Oral daily  FIRST- Mouthwash  BLM 5 milliLiter(s) Swish and Spit every 6 hours  glucagon  Injectable 1 milliGRAM(s) IntraMuscular once  influenza  Vaccine (HIGH DOSE) 0.7 milliLiter(s) IntraMuscular once  insulin lispro (ADMELOG) corrective regimen sliding scale   SubCutaneous three times a day before meals  insulin lispro (ADMELOG) corrective regimen sliding scale   SubCutaneous at bedtime  iron sucrose IVPB 300 milliGRAM(s) IV Intermittent every 24 hours  metoprolol tartrate 25 milliGRAM(s) Oral two times a day  multivitamin 1 Tablet(s) Oral daily  nystatin    Suspension 450883 Unit(s) Oral four times a day  pantoprazole    Tablet 40 milliGRAM(s) Oral before breakfast  sodium bicarbonate 325 milliGRAM(s) Oral every 8 hours  tamsulosin 0.4 milliGRAM(s) Oral at bedtime    MEDICATIONS  (PRN):  acetaminophen     Tablet .. 650 milliGRAM(s) Oral every 6 hours PRN Temp greater or equal to 38C (100.4F), Mild Pain (1 - 3)  benzocaine/menthol Lozenge 1 Lozenge Oral three times a day PRN Sore Throat  dextrose Oral Gel 15 Gram(s) Oral once PRN Blood Glucose LESS THAN 70 milliGRAM(s)/deciliter      LABS:    The Labs were reviewed by me   The Radiology was reviewed by me    EKG tracing reviewed by me    12-29    139  |  100  |  x   ----------------------------<  x   4.1   |  x   |  x   12-28    139  |  100  |  71<H>  ----------------------------<  162<H>  3.6   |  29  |  2.13<H>  12-27    138  |  97  |  87<H>  ----------------------------<  149<H>  3.5   |  27  |  3.01<H>    Ca    8.2<L>      28 Dec 2023 04:37  Ca    8.0<L>      27 Dec 2023 07:19  Ca    8.1<L>      26 Dec 2023 14:43  Phos  3.7     12-28  Mg     2.1     12-28    TPro  5.5<L>  /  Alb  2.7<L>  /  TBili  0.2  /  DBili  x   /  AST  24  /  ALT  58<H>  /  AlkPhos  100  12-28  TPro  5.5<L>  /  Alb  2.7<L>  /  TBili  0.2  /  DBili  x   /  AST  32  /  ALT  70<H>  /  AlkPhos  113  12-27  TPro  5.9<L>  /  Alb  2.7<L>  /  TBili  0.3  /  DBili  x   /  AST  36  /  ALT  77<H>  /  AlkPhos  104  12-26    Magnesium: 2.1 mg/dL (12-28-23 @ 04:37)  Magnesium: 1.7 mg/dL (12-27-23 @ 07:19)  Magnesium: 1.6 mg/dL (12-26-23 @ 14:43)    Phosphorus: 3.7 mg/dL (12-28-23 @ 04:37)  Phosphorus: 4.5 mg/dL (12-27-23 @ 07:19)  Phosphorus: 4.8 mg/dL (12-26-23 @ 14:43)      PTT - ( 28 Dec 2023 11:25 )  PTT:67.0 sec              Urinalysis Basic - ( 28 Dec 2023 04:37 )    Color: x / Appearance: x / SG: x / pH: x  Gluc: 162 mg/dL / Ketone: x  / Bili: x / Urobili: x   Blood: x / Protein: x / Nitrite: x   Leuk Esterase: x / RBC: x / WBC x   Sq Epi: x / Non Sq Epi: x / Bacteria: x                              8.3    19.18 )-----------( 602      ( 29 Dec 2023 07:10 )             28.2                         8.2    16.29 )-----------( 659      ( 28 Dec 2023 04:37 )             26.1                         7.9    18.47 )-----------( 599      ( 27 Dec 2023 07:19 )             25.0     CAPILLARY BLOOD GLUCOSE      POCT Blood Glucose.: 163 mg/dL (28 Dec 2023 21:16)  POCT Blood Glucose.: 193 mg/dL (28 Dec 2023 16:59)  POCT Blood Glucose.: 153 mg/dL (28 Dec 2023 12:10)  POCT Blood Glucose.: 135 mg/dL (28 Dec 2023 08:41)        MICROBIOLOGY:     RADIOLOGY:  [ ] Reviewed and interpreted by me    Point of Care Ultrasound Findings:    PFT:    EKG: CHIEF COMPLAINT:Patient is a 83y old  Male who presents with a chief complaint of sob (29 Dec 2023 07:02)      Interval Events:    REVIEW OF SYSTEMS:  [x] All other systems negative except per HPI   [ ] Unable to assess ROS because ________    OBJECTIVE:  ICU Vital Signs Last 24 Hrs  T(C): 36.3 (29 Dec 2023 04:44), Max: 36.3 (28 Dec 2023 22:08)  T(F): 97.4 (29 Dec 2023 04:44), Max: 97.4 (29 Dec 2023 04:44)  HR: 84 (29 Dec 2023 04:44) (80 - 86)  BP: 140/74 (29 Dec 2023 04:44) (129/70 - 140/74)  BP(mean): --  ABP: --  ABP(mean): --  RR: 19 (29 Dec 2023 04:44) (19 - 19)  SpO2: 95% (29 Dec 2023 04:44) (93% - 96%)    O2 Parameters below as of 29 Dec 2023 04:44  Patient On (Oxygen Delivery Method): room air              12-28 @ 07:01  -  12-29 @ 07:00  --------------------------------------------------------  IN: 0 mL / OUT: 1900 mL / NET: -1900 mL        PHYSICAL EXAM:  GENERAL: NAD, well-groomed, well-developed  HEAD:  Atraumatic, Normocephalic  EYES: EOMI, PERRLA, conjunctiva and sclera clear  ENMT: No tonsillar erythema, exudates, or enlargement; Moist mucous membranes, Good dentition, No lesions  NECK: Supple, No JVD, Normal thyroid  CHEST/LUNG: Clear to auscultation bilaterally; No rales, rhonchi, wheezing, or rubs  HEART: Regular rate and rhythm; No murmurs, rubs, or gallops  ABDOMEN: Soft, Nontender, Nondistended; Bowel sounds present  VASCULAR:  2+ Peripheral Pulses, No clubbing, cyanosis, or edema  LYMPH: No lymphadenopathy noted  SKIN: No rashes or lesions  NERVOUS SYSTEM:  Alert & Oriented X3, Good concentration; Motor Strength 5/5 B/L upper and lower extremities; DTRs 2+ intact and symmetric    HOSPITAL MEDICATIONS:  MEDICATIONS  (STANDING):  albuterol    0.083% 2.5 milliGRAM(s) Nebulizer every 6 hours  apixaban 2.5 milliGRAM(s) Oral two times a day  ascorbic acid 500 milliGRAM(s) Oral daily  atorvastatin 10 milliGRAM(s) Oral at bedtime  budesonide 160 MICROgram(s)/formoterol 4.5 MICROgram(s) Inhaler 2 Puff(s) Inhalation two times a day  chlorhexidine 4% Liquid 1 Application(s) Topical <User Schedule>  dextrose 5%. 1000 milliLiter(s) (50 mL/Hr) IV Continuous <Continuous>  dextrose 5%. 1000 milliLiter(s) (100 mL/Hr) IV Continuous <Continuous>  dextrose 50% Injectable 25 Gram(s) IV Push once  dextrose 50% Injectable 25 Gram(s) IV Push once  dextrose 50% Injectable 12.5 Gram(s) IV Push once  finasteride 5 milliGRAM(s) Oral daily  FIRST- Mouthwash  BLM 5 milliLiter(s) Swish and Spit every 6 hours  glucagon  Injectable 1 milliGRAM(s) IntraMuscular once  influenza  Vaccine (HIGH DOSE) 0.7 milliLiter(s) IntraMuscular once  insulin lispro (ADMELOG) corrective regimen sliding scale   SubCutaneous three times a day before meals  insulin lispro (ADMELOG) corrective regimen sliding scale   SubCutaneous at bedtime  iron sucrose IVPB 300 milliGRAM(s) IV Intermittent every 24 hours  metoprolol tartrate 25 milliGRAM(s) Oral two times a day  multivitamin 1 Tablet(s) Oral daily  nystatin    Suspension 007278 Unit(s) Oral four times a day  pantoprazole    Tablet 40 milliGRAM(s) Oral before breakfast  sodium bicarbonate 325 milliGRAM(s) Oral every 8 hours  tamsulosin 0.4 milliGRAM(s) Oral at bedtime    MEDICATIONS  (PRN):  acetaminophen     Tablet .. 650 milliGRAM(s) Oral every 6 hours PRN Temp greater or equal to 38C (100.4F), Mild Pain (1 - 3)  benzocaine/menthol Lozenge 1 Lozenge Oral three times a day PRN Sore Throat  dextrose Oral Gel 15 Gram(s) Oral once PRN Blood Glucose LESS THAN 70 milliGRAM(s)/deciliter      LABS:    The Labs were reviewed by me   The Radiology was reviewed by me    EKG tracing reviewed by me    12-29    139  |  100  |  x   ----------------------------<  x   4.1   |  x   |  x   12-28    139  |  100  |  71<H>  ----------------------------<  162<H>  3.6   |  29  |  2.13<H>  12-27    138  |  97  |  87<H>  ----------------------------<  149<H>  3.5   |  27  |  3.01<H>    Ca    8.2<L>      28 Dec 2023 04:37  Ca    8.0<L>      27 Dec 2023 07:19  Ca    8.1<L>      26 Dec 2023 14:43  Phos  3.7     12-28  Mg     2.1     12-28    TPro  5.5<L>  /  Alb  2.7<L>  /  TBili  0.2  /  DBili  x   /  AST  24  /  ALT  58<H>  /  AlkPhos  100  12-28  TPro  5.5<L>  /  Alb  2.7<L>  /  TBili  0.2  /  DBili  x   /  AST  32  /  ALT  70<H>  /  AlkPhos  113  12-27  TPro  5.9<L>  /  Alb  2.7<L>  /  TBili  0.3  /  DBili  x   /  AST  36  /  ALT  77<H>  /  AlkPhos  104  12-26    Magnesium: 2.1 mg/dL (12-28-23 @ 04:37)  Magnesium: 1.7 mg/dL (12-27-23 @ 07:19)  Magnesium: 1.6 mg/dL (12-26-23 @ 14:43)    Phosphorus: 3.7 mg/dL (12-28-23 @ 04:37)  Phosphorus: 4.5 mg/dL (12-27-23 @ 07:19)  Phosphorus: 4.8 mg/dL (12-26-23 @ 14:43)      PTT - ( 28 Dec 2023 11:25 )  PTT:67.0 sec              Urinalysis Basic - ( 28 Dec 2023 04:37 )    Color: x / Appearance: x / SG: x / pH: x  Gluc: 162 mg/dL / Ketone: x  / Bili: x / Urobili: x   Blood: x / Protein: x / Nitrite: x   Leuk Esterase: x / RBC: x / WBC x   Sq Epi: x / Non Sq Epi: x / Bacteria: x                              8.3    19.18 )-----------( 602      ( 29 Dec 2023 07:10 )             28.2                         8.2    16.29 )-----------( 659      ( 28 Dec 2023 04:37 )             26.1                         7.9    18.47 )-----------( 599      ( 27 Dec 2023 07:19 )             25.0     CAPILLARY BLOOD GLUCOSE      POCT Blood Glucose.: 163 mg/dL (28 Dec 2023 21:16)  POCT Blood Glucose.: 193 mg/dL (28 Dec 2023 16:59)  POCT Blood Glucose.: 153 mg/dL (28 Dec 2023 12:10)  POCT Blood Glucose.: 135 mg/dL (28 Dec 2023 08:41)        MICROBIOLOGY:     RADIOLOGY:  [ ] Reviewed and interpreted by me    Point of Care Ultrasound Findings:    PFT:    EKG: CHIEF COMPLAINT:Patient is a 83y old  Male who presents with a chief complaint of sob (29 Dec 2023 07:02)      Interval Events: patient with increased nasal congestion, sore throat and rhoncorus cough. Patient denies fevers, chills, chest pain, shortness of breath, nausea, abdominal pain, diarrhea, constipation, dysuria, headache, light headedness    REVIEW OF SYSTEMS:  [x] All other systems negative except per HPI   [ ] Unable to assess ROS because ________    OBJECTIVE:  ICU Vital Signs Last 24 Hrs  T(C): 36.3 (29 Dec 2023 04:44), Max: 36.3 (28 Dec 2023 22:08)  T(F): 97.4 (29 Dec 2023 04:44), Max: 97.4 (29 Dec 2023 04:44)  HR: 84 (29 Dec 2023 04:44) (80 - 86)  BP: 140/74 (29 Dec 2023 04:44) (129/70 - 140/74)  BP(mean): --  ABP: --  ABP(mean): --  RR: 19 (29 Dec 2023 04:44) (19 - 19)  SpO2: 95% (29 Dec 2023 04:44) (93% - 96%)    O2 Parameters below as of 29 Dec 2023 04:44  Patient On (Oxygen Delivery Method): room air              12-28 @ 07:01  -  12-29 @ 07:00  --------------------------------------------------------  IN: 0 mL / OUT: 1900 mL / NET: -1900 mL        PHYSICAL EXAM:  GENERAL: NAD, well-groomed, well-developed  HEAD:  Atraumatic, Normocephalic  EYES: EOMI, PERRLA, conjunctiva and sclera clear  ENMT: No tonsillar erythema, exudates, or enlargement; Moist mucous membranes, Good dentition, No lesions  NECK: Supple, No JVD, Normal thyroid  CHEST/LUNG: rhoncorus   HEART: Regular rate and rhythm; No murmurs, rubs, or gallops  ABDOMEN: Soft, Nontender, Nondistended; Bowel sounds present  VASCULAR:  2+ Peripheral Pulses, No clubbing, cyanosis. leg swelling  LYMPH: No lymphadenopathy noted  SKIN: No rashes or lesions  NERVOUS SYSTEM:  Alert & Oriented X3, Good concentration; Motor Strength 5/5 B/L upper and lower extremities; DTRs 2+ intact and symmetric    HOSPITAL MEDICATIONS:  MEDICATIONS  (STANDING):  albuterol    0.083% 2.5 milliGRAM(s) Nebulizer every 6 hours  apixaban 2.5 milliGRAM(s) Oral two times a day  ascorbic acid 500 milliGRAM(s) Oral daily  atorvastatin 10 milliGRAM(s) Oral at bedtime  budesonide 160 MICROgram(s)/formoterol 4.5 MICROgram(s) Inhaler 2 Puff(s) Inhalation two times a day  chlorhexidine 4% Liquid 1 Application(s) Topical <User Schedule>  dextrose 5%. 1000 milliLiter(s) (50 mL/Hr) IV Continuous <Continuous>  dextrose 5%. 1000 milliLiter(s) (100 mL/Hr) IV Continuous <Continuous>  dextrose 50% Injectable 25 Gram(s) IV Push once  dextrose 50% Injectable 25 Gram(s) IV Push once  dextrose 50% Injectable 12.5 Gram(s) IV Push once  finasteride 5 milliGRAM(s) Oral daily  FIRST- Mouthwash  BLM 5 milliLiter(s) Swish and Spit every 6 hours  glucagon  Injectable 1 milliGRAM(s) IntraMuscular once  influenza  Vaccine (HIGH DOSE) 0.7 milliLiter(s) IntraMuscular once  insulin lispro (ADMELOG) corrective regimen sliding scale   SubCutaneous three times a day before meals  insulin lispro (ADMELOG) corrective regimen sliding scale   SubCutaneous at bedtime  iron sucrose IVPB 300 milliGRAM(s) IV Intermittent every 24 hours  metoprolol tartrate 25 milliGRAM(s) Oral two times a day  multivitamin 1 Tablet(s) Oral daily  nystatin    Suspension [id number] Unit(s) Oral four times a day  pantoprazole    Tablet 40 milliGRAM(s) Oral before breakfast  sodium bicarbonate 325 milliGRAM(s) Oral every 8 hours  tamsulosin 0.4 milliGRAM(s) Oral at bedtime    MEDICATIONS  (PRN):  acetaminophen     Tablet .. 650 milliGRAM(s) Oral every 6 hours PRN Temp greater or equal to 38C (100.4F), Mild Pain (1 - 3)  benzocaine/menthol Lozenge 1 Lozenge Oral three times a day PRN Sore Throat  dextrose Oral Gel 15 Gram(s) Oral once PRN Blood Glucose LESS THAN 70 milliGRAM(s)/deciliter      LABS:    The Labs were reviewed by me   The Radiology was reviewed by me    EKG tracing reviewed by me    12-29    139  |  100  |  x   ----------------------------<  x   4.1   |  x   |  x   12-28    139  |  100  |  71<H>  ----------------------------<  162<H>  3.6   |  29  |  2.13<H>  12-27    138  |  97  |  87<H>  ----------------------------<  149<H>  3.5   |  27  |  3.01<H>    Ca    8.2<L>      28 Dec 2023 04:37  Ca    8.0<L>      27 Dec 2023 07:19  Ca    8.1<L>      26 Dec 2023 14:43  Phos  3.7     12-28  Mg     2.1     12-28    TPro  5.5<L>  /  Alb  2.7<L>  /  TBili  0.2  /  DBili  x   /  AST  24  /  ALT  58<H>  /  AlkPhos  100  12-28  TPro  5.5<L>  /  Alb  2.7<L>  /  TBili  0.2  /  DBili  x   /  AST  32  /  ALT  70<H>  /  AlkPhos  113  12-27  TPro  5.9<L>  /  Alb  2.7<L>  /  TBili  0.3  /  DBili  x   /  AST  36  /  ALT  77<H>  /  AlkPhos  104  12-26    Magnesium: 2.1 mg/dL (12-28-23 @ 04:37)  Magnesium: 1.7 mg/dL (12-27-23 @ 07:19)  Magnesium: 1.6 mg/dL (12-26-23 @ 14:43)    Phosphorus: 3.7 mg/dL (12-28-23 @ 04:37)  Phosphorus: 4.5 mg/dL (12-27-23 @ 07:19)  Phosphorus: 4.8 mg/dL (12-26-23 @ 14:43)      PTT - ( 28 Dec 2023 11:25 )  PTT:67.0 sec              Urinalysis Basic - ( 28 Dec 2023 04:37 )    Color: x / Appearance: x / SG: x / pH: x  Gluc: 162 mg/dL / Ketone: x  / Bili: x / Urobili: x   Blood: x / Protein: x / Nitrite: x   Leuk Esterase: x / RBC: x / WBC x   Sq Epi: x / Non Sq Epi: x / Bacteria: x                              8.3    19.18 )-----------( 602      ( 29 Dec 2023 07:10 )             28.2                         8.2    16.29 )-----------( 659      ( 28 Dec 2023 04:37 )             26.1                         7.9    18.47 )-----------( 599      ( 27 Dec 2023 07:19 )             25.0     CAPILLARY BLOOD GLUCOSE      POCT Blood Glucose.: 163 mg/dL (28 Dec 2023 21:16)  POCT Blood Glucose.: 193 mg/dL (28 Dec 2023 16:59)  POCT Blood Glucose.: 153 mg/dL (28 Dec 2023 12:10)  POCT Blood Glucose.: 135 mg/dL (28 Dec 2023 08:41)        MICROBIOLOGY:     RADIOLOGY:  [ ] Reviewed and interpreted by me    Point of Care Ultrasound Findings:    PFT:    EKG: CHIEF COMPLAINT:Patient is a 83y old  Male who presents with a chief complaint of sob (29 Dec 2023 07:02)      Interval Events: patient with increased nasal congestion, sore throat and rhoncorus cough. Patient denies fevers, chills, chest pain, shortness of breath, nausea, abdominal pain, diarrhea, constipation, dysuria, headache, light headedness    REVIEW OF SYSTEMS:  [x] All other systems negative except per HPI   [ ] Unable to assess ROS because ________    OBJECTIVE:  ICU Vital Signs Last 24 Hrs  T(C): 36.3 (29 Dec 2023 04:44), Max: 36.3 (28 Dec 2023 22:08)  T(F): 97.4 (29 Dec 2023 04:44), Max: 97.4 (29 Dec 2023 04:44)  HR: 84 (29 Dec 2023 04:44) (80 - 86)  BP: 140/74 (29 Dec 2023 04:44) (129/70 - 140/74)  BP(mean): --  ABP: --  ABP(mean): --  RR: 19 (29 Dec 2023 04:44) (19 - 19)  SpO2: 95% (29 Dec 2023 04:44) (93% - 96%)    O2 Parameters below as of 29 Dec 2023 04:44  Patient On (Oxygen Delivery Method): room air              12-28 @ 07:01  -  12-29 @ 07:00  --------------------------------------------------------  IN: 0 mL / OUT: 1900 mL / NET: -1900 mL        PHYSICAL EXAM:  GENERAL: NAD, well-groomed, well-developed  HEAD:  Atraumatic, Normocephalic  EYES: EOMI, PERRLA, conjunctiva and sclera clear  ENMT: No tonsillar erythema, exudates, or enlargement; Moist mucous membranes, Good dentition, No lesions  NECK: Supple, No JVD, Normal thyroid  CHEST/LUNG: rhoncorus   HEART: Regular rate and rhythm; No murmurs, rubs, or gallops  ABDOMEN: Soft, Nontender, Nondistended; Bowel sounds present  VASCULAR:  2+ Peripheral Pulses, No clubbing, cyanosis. leg swelling  LYMPH: No lymphadenopathy noted  SKIN: No rashes or lesions  NERVOUS SYSTEM:  Alert & Oriented X3, Good concentration; Motor Strength 5/5 B/L upper and lower extremities; DTRs 2+ intact and symmetric    HOSPITAL MEDICATIONS:  MEDICATIONS  (STANDING):  albuterol    0.083% 2.5 milliGRAM(s) Nebulizer every 6 hours  apixaban 2.5 milliGRAM(s) Oral two times a day  ascorbic acid 500 milliGRAM(s) Oral daily  atorvastatin 10 milliGRAM(s) Oral at bedtime  budesonide 160 MICROgram(s)/formoterol 4.5 MICROgram(s) Inhaler 2 Puff(s) Inhalation two times a day  chlorhexidine 4% Liquid 1 Application(s) Topical <User Schedule>  dextrose 5%. 1000 milliLiter(s) (50 mL/Hr) IV Continuous <Continuous>  dextrose 5%. 1000 milliLiter(s) (100 mL/Hr) IV Continuous <Continuous>  dextrose 50% Injectable 25 Gram(s) IV Push once  dextrose 50% Injectable 25 Gram(s) IV Push once  dextrose 50% Injectable 12.5 Gram(s) IV Push once  finasteride 5 milliGRAM(s) Oral daily  FIRST- Mouthwash  BLM 5 milliLiter(s) Swish and Spit every 6 hours  glucagon  Injectable 1 milliGRAM(s) IntraMuscular once  influenza  Vaccine (HIGH DOSE) 0.7 milliLiter(s) IntraMuscular once  insulin lispro (ADMELOG) corrective regimen sliding scale   SubCutaneous three times a day before meals  insulin lispro (ADMELOG) corrective regimen sliding scale   SubCutaneous at bedtime  iron sucrose IVPB 300 milliGRAM(s) IV Intermittent every 24 hours  metoprolol tartrate 25 milliGRAM(s) Oral two times a day  multivitamin 1 Tablet(s) Oral daily  nystatin    Suspension [id number] Unit(s) Oral four times a day  pantoprazole    Tablet 40 milliGRAM(s) Oral before breakfast  sodium bicarbonate 325 milliGRAM(s) Oral every 8 hours  tamsulosin 0.4 milliGRAM(s) Oral at bedtime    MEDICATIONS  (PRN):  acetaminophen     Tablet .. 650 milliGRAM(s) Oral every 6 hours PRN Temp greater or equal to 38C (100.4F), Mild Pain (1 - 3)  benzocaine/menthol Lozenge 1 Lozenge Oral three times a day PRN Sore Throat  dextrose Oral Gel 15 Gram(s) Oral once PRN Blood Glucose LESS THAN 70 milliGRAM(s)/deciliter      LABS:    The Labs were reviewed by me   The Radiology was reviewed by me    EKG tracing reviewed by me    12-29    139  |  100  |  x   ----------------------------<  x   4.1   |  x   |  x   12-28    139  |  100  |  71<H>  ----------------------------<  162<H>  3.6   |  29  |  2.13<H>  12-27    138  |  97  |  87<H>  ----------------------------<  149<H>  3.5   |  27  |  3.01<H>    Ca    8.2<L>      28 Dec 2023 04:37  Ca    8.0<L>      27 Dec 2023 07:19  Ca    8.1<L>      26 Dec 2023 14:43  Phos  3.7     12-28  Mg     2.1     12-28    TPro  5.5<L>  /  Alb  2.7<L>  /  TBili  0.2  /  DBili  x   /  AST  24  /  ALT  58<H>  /  AlkPhos  100  12-28  TPro  5.5<L>  /  Alb  2.7<L>  /  TBili  0.2  /  DBili  x   /  AST  32  /  ALT  70<H>  /  AlkPhos  113  12-27  TPro  5.9<L>  /  Alb  2.7<L>  /  TBili  0.3  /  DBili  x   /  AST  36  /  ALT  77<H>  /  AlkPhos  104  12-26    Magnesium: 2.1 mg/dL (12-28-23 @ 04:37)  Magnesium: 1.7 mg/dL (12-27-23 @ 07:19)  Magnesium: 1.6 mg/dL (12-26-23 @ 14:43)    Phosphorus: 3.7 mg/dL (12-28-23 @ 04:37)  Phosphorus: 4.5 mg/dL (12-27-23 @ 07:19)  Phosphorus: 4.8 mg/dL (12-26-23 @ 14:43)      PTT - ( 28 Dec 2023 11:25 )  PTT:67.0 sec              Urinalysis Basic - ( 28 Dec 2023 04:37 )    Color: x / Appearance: x / SG: x / pH: x  Gluc: 162 mg/dL / Ketone: x  / Bili: x / Urobili: x   Blood: x / Protein: x / Nitrite: x   Leuk Esterase: x / RBC: x / WBC x   Sq Epi: x / Non Sq Epi: x / Bacteria: x                              8.3    19.18 )-----------( 602      ( 29 Dec 2023 07:10 )             28.2                         8.2    16.29 )-----------( 659      ( 28 Dec 2023 04:37 )             26.1                         7.9    18.47 )-----------( 599      ( 27 Dec 2023 07:19 )             25.0     CAPILLARY BLOOD GLUCOSE      POCT Blood Glucose.: 163 mg/dL (28 Dec 2023 21:16)  POCT Blood Glucose.: 193 mg/dL (28 Dec 2023 16:59)  POCT Blood Glucose.: 153 mg/dL (28 Dec 2023 12:10)  POCT Blood Glucose.: 135 mg/dL (28 Dec 2023 08:41)        MICROBIOLOGY:     RADIOLOGY:  [ ] Reviewed and interpreted by me    Point of Care Ultrasound Findings:    PFT:    EKG:

## 2023-12-29 NOTE — PROGRESS NOTE ADULT - SUBJECTIVE AND OBJECTIVE BOX
***************************************************************  Blas Rubio, PGY1  Internal Medicine   Preferred contact via [employer] Teams   ***************************************************************    GLADIS LEONARD  83y  MRN: [id number]    Patient is a 83y old  Male who presents with a chief complaint of sob (28 Dec 2023 13:13)      Interval/Overnight Events: no events ON.     Subjective: Pt seen and examined at bedside. Denies fever, CP, SOB, abn pain, N/V, dysuria. Tolerating diet.      MEDICATIONS  (STANDING):  albuterol    0.083% 2.5 milliGRAM(s) Nebulizer every 6 hours  apixaban 2.5 milliGRAM(s) Oral two times a day  ascorbic acid 500 milliGRAM(s) Oral daily  atorvastatin 10 milliGRAM(s) Oral at bedtime  budesonide 160 MICROgram(s)/formoterol 4.5 MICROgram(s) Inhaler 2 Puff(s) Inhalation two times a day  chlorhexidine 4% Liquid 1 Application(s) Topical <User Schedule>  dextrose 5%. 1000 milliLiter(s) (50 mL/Hr) IV Continuous <Continuous>  dextrose 5%. 1000 milliLiter(s) (100 mL/Hr) IV Continuous <Continuous>  dextrose 50% Injectable 25 Gram(s) IV Push once  dextrose 50% Injectable 25 Gram(s) IV Push once  dextrose 50% Injectable 12.5 Gram(s) IV Push once  finasteride 5 milliGRAM(s) Oral daily  FIRST- Mouthwash  BLM 5 milliLiter(s) Swish and Spit every 6 hours  glucagon  Injectable 1 milliGRAM(s) IntraMuscular once  influenza  Vaccine (HIGH DOSE) 0.7 milliLiter(s) IntraMuscular once  insulin lispro (ADMELOG) corrective regimen sliding scale   SubCutaneous three times a day before meals  insulin lispro (ADMELOG) corrective regimen sliding scale   SubCutaneous at bedtime  iron sucrose IVPB 300 milliGRAM(s) IV Intermittent every 24 hours  metoprolol tartrate 25 milliGRAM(s) Oral two times a day  multivitamin 1 Tablet(s) Oral daily  nystatin    Suspension 548618 Unit(s) Oral four times a day  pantoprazole    Tablet 40 milliGRAM(s) Oral before breakfast  sodium bicarbonate 325 milliGRAM(s) Oral every 8 hours  tamsulosin 0.4 milliGRAM(s) Oral at bedtime    MEDICATIONS  (PRN):  acetaminophen     Tablet .. 650 milliGRAM(s) Oral every 6 hours PRN Temp greater or equal to 38C (100.4F), Mild Pain (1 - 3)  benzocaine/menthol Lozenge 1 Lozenge Oral three times a day PRN Sore Throat  dextrose Oral Gel 15 Gram(s) Oral once PRN Blood Glucose LESS THAN 70 milliGRAM(s)/deciliter      Objective:    Vitals: Vital Signs Last 24 Hrs  T(C): 36.3 (12-29-23 @ 04:44), Max: 36.3 (12-28-23 @ 22:08)  T(F): 97.4 (12-29-23 @ 04:44), Max: 97.4 (12-29-23 @ 04:44)  HR: 84 (12-29-23 @ 04:44) (80 - 86)  BP: 140/74 (12-29-23 @ 04:44) (129/70 - 140/74)  BP(mean): --  RR: 19 (12-29-23 @ 04:44) (19 - 19)  SpO2: 95% (12-29-23 @ 04:44) (93% - 96%)                I&O's Summary    28 Dec 2023 07:01  -  29 Dec 2023 07:00  --------------------------------------------------------  IN: 0 mL / OUT: 1900 mL / NET: -1900 mL        PHYSICAL EXAM:  GENERAL: NAD  HEAD:  Atraumatic, Normocephalic  EYES: EOMI, conjunctiva and sclera clear  CHEST/LUNG: Clear to auscultation bilaterally; No rales, rhonchi, wheezing, or rubs  HEART: Regular rate and rhythm; No murmurs, rubs, or gallops  ABDOMEN: Soft, Nontender, Nondistended;   SKIN: No rashes or lesions  NERVOUS SYSTEM:  Alert & Oriented X3, no focal deficits    LABS:                        8.2    16.29 )-----------( 659      ( 28 Dec 2023 04:37 )             26.1                         7.9    18.47 )-----------( 599      ( 27 Dec 2023 07:19 )             25.0                         8.1    18.21 )-----------( 632      ( 26 Dec 2023 14:43 )             25.8     12-28    139  |  100  |  71<H>  ----------------------------<  162<H>  3.6   |  29  |  2.13<H>  12-27    138  |  97  |  87<H>  ----------------------------<  149<H>  3.5   |  27  |  3.01<H>  12-26    137  |  96  |  93<H>  ----------------------------<  214<H>  3.9   |  27  |  3.59<H>    Ca    8.2<L>      28 Dec 2023 04:37  Ca    8.0<L>      27 Dec 2023 07:19  Ca    8.1<L>      26 Dec 2023 14:43  Phos  3.7     12-28  Mg     2.1     12-28    TPro  5.5<L>  /  Alb  2.7<L>  /  TBili  0.2  /  DBili  x   /  AST  24  /  ALT  58<H>  /  AlkPhos  100  12-28  TPro  5.5<L>  /  Alb  2.7<L>  /  TBili  0.2  /  DBili  x   /  AST  32  /  ALT  70<H>  /  AlkPhos  113  12-27  TPro  5.9<L>  /  Alb  2.7<L>  /  TBili  0.3  /  DBili  x   /  AST  36  /  ALT  77<H>  /  AlkPhos  104  12-26    CAPILLARY BLOOD GLUCOSE      POCT Blood Glucose.: 163 mg/dL (28 Dec 2023 21:16)  POCT Blood Glucose.: 193 mg/dL (28 Dec 2023 16:59)  POCT Blood Glucose.: 153 mg/dL (28 Dec 2023 12:10)  POCT Blood Glucose.: 135 mg/dL (28 Dec 2023 08:41)    PTT - ( 28 Dec 2023 11:25 )  PTT:67.0 sec    Urinalysis Basic - ( 28 Dec 2023 04:37 )    Color: x / Appearance: x / SG: x / pH: x  Gluc: 162 mg/dL / Ketone: x  / Bili: x / Urobili: x   Blood: x / Protein: x / Nitrite: x   Leuk Esterase: x / RBC: x / WBC x   Sq Epi: x / Non Sq Epi: x / Bacteria: x          RADIOLOGY & ADDITIONAL TESTS:    Imaging Personally Reviewed:  [x ] YES  [ ] NO    Consultants involved in case:   Consultant(s) Notes Reviewed:  [ x] YES  [ ] NO:   Care Discussed with Consultants/Other Providers [x ] YES  [ ] NO         ***************************************************************  Blas Rubio, PGY1  Internal Medicine   Preferred contact via [employer] Teams   ***************************************************************    GLADIS LEONARD  83y  MRN: [id number]    Patient is a 83y old  Male who presents with a chief complaint of sob (28 Dec 2023 13:13)      Interval/Overnight Events: no events ON.     Subjective: Pt seen and examined at bedside. Denies fever, CP, SOB, abn pain, N/V, dysuria. Tolerating diet.      MEDICATIONS  (STANDING):  albuterol    0.083% 2.5 milliGRAM(s) Nebulizer every 6 hours  apixaban 2.5 milliGRAM(s) Oral two times a day  ascorbic acid 500 milliGRAM(s) Oral daily  atorvastatin 10 milliGRAM(s) Oral at bedtime  budesonide 160 MICROgram(s)/formoterol 4.5 MICROgram(s) Inhaler 2 Puff(s) Inhalation two times a day  chlorhexidine 4% Liquid 1 Application(s) Topical <User Schedule>  dextrose 5%. 1000 milliLiter(s) (50 mL/Hr) IV Continuous <Continuous>  dextrose 5%. 1000 milliLiter(s) (100 mL/Hr) IV Continuous <Continuous>  dextrose 50% Injectable 25 Gram(s) IV Push once  dextrose 50% Injectable 25 Gram(s) IV Push once  dextrose 50% Injectable 12.5 Gram(s) IV Push once  finasteride 5 milliGRAM(s) Oral daily  FIRST- Mouthwash  BLM 5 milliLiter(s) Swish and Spit every 6 hours  glucagon  Injectable 1 milliGRAM(s) IntraMuscular once  influenza  Vaccine (HIGH DOSE) 0.7 milliLiter(s) IntraMuscular once  insulin lispro (ADMELOG) corrective regimen sliding scale   SubCutaneous three times a day before meals  insulin lispro (ADMELOG) corrective regimen sliding scale   SubCutaneous at bedtime  iron sucrose IVPB 300 milliGRAM(s) IV Intermittent every 24 hours  metoprolol tartrate 25 milliGRAM(s) Oral two times a day  multivitamin 1 Tablet(s) Oral daily  nystatin    Suspension 299474 Unit(s) Oral four times a day  pantoprazole    Tablet 40 milliGRAM(s) Oral before breakfast  sodium bicarbonate 325 milliGRAM(s) Oral every 8 hours  tamsulosin 0.4 milliGRAM(s) Oral at bedtime    MEDICATIONS  (PRN):  acetaminophen     Tablet .. 650 milliGRAM(s) Oral every 6 hours PRN Temp greater or equal to 38C (100.4F), Mild Pain (1 - 3)  benzocaine/menthol Lozenge 1 Lozenge Oral three times a day PRN Sore Throat  dextrose Oral Gel 15 Gram(s) Oral once PRN Blood Glucose LESS THAN 70 milliGRAM(s)/deciliter      Objective:    Vitals: Vital Signs Last 24 Hrs  T(C): 36.3 (12-29-23 @ 04:44), Max: 36.3 (12-28-23 @ 22:08)  T(F): 97.4 (12-29-23 @ 04:44), Max: 97.4 (12-29-23 @ 04:44)  HR: 84 (12-29-23 @ 04:44) (80 - 86)  BP: 140/74 (12-29-23 @ 04:44) (129/70 - 140/74)  BP(mean): --  RR: 19 (12-29-23 @ 04:44) (19 - 19)  SpO2: 95% (12-29-23 @ 04:44) (93% - 96%)                I&O's Summary    28 Dec 2023 07:01  -  29 Dec 2023 07:00  --------------------------------------------------------  IN: 0 mL / OUT: 1900 mL / NET: -1900 mL        PHYSICAL EXAM:  GENERAL: NAD  HEAD:  Atraumatic, Normocephalic  EYES: EOMI, conjunctiva and sclera clear  CHEST/LUNG: Clear to auscultation bilaterally; No rales, rhonchi, wheezing, or rubs  HEART: Regular rate and rhythm; No murmurs, rubs, or gallops  ABDOMEN: Soft, Nontender, Nondistended;   SKIN: No rashes or lesions  NERVOUS SYSTEM:  Alert & Oriented X3, no focal deficits    LABS:                        8.2    16.29 )-----------( 659      ( 28 Dec 2023 04:37 )             26.1                         7.9    18.47 )-----------( 599      ( 27 Dec 2023 07:19 )             25.0                         8.1    18.21 )-----------( 632      ( 26 Dec 2023 14:43 )             25.8     12-28    139  |  100  |  71<H>  ----------------------------<  162<H>  3.6   |  29  |  2.13<H>  12-27    138  |  97  |  87<H>  ----------------------------<  149<H>  3.5   |  27  |  3.01<H>  12-26    137  |  96  |  93<H>  ----------------------------<  214<H>  3.9   |  27  |  3.59<H>    Ca    8.2<L>      28 Dec 2023 04:37  Ca    8.0<L>      27 Dec 2023 07:19  Ca    8.1<L>      26 Dec 2023 14:43  Phos  3.7     12-28  Mg     2.1     12-28    TPro  5.5<L>  /  Alb  2.7<L>  /  TBili  0.2  /  DBili  x   /  AST  24  /  ALT  58<H>  /  AlkPhos  100  12-28  TPro  5.5<L>  /  Alb  2.7<L>  /  TBili  0.2  /  DBili  x   /  AST  32  /  ALT  70<H>  /  AlkPhos  113  12-27  TPro  5.9<L>  /  Alb  2.7<L>  /  TBili  0.3  /  DBili  x   /  AST  36  /  ALT  77<H>  /  AlkPhos  104  12-26    CAPILLARY BLOOD GLUCOSE      POCT Blood Glucose.: 163 mg/dL (28 Dec 2023 21:16)  POCT Blood Glucose.: 193 mg/dL (28 Dec 2023 16:59)  POCT Blood Glucose.: 153 mg/dL (28 Dec 2023 12:10)  POCT Blood Glucose.: 135 mg/dL (28 Dec 2023 08:41)    PTT - ( 28 Dec 2023 11:25 )  PTT:67.0 sec    Urinalysis Basic - ( 28 Dec 2023 04:37 )    Color: x / Appearance: x / SG: x / pH: x  Gluc: 162 mg/dL / Ketone: x  / Bili: x / Urobili: x   Blood: x / Protein: x / Nitrite: x   Leuk Esterase: x / RBC: x / WBC x   Sq Epi: x / Non Sq Epi: x / Bacteria: x          RADIOLOGY & ADDITIONAL TESTS:    Imaging Personally Reviewed:  [x ] YES  [ ] NO    Consultants involved in case:   Consultant(s) Notes Reviewed:  [ x] YES  [ ] NO:   Care Discussed with Consultants/Other Providers [x ] YES  [ ] NO         ***************************************************************  Blas Rubio, PGY1  Internal Medicine   Preferred contact via [employer] Teams   ***************************************************************    GLADIS LEONARD  83y  MRN: [id number]    Patient is a 83y old  Male who presents with a chief complaint of sob (28 Dec 2023 13:13)      Interval/Overnight Events: no events ON.     Subjective: Pt seen and examined at bedside. Pt wheezing and coughing.   MEDICATIONS  (STANDING):  albuterol    0.083% 2.5 milliGRAM(s) Nebulizer every 6 hours  apixaban 2.5 milliGRAM(s) Oral two times a day  ascorbic acid 500 milliGRAM(s) Oral daily  atorvastatin 10 milliGRAM(s) Oral at bedtime  budesonide 160 MICROgram(s)/formoterol 4.5 MICROgram(s) Inhaler 2 Puff(s) Inhalation two times a day  chlorhexidine 4% Liquid 1 Application(s) Topical <User Schedule>  dextrose 5%. 1000 milliLiter(s) (50 mL/Hr) IV Continuous <Continuous>  dextrose 5%. 1000 milliLiter(s) (100 mL/Hr) IV Continuous <Continuous>  dextrose 50% Injectable 25 Gram(s) IV Push once  dextrose 50% Injectable 25 Gram(s) IV Push once  dextrose 50% Injectable 12.5 Gram(s) IV Push once  finasteride 5 milliGRAM(s) Oral daily  FIRST- Mouthwash  BLM 5 milliLiter(s) Swish and Spit every 6 hours  glucagon  Injectable 1 milliGRAM(s) IntraMuscular once  influenza  Vaccine (HIGH DOSE) 0.7 milliLiter(s) IntraMuscular once  insulin lispro (ADMELOG) corrective regimen sliding scale   SubCutaneous three times a day before meals  insulin lispro (ADMELOG) corrective regimen sliding scale   SubCutaneous at bedtime  iron sucrose IVPB 300 milliGRAM(s) IV Intermittent every 24 hours  metoprolol tartrate 25 milliGRAM(s) Oral two times a day  multivitamin 1 Tablet(s) Oral daily  nystatin    Suspension 200417 Unit(s) Oral four times a day  pantoprazole    Tablet 40 milliGRAM(s) Oral before breakfast  sodium bicarbonate 325 milliGRAM(s) Oral every 8 hours  tamsulosin 0.4 milliGRAM(s) Oral at bedtime    MEDICATIONS  (PRN):  acetaminophen     Tablet .. 650 milliGRAM(s) Oral every 6 hours PRN Temp greater or equal to 38C (100.4F), Mild Pain (1 - 3)  benzocaine/menthol Lozenge 1 Lozenge Oral three times a day PRN Sore Throat  dextrose Oral Gel 15 Gram(s) Oral once PRN Blood Glucose LESS THAN 70 milliGRAM(s)/deciliter      Objective:    Vitals: Vital Signs Last 24 Hrs  T(C): 36.3 (12-29-23 @ 04:44), Max: 36.3 (12-28-23 @ 22:08)  T(F): 97.4 (12-29-23 @ 04:44), Max: 97.4 (12-29-23 @ 04:44)  HR: 84 (12-29-23 @ 04:44) (80 - 86)  BP: 140/74 (12-29-23 @ 04:44) (129/70 - 140/74)  BP(mean): --  RR: 19 (12-29-23 @ 04:44) (19 - 19)  SpO2: 95% (12-29-23 @ 04:44) (93% - 96%)                I&O's Summary    28 Dec 2023 07:01  -  29 Dec 2023 07:00  --------------------------------------------------------  IN: 0 mL / OUT: 1900 mL / NET: -1900 mL        PHYSICAL EXAM:  GENERAL: NAD  HEAD:  Atraumatic, Normocephalic  EYES: EOMI, conjunctiva and sclera clear  CHEST/LUNG: wheezing  HEART: Regular rate and rhythm; No murmurs, rubs, or gallops  ABDOMEN: Soft, Nontender, Nondistended;   SKIN: 1+ BLE   NERVOUS SYSTEM:  Alert & Oriented X3, no focal deficits    LABS:                        8.2    16.29 )-----------( 659      ( 28 Dec 2023 04:37 )             26.1                         7.9    18.47 )-----------( 599      ( 27 Dec 2023 07:19 )             25.0                         8.1    18.21 )-----------( 632      ( 26 Dec 2023 14:43 )             25.8     12-28    139  |  100  |  71<H>  ----------------------------<  162<H>  3.6   |  29  |  2.13<H>  12-27    138  |  97  |  87<H>  ----------------------------<  149<H>  3.5   |  27  |  3.01<H>  12-26    137  |  96  |  93<H>  ----------------------------<  214<H>  3.9   |  27  |  3.59<H>    Ca    8.2<L>      28 Dec 2023 04:37  Ca    8.0<L>      27 Dec 2023 07:19  Ca    8.1<L>      26 Dec 2023 14:43  Phos  3.7     12-28  Mg     2.1     12-28    TPro  5.5<L>  /  Alb  2.7<L>  /  TBili  0.2  /  DBili  x   /  AST  24  /  ALT  58<H>  /  AlkPhos  100  12-28  TPro  5.5<L>  /  Alb  2.7<L>  /  TBili  0.2  /  DBili  x   /  AST  32  /  ALT  70<H>  /  AlkPhos  113  12-27  TPro  5.9<L>  /  Alb  2.7<L>  /  TBili  0.3  /  DBili  x   /  AST  36  /  ALT  77<H>  /  AlkPhos  104  12-26    CAPILLARY BLOOD GLUCOSE      POCT Blood Glucose.: 163 mg/dL (28 Dec 2023 21:16)  POCT Blood Glucose.: 193 mg/dL (28 Dec 2023 16:59)  POCT Blood Glucose.: 153 mg/dL (28 Dec 2023 12:10)  POCT Blood Glucose.: 135 mg/dL (28 Dec 2023 08:41)    PTT - ( 28 Dec 2023 11:25 )  PTT:67.0 sec    Urinalysis Basic - ( 28 Dec 2023 04:37 )    Color: x / Appearance: x / SG: x / pH: x  Gluc: 162 mg/dL / Ketone: x  / Bili: x / Urobili: x   Blood: x / Protein: x / Nitrite: x   Leuk Esterase: x / RBC: x / WBC x   Sq Epi: x / Non Sq Epi: x / Bacteria: x          RADIOLOGY & ADDITIONAL TESTS:    Imaging Personally Reviewed:  [x ] YES  [ ] NO    Consultants involved in case:   Consultant(s) Notes Reviewed:  [ x] YES  [ ] NO:   Care Discussed with Consultants/Other Providers [x ] YES  [ ] NO         ***************************************************************  Blas Rubio, PGY1  Internal Medicine   Preferred contact via [employer] Teams   ***************************************************************    GLADIS LEONARD  83y  MRN: [id number]    Patient is a 83y old  Male who presents with a chief complaint of sob (28 Dec 2023 13:13)      Interval/Overnight Events: no events ON.     Subjective: Pt seen and examined at bedside. Pt wheezing and coughing.   MEDICATIONS  (STANDING):  albuterol    0.083% 2.5 milliGRAM(s) Nebulizer every 6 hours  apixaban 2.5 milliGRAM(s) Oral two times a day  ascorbic acid 500 milliGRAM(s) Oral daily  atorvastatin 10 milliGRAM(s) Oral at bedtime  budesonide 160 MICROgram(s)/formoterol 4.5 MICROgram(s) Inhaler 2 Puff(s) Inhalation two times a day  chlorhexidine 4% Liquid 1 Application(s) Topical <User Schedule>  dextrose 5%. 1000 milliLiter(s) (50 mL/Hr) IV Continuous <Continuous>  dextrose 5%. 1000 milliLiter(s) (100 mL/Hr) IV Continuous <Continuous>  dextrose 50% Injectable 25 Gram(s) IV Push once  dextrose 50% Injectable 25 Gram(s) IV Push once  dextrose 50% Injectable 12.5 Gram(s) IV Push once  finasteride 5 milliGRAM(s) Oral daily  FIRST- Mouthwash  BLM 5 milliLiter(s) Swish and Spit every 6 hours  glucagon  Injectable 1 milliGRAM(s) IntraMuscular once  influenza  Vaccine (HIGH DOSE) 0.7 milliLiter(s) IntraMuscular once  insulin lispro (ADMELOG) corrective regimen sliding scale   SubCutaneous three times a day before meals  insulin lispro (ADMELOG) corrective regimen sliding scale   SubCutaneous at bedtime  iron sucrose IVPB 300 milliGRAM(s) IV Intermittent every 24 hours  metoprolol tartrate 25 milliGRAM(s) Oral two times a day  multivitamin 1 Tablet(s) Oral daily  nystatin    Suspension 912039 Unit(s) Oral four times a day  pantoprazole    Tablet 40 milliGRAM(s) Oral before breakfast  sodium bicarbonate 325 milliGRAM(s) Oral every 8 hours  tamsulosin 0.4 milliGRAM(s) Oral at bedtime    MEDICATIONS  (PRN):  acetaminophen     Tablet .. 650 milliGRAM(s) Oral every 6 hours PRN Temp greater or equal to 38C (100.4F), Mild Pain (1 - 3)  benzocaine/menthol Lozenge 1 Lozenge Oral three times a day PRN Sore Throat  dextrose Oral Gel 15 Gram(s) Oral once PRN Blood Glucose LESS THAN 70 milliGRAM(s)/deciliter      Objective:    Vitals: Vital Signs Last 24 Hrs  T(C): 36.3 (12-29-23 @ 04:44), Max: 36.3 (12-28-23 @ 22:08)  T(F): 97.4 (12-29-23 @ 04:44), Max: 97.4 (12-29-23 @ 04:44)  HR: 84 (12-29-23 @ 04:44) (80 - 86)  BP: 140/74 (12-29-23 @ 04:44) (129/70 - 140/74)  BP(mean): --  RR: 19 (12-29-23 @ 04:44) (19 - 19)  SpO2: 95% (12-29-23 @ 04:44) (93% - 96%)                I&O's Summary    28 Dec 2023 07:01  -  29 Dec 2023 07:00  --------------------------------------------------------  IN: 0 mL / OUT: 1900 mL / NET: -1900 mL        PHYSICAL EXAM:  GENERAL: NAD  HEAD:  Atraumatic, Normocephalic  EYES: EOMI, conjunctiva and sclera clear  CHEST/LUNG: wheezing  HEART: Regular rate and rhythm; No murmurs, rubs, or gallops  ABDOMEN: Soft, Nontender, Nondistended;   SKIN: 1+ BLE   NERVOUS SYSTEM:  Alert & Oriented X3, no focal deficits    LABS:                        8.2    16.29 )-----------( 659      ( 28 Dec 2023 04:37 )             26.1                         7.9    18.47 )-----------( 599      ( 27 Dec 2023 07:19 )             25.0                         8.1    18.21 )-----------( 632      ( 26 Dec 2023 14:43 )             25.8     12-28    139  |  100  |  71<H>  ----------------------------<  162<H>  3.6   |  29  |  2.13<H>  12-27    138  |  97  |  87<H>  ----------------------------<  149<H>  3.5   |  27  |  3.01<H>  12-26    137  |  96  |  93<H>  ----------------------------<  214<H>  3.9   |  27  |  3.59<H>    Ca    8.2<L>      28 Dec 2023 04:37  Ca    8.0<L>      27 Dec 2023 07:19  Ca    8.1<L>      26 Dec 2023 14:43  Phos  3.7     12-28  Mg     2.1     12-28    TPro  5.5<L>  /  Alb  2.7<L>  /  TBili  0.2  /  DBili  x   /  AST  24  /  ALT  58<H>  /  AlkPhos  100  12-28  TPro  5.5<L>  /  Alb  2.7<L>  /  TBili  0.2  /  DBili  x   /  AST  32  /  ALT  70<H>  /  AlkPhos  113  12-27  TPro  5.9<L>  /  Alb  2.7<L>  /  TBili  0.3  /  DBili  x   /  AST  36  /  ALT  77<H>  /  AlkPhos  104  12-26    CAPILLARY BLOOD GLUCOSE      POCT Blood Glucose.: 163 mg/dL (28 Dec 2023 21:16)  POCT Blood Glucose.: 193 mg/dL (28 Dec 2023 16:59)  POCT Blood Glucose.: 153 mg/dL (28 Dec 2023 12:10)  POCT Blood Glucose.: 135 mg/dL (28 Dec 2023 08:41)    PTT - ( 28 Dec 2023 11:25 )  PTT:67.0 sec    Urinalysis Basic - ( 28 Dec 2023 04:37 )    Color: x / Appearance: x / SG: x / pH: x  Gluc: 162 mg/dL / Ketone: x  / Bili: x / Urobili: x   Blood: x / Protein: x / Nitrite: x   Leuk Esterase: x / RBC: x / WBC x   Sq Epi: x / Non Sq Epi: x / Bacteria: x          RADIOLOGY & ADDITIONAL TESTS:    Imaging Personally Reviewed:  [x ] YES  [ ] NO    Consultants involved in case:   Consultant(s) Notes Reviewed:  [ x] YES  [ ] NO:   Care Discussed with Consultants/Other Providers [x ] YES  [ ] NO

## 2023-12-29 NOTE — PROGRESS NOTE ADULT - ATTENDING COMMENTS
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     New congestion, sore throat and coughing today    1. Acute Hypoxic Respiratory Failure 2/2 s/p thoracentesis CAP and component of HFpEF, entero/rhino virus +, Strep pneumo bacteremia. COPD exacerbation: started ceftriaxone 2g q24, s/p azithro 500 q24. Taper steroids per pulms recs.  Thrombocytosis likely 2/2 to infection. Will send home with Cefpodoxime for 3 weeks. With new symptoms with get FLU COVID and RSV tests- before this leokocytosis was down trending- appreciate ID recs  2. TTE systolic function is hyperdynamic with an ejection fraction of 80 %. Bumex DC'd   3. Afib on Heparin gtt now transitioned to eliquis adjusted dose    4. Thrombocytosis/ anemia: likely reactive plt count, JAMAL anemia/ hx of colon ca- possible slow bleed. Venofer started   5. Oral lesions: ID has seen prescribed FIRST mouthwash      Team to discuss with family

## 2023-12-29 NOTE — PROGRESS NOTE ADULT - ATTENDING COMMENTS
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   Also with contribution of decompensated heart failure.   Would continue antibiotics and supportive care for pneumonia as described above. With worsening wheezing on exam, would increase steroid dose and prolong taper for COPD exacerbation.   He appears clinically overloaded on exam and would continue to diurese to net negative until euvolemic.

## 2023-12-29 NOTE — PROGRESS NOTE ADULT - PROBLEM SELECTOR PLAN 2
Baseline BNP ~ 2800. BNP ~ 2800 this admission  In chronic exacerbation state with 3+ pitting edema and on diuresis  HFpEF or HFrEF  EF = 68% on 8/11/23  Heart failure, CHF order set initiated  Guideline directed medical therapy as below: after med-rec completed  TTE systolic function is hyperdynamic with an ejection fraction of 80 %  - Bumex 2 mg IV BID and PRN -> Hold diuresis-> Will consider resuming with increasing LE edema and crackles   - Diuretic: on bumex PO 2 mg am 1 mg pm at home.   - BB:  resume home metoprolol tatrate 25 mg BID.  - ARNI/ACE-I/ARB: Consider starting at d/c  - Hydralazine/Nitrate: Not indicated at this time  - MRA: Not indicated at this time  - SGLT2: Consider starting at d/c  - Maintain K > 4, Mg > 2

## 2023-12-30 LAB
ALBUMIN SERPL ELPH-MCNC: 2.4 G/DL — LOW (ref 3.3–5)
ALBUMIN SERPL ELPH-MCNC: 2.4 G/DL — LOW (ref 3.3–5)
ALP SERPL-CCNC: 87 U/L — SIGNIFICANT CHANGE UP (ref 40–120)
ALP SERPL-CCNC: 87 U/L — SIGNIFICANT CHANGE UP (ref 40–120)
ALT FLD-CCNC: 37 U/L — SIGNIFICANT CHANGE UP (ref 10–45)
ALT FLD-CCNC: 37 U/L — SIGNIFICANT CHANGE UP (ref 10–45)
ANION GAP SERPL CALC-SCNC: 10 MMOL/L — SIGNIFICANT CHANGE UP (ref 5–17)
ANION GAP SERPL CALC-SCNC: 10 MMOL/L — SIGNIFICANT CHANGE UP (ref 5–17)
AST SERPL-CCNC: 14 U/L — SIGNIFICANT CHANGE UP (ref 10–40)
AST SERPL-CCNC: 14 U/L — SIGNIFICANT CHANGE UP (ref 10–40)
BASOPHILS # BLD AUTO: 0.02 K/UL — SIGNIFICANT CHANGE UP (ref 0–0.2)
BASOPHILS # BLD AUTO: 0.02 K/UL — SIGNIFICANT CHANGE UP (ref 0–0.2)
BASOPHILS NFR BLD AUTO: 0.1 % — SIGNIFICANT CHANGE UP (ref 0–2)
BASOPHILS NFR BLD AUTO: 0.1 % — SIGNIFICANT CHANGE UP (ref 0–2)
BILIRUB SERPL-MCNC: 0.3 MG/DL — SIGNIFICANT CHANGE UP (ref 0.2–1.2)
BILIRUB SERPL-MCNC: 0.3 MG/DL — SIGNIFICANT CHANGE UP (ref 0.2–1.2)
BUN SERPL-MCNC: 42 MG/DL — HIGH (ref 7–23)
BUN SERPL-MCNC: 42 MG/DL — HIGH (ref 7–23)
CALCIUM SERPL-MCNC: 8.9 MG/DL — SIGNIFICANT CHANGE UP (ref 8.4–10.5)
CALCIUM SERPL-MCNC: 8.9 MG/DL — SIGNIFICANT CHANGE UP (ref 8.4–10.5)
CHLORIDE SERPL-SCNC: 99 MMOL/L — SIGNIFICANT CHANGE UP (ref 96–108)
CHLORIDE SERPL-SCNC: 99 MMOL/L — SIGNIFICANT CHANGE UP (ref 96–108)
CO2 SERPL-SCNC: 28 MMOL/L — SIGNIFICANT CHANGE UP (ref 22–31)
CO2 SERPL-SCNC: 28 MMOL/L — SIGNIFICANT CHANGE UP (ref 22–31)
CREAT SERPL-MCNC: 1.44 MG/DL — HIGH (ref 0.5–1.3)
CREAT SERPL-MCNC: 1.44 MG/DL — HIGH (ref 0.5–1.3)
EGFR: 48 ML/MIN/1.73M2 — LOW
EGFR: 48 ML/MIN/1.73M2 — LOW
EOSINOPHIL # BLD AUTO: 0 K/UL — SIGNIFICANT CHANGE UP (ref 0–0.5)
EOSINOPHIL # BLD AUTO: 0 K/UL — SIGNIFICANT CHANGE UP (ref 0–0.5)
EOSINOPHIL NFR BLD AUTO: 0 % — SIGNIFICANT CHANGE UP (ref 0–6)
EOSINOPHIL NFR BLD AUTO: 0 % — SIGNIFICANT CHANGE UP (ref 0–6)
GLUCOSE BLDC GLUCOMTR-MCNC: 145 MG/DL — HIGH (ref 70–99)
GLUCOSE BLDC GLUCOMTR-MCNC: 145 MG/DL — HIGH (ref 70–99)
GLUCOSE BLDC GLUCOMTR-MCNC: 154 MG/DL — HIGH (ref 70–99)
GLUCOSE BLDC GLUCOMTR-MCNC: 154 MG/DL — HIGH (ref 70–99)
GLUCOSE BLDC GLUCOMTR-MCNC: 182 MG/DL — HIGH (ref 70–99)
GLUCOSE BLDC GLUCOMTR-MCNC: 182 MG/DL — HIGH (ref 70–99)
GLUCOSE BLDC GLUCOMTR-MCNC: 195 MG/DL — HIGH (ref 70–99)
GLUCOSE BLDC GLUCOMTR-MCNC: 195 MG/DL — HIGH (ref 70–99)
GLUCOSE SERPL-MCNC: 135 MG/DL — HIGH (ref 70–99)
GLUCOSE SERPL-MCNC: 135 MG/DL — HIGH (ref 70–99)
HCT VFR BLD CALC: 29.7 % — LOW (ref 39–50)
HCT VFR BLD CALC: 29.7 % — LOW (ref 39–50)
HGB BLD-MCNC: 8.7 G/DL — LOW (ref 13–17)
HGB BLD-MCNC: 8.7 G/DL — LOW (ref 13–17)
IMM GRANULOCYTES NFR BLD AUTO: 1.2 % — HIGH (ref 0–0.9)
IMM GRANULOCYTES NFR BLD AUTO: 1.2 % — HIGH (ref 0–0.9)
LYMPHOCYTES # BLD AUTO: 0.94 K/UL — LOW (ref 1–3.3)
LYMPHOCYTES # BLD AUTO: 0.94 K/UL — LOW (ref 1–3.3)
LYMPHOCYTES # BLD AUTO: 5.6 % — LOW (ref 13–44)
LYMPHOCYTES # BLD AUTO: 5.6 % — LOW (ref 13–44)
MAGNESIUM SERPL-MCNC: 2.1 MG/DL — SIGNIFICANT CHANGE UP (ref 1.6–2.6)
MAGNESIUM SERPL-MCNC: 2.1 MG/DL — SIGNIFICANT CHANGE UP (ref 1.6–2.6)
MCHC RBC-ENTMCNC: 21.1 PG — LOW (ref 27–34)
MCHC RBC-ENTMCNC: 21.1 PG — LOW (ref 27–34)
MCHC RBC-ENTMCNC: 29.3 GM/DL — LOW (ref 32–36)
MCHC RBC-ENTMCNC: 29.3 GM/DL — LOW (ref 32–36)
MCV RBC AUTO: 71.9 FL — LOW (ref 80–100)
MCV RBC AUTO: 71.9 FL — LOW (ref 80–100)
MONOCYTES # BLD AUTO: 1.03 K/UL — HIGH (ref 0–0.9)
MONOCYTES # BLD AUTO: 1.03 K/UL — HIGH (ref 0–0.9)
MONOCYTES NFR BLD AUTO: 6.1 % — SIGNIFICANT CHANGE UP (ref 2–14)
MONOCYTES NFR BLD AUTO: 6.1 % — SIGNIFICANT CHANGE UP (ref 2–14)
NEUTROPHILS # BLD AUTO: 14.56 K/UL — HIGH (ref 1.8–7.4)
NEUTROPHILS # BLD AUTO: 14.56 K/UL — HIGH (ref 1.8–7.4)
NEUTROPHILS NFR BLD AUTO: 87 % — HIGH (ref 43–77)
NEUTROPHILS NFR BLD AUTO: 87 % — HIGH (ref 43–77)
NRBC # BLD: 0 /100 WBCS — SIGNIFICANT CHANGE UP (ref 0–0)
NRBC # BLD: 0 /100 WBCS — SIGNIFICANT CHANGE UP (ref 0–0)
PHOSPHATE SERPL-MCNC: 3 MG/DL — SIGNIFICANT CHANGE UP (ref 2.5–4.5)
PHOSPHATE SERPL-MCNC: 3 MG/DL — SIGNIFICANT CHANGE UP (ref 2.5–4.5)
PLATELET # BLD AUTO: 543 K/UL — HIGH (ref 150–400)
PLATELET # BLD AUTO: 543 K/UL — HIGH (ref 150–400)
POTASSIUM SERPL-MCNC: 4.1 MMOL/L — SIGNIFICANT CHANGE UP (ref 3.5–5.3)
POTASSIUM SERPL-MCNC: 4.1 MMOL/L — SIGNIFICANT CHANGE UP (ref 3.5–5.3)
POTASSIUM SERPL-SCNC: 4.1 MMOL/L — SIGNIFICANT CHANGE UP (ref 3.5–5.3)
POTASSIUM SERPL-SCNC: 4.1 MMOL/L — SIGNIFICANT CHANGE UP (ref 3.5–5.3)
PROT SERPL-MCNC: 5.8 G/DL — LOW (ref 6–8.3)
PROT SERPL-MCNC: 5.8 G/DL — LOW (ref 6–8.3)
RBC # BLD: 4.13 M/UL — LOW (ref 4.2–5.8)
RBC # BLD: 4.13 M/UL — LOW (ref 4.2–5.8)
RBC # FLD: 21.3 % — HIGH (ref 10.3–14.5)
RBC # FLD: 21.3 % — HIGH (ref 10.3–14.5)
SODIUM SERPL-SCNC: 137 MMOL/L — SIGNIFICANT CHANGE UP (ref 135–145)
SODIUM SERPL-SCNC: 137 MMOL/L — SIGNIFICANT CHANGE UP (ref 135–145)
WBC # BLD: 16.75 K/UL — HIGH (ref 3.8–10.5)
WBC # BLD: 16.75 K/UL — HIGH (ref 3.8–10.5)
WBC # FLD AUTO: 16.75 K/UL — HIGH (ref 3.8–10.5)
WBC # FLD AUTO: 16.75 K/UL — HIGH (ref 3.8–10.5)

## 2023-12-30 PROCEDURE — 99232 SBSQ HOSP IP/OBS MODERATE 35: CPT | Mod: GC

## 2023-12-30 RX ORDER — PANTOPRAZOLE SODIUM 20 MG/1
40 TABLET, DELAYED RELEASE ORAL DAILY
Refills: 0 | Status: DISCONTINUED | OUTPATIENT
Start: 2023-12-30 | End: 2024-01-02

## 2023-12-30 RX ADMIN — Medication 325 MILLIGRAM(S): at 05:33

## 2023-12-30 RX ADMIN — Medication 25 MILLIGRAM(S): at 05:33

## 2023-12-30 RX ADMIN — DIPHENHYDRAMINE HYDROCHLORIDE AND LIDOCAINE HYDROCHLORIDE AND ALUMINUM HYDROXIDE AND MAGNESIUM HYDRO 5 MILLILITER(S): KIT at 12:02

## 2023-12-30 RX ADMIN — CHLORHEXIDINE GLUCONATE 1 APPLICATION(S): 213 SOLUTION TOPICAL at 05:34

## 2023-12-30 RX ADMIN — Medication 600 MILLIGRAM(S): at 05:33

## 2023-12-30 RX ADMIN — BUDESONIDE AND FORMOTEROL FUMARATE DIHYDRATE 2 PUFF(S): 160; 4.5 AEROSOL RESPIRATORY (INHALATION) at 05:34

## 2023-12-30 RX ADMIN — Medication 500000 UNIT(S): at 05:32

## 2023-12-30 RX ADMIN — Medication 500000 UNIT(S): at 23:08

## 2023-12-30 RX ADMIN — FINASTERIDE 5 MILLIGRAM(S): 5 TABLET, FILM COATED ORAL at 12:01

## 2023-12-30 RX ADMIN — ALBUTEROL 2.5 MILLIGRAM(S): 90 AEROSOL, METERED ORAL at 17:35

## 2023-12-30 RX ADMIN — BENZOCAINE AND MENTHOL 1 LOZENGE: 5; 1 LIQUID ORAL at 05:34

## 2023-12-30 RX ADMIN — Medication 1: at 18:11

## 2023-12-30 RX ADMIN — DIPHENHYDRAMINE HYDROCHLORIDE AND LIDOCAINE HYDROCHLORIDE AND ALUMINUM HYDROXIDE AND MAGNESIUM HYDRO 5 MILLILITER(S): KIT at 17:35

## 2023-12-30 RX ADMIN — DIPHENHYDRAMINE HYDROCHLORIDE AND LIDOCAINE HYDROCHLORIDE AND ALUMINUM HYDROXIDE AND MAGNESIUM HYDRO 5 MILLILITER(S): KIT at 23:08

## 2023-12-30 RX ADMIN — Medication 1 TABLET(S): at 12:01

## 2023-12-30 RX ADMIN — ALBUTEROL 2.5 MILLIGRAM(S): 90 AEROSOL, METERED ORAL at 23:08

## 2023-12-30 RX ADMIN — PANTOPRAZOLE SODIUM 40 MILLIGRAM(S): 20 TABLET, DELAYED RELEASE ORAL at 12:02

## 2023-12-30 RX ADMIN — Medication 100 MILLIGRAM(S): at 21:07

## 2023-12-30 RX ADMIN — IRON SUCROSE 176.67 MILLIGRAM(S): 20 INJECTION, SOLUTION INTRAVENOUS at 14:03

## 2023-12-30 RX ADMIN — DIPHENHYDRAMINE HYDROCHLORIDE AND LIDOCAINE HYDROCHLORIDE AND ALUMINUM HYDROXIDE AND MAGNESIUM HYDRO 5 MILLILITER(S): KIT at 05:32

## 2023-12-30 RX ADMIN — APIXABAN 2.5 MILLIGRAM(S): 2.5 TABLET, FILM COATED ORAL at 05:34

## 2023-12-30 RX ADMIN — ALBUTEROL 2.5 MILLIGRAM(S): 90 AEROSOL, METERED ORAL at 05:32

## 2023-12-30 RX ADMIN — Medication 1: at 08:58

## 2023-12-30 RX ADMIN — Medication 25 MILLIGRAM(S): at 17:36

## 2023-12-30 RX ADMIN — Medication 325 MILLIGRAM(S): at 14:02

## 2023-12-30 RX ADMIN — Medication 325 MILLIGRAM(S): at 21:07

## 2023-12-30 RX ADMIN — Medication 600 MILLIGRAM(S): at 17:36

## 2023-12-30 RX ADMIN — Medication 500 MILLIGRAM(S): at 12:02

## 2023-12-30 RX ADMIN — CEFTRIAXONE 100 MILLIGRAM(S): 500 INJECTION, POWDER, FOR SOLUTION INTRAMUSCULAR; INTRAVENOUS at 12:01

## 2023-12-30 RX ADMIN — Medication 100 MILLIGRAM(S): at 05:33

## 2023-12-30 RX ADMIN — Medication 40 MILLIGRAM(S): at 05:33

## 2023-12-30 RX ADMIN — ALBUTEROL 2.5 MILLIGRAM(S): 90 AEROSOL, METERED ORAL at 12:03

## 2023-12-30 RX ADMIN — Medication 500000 UNIT(S): at 12:02

## 2023-12-30 RX ADMIN — Medication 500000 UNIT(S): at 17:36

## 2023-12-30 RX ADMIN — BUDESONIDE AND FORMOTEROL FUMARATE DIHYDRATE 2 PUFF(S): 160; 4.5 AEROSOL RESPIRATORY (INHALATION) at 17:42

## 2023-12-30 RX ADMIN — PANTOPRAZOLE SODIUM 40 MILLIGRAM(S): 20 TABLET, DELAYED RELEASE ORAL at 05:35

## 2023-12-30 RX ADMIN — TAMSULOSIN HYDROCHLORIDE 0.4 MILLIGRAM(S): 0.4 CAPSULE ORAL at 21:07

## 2023-12-30 RX ADMIN — ATORVASTATIN CALCIUM 10 MILLIGRAM(S): 80 TABLET, FILM COATED ORAL at 21:08

## 2023-12-30 RX ADMIN — APIXABAN 2.5 MILLIGRAM(S): 2.5 TABLET, FILM COATED ORAL at 17:36

## 2023-12-30 RX ADMIN — Medication 100 MILLIGRAM(S): at 14:02

## 2023-12-30 NOTE — PROGRESS NOTE ADULT - PROBLEM SELECTOR PLAN 6
- on heparin gtt iso possible thora tomorrow-> will follow up with pulm about thoracentesis   - holding home eliquis for now-> herapin drip-> Eliquis started 12/28

## 2023-12-30 NOTE — PROGRESS NOTE ADULT - SUBJECTIVE AND OBJECTIVE BOX
***************************************************************  Blas Rubio, PGY1  Internal Medicine   Preferred contact via [employer] Teams   ***************************************************************    GLADIS LEONARD  83y  MRN: [id number]    Patient is a 83y old  Male who presents with a chief complaint of sob (29 Dec 2023 07:50)      Interval/Overnight Events: no events ON.     Subjective: Pt seen and examined at bedside. Denies fever, CP, SOB, abn pain, N/V, dysuria. Tolerating diet.      MEDICATIONS  (STANDING):  albuterol    0.083% 2.5 milliGRAM(s) Nebulizer every 6 hours  apixaban 2.5 milliGRAM(s) Oral two times a day  ascorbic acid 500 milliGRAM(s) Oral daily  atorvastatin 10 milliGRAM(s) Oral at bedtime  benzonatate 100 milliGRAM(s) Oral every 8 hours  budesonide 160 MICROgram(s)/formoterol 4.5 MICROgram(s) Inhaler 2 Puff(s) Inhalation two times a day  cefTRIAXone   IVPB 2000 milliGRAM(s) IV Intermittent every 24 hours  chlorhexidine 4% Liquid 1 Application(s) Topical <User Schedule>  dextrose 5%. 1000 milliLiter(s) (50 mL/Hr) IV Continuous <Continuous>  dextrose 5%. 1000 milliLiter(s) (100 mL/Hr) IV Continuous <Continuous>  dextrose 50% Injectable 12.5 Gram(s) IV Push once  dextrose 50% Injectable 25 Gram(s) IV Push once  dextrose 50% Injectable 25 Gram(s) IV Push once  finasteride 5 milliGRAM(s) Oral daily  FIRST- Mouthwash  BLM 5 milliLiter(s) Swish and Spit every 6 hours  glucagon  Injectable 1 milliGRAM(s) IntraMuscular once  guaiFENesin  milliGRAM(s) Oral every 12 hours  influenza  Vaccine (HIGH DOSE) 0.7 milliLiter(s) IntraMuscular once  insulin lispro (ADMELOG) corrective regimen sliding scale   SubCutaneous three times a day before meals  insulin lispro (ADMELOG) corrective regimen sliding scale   SubCutaneous at bedtime  iron sucrose IVPB 300 milliGRAM(s) IV Intermittent every 24 hours  metoprolol tartrate 25 milliGRAM(s) Oral two times a day  multivitamin 1 Tablet(s) Oral daily  nystatin    Suspension 905496 Unit(s) Oral four times a day  pantoprazole    Tablet 40 milliGRAM(s) Oral before breakfast  predniSONE   Tablet 40 milliGRAM(s) Oral daily  sodium bicarbonate 325 milliGRAM(s) Oral every 8 hours  tamsulosin 0.4 milliGRAM(s) Oral at bedtime    MEDICATIONS  (PRN):  acetaminophen     Tablet .. 650 milliGRAM(s) Oral every 6 hours PRN Temp greater or equal to 38C (100.4F), Mild Pain (1 - 3)  benzocaine/menthol Lozenge 1 Lozenge Oral three times a day PRN Sore Throat  dextrose Oral Gel 15 Gram(s) Oral once PRN Blood Glucose LESS THAN 70 milliGRAM(s)/deciliter      Objective:    Vitals: Vital Signs Last 24 Hrs  T(C): 36.3 (12-30-23 @ 04:23), Max: 36.8 (12-29-23 @ 20:21)  T(F): 97.4 (12-30-23 @ 04:23), Max: 98.3 (12-29-23 @ 20:21)  HR: 81 (12-30-23 @ 04:23) (76 - 81)  BP: 139/69 (12-30-23 @ 04:23) (115/60 - 139/69)  BP(mean): --  RR: 18 (12-30-23 @ 04:23) (18 - 18)  SpO2: 96% (12-30-23 @ 04:23) (94% - 96%)                I&O's Summary    29 Dec 2023 07:01  -  30 Dec 2023 07:00  --------------------------------------------------------  IN: 0 mL / OUT: 350 mL / NET: -350 mL        PHYSICAL EXAM:  GENERAL: NAD  HEAD:  Atraumatic, Normocephalic  EYES: EOMI, conjunctiva and sclera clear  CHEST/LUNG: Clear to auscultation bilaterally; No rales, rhonchi, wheezing, or rubs  HEART: Regular rate and rhythm; No murmurs, rubs, or gallops  ABDOMEN: Soft, Nontender, Nondistended;   SKIN: No rashes or lesions  NERVOUS SYSTEM:  Alert & Oriented X3, no focal deficits    LABS:                        8.3    19.18 )-----------( 602      ( 29 Dec 2023 07:10 )             28.2                         8.2    16.29 )-----------( 659      ( 28 Dec 2023 04:37 )             26.1     12-29    139  |  100  |  51<H>  ----------------------------<  129<H>  4.1   |  30  |  1.61<H>  12-28    139  |  100  |  71<H>  ----------------------------<  162<H>  3.6   |  29  |  2.13<H>    Ca    8.3<L>      29 Dec 2023 07:02  Ca    8.2<L>      28 Dec 2023 04:37  Phos  3.3     12-29  Mg     2.1     12-29    TPro  5.6<L>  /  Alb  2.5<L>  /  TBili  0.3  /  DBili  x   /  AST  22  /  ALT  47<H>  /  AlkPhos  87  12-29  TPro  5.5<L>  /  Alb  2.7<L>  /  TBili  0.2  /  DBili  x   /  AST  24  /  ALT  58<H>  /  AlkPhos  100  12-28    CAPILLARY BLOOD GLUCOSE      POCT Blood Glucose.: 291 mg/dL (29 Dec 2023 21:17)  POCT Blood Glucose.: 156 mg/dL (29 Dec 2023 17:23)  POCT Blood Glucose.: 133 mg/dL (29 Dec 2023 12:08)  POCT Blood Glucose.: 130 mg/dL (29 Dec 2023 08:30)    PTT - ( 28 Dec 2023 11:25 )  PTT:67.0 sec    Urinalysis Basic - ( 29 Dec 2023 07:02 )    Color: x / Appearance: x / SG: x / pH: x  Gluc: 129 mg/dL / Ketone: x  / Bili: x / Urobili: x   Blood: x / Protein: x / Nitrite: x   Leuk Esterase: x / RBC: x / WBC x   Sq Epi: x / Non Sq Epi: x / Bacteria: x          RADIOLOGY & ADDITIONAL TESTS:    Imaging Personally Reviewed:  [x ] YES  [ ] NO    Consultants involved in case:   Consultant(s) Notes Reviewed:  [ x] YES  [ ] NO:   Care Discussed with Consultants/Other Providers [x ] YES  [ ] NO         ***************************************************************  Blas Rubio, PGY1  Internal Medicine   Preferred contact via [employer] Teams   ***************************************************************    GLADIS LEONARD  83y  MRN: [id number]    Patient is a 83y old  Male who presents with a chief complaint of sob (29 Dec 2023 07:50)      Interval/Overnight Events: no events ON.     Subjective: Pt seen and examined at bedside. Denies fever, CP, SOB, abn pain, N/V, dysuria. Tolerating diet.      MEDICATIONS  (STANDING):  albuterol    0.083% 2.5 milliGRAM(s) Nebulizer every 6 hours  apixaban 2.5 milliGRAM(s) Oral two times a day  ascorbic acid 500 milliGRAM(s) Oral daily  atorvastatin 10 milliGRAM(s) Oral at bedtime  benzonatate 100 milliGRAM(s) Oral every 8 hours  budesonide 160 MICROgram(s)/formoterol 4.5 MICROgram(s) Inhaler 2 Puff(s) Inhalation two times a day  cefTRIAXone   IVPB 2000 milliGRAM(s) IV Intermittent every 24 hours  chlorhexidine 4% Liquid 1 Application(s) Topical <User Schedule>  dextrose 5%. 1000 milliLiter(s) (50 mL/Hr) IV Continuous <Continuous>  dextrose 5%. 1000 milliLiter(s) (100 mL/Hr) IV Continuous <Continuous>  dextrose 50% Injectable 12.5 Gram(s) IV Push once  dextrose 50% Injectable 25 Gram(s) IV Push once  dextrose 50% Injectable 25 Gram(s) IV Push once  finasteride 5 milliGRAM(s) Oral daily  FIRST- Mouthwash  BLM 5 milliLiter(s) Swish and Spit every 6 hours  glucagon  Injectable 1 milliGRAM(s) IntraMuscular once  guaiFENesin  milliGRAM(s) Oral every 12 hours  influenza  Vaccine (HIGH DOSE) 0.7 milliLiter(s) IntraMuscular once  insulin lispro (ADMELOG) corrective regimen sliding scale   SubCutaneous three times a day before meals  insulin lispro (ADMELOG) corrective regimen sliding scale   SubCutaneous at bedtime  iron sucrose IVPB 300 milliGRAM(s) IV Intermittent every 24 hours  metoprolol tartrate 25 milliGRAM(s) Oral two times a day  multivitamin 1 Tablet(s) Oral daily  nystatin    Suspension 246358 Unit(s) Oral four times a day  pantoprazole    Tablet 40 milliGRAM(s) Oral before breakfast  predniSONE   Tablet 40 milliGRAM(s) Oral daily  sodium bicarbonate 325 milliGRAM(s) Oral every 8 hours  tamsulosin 0.4 milliGRAM(s) Oral at bedtime    MEDICATIONS  (PRN):  acetaminophen     Tablet .. 650 milliGRAM(s) Oral every 6 hours PRN Temp greater or equal to 38C (100.4F), Mild Pain (1 - 3)  benzocaine/menthol Lozenge 1 Lozenge Oral three times a day PRN Sore Throat  dextrose Oral Gel 15 Gram(s) Oral once PRN Blood Glucose LESS THAN 70 milliGRAM(s)/deciliter      Objective:    Vitals: Vital Signs Last 24 Hrs  T(C): 36.3 (12-30-23 @ 04:23), Max: 36.8 (12-29-23 @ 20:21)  T(F): 97.4 (12-30-23 @ 04:23), Max: 98.3 (12-29-23 @ 20:21)  HR: 81 (12-30-23 @ 04:23) (76 - 81)  BP: 139/69 (12-30-23 @ 04:23) (115/60 - 139/69)  BP(mean): --  RR: 18 (12-30-23 @ 04:23) (18 - 18)  SpO2: 96% (12-30-23 @ 04:23) (94% - 96%)                I&O's Summary    29 Dec 2023 07:01  -  30 Dec 2023 07:00  --------------------------------------------------------  IN: 0 mL / OUT: 350 mL / NET: -350 mL        PHYSICAL EXAM:  GENERAL: NAD  HEAD:  Atraumatic, Normocephalic  EYES: EOMI, conjunctiva and sclera clear  CHEST/LUNG: Clear to auscultation bilaterally; No rales, rhonchi, wheezing, or rubs  HEART: Regular rate and rhythm; No murmurs, rubs, or gallops  ABDOMEN: Soft, Nontender, Nondistended;   SKIN: No rashes or lesions  NERVOUS SYSTEM:  Alert & Oriented X3, no focal deficits    LABS:                        8.3    19.18 )-----------( 602      ( 29 Dec 2023 07:10 )             28.2                         8.2    16.29 )-----------( 659      ( 28 Dec 2023 04:37 )             26.1     12-29    139  |  100  |  51<H>  ----------------------------<  129<H>  4.1   |  30  |  1.61<H>  12-28    139  |  100  |  71<H>  ----------------------------<  162<H>  3.6   |  29  |  2.13<H>    Ca    8.3<L>      29 Dec 2023 07:02  Ca    8.2<L>      28 Dec 2023 04:37  Phos  3.3     12-29  Mg     2.1     12-29    TPro  5.6<L>  /  Alb  2.5<L>  /  TBili  0.3  /  DBili  x   /  AST  22  /  ALT  47<H>  /  AlkPhos  87  12-29  TPro  5.5<L>  /  Alb  2.7<L>  /  TBili  0.2  /  DBili  x   /  AST  24  /  ALT  58<H>  /  AlkPhos  100  12-28    CAPILLARY BLOOD GLUCOSE      POCT Blood Glucose.: 291 mg/dL (29 Dec 2023 21:17)  POCT Blood Glucose.: 156 mg/dL (29 Dec 2023 17:23)  POCT Blood Glucose.: 133 mg/dL (29 Dec 2023 12:08)  POCT Blood Glucose.: 130 mg/dL (29 Dec 2023 08:30)    PTT - ( 28 Dec 2023 11:25 )  PTT:67.0 sec    Urinalysis Basic - ( 29 Dec 2023 07:02 )    Color: x / Appearance: x / SG: x / pH: x  Gluc: 129 mg/dL / Ketone: x  / Bili: x / Urobili: x   Blood: x / Protein: x / Nitrite: x   Leuk Esterase: x / RBC: x / WBC x   Sq Epi: x / Non Sq Epi: x / Bacteria: x          RADIOLOGY & ADDITIONAL TESTS:    Imaging Personally Reviewed:  [x ] YES  [ ] NO    Consultants involved in case:   Consultant(s) Notes Reviewed:  [ x] YES  [ ] NO:   Care Discussed with Consultants/Other Providers [x ] YES  [ ] NO         ***************************************************************  Blas Rubio, PGY1  Internal Medicine   Preferred contact via [employer] Teams   ***************************************************************    GLADIS LEONARD  83y  MRN: [id number]    Patient is a 83y old  Male who presents with a chief complaint of sob (29 Dec 2023 07:50)      Interval/Overnight Events: no events ON.     Subjective: Pt seen and examined at bedside. Pt pleasant and resting well.     MEDICATIONS  (STANDING):  albuterol    0.083% 2.5 milliGRAM(s) Nebulizer every 6 hours  apixaban 2.5 milliGRAM(s) Oral two times a day  ascorbic acid 500 milliGRAM(s) Oral daily  atorvastatin 10 milliGRAM(s) Oral at bedtime  benzonatate 100 milliGRAM(s) Oral every 8 hours  budesonide 160 MICROgram(s)/formoterol 4.5 MICROgram(s) Inhaler 2 Puff(s) Inhalation two times a day  cefTRIAXone   IVPB 2000 milliGRAM(s) IV Intermittent every 24 hours  chlorhexidine 4% Liquid 1 Application(s) Topical <User Schedule>  dextrose 5%. 1000 milliLiter(s) (50 mL/Hr) IV Continuous <Continuous>  dextrose 5%. 1000 milliLiter(s) (100 mL/Hr) IV Continuous <Continuous>  dextrose 50% Injectable 12.5 Gram(s) IV Push once  dextrose 50% Injectable 25 Gram(s) IV Push once  dextrose 50% Injectable 25 Gram(s) IV Push once  finasteride 5 milliGRAM(s) Oral daily  FIRST- Mouthwash  BLM 5 milliLiter(s) Swish and Spit every 6 hours  glucagon  Injectable 1 milliGRAM(s) IntraMuscular once  guaiFENesin  milliGRAM(s) Oral every 12 hours  influenza  Vaccine (HIGH DOSE) 0.7 milliLiter(s) IntraMuscular once  insulin lispro (ADMELOG) corrective regimen sliding scale   SubCutaneous three times a day before meals  insulin lispro (ADMELOG) corrective regimen sliding scale   SubCutaneous at bedtime  iron sucrose IVPB 300 milliGRAM(s) IV Intermittent every 24 hours  metoprolol tartrate 25 milliGRAM(s) Oral two times a day  multivitamin 1 Tablet(s) Oral daily  nystatin    Suspension 043130 Unit(s) Oral four times a day  pantoprazole    Tablet 40 milliGRAM(s) Oral before breakfast  predniSONE   Tablet 40 milliGRAM(s) Oral daily  sodium bicarbonate 325 milliGRAM(s) Oral every 8 hours  tamsulosin 0.4 milliGRAM(s) Oral at bedtime    MEDICATIONS  (PRN):  acetaminophen     Tablet .. 650 milliGRAM(s) Oral every 6 hours PRN Temp greater or equal to 38C (100.4F), Mild Pain (1 - 3)  benzocaine/menthol Lozenge 1 Lozenge Oral three times a day PRN Sore Throat  dextrose Oral Gel 15 Gram(s) Oral once PRN Blood Glucose LESS THAN 70 milliGRAM(s)/deciliter      Objective:    Vitals: Vital Signs Last 24 Hrs  T(C): 36.3 (12-30-23 @ 04:23), Max: 36.8 (12-29-23 @ 20:21)  T(F): 97.4 (12-30-23 @ 04:23), Max: 98.3 (12-29-23 @ 20:21)  HR: 81 (12-30-23 @ 04:23) (76 - 81)  BP: 139/69 (12-30-23 @ 04:23) (115/60 - 139/69)  BP(mean): --  RR: 18 (12-30-23 @ 04:23) (18 - 18)  SpO2: 96% (12-30-23 @ 04:23) (94% - 96%)                I&O's Summary    29 Dec 2023 07:01  -  30 Dec 2023 07:00  --------------------------------------------------------  IN: 0 mL / OUT: 350 mL / NET: -350 mL        PHYSICAL EXAM:  GENERAL: NAD  HEAD:  Atraumatic, Normocephalic  EYES: EOMI, conjunctiva and sclera clear  CHEST/LUNG: Clear to auscultation bilaterally; No rales, rhonchi, wheezing, or rubs  HEART: Regular rate and rhythm; No murmurs, rubs, or gallops  ABDOMEN: Soft, Nontender, Nondistended;   SKIN: 1+ PE Bilaterally   NERVOUS SYSTEM:  Alert & Oriented X3, no focal deficits    LABS:                        8.3    19.18 )-----------( 602      ( 29 Dec 2023 07:10 )             28.2                         8.2    16.29 )-----------( 659      ( 28 Dec 2023 04:37 )             26.1     12-29    139  |  100  |  51<H>  ----------------------------<  129<H>  4.1   |  30  |  1.61<H>  12-28    139  |  100  |  71<H>  ----------------------------<  162<H>  3.6   |  29  |  2.13<H>    Ca    8.3<L>      29 Dec 2023 07:02  Ca    8.2<L>      28 Dec 2023 04:37  Phos  3.3     12-29  Mg     2.1     12-29    TPro  5.6<L>  /  Alb  2.5<L>  /  TBili  0.3  /  DBili  x   /  AST  22  /  ALT  47<H>  /  AlkPhos  87  12-29  TPro  5.5<L>  /  Alb  2.7<L>  /  TBili  0.2  /  DBili  x   /  AST  24  /  ALT  58<H>  /  AlkPhos  100  12-28    CAPILLARY BLOOD GLUCOSE      POCT Blood Glucose.: 291 mg/dL (29 Dec 2023 21:17)  POCT Blood Glucose.: 156 mg/dL (29 Dec 2023 17:23)  POCT Blood Glucose.: 133 mg/dL (29 Dec 2023 12:08)  POCT Blood Glucose.: 130 mg/dL (29 Dec 2023 08:30)    PTT - ( 28 Dec 2023 11:25 )  PTT:67.0 sec    Urinalysis Basic - ( 29 Dec 2023 07:02 )    Color: x / Appearance: x / SG: x / pH: x  Gluc: 129 mg/dL / Ketone: x  / Bili: x / Urobili: x   Blood: x / Protein: x / Nitrite: x   Leuk Esterase: x / RBC: x / WBC x   Sq Epi: x / Non Sq Epi: x / Bacteria: x          RADIOLOGY & ADDITIONAL TESTS:    Imaging Personally Reviewed:  [x ] YES  [ ] NO    Consultants involved in case:   Consultant(s) Notes Reviewed:  [ x] YES  [ ] NO:   Care Discussed with Consultants/Other Providers [x ] YES  [ ] NO         ***************************************************************  Blas Rubio, PGY1  Internal Medicine   Preferred contact via [employer] Teams   ***************************************************************    GLADIS LEONARD  83y  MRN: [id number]    Patient is a 83y old  Male who presents with a chief complaint of sob (29 Dec 2023 07:50)      Interval/Overnight Events: no events ON.     Subjective: Pt seen and examined at bedside. Pt pleasant and resting well.     MEDICATIONS  (STANDING):  albuterol    0.083% 2.5 milliGRAM(s) Nebulizer every 6 hours  apixaban 2.5 milliGRAM(s) Oral two times a day  ascorbic acid 500 milliGRAM(s) Oral daily  atorvastatin 10 milliGRAM(s) Oral at bedtime  benzonatate 100 milliGRAM(s) Oral every 8 hours  budesonide 160 MICROgram(s)/formoterol 4.5 MICROgram(s) Inhaler 2 Puff(s) Inhalation two times a day  cefTRIAXone   IVPB 2000 milliGRAM(s) IV Intermittent every 24 hours  chlorhexidine 4% Liquid 1 Application(s) Topical <User Schedule>  dextrose 5%. 1000 milliLiter(s) (50 mL/Hr) IV Continuous <Continuous>  dextrose 5%. 1000 milliLiter(s) (100 mL/Hr) IV Continuous <Continuous>  dextrose 50% Injectable 12.5 Gram(s) IV Push once  dextrose 50% Injectable 25 Gram(s) IV Push once  dextrose 50% Injectable 25 Gram(s) IV Push once  finasteride 5 milliGRAM(s) Oral daily  FIRST- Mouthwash  BLM 5 milliLiter(s) Swish and Spit every 6 hours  glucagon  Injectable 1 milliGRAM(s) IntraMuscular once  guaiFENesin  milliGRAM(s) Oral every 12 hours  influenza  Vaccine (HIGH DOSE) 0.7 milliLiter(s) IntraMuscular once  insulin lispro (ADMELOG) corrective regimen sliding scale   SubCutaneous three times a day before meals  insulin lispro (ADMELOG) corrective regimen sliding scale   SubCutaneous at bedtime  iron sucrose IVPB 300 milliGRAM(s) IV Intermittent every 24 hours  metoprolol tartrate 25 milliGRAM(s) Oral two times a day  multivitamin 1 Tablet(s) Oral daily  nystatin    Suspension 073717 Unit(s) Oral four times a day  pantoprazole    Tablet 40 milliGRAM(s) Oral before breakfast  predniSONE   Tablet 40 milliGRAM(s) Oral daily  sodium bicarbonate 325 milliGRAM(s) Oral every 8 hours  tamsulosin 0.4 milliGRAM(s) Oral at bedtime    MEDICATIONS  (PRN):  acetaminophen     Tablet .. 650 milliGRAM(s) Oral every 6 hours PRN Temp greater or equal to 38C (100.4F), Mild Pain (1 - 3)  benzocaine/menthol Lozenge 1 Lozenge Oral three times a day PRN Sore Throat  dextrose Oral Gel 15 Gram(s) Oral once PRN Blood Glucose LESS THAN 70 milliGRAM(s)/deciliter      Objective:    Vitals: Vital Signs Last 24 Hrs  T(C): 36.3 (12-30-23 @ 04:23), Max: 36.8 (12-29-23 @ 20:21)  T(F): 97.4 (12-30-23 @ 04:23), Max: 98.3 (12-29-23 @ 20:21)  HR: 81 (12-30-23 @ 04:23) (76 - 81)  BP: 139/69 (12-30-23 @ 04:23) (115/60 - 139/69)  BP(mean): --  RR: 18 (12-30-23 @ 04:23) (18 - 18)  SpO2: 96% (12-30-23 @ 04:23) (94% - 96%)                I&O's Summary    29 Dec 2023 07:01  -  30 Dec 2023 07:00  --------------------------------------------------------  IN: 0 mL / OUT: 350 mL / NET: -350 mL        PHYSICAL EXAM:  GENERAL: NAD  HEAD:  Atraumatic, Normocephalic  EYES: EOMI, conjunctiva and sclera clear  CHEST/LUNG: Clear to auscultation bilaterally; No rales, rhonchi, wheezing, or rubs  HEART: Regular rate and rhythm; No murmurs, rubs, or gallops  ABDOMEN: Soft, Nontender, Nondistended;   SKIN: 1+ PE Bilaterally   NERVOUS SYSTEM:  Alert & Oriented X3, no focal deficits    LABS:                        8.3    19.18 )-----------( 602      ( 29 Dec 2023 07:10 )             28.2                         8.2    16.29 )-----------( 659      ( 28 Dec 2023 04:37 )             26.1     12-29    139  |  100  |  51<H>  ----------------------------<  129<H>  4.1   |  30  |  1.61<H>  12-28    139  |  100  |  71<H>  ----------------------------<  162<H>  3.6   |  29  |  2.13<H>    Ca    8.3<L>      29 Dec 2023 07:02  Ca    8.2<L>      28 Dec 2023 04:37  Phos  3.3     12-29  Mg     2.1     12-29    TPro  5.6<L>  /  Alb  2.5<L>  /  TBili  0.3  /  DBili  x   /  AST  22  /  ALT  47<H>  /  AlkPhos  87  12-29  TPro  5.5<L>  /  Alb  2.7<L>  /  TBili  0.2  /  DBili  x   /  AST  24  /  ALT  58<H>  /  AlkPhos  100  12-28    CAPILLARY BLOOD GLUCOSE      POCT Blood Glucose.: 291 mg/dL (29 Dec 2023 21:17)  POCT Blood Glucose.: 156 mg/dL (29 Dec 2023 17:23)  POCT Blood Glucose.: 133 mg/dL (29 Dec 2023 12:08)  POCT Blood Glucose.: 130 mg/dL (29 Dec 2023 08:30)    PTT - ( 28 Dec 2023 11:25 )  PTT:67.0 sec    Urinalysis Basic - ( 29 Dec 2023 07:02 )    Color: x / Appearance: x / SG: x / pH: x  Gluc: 129 mg/dL / Ketone: x  / Bili: x / Urobili: x   Blood: x / Protein: x / Nitrite: x   Leuk Esterase: x / RBC: x / WBC x   Sq Epi: x / Non Sq Epi: x / Bacteria: x          RADIOLOGY & ADDITIONAL TESTS:    Imaging Personally Reviewed:  [x ] YES  [ ] NO    Consultants involved in case:   Consultant(s) Notes Reviewed:  [ x] YES  [ ] NO:   Care Discussed with Consultants/Other Providers [x ] YES  [ ] NO

## 2023-12-30 NOTE — PROGRESS NOTE ADULT - ASSESSMENT
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

## 2023-12-30 NOTE — PROGRESS NOTE ADULT - PROBLEM SELECTOR PLAN 1
Likely multifactorial - CAP and component of HFpEF, entero/rhino virus +, COPD exacerbation   CTPE chest showed loculated moderate L pleural effusion and small R pleural effusion  - s/p thoracentesis  - pulm consulted - recs appreciated   - started ceftriaxone 2000 q24   - s/p azithro 500 q24  - ID consulted recs appreciated   - Aspiration Precautions  - Further plan per HFpEF below  - methylpred taper per Pulm recs-> 12/28 last day-> resumed 12/29 for increased wheezing also ordered RSV and COVID test

## 2023-12-30 NOTE — PROGRESS NOTE ADULT - PROBLEM SELECTOR PLAN 2
Baseline BNP ~ 2800. BNP ~ 2800 this admission  In chronic exacerbation state with 3+ pitting edema and on diuresis  HFpEF or HFrEF  EF = 68% on 8/11/23  Heart failure, CHF order set initiated  Guideline directed medical therapy as below: after med-rec completed  TTE systolic function is hyperdynamic with an ejection fraction of 80 %  - Bumex 2 mg IV BID and PRN -> Hold diuresis-> Will consider resuming with increasing LE edema and crackles   - Diuretic: on bumex PO 2 mg am 1 mg pm at home.   - BB:  resume home metoprolol tatrate 25 mg BID.  - ARNI/ACE-I/ARB: Consider starting at d/c  - Hydralazine/Nitrate: Not indicated at this time  - MRA: Not indicated at this time  - SGLT2: Consider starting at d/c  - Maintain K > 4, Mg > 2 Baseline BNP ~ 2800. BNP ~ 2800 this admission  In chronic exacerbation state with 3+ pitting edema and on diuresis  HFpEF   EF = 68% on 8/11/23  Heart failure, CHF order set initiated  Guideline directed medical therapy as below: after med-rec completed  TTE systolic function is hyperdynamic with an ejection fraction of 80 %  - Bumex 2 mg IV BID and PRN -> Hold diuresis-> Will consider resuming with increasing LE edema and crackles   - Diuretic: on bumex PO 2 mg am 1 mg pm at home.   - BB:  resume home metoprolol tatrate 25 mg BID.  - ARNI/ACE-I/ARB: Consider starting at d/c  - Hydralazine/Nitrate: Not indicated at this time  - MRA: Not indicated at this time  - SGLT2: Consider starting at d/c  - Maintain K > 4, Mg > 2

## 2023-12-30 NOTE — PROGRESS NOTE ADULT - ATTENDING COMMENTS
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     Plan:   - C/w with steroid taper, patient continues have wheezing. on RA. Pulm following   - ATN improved, continue to trend Cr   - PT recs for [facility], aim to DC patient if symptoms improve  - On Abx for bacteremia, ID following. Will be DC'ed on PO abx    VTE PPx: Apixiban  Diet: DASH   Dispo: [facility]

## 2023-12-30 NOTE — PROGRESS NOTE ADULT - PROBLEM SELECTOR PLAN 4
HgB 7.9  Will evaluate B12, Folate, and Iron studies-> Yosef JAMAL due to low ferritin-> Venofer started 12/28

## 2023-12-31 LAB
ALBUMIN SERPL ELPH-MCNC: 2.6 G/DL — LOW (ref 3.3–5)
ALBUMIN SERPL ELPH-MCNC: 2.6 G/DL — LOW (ref 3.3–5)
ALP SERPL-CCNC: 83 U/L — SIGNIFICANT CHANGE UP (ref 40–120)
ALP SERPL-CCNC: 83 U/L — SIGNIFICANT CHANGE UP (ref 40–120)
ALT FLD-CCNC: 31 U/L — SIGNIFICANT CHANGE UP (ref 10–45)
ALT FLD-CCNC: 31 U/L — SIGNIFICANT CHANGE UP (ref 10–45)
ANION GAP SERPL CALC-SCNC: 9 MMOL/L — SIGNIFICANT CHANGE UP (ref 5–17)
ANION GAP SERPL CALC-SCNC: 9 MMOL/L — SIGNIFICANT CHANGE UP (ref 5–17)
AST SERPL-CCNC: 16 U/L — SIGNIFICANT CHANGE UP (ref 10–40)
AST SERPL-CCNC: 16 U/L — SIGNIFICANT CHANGE UP (ref 10–40)
BASOPHILS # BLD AUTO: 0.01 K/UL — SIGNIFICANT CHANGE UP (ref 0–0.2)
BASOPHILS # BLD AUTO: 0.01 K/UL — SIGNIFICANT CHANGE UP (ref 0–0.2)
BASOPHILS NFR BLD AUTO: 0.1 % — SIGNIFICANT CHANGE UP (ref 0–2)
BASOPHILS NFR BLD AUTO: 0.1 % — SIGNIFICANT CHANGE UP (ref 0–2)
BILIRUB SERPL-MCNC: 0.4 MG/DL — SIGNIFICANT CHANGE UP (ref 0.2–1.2)
BILIRUB SERPL-MCNC: 0.4 MG/DL — SIGNIFICANT CHANGE UP (ref 0.2–1.2)
BUN SERPL-MCNC: 34 MG/DL — HIGH (ref 7–23)
BUN SERPL-MCNC: 34 MG/DL — HIGH (ref 7–23)
CALCIUM SERPL-MCNC: 8.8 MG/DL — SIGNIFICANT CHANGE UP (ref 8.4–10.5)
CALCIUM SERPL-MCNC: 8.8 MG/DL — SIGNIFICANT CHANGE UP (ref 8.4–10.5)
CHLORIDE SERPL-SCNC: 101 MMOL/L — SIGNIFICANT CHANGE UP (ref 96–108)
CHLORIDE SERPL-SCNC: 101 MMOL/L — SIGNIFICANT CHANGE UP (ref 96–108)
CO2 SERPL-SCNC: 29 MMOL/L — SIGNIFICANT CHANGE UP (ref 22–31)
CO2 SERPL-SCNC: 29 MMOL/L — SIGNIFICANT CHANGE UP (ref 22–31)
CREAT SERPL-MCNC: 1.31 MG/DL — HIGH (ref 0.5–1.3)
CREAT SERPL-MCNC: 1.31 MG/DL — HIGH (ref 0.5–1.3)
EGFR: 53 ML/MIN/1.73M2 — LOW
EGFR: 53 ML/MIN/1.73M2 — LOW
EOSINOPHIL # BLD AUTO: 0.13 K/UL — SIGNIFICANT CHANGE UP (ref 0–0.5)
EOSINOPHIL # BLD AUTO: 0.13 K/UL — SIGNIFICANT CHANGE UP (ref 0–0.5)
EOSINOPHIL NFR BLD AUTO: 0.9 % — SIGNIFICANT CHANGE UP (ref 0–6)
EOSINOPHIL NFR BLD AUTO: 0.9 % — SIGNIFICANT CHANGE UP (ref 0–6)
GLUCOSE BLDC GLUCOMTR-MCNC: 147 MG/DL — HIGH (ref 70–99)
GLUCOSE BLDC GLUCOMTR-MCNC: 147 MG/DL — HIGH (ref 70–99)
GLUCOSE BLDC GLUCOMTR-MCNC: 171 MG/DL — HIGH (ref 70–99)
GLUCOSE BLDC GLUCOMTR-MCNC: 171 MG/DL — HIGH (ref 70–99)
GLUCOSE BLDC GLUCOMTR-MCNC: 184 MG/DL — HIGH (ref 70–99)
GLUCOSE BLDC GLUCOMTR-MCNC: 184 MG/DL — HIGH (ref 70–99)
GLUCOSE BLDC GLUCOMTR-MCNC: 234 MG/DL — HIGH (ref 70–99)
GLUCOSE BLDC GLUCOMTR-MCNC: 234 MG/DL — HIGH (ref 70–99)
GLUCOSE SERPL-MCNC: 109 MG/DL — HIGH (ref 70–99)
GLUCOSE SERPL-MCNC: 109 MG/DL — HIGH (ref 70–99)
HCT VFR BLD CALC: 28.3 % — LOW (ref 39–50)
HCT VFR BLD CALC: 28.3 % — LOW (ref 39–50)
HGB BLD-MCNC: 8.4 G/DL — LOW (ref 13–17)
HGB BLD-MCNC: 8.4 G/DL — LOW (ref 13–17)
IMM GRANULOCYTES NFR BLD AUTO: 1.1 % — HIGH (ref 0–0.9)
IMM GRANULOCYTES NFR BLD AUTO: 1.1 % — HIGH (ref 0–0.9)
LYMPHOCYTES # BLD AUTO: 1.87 K/UL — SIGNIFICANT CHANGE UP (ref 1–3.3)
LYMPHOCYTES # BLD AUTO: 1.87 K/UL — SIGNIFICANT CHANGE UP (ref 1–3.3)
LYMPHOCYTES # BLD AUTO: 13.3 % — SIGNIFICANT CHANGE UP (ref 13–44)
LYMPHOCYTES # BLD AUTO: 13.3 % — SIGNIFICANT CHANGE UP (ref 13–44)
MAGNESIUM SERPL-MCNC: 2 MG/DL — SIGNIFICANT CHANGE UP (ref 1.6–2.6)
MAGNESIUM SERPL-MCNC: 2 MG/DL — SIGNIFICANT CHANGE UP (ref 1.6–2.6)
MCHC RBC-ENTMCNC: 21.4 PG — LOW (ref 27–34)
MCHC RBC-ENTMCNC: 21.4 PG — LOW (ref 27–34)
MCHC RBC-ENTMCNC: 29.7 GM/DL — LOW (ref 32–36)
MCHC RBC-ENTMCNC: 29.7 GM/DL — LOW (ref 32–36)
MCV RBC AUTO: 72.2 FL — LOW (ref 80–100)
MCV RBC AUTO: 72.2 FL — LOW (ref 80–100)
MONOCYTES # BLD AUTO: 1.4 K/UL — HIGH (ref 0–0.9)
MONOCYTES # BLD AUTO: 1.4 K/UL — HIGH (ref 0–0.9)
MONOCYTES NFR BLD AUTO: 10 % — SIGNIFICANT CHANGE UP (ref 2–14)
MONOCYTES NFR BLD AUTO: 10 % — SIGNIFICANT CHANGE UP (ref 2–14)
NEUTROPHILS # BLD AUTO: 10.48 K/UL — HIGH (ref 1.8–7.4)
NEUTROPHILS # BLD AUTO: 10.48 K/UL — HIGH (ref 1.8–7.4)
NEUTROPHILS NFR BLD AUTO: 74.6 % — SIGNIFICANT CHANGE UP (ref 43–77)
NEUTROPHILS NFR BLD AUTO: 74.6 % — SIGNIFICANT CHANGE UP (ref 43–77)
NRBC # BLD: 0 /100 WBCS — SIGNIFICANT CHANGE UP (ref 0–0)
NRBC # BLD: 0 /100 WBCS — SIGNIFICANT CHANGE UP (ref 0–0)
PHOSPHATE SERPL-MCNC: 2.4 MG/DL — LOW (ref 2.5–4.5)
PHOSPHATE SERPL-MCNC: 2.4 MG/DL — LOW (ref 2.5–4.5)
PLATELET # BLD AUTO: 536 K/UL — HIGH (ref 150–400)
PLATELET # BLD AUTO: 536 K/UL — HIGH (ref 150–400)
POTASSIUM SERPL-MCNC: 3.8 MMOL/L — SIGNIFICANT CHANGE UP (ref 3.5–5.3)
POTASSIUM SERPL-MCNC: 3.8 MMOL/L — SIGNIFICANT CHANGE UP (ref 3.5–5.3)
POTASSIUM SERPL-SCNC: 3.8 MMOL/L — SIGNIFICANT CHANGE UP (ref 3.5–5.3)
POTASSIUM SERPL-SCNC: 3.8 MMOL/L — SIGNIFICANT CHANGE UP (ref 3.5–5.3)
PROT SERPL-MCNC: 5.7 G/DL — LOW (ref 6–8.3)
PROT SERPL-MCNC: 5.7 G/DL — LOW (ref 6–8.3)
RBC # BLD: 3.92 M/UL — LOW (ref 4.2–5.8)
RBC # BLD: 3.92 M/UL — LOW (ref 4.2–5.8)
RBC # FLD: 21.7 % — HIGH (ref 10.3–14.5)
RBC # FLD: 21.7 % — HIGH (ref 10.3–14.5)
SODIUM SERPL-SCNC: 139 MMOL/L — SIGNIFICANT CHANGE UP (ref 135–145)
SODIUM SERPL-SCNC: 139 MMOL/L — SIGNIFICANT CHANGE UP (ref 135–145)
WBC # BLD: 14.05 K/UL — HIGH (ref 3.8–10.5)
WBC # BLD: 14.05 K/UL — HIGH (ref 3.8–10.5)
WBC # FLD AUTO: 14.05 K/UL — HIGH (ref 3.8–10.5)
WBC # FLD AUTO: 14.05 K/UL — HIGH (ref 3.8–10.5)

## 2023-12-31 PROCEDURE — 99233 SBSQ HOSP IP/OBS HIGH 50: CPT

## 2023-12-31 RX ORDER — BUMETANIDE 0.25 MG/ML
2 INJECTION INTRAMUSCULAR; INTRAVENOUS DAILY
Refills: 0 | Status: DISCONTINUED | OUTPATIENT
Start: 2023-12-31 | End: 2023-12-31

## 2023-12-31 RX ORDER — SODIUM,POTASSIUM PHOSPHATES 278-250MG
1 POWDER IN PACKET (EA) ORAL ONCE
Refills: 0 | Status: COMPLETED | OUTPATIENT
Start: 2023-12-31 | End: 2023-12-31

## 2023-12-31 RX ORDER — BUMETANIDE 0.25 MG/ML
2 INJECTION INTRAMUSCULAR; INTRAVENOUS ONCE
Refills: 0 | Status: COMPLETED | OUTPATIENT
Start: 2023-12-31 | End: 2023-12-31

## 2023-12-31 RX ADMIN — Medication 25 MILLIGRAM(S): at 05:34

## 2023-12-31 RX ADMIN — Medication 25 MILLIGRAM(S): at 17:48

## 2023-12-31 RX ADMIN — BUDESONIDE AND FORMOTEROL FUMARATE DIHYDRATE 2 PUFF(S): 160; 4.5 AEROSOL RESPIRATORY (INHALATION) at 05:37

## 2023-12-31 RX ADMIN — Medication 500000 UNIT(S): at 11:39

## 2023-12-31 RX ADMIN — BUMETANIDE 2 MILLIGRAM(S): 0.25 INJECTION INTRAMUSCULAR; INTRAVENOUS at 13:02

## 2023-12-31 RX ADMIN — Medication 600 MILLIGRAM(S): at 17:48

## 2023-12-31 RX ADMIN — CHLORHEXIDINE GLUCONATE 1 APPLICATION(S): 213 SOLUTION TOPICAL at 05:37

## 2023-12-31 RX ADMIN — DIPHENHYDRAMINE HYDROCHLORIDE AND LIDOCAINE HYDROCHLORIDE AND ALUMINUM HYDROXIDE AND MAGNESIUM HYDRO 5 MILLILITER(S): KIT at 17:47

## 2023-12-31 RX ADMIN — Medication 0: at 09:00

## 2023-12-31 RX ADMIN — DIPHENHYDRAMINE HYDROCHLORIDE AND LIDOCAINE HYDROCHLORIDE AND ALUMINUM HYDROXIDE AND MAGNESIUM HYDRO 5 MILLILITER(S): KIT at 23:21

## 2023-12-31 RX ADMIN — PANTOPRAZOLE SODIUM 40 MILLIGRAM(S): 20 TABLET, DELAYED RELEASE ORAL at 11:39

## 2023-12-31 RX ADMIN — Medication 40 MILLIGRAM(S): at 05:33

## 2023-12-31 RX ADMIN — Medication 100 MILLIGRAM(S): at 13:03

## 2023-12-31 RX ADMIN — Medication 100 MILLIGRAM(S): at 05:33

## 2023-12-31 RX ADMIN — Medication 500 MILLIGRAM(S): at 11:41

## 2023-12-31 RX ADMIN — Medication 1 PACKET(S): at 09:00

## 2023-12-31 RX ADMIN — CEFTRIAXONE 100 MILLIGRAM(S): 500 INJECTION, POWDER, FOR SOLUTION INTRAMUSCULAR; INTRAVENOUS at 11:40

## 2023-12-31 RX ADMIN — Medication 325 MILLIGRAM(S): at 05:32

## 2023-12-31 RX ADMIN — DIPHENHYDRAMINE HYDROCHLORIDE AND LIDOCAINE HYDROCHLORIDE AND ALUMINUM HYDROXIDE AND MAGNESIUM HYDRO 5 MILLILITER(S): KIT at 11:39

## 2023-12-31 RX ADMIN — Medication 325 MILLIGRAM(S): at 13:03

## 2023-12-31 RX ADMIN — ALBUTEROL 2.5 MILLIGRAM(S): 90 AEROSOL, METERED ORAL at 11:39

## 2023-12-31 RX ADMIN — ALBUTEROL 2.5 MILLIGRAM(S): 90 AEROSOL, METERED ORAL at 17:47

## 2023-12-31 RX ADMIN — Medication 500000 UNIT(S): at 17:47

## 2023-12-31 RX ADMIN — Medication 0: at 22:16

## 2023-12-31 RX ADMIN — ATORVASTATIN CALCIUM 10 MILLIGRAM(S): 80 TABLET, FILM COATED ORAL at 21:22

## 2023-12-31 RX ADMIN — DIPHENHYDRAMINE HYDROCHLORIDE AND LIDOCAINE HYDROCHLORIDE AND ALUMINUM HYDROXIDE AND MAGNESIUM HYDRO 5 MILLILITER(S): KIT at 05:31

## 2023-12-31 RX ADMIN — Medication 100 MILLIGRAM(S): at 21:22

## 2023-12-31 RX ADMIN — Medication 500000 UNIT(S): at 23:21

## 2023-12-31 RX ADMIN — Medication 1: at 13:04

## 2023-12-31 RX ADMIN — Medication 1 TABLET(S): at 11:40

## 2023-12-31 RX ADMIN — ALBUTEROL 2.5 MILLIGRAM(S): 90 AEROSOL, METERED ORAL at 23:21

## 2023-12-31 RX ADMIN — Medication 600 MILLIGRAM(S): at 05:33

## 2023-12-31 RX ADMIN — Medication 500000 UNIT(S): at 05:32

## 2023-12-31 RX ADMIN — TAMSULOSIN HYDROCHLORIDE 0.4 MILLIGRAM(S): 0.4 CAPSULE ORAL at 21:22

## 2023-12-31 RX ADMIN — Medication 325 MILLIGRAM(S): at 21:22

## 2023-12-31 RX ADMIN — APIXABAN 2.5 MILLIGRAM(S): 2.5 TABLET, FILM COATED ORAL at 05:32

## 2023-12-31 RX ADMIN — IRON SUCROSE 176.67 MILLIGRAM(S): 20 INJECTION, SOLUTION INTRAVENOUS at 13:04

## 2023-12-31 RX ADMIN — Medication 2: at 17:49

## 2023-12-31 RX ADMIN — BUDESONIDE AND FORMOTEROL FUMARATE DIHYDRATE 2 PUFF(S): 160; 4.5 AEROSOL RESPIRATORY (INHALATION) at 17:49

## 2023-12-31 RX ADMIN — FINASTERIDE 5 MILLIGRAM(S): 5 TABLET, FILM COATED ORAL at 11:40

## 2023-12-31 RX ADMIN — ALBUTEROL 2.5 MILLIGRAM(S): 90 AEROSOL, METERED ORAL at 05:31

## 2023-12-31 RX ADMIN — APIXABAN 2.5 MILLIGRAM(S): 2.5 TABLET, FILM COATED ORAL at 17:48

## 2023-12-31 NOTE — PROGRESS NOTE ADULT - PROBLEM SELECTOR PLAN 2
Baseline BNP ~ 2800. BNP ~ 2800 this admission  In chronic exacerbation state with 3+ pitting edema and on diuresis  HFpEF   EF = 68% on 8/11/23  Heart failure, CHF order set initiated  Guideline directed medical therapy as below: after med-rec completed  TTE systolic function is hyperdynamic with an ejection fraction of 80 %  - Bumex 2 mg IV BID and PRN -> Hold diuresis-> Will consider resuming with increasing LE edema and crackles   - Diuretic: on bumex PO 2 mg am 1 mg pm at home.   - BB:  resume home metoprolol tatrate 25 mg BID.  - ARNI/ACE-I/ARB: Consider starting at d/c  - Hydralazine/Nitrate: Not indicated at this time  - MRA: Not indicated at this time  - SGLT2: Consider starting at d/c  - Maintain K > 4, Mg > 2 Baseline BNP ~ 2800. BNP ~ 2800 this admission  In chronic exacerbation state with 3+ pitting edema and on diuresis  HFpEF   EF = 68% on 8/11/23  Heart failure, CHF order set initiated  Guideline directed medical therapy as below: after med-rec completed  TTE systolic function is hyperdynamic with an ejection fraction of 80 %  - Bumex 2 mg IV BID and PRN -> Hold diuresis-> Will consider resuming with increasing LE edema and crackles   - Diuretic: on bumex PO 2 mg am 1 mg pm at home. >> resumed home 2mg AM, reassess fluid status in AM  - BB:  resume home metoprolol tatrate 25 mg BID.  - ARNI/ACE-I/ARB: Consider starting at d/c  - Hydralazine/Nitrate: Not indicated at this time  - MRA: Not indicated at this time  - SGLT2: Consider starting at d/c  - Maintain K > 4, Mg > 2

## 2023-12-31 NOTE — PROGRESS NOTE ADULT - PROBLEM SELECTOR PLAN 1
Likely multifactorial - CAP and component of HFpEF, entero/rhino virus +, COPD exacerbation   CTPE chest showed loculated moderate L pleural effusion and small R pleural effusion  - s/p thoracentesis  - pulm consulted - recs appreciated   - started ceftriaxone 2000 q24   - s/p azithro 500 q24  - ID consulted recs appreciated   - Aspiration Precautions  - Further plan per HFpEF below  - methylpred taper per Pulm recs-> 12/28 last day-> resumed 12/29 for increased wheezing also ordered RSV and COVID test Likely multifactorial - CAP and component of HFpEF, entero/rhino virus +, COPD exacerbation   CTPE chest showed loculated moderate L pleural effusion and small R pleural effusion  - s/p thoracentesis  - pulm consulted - recs appreciated   - started ceftriaxone 2000 q24   - s/p azithro 500 q24  - ID consulted recs appreciated   - Aspiration Precautions  - Further plan per HFpEF below  - methylpred taper per Pulm recs-> 12/28 last day-> resumed 12/29 for increased wheezing also ordered RSV and COVID test  > bumex as below

## 2023-12-31 NOTE — PROGRESS NOTE ADULT - SUBJECTIVE AND OBJECTIVE BOX
************************  Cheikh Jerez, MD-PhD  Internal Medicine PGY-2  ************************    Patient is a 83y old  Male who presents with a chief complaint of sob (30 Dec 2023 07:18)    No events overnight, no acute complaints this morning. Patient with appropriate PO intake and urinating well with BM(s). Denies CP, SOB, fevers/chills, N/V, or abdominal pain. Patient reminded of ongoing careplan.    MEDICATIONS  (STANDING):  albuterol    0.083% 2.5 milliGRAM(s) Nebulizer every 6 hours  apixaban 2.5 milliGRAM(s) Oral two times a day  ascorbic acid 500 milliGRAM(s) Oral daily  atorvastatin 10 milliGRAM(s) Oral at bedtime  benzonatate 100 milliGRAM(s) Oral every 8 hours  budesonide 160 MICROgram(s)/formoterol 4.5 MICROgram(s) Inhaler 2 Puff(s) Inhalation two times a day  cefTRIAXone   IVPB 2000 milliGRAM(s) IV Intermittent every 24 hours  chlorhexidine 4% Liquid 1 Application(s) Topical <User Schedule>  dextrose 5%. 1000 milliLiter(s) (50 mL/Hr) IV Continuous <Continuous>  dextrose 5%. 1000 milliLiter(s) (100 mL/Hr) IV Continuous <Continuous>  dextrose 50% Injectable 25 Gram(s) IV Push once  dextrose 50% Injectable 25 Gram(s) IV Push once  dextrose 50% Injectable 12.5 Gram(s) IV Push once  finasteride 5 milliGRAM(s) Oral daily  FIRST- Mouthwash  BLM 5 milliLiter(s) Swish and Spit every 6 hours  glucagon  Injectable 1 milliGRAM(s) IntraMuscular once  guaiFENesin  milliGRAM(s) Oral every 12 hours  influenza  Vaccine (HIGH DOSE) 0.7 milliLiter(s) IntraMuscular once  insulin lispro (ADMELOG) corrective regimen sliding scale   SubCutaneous at bedtime  insulin lispro (ADMELOG) corrective regimen sliding scale   SubCutaneous three times a day before meals  iron sucrose IVPB 300 milliGRAM(s) IV Intermittent every 24 hours  metoprolol tartrate 25 milliGRAM(s) Oral two times a day  multivitamin 1 Tablet(s) Oral daily  nystatin    Suspension 631289 Unit(s) Oral four times a day  pantoprazole   Suspension 40 milliGRAM(s) Oral daily  predniSONE   Tablet 40 milliGRAM(s) Oral daily  sodium bicarbonate 325 milliGRAM(s) Oral every 8 hours  tamsulosin 0.4 milliGRAM(s) Oral at bedtime    MEDICATIONS  (PRN):  acetaminophen     Tablet .. 650 milliGRAM(s) Oral every 6 hours PRN Temp greater or equal to 38C (100.4F), Mild Pain (1 - 3)  benzocaine/menthol Lozenge 1 Lozenge Oral three times a day PRN Sore Throat  dextrose Oral Gel 15 Gram(s) Oral once PRN Blood Glucose LESS THAN 70 milliGRAM(s)/deciliter      CAPILLARY BLOOD GLUCOSE      POCT Blood Glucose.: 182 mg/dL (30 Dec 2023 21:12)  POCT Blood Glucose.: 195 mg/dL (30 Dec 2023 17:50)  POCT Blood Glucose.: 145 mg/dL (30 Dec 2023 12:18)  POCT Blood Glucose.: 154 mg/dL (30 Dec 2023 08:40)    I&O's Summary    29 Dec 2023 07:01  -  30 Dec 2023 07:00  --------------------------------------------------------  IN: 0 mL / OUT: 350 mL / NET: -350 mL    30 Dec 2023 07:01  -  31 Dec 2023 06:45  --------------------------------------------------------  IN: 240 mL / OUT: 2050 mL / NET: -1810 mL        PHYSICAL EXAM:  Vital Signs Last 24 Hrs  T(C): 36.5 (31 Dec 2023 04:55), Max: 36.8 (30 Dec 2023 14:27)  T(F): 97.7 (31 Dec 2023 04:55), Max: 98.3 (30 Dec 2023 14:27)  HR: 70 (31 Dec 2023 04:55) (70 - 83)  BP: 117/63 (31 Dec 2023 04:55) (115/66 - 134/64)  BP(mean): --  RR: 17 (31 Dec 2023 04:55) (17 - 18)  SpO2: 94% (31 Dec 2023 04:55) (93% - 94%)    Parameters below as of 31 Dec 2023 04:55  Patient On (Oxygen Delivery Method): room air        T(C): 36.5 (12-31-23 @ 04:55), Max: 36.8 (12-30-23 @ 14:27)  HR: 70 (12-31-23 @ 04:55) (70 - 83)  BP: 117/63 (12-31-23 @ 04:55) (115/66 - 134/64)  RR: 17 (12-31-23 @ 04:55) (17 - 18)  SpO2: 94% (12-31-23 @ 04:55) (93% - 94%)    GENERAL: NAD  HEAD:  Atraumatic, Normocephalic  EYES: EOMI, conjunctiva and sclera clear  CHEST/LUNG: Clear to auscultation bilaterally; No rales, rhonchi, wheezing, or rubs  HEART: Regular rate and rhythm; No murmurs, rubs, or gallops  ABDOMEN: Soft, Nontender, Nondistended;   SKIN: 1+ PE Bilaterally   NERVOUS SYSTEM:  Alert & Oriented X3, no focal deficits    LABS:                        8.7    16.75 )-----------( 543      ( 30 Dec 2023 07:16 )             29.7     12-30    137  |  99  |  42<H>  ----------------------------<  135<H>  4.1   |  28  |  1.44<H>    Ca    8.9      30 Dec 2023 07:16  Phos  3.0     12-30  Mg     2.1     12-30    TPro  5.8<L>  /  Alb  2.4<L>  /  TBili  0.3  /  DBili  x   /  AST  14  /  ALT  37  /  AlkPhos  87  12-30          Urinalysis Basic - ( 30 Dec 2023 07:16 )    Color: x / Appearance: x / SG: x / pH: x  Gluc: 135 mg/dL / Ketone: x  / Bili: x / Urobili: x   Blood: x / Protein: x / Nitrite: x   Leuk Esterase: x / RBC: x / WBC x   Sq Epi: x / Non Sq Epi: x / Bacteria: x          IMAGING & OTHER TESTS:  NNI.   ************************  Cheikh Jerez, MD-PhD  Internal Medicine PGY-2  ************************    Patient is a 83y old  Male who presents with a chief complaint of sob (30 Dec 2023 07:18)    No events overnight, no acute complaints this morning. Patient with appropriate PO intake and urinating well with BM(s). Denies CP, SOB, fevers/chills, N/V, or abdominal pain. Patient reminded of ongoing careplan.    MEDICATIONS  (STANDING):  albuterol    0.083% 2.5 milliGRAM(s) Nebulizer every 6 hours  apixaban 2.5 milliGRAM(s) Oral two times a day  ascorbic acid 500 milliGRAM(s) Oral daily  atorvastatin 10 milliGRAM(s) Oral at bedtime  benzonatate 100 milliGRAM(s) Oral every 8 hours  budesonide 160 MICROgram(s)/formoterol 4.5 MICROgram(s) Inhaler 2 Puff(s) Inhalation two times a day  cefTRIAXone   IVPB 2000 milliGRAM(s) IV Intermittent every 24 hours  chlorhexidine 4% Liquid 1 Application(s) Topical <User Schedule>  dextrose 5%. 1000 milliLiter(s) (50 mL/Hr) IV Continuous <Continuous>  dextrose 5%. 1000 milliLiter(s) (100 mL/Hr) IV Continuous <Continuous>  dextrose 50% Injectable 25 Gram(s) IV Push once  dextrose 50% Injectable 25 Gram(s) IV Push once  dextrose 50% Injectable 12.5 Gram(s) IV Push once  finasteride 5 milliGRAM(s) Oral daily  FIRST- Mouthwash  BLM 5 milliLiter(s) Swish and Spit every 6 hours  glucagon  Injectable 1 milliGRAM(s) IntraMuscular once  guaiFENesin  milliGRAM(s) Oral every 12 hours  influenza  Vaccine (HIGH DOSE) 0.7 milliLiter(s) IntraMuscular once  insulin lispro (ADMELOG) corrective regimen sliding scale   SubCutaneous at bedtime  insulin lispro (ADMELOG) corrective regimen sliding scale   SubCutaneous three times a day before meals  iron sucrose IVPB 300 milliGRAM(s) IV Intermittent every 24 hours  metoprolol tartrate 25 milliGRAM(s) Oral two times a day  multivitamin 1 Tablet(s) Oral daily  nystatin    Suspension [id number] Unit(s) Oral four times a day  pantoprazole   Suspension 40 milliGRAM(s) Oral daily  predniSONE   Tablet 40 milliGRAM(s) Oral daily  sodium bicarbonate 325 milliGRAM(s) Oral every 8 hours  tamsulosin 0.4 milliGRAM(s) Oral at bedtime    MEDICATIONS  (PRN):  acetaminophen     Tablet .. 650 milliGRAM(s) Oral every 6 hours PRN Temp greater or equal to 38C (100.4F), Mild Pain (1 - 3)  benzocaine/menthol Lozenge 1 Lozenge Oral three times a day PRN Sore Throat  dextrose Oral Gel 15 Gram(s) Oral once PRN Blood Glucose LESS THAN 70 milliGRAM(s)/deciliter      CAPILLARY BLOOD GLUCOSE      POCT Blood Glucose.: 182 mg/dL (30 Dec 2023 21:12)  POCT Blood Glucose.: 195 mg/dL (30 Dec 2023 17:50)  POCT Blood Glucose.: 145 mg/dL (30 Dec 2023 12:18)  POCT Blood Glucose.: 154 mg/dL (30 Dec 2023 08:40)    I&O's Summary    29 Dec 2023 07:01  -  30 Dec 2023 07:00  --------------------------------------------------------  IN: 0 mL / OUT: 350 mL / NET: -350 mL    30 Dec 2023 07:01  -  31 Dec 2023 06:45  --------------------------------------------------------  IN: 240 mL / OUT: 2050 mL / NET: -1810 mL        PHYSICAL EXAM:  Vital Signs Last 24 Hrs  T(C): 36.5 (31 Dec 2023 04:55), Max: 36.8 (30 Dec 2023 14:27)  T(F): 97.7 (31 Dec 2023 04:55), Max: 98.3 (30 Dec 2023 14:27)  HR: 70 (31 Dec 2023 04:55) (70 - 83)  BP: 117/63 (31 Dec 2023 04:55) (115/66 - 134/64)  BP(mean): --  RR: 17 (31 Dec 2023 04:55) (17 - 18)  SpO2: 94% (31 Dec 2023 04:55) (93% - 94%)    Parameters below as of 31 Dec 2023 04:55  Patient On (Oxygen Delivery Method): room air        T(C): 36.5 (12-31-23 @ 04:55), Max: 36.8 (12-30-23 @ 14:27)  HR: 70 (12-31-23 @ 04:55) (70 - 83)  BP: 117/63 (12-31-23 @ 04:55) (115/66 - 134/64)  RR: 17 (12-31-23 @ 04:55) (17 - 18)  SpO2: 94% (12-31-23 @ 04:55) (93% - 94%)    GENERAL: NAD  HEAD:  Atraumatic, Normocephalic  EYES: EOMI, conjunctiva and sclera clear  CHEST/LUNG: Clear to auscultation bilaterally; No rales, rhonchi, wheezing, or rubs  HEART: Regular rate and rhythm; No murmurs, rubs, or gallops  ABDOMEN: Soft, Nontender, Nondistended;   SKIN: 1+ PE Bilaterally   NERVOUS SYSTEM:  Alert & Oriented X3, no focal deficits    LABS:                        8.7    16.75 )-----------( 543      ( 30 Dec 2023 07:16 )             29.7     12-30    137  |  99  |  42<H>  ----------------------------<  135<H>  4.1   |  28  |  1.44<H>    Ca    8.9      30 Dec 2023 07:16  Phos  3.0     12-30  Mg     2.1     12-30    TPro  5.8<L>  /  Alb  2.4<L>  /  TBili  0.3  /  DBili  x   /  AST  14  /  ALT  37  /  AlkPhos  87  12-30          Urinalysis Basic - ( 30 Dec 2023 07:16 )    Color: x / Appearance: x / SG: x / pH: x  Gluc: 135 mg/dL / Ketone: x  / Bili: x / Urobili: x   Blood: x / Protein: x / Nitrite: x   Leuk Esterase: x / RBC: x / WBC x   Sq Epi: x / Non Sq Epi: x / Bacteria: x          IMAGING & OTHER TESTS:  NNI.

## 2023-12-31 NOTE — PROGRESS NOTE ADULT - ATTENDING COMMENTS
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     Plan:   - C/w with steroid taper, patient continues have wheezing. on RA. s/p thoracentesis Pulm following   - ATN improved, continue to trend Cr. Will RESUME home diuretic  - PT recs for [facility], aim to DC patient if symptoms improve  - On Abx for bacteremia, ID following. Will be DC'ed on PO abx    VTE PPx: Apixiban  Diet: DASH   Dispo: [facility]

## 2024-01-01 LAB
ALBUMIN SERPL ELPH-MCNC: 2.7 G/DL — LOW (ref 3.3–5)
ALBUMIN SERPL ELPH-MCNC: 2.7 G/DL — LOW (ref 3.3–5)
ALP SERPL-CCNC: 86 U/L — SIGNIFICANT CHANGE UP (ref 40–120)
ALP SERPL-CCNC: 86 U/L — SIGNIFICANT CHANGE UP (ref 40–120)
ALT FLD-CCNC: 26 U/L — SIGNIFICANT CHANGE UP (ref 10–45)
ALT FLD-CCNC: 26 U/L — SIGNIFICANT CHANGE UP (ref 10–45)
ANION GAP SERPL CALC-SCNC: 7 MMOL/L — SIGNIFICANT CHANGE UP (ref 5–17)
ANION GAP SERPL CALC-SCNC: 7 MMOL/L — SIGNIFICANT CHANGE UP (ref 5–17)
AST SERPL-CCNC: 16 U/L — SIGNIFICANT CHANGE UP (ref 10–40)
AST SERPL-CCNC: 16 U/L — SIGNIFICANT CHANGE UP (ref 10–40)
BASOPHILS # BLD AUTO: 0.01 K/UL — SIGNIFICANT CHANGE UP (ref 0–0.2)
BASOPHILS # BLD AUTO: 0.01 K/UL — SIGNIFICANT CHANGE UP (ref 0–0.2)
BASOPHILS NFR BLD AUTO: 0.1 % — SIGNIFICANT CHANGE UP (ref 0–2)
BASOPHILS NFR BLD AUTO: 0.1 % — SIGNIFICANT CHANGE UP (ref 0–2)
BILIRUB SERPL-MCNC: 0.4 MG/DL — SIGNIFICANT CHANGE UP (ref 0.2–1.2)
BILIRUB SERPL-MCNC: 0.4 MG/DL — SIGNIFICANT CHANGE UP (ref 0.2–1.2)
BUN SERPL-MCNC: 31 MG/DL — HIGH (ref 7–23)
BUN SERPL-MCNC: 31 MG/DL — HIGH (ref 7–23)
CALCIUM SERPL-MCNC: 8.6 MG/DL — SIGNIFICANT CHANGE UP (ref 8.4–10.5)
CALCIUM SERPL-MCNC: 8.6 MG/DL — SIGNIFICANT CHANGE UP (ref 8.4–10.5)
CHLORIDE SERPL-SCNC: 101 MMOL/L — SIGNIFICANT CHANGE UP (ref 96–108)
CHLORIDE SERPL-SCNC: 101 MMOL/L — SIGNIFICANT CHANGE UP (ref 96–108)
CO2 SERPL-SCNC: 31 MMOL/L — SIGNIFICANT CHANGE UP (ref 22–31)
CO2 SERPL-SCNC: 31 MMOL/L — SIGNIFICANT CHANGE UP (ref 22–31)
CREAT SERPL-MCNC: 1.33 MG/DL — HIGH (ref 0.5–1.3)
CREAT SERPL-MCNC: 1.33 MG/DL — HIGH (ref 0.5–1.3)
EGFR: 52 ML/MIN/1.73M2 — LOW
EGFR: 52 ML/MIN/1.73M2 — LOW
EOSINOPHIL # BLD AUTO: 0.11 K/UL — SIGNIFICANT CHANGE UP (ref 0–0.5)
EOSINOPHIL # BLD AUTO: 0.11 K/UL — SIGNIFICANT CHANGE UP (ref 0–0.5)
EOSINOPHIL NFR BLD AUTO: 0.8 % — SIGNIFICANT CHANGE UP (ref 0–6)
EOSINOPHIL NFR BLD AUTO: 0.8 % — SIGNIFICANT CHANGE UP (ref 0–6)
GLUCOSE BLDC GLUCOMTR-MCNC: 128 MG/DL — HIGH (ref 70–99)
GLUCOSE BLDC GLUCOMTR-MCNC: 128 MG/DL — HIGH (ref 70–99)
GLUCOSE BLDC GLUCOMTR-MCNC: 182 MG/DL — HIGH (ref 70–99)
GLUCOSE BLDC GLUCOMTR-MCNC: 182 MG/DL — HIGH (ref 70–99)
GLUCOSE BLDC GLUCOMTR-MCNC: 223 MG/DL — HIGH (ref 70–99)
GLUCOSE BLDC GLUCOMTR-MCNC: 223 MG/DL — HIGH (ref 70–99)
GLUCOSE BLDC GLUCOMTR-MCNC: 318 MG/DL — HIGH (ref 70–99)
GLUCOSE BLDC GLUCOMTR-MCNC: 318 MG/DL — HIGH (ref 70–99)
GLUCOSE SERPL-MCNC: 111 MG/DL — HIGH (ref 70–99)
GLUCOSE SERPL-MCNC: 111 MG/DL — HIGH (ref 70–99)
HCT VFR BLD CALC: 26.4 % — LOW (ref 39–50)
HCT VFR BLD CALC: 26.4 % — LOW (ref 39–50)
HGB BLD-MCNC: 8 G/DL — LOW (ref 13–17)
HGB BLD-MCNC: 8 G/DL — LOW (ref 13–17)
IMM GRANULOCYTES NFR BLD AUTO: 1.1 % — HIGH (ref 0–0.9)
IMM GRANULOCYTES NFR BLD AUTO: 1.1 % — HIGH (ref 0–0.9)
LYMPHOCYTES # BLD AUTO: 1.95 K/UL — SIGNIFICANT CHANGE UP (ref 1–3.3)
LYMPHOCYTES # BLD AUTO: 1.95 K/UL — SIGNIFICANT CHANGE UP (ref 1–3.3)
LYMPHOCYTES # BLD AUTO: 13.9 % — SIGNIFICANT CHANGE UP (ref 13–44)
LYMPHOCYTES # BLD AUTO: 13.9 % — SIGNIFICANT CHANGE UP (ref 13–44)
MAGNESIUM SERPL-MCNC: 1.9 MG/DL — SIGNIFICANT CHANGE UP (ref 1.6–2.6)
MAGNESIUM SERPL-MCNC: 1.9 MG/DL — SIGNIFICANT CHANGE UP (ref 1.6–2.6)
MCHC RBC-ENTMCNC: 21.8 PG — LOW (ref 27–34)
MCHC RBC-ENTMCNC: 21.8 PG — LOW (ref 27–34)
MCHC RBC-ENTMCNC: 30.3 GM/DL — LOW (ref 32–36)
MCHC RBC-ENTMCNC: 30.3 GM/DL — LOW (ref 32–36)
MCV RBC AUTO: 71.9 FL — LOW (ref 80–100)
MCV RBC AUTO: 71.9 FL — LOW (ref 80–100)
MONOCYTES # BLD AUTO: 1.29 K/UL — HIGH (ref 0–0.9)
MONOCYTES # BLD AUTO: 1.29 K/UL — HIGH (ref 0–0.9)
MONOCYTES NFR BLD AUTO: 9.2 % — SIGNIFICANT CHANGE UP (ref 2–14)
MONOCYTES NFR BLD AUTO: 9.2 % — SIGNIFICANT CHANGE UP (ref 2–14)
NEUTROPHILS # BLD AUTO: 10.54 K/UL — HIGH (ref 1.8–7.4)
NEUTROPHILS # BLD AUTO: 10.54 K/UL — HIGH (ref 1.8–7.4)
NEUTROPHILS NFR BLD AUTO: 74.9 % — SIGNIFICANT CHANGE UP (ref 43–77)
NEUTROPHILS NFR BLD AUTO: 74.9 % — SIGNIFICANT CHANGE UP (ref 43–77)
NRBC # BLD: 0 /100 WBCS — SIGNIFICANT CHANGE UP (ref 0–0)
NRBC # BLD: 0 /100 WBCS — SIGNIFICANT CHANGE UP (ref 0–0)
PHOSPHATE SERPL-MCNC: 2.4 MG/DL — LOW (ref 2.5–4.5)
PHOSPHATE SERPL-MCNC: 2.4 MG/DL — LOW (ref 2.5–4.5)
PLATELET # BLD AUTO: 503 K/UL — HIGH (ref 150–400)
PLATELET # BLD AUTO: 503 K/UL — HIGH (ref 150–400)
POTASSIUM SERPL-MCNC: 3.5 MMOL/L — SIGNIFICANT CHANGE UP (ref 3.5–5.3)
POTASSIUM SERPL-MCNC: 3.5 MMOL/L — SIGNIFICANT CHANGE UP (ref 3.5–5.3)
POTASSIUM SERPL-SCNC: 3.5 MMOL/L — SIGNIFICANT CHANGE UP (ref 3.5–5.3)
POTASSIUM SERPL-SCNC: 3.5 MMOL/L — SIGNIFICANT CHANGE UP (ref 3.5–5.3)
PROT SERPL-MCNC: 5.4 G/DL — LOW (ref 6–8.3)
PROT SERPL-MCNC: 5.4 G/DL — LOW (ref 6–8.3)
RBC # BLD: 3.67 M/UL — LOW (ref 4.2–5.8)
RBC # BLD: 3.67 M/UL — LOW (ref 4.2–5.8)
RBC # FLD: 21.8 % — HIGH (ref 10.3–14.5)
RBC # FLD: 21.8 % — HIGH (ref 10.3–14.5)
SODIUM SERPL-SCNC: 139 MMOL/L — SIGNIFICANT CHANGE UP (ref 135–145)
SODIUM SERPL-SCNC: 139 MMOL/L — SIGNIFICANT CHANGE UP (ref 135–145)
WBC # BLD: 14.05 K/UL — HIGH (ref 3.8–10.5)
WBC # BLD: 14.05 K/UL — HIGH (ref 3.8–10.5)
WBC # FLD AUTO: 14.05 K/UL — HIGH (ref 3.8–10.5)
WBC # FLD AUTO: 14.05 K/UL — HIGH (ref 3.8–10.5)

## 2024-01-01 PROCEDURE — 99233 SBSQ HOSP IP/OBS HIGH 50: CPT

## 2024-01-01 RX ORDER — BUMETANIDE 0.25 MG/ML
1 INJECTION INTRAMUSCULAR; INTRAVENOUS DAILY
Refills: 0 | Status: DISCONTINUED | OUTPATIENT
Start: 2024-01-01 | End: 2024-01-02

## 2024-01-01 RX ADMIN — Medication 325 MILLIGRAM(S): at 21:57

## 2024-01-01 RX ADMIN — Medication 500 MILLIGRAM(S): at 13:03

## 2024-01-01 RX ADMIN — Medication 25 MILLIGRAM(S): at 05:35

## 2024-01-01 RX ADMIN — Medication 500000 UNIT(S): at 18:23

## 2024-01-01 RX ADMIN — APIXABAN 2.5 MILLIGRAM(S): 2.5 TABLET, FILM COATED ORAL at 05:36

## 2024-01-01 RX ADMIN — Medication 100 MILLIGRAM(S): at 21:57

## 2024-01-01 RX ADMIN — ALBUTEROL 2.5 MILLIGRAM(S): 90 AEROSOL, METERED ORAL at 18:22

## 2024-01-01 RX ADMIN — Medication 500000 UNIT(S): at 13:03

## 2024-01-01 RX ADMIN — Medication 85 MILLIMOLE(S): at 16:00

## 2024-01-01 RX ADMIN — BUDESONIDE AND FORMOTEROL FUMARATE DIHYDRATE 2 PUFF(S): 160; 4.5 AEROSOL RESPIRATORY (INHALATION) at 18:21

## 2024-01-01 RX ADMIN — IRON SUCROSE 176.67 MILLIGRAM(S): 20 INJECTION, SOLUTION INTRAVENOUS at 14:01

## 2024-01-01 RX ADMIN — APIXABAN 2.5 MILLIGRAM(S): 2.5 TABLET, FILM COATED ORAL at 18:23

## 2024-01-01 RX ADMIN — ALBUTEROL 2.5 MILLIGRAM(S): 90 AEROSOL, METERED ORAL at 13:03

## 2024-01-01 RX ADMIN — FINASTERIDE 5 MILLIGRAM(S): 5 TABLET, FILM COATED ORAL at 13:03

## 2024-01-01 RX ADMIN — Medication 600 MILLIGRAM(S): at 18:23

## 2024-01-01 RX ADMIN — ALBUTEROL 2.5 MILLIGRAM(S): 90 AEROSOL, METERED ORAL at 05:36

## 2024-01-01 RX ADMIN — ATORVASTATIN CALCIUM 10 MILLIGRAM(S): 80 TABLET, FILM COATED ORAL at 21:56

## 2024-01-01 RX ADMIN — Medication 1: at 13:05

## 2024-01-01 RX ADMIN — BUMETANIDE 1 MILLIGRAM(S): 0.25 INJECTION INTRAMUSCULAR; INTRAVENOUS at 14:08

## 2024-01-01 RX ADMIN — DIPHENHYDRAMINE HYDROCHLORIDE AND LIDOCAINE HYDROCHLORIDE AND ALUMINUM HYDROXIDE AND MAGNESIUM HYDRO 5 MILLILITER(S): KIT at 05:36

## 2024-01-01 RX ADMIN — PANTOPRAZOLE SODIUM 40 MILLIGRAM(S): 20 TABLET, DELAYED RELEASE ORAL at 13:03

## 2024-01-01 RX ADMIN — TAMSULOSIN HYDROCHLORIDE 0.4 MILLIGRAM(S): 0.4 CAPSULE ORAL at 21:57

## 2024-01-01 RX ADMIN — Medication 500000 UNIT(S): at 05:36

## 2024-01-01 RX ADMIN — Medication 1 TABLET(S): at 13:03

## 2024-01-01 RX ADMIN — Medication 100 MILLIGRAM(S): at 05:35

## 2024-01-01 RX ADMIN — Medication 600 MILLIGRAM(S): at 05:35

## 2024-01-01 RX ADMIN — CEFTRIAXONE 100 MILLIGRAM(S): 500 INJECTION, POWDER, FOR SOLUTION INTRAMUSCULAR; INTRAVENOUS at 13:04

## 2024-01-01 RX ADMIN — BUDESONIDE AND FORMOTEROL FUMARATE DIHYDRATE 2 PUFF(S): 160; 4.5 AEROSOL RESPIRATORY (INHALATION) at 05:36

## 2024-01-01 RX ADMIN — DIPHENHYDRAMINE HYDROCHLORIDE AND LIDOCAINE HYDROCHLORIDE AND ALUMINUM HYDROXIDE AND MAGNESIUM HYDRO 5 MILLILITER(S): KIT at 18:22

## 2024-01-01 RX ADMIN — Medication 4: at 18:21

## 2024-01-01 RX ADMIN — Medication 500000 UNIT(S): at 21:58

## 2024-01-01 RX ADMIN — Medication 40 MILLIGRAM(S): at 05:35

## 2024-01-01 RX ADMIN — Medication 100 MILLIGRAM(S): at 14:00

## 2024-01-01 RX ADMIN — Medication 325 MILLIGRAM(S): at 14:00

## 2024-01-01 RX ADMIN — DIPHENHYDRAMINE HYDROCHLORIDE AND LIDOCAINE HYDROCHLORIDE AND ALUMINUM HYDROXIDE AND MAGNESIUM HYDRO 5 MILLILITER(S): KIT at 13:03

## 2024-01-01 RX ADMIN — Medication 25 MILLIGRAM(S): at 18:23

## 2024-01-01 RX ADMIN — Medication 325 MILLIGRAM(S): at 05:35

## 2024-01-01 NOTE — PROGRESS NOTE ADULT - SUBJECTIVE AND OBJECTIVE BOX
***************************************************************  Blas Rubio, PGY1  Internal Medicine   Preferred contact via [employer] Teams   ***************************************************************    GLADIS LEONARD  83y  MRN: [id number]    Patient is a 83y old  Male who presents with a chief complaint of sob (31 Dec 2023 06:45)      Interval/Overnight Events: no events ON.     Subjective: Pt seen and examined at bedside. Denies fever, CP, SOB, abn pain, N/V, dysuria. Tolerating diet.      MEDICATIONS  (STANDING):  albuterol    0.083% 2.5 milliGRAM(s) Nebulizer every 6 hours  apixaban 2.5 milliGRAM(s) Oral two times a day  ascorbic acid 500 milliGRAM(s) Oral daily  atorvastatin 10 milliGRAM(s) Oral at bedtime  benzonatate 100 milliGRAM(s) Oral every 8 hours  budesonide 160 MICROgram(s)/formoterol 4.5 MICROgram(s) Inhaler 2 Puff(s) Inhalation two times a day  cefTRIAXone   IVPB 2000 milliGRAM(s) IV Intermittent every 24 hours  chlorhexidine 4% Liquid 1 Application(s) Topical <User Schedule>  dextrose 5%. 1000 milliLiter(s) (50 mL/Hr) IV Continuous <Continuous>  dextrose 5%. 1000 milliLiter(s) (100 mL/Hr) IV Continuous <Continuous>  dextrose 50% Injectable 12.5 Gram(s) IV Push once  dextrose 50% Injectable 25 Gram(s) IV Push once  dextrose 50% Injectable 25 Gram(s) IV Push once  finasteride 5 milliGRAM(s) Oral daily  FIRST- Mouthwash  BLM 5 milliLiter(s) Swish and Spit every 6 hours  glucagon  Injectable 1 milliGRAM(s) IntraMuscular once  guaiFENesin  milliGRAM(s) Oral every 12 hours  influenza  Vaccine (HIGH DOSE) 0.7 milliLiter(s) IntraMuscular once  insulin lispro (ADMELOG) corrective regimen sliding scale   SubCutaneous three times a day before meals  insulin lispro (ADMELOG) corrective regimen sliding scale   SubCutaneous at bedtime  iron sucrose IVPB 300 milliGRAM(s) IV Intermittent every 24 hours  metoprolol tartrate 25 milliGRAM(s) Oral two times a day  multivitamin 1 Tablet(s) Oral daily  nystatin    Suspension 067131 Unit(s) Oral four times a day  pantoprazole   Suspension 40 milliGRAM(s) Oral daily  predniSONE   Tablet 40 milliGRAM(s) Oral daily  sodium bicarbonate 325 milliGRAM(s) Oral every 8 hours  tamsulosin 0.4 milliGRAM(s) Oral at bedtime    MEDICATIONS  (PRN):  acetaminophen     Tablet .. 650 milliGRAM(s) Oral every 6 hours PRN Temp greater or equal to 38C (100.4F), Mild Pain (1 - 3)  benzocaine/menthol Lozenge 1 Lozenge Oral three times a day PRN Sore Throat  dextrose Oral Gel 15 Gram(s) Oral once PRN Blood Glucose LESS THAN 70 milliGRAM(s)/deciliter      Objective:    Vitals: Vital Signs Last 24 Hrs  T(C): 36.3 (01-01-24 @ 05:29), Max: 36.7 (12-31-23 @ 21:58)  T(F): 97.3 (01-01-24 @ 05:29), Max: 98.1 (12-31-23 @ 21:58)  HR: 85 (01-01-24 @ 05:29) (80 - 85)  BP: 122/68 (01-01-24 @ 05:29) (122/68 - 139/70)  BP(mean): --  RR: 16 (01-01-24 @ 05:29) (16 - 18)  SpO2: 96% (01-01-24 @ 05:29) (93% - 96%)                I&O's Summary    31 Dec 2023 07:01  -  01 Jan 2024 07:00  --------------------------------------------------------  IN: 160 mL / OUT: 2200 mL / NET: -2040 mL        PHYSICAL EXAM:  GENERAL: NAD  HEAD:  Atraumatic, Normocephalic  EYES: EOMI, conjunctiva and sclera clear  CHEST/LUNG: Clear to auscultation bilaterally; No rales, rhonchi, wheezing, or rubs  HEART: Regular rate and rhythm; No murmurs, rubs, or gallops  ABDOMEN: Soft, Nontender, Nondistended;   SKIN: No rashes or lesions  NERVOUS SYSTEM:  Alert & Oriented X3, no focal deficits    LABS:                        8.4    14.05 )-----------( 536      ( 31 Dec 2023 07:19 )             28.3                         8.7    16.75 )-----------( 543      ( 30 Dec 2023 07:16 )             29.7                         8.3    19.18 )-----------( 602      ( 29 Dec 2023 07:10 )             28.2     12-31    139  |  101  |  34<H>  ----------------------------<  109<H>  3.8   |  29  |  1.31<H>  12-30    137  |  99  |  42<H>  ----------------------------<  135<H>  4.1   |  28  |  1.44<H>    Ca    8.8      31 Dec 2023 07:27  Ca    8.9      30 Dec 2023 07:16  Phos  2.4     12-31  Mg     2.0     12-31    TPro  5.7<L>  /  Alb  2.6<L>  /  TBili  0.4  /  DBili  x   /  AST  16  /  ALT  31  /  AlkPhos  83  12-31  TPro  5.8<L>  /  Alb  2.4<L>  /  TBili  0.3  /  DBili  x   /  AST  14  /  ALT  37  /  AlkPhos  87  12-30    CAPILLARY BLOOD GLUCOSE      POCT Blood Glucose.: 184 mg/dL (31 Dec 2023 22:14)  POCT Blood Glucose.: 234 mg/dL (31 Dec 2023 17:14)  POCT Blood Glucose.: 171 mg/dL (31 Dec 2023 12:59)  POCT Blood Glucose.: 147 mg/dL (31 Dec 2023 08:54)        Urinalysis Basic - ( 31 Dec 2023 07:27 )    Color: x / Appearance: x / SG: x / pH: x  Gluc: 109 mg/dL / Ketone: x  / Bili: x / Urobili: x   Blood: x / Protein: x / Nitrite: x   Leuk Esterase: x / RBC: x / WBC x   Sq Epi: x / Non Sq Epi: x / Bacteria: x          RADIOLOGY & ADDITIONAL TESTS:    Imaging Personally Reviewed:  [x ] YES  [ ] NO    Consultants involved in case:   Consultant(s) Notes Reviewed:  [ x] YES  [ ] NO:   Care Discussed with Consultants/Other Providers [x ] YES  [ ] NO         ***************************************************************  Blas Rubio, PGY1  Internal Medicine   Preferred contact via [employer] Teams   ***************************************************************    GLADIS LEONARD  83y  MRN: [id number]    Patient is a 83y old  Male who presents with a chief complaint of sob (31 Dec 2023 06:45)      Interval/Overnight Events: no events ON.     Subjective: Pt seen and examined at bedside. Denies fever, CP, SOB, abn pain, N/V, dysuria. Tolerating diet.      MEDICATIONS  (STANDING):  albuterol    0.083% 2.5 milliGRAM(s) Nebulizer every 6 hours  apixaban 2.5 milliGRAM(s) Oral two times a day  ascorbic acid 500 milliGRAM(s) Oral daily  atorvastatin 10 milliGRAM(s) Oral at bedtime  benzonatate 100 milliGRAM(s) Oral every 8 hours  budesonide 160 MICROgram(s)/formoterol 4.5 MICROgram(s) Inhaler 2 Puff(s) Inhalation two times a day  cefTRIAXone   IVPB 2000 milliGRAM(s) IV Intermittent every 24 hours  chlorhexidine 4% Liquid 1 Application(s) Topical <User Schedule>  dextrose 5%. 1000 milliLiter(s) (50 mL/Hr) IV Continuous <Continuous>  dextrose 5%. 1000 milliLiter(s) (100 mL/Hr) IV Continuous <Continuous>  dextrose 50% Injectable 12.5 Gram(s) IV Push once  dextrose 50% Injectable 25 Gram(s) IV Push once  dextrose 50% Injectable 25 Gram(s) IV Push once  finasteride 5 milliGRAM(s) Oral daily  FIRST- Mouthwash  BLM 5 milliLiter(s) Swish and Spit every 6 hours  glucagon  Injectable 1 milliGRAM(s) IntraMuscular once  guaiFENesin  milliGRAM(s) Oral every 12 hours  influenza  Vaccine (HIGH DOSE) 0.7 milliLiter(s) IntraMuscular once  insulin lispro (ADMELOG) corrective regimen sliding scale   SubCutaneous three times a day before meals  insulin lispro (ADMELOG) corrective regimen sliding scale   SubCutaneous at bedtime  iron sucrose IVPB 300 milliGRAM(s) IV Intermittent every 24 hours  metoprolol tartrate 25 milliGRAM(s) Oral two times a day  multivitamin 1 Tablet(s) Oral daily  nystatin    Suspension 207596 Unit(s) Oral four times a day  pantoprazole   Suspension 40 milliGRAM(s) Oral daily  predniSONE   Tablet 40 milliGRAM(s) Oral daily  sodium bicarbonate 325 milliGRAM(s) Oral every 8 hours  tamsulosin 0.4 milliGRAM(s) Oral at bedtime    MEDICATIONS  (PRN):  acetaminophen     Tablet .. 650 milliGRAM(s) Oral every 6 hours PRN Temp greater or equal to 38C (100.4F), Mild Pain (1 - 3)  benzocaine/menthol Lozenge 1 Lozenge Oral three times a day PRN Sore Throat  dextrose Oral Gel 15 Gram(s) Oral once PRN Blood Glucose LESS THAN 70 milliGRAM(s)/deciliter      Objective:    Vitals: Vital Signs Last 24 Hrs  T(C): 36.3 (01-01-24 @ 05:29), Max: 36.7 (12-31-23 @ 21:58)  T(F): 97.3 (01-01-24 @ 05:29), Max: 98.1 (12-31-23 @ 21:58)  HR: 85 (01-01-24 @ 05:29) (80 - 85)  BP: 122/68 (01-01-24 @ 05:29) (122/68 - 139/70)  BP(mean): --  RR: 16 (01-01-24 @ 05:29) (16 - 18)  SpO2: 96% (01-01-24 @ 05:29) (93% - 96%)                I&O's Summary    31 Dec 2023 07:01  -  01 Jan 2024 07:00  --------------------------------------------------------  IN: 160 mL / OUT: 2200 mL / NET: -2040 mL        PHYSICAL EXAM:  GENERAL: NAD  HEAD:  Atraumatic, Normocephalic  EYES: EOMI, conjunctiva and sclera clear  CHEST/LUNG: Clear to auscultation bilaterally; No rales, rhonchi, wheezing, or rubs  HEART: Regular rate and rhythm; No murmurs, rubs, or gallops  ABDOMEN: Soft, Nontender, Nondistended;   SKIN: No rashes or lesions  NERVOUS SYSTEM:  Alert & Oriented X3, no focal deficits    LABS:                        8.4    14.05 )-----------( 536      ( 31 Dec 2023 07:19 )             28.3                         8.7    16.75 )-----------( 543      ( 30 Dec 2023 07:16 )             29.7                         8.3    19.18 )-----------( 602      ( 29 Dec 2023 07:10 )             28.2     12-31    139  |  101  |  34<H>  ----------------------------<  109<H>  3.8   |  29  |  1.31<H>  12-30    137  |  99  |  42<H>  ----------------------------<  135<H>  4.1   |  28  |  1.44<H>    Ca    8.8      31 Dec 2023 07:27  Ca    8.9      30 Dec 2023 07:16  Phos  2.4     12-31  Mg     2.0     12-31    TPro  5.7<L>  /  Alb  2.6<L>  /  TBili  0.4  /  DBili  x   /  AST  16  /  ALT  31  /  AlkPhos  83  12-31  TPro  5.8<L>  /  Alb  2.4<L>  /  TBili  0.3  /  DBili  x   /  AST  14  /  ALT  37  /  AlkPhos  87  12-30    CAPILLARY BLOOD GLUCOSE      POCT Blood Glucose.: 184 mg/dL (31 Dec 2023 22:14)  POCT Blood Glucose.: 234 mg/dL (31 Dec 2023 17:14)  POCT Blood Glucose.: 171 mg/dL (31 Dec 2023 12:59)  POCT Blood Glucose.: 147 mg/dL (31 Dec 2023 08:54)        Urinalysis Basic - ( 31 Dec 2023 07:27 )    Color: x / Appearance: x / SG: x / pH: x  Gluc: 109 mg/dL / Ketone: x  / Bili: x / Urobili: x   Blood: x / Protein: x / Nitrite: x   Leuk Esterase: x / RBC: x / WBC x   Sq Epi: x / Non Sq Epi: x / Bacteria: x          RADIOLOGY & ADDITIONAL TESTS:    Imaging Personally Reviewed:  [x ] YES  [ ] NO    Consultants involved in case:   Consultant(s) Notes Reviewed:  [ x] YES  [ ] NO:   Care Discussed with Consultants/Other Providers [x ] YES  [ ] NO         ***************************************************************  Blas Rubio, PGY1  Internal Medicine   Preferred contact via [employer] Teams   ***************************************************************    GLADIS LEONARD  83y  MRN: [id number]    Patient is a 83y old  Male who presents with a chief complaint of sob (31 Dec 2023 06:45)      Interval/Overnight Events: no events ON.     Subjective: Pt seen and examined at bedside. Pt pleasant and resting calmly.     MEDICATIONS  (STANDING):  albuterol    0.083% 2.5 milliGRAM(s) Nebulizer every 6 hours  apixaban 2.5 milliGRAM(s) Oral two times a day  ascorbic acid 500 milliGRAM(s) Oral daily  atorvastatin 10 milliGRAM(s) Oral at bedtime  benzonatate 100 milliGRAM(s) Oral every 8 hours  budesonide 160 MICROgram(s)/formoterol 4.5 MICROgram(s) Inhaler 2 Puff(s) Inhalation two times a day  cefTRIAXone   IVPB 2000 milliGRAM(s) IV Intermittent every 24 hours  chlorhexidine 4% Liquid 1 Application(s) Topical <User Schedule>  dextrose 5%. 1000 milliLiter(s) (50 mL/Hr) IV Continuous <Continuous>  dextrose 5%. 1000 milliLiter(s) (100 mL/Hr) IV Continuous <Continuous>  dextrose 50% Injectable 12.5 Gram(s) IV Push once  dextrose 50% Injectable 25 Gram(s) IV Push once  dextrose 50% Injectable 25 Gram(s) IV Push once  finasteride 5 milliGRAM(s) Oral daily  FIRST- Mouthwash  BLM 5 milliLiter(s) Swish and Spit every 6 hours  glucagon  Injectable 1 milliGRAM(s) IntraMuscular once  guaiFENesin  milliGRAM(s) Oral every 12 hours  influenza  Vaccine (HIGH DOSE) 0.7 milliLiter(s) IntraMuscular once  insulin lispro (ADMELOG) corrective regimen sliding scale   SubCutaneous three times a day before meals  insulin lispro (ADMELOG) corrective regimen sliding scale   SubCutaneous at bedtime  iron sucrose IVPB 300 milliGRAM(s) IV Intermittent every 24 hours  metoprolol tartrate 25 milliGRAM(s) Oral two times a day  multivitamin 1 Tablet(s) Oral daily  nystatin    Suspension 680155 Unit(s) Oral four times a day  pantoprazole   Suspension 40 milliGRAM(s) Oral daily  predniSONE   Tablet 40 milliGRAM(s) Oral daily  sodium bicarbonate 325 milliGRAM(s) Oral every 8 hours  tamsulosin 0.4 milliGRAM(s) Oral at bedtime    MEDICATIONS  (PRN):  acetaminophen     Tablet .. 650 milliGRAM(s) Oral every 6 hours PRN Temp greater or equal to 38C (100.4F), Mild Pain (1 - 3)  benzocaine/menthol Lozenge 1 Lozenge Oral three times a day PRN Sore Throat  dextrose Oral Gel 15 Gram(s) Oral once PRN Blood Glucose LESS THAN 70 milliGRAM(s)/deciliter      Objective:    Vitals: Vital Signs Last 24 Hrs  T(C): 36.3 (01-01-24 @ 05:29), Max: 36.7 (12-31-23 @ 21:58)  T(F): 97.3 (01-01-24 @ 05:29), Max: 98.1 (12-31-23 @ 21:58)  HR: 85 (01-01-24 @ 05:29) (80 - 85)  BP: 122/68 (01-01-24 @ 05:29) (122/68 - 139/70)  BP(mean): --  RR: 16 (01-01-24 @ 05:29) (16 - 18)  SpO2: 96% (01-01-24 @ 05:29) (93% - 96%)                I&O's Summary    31 Dec 2023 07:01  -  01 Jan 2024 07:00  --------------------------------------------------------  IN: 160 mL / OUT: 2200 mL / NET: -2040 mL        PHYSICAL EXAM:  GENERAL: NAD  HEAD:  Atraumatic, Normocephalic  EYES: EOMI, conjunctiva and sclera clear  CHEST/LUNG: Clear to auscultation bilaterally; No rales, rhonchi, wheezing, or rubs  HEART: Regular rate and rhythm; No murmurs, rubs, or gallops  ABDOMEN: Soft, Nontender, Nondistended;   SKIN: 1+ PE bilaterally  NERVOUS SYSTEM:  Alert & Oriented X3, no focal deficits    LABS:                        8.4    14.05 )-----------( 536      ( 31 Dec 2023 07:19 )             28.3                         8.7    16.75 )-----------( 543      ( 30 Dec 2023 07:16 )             29.7                         8.3    19.18 )-----------( 602      ( 29 Dec 2023 07:10 )             28.2     12-31    139  |  101  |  34<H>  ----------------------------<  109<H>  3.8   |  29  |  1.31<H>  12-30    137  |  99  |  42<H>  ----------------------------<  135<H>  4.1   |  28  |  1.44<H>    Ca    8.8      31 Dec 2023 07:27  Ca    8.9      30 Dec 2023 07:16  Phos  2.4     12-31  Mg     2.0     12-31    TPro  5.7<L>  /  Alb  2.6<L>  /  TBili  0.4  /  DBili  x   /  AST  16  /  ALT  31  /  AlkPhos  83  12-31  TPro  5.8<L>  /  Alb  2.4<L>  /  TBili  0.3  /  DBili  x   /  AST  14  /  ALT  37  /  AlkPhos  87  12-30    CAPILLARY BLOOD GLUCOSE      POCT Blood Glucose.: 184 mg/dL (31 Dec 2023 22:14)  POCT Blood Glucose.: 234 mg/dL (31 Dec 2023 17:14)  POCT Blood Glucose.: 171 mg/dL (31 Dec 2023 12:59)  POCT Blood Glucose.: 147 mg/dL (31 Dec 2023 08:54)        Urinalysis Basic - ( 31 Dec 2023 07:27 )    Color: x / Appearance: x / SG: x / pH: x  Gluc: 109 mg/dL / Ketone: x  / Bili: x / Urobili: x   Blood: x / Protein: x / Nitrite: x   Leuk Esterase: x / RBC: x / WBC x   Sq Epi: x / Non Sq Epi: x / Bacteria: x          RADIOLOGY & ADDITIONAL TESTS:    Imaging Personally Reviewed:  [x ] YES  [ ] NO    Consultants involved in case:   Consultant(s) Notes Reviewed:  [ x] YES  [ ] NO:   Care Discussed with Consultants/Other Providers [x ] YES  [ ] NO         ***************************************************************  Blas Rubio, PGY1  Internal Medicine   Preferred contact via [employer] Teams   ***************************************************************    GLADIS LEONARD  83y  MRN: [id number]    Patient is a 83y old  Male who presents with a chief complaint of sob (31 Dec 2023 06:45)      Interval/Overnight Events: no events ON.     Subjective: Pt seen and examined at bedside. Pt pleasant and resting calmly.     MEDICATIONS  (STANDING):  albuterol    0.083% 2.5 milliGRAM(s) Nebulizer every 6 hours  apixaban 2.5 milliGRAM(s) Oral two times a day  ascorbic acid 500 milliGRAM(s) Oral daily  atorvastatin 10 milliGRAM(s) Oral at bedtime  benzonatate 100 milliGRAM(s) Oral every 8 hours  budesonide 160 MICROgram(s)/formoterol 4.5 MICROgram(s) Inhaler 2 Puff(s) Inhalation two times a day  cefTRIAXone   IVPB 2000 milliGRAM(s) IV Intermittent every 24 hours  chlorhexidine 4% Liquid 1 Application(s) Topical <User Schedule>  dextrose 5%. 1000 milliLiter(s) (50 mL/Hr) IV Continuous <Continuous>  dextrose 5%. 1000 milliLiter(s) (100 mL/Hr) IV Continuous <Continuous>  dextrose 50% Injectable 12.5 Gram(s) IV Push once  dextrose 50% Injectable 25 Gram(s) IV Push once  dextrose 50% Injectable 25 Gram(s) IV Push once  finasteride 5 milliGRAM(s) Oral daily  FIRST- Mouthwash  BLM 5 milliLiter(s) Swish and Spit every 6 hours  glucagon  Injectable 1 milliGRAM(s) IntraMuscular once  guaiFENesin  milliGRAM(s) Oral every 12 hours  influenza  Vaccine (HIGH DOSE) 0.7 milliLiter(s) IntraMuscular once  insulin lispro (ADMELOG) corrective regimen sliding scale   SubCutaneous three times a day before meals  insulin lispro (ADMELOG) corrective regimen sliding scale   SubCutaneous at bedtime  iron sucrose IVPB 300 milliGRAM(s) IV Intermittent every 24 hours  metoprolol tartrate 25 milliGRAM(s) Oral two times a day  multivitamin 1 Tablet(s) Oral daily  nystatin    Suspension 881927 Unit(s) Oral four times a day  pantoprazole   Suspension 40 milliGRAM(s) Oral daily  predniSONE   Tablet 40 milliGRAM(s) Oral daily  sodium bicarbonate 325 milliGRAM(s) Oral every 8 hours  tamsulosin 0.4 milliGRAM(s) Oral at bedtime    MEDICATIONS  (PRN):  acetaminophen     Tablet .. 650 milliGRAM(s) Oral every 6 hours PRN Temp greater or equal to 38C (100.4F), Mild Pain (1 - 3)  benzocaine/menthol Lozenge 1 Lozenge Oral three times a day PRN Sore Throat  dextrose Oral Gel 15 Gram(s) Oral once PRN Blood Glucose LESS THAN 70 milliGRAM(s)/deciliter      Objective:    Vitals: Vital Signs Last 24 Hrs  T(C): 36.3 (01-01-24 @ 05:29), Max: 36.7 (12-31-23 @ 21:58)  T(F): 97.3 (01-01-24 @ 05:29), Max: 98.1 (12-31-23 @ 21:58)  HR: 85 (01-01-24 @ 05:29) (80 - 85)  BP: 122/68 (01-01-24 @ 05:29) (122/68 - 139/70)  BP(mean): --  RR: 16 (01-01-24 @ 05:29) (16 - 18)  SpO2: 96% (01-01-24 @ 05:29) (93% - 96%)                I&O's Summary    31 Dec 2023 07:01  -  01 Jan 2024 07:00  --------------------------------------------------------  IN: 160 mL / OUT: 2200 mL / NET: -2040 mL        PHYSICAL EXAM:  GENERAL: NAD  HEAD:  Atraumatic, Normocephalic  EYES: EOMI, conjunctiva and sclera clear  CHEST/LUNG: Clear to auscultation bilaterally; No rales, rhonchi, wheezing, or rubs  HEART: Regular rate and rhythm; No murmurs, rubs, or gallops  ABDOMEN: Soft, Nontender, Nondistended;   SKIN: 1+ PE bilaterally  NERVOUS SYSTEM:  Alert & Oriented X3, no focal deficits    LABS:                        8.4    14.05 )-----------( 536      ( 31 Dec 2023 07:19 )             28.3                         8.7    16.75 )-----------( 543      ( 30 Dec 2023 07:16 )             29.7                         8.3    19.18 )-----------( 602      ( 29 Dec 2023 07:10 )             28.2     12-31    139  |  101  |  34<H>  ----------------------------<  109<H>  3.8   |  29  |  1.31<H>  12-30    137  |  99  |  42<H>  ----------------------------<  135<H>  4.1   |  28  |  1.44<H>    Ca    8.8      31 Dec 2023 07:27  Ca    8.9      30 Dec 2023 07:16  Phos  2.4     12-31  Mg     2.0     12-31    TPro  5.7<L>  /  Alb  2.6<L>  /  TBili  0.4  /  DBili  x   /  AST  16  /  ALT  31  /  AlkPhos  83  12-31  TPro  5.8<L>  /  Alb  2.4<L>  /  TBili  0.3  /  DBili  x   /  AST  14  /  ALT  37  /  AlkPhos  87  12-30    CAPILLARY BLOOD GLUCOSE      POCT Blood Glucose.: 184 mg/dL (31 Dec 2023 22:14)  POCT Blood Glucose.: 234 mg/dL (31 Dec 2023 17:14)  POCT Blood Glucose.: 171 mg/dL (31 Dec 2023 12:59)  POCT Blood Glucose.: 147 mg/dL (31 Dec 2023 08:54)        Urinalysis Basic - ( 31 Dec 2023 07:27 )    Color: x / Appearance: x / SG: x / pH: x  Gluc: 109 mg/dL / Ketone: x  / Bili: x / Urobili: x   Blood: x / Protein: x / Nitrite: x   Leuk Esterase: x / RBC: x / WBC x   Sq Epi: x / Non Sq Epi: x / Bacteria: x          RADIOLOGY & ADDITIONAL TESTS:    Imaging Personally Reviewed:  [x ] YES  [ ] NO    Consultants involved in case:   Consultant(s) Notes Reviewed:  [ x] YES  [ ] NO:   Care Discussed with Consultants/Other Providers [x ] YES  [ ] NO

## 2024-01-01 NOTE — PROGRESS NOTE ADULT - PROBLEM SELECTOR PLAN 2
Baseline BNP ~ 2800. BNP ~ 2800 this admission  In chronic exacerbation state with 3+ pitting edema and on diuresis  HFpEF   EF = 68% on 8/11/23  Heart failure, CHF order set initiated  Guideline directed medical therapy as below: after med-rec completed  TTE systolic function is hyperdynamic with an ejection fraction of 80 %  - Bumex 2 mg IV BID and PRN -> Hold diuresis-> Will consider resuming with increasing LE edema and crackles   - Diuretic: on bumex PO 2 mg am 1 mg pm at home. >> resumed home 2mg AM, reassess fluid status in AM  - BB:  resume home metoprolol tatrate 25 mg BID.  - ARNI/ACE-I/ARB: Consider starting at d/c  - Hydralazine/Nitrate: Not indicated at this time  - MRA: Not indicated at this time  - SGLT2: Consider starting at d/c  - Maintain K > 4, Mg > 2 Baseline BNP ~ 2800. BNP ~ 2800 this admission  HFpEF   EF = 68% on 8/11/23  Heart failure, CHF order set initiated  Guideline directed medical therapy as below: after med-rec completed  TTE systolic function is hyperdynamic with an ejection fraction of 80 %  - Bumex 2 mg IV BID and PRN -> Hold diuresis-> Will consider resuming with increasing LE edema and crackles   - Diuretic: on bumex PO 2 mg am 1 mg pm at home. >> resumed home 2mg 1/1-> can considering resuming full home dose   - BB:  resume home metoprolol tatrate 25 mg BID.  - ARNI/ACE-I/ARB: Consider starting at d/c  - Hydralazine/Nitrate: Not indicated at this time  - MRA: Not indicated at this time  - SGLT2: Consider starting at d/c  - Maintain K > 4, Mg > 2

## 2024-01-01 NOTE — PROGRESS NOTE ADULT - ATTENDING COMMENTS
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     Plan:   - C/w with slow steroid taper, patient continues have wheezing. on RA. s/p thoracentesis Pulm following   - ATN improved, continue to trend Cr. Will RESUME home diuretic  - PT recs for [facility], aim to DC patient if symptoms improve  - On Abx for bacteremia, ID following. Will be DC'ed on PO abx    VTE PPx: Apixiban  Diet: DASH   Dispo: [facility] .

## 2024-01-01 NOTE — PROGRESS NOTE ADULT - PROBLEM SELECTOR PLAN 1
Likely multifactorial - CAP and component of HFpEF, entero/rhino virus +, COPD exacerbation   CTPE chest showed loculated moderate L pleural effusion and small R pleural effusion  - s/p thoracentesis  - pulm consulted - recs appreciated   - started ceftriaxone 2000 q24   - s/p azithro 500 q24  - ID consulted recs appreciated   - Aspiration Precautions  - Further plan per HFpEF below  - methylpred taper per Pulm recs-> 12/28 last day-> resumed 12/29 for increased wheezing also ordered RSV and COVID test  > bumex as below

## 2024-01-02 ENCOUNTER — TRANSCRIPTION ENCOUNTER (OUTPATIENT)
Age: 84
End: 2024-01-02

## 2024-01-02 VITALS
SYSTOLIC BLOOD PRESSURE: 122 MMHG | DIASTOLIC BLOOD PRESSURE: 64 MMHG | HEART RATE: 82 BPM | TEMPERATURE: 98 F | RESPIRATION RATE: 17 BRPM | OXYGEN SATURATION: 97 %

## 2024-01-02 LAB
ALBUMIN SERPL ELPH-MCNC: 2.5 G/DL — LOW (ref 3.3–5)
ALBUMIN SERPL ELPH-MCNC: 2.5 G/DL — LOW (ref 3.3–5)
ALP SERPL-CCNC: 84 U/L — SIGNIFICANT CHANGE UP (ref 40–120)
ALP SERPL-CCNC: 84 U/L — SIGNIFICANT CHANGE UP (ref 40–120)
ALT FLD-CCNC: 21 U/L — SIGNIFICANT CHANGE UP (ref 10–45)
ALT FLD-CCNC: 21 U/L — SIGNIFICANT CHANGE UP (ref 10–45)
ANION GAP SERPL CALC-SCNC: 10 MMOL/L — SIGNIFICANT CHANGE UP (ref 5–17)
ANION GAP SERPL CALC-SCNC: 10 MMOL/L — SIGNIFICANT CHANGE UP (ref 5–17)
AST SERPL-CCNC: 15 U/L — SIGNIFICANT CHANGE UP (ref 10–40)
AST SERPL-CCNC: 15 U/L — SIGNIFICANT CHANGE UP (ref 10–40)
BASOPHILS # BLD AUTO: 0.01 K/UL — SIGNIFICANT CHANGE UP (ref 0–0.2)
BASOPHILS # BLD AUTO: 0.01 K/UL — SIGNIFICANT CHANGE UP (ref 0–0.2)
BASOPHILS NFR BLD AUTO: 0.1 % — SIGNIFICANT CHANGE UP (ref 0–2)
BASOPHILS NFR BLD AUTO: 0.1 % — SIGNIFICANT CHANGE UP (ref 0–2)
BILIRUB SERPL-MCNC: 0.4 MG/DL — SIGNIFICANT CHANGE UP (ref 0.2–1.2)
BILIRUB SERPL-MCNC: 0.4 MG/DL — SIGNIFICANT CHANGE UP (ref 0.2–1.2)
BUN SERPL-MCNC: 26 MG/DL — HIGH (ref 7–23)
BUN SERPL-MCNC: 26 MG/DL — HIGH (ref 7–23)
CALCIUM SERPL-MCNC: 8.1 MG/DL — LOW (ref 8.4–10.5)
CALCIUM SERPL-MCNC: 8.1 MG/DL — LOW (ref 8.4–10.5)
CHLORIDE SERPL-SCNC: 101 MMOL/L — SIGNIFICANT CHANGE UP (ref 96–108)
CHLORIDE SERPL-SCNC: 101 MMOL/L — SIGNIFICANT CHANGE UP (ref 96–108)
CO2 SERPL-SCNC: 29 MMOL/L — SIGNIFICANT CHANGE UP (ref 22–31)
CO2 SERPL-SCNC: 29 MMOL/L — SIGNIFICANT CHANGE UP (ref 22–31)
CREAT SERPL-MCNC: 1.17 MG/DL — SIGNIFICANT CHANGE UP (ref 0.5–1.3)
CREAT SERPL-MCNC: 1.17 MG/DL — SIGNIFICANT CHANGE UP (ref 0.5–1.3)
EGFR: 61 ML/MIN/1.73M2 — SIGNIFICANT CHANGE UP
EGFR: 61 ML/MIN/1.73M2 — SIGNIFICANT CHANGE UP
EOSINOPHIL # BLD AUTO: 0.13 K/UL — SIGNIFICANT CHANGE UP (ref 0–0.5)
EOSINOPHIL # BLD AUTO: 0.13 K/UL — SIGNIFICANT CHANGE UP (ref 0–0.5)
EOSINOPHIL NFR BLD AUTO: 0.9 % — SIGNIFICANT CHANGE UP (ref 0–6)
EOSINOPHIL NFR BLD AUTO: 0.9 % — SIGNIFICANT CHANGE UP (ref 0–6)
GLUCOSE BLDC GLUCOMTR-MCNC: 113 MG/DL — HIGH (ref 70–99)
GLUCOSE BLDC GLUCOMTR-MCNC: 113 MG/DL — HIGH (ref 70–99)
GLUCOSE BLDC GLUCOMTR-MCNC: 191 MG/DL — HIGH (ref 70–99)
GLUCOSE BLDC GLUCOMTR-MCNC: 191 MG/DL — HIGH (ref 70–99)
GLUCOSE SERPL-MCNC: 90 MG/DL — SIGNIFICANT CHANGE UP (ref 70–99)
GLUCOSE SERPL-MCNC: 90 MG/DL — SIGNIFICANT CHANGE UP (ref 70–99)
HCT VFR BLD CALC: 26.1 % — LOW (ref 39–50)
HCT VFR BLD CALC: 26.1 % — LOW (ref 39–50)
HGB BLD-MCNC: 8 G/DL — LOW (ref 13–17)
HGB BLD-MCNC: 8 G/DL — LOW (ref 13–17)
IMM GRANULOCYTES NFR BLD AUTO: 1.4 % — HIGH (ref 0–0.9)
IMM GRANULOCYTES NFR BLD AUTO: 1.4 % — HIGH (ref 0–0.9)
LYMPHOCYTES # BLD AUTO: 1.98 K/UL — SIGNIFICANT CHANGE UP (ref 1–3.3)
LYMPHOCYTES # BLD AUTO: 1.98 K/UL — SIGNIFICANT CHANGE UP (ref 1–3.3)
LYMPHOCYTES # BLD AUTO: 14.4 % — SIGNIFICANT CHANGE UP (ref 13–44)
LYMPHOCYTES # BLD AUTO: 14.4 % — SIGNIFICANT CHANGE UP (ref 13–44)
MAGNESIUM SERPL-MCNC: 1.7 MG/DL — SIGNIFICANT CHANGE UP (ref 1.6–2.6)
MAGNESIUM SERPL-MCNC: 1.7 MG/DL — SIGNIFICANT CHANGE UP (ref 1.6–2.6)
MCHC RBC-ENTMCNC: 21.7 PG — LOW (ref 27–34)
MCHC RBC-ENTMCNC: 21.7 PG — LOW (ref 27–34)
MCHC RBC-ENTMCNC: 30.7 GM/DL — LOW (ref 32–36)
MCHC RBC-ENTMCNC: 30.7 GM/DL — LOW (ref 32–36)
MCV RBC AUTO: 70.9 FL — LOW (ref 80–100)
MCV RBC AUTO: 70.9 FL — LOW (ref 80–100)
MONOCYTES # BLD AUTO: 1.15 K/UL — HIGH (ref 0–0.9)
MONOCYTES # BLD AUTO: 1.15 K/UL — HIGH (ref 0–0.9)
MONOCYTES NFR BLD AUTO: 8.4 % — SIGNIFICANT CHANGE UP (ref 2–14)
MONOCYTES NFR BLD AUTO: 8.4 % — SIGNIFICANT CHANGE UP (ref 2–14)
NEUTROPHILS # BLD AUTO: 10.3 K/UL — HIGH (ref 1.8–7.4)
NEUTROPHILS # BLD AUTO: 10.3 K/UL — HIGH (ref 1.8–7.4)
NEUTROPHILS NFR BLD AUTO: 74.8 % — SIGNIFICANT CHANGE UP (ref 43–77)
NEUTROPHILS NFR BLD AUTO: 74.8 % — SIGNIFICANT CHANGE UP (ref 43–77)
NRBC # BLD: 0 /100 WBCS — SIGNIFICANT CHANGE UP (ref 0–0)
NRBC # BLD: 0 /100 WBCS — SIGNIFICANT CHANGE UP (ref 0–0)
PHOSPHATE SERPL-MCNC: 3.3 MG/DL — SIGNIFICANT CHANGE UP (ref 2.5–4.5)
PHOSPHATE SERPL-MCNC: 3.3 MG/DL — SIGNIFICANT CHANGE UP (ref 2.5–4.5)
PLATELET # BLD AUTO: 441 K/UL — HIGH (ref 150–400)
PLATELET # BLD AUTO: 441 K/UL — HIGH (ref 150–400)
POTASSIUM SERPL-MCNC: 3.4 MMOL/L — LOW (ref 3.5–5.3)
POTASSIUM SERPL-MCNC: 3.4 MMOL/L — LOW (ref 3.5–5.3)
POTASSIUM SERPL-SCNC: 3.4 MMOL/L — LOW (ref 3.5–5.3)
POTASSIUM SERPL-SCNC: 3.4 MMOL/L — LOW (ref 3.5–5.3)
PROT SERPL-MCNC: 5.2 G/DL — LOW (ref 6–8.3)
PROT SERPL-MCNC: 5.2 G/DL — LOW (ref 6–8.3)
RBC # BLD: 3.68 M/UL — LOW (ref 4.2–5.8)
RBC # BLD: 3.68 M/UL — LOW (ref 4.2–5.8)
RBC # FLD: 22.1 % — HIGH (ref 10.3–14.5)
RBC # FLD: 22.1 % — HIGH (ref 10.3–14.5)
SARS-COV-2 RNA SPEC QL NAA+PROBE: SIGNIFICANT CHANGE UP
SARS-COV-2 RNA SPEC QL NAA+PROBE: SIGNIFICANT CHANGE UP
SODIUM SERPL-SCNC: 140 MMOL/L — SIGNIFICANT CHANGE UP (ref 135–145)
SODIUM SERPL-SCNC: 140 MMOL/L — SIGNIFICANT CHANGE UP (ref 135–145)
WBC # BLD: 13.76 K/UL — HIGH (ref 3.8–10.5)
WBC # BLD: 13.76 K/UL — HIGH (ref 3.8–10.5)
WBC # FLD AUTO: 13.76 K/UL — HIGH (ref 3.8–10.5)
WBC # FLD AUTO: 13.76 K/UL — HIGH (ref 3.8–10.5)

## 2024-01-02 PROCEDURE — 86140 C-REACTIVE PROTEIN: CPT

## 2024-01-02 PROCEDURE — 86850 RBC ANTIBODY SCREEN: CPT

## 2024-01-02 PROCEDURE — 84156 ASSAY OF PROTEIN URINE: CPT

## 2024-01-02 PROCEDURE — 84145 PROCALCITONIN (PCT): CPT

## 2024-01-02 PROCEDURE — 96366 THER/PROPH/DIAG IV INF ADDON: CPT

## 2024-01-02 PROCEDURE — 84300 ASSAY OF URINE SODIUM: CPT

## 2024-01-02 PROCEDURE — 87186 SC STD MICRODIL/AGAR DIL: CPT

## 2024-01-02 PROCEDURE — 84478 ASSAY OF TRIGLYCERIDES: CPT

## 2024-01-02 PROCEDURE — 88184 FLOWCYTOMETRY/ TC 1 MARKER: CPT

## 2024-01-02 PROCEDURE — 82435 ASSAY OF BLOOD CHLORIDE: CPT

## 2024-01-02 PROCEDURE — 85025 COMPLETE CBC W/AUTO DIFF WBC: CPT

## 2024-01-02 PROCEDURE — 84157 ASSAY OF PROTEIN OTHER: CPT

## 2024-01-02 PROCEDURE — 0225U NFCT DS DNA&RNA 21 SARSCOV2: CPT

## 2024-01-02 PROCEDURE — 84311 SPECTROPHOTOMETRY: CPT

## 2024-01-02 PROCEDURE — 76770 US EXAM ABDO BACK WALL COMP: CPT

## 2024-01-02 PROCEDURE — 84133 ASSAY OF URINE POTASSIUM: CPT

## 2024-01-02 PROCEDURE — 87899 AGENT NOS ASSAY W/OPTIC: CPT

## 2024-01-02 PROCEDURE — 82042 OTHER SOURCE ALBUMIN QUAN EA: CPT

## 2024-01-02 PROCEDURE — 83986 ASSAY PH BODY FLUID NOS: CPT

## 2024-01-02 PROCEDURE — 88112 CYTOPATH CELL ENHANCE TECH: CPT

## 2024-01-02 PROCEDURE — 84100 ASSAY OF PHOSPHORUS: CPT

## 2024-01-02 PROCEDURE — 94640 AIRWAY INHALATION TREATMENT: CPT

## 2024-01-02 PROCEDURE — 82570 ASSAY OF URINE CREATININE: CPT

## 2024-01-02 PROCEDURE — 83550 IRON BINDING TEST: CPT

## 2024-01-02 PROCEDURE — 85652 RBC SED RATE AUTOMATED: CPT

## 2024-01-02 PROCEDURE — 87086 URINE CULTURE/COLONY COUNT: CPT

## 2024-01-02 PROCEDURE — 85610 PROTHROMBIN TIME: CPT

## 2024-01-02 PROCEDURE — 85018 HEMOGLOBIN: CPT

## 2024-01-02 PROCEDURE — 82945 GLUCOSE OTHER FLUID: CPT

## 2024-01-02 PROCEDURE — 87015 SPECIMEN INFECT AGNT CONCNTJ: CPT

## 2024-01-02 PROCEDURE — 82746 ASSAY OF FOLIC ACID SERUM: CPT

## 2024-01-02 PROCEDURE — 82962 GLUCOSE BLOOD TEST: CPT

## 2024-01-02 PROCEDURE — 96375 TX/PRO/DX INJ NEW DRUG ADDON: CPT

## 2024-01-02 PROCEDURE — 83880 ASSAY OF NATRIURETIC PEPTIDE: CPT

## 2024-01-02 PROCEDURE — 87070 CULTURE OTHR SPECIMN AEROBIC: CPT

## 2024-01-02 PROCEDURE — 83615 LACTATE (LD) (LDH) ENZYME: CPT

## 2024-01-02 PROCEDURE — 99285 EMERGENCY DEPT VISIT HI MDM: CPT

## 2024-01-02 PROCEDURE — 86901 BLOOD TYPING SEROLOGIC RH(D): CPT

## 2024-01-02 PROCEDURE — 96365 THER/PROPH/DIAG IV INF INIT: CPT

## 2024-01-02 PROCEDURE — 87102 FUNGUS ISOLATION CULTURE: CPT

## 2024-01-02 PROCEDURE — 87150 DNA/RNA AMPLIFIED PROBE: CPT

## 2024-01-02 PROCEDURE — 36415 COLL VENOUS BLD VENIPUNCTURE: CPT

## 2024-01-02 PROCEDURE — 71045 X-RAY EXAM CHEST 1 VIEW: CPT

## 2024-01-02 PROCEDURE — 82728 ASSAY OF FERRITIN: CPT

## 2024-01-02 PROCEDURE — 82947 ASSAY GLUCOSE BLOOD QUANT: CPT

## 2024-01-02 PROCEDURE — 85014 HEMATOCRIT: CPT

## 2024-01-02 PROCEDURE — 87040 BLOOD CULTURE FOR BACTERIA: CPT

## 2024-01-02 PROCEDURE — 87640 STAPH A DNA AMP PROBE: CPT

## 2024-01-02 PROCEDURE — 83935 ASSAY OF URINE OSMOLALITY: CPT

## 2024-01-02 PROCEDURE — 87116 MYCOBACTERIA CULTURE: CPT

## 2024-01-02 PROCEDURE — 99239 HOSP IP/OBS DSCHRG MGMT >30: CPT | Mod: GC

## 2024-01-02 PROCEDURE — 83605 ASSAY OF LACTIC ACID: CPT

## 2024-01-02 PROCEDURE — 82803 BLOOD GASES ANY COMBINATION: CPT

## 2024-01-02 PROCEDURE — 84540 ASSAY OF URINE/UREA-N: CPT

## 2024-01-02 PROCEDURE — 87206 SMEAR FLUORESCENT/ACID STAI: CPT

## 2024-01-02 PROCEDURE — 87635 SARS-COV-2 COVID-19 AMP PRB: CPT

## 2024-01-02 PROCEDURE — 88305 TISSUE EXAM BY PATHOLOGIST: CPT

## 2024-01-02 PROCEDURE — C8929: CPT

## 2024-01-02 PROCEDURE — 87641 MR-STAPH DNA AMP PROBE: CPT

## 2024-01-02 PROCEDURE — 82607 VITAMIN B-12: CPT

## 2024-01-02 PROCEDURE — 84295 ASSAY OF SERUM SODIUM: CPT

## 2024-01-02 PROCEDURE — 81001 URINALYSIS AUTO W/SCOPE: CPT

## 2024-01-02 PROCEDURE — 87077 CULTURE AEROBIC IDENTIFY: CPT

## 2024-01-02 PROCEDURE — 97163 PT EVAL HIGH COMPLEX 45 MIN: CPT

## 2024-01-02 PROCEDURE — 71275 CT ANGIOGRAPHY CHEST: CPT | Mod: MA

## 2024-01-02 PROCEDURE — 88185 FLOWCYTOMETRY/TC ADD-ON: CPT

## 2024-01-02 PROCEDURE — 84484 ASSAY OF TROPONIN QUANT: CPT

## 2024-01-02 PROCEDURE — 87205 SMEAR GRAM STAIN: CPT

## 2024-01-02 PROCEDURE — 87637 SARSCOV2&INF A&B&RSV AMP PRB: CPT

## 2024-01-02 PROCEDURE — 87075 CULTR BACTERIA EXCEPT BLOOD: CPT

## 2024-01-02 PROCEDURE — 84132 ASSAY OF SERUM POTASSIUM: CPT

## 2024-01-02 PROCEDURE — 85730 THROMBOPLASTIN TIME PARTIAL: CPT

## 2024-01-02 PROCEDURE — 87449 NOS EACH ORGANISM AG IA: CPT

## 2024-01-02 PROCEDURE — 83036 HEMOGLOBIN GLYCOSYLATED A1C: CPT

## 2024-01-02 PROCEDURE — 82330 ASSAY OF CALCIUM: CPT

## 2024-01-02 PROCEDURE — 84466 ASSAY OF TRANSFERRIN: CPT

## 2024-01-02 PROCEDURE — 83540 ASSAY OF IRON: CPT

## 2024-01-02 PROCEDURE — 85027 COMPLETE CBC AUTOMATED: CPT

## 2024-01-02 PROCEDURE — 83735 ASSAY OF MAGNESIUM: CPT

## 2024-01-02 PROCEDURE — 86900 BLOOD TYPING SEROLOGIC ABO: CPT

## 2024-01-02 PROCEDURE — 89051 BODY FLUID CELL COUNT: CPT

## 2024-01-02 PROCEDURE — 80053 COMPREHEN METABOLIC PANEL: CPT

## 2024-01-02 RX ORDER — BUDESONIDE AND FORMOTEROL FUMARATE DIHYDRATE 160; 4.5 UG/1; UG/1
2 AEROSOL RESPIRATORY (INHALATION)
Qty: 0 | Refills: 0 | DISCHARGE
Start: 2024-01-02

## 2024-01-02 RX ORDER — POTASSIUM CHLORIDE 20 MEQ
1 PACKET (EA) ORAL
Refills: 0 | DISCHARGE

## 2024-01-02 RX ORDER — NYSTATIN 500MM UNIT
5 POWDER (EA) MISCELLANEOUS
Qty: 0 | Refills: 0 | DISCHARGE
Start: 2024-01-02 | End: 2024-01-10

## 2024-01-02 RX ORDER — POTASSIUM CHLORIDE 20 MEQ
20 PACKET (EA) ORAL
Refills: 0 | Status: COMPLETED | OUTPATIENT
Start: 2024-01-02 | End: 2024-01-02

## 2024-01-02 RX ORDER — CEFPODOXIME PROXETIL 100 MG
1 TABLET ORAL
Qty: 18 | Refills: 0
Start: 2024-01-02 | End: 2024-01-10

## 2024-01-02 RX ORDER — DIPHENHYDRAMINE HYDROCHLORIDE AND LIDOCAINE HYDROCHLORIDE AND ALUMINUM HYDROXIDE AND MAGNESIUM HYDRO
5 KIT
Qty: 0 | Refills: 0 | DISCHARGE
Start: 2024-01-02 | End: 2024-01-10

## 2024-01-02 RX ORDER — ASCORBIC ACID 60 MG
1 TABLET,CHEWABLE ORAL
Qty: 0 | Refills: 0 | DISCHARGE
Start: 2024-01-02

## 2024-01-02 RX ORDER — CEFTRIAXONE 500 MG/1
2 INJECTION, POWDER, FOR SOLUTION INTRAMUSCULAR; INTRAVENOUS
Qty: 0 | Refills: 0 | DISCHARGE
Start: 2024-01-02 | End: 2024-01-09

## 2024-01-02 RX ORDER — INSULIN LISPRO 100/ML
1 VIAL (ML) SUBCUTANEOUS
Refills: 0 | Status: DISCONTINUED | OUTPATIENT
Start: 2024-01-02 | End: 2024-01-02

## 2024-01-02 RX ORDER — ALBUTEROL 90 UG/1
2.5 AEROSOL, METERED ORAL
Qty: 0 | Refills: 0 | DISCHARGE
Start: 2024-01-02

## 2024-01-02 RX ORDER — PANTOPRAZOLE SODIUM 20 MG/1
1 TABLET, DELAYED RELEASE ORAL
Qty: 0 | Refills: 0 | DISCHARGE
Start: 2024-01-02

## 2024-01-02 RX ORDER — BUMETANIDE 0.25 MG/ML
2 INJECTION INTRAMUSCULAR; INTRAVENOUS
Qty: 0 | Refills: 0 | DISCHARGE

## 2024-01-02 RX ORDER — APIXABAN 2.5 MG/1
1 TABLET, FILM COATED ORAL
Qty: 0 | Refills: 0 | DISCHARGE
Start: 2024-01-02

## 2024-01-02 RX ORDER — MAGNESIUM SULFATE 500 MG/ML
2 VIAL (ML) INJECTION ONCE
Refills: 0 | Status: COMPLETED | OUTPATIENT
Start: 2024-01-02 | End: 2024-01-02

## 2024-01-02 RX ADMIN — Medication 40 MILLIGRAM(S): at 06:05

## 2024-01-02 RX ADMIN — BUDESONIDE AND FORMOTEROL FUMARATE DIHYDRATE 2 PUFF(S): 160; 4.5 AEROSOL RESPIRATORY (INHALATION) at 06:05

## 2024-01-02 RX ADMIN — DIPHENHYDRAMINE HYDROCHLORIDE AND LIDOCAINE HYDROCHLORIDE AND ALUMINUM HYDROXIDE AND MAGNESIUM HYDRO 5 MILLILITER(S): KIT at 12:07

## 2024-01-02 RX ADMIN — FINASTERIDE 5 MILLIGRAM(S): 5 TABLET, FILM COATED ORAL at 12:06

## 2024-01-02 RX ADMIN — BENZOCAINE AND MENTHOL 1 LOZENGE: 5; 1 LIQUID ORAL at 09:58

## 2024-01-02 RX ADMIN — CEFTRIAXONE 100 MILLIGRAM(S): 500 INJECTION, POWDER, FOR SOLUTION INTRAMUSCULAR; INTRAVENOUS at 12:17

## 2024-01-02 RX ADMIN — ALBUTEROL 2.5 MILLIGRAM(S): 90 AEROSOL, METERED ORAL at 06:05

## 2024-01-02 RX ADMIN — CHLORHEXIDINE GLUCONATE 1 APPLICATION(S): 213 SOLUTION TOPICAL at 06:06

## 2024-01-02 RX ADMIN — Medication 600 MILLIGRAM(S): at 06:04

## 2024-01-02 RX ADMIN — Medication 100 MILLIGRAM(S): at 13:25

## 2024-01-02 RX ADMIN — BUMETANIDE 1 MILLIGRAM(S): 0.25 INJECTION INTRAMUSCULAR; INTRAVENOUS at 06:04

## 2024-01-02 RX ADMIN — Medication 500000 UNIT(S): at 06:06

## 2024-01-02 RX ADMIN — PANTOPRAZOLE SODIUM 40 MILLIGRAM(S): 20 TABLET, DELAYED RELEASE ORAL at 12:07

## 2024-01-02 RX ADMIN — Medication 500000 UNIT(S): at 12:07

## 2024-01-02 RX ADMIN — Medication 20 MILLIEQUIVALENT(S): at 09:58

## 2024-01-02 RX ADMIN — Medication 325 MILLIGRAM(S): at 06:03

## 2024-01-02 RX ADMIN — Medication 25 MILLIGRAM(S): at 06:05

## 2024-01-02 RX ADMIN — Medication 20 MILLIEQUIVALENT(S): at 12:08

## 2024-01-02 RX ADMIN — ALBUTEROL 2.5 MILLIGRAM(S): 90 AEROSOL, METERED ORAL at 01:35

## 2024-01-02 RX ADMIN — Medication 1 UNIT(S): at 12:43

## 2024-01-02 RX ADMIN — DIPHENHYDRAMINE HYDROCHLORIDE AND LIDOCAINE HYDROCHLORIDE AND ALUMINUM HYDROXIDE AND MAGNESIUM HYDRO 5 MILLILITER(S): KIT at 06:04

## 2024-01-02 RX ADMIN — Medication 500 MILLIGRAM(S): at 12:07

## 2024-01-02 RX ADMIN — Medication 100 MILLIGRAM(S): at 06:04

## 2024-01-02 RX ADMIN — Medication 25 GRAM(S): at 09:48

## 2024-01-02 RX ADMIN — Medication 1 TABLET(S): at 12:06

## 2024-01-02 RX ADMIN — Medication 1: at 12:42

## 2024-01-02 RX ADMIN — APIXABAN 2.5 MILLIGRAM(S): 2.5 TABLET, FILM COATED ORAL at 06:04

## 2024-01-02 RX ADMIN — Medication 20 MILLIEQUIVALENT(S): at 08:58

## 2024-01-02 RX ADMIN — ALBUTEROL 2.5 MILLIGRAM(S): 90 AEROSOL, METERED ORAL at 12:06

## 2024-01-02 NOTE — PROGRESS NOTE ADULT - ASSESSMENT
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 found to have hypoxemic respiratory failure secondary to pna vs copd vs AHRF    # ILD 2/2 remote asbestos exposure  # COPD  # Acute on chronic hypoxemic respiratory failure   # Pleural effusion s/p thoracentesis  # strep pneumo bacteremia    Recommendations  - patient declining inhalers however amenable nebulizers continue standing duonebs,  -c/w steroid taper- 20 for 3 days then off  - patient with significant increased mucus production and sepsis please continue ceftriaxone can discontinue azitho at this point  - s/p 1200 left thoracentesis exudative, unlikely infectious pleural effusion, no ptx post op, flow negative, cultures negative, cytopath negative for malignancy   - continue airway clearance: humidified oxygen, standing guaifenesin, duonebs q6h, hyper sal q6h, chest PT q6 h, aerobika/acapella q6 h,   - continue diuresis as per nephro  - titrate oxygen to O2 >88%, use humidified oxygen; incentive spirometry; OOBTC; PT; aspiration precautions and airway clearance as necessary; DVT ppx as tolerated   - no further pulmonary recommendations     This patient will need to follow up with the interventional pulmonology team after discharge:  1-2 days prior to discharge please email: [email or web address] to setup an appointment with Dr. Gant prior to discharge. The appointment should be within 1-2 weeks of discharge from the hospital. Include the patient's name, indication for follow-up, , MRN and contact information in the email.  Please cc [email or web address].    Pulmonary/Sleep Clinic  [address] [place] [address]  [phone]    Please discuss the appointment details with the patient and include the appointment details in the patients discharge summary.  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 found to have hypoxemic respiratory failure secondary to pna vs copd vs AHRF    # ILD 2/2 remote asbestos exposure  # COPD  # Acute on chronic hypoxemic respiratory failure   # Pleural effusion s/p thoracentesis  # strep pneumo bacteremia    Recommendations  - patient declining inhalers however amenable nebulizers continue standing duonebs,  -c/w steroid taper- 20 for 3 days then off  - patient with significant increased mucus production and sepsis please continue ceftriaxone can discontinue azitho at this point  - s/p 1200 left thoracentesis exudative, unlikely infectious pleural effusion, no ptx post op, flow negative, cultures negative, cytopath negative for malignancy   - continue airway clearance: humidified oxygen, standing guaifenesin, duonebs q6h, hyper sal q6h, chest PT q6 h, aerobika/acapella q6 h,   - continue diuresis as per nephro  - titrate oxygen to O2 >88%, use humidified oxygen; incentive spirometry; OOBTC; PT; aspiration precautions and airway clearance as necessary; DVT ppx as tolerated   - no further pulmonary recommendations     This patient will need to follow up with the interventional pulmonology team after discharge:  1-2 days prior to discharge please email: [email or web address] to setup an appointment with Dr. Gant prior to discharge. The appointment should be within 1-2 weeks of discharge from the hospital. Include the patient's name, indication for follow-up, , MRN and contact information in the email.  Please cc [email or web address].    Pulmonary/Sleep Clinic  [address] [place] [address]  [phone]    Please discuss the appointment details with the patient and include the appointment details in the patients discharge summary.  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 found to have hypoxemic respiratory failure secondary to pna vs copd vs AHRF    # ILD 2/2 remote asbestos exposure  # COPD  # Acute on chronic hypoxemic respiratory failure   # Pleural effusion s/p thoracentesis  # strep pneumo bacteremia    Recommendations  - patient declining inhalers however amenable nebulizers continue standing duonebs,  -c/w steroid taper- 20 for 3 days then off  - patient with significant increased mucus production and sepsis please continue ceftriaxone can discontinue azitho at this point  - s/p 1200 left thoracentesis exudative, unlikely infectious pleural effusion, no ptx post op, flow negative, cultures negative, cytopath negative for malignancy   - continue airway clearance: humidified oxygen, standing guaifenesin, duonebs q6h, hyper sal q6h, chest PT q6 h, aerobika/acapella q6 h,   - continue diuresis as per nephro but would send home on higher dose than his home dose since he came in with volume overload  - titrate oxygen to O2 >88%, use humidified oxygen; incentive spirometry; OOBTC; PT; aspiration precautions and airway clearance as necessary; DVT ppx as tolerated   - no further pulmonary recommendations     This patient will need to follow up with the interventional pulmonology team after discharge:  1-2 days prior to discharge please email: [email or web address] to setup an appointment with Dr. Gant prior to discharge. The appointment should be within 1-2 weeks of discharge from the hospital. Include the patient's name, indication for follow-up, , MRN and contact information in the email.  Please cc [email or web address].    Pulmonary/Sleep Clinic  [address] [place] [address]  [phone]    Please discuss the appointment details with the patient and include the appointment details in the patients discharge summary.  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 found to have hypoxemic respiratory failure secondary to pna vs copd vs AHRF    # ILD 2/2 remote asbestos exposure  # COPD  # Acute on chronic hypoxemic respiratory failure   # Pleural effusion s/p thoracentesis  # strep pneumo bacteremia    Recommendations  - patient declining inhalers however amenable nebulizers continue standing duonebs,  -c/w steroid taper- 20 for 3 days then off  - patient with significant increased mucus production and sepsis please continue ceftriaxone can discontinue azitho at this point  - s/p 1200 left thoracentesis exudative, unlikely infectious pleural effusion, no ptx post op, flow negative, cultures negative, cytopath negative for malignancy   - continue airway clearance: humidified oxygen, standing guaifenesin, duonebs q6h, hyper sal q6h, chest PT q6 h, aerobika/acapella q6 h,   - continue diuresis as per nephro but would send home on higher dose than his home dose since he came in with volume overload  - titrate oxygen to O2 >88%, use humidified oxygen; incentive spirometry; OOBTC; PT; aspiration precautions and airway clearance as necessary; DVT ppx as tolerated   - no further pulmonary recommendations     This patient will need to follow up with the interventional pulmonology team after discharge:  1-2 days prior to discharge please email: [email or web address] to setup an appointment with Dr. Gant prior to discharge. The appointment should be within 1-2 weeks of discharge from the hospital. Include the patient's name, indication for follow-up, , MRN and contact information in the email.  Please cc [email or web address].    Pulmonary/Sleep Clinic  [address] [place] [address]  [phone]    Please discuss the appointment details with the patient and include the appointment details in the patients discharge summary.

## 2024-01-02 NOTE — PROGRESS NOTE ADULT - PROBLEM SELECTOR PROBLEM 2
Acute on chronic heart failure with preserved ejection fraction (HFpEF)

## 2024-01-02 NOTE — PROGRESS NOTE ADULT - PROVIDER SPECIALTY LIST ADULT
Nephrology
Nephrology
Pulmonology
Pulmonology
Infectious Disease
Nephrology
Pulmonology
Nephrology
Nephrology
Pulmonology
Pulmonology
Infectious Disease
Infectious Disease
Nephrology
Internal Medicine
Pulmonology
Internal Medicine

## 2024-01-02 NOTE — DISCHARGE NOTE NURSING/CASE MANAGEMENT/SOCIAL WORK - PATIENT PORTAL LINK FT
You can access the FollowMyHealth Patient Portal offered by [facility] by registering at the following website: [email or web address]. By joining [employer]’s FollowMyHealth portal, you will also be able to view your health information using other applications (apps) compatible with our system. You can access the FollowMyHealth Patient Portal offered by [facility] by registering at the following website: [email or web address]. By joining [employer]’s FollowMyHealth portal, you will also be able to view your health information using other applications (apps) compatible with our system.

## 2024-01-02 NOTE — PROGRESS NOTE ADULT - PROBLEM SELECTOR PROBLEM 1
ATN (acute tubular necrosis)
Acute hypoxic respiratory failure
Acute hypoxic respiratory failure
ATN (acute tubular necrosis)
Acute hypoxic respiratory failure

## 2024-01-02 NOTE — DISCHARGE NOTE NURSING/CASE MANAGEMENT/SOCIAL WORK - NSDCFUADDAPPT_GEN_ALL_CORE_FT
APPTS ARE READY TO BE MADE: [ ] YES    Best Family or Patient Contact (if needed):    Additional Information about above appointments (if needed):    1: Nephrology  2: Internal Medicine   3: Pulmonology     Other comments or requests:

## 2024-01-02 NOTE — DISCHARGE NOTE NURSING/CASE MANAGEMENT/SOCIAL WORK - NSDCPEFALRISK_GEN_ALL_CORE
For information on Fall & Injury Prevention, visit: [email or web address] OR  [email or web address] OR  https://www.cdc.gov/steadi/patient.html For information on Fall & Injury Prevention, visit: [email or web address] OR  [email or web address] OR  https://www.cdc.gov/steadi/patient.html

## 2024-01-02 NOTE — PROGRESS NOTE ADULT - PROBLEM SELECTOR PLAN 1
Likely multifactorial - CAP and component of HFpEF, entero/rhino virus +, COPD exacerbation   CTPE chest showed loculated moderate L pleural effusion and small R pleural effusion  - s/p thoracentesis  - pulm consulted - recs appreciated   - started ceftriaxone 2000 q24   - s/p azithro 500 q24  - ID consulted recs appreciated   - Aspiration Precautions  - Further plan per HFpEF below  - methylpred taper per Pulm recs-> 12/28 last day-> resumed 12/29 for increased wheezing also ordered RSV and COVID test  > bumex as below Likely multifactorial - CAP and component of HFpEF, entero/rhino virus +, COPD exacerbation   CTPE chest showed loculated moderate L pleural effusion and small R pleural effusion  - s/p thoracentesis  - pulm consulted - recs appreciated   - started ceftriaxone 2000 q24   - s/p azithro 500 q24  - ID consulted recs appreciated   - Aspiration Precautions  - Further plan per HFpEF below  - methylpred taper per Pulm recs-> 12/28 last day-> resumed 12/29 for increased wheezing also ordered RSV and COVID test->  Continuing pred taper on 20x 3 starting 1/2  > bumex as below

## 2024-01-02 NOTE — PROGRESS NOTE ADULT - SUBJECTIVE AND OBJECTIVE BOX
***************************************************************  Blas Rubio, PGY1  Internal Medicine   Preferred contact via [employer] Teams   ***************************************************************    GLADIS LEONARD  83y  MRN: [id number]    Patient is a 83y old  Male who presents with a chief complaint of sob (01 Jan 2024 07:08)      Interval/Overnight Events: no events ON.     Subjective: Pt seen and examined at bedside. Denies fever, CP, SOB, abn pain, N/V, dysuria. Tolerating diet.      MEDICATIONS  (STANDING):  albuterol    0.083% 2.5 milliGRAM(s) Nebulizer every 6 hours  apixaban 2.5 milliGRAM(s) Oral two times a day  ascorbic acid 500 milliGRAM(s) Oral daily  atorvastatin 10 milliGRAM(s) Oral at bedtime  benzonatate 100 milliGRAM(s) Oral every 8 hours  budesonide 160 MICROgram(s)/formoterol 4.5 MICROgram(s) Inhaler 2 Puff(s) Inhalation two times a day  buMETAnide 1 milliGRAM(s) Oral daily  cefTRIAXone   IVPB 2000 milliGRAM(s) IV Intermittent every 24 hours  chlorhexidine 4% Liquid 1 Application(s) Topical <User Schedule>  dextrose 5%. 1000 milliLiter(s) (50 mL/Hr) IV Continuous <Continuous>  dextrose 5%. 1000 milliLiter(s) (100 mL/Hr) IV Continuous <Continuous>  dextrose 50% Injectable 12.5 Gram(s) IV Push once  dextrose 50% Injectable 25 Gram(s) IV Push once  dextrose 50% Injectable 25 Gram(s) IV Push once  finasteride 5 milliGRAM(s) Oral daily  FIRST- Mouthwash  BLM 5 milliLiter(s) Swish and Spit every 6 hours  glucagon  Injectable 1 milliGRAM(s) IntraMuscular once  guaiFENesin  milliGRAM(s) Oral every 12 hours  influenza  Vaccine (HIGH DOSE) 0.7 milliLiter(s) IntraMuscular once  insulin lispro (ADMELOG) corrective regimen sliding scale   SubCutaneous three times a day before meals  insulin lispro (ADMELOG) corrective regimen sliding scale   SubCutaneous at bedtime  metoprolol tartrate 25 milliGRAM(s) Oral two times a day  multivitamin 1 Tablet(s) Oral daily  nystatin    Suspension 078793 Unit(s) Oral four times a day  pantoprazole   Suspension 40 milliGRAM(s) Oral daily  predniSONE   Tablet 40 milliGRAM(s) Oral daily  sodium bicarbonate 325 milliGRAM(s) Oral every 8 hours  tamsulosin 0.4 milliGRAM(s) Oral at bedtime    MEDICATIONS  (PRN):  acetaminophen     Tablet .. 650 milliGRAM(s) Oral every 6 hours PRN Temp greater or equal to 38C (100.4F), Mild Pain (1 - 3)  benzocaine/menthol Lozenge 1 Lozenge Oral three times a day PRN Sore Throat  dextrose Oral Gel 15 Gram(s) Oral once PRN Blood Glucose LESS THAN 70 milliGRAM(s)/deciliter      Objective:    Vitals: Vital Signs Last 24 Hrs  T(C): 36.6 (01-02-24 @ 06:04), Max: 36.8 (01-01-24 @ 21:33)  T(F): 97.9 (01-02-24 @ 06:04), Max: 98.3 (01-01-24 @ 21:33)  HR: 77 (01-02-24 @ 06:04) (76 - 91)  BP: 124/59 (01-02-24 @ 06:04) (121/68 - 126/69)  BP(mean): --  RR: 18 (01-02-24 @ 06:04) (16 - 18)  SpO2: 95% (01-02-24 @ 06:04) (95% - 96%)                I&O's Summary    01 Jan 2024 07:01  -  02 Jan 2024 07:00  --------------------------------------------------------  IN: 1280 mL / OUT: 1552 mL / NET: -272 mL        PHYSICAL EXAM:  GENERAL: NAD  HEAD:  Atraumatic, Normocephalic  EYES: EOMI, conjunctiva and sclera clear  CHEST/LUNG: Clear to auscultation bilaterally; No rales, rhonchi, wheezing, or rubs  HEART: Regular rate and rhythm; No murmurs, rubs, or gallops  ABDOMEN: Soft, Nontender, Nondistended;   SKIN: No rashes or lesions  NERVOUS SYSTEM:  Alert & Oriented X3, no focal deficits    LABS:                        8.0    14.05 )-----------( 503      ( 01 Jan 2024 07:26 )             26.4                         8.4    14.05 )-----------( 536      ( 31 Dec 2023 07:19 )             28.3                         8.7    16.75 )-----------( 543      ( 30 Dec 2023 07:16 )             29.7     01-01    139  |  101  |  31<H>  ----------------------------<  111<H>  3.5   |  31  |  1.33<H>  12-31    139  |  101  |  34<H>  ----------------------------<  109<H>  3.8   |  29  |  1.31<H>  12-30    137  |  99  |  42<H>  ----------------------------<  135<H>  4.1   |  28  |  1.44<H>    Ca    8.6      01 Jan 2024 07:26  Ca    8.8      31 Dec 2023 07:27  Ca    8.9      30 Dec 2023 07:16  Phos  2.4     01-01  Mg     1.9     01-01    TPro  5.4<L>  /  Alb  2.7<L>  /  TBili  0.4  /  DBili  x   /  AST  16  /  ALT  26  /  AlkPhos  86  01-01  TPro  5.7<L>  /  Alb  2.6<L>  /  TBili  0.4  /  DBili  x   /  AST  16  /  ALT  31  /  AlkPhos  83  12-31  TPro  5.8<L>  /  Alb  2.4<L>  /  TBili  0.3  /  DBili  x   /  AST  14  /  ALT  37  /  AlkPhos  87  12-30    CAPILLARY BLOOD GLUCOSE      POCT Blood Glucose.: 223 mg/dL (01 Jan 2024 21:52)  POCT Blood Glucose.: 318 mg/dL (01 Jan 2024 17:32)  POCT Blood Glucose.: 182 mg/dL (01 Jan 2024 13:01)  POCT Blood Glucose.: 128 mg/dL (01 Jan 2024 08:15)        Urinalysis Basic - ( 01 Jan 2024 07:26 )    Color: x / Appearance: x / SG: x / pH: x  Gluc: 111 mg/dL / Ketone: x  / Bili: x / Urobili: x   Blood: x / Protein: x / Nitrite: x   Leuk Esterase: x / RBC: x / WBC x   Sq Epi: x / Non Sq Epi: x / Bacteria: x          RADIOLOGY & ADDITIONAL TESTS:    Imaging Personally Reviewed:  [x ] YES  [ ] NO    Consultants involved in case:   Consultant(s) Notes Reviewed:  [ x] YES  [ ] NO:   Care Discussed with Consultants/Other Providers [x ] YES  [ ] NO         ***************************************************************  Blas Rubio, PGY1  Internal Medicine   Preferred contact via [employer] Teams   ***************************************************************    GLADIS LEONARD  83y  MRN: [id number]    Patient is a 83y old  Male who presents with a chief complaint of sob (01 Jan 2024 07:08)      Interval/Overnight Events: no events ON.     Subjective: Pt seen and examined at bedside. Denies fever, CP, SOB, abn pain, N/V, dysuria. Tolerating diet.      MEDICATIONS  (STANDING):  albuterol    0.083% 2.5 milliGRAM(s) Nebulizer every 6 hours  apixaban 2.5 milliGRAM(s) Oral two times a day  ascorbic acid 500 milliGRAM(s) Oral daily  atorvastatin 10 milliGRAM(s) Oral at bedtime  benzonatate 100 milliGRAM(s) Oral every 8 hours  budesonide 160 MICROgram(s)/formoterol 4.5 MICROgram(s) Inhaler 2 Puff(s) Inhalation two times a day  buMETAnide 1 milliGRAM(s) Oral daily  cefTRIAXone   IVPB 2000 milliGRAM(s) IV Intermittent every 24 hours  chlorhexidine 4% Liquid 1 Application(s) Topical <User Schedule>  dextrose 5%. 1000 milliLiter(s) (50 mL/Hr) IV Continuous <Continuous>  dextrose 5%. 1000 milliLiter(s) (100 mL/Hr) IV Continuous <Continuous>  dextrose 50% Injectable 12.5 Gram(s) IV Push once  dextrose 50% Injectable 25 Gram(s) IV Push once  dextrose 50% Injectable 25 Gram(s) IV Push once  finasteride 5 milliGRAM(s) Oral daily  FIRST- Mouthwash  BLM 5 milliLiter(s) Swish and Spit every 6 hours  glucagon  Injectable 1 milliGRAM(s) IntraMuscular once  guaiFENesin  milliGRAM(s) Oral every 12 hours  influenza  Vaccine (HIGH DOSE) 0.7 milliLiter(s) IntraMuscular once  insulin lispro (ADMELOG) corrective regimen sliding scale   SubCutaneous three times a day before meals  insulin lispro (ADMELOG) corrective regimen sliding scale   SubCutaneous at bedtime  metoprolol tartrate 25 milliGRAM(s) Oral two times a day  multivitamin 1 Tablet(s) Oral daily  nystatin    Suspension 047048 Unit(s) Oral four times a day  pantoprazole   Suspension 40 milliGRAM(s) Oral daily  predniSONE   Tablet 40 milliGRAM(s) Oral daily  sodium bicarbonate 325 milliGRAM(s) Oral every 8 hours  tamsulosin 0.4 milliGRAM(s) Oral at bedtime    MEDICATIONS  (PRN):  acetaminophen     Tablet .. 650 milliGRAM(s) Oral every 6 hours PRN Temp greater or equal to 38C (100.4F), Mild Pain (1 - 3)  benzocaine/menthol Lozenge 1 Lozenge Oral three times a day PRN Sore Throat  dextrose Oral Gel 15 Gram(s) Oral once PRN Blood Glucose LESS THAN 70 milliGRAM(s)/deciliter      Objective:    Vitals: Vital Signs Last 24 Hrs  T(C): 36.6 (01-02-24 @ 06:04), Max: 36.8 (01-01-24 @ 21:33)  T(F): 97.9 (01-02-24 @ 06:04), Max: 98.3 (01-01-24 @ 21:33)  HR: 77 (01-02-24 @ 06:04) (76 - 91)  BP: 124/59 (01-02-24 @ 06:04) (121/68 - 126/69)  BP(mean): --  RR: 18 (01-02-24 @ 06:04) (16 - 18)  SpO2: 95% (01-02-24 @ 06:04) (95% - 96%)                I&O's Summary    01 Jan 2024 07:01  -  02 Jan 2024 07:00  --------------------------------------------------------  IN: 1280 mL / OUT: 1552 mL / NET: -272 mL        PHYSICAL EXAM:  GENERAL: NAD  HEAD:  Atraumatic, Normocephalic  EYES: EOMI, conjunctiva and sclera clear  CHEST/LUNG: Clear to auscultation bilaterally; No rales, rhonchi, wheezing, or rubs  HEART: Regular rate and rhythm; No murmurs, rubs, or gallops  ABDOMEN: Soft, Nontender, Nondistended;   SKIN: No rashes or lesions  NERVOUS SYSTEM:  Alert & Oriented X3, no focal deficits    LABS:                        8.0    14.05 )-----------( 503      ( 01 Jan 2024 07:26 )             26.4                         8.4    14.05 )-----------( 536      ( 31 Dec 2023 07:19 )             28.3                         8.7    16.75 )-----------( 543      ( 30 Dec 2023 07:16 )             29.7     01-01    139  |  101  |  31<H>  ----------------------------<  111<H>  3.5   |  31  |  1.33<H>  12-31    139  |  101  |  34<H>  ----------------------------<  109<H>  3.8   |  29  |  1.31<H>  12-30    137  |  99  |  42<H>  ----------------------------<  135<H>  4.1   |  28  |  1.44<H>    Ca    8.6      01 Jan 2024 07:26  Ca    8.8      31 Dec 2023 07:27  Ca    8.9      30 Dec 2023 07:16  Phos  2.4     01-01  Mg     1.9     01-01    TPro  5.4<L>  /  Alb  2.7<L>  /  TBili  0.4  /  DBili  x   /  AST  16  /  ALT  26  /  AlkPhos  86  01-01  TPro  5.7<L>  /  Alb  2.6<L>  /  TBili  0.4  /  DBili  x   /  AST  16  /  ALT  31  /  AlkPhos  83  12-31  TPro  5.8<L>  /  Alb  2.4<L>  /  TBili  0.3  /  DBili  x   /  AST  14  /  ALT  37  /  AlkPhos  87  12-30    CAPILLARY BLOOD GLUCOSE      POCT Blood Glucose.: 223 mg/dL (01 Jan 2024 21:52)  POCT Blood Glucose.: 318 mg/dL (01 Jan 2024 17:32)  POCT Blood Glucose.: 182 mg/dL (01 Jan 2024 13:01)  POCT Blood Glucose.: 128 mg/dL (01 Jan 2024 08:15)        Urinalysis Basic - ( 01 Jan 2024 07:26 )    Color: x / Appearance: x / SG: x / pH: x  Gluc: 111 mg/dL / Ketone: x  / Bili: x / Urobili: x   Blood: x / Protein: x / Nitrite: x   Leuk Esterase: x / RBC: x / WBC x   Sq Epi: x / Non Sq Epi: x / Bacteria: x          RADIOLOGY & ADDITIONAL TESTS:    Imaging Personally Reviewed:  [x ] YES  [ ] NO    Consultants involved in case:   Consultant(s) Notes Reviewed:  [ x] YES  [ ] NO:   Care Discussed with Consultants/Other Providers [x ] YES  [ ] NO         ***************************************************************  Blas Rubio, PGY1  Internal Medicine   Preferred contact via [employer] Teams   ***************************************************************    GLADIS LEONARD  83y  MRN: [id number]    Patient is a 83y old  Male who presents with a chief complaint of sob (02 Jan 2024 08:08)      Interval/Overnight Events: no events ON.     Subjective: Pt seen and examined at bedside. Pt pleasant resting calmly.     MEDICATIONS  (STANDING):  albuterol    0.083% 2.5 milliGRAM(s) Nebulizer every 6 hours  apixaban 2.5 milliGRAM(s) Oral two times a day  ascorbic acid 500 milliGRAM(s) Oral daily  atorvastatin 10 milliGRAM(s) Oral at bedtime  benzonatate 100 milliGRAM(s) Oral every 8 hours  budesonide 160 MICROgram(s)/formoterol 4.5 MICROgram(s) Inhaler 2 Puff(s) Inhalation two times a day  buMETAnide 1 milliGRAM(s) Oral daily  cefTRIAXone   IVPB 2000 milliGRAM(s) IV Intermittent every 24 hours  chlorhexidine 4% Liquid 1 Application(s) Topical <User Schedule>  dextrose 5%. 1000 milliLiter(s) (50 mL/Hr) IV Continuous <Continuous>  dextrose 5%. 1000 milliLiter(s) (100 mL/Hr) IV Continuous <Continuous>  dextrose 50% Injectable 12.5 Gram(s) IV Push once  dextrose 50% Injectable 25 Gram(s) IV Push once  dextrose 50% Injectable 25 Gram(s) IV Push once  finasteride 5 milliGRAM(s) Oral daily  FIRST- Mouthwash  BLM 5 milliLiter(s) Swish and Spit every 6 hours  glucagon  Injectable 1 milliGRAM(s) IntraMuscular once  guaiFENesin  milliGRAM(s) Oral every 12 hours  influenza  Vaccine (HIGH DOSE) 0.7 milliLiter(s) IntraMuscular once  insulin lispro (ADMELOG) corrective regimen sliding scale   SubCutaneous three times a day before meals  insulin lispro (ADMELOG) corrective regimen sliding scale   SubCutaneous at bedtime  insulin lispro Injectable (ADMELOG) 1 Unit(s) SubCutaneous three times a day before meals  metoprolol tartrate 25 milliGRAM(s) Oral two times a day  multivitamin 1 Tablet(s) Oral daily  nystatin    Suspension [id number] Unit(s) Oral four times a day  pantoprazole   Suspension 40 milliGRAM(s) Oral daily  potassium chloride    Tablet ER 20 milliEquivalent(s) Oral every 2 hours  sodium bicarbonate 325 milliGRAM(s) Oral every 8 hours  tamsulosin 0.4 milliGRAM(s) Oral at bedtime    MEDICATIONS  (PRN):  acetaminophen     Tablet .. 650 milliGRAM(s) Oral every 6 hours PRN Temp greater or equal to 38C (100.4F), Mild Pain (1 - 3)  benzocaine/menthol Lozenge 1 Lozenge Oral three times a day PRN Sore Throat  dextrose Oral Gel 15 Gram(s) Oral once PRN Blood Glucose LESS THAN 70 milliGRAM(s)/deciliter      Objective:    Vitals: Vital Signs Last 24 Hrs  T(C): 36.6 (01-02-24 @ 06:04), Max: 36.8 (01-01-24 @ 21:33)  T(F): 97.9 (01-02-24 @ 06:04), Max: 98.3 (01-01-24 @ 21:33)  HR: 77 (01-02-24 @ 06:04) (76 - 91)  BP: 124/59 (01-02-24 @ 06:04) (121/68 - 126/69)  BP(mean): --  RR: 18 (01-02-24 @ 06:04) (16 - 18)  SpO2: 95% (01-02-24 @ 06:04) (95% - 96%)                I&O's Summary    01 Jan 2024 07:01  -  02 Jan 2024 07:00  --------------------------------------------------------  IN: 1280 mL / OUT: 1552 mL / NET: -272 mL    02 Jan 2024 07:01  -  02 Jan 2024 11:38  --------------------------------------------------------  IN: 360 mL / OUT: 0 mL / NET: 360 mL        PHYSICAL EXAM:  GENERAL: NAD  HEAD:  Atraumatic, Normocephalic  EYES: EOMI, conjunctiva and sclera clear  CHEST/LUNG: Clear to auscultation bilaterally; No rales, rhonchi, wheezing, or rubs  HEART: Regular rate and rhythm; No murmurs, rubs, or gallops  ABDOMEN: Soft, Nontender, Nondistended;   SKIN: 1+ PE bilaterally   NERVOUS SYSTEM:  Alert & Oriented X3, no focal deficits    LABS:                        8.0    13.76 )-----------( 441      ( 02 Jan 2024 07:10 )             26.1                         8.0    14.05 )-----------( 503      ( 01 Jan 2024 07:26 )             26.4                         8.4    14.05 )-----------( 536      ( 31 Dec 2023 07:19 )             28.3     01-02    140  |  101  |  26<H>  ----------------------------<  90  3.4<L>   |  29  |  1.17  01-01    139  |  101  |  31<H>  ----------------------------<  111<H>  3.5   |  31  |  1.33<H>  12-31    139  |  101  |  34<H>  ----------------------------<  109<H>  3.8   |  29  |  1.31<H>    Ca    8.1<L>      02 Jan 2024 07:11  Ca    8.6      01 Jan 2024 07:26  Ca    8.8      31 Dec 2023 07:27  Phos  3.3     01-02  Mg     1.7     01-02    TPro  5.2<L>  /  Alb  2.5<L>  /  TBili  0.4  /  DBili  x   /  AST  15  /  ALT  21  /  AlkPhos  84  01-02  TPro  5.4<L>  /  Alb  2.7<L>  /  TBili  0.4  /  DBili  x   /  AST  16  /  ALT  26  /  AlkPhos  86  01-01  TPro  5.7<L>  /  Alb  2.6<L>  /  TBili  0.4  /  DBili  x   /  AST  16  /  ALT  31  /  AlkPhos  83  12-31    CAPILLARY BLOOD GLUCOSE      POCT Blood Glucose.: 113 mg/dL (02 Jan 2024 08:21)  POCT Blood Glucose.: 223 mg/dL (01 Jan 2024 21:52)  POCT Blood Glucose.: 318 mg/dL (01 Jan 2024 17:32)  POCT Blood Glucose.: 182 mg/dL (01 Jan 2024 13:01)        Urinalysis Basic - ( 02 Jan 2024 07:11 )    Color: x / Appearance: x / SG: x / pH: x  Gluc: 90 mg/dL / Ketone: x  / Bili: x / Urobili: x   Blood: x / Protein: x / Nitrite: x   Leuk Esterase: x / RBC: x / WBC x   Sq Epi: x / Non Sq Epi: x / Bacteria: x          RADIOLOGY & ADDITIONAL TESTS:    Imaging Personally Reviewed:  [x ] YES  [ ] NO    Consultants involved in case:   Consultant(s) Notes Reviewed:  [ x] YES  [ ] NO:   Care Discussed with Consultants/Other Providers [x ] YES  [ ] NO                 ***************************************************************  Blas Rubio, PGY1  Internal Medicine   Preferred contact via [employer] Teams   ***************************************************************    GLADIS LEONARD  83y  MRN: [id number]    Patient is a 83y old  Male who presents with a chief complaint of sob (02 Jan 2024 08:08)      Interval/Overnight Events: no events ON.     Subjective: Pt seen and examined at bedside. Pt pleasant resting calmly.     MEDICATIONS  (STANDING):  albuterol    0.083% 2.5 milliGRAM(s) Nebulizer every 6 hours  apixaban 2.5 milliGRAM(s) Oral two times a day  ascorbic acid 500 milliGRAM(s) Oral daily  atorvastatin 10 milliGRAM(s) Oral at bedtime  benzonatate 100 milliGRAM(s) Oral every 8 hours  budesonide 160 MICROgram(s)/formoterol 4.5 MICROgram(s) Inhaler 2 Puff(s) Inhalation two times a day  buMETAnide 1 milliGRAM(s) Oral daily  cefTRIAXone   IVPB 2000 milliGRAM(s) IV Intermittent every 24 hours  chlorhexidine 4% Liquid 1 Application(s) Topical <User Schedule>  dextrose 5%. 1000 milliLiter(s) (50 mL/Hr) IV Continuous <Continuous>  dextrose 5%. 1000 milliLiter(s) (100 mL/Hr) IV Continuous <Continuous>  dextrose 50% Injectable 12.5 Gram(s) IV Push once  dextrose 50% Injectable 25 Gram(s) IV Push once  dextrose 50% Injectable 25 Gram(s) IV Push once  finasteride 5 milliGRAM(s) Oral daily  FIRST- Mouthwash  BLM 5 milliLiter(s) Swish and Spit every 6 hours  glucagon  Injectable 1 milliGRAM(s) IntraMuscular once  guaiFENesin  milliGRAM(s) Oral every 12 hours  influenza  Vaccine (HIGH DOSE) 0.7 milliLiter(s) IntraMuscular once  insulin lispro (ADMELOG) corrective regimen sliding scale   SubCutaneous three times a day before meals  insulin lispro (ADMELOG) corrective regimen sliding scale   SubCutaneous at bedtime  insulin lispro Injectable (ADMELOG) 1 Unit(s) SubCutaneous three times a day before meals  metoprolol tartrate 25 milliGRAM(s) Oral two times a day  multivitamin 1 Tablet(s) Oral daily  nystatin    Suspension 479917 Unit(s) Oral four times a day  pantoprazole   Suspension 40 milliGRAM(s) Oral daily  potassium chloride    Tablet ER 20 milliEquivalent(s) Oral every 2 hours  sodium bicarbonate 325 milliGRAM(s) Oral every 8 hours  tamsulosin 0.4 milliGRAM(s) Oral at bedtime    MEDICATIONS  (PRN):  acetaminophen     Tablet .. 650 milliGRAM(s) Oral every 6 hours PRN Temp greater or equal to 38C (100.4F), Mild Pain (1 - 3)  benzocaine/menthol Lozenge 1 Lozenge Oral three times a day PRN Sore Throat  dextrose Oral Gel 15 Gram(s) Oral once PRN Blood Glucose LESS THAN 70 milliGRAM(s)/deciliter      Objective:    Vitals: Vital Signs Last 24 Hrs  T(C): 36.6 (01-02-24 @ 06:04), Max: 36.8 (01-01-24 @ 21:33)  T(F): 97.9 (01-02-24 @ 06:04), Max: 98.3 (01-01-24 @ 21:33)  HR: 77 (01-02-24 @ 06:04) (76 - 91)  BP: 124/59 (01-02-24 @ 06:04) (121/68 - 126/69)  BP(mean): --  RR: 18 (01-02-24 @ 06:04) (16 - 18)  SpO2: 95% (01-02-24 @ 06:04) (95% - 96%)                I&O's Summary    01 Jan 2024 07:01  -  02 Jan 2024 07:00  --------------------------------------------------------  IN: 1280 mL / OUT: 1552 mL / NET: -272 mL    02 Jan 2024 07:01  -  02 Jan 2024 11:38  --------------------------------------------------------  IN: 360 mL / OUT: 0 mL / NET: 360 mL        PHYSICAL EXAM:  GENERAL: NAD  HEAD:  Atraumatic, Normocephalic  EYES: EOMI, conjunctiva and sclera clear  CHEST/LUNG: Clear to auscultation bilaterally; No rales, rhonchi, wheezing, or rubs  HEART: Regular rate and rhythm; No murmurs, rubs, or gallops  ABDOMEN: Soft, Nontender, Nondistended;   SKIN: 1+ PE bilaterally   NERVOUS SYSTEM:  Alert & Oriented X3, no focal deficits    LABS:                        8.0    13.76 )-----------( 441      ( 02 Jan 2024 07:10 )             26.1                         8.0    14.05 )-----------( 503      ( 01 Jan 2024 07:26 )             26.4                         8.4    14.05 )-----------( 536      ( 31 Dec 2023 07:19 )             28.3     01-02    140  |  101  |  26<H>  ----------------------------<  90  3.4<L>   |  29  |  1.17  01-01    139  |  101  |  31<H>  ----------------------------<  111<H>  3.5   |  31  |  1.33<H>  12-31    139  |  101  |  34<H>  ----------------------------<  109<H>  3.8   |  29  |  1.31<H>    Ca    8.1<L>      02 Jan 2024 07:11  Ca    8.6      01 Jan 2024 07:26  Ca    8.8      31 Dec 2023 07:27  Phos  3.3     01-02  Mg     1.7     01-02    TPro  5.2<L>  /  Alb  2.5<L>  /  TBili  0.4  /  DBili  x   /  AST  15  /  ALT  21  /  AlkPhos  84  01-02  TPro  5.4<L>  /  Alb  2.7<L>  /  TBili  0.4  /  DBili  x   /  AST  16  /  ALT  26  /  AlkPhos  86  01-01  TPro  5.7<L>  /  Alb  2.6<L>  /  TBili  0.4  /  DBili  x   /  AST  16  /  ALT  31  /  AlkPhos  83  12-31    CAPILLARY BLOOD GLUCOSE      POCT Blood Glucose.: 113 mg/dL (02 Jan 2024 08:21)  POCT Blood Glucose.: 223 mg/dL (01 Jan 2024 21:52)  POCT Blood Glucose.: 318 mg/dL (01 Jan 2024 17:32)  POCT Blood Glucose.: 182 mg/dL (01 Jan 2024 13:01)        Urinalysis Basic - ( 02 Jan 2024 07:11 )    Color: x / Appearance: x / SG: x / pH: x  Gluc: 90 mg/dL / Ketone: x  / Bili: x / Urobili: x   Blood: x / Protein: x / Nitrite: x   Leuk Esterase: x / RBC: x / WBC x   Sq Epi: x / Non Sq Epi: x / Bacteria: x          RADIOLOGY & ADDITIONAL TESTS:    Imaging Personally Reviewed:  [x ] YES  [ ] NO    Consultants involved in case:   Consultant(s) Notes Reviewed:  [ x] YES  [ ] NO:   Care Discussed with Consultants/Other Providers [x ] YES  [ ] NO

## 2024-01-02 NOTE — PROGRESS NOTE ADULT - PROBLEM SELECTOR PLAN 2
Baseline BNP ~ 2800. BNP ~ 2800 this admission  HFpEF   EF = 68% on 8/11/23  Heart failure, CHF order set initiated  Guideline directed medical therapy as below: after med-rec completed  TTE systolic function is hyperdynamic with an ejection fraction of 80 %  - Bumex 2 mg IV BID and PRN -> Hold diuresis-> Will consider resuming with increasing LE edema and crackles   - Diuretic: on bumex PO 2 mg am 1 mg pm at home. >> resumed home 2mg 1/1-> can considering resuming full home dose   - BB:  resume home metoprolol tatrate 25 mg BID.  - ARNI/ACE-I/ARB: Consider starting at d/c  - Hydralazine/Nitrate: Not indicated at this time  - MRA: Not indicated at this time  - SGLT2: Consider starting at d/c  - Maintain K > 4, Mg > 2 Baseline BNP ~ 2800. BNP ~ 2800 this admission  HFpEF   EF = 68% on 8/11/23  Heart failure, CHF order set initiated  Guideline directed medical therapy as below: after med-rec completed  TTE systolic function is hyperdynamic with an ejection fraction of 80 %  - Bumex 2 mg IV BID and PRN -> Hold diuresis-> Will consider resuming with increasing LE edema and crackles   - Diuretic: on bumex PO 2 mg am 1 mg pm at home. >> resumed home 2mg 1/1-> resumed full dose 1/1  - BB:  resume home metoprolol tatrate 25 mg BID.  - ARNI/ACE-I/ARB: Consider starting at d/c  - Hydralazine/Nitrate: Not indicated at this time  - MRA: Not indicated at this time  - SGLT2: Consider starting at d/c  - Maintain K > 4, Mg > 2

## 2024-01-02 NOTE — PROGRESS NOTE ADULT - PROBLEM SELECTOR PLAN 7
DVT proph: heparin gtt  Diet: DASH  Dispo: pending med clearance

## 2024-01-02 NOTE — PROGRESS NOTE ADULT - ATTENDING COMMENTS
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  He is stable from pulmonary standpoint, without any recurrence of his pleural effusion, comfortable on RA.  Continues to have lower extremity edema, currently on bumex 1mg daily.  Agree with plan as outlined above.

## 2024-01-02 NOTE — PROGRESS NOTE ADULT - TIME BILLING
Personal review of data, imaging and discussion with medical team.
Review of patient records, including history, laboratory data, imaging. Patient evaluation and assessment. Coordination of care. Time excludes teaching
Personal review of data, imaging and discussion with medical team.
Discussion with medical team, review of data.
Review of patient records, including history, laboratory data, imaging. Patient evaluation and assessment. Coordination of care. Time excludes teaching
Review of patient records, including history, laboratory data, imaging. Patient evaluation and assessment. Coordination of care. Time excludes teaching
chart reviewing, history taking, physical exam, assessment and documentation, including speaking to specialist/SW/CM regarding the management.

## 2024-01-02 NOTE — PROGRESS NOTE ADULT - SUBJECTIVE AND OBJECTIVE BOX
PULMONARY PROGRESS NOTE    PATIENT INFORMATION:  NAME: GLADIS LEONARD:  MRN: [id number]    CHIEF COMPLAINT: Patient is a 83y old  Male who presents with a chief complaint of sob (02 Jan 2024 08:08)      [x] INITIAL CONSULT, H&P, FAMILY HISTORY and PAST MEDICAL AND SURGICAL HISTORY REVIEWED    OVERNIGHT EVENTS, CHANGES TO HPI, SUBJECTIVE:     ========================REVIEW OF SYSTEMS========================  [x] ROS negative except as per HPI    ========================MEDICATIONS=============================  MEDICATIONS  (STANDING):  albuterol    0.083% 2.5 milliGRAM(s) Nebulizer every 6 hours  apixaban 2.5 milliGRAM(s) Oral two times a day  ascorbic acid 500 milliGRAM(s) Oral daily  atorvastatin 10 milliGRAM(s) Oral at bedtime  benzonatate 100 milliGRAM(s) Oral every 8 hours  budesonide 160 MICROgram(s)/formoterol 4.5 MICROgram(s) Inhaler 2 Puff(s) Inhalation two times a day  buMETAnide 1 milliGRAM(s) Oral daily  cefTRIAXone   IVPB 2000 milliGRAM(s) IV Intermittent every 24 hours  chlorhexidine 4% Liquid 1 Application(s) Topical <User Schedule>  dextrose 5%. 1000 milliLiter(s) (50 mL/Hr) IV Continuous <Continuous>  dextrose 5%. 1000 milliLiter(s) (100 mL/Hr) IV Continuous <Continuous>  dextrose 50% Injectable 25 Gram(s) IV Push once  dextrose 50% Injectable 25 Gram(s) IV Push once  dextrose 50% Injectable 12.5 Gram(s) IV Push once  finasteride 5 milliGRAM(s) Oral daily  FIRST- Mouthwash  BLM 5 milliLiter(s) Swish and Spit every 6 hours  glucagon  Injectable 1 milliGRAM(s) IntraMuscular once  guaiFENesin  milliGRAM(s) Oral every 12 hours  influenza  Vaccine (HIGH DOSE) 0.7 milliLiter(s) IntraMuscular once  insulin lispro (ADMELOG) corrective regimen sliding scale   SubCutaneous three times a day before meals  insulin lispro (ADMELOG) corrective regimen sliding scale   SubCutaneous at bedtime  insulin lispro Injectable (ADMELOG) 1 Unit(s) SubCutaneous three times a day before meals  metoprolol tartrate 25 milliGRAM(s) Oral two times a day  multivitamin 1 Tablet(s) Oral daily  nystatin    Suspension 343475 Unit(s) Oral four times a day  pantoprazole   Suspension 40 milliGRAM(s) Oral daily  tamsulosin 0.4 milliGRAM(s) Oral at bedtime      MEDICATIONS  (PRN):  acetaminophen     Tablet .. 650 milliGRAM(s) Oral every 6 hours PRN Temp greater or equal to 38C (100.4F), Mild Pain (1 - 3)  benzocaine/menthol Lozenge 1 Lozenge Oral three times a day PRN Sore Throat  dextrose Oral Gel 15 Gram(s) Oral once PRN Blood Glucose LESS THAN 70 milliGRAM(s)/deciliter      ========================PHYSICAL EXAM============================    VITALS: ICU Vital Signs Last 24 Hrs  T(C): 36.8 (02 Jan 2024 15:43), Max: 36.8 (01 Jan 2024 21:33)  T(F): 98.3 (02 Jan 2024 15:43), Max: 98.3 (01 Jan 2024 21:33)  HR: 82 (02 Jan 2024 15:43) (76 - 91)  BP: 122/64 (02 Jan 2024 15:43) (121/68 - 125/62)  BP(mean): --  ABP: --  ABP(mean): --  RR: 17 (02 Jan 2024 15:43) (16 - 18)  SpO2: 97% (02 Jan 2024 15:43) (95% - 97%)    O2 Parameters below as of 02 Jan 2024 15:43  Patient On (Oxygen Delivery Method): room air            INTAKE and OUTPUT: I&O's Summary    01 Jan 2024 07:01  -  02 Jan 2024 07:00  --------------------------------------------------------  IN: 1280 mL / OUT: 1552 mL / NET: -272 mL    02 Jan 2024 07:01  -  02 Jan 2024 17:51  --------------------------------------------------------  IN: 600 mL / OUT: 850 mL / NET: -250 mL        VENTILATOR SETTINGS:     Physical Exam  CONST:     NAD, well-appearing; well-developed; appears stated age  ENMT:       MMM, no pharyngeal injection or exudates; oropharynx not crowded   RESP :        Normal respiratory effort; CTA bilaterally; no W/R/R  CHEST:       No TTP, no lines/ports; symmetric chest expansion  CARDIO:     RRR, normal S1 and S2, no M/R/G  VASC:         No JVD, no peripheral edema, pulses 2+ B/L, cap refill <2s  ABD:           Soft, nontender nondistended  MSK:          No clubbing of digits; no kyphoscoloiosis  NEURO:     Non-focal; no gross sensory deficits; moving all extremities   SKIN:          No rashes; no palpable lesions       ========================LABORATORY RESULTS AND IMAGING=============                        8.0    13.76 )-----------( 441      ( 02 Jan 2024 07:10 )             26.1                                                    01-02    140  |  101  |  26<H>  ----------------------------<  90  3.4<L>   |  29  |  1.17    Ca    8.1<L>      02 Jan 2024 07:11  Phos  3.3     01-02  Mg     1.7     01-02    TPro  5.2<L>  /  Alb  2.5<L>  /  TBili  0.4  /  DBili  x   /  AST  15  /  ALT  21  /  AlkPhos  84  01-02          Creatinine Trend: 1.17<--, 1.33<--, 1.31<--, 1.44<--, 1.61<--, 2.13<--    [x] RADIOLOGY REVIEWED AND INTERPRETED BY ME     PULMONARY PROGRESS NOTE    PATIENT INFORMATION:  NAME: GLADIS LEONARD:  MRN: [id number]    CHIEF COMPLAINT: Patient is a 83y old  Male who presents with a chief complaint of sob (02 Jan 2024 08:08)      [x] INITIAL CONSULT, H&P, FAMILY HISTORY and PAST MEDICAL AND SURGICAL HISTORY REVIEWED    OVERNIGHT EVENTS, CHANGES TO HPI, SUBJECTIVE:     ========================REVIEW OF SYSTEMS========================  [x] ROS negative except as per HPI    ========================MEDICATIONS=============================  MEDICATIONS  (STANDING):  albuterol    0.083% 2.5 milliGRAM(s) Nebulizer every 6 hours  apixaban 2.5 milliGRAM(s) Oral two times a day  ascorbic acid 500 milliGRAM(s) Oral daily  atorvastatin 10 milliGRAM(s) Oral at bedtime  benzonatate 100 milliGRAM(s) Oral every 8 hours  budesonide 160 MICROgram(s)/formoterol 4.5 MICROgram(s) Inhaler 2 Puff(s) Inhalation two times a day  buMETAnide 1 milliGRAM(s) Oral daily  cefTRIAXone   IVPB 2000 milliGRAM(s) IV Intermittent every 24 hours  chlorhexidine 4% Liquid 1 Application(s) Topical <User Schedule>  dextrose 5%. 1000 milliLiter(s) (50 mL/Hr) IV Continuous <Continuous>  dextrose 5%. 1000 milliLiter(s) (100 mL/Hr) IV Continuous <Continuous>  dextrose 50% Injectable 25 Gram(s) IV Push once  dextrose 50% Injectable 25 Gram(s) IV Push once  dextrose 50% Injectable 12.5 Gram(s) IV Push once  finasteride 5 milliGRAM(s) Oral daily  FIRST- Mouthwash  BLM 5 milliLiter(s) Swish and Spit every 6 hours  glucagon  Injectable 1 milliGRAM(s) IntraMuscular once  guaiFENesin  milliGRAM(s) Oral every 12 hours  influenza  Vaccine (HIGH DOSE) 0.7 milliLiter(s) IntraMuscular once  insulin lispro (ADMELOG) corrective regimen sliding scale   SubCutaneous three times a day before meals  insulin lispro (ADMELOG) corrective regimen sliding scale   SubCutaneous at bedtime  insulin lispro Injectable (ADMELOG) 1 Unit(s) SubCutaneous three times a day before meals  metoprolol tartrate 25 milliGRAM(s) Oral two times a day  multivitamin 1 Tablet(s) Oral daily  nystatin    Suspension [id number] Unit(s) Oral four times a day  pantoprazole   Suspension 40 milliGRAM(s) Oral daily  tamsulosin 0.4 milliGRAM(s) Oral at bedtime      MEDICATIONS  (PRN):  acetaminophen     Tablet .. 650 milliGRAM(s) Oral every 6 hours PRN Temp greater or equal to 38C (100.4F), Mild Pain (1 - 3)  benzocaine/menthol Lozenge 1 Lozenge Oral three times a day PRN Sore Throat  dextrose Oral Gel 15 Gram(s) Oral once PRN Blood Glucose LESS THAN 70 milliGRAM(s)/deciliter      ========================PHYSICAL EXAM============================    VITALS: ICU Vital Signs Last 24 Hrs  T(C): 36.8 (02 Jan 2024 15:43), Max: 36.8 (01 Jan 2024 21:33)  T(F): 98.3 (02 Jan 2024 15:43), Max: 98.3 (01 Jan 2024 21:33)  HR: 82 (02 Jan 2024 15:43) (76 - 91)  BP: 122/64 (02 Jan 2024 15:43) (121/68 - 125/62)  BP(mean): --  ABP: --  ABP(mean): --  RR: 17 (02 Jan 2024 15:43) (16 - 18)  SpO2: 97% (02 Jan 2024 15:43) (95% - 97%)    O2 Parameters below as of 02 Jan 2024 15:43  Patient On (Oxygen Delivery Method): room air            INTAKE and OUTPUT: I&O's Summary    01 Jan 2024 07:01  -  02 Jan 2024 07:00  --------------------------------------------------------  IN: 1280 mL / OUT: 1552 mL / NET: -272 mL    02 Jan 2024 07:01  -  02 Jan 2024 17:51  --------------------------------------------------------  IN: 600 mL / OUT: 850 mL / NET: -250 mL        VENTILATOR SETTINGS:     Physical Exam  CONST:     NAD, well-appearing; well-developed; appears stated age  ENMT:       MMM, no pharyngeal injection or exudates; oropharynx not crowded   RESP :        Normal respiratory effort; CTA bilaterally; no W/R/R  CHEST:       No TTP, no lines/ports; symmetric chest expansion  CARDIO:     RRR, normal S1 and S2, no M/R/G  VASC:         No JVD, no peripheral edema, pulses 2+ B/L, cap refill <2s  ABD:           Soft, nontender nondistended  MSK:          No clubbing of digits; no kyphoscoloiosis  NEURO:     Non-focal; no gross sensory deficits; moving all extremities   SKIN:          No rashes; no palpable lesions       ========================LABORATORY RESULTS AND IMAGING=============                        8.0    13.76 )-----------( 441      ( 02 Jan 2024 07:10 )             26.1                                                    01-02    140  |  101  |  26<H>  ----------------------------<  90  3.4<L>   |  29  |  1.17    Ca    8.1<L>      02 Jan 2024 07:11  Phos  3.3     01-02  Mg     1.7     01-02    TPro  5.2<L>  /  Alb  2.5<L>  /  TBili  0.4  /  DBili  x   /  AST  15  /  ALT  21  /  AlkPhos  84  01-02          Creatinine Trend: 1.17<--, 1.33<--, 1.31<--, 1.44<--, 1.61<--, 2.13<--    [x] RADIOLOGY REVIEWED AND INTERPRETED BY ME

## 2024-01-02 NOTE — PROGRESS NOTE ADULT - ATTENDING SUPERVISION STATEMENT
Fellow
Resident

## 2024-01-05 NOTE — CDI QUERY NOTE - NSCDIOTHERTXTBX_GEN_ALL_CORE_HH
The patient presented with shortness of breath, found to have acute respiratory failure with hypoxia.  Per ED note, ID notes and Nephrology notes, the patient also had sepsis.  Internal Medicine notes indicated patient meets SIRS criteria.    Query:  Please clarify the status of sepsis on this admission, if known?  = Patient was treated for sepsis.  = Sepsis was ruled out.  = Other.  = Not applicable.    Supporting Information:  ED vital signs:  T-100.2  P-130-->118-->99  BP-128/64  R-36-->31-->22    WBC = 16.93  Lactate = 3.7    Patient was on antibiotics:   Vancomycin IV 12/20  Ceftriaxone IV 12/20 to 12/21  Cefepime IV 12/20  Azithromycin IV 12/20 to 12/21    ID notes: Pneumococcal pneumonia with septicemia  Pulmonary notes: patient with significant increased mucus production and sepsis please continue ceftriaxone and azithromycin                      s/p 1200 left thoracentesis exudative, unlikely infectious pleural effusion  Nephrology notes: acute kidney injury- likely ATN due to contrast/sepsis  Internal Medicine last note: Patient meets SIRS criteria, started on IV antibiotics, diuresis, steroid for suspected COPD exacerbation and admitted to medicine for further workup.

## 2024-01-20 LAB
CULTURE RESULTS: SIGNIFICANT CHANGE UP
SPECIMEN SOURCE: SIGNIFICANT CHANGE UP

## 2024-02-07 LAB
CULTURE RESULTS: SIGNIFICANT CHANGE UP
SPECIMEN SOURCE: SIGNIFICANT CHANGE UP

## 2024-02-16 RX ORDER — FOLIC ACID 1 MG/1
1 TABLET ORAL DAILY
Qty: 90 | Refills: 3 | Status: ACTIVE | COMMUNITY
Start: 2024-02-16 | End: 1900-01-01

## 2024-02-16 RX ORDER — APIXABAN 5 MG/1
5 TABLET, FILM COATED ORAL
Qty: 180 | Refills: 3 | Status: ACTIVE | COMMUNITY
Start: 1900-01-01 | End: 1900-01-01

## 2024-02-16 RX ORDER — TAMSULOSIN HYDROCHLORIDE 0.4 MG/1
0.4 CAPSULE ORAL
Qty: 90 | Refills: 1 | Status: ACTIVE | COMMUNITY
Start: 2023-11-29 | End: 1900-01-01

## 2024-02-16 RX ORDER — METOPROLOL TARTRATE 25 MG/1
25 TABLET, FILM COATED ORAL TWICE DAILY
Qty: 180 | Refills: 1 | Status: ACTIVE | COMMUNITY
Start: 2019-03-06 | End: 1900-01-01

## 2024-02-16 RX ORDER — BUMETANIDE 1 MG/1
1 TABLET ORAL
Qty: 180 | Refills: 1 | Status: ACTIVE | COMMUNITY
Start: 2023-11-29 | End: 1900-01-01

## 2024-02-16 RX ORDER — PANTOPRAZOLE 40 MG/1
40 TABLET, DELAYED RELEASE ORAL
Qty: 90 | Refills: 1 | Status: ACTIVE | COMMUNITY
Start: 2023-11-29 | End: 1900-01-01

## 2024-02-16 RX ORDER — FLUTICASONE PROPIONATE AND SALMETEROL 250; 50 UG/1; UG/1
250-50 POWDER RESPIRATORY (INHALATION)
Qty: 3 | Refills: 3 | Status: ACTIVE | COMMUNITY
Start: 2024-02-16 | End: 1900-01-01

## 2024-02-17 RX ORDER — POTASSIUM CHLORIDE 1500 MG/1
20 TABLET, FILM COATED, EXTENDED RELEASE ORAL
Qty: 180 | Refills: 1 | Status: ACTIVE | COMMUNITY
Start: 2023-11-30 | End: 1900-01-01

## 2024-02-20 RX ORDER — CLOTRIMAZOLE AND BETAMETHASONE DIPROPIONATE 10; .5 MG/G; MG/G
1-0.05 CREAM TOPICAL TWICE DAILY
Qty: 1 | Refills: 2 | Status: COMPLETED | COMMUNITY
Start: 2022-09-21 | End: 2024-02-20

## 2024-02-20 RX ORDER — DOXYCYCLINE HYCLATE 100 MG/1
100 TABLET ORAL
Qty: 14 | Refills: 0 | Status: COMPLETED | COMMUNITY
Start: 2023-12-18 | End: 2024-02-20

## 2024-02-21 RX ORDER — CLOTRIMAZOLE AND BETAMETHASONE DIPROPIONATE 10; .5 MG/G; MG/G
1-0.05 CREAM TOPICAL TWICE DAILY
Qty: 1 | Refills: 2 | Status: ACTIVE | COMMUNITY
Start: 1900-01-01 | End: 1900-01-01

## 2024-03-04 NOTE — PHYSICAL EXAM
Subjective:       Patient ID: Rochelle Mckeon is a 81 y.o. female.    Chief Complaint: Cyst      HPI: 81-year-old female, new to [facility], referred for renal cyst in renal dilation.    Patient had a CT on 02/22/2024 due to abdominal pain.    CT shows bilateral renal cyst.  Also shows enlargement of the right renal pelvis which could be due to educated until UPJ stenosis or pelviectasis..      Patient has a history of frequency and nocturia.  She is on Myrbetriq 25 mg daily.  Patient states this does help.  She still gets up 2-3 times at night, but this is improvement.      She denies any pain or burning urination.  Denies any odor urine denies any fever or body aches.  Denies any blood in urine.    No other urinary complaints at this time.           Past Medical History:   Past Medical History:   Diagnosis Date    Allergies     Aortic stenosis     Bowel obstruction     CHF (congestive heart failure)     Gastroparesis     GERD (gastroesophageal reflux disease)     Heart disease     HTN (hypertension)     Hypoxia     Mixed hyperlipidemia     Pneumonia, unspecified organism     Respiratory failure     Sepsis, unspecified organism     SOB (shortness of breath)        Past Surgical Historical:   Past Surgical History:   Procedure Laterality Date    APPENDECTOMY      BLADDER SUSPENSION      HYSTERECTOMY      REPAIR OF BOWEL PERFORATION      REPAIR, LACERATION, ABDOMEN      SHOULDER OPEN ROTATOR CUFF REPAIR          Medications:   Medication List with Changes/Refills   Current Medications    ALBUTEROL (ACCUNEB) 0.63 MG/3 ML NEBU    Take 0.63 mg by nebulization every 6 (six) hours as needed. Rescue    AMLODIPINE (NORVASC) 2.5 MG TABLET    Take 2.5 mg by mouth once daily.    APIXABAN (ELIQUIS) 2.5 MG TAB    Take by mouth 2 (two) times daily.    ASPIRIN 81 MG CHEW    Take 81 mg by mouth once daily.    ATORVASTATIN (LIPITOR) 40 MG TABLET    Take 40 mg by mouth once daily.    BENZONATATE (TESSALON) 200 MG CAPSULE    Take 200  mg by mouth 3 (three) times daily as needed for Cough.    BUDESONIDE (PULMICORT) 0.5 MG/2 ML NEBULIZER SOLUTION    Take 0.5 mg by nebulization once daily. Controller    CARBAMAZEPINE (TEGRETOL XR) 100 MG 12 HR TABLET    Take 100 mg by mouth 2 (two) times daily.    CLONIDINE 0.1 MG/24 HR TD PTWK (CATAPRES) 0.1 MG/24 HR    Place 1 patch onto the skin every 7 days.    FLUTICASONE PROPIONATE (FLONASE) 50 MCG/ACTUATION NASAL SPRAY    1 spray by Each Nostril route once daily.    FUROSEMIDE (LASIX) 20 MG TABLET    Take 20 mg by mouth 2 (two) times daily.    LEVOTHYROXINE (SYNTHROID) 25 MCG TABLET    Take 25 mcg by mouth before breakfast.    MAGNESIUM SULFATE 100 % CRYSTALS    by Misc.(Non-Drug; Combo Route) route.    METOCLOPRAMIDE HCL (REGLAN) 10 MG TABLET    Take 10 mg by mouth 4 (four) times daily.    MIRABEGRON (MYRBETRIQ) 25 MG TB24 ER TABLET    Take 25 mg by mouth once daily.    MONTELUKAST (SINGULAIR) 10 MG TABLET    Take 10 mg by mouth every evening.    PANTOPRAZOLE (PROTONIX) 40 MG TABLET    Take 40 mg by mouth once daily.    SERTRALINE (ZOLOFT) 25 MG TABLET    Take 25 mg by mouth once daily.    SPIRONOLACTONE (ALDACTONE) 25 MG TABLET    Take 25 mg by mouth once daily.    SUCRALFATE (CARAFATE) 100 MG/ML SUSPENSION    Take 1 g by mouth 4 (four) times daily.    VALSARTAN (DIOVAN) 160 MG TABLET    Take 160 mg by mouth once daily.        Past Social History:   Social History     Socioeconomic History    Marital status:    Tobacco Use    Passive exposure: Never    Smokeless tobacco: Never   Substance and Sexual Activity    Alcohol use: Never    Drug use: Never    Sexual activity: Not Currently       Allergies:   Review of patient's allergies indicates:   Allergen Reactions    Codeine         Family History: History reviewed. No pertinent family history.     Review of Systems:  Review of Systems   Constitutional:  Negative for activity change and appetite change.   HENT:  Negative for congestion and dental  problem.    Respiratory:  Negative for chest tightness and shortness of breath.    Cardiovascular:  Negative for chest pain.   Gastrointestinal:  Negative for abdominal distention and abdominal pain.   Genitourinary:  Positive for frequency. Negative for decreased urine volume, difficulty urinating, dyspareunia, dysuria, enuresis, flank pain, genital sores, hematuria, pelvic pain and urgency.   Musculoskeletal:  Negative for back pain and neck pain.   Allergic/Immunologic: Negative for immunocompromised state.   Neurological:  Negative for dizziness.   Hematological:  Negative for adenopathy.   Psychiatric/Behavioral:  Negative for agitation, behavioral problems and confusion.        Physical Exam:  Physical Exam  Vitals and nursing note reviewed.   Constitutional:       Appearance: She is well-developed.   HENT:      Head: Normocephalic.   Eyes:      Pupils: Pupils are equal, round, and reactive to light.   Cardiovascular:      Rate and Rhythm: Normal rate and regular rhythm.      Heart sounds: Normal heart sounds.   Pulmonary:      Effort: Pulmonary effort is normal.      Breath sounds: Normal breath sounds.   Abdominal:      General: Bowel sounds are normal.      Palpations: Abdomen is soft.   Musculoskeletal:         General: Normal range of motion.      Cervical back: Normal range of motion and neck supple.   Skin:     General: Skin is warm and dry.   Neurological:      Mental Status: She is alert and oriented to person, place, and time.   Psychiatric:         Mood and Affect: Mood normal.         Behavior: Behavior normal.       Urinalysis:  Trace intact blood, red blood cells 3-5.    Assessment/Plan:   1. Renal cyst:  CT shows renal cyst with no lesions or masses.    No intervention indicated at this time.      2. Dilation of renal collecting system: Will schedule patient for renal scan with Lasix.      3. Frequency/nocturia:  Patient is on Myrbetriq 25 mg with some improvement.    Patient given samples to  try 50 mg for 6 weeks.      Follow-up to arrange pending renal scan.  Problem List Items Addressed This Visit    None  Visit Diagnoses       Renal cyst    -  Primary    Relevant Orders    POCT Urinalysis(Instrument) (Completed)    Dilation of renal collection system        Relevant Orders    NM Kidney W Flow and Function W Pharmacological    Hydronephrosis, unspecified hydronephrosis type        Relevant Orders    NM Kidney W Flow and Function W Pharmacological    Urinary frequency        Nocturia                      [General Appearance - Well Developed] : well developed [Heart Rate And Rhythm] : Heart rate and rhythm were normal [] : no respiratory distress [Bowel Sounds] : normal bowel sounds [Abdomen Soft] : soft [Abdomen Tenderness] : non-tender [Abdomen Mass (___ Cm)] : no abdominal mass palpated [Abdomen Hernia] : no hernia was discovered [Urethral Meatus] : meatus normal [Penis Abnormality] : normal circumcised penis [Testes Tenderness] : no tenderness of the testes [Prostate Enlargement] : the prostate was not enlarged [Prostate Tenderness] : the prostate was not tender [Normal Station and Gait] : the gait and station were normal for the patient's age [Skin Color & Pigmentation] : normal skin color and pigmentation [No Focal Deficits] : no focal deficits [Mood] : the mood was normal [Inguinal Lymph Nodes Enlarged Bilaterally] : inguinal [FreeTextEntry1] : healing inguinal skin abscess with induration no erythema no tenderness

## 2024-03-06 ENCOUNTER — APPOINTMENT (OUTPATIENT)
Dept: PULMONOLOGY | Facility: CLINIC | Age: 84
End: 2024-03-06
Payer: MEDICARE

## 2024-03-06 ENCOUNTER — NON-APPOINTMENT (OUTPATIENT)
Age: 84
End: 2024-03-06

## 2024-03-06 ENCOUNTER — LABORATORY RESULT (OUTPATIENT)
Age: 84
End: 2024-03-06

## 2024-03-06 VITALS — SYSTOLIC BLOOD PRESSURE: 107 MMHG | HEART RATE: 93 BPM | OXYGEN SATURATION: 94 % | DIASTOLIC BLOOD PRESSURE: 72 MMHG

## 2024-03-06 DIAGNOSIS — J43.9 EMPHYSEMA, UNSPECIFIED: ICD-10-CM

## 2024-03-06 DIAGNOSIS — J61 PNEUMOCONIOSIS DUE TO ASBESTOS AND OTHER MINERAL FIBERS: ICD-10-CM

## 2024-03-06 DIAGNOSIS — Z23 ENCOUNTER FOR IMMUNIZATION: ICD-10-CM

## 2024-03-06 DIAGNOSIS — I10 ESSENTIAL (PRIMARY) HYPERTENSION: ICD-10-CM

## 2024-03-06 DIAGNOSIS — R60.0 LOCALIZED EDEMA: ICD-10-CM

## 2024-03-06 DIAGNOSIS — E78.5 HYPERLIPIDEMIA, UNSPECIFIED: ICD-10-CM

## 2024-03-06 DIAGNOSIS — I87.2 VENOUS INSUFFICIENCY (CHRONIC) (PERIPHERAL): ICD-10-CM

## 2024-03-06 DIAGNOSIS — R73.03 PREDIABETES.: ICD-10-CM

## 2024-03-06 LAB — POCT - HEMOGLOBIN (HGB), QUANTITATIVE, TRANSCUTANEOUS: 12.5

## 2024-03-06 PROCEDURE — 71046 X-RAY EXAM CHEST 2 VIEWS: CPT

## 2024-03-06 PROCEDURE — 94727 GAS DIL/WSHOT DETER LNG VOL: CPT

## 2024-03-06 PROCEDURE — 99215 OFFICE O/P EST HI 40 MIN: CPT | Mod: 25

## 2024-03-06 PROCEDURE — 88738 HGB QUANT TRANSCUTANEOUS: CPT

## 2024-03-06 PROCEDURE — G0009: CPT

## 2024-03-06 PROCEDURE — ZZZZZ: CPT

## 2024-03-06 PROCEDURE — 90677 PCV20 VACCINE IM: CPT

## 2024-03-06 PROCEDURE — 94729 DIFFUSING CAPACITY: CPT

## 2024-03-06 PROCEDURE — 94010 BREATHING CAPACITY TEST: CPT

## 2024-03-06 RX ORDER — ALBUTEROL SULFATE 90 UG/1
108 (90 BASE) AEROSOL, METERED RESPIRATORY (INHALATION)
Qty: 3 | Refills: 3 | Status: ACTIVE | COMMUNITY
Start: 2024-03-06 | End: 1900-01-01

## 2024-03-06 RX ORDER — ASCORBIC ACID 500 MG
500 TABLET ORAL DAILY
Refills: 0 | Status: ACTIVE | COMMUNITY

## 2024-03-06 NOTE — DISCUSSION/SUMMARY
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 Congestive heart failure does not appear to be actively fluid overload Atrial fibrillation on Eliquis History of hospitalization December 2023 with sepsis post hypoxemic respiratory failure post pneumonia History hypertension Hyperlipidemia Colon cancer Status post Pneumonia bacteremia with above-noted etiology for respiratory failure Continue current management   Pos COVID AB  consistent with Dec known pos exposure  Better controlled hypertension-patient is medication adherence compliance in view lower ext edema improved Cardiac vascular consult Lopez Kahn DO reviewed  1 + proteinuria - address at f/u 24 hr urine collection etiology hypertension mild lower  extremity edema/ Venous insufficiency mild interval increase-  lasix- QOD Improved Low normal O2 saturations on room air  abnormal PFT  History asbestosis  COPD-Emphysema with mucoid impaction stable pulmonary physiology Pre DM- inc noted HGBA1C  HTN  - controlled Sinus tachycardia-we will increase metoprolol 200 mg a.m. and 50 mg p.o.-  normalized  HLD Groin cellulitis w with recurrence- left inguinal- Resolved without recent  recurrence Hx right  shoulder  cellulitis  ANORO 1 puff Daily-PFT trend   3 month APPT watch Lipid profile with  A1C low sugar diet addressed  Discussed  with patient never did get Colonoscopy - readvised  J and J completed plus booster with PFIZER

## 2024-03-06 NOTE — REASON FOR VISIT
[Follow-Up] : a follow-up visit [COPD] : COPD [ILD] : ILD [FreeTextEntry2] : HTN , HLD, asbestosis,HF

## 2024-03-06 NOTE — REVIEW OF SYSTEMS
[As Noted in HPI] : as noted in HPI [Hypertension] : ~T hypertension [Negative] : Psychiatric [FreeTextEntry7] : abnormal CT  ABD  focal  wall thickening [FreeTextEntry5] : Hematuria  with  cystoscopy [de-identified] : R/O Pre DM

## 2024-03-06 NOTE — HISTORY OF PRESENT ILLNESS
[Stable] : are stable [None] : ~He/She~ has no significant interval events [Difficulty Breathing During Exertion] : stable dyspnea on exertion [Feelings Of Weakness On Exertion] : stable exercise intolerance [Cough] : denies coughing [Wheezing] : denies wheezing [Chest Pain Or Discomfort] : denies chest pain [Regional Soft Tissue Swelling Both Lower Extremities] : denies lower extremity edema [Fever] : denies fever [Date: ___] : was performed [unfilled] [Wt Gain ___ Lbs] : no recent weight gain [Wt Loss ___ Lbs] : no recent weight loss [Oxygen] : the patient uses no supplemental oxygen [de-identified] : emphysema with mucous plugging [FreeTextEntry1] : 83-year-old male History COPD Interstitial lung disease Asbestos related lung disease with plaque Congestive heart failure Atrial fibrillation on Eliquis Status post hypoxemic respiratory failure secondary to pneumonia possible COPD flare acute heart failure Additional history hypertension Hyperlipidemia Colon cancer Posthospitalization strep pneumonia bacteremia Did not meet criteria for SIRS criteria additional hospital findings included enterorhinovirus CT chest with loculated left pleural effusion and a small right pleural effusion as noted Antibiotic treatment protocol including ceftriaxone and Zithromax Bumex for congestive heart failure Complications including BESSY anemia hypertension chronic A-fib on anticoagulation as noted above Pleural effusion status postthoracentesis negative for malignancy Oxygen as needed to maintain O2 sats greater than 88%  [facility] Imaging studies CT chest angiogram protocol Negative pulmonary embolism Moderate loculated left pleural effusion Small right pleural effusion Associated compressive atelectasis Coronary artery calcification Mild aneurysm dilatation Positive secretions left mainstem bronchus bilateral lower lobe bronchi Emphysema Reticular opacities architectural distortion with groundglass opacities Noted to left pleural effusion is circumferential and loculated in appearance Almost near collapse of the left lower lobe Calcified pleural plaque Note that a scan of the chest completed at a prior hospitalization October 7, 2023 Only reported small bilateral pleural effusions Noncalcified pleural plaques consistent with known asbestos Emphysema Above-noted radiographic features are new  Emergency department review As noted 83-year-old male with a history of hypertension hypercholesterolemia colon cancer colostomy COPD interstitial lung disease with known asbestos and plaque formation diastolic dysfunction congestive heart failure atrial fibrillation on Eliquis Admitted from [facility] with shortness of breath Worsening dyspnea Reported positive fever to 100.2 Reports that was in atrial fibrillation Normotensive  Cardiac Vascular Edema of lower extremity (782.3) (R60.0)  Assessment: 1. Coronary Athero 2. Obesity 3. Asbestosis/copd 4. LE edema, pitting, to the thigh .5. Former smoker 6. Aortic athero Plan:  1. Continue current meds 2. Edema is improving 3. Labwork today to assure he is tolerating lasix  4. will hold off on stress test - he has no symptoms. 5. Reduce lasix to 40mg DAILY. If swelling returns then increase back to 40mg BID.  6. If all ok then return in 6 months.

## 2024-03-06 NOTE — PHYSICAL EXAM
[General Appearance - Well Developed] : well developed [Normal Appearance] : normal appearance [Well Groomed] : well groomed [General Appearance - Well Nourished] : well nourished [General Appearance - In No Acute Distress] : no acute distress [No Deformities] : no deformities [Normal Conjunctiva] : the conjunctiva exhibited no abnormalities [Eyelids - No Xanthelasma] : the eyelids demonstrated no xanthelasmas [Normal Oropharynx] : normal oropharynx [I] : I [Neck Appearance] : the appearance of the neck was normal [Jugular Venous Distention Increased] : there was no jugular-venous distention [Neck Cervical Mass (___cm)] : no neck mass was observed [Thyroid Diffuse Enlargement] : the thyroid was not enlarged [Heart Rate And Rhythm] : heart rate and rhythm were normal [Murmurs] : no murmurs present [Heart Sounds] : normal S1 and S2 [Edema] : no peripheral edema present [Veins - Varicosity Changes] : no varicosital changes were noted in the lower extremities [Arterial Pulses Normal] : the arterial pulses were normal [Respiration, Rhythm And Depth] : normal respiratory rhythm and effort [Exaggerated Use Of Accessory Muscles For Inspiration] : no accessory muscle use [Bowel Sounds] : normal bowel sounds [Abdomen Soft] : soft [Abdomen Tenderness] : non-tender [Abdomen Mass (___ Cm)] : no abdominal mass palpated [Abnormal Walk] : normal gait [Gait - Sufficient For Exercise Testing] : the gait was sufficient for exercise testing [Cyanosis, Localized] : no localized cyanosis [Petechial Hemorrhages (___cm)] : no petechial hemorrhages [Nail Clubbing] : no clubbing of the fingernails [Skin Color & Pigmentation] : normal skin color and pigmentation [] : no rash [Sensation] : the sensory exam was normal to light touch and pinprick [Deep Tendon Reflexes (DTR)] : deep tendon reflexes were 2+ and symmetric [No Focal Deficits] : no focal deficits [Motor Exam] : the motor exam was normal [Impaired Insight] : insight and judgment were intact [Affect] : the affect was normal [Oriented To Time, Place, And Person] : oriented to person, place, and time [Mood] : the mood was normal [FreeTextEntry1] : left groin swelling erythema edema consistent with cellulitis - interval improvement with candida  [FreeTextEntry2] : Only some edema at the level of the ankle

## 2024-03-06 NOTE — PROCEDURE
[FreeTextEntry1] : Chest x-ray PA lateral March 6, 2024 Indication asbestos related exposure Congestive heart failure historically Pleural effusion Cardiac size is grossly normal Evidence of a dense pleural plaque bilateral Right lung does not demonstrate any evidence of pleural effusion Left lung with very small left pleural effusion Interval improvement compared to chest x-ray from hospitalization No appreciable dominant pulmonary nodules  PFT no bronchodilator March 6, 2024 Severe reduction in flow rates Restrictive obstructive ventilatory impairment Severe gas exchange impairment with diffusion 38% predicted Hemoglobin 12.5 Compared to 1 year prior there is a decline in overall pulmonary physiology   PFT April 19 2023 indication asbestosis Interstitial lung disease COPD Moderate obstructive ventilatory impairment Lung volumes normal TLC 81% predicted Air-trapping with RV/TLC ratio 125% predicted Diffusion mild mild reduction 64% predicted with normal functioning alveolar capillary units and gas exchange impairment Hemoglobin 13.5 Overall stable pulmonary physiology  Bone density April 19, 2023 Indication steroids AP spine L1-L4 normal Femoral neck  Normal Total left hip normal Impression normal study Calcium vitamin D Weightbearing walking exercise Consider follow-up in 2 to 5 years  POCT 3/8/23 Glucose 142 HGBA1C  6.1  Chest x-ray PA lateral January 25, 2023 Cardiac size normal Uncoiled aorta Patchy parenchymal infiltrate with scarring bilateral consistent with known pleural plaques fibrotic changes No appreciable densities Left upper lobe pleural-parenchymal scar No gross interval change compared to x-ray dating back to February 28, 2020  PFT 1/25/23 moderate reduction flow  rates  TLC nl range 82 % pred  Pos airtrapping DLCO 58 % with moderate loss fx alveolar  capillary units  HGB 13.2  Labs per Vascular Dec 2022 Reviewed Stable hemoglobin A1c  Spirometry no bronchodilator 8/10/2022 Moderate to severe reduction in flow rates ratio 65 Will dating back over the past year there has been a decline at the FVC most recent noted from June till present the FEV1 is unchanged  PFT no bronchodilator June 24, 2022 Moderate to severe reduction in flow rates Mild obstructive ventilatory impairment TLC normal 83% predicted Positive air trapping with RV/TLC ratio 152% predicted Diffusion moderate to severely reduced 54% predicted with a loss of functioning alveolar capillary units Hemoglobin 15.0 Overall noted decline at diffusion Clinical correlation  POCT  2/28/22 HGBA!C 6.5  Glucose 115  Chest x-ray PA lateral February 28, 2022 Cardiac size is normal Pleural plaques identified On the right lung No parenchymal infiltrates pleural effusions or dominant pulmonary nodules Uncoiled aorta  PFT no bronchodilator December 13, 2021 Flow rates normal Very mild obstructive pattern Ratio 75 Lung volumes normal Total lung capacity 98% predicted Diffusion 108% predicted There is noted to be some decline of pulmonary physiology  EKG 10/27/21  Sinus tach with rate variation  Chest x-ray PA lateral August 9, 2021 Cardiac size normal Uncoiled aorta Pleural plaque identified with the dominant right hemidiaphragm Left lung pleural-based abnormality consistent with known asbestosis No dominant pulmonary nodules pleural effusions pneumothorax  PFT 7/8/21 Moderate OAD  TLC 86 % pred  Airtrapping  Low nl DLCO 73 % HGB 16.1  PFT  4//14/21 Moderate reduction flow  rates  Mod OAD Normal TLC 82 % Pos  airtrapping Low nl DLCO  73 % HGB 17.7  stable pulmonary physiology  CHEST  x-ray PA lateral February 24, 2021 history of asbestosis COPD Normal cardiac size Uncoiled aorta Chronic parenchymal changes with bilateral identifiable pleural disease. Diagnosis with pleural plaques clinically right diaphragm asbestosis Minimal calcification aortic knob no gross interval change compared to chest x-ray [place] 3, 2020  PFT Body Box 12/7/2020 moderate OAD airtrapping Resistance is  normal and sp conductance decreased low  normal DLCO DLCO 78 % HGB 16.3  PFT September 4, 2020 No bronchodilator administered Moderate reduction flow rates with an obstructive ventilatory impairment and a reduced total lung capacity 69% predicted. Mild reduction diffusion with a loss of functioning alveolocapillary units are Impression combined mild obstructive and restrictive ventilatory impairment with mild gas exchange impairment.  POCT  12/7/2020 Glucose 138 HGB  5.8  DATA Delores  3  2020 Serum glucose 112 Hemoglobin A1c 6.1 Urine analysis trace protein negative nitrates  EKG Delores 3, 2020 Sinus tachycardia Cannot exclude old anterior wall changes RSR  Chest x-ray PA lateral Delores 3, 2020 Normal cardiac size " Reported Calcified pleural diaphragmatic pad right Patchy changes midlung zone No pleural effusions dominant pulmonary nodules No gross interval change compared to chest x-ray of December 19, 2019   Spirometry No BD Moderate reduction flow rates Spirometry  1/17/2020  moderate  reduction in flow rates  obstructive ventilatory impairment and with reduced FVC cannot exclude restrictive component Pos BD at FVC 12 %  Chest x-ray PA lateral December 19, 2019 Indication COPD-asbestosis Cardiac size normal " Aortic Dense calcified pleural plaque right hemidiaphragm COPD changes No significant interstitial changes No dominant pulmonary nodules parenchymal infiltrates pleural effusions Change compared to chest x-ray of December 18, 2018  PFT 9/16/2019 Moderate OAD  Pos BD 14 % at FVC  Minimal airtrapping and normal TLC 95 %  DLCO  84 % normal  range  HGB 16.2  Stable pulmonary physiology  Pulmonary Six Minute Walk 5/4/2018 No desaturation  Blood draw Data review June 4, 2020 COVID-19 IgG antibody negative CBC normal White blood count 9.45 hemoglobin 16.3 hematocrit 49.6 Platelet count 315,000 T4 TSH normal PSA normal range 1.38 Lipid profile Cholesterol 148 HDL 47 LDL 82 Vitamin D 27.9   HD Flu administered Oct 27 2021  Bone density screening osteoporosis high risk patient February 24, 2021 AP spine L1-L4 normal Femoral neck left normal Total left hip normal Impression normal study  Blood draw PREVNAIR IM 3/6/24

## 2024-03-07 LAB
ALBUMIN SERPL ELPH-MCNC: 3.7 G/DL
ALP BLD-CCNC: 117 U/L
ALT SERPL-CCNC: 5 U/L
ANION GAP SERPL CALC-SCNC: 18 MMOL/L
AST SERPL-CCNC: 13 U/L
BASOPHILS # BLD AUTO: 0.05 K/UL
BASOPHILS NFR BLD AUTO: 0.5 %
BILIRUB SERPL-MCNC: 0.6 MG/DL
BUN SERPL-MCNC: 17 MG/DL
CALCIUM SERPL-MCNC: 9.1 MG/DL
CHLORIDE SERPL-SCNC: 98 MMOL/L
CHOLEST SERPL-MCNC: 141 MG/DL
CO2 SERPL-SCNC: 24 MMOL/L
CREAT SERPL-MCNC: 1.14 MG/DL
EGFR: 63 ML/MIN/1.73M2
EOSINOPHIL # BLD AUTO: 0.67 K/UL
EOSINOPHIL NFR BLD AUTO: 6.9 %
GLUCOSE SERPL-MCNC: 124 MG/DL
HCT VFR BLD CALC: 40.3 %
HDLC SERPL-MCNC: 39 MG/DL
HGB BLD-MCNC: 13 G/DL
IMM GRANULOCYTES NFR BLD AUTO: 0.4 %
LDLC SERPL CALC-MCNC: 85 MG/DL
LYMPHOCYTES # BLD AUTO: 1.6 K/UL
LYMPHOCYTES NFR BLD AUTO: 16.5 %
MAN DIFF?: NORMAL
MCHC RBC-ENTMCNC: 28 PG
MCHC RBC-ENTMCNC: 32.3 GM/DL
MCV RBC AUTO: 86.7 FL
MONOCYTES # BLD AUTO: 0.71 K/UL
MONOCYTES NFR BLD AUTO: 7.3 %
NEUTROPHILS # BLD AUTO: 6.61 K/UL
NEUTROPHILS NFR BLD AUTO: 68.4 %
NONHDLC SERPL-MCNC: 102 MG/DL
NT-PROBNP SERPL-MCNC: 1006 PG/ML
PLATELET # BLD AUTO: 312 K/UL
POTASSIUM SERPL-SCNC: 3.8 MMOL/L
PROT SERPL-MCNC: 6.5 G/DL
RBC # BLD: 4.65 M/UL
RBC # FLD: 21.1 %
SODIUM SERPL-SCNC: 140 MMOL/L
TRIGL SERPL-MCNC: 89 MG/DL
WBC # FLD AUTO: 9.68 K/UL

## 2024-05-16 NOTE — PATIENT PROFILE ADULT - FUNCTIONAL ASSESSMENT - DAILY ACTIVITY 6.
Pt SURJIT to x ray. Heparin gtt infusing per MAR order. NG tube clamped at this time for transport.    1340 - Pt back to unit, NG placed back to low intermittent suction     1410 - NG tube output noted to be red, Hospitalist made aware - will continue to monitor output.     Intermittent abd pain throughout shift, prn oxycodone given x3 during shift. Pt up to bathroom with SBA - no BM, pt states he is passing gas. While ambulating in room, pt complains of increasing abd pain 8/10 - medicated per MAR order with 0.5mg IV Dilaudid. Pain reassessment 4/10. Heparin gtt continues to infuse per MAR order. Plan to stop heparin drip at 0300 on 5/17 for possible EGD in AM. Pt and family at bedside aware and agreeable to plan of care.     1830 - 250ml bloody brown total output from NG tube for shift. Pt still without BM, passing gas. Adequate voids.    3 = A little assistance

## 2024-05-20 DIAGNOSIS — K52.9 NONINFECTIVE GASTROENTERITIS AND COLITIS, UNSPECIFIED: ICD-10-CM

## 2024-05-21 ENCOUNTER — LABORATORY RESULT (OUTPATIENT)
Age: 84
End: 2024-05-21

## 2024-05-22 ENCOUNTER — NON-APPOINTMENT (OUTPATIENT)
Age: 84
End: 2024-05-22

## 2024-05-22 DIAGNOSIS — A08.11 ACUTE GASTROENTEROPATHY DUE TO NORWALK AGENT: ICD-10-CM

## 2024-05-24 ENCOUNTER — APPOINTMENT (OUTPATIENT)
Dept: GASTROENTEROLOGY | Facility: CLINIC | Age: 84
End: 2024-05-24

## 2024-06-03 RX ORDER — MUPIROCIN 20 MG/G
2 OINTMENT TOPICAL 3 TIMES DAILY
Qty: 1 | Refills: 3 | Status: ACTIVE | COMMUNITY
Start: 2024-06-03 | End: 1900-01-01

## 2024-08-20 NOTE — PROGRESS NOTE ADULT - SUBJECTIVE AND OBJECTIVE BOX
CHIEF COMPLAINT:Patient is a 83y old  Male who presents with a chief complaint of sob (28 Dec 2023 07:10)      Interval Events:    REVIEW OF SYSTEMS:  [x] All other systems negative except per HPI   [ ] Unable to assess ROS because ________    OBJECTIVE:  ICU Vital Signs Last 24 Hrs  T(C): 36.3 (28 Dec 2023 04:20), Max: 36.3 (27 Dec 2023 14:45)  T(F): 97.4 (28 Dec 2023 04:20), Max: 97.4 (28 Dec 2023 04:20)  HR: 83 (28 Dec 2023 04:20) (82 - 86)  BP: 134/65 (28 Dec 2023 04:20) (116/55 - 134/65)  BP(mean): --  ABP: --  ABP(mean): --  RR: 18 (28 Dec 2023 04:20) (17 - 18)  SpO2: 96% (28 Dec 2023 04:20) (94% - 96%)    O2 Parameters below as of 28 Dec 2023 04:20  Patient On (Oxygen Delivery Method): room air              12-27 @ 07:01  -  12-28 @ 07:00  --------------------------------------------------------  IN: 600 mL / OUT: 2700 mL / NET: -2100 mL        PHYSICAL EXAM:  GENERAL: NAD, well-groomed, well-developed  HEAD:  Atraumatic, Normocephalic  EYES: EOMI, PERRLA, conjunctiva and sclera clear  ENMT: No tonsillar erythema, exudates, or enlargement; Moist mucous membranes, Good dentition, No lesions  NECK: Supple, No JVD, Normal thyroid  CHEST/LUNG: Clear to auscultation bilaterally; No rales, rhonchi, wheezing, or rubs  HEART: Regular rate and rhythm; No murmurs, rubs, or gallops  ABDOMEN: Soft, Nontender, Nondistended; Bowel sounds present  VASCULAR:  2+ Peripheral Pulses, No clubbing, cyanosis, or edema  LYMPH: No lymphadenopathy noted  SKIN: No rashes or lesions  NERVOUS SYSTEM:  Alert & Oriented X3, Good concentration; Motor Strength 5/5 B/L upper and lower extremities; DTRs 2+ intact and symmetric    HOSPITAL MEDICATIONS:  MEDICATIONS  (STANDING):  albuterol    0.083% 2.5 milliGRAM(s) Nebulizer every 6 hours  apixaban 5 milliGRAM(s) Oral two times a day  ascorbic acid 500 milliGRAM(s) Oral daily  atorvastatin 10 milliGRAM(s) Oral at bedtime  budesonide 160 MICROgram(s)/formoterol 4.5 MICROgram(s) Inhaler 2 Puff(s) Inhalation two times a day  cefTRIAXone   IVPB 2000 milliGRAM(s) IV Intermittent every 24 hours  chlorhexidine 4% Liquid 1 Application(s) Topical <User Schedule>  dextrose 5%. 1000 milliLiter(s) (50 mL/Hr) IV Continuous <Continuous>  dextrose 5%. 1000 milliLiter(s) (100 mL/Hr) IV Continuous <Continuous>  dextrose 50% Injectable 12.5 Gram(s) IV Push once  dextrose 50% Injectable 25 Gram(s) IV Push once  dextrose 50% Injectable 25 Gram(s) IV Push once  finasteride 5 milliGRAM(s) Oral daily  FIRST- Mouthwash  BLM 5 milliLiter(s) Swish and Spit every 6 hours  glucagon  Injectable 1 milliGRAM(s) IntraMuscular once  influenza  Vaccine (HIGH DOSE) 0.7 milliLiter(s) IntraMuscular once  insulin lispro (ADMELOG) corrective regimen sliding scale   SubCutaneous three times a day before meals  insulin lispro (ADMELOG) corrective regimen sliding scale   SubCutaneous at bedtime  metoprolol tartrate 25 milliGRAM(s) Oral two times a day  multivitamin 1 Tablet(s) Oral daily  pantoprazole    Tablet 40 milliGRAM(s) Oral before breakfast  predniSONE   Tablet 10 milliGRAM(s) Oral once  sodium bicarbonate 325 milliGRAM(s) Oral every 8 hours  tamsulosin 0.4 milliGRAM(s) Oral at bedtime    MEDICATIONS  (PRN):  acetaminophen     Tablet .. 650 milliGRAM(s) Oral every 6 hours PRN Temp greater or equal to 38C (100.4F), Mild Pain (1 - 3)  benzocaine/menthol Lozenge 1 Lozenge Oral three times a day PRN Sore Throat  dextrose Oral Gel 15 Gram(s) Oral once PRN Blood Glucose LESS THAN 70 milliGRAM(s)/deciliter      LABS:    The Labs were reviewed by me   The Radiology was reviewed by me    EKG tracing reviewed by me    12-28    139  |  100  |  71<H>  ----------------------------<  162<H>  3.6   |  29  |  2.13<H>  12-27    138  |  97  |  87<H>  ----------------------------<  149<H>  3.5   |  27  |  3.01<H>  12-26    137  |  96  |  93<H>  ----------------------------<  214<H>  3.9   |  27  |  3.59<H>    Ca    8.2<L>      28 Dec 2023 04:37  Ca    8.0<L>      27 Dec 2023 07:19  Ca    8.1<L>      26 Dec 2023 14:43  Phos  3.7     12-28  Mg     2.1     12-28    TPro  5.5<L>  /  Alb  2.7<L>  /  TBili  0.2  /  DBili  x   /  AST  24  /  ALT  58<H>  /  AlkPhos  100  12-28  TPro  5.5<L>  /  Alb  2.7<L>  /  TBili  0.2  /  DBili  x   /  AST  32  /  ALT  70<H>  /  AlkPhos  113  12-27  TPro  5.9<L>  /  Alb  2.7<L>  /  TBili  0.3  /  DBili  x   /  AST  36  /  ALT  77<H>  /  AlkPhos  104  12-26    Magnesium: 2.1 mg/dL (12-28-23 @ 04:37)  Magnesium: 1.7 mg/dL (12-27-23 @ 07:19)  Magnesium: 1.6 mg/dL (12-26-23 @ 14:43)    Phosphorus: 3.7 mg/dL (12-28-23 @ 04:37)  Phosphorus: 4.5 mg/dL (12-27-23 @ 07:19)  Phosphorus: 4.8 mg/dL (12-26-23 @ 14:43)      PTT - ( 28 Dec 2023 11:25 )  PTT:67.0 sec              Urinalysis Basic - ( 28 Dec 2023 04:37 )    Color: x / Appearance: x / SG: x / pH: x  Gluc: 162 mg/dL / Ketone: x  / Bili: x / Urobili: x   Blood: x / Protein: x / Nitrite: x   Leuk Esterase: x / RBC: x / WBC x   Sq Epi: x / Non Sq Epi: x / Bacteria: x                              8.2    16.29 )-----------( 659      ( 28 Dec 2023 04:37 )             26.1                         7.9    18.47 )-----------( 599      ( 27 Dec 2023 07:19 )             25.0                         8.1    18.21 )-----------( 632      ( 26 Dec 2023 14:43 )             25.8     CAPILLARY BLOOD GLUCOSE      POCT Blood Glucose.: 153 mg/dL (28 Dec 2023 12:10)  POCT Blood Glucose.: 135 mg/dL (28 Dec 2023 08:41)  POCT Blood Glucose.: 247 mg/dL (27 Dec 2023 21:13)  POCT Blood Glucose.: 290 mg/dL (27 Dec 2023 19:00)  POCT Blood Glucose.: 316 mg/dL (27 Dec 2023 17:34)        MICROBIOLOGY:     RADIOLOGY:  [ ] Reviewed and interpreted by me    Point of Care Ultrasound Findings:    PFT:    EKG: CHIEF COMPLAINT:Patient is a 83y old  Male who presents with a chief complaint of sob (28 Dec 2023 07:10)      Interval Events: Patient denies fevers, chills, chest pain, shortness of breath, nausea, abdominal pain, diarrhea, constipation, dysuria, headache, light headedness    REVIEW OF SYSTEMS:  [x] All other systems negative except per HPI   [ ] Unable to assess ROS because ________    OBJECTIVE:  ICU Vital Signs Last 24 Hrs  T(C): 36.3 (28 Dec 2023 04:20), Max: 36.3 (27 Dec 2023 14:45)  T(F): 97.4 (28 Dec 2023 04:20), Max: 97.4 (28 Dec 2023 04:20)  HR: 83 (28 Dec 2023 04:20) (82 - 86)  BP: 134/65 (28 Dec 2023 04:20) (116/55 - 134/65)  BP(mean): --  ABP: --  ABP(mean): --  RR: 18 (28 Dec 2023 04:20) (17 - 18)  SpO2: 96% (28 Dec 2023 04:20) (94% - 96%)    O2 Parameters below as of 28 Dec 2023 04:20  Patient On (Oxygen Delivery Method): room air              12-27 @ 07:01  -  12-28 @ 07:00  --------------------------------------------------------  IN: 600 mL / OUT: 2700 mL / NET: -2100 mL        PHYSICAL EXAM:  GENERAL: NAD, well-groomed, well-developed  HEAD:  Atraumatic, Normocephalic  EYES: EOMI, PERRLA, conjunctiva and sclera clear  ENMT: No tonsillar erythema, exudates, or enlargement; Moist mucous membranes, Good dentition, No lesions  NECK: Supple, No JVD, Normal thyroid  CHEST/LUNG: Clear to auscultation bilaterally; No rales, rhonchi, wheezing, or rubs  HEART: Regular rate and rhythm; No murmurs, rubs, or gallops  ABDOMEN: Soft, Nontender, Nondistended; Bowel sounds present  VASCULAR:  2+ Peripheral Pulses, No clubbing, cyanosis. bilateral leg edema  LYMPH: No lymphadenopathy noted  SKIN: No rashes or lesions  NERVOUS SYSTEM:  Alert & Oriented X3, Good concentration; Motor Strength 5/5 B/L upper and lower extremities; DTRs 2+ intact and symmetric    HOSPITAL MEDICATIONS:  MEDICATIONS  (STANDING):  albuterol    0.083% 2.5 milliGRAM(s) Nebulizer every 6 hours  apixaban 5 milliGRAM(s) Oral two times a day  ascorbic acid 500 milliGRAM(s) Oral daily  atorvastatin 10 milliGRAM(s) Oral at bedtime  budesonide 160 MICROgram(s)/formoterol 4.5 MICROgram(s) Inhaler 2 Puff(s) Inhalation two times a day  cefTRIAXone   IVPB 2000 milliGRAM(s) IV Intermittent every 24 hours  chlorhexidine 4% Liquid 1 Application(s) Topical <User Schedule>  dextrose 5%. 1000 milliLiter(s) (50 mL/Hr) IV Continuous <Continuous>  dextrose 5%. 1000 milliLiter(s) (100 mL/Hr) IV Continuous <Continuous>  dextrose 50% Injectable 12.5 Gram(s) IV Push once  dextrose 50% Injectable 25 Gram(s) IV Push once  dextrose 50% Injectable 25 Gram(s) IV Push once  finasteride 5 milliGRAM(s) Oral daily  FIRST- Mouthwash  BLM 5 milliLiter(s) Swish and Spit every 6 hours  glucagon  Injectable 1 milliGRAM(s) IntraMuscular once  influenza  Vaccine (HIGH DOSE) 0.7 milliLiter(s) IntraMuscular once  insulin lispro (ADMELOG) corrective regimen sliding scale   SubCutaneous three times a day before meals  insulin lispro (ADMELOG) corrective regimen sliding scale   SubCutaneous at bedtime  metoprolol tartrate 25 milliGRAM(s) Oral two times a day  multivitamin 1 Tablet(s) Oral daily  pantoprazole    Tablet 40 milliGRAM(s) Oral before breakfast  predniSONE   Tablet 10 milliGRAM(s) Oral once  sodium bicarbonate 325 milliGRAM(s) Oral every 8 hours  tamsulosin 0.4 milliGRAM(s) Oral at bedtime    MEDICATIONS  (PRN):  acetaminophen     Tablet .. 650 milliGRAM(s) Oral every 6 hours PRN Temp greater or equal to 38C (100.4F), Mild Pain (1 - 3)  benzocaine/menthol Lozenge 1 Lozenge Oral three times a day PRN Sore Throat  dextrose Oral Gel 15 Gram(s) Oral once PRN Blood Glucose LESS THAN 70 milliGRAM(s)/deciliter      LABS:    The Labs were reviewed by me   The Radiology was reviewed by me    EKG tracing reviewed by me    12-28    139  |  100  |  71<H>  ----------------------------<  162<H>  3.6   |  29  |  2.13<H>  12-27    138  |  97  |  87<H>  ----------------------------<  149<H>  3.5   |  27  |  3.01<H>  12-26    137  |  96  |  93<H>  ----------------------------<  214<H>  3.9   |  27  |  3.59<H>    Ca    8.2<L>      28 Dec 2023 04:37  Ca    8.0<L>      27 Dec 2023 07:19  Ca    8.1<L>      26 Dec 2023 14:43  Phos  3.7     12-28  Mg     2.1     12-28    TPro  5.5<L>  /  Alb  2.7<L>  /  TBili  0.2  /  DBili  x   /  AST  24  /  ALT  58<H>  /  AlkPhos  100  12-28  TPro  5.5<L>  /  Alb  2.7<L>  /  TBili  0.2  /  DBili  x   /  AST  32  /  ALT  70<H>  /  AlkPhos  113  12-27  TPro  5.9<L>  /  Alb  2.7<L>  /  TBili  0.3  /  DBili  x   /  AST  36  /  ALT  77<H>  /  AlkPhos  104  12-26    Magnesium: 2.1 mg/dL (12-28-23 @ 04:37)  Magnesium: 1.7 mg/dL (12-27-23 @ 07:19)  Magnesium: 1.6 mg/dL (12-26-23 @ 14:43)    Phosphorus: 3.7 mg/dL (12-28-23 @ 04:37)  Phosphorus: 4.5 mg/dL (12-27-23 @ 07:19)  Phosphorus: 4.8 mg/dL (12-26-23 @ 14:43)      PTT - ( 28 Dec 2023 11:25 )  PTT:67.0 sec              Urinalysis Basic - ( 28 Dec 2023 04:37 )    Color: x / Appearance: x / SG: x / pH: x  Gluc: 162 mg/dL / Ketone: x  / Bili: x / Urobili: x   Blood: x / Protein: x / Nitrite: x   Leuk Esterase: x / RBC: x / WBC x   Sq Epi: x / Non Sq Epi: x / Bacteria: x                              8.2    16.29 )-----------( 659      ( 28 Dec 2023 04:37 )             26.1                         7.9    18.47 )-----------( 599      ( 27 Dec 2023 07:19 )             25.0                         8.1    18.21 )-----------( 632      ( 26 Dec 2023 14:43 )             25.8     CAPILLARY BLOOD GLUCOSE      POCT Blood Glucose.: 153 mg/dL (28 Dec 2023 12:10)  POCT Blood Glucose.: 135 mg/dL (28 Dec 2023 08:41)  POCT Blood Glucose.: 247 mg/dL (27 Dec 2023 21:13)  POCT Blood Glucose.: 290 mg/dL (27 Dec 2023 19:00)  POCT Blood Glucose.: 316 mg/dL (27 Dec 2023 17:34)        MICROBIOLOGY:     RADIOLOGY:  [ ] Reviewed and interpreted by me    Point of Care Ultrasound Findings:    PFT:    EKG: Quality 47: Advance Care Plan: Advance Care Planning discussed and documented; advance care plan or surrogate decision maker documented in the medical record. Detail Level: Detailed Quality 226: Preventive Care And Screening: Tobacco Use: Screening And Cessation Intervention: Patient screened for tobacco use and is an ex/non-smoker Quality 130: Documentation Of Current Medications In The Medical Record: Current Medications Documented Quality 431: Preventive Care And Screening: Unhealthy Alcohol Use - Screening: Patient not identified as an unhealthy alcohol user when screened for unhealthy alcohol use using a systematic screening method

## 2024-09-14 ENCOUNTER — INPATIENT (INPATIENT)
Facility: HOSPITAL | Age: 84
LOS: 4 days | Discharge: HOME HEALTH | DRG: 446 | End: 2024-09-19
Attending: COLON & RECTAL SURGERY
Payer: MEDICARE

## 2024-09-14 VITALS
WEIGHT: 197.98 LBS | HEIGHT: 71 IN | SYSTOLIC BLOOD PRESSURE: 134 MMHG | HEART RATE: 76 BPM | DIASTOLIC BLOOD PRESSURE: 72 MMHG | OXYGEN SATURATION: 94 % | TEMPERATURE: 98 F | RESPIRATION RATE: 20 BRPM

## 2024-09-14 DIAGNOSIS — Z98.89 OTHER SPECIFIED POSTPROCEDURAL STATES: Chronic | ICD-10-CM

## 2024-09-14 PROCEDURE — 99285 EMERGENCY DEPT VISIT HI MDM: CPT | Mod: GC

## 2024-09-14 RX ORDER — SODIUM CHLORIDE 9 MG/ML
1000 INJECTION INTRAMUSCULAR; INTRAVENOUS; SUBCUTANEOUS ONCE
Refills: 0 | Status: COMPLETED | OUTPATIENT
Start: 2024-09-14 | End: 2024-09-14

## 2024-09-14 NOTE — ED PROVIDER NOTE - ATTENDING CONTRIBUTION TO CARE
I, Dany Pineda, performed a history and physical exam of the patient and discussed their management with the resident provider. I reviewed the resident care provider's note and agree with the documented findings and plan of care. I was present and available for all procedures.    See my full note for details

## 2024-09-14 NOTE — ED PROVIDER NOTE - OBJECTIVE STATEMENT
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  Also endorsing difficulty urinating     surgery 2 years ago by Dr. Lane for colostomy

## 2024-09-14 NOTE — ED PROVIDER NOTE - PROGRESS NOTE DETAILS
postvoid residual 140 cc, will continue to monitor Juanyedgar PGY3: 85 yo M w/ PMHx of HLD, HTN, colon cancer, COPD/asbestosis with interstitial lung disease, HFpEF, aortic ectasia, and A-fib on Eliquis presents for 1 day of abdominal distention/pain, difficulty urinating, and increased output from colostomy bag.  Since today morning, patient has been complaining of abdominal pain/distention and has had 3 full bags of colostomy output that is more liquidy than usual.  Patient received Pepto-Bismol earlier today.  ~9 PM, granddaughter observed the patient was diaphoretic and working harder to breathe.  Patient uses inhalers at home, but does not take any oxygen.  He denies any CP, fever/chills, fall/trauma, vomiting.  On examination, patient is on 2 L nasal cannula (hypoxic to 88% on room air), left base crackles on lung exam, and diffuse abdominal pain (distended, but soft).     Labs remarkable for WBC 18.32, elevated LFTs (AST//157, lipase 1242, and slightly alkalotic VBG with no pCO2/HCO3 derangements.  Patient's pain did not improve with morphine, so given ibuprofen.  Empirically treated with Zosyn and sepsis order set ordered.  Pending CT abdomen/pelvis.

## 2024-09-14 NOTE — ED PROVIDER NOTE - CLINICAL SUMMARY MEDICAL DECISION MAKING FREE TEXT BOX
pettet attending- Patient presenting with abdominal pain distention diffusely tender will evaluate with screening labs CAT scan p.o. IV contrast surgical consultation evaluate for intra-abdominal surgical pathology otherwise unlikely ACS PE pneumothorax dissection AAA pneumonia disposition for likely admission but depending on workup reevaluation

## 2024-09-14 NOTE — ED PROVIDER NOTE - PHYSICAL EXAMINATION
Well appearing and in NAD, head normal appearing atraumatic, trachea midline, no respiratory distress, lungs cta bilaterally, rrr no murmurs, Diffuse abdominal pain and distention and diffuse tenderness palpation no tenderness palpation or erythema around colostomy site in the left mid abdomen abdomen, no visible extremity deformities, Alert and oriented, non focal neuro exam, skin warm and dry, normal affect and mood, Mild bilateral circumferential leg swelling, no calf ttp or jvd

## 2024-09-15 DIAGNOSIS — K81.0 ACUTE CHOLECYSTITIS: ICD-10-CM

## 2024-09-15 LAB
ALBUMIN SERPL ELPH-MCNC: 3.3 G/DL — SIGNIFICANT CHANGE UP (ref 3.3–5)
ALP SERPL-CCNC: 211 U/L — HIGH (ref 40–120)
ALT FLD-CCNC: 157 U/L — HIGH (ref 10–45)
ANION GAP SERPL CALC-SCNC: 13 MMOL/L — SIGNIFICANT CHANGE UP (ref 5–17)
APPEARANCE UR: ABNORMAL
AST SERPL-CCNC: 218 U/L — HIGH (ref 10–40)
BACTERIA # UR AUTO: NEGATIVE /HPF — SIGNIFICANT CHANGE UP
BASOPHILS # BLD AUTO: 0.04 K/UL — SIGNIFICANT CHANGE UP (ref 0–0.2)
BASOPHILS NFR BLD AUTO: 0.2 % — SIGNIFICANT CHANGE UP (ref 0–2)
BILIRUB SERPL-MCNC: 1.8 MG/DL — HIGH (ref 0.2–1.2)
BILIRUB UR-MCNC: NEGATIVE — SIGNIFICANT CHANGE UP
BLD GP AB SCN SERPL QL: NEGATIVE — SIGNIFICANT CHANGE UP
BUN SERPL-MCNC: 27 MG/DL — HIGH (ref 7–23)
CALCIUM SERPL-MCNC: 8.8 MG/DL — SIGNIFICANT CHANGE UP (ref 8.4–10.5)
CAST: 4 /LPF — SIGNIFICANT CHANGE UP (ref 0–4)
CHLORIDE SERPL-SCNC: 97 MMOL/L — SIGNIFICANT CHANGE UP (ref 96–108)
CO2 SERPL-SCNC: 26 MMOL/L — SIGNIFICANT CHANGE UP (ref 22–31)
COLOR SPEC: YELLOW — SIGNIFICANT CHANGE UP
CREAT SERPL-MCNC: 1.18 MG/DL — SIGNIFICANT CHANGE UP (ref 0.5–1.3)
DIFF PNL FLD: ABNORMAL
EGFR: 60 ML/MIN/1.73M2 — SIGNIFICANT CHANGE UP
EOSINOPHIL # BLD AUTO: 0.02 K/UL — SIGNIFICANT CHANGE UP (ref 0–0.5)
EOSINOPHIL NFR BLD AUTO: 0.1 % — SIGNIFICANT CHANGE UP (ref 0–6)
GAS PNL BLDV: SIGNIFICANT CHANGE UP
GLUCOSE SERPL-MCNC: 115 MG/DL — HIGH (ref 70–99)
GLUCOSE UR QL: NEGATIVE MG/DL — SIGNIFICANT CHANGE UP
HCT VFR BLD CALC: 40.4 % — SIGNIFICANT CHANGE UP (ref 39–50)
HGB BLD-MCNC: 13.2 G/DL — SIGNIFICANT CHANGE UP (ref 13–17)
IMM GRANULOCYTES NFR BLD AUTO: 0.7 % — SIGNIFICANT CHANGE UP (ref 0–0.9)
KETONES UR-MCNC: NEGATIVE MG/DL — SIGNIFICANT CHANGE UP
LEUKOCYTE ESTERASE UR-ACNC: ABNORMAL
LIDOCAIN IGE QN: 1242 U/L — HIGH (ref 7–60)
LYMPHOCYTES # BLD AUTO: 0.66 K/UL — LOW (ref 1–3.3)
LYMPHOCYTES # BLD AUTO: 3.6 % — LOW (ref 13–44)
MCHC RBC-ENTMCNC: 30 PG — SIGNIFICANT CHANGE UP (ref 27–34)
MCHC RBC-ENTMCNC: 32.7 GM/DL — SIGNIFICANT CHANGE UP (ref 32–36)
MCV RBC AUTO: 91.8 FL — SIGNIFICANT CHANGE UP (ref 80–100)
MONOCYTES # BLD AUTO: 1.44 K/UL — HIGH (ref 0–0.9)
MONOCYTES NFR BLD AUTO: 7.9 % — SIGNIFICANT CHANGE UP (ref 2–14)
NEUTROPHILS # BLD AUTO: 16.03 K/UL — HIGH (ref 1.8–7.4)
NEUTROPHILS NFR BLD AUTO: 87.5 % — HIGH (ref 43–77)
NITRITE UR-MCNC: NEGATIVE — SIGNIFICANT CHANGE UP
NRBC # BLD: 0 /100 WBCS — SIGNIFICANT CHANGE UP (ref 0–0)
PH UR: 5.5 — SIGNIFICANT CHANGE UP (ref 5–8)
PLATELET # BLD AUTO: 282 K/UL — SIGNIFICANT CHANGE UP (ref 150–400)
POTASSIUM SERPL-MCNC: 3.3 MMOL/L — LOW (ref 3.5–5.3)
POTASSIUM SERPL-SCNC: 3.3 MMOL/L — LOW (ref 3.5–5.3)
PROT SERPL-MCNC: 6.8 G/DL — SIGNIFICANT CHANGE UP (ref 6–8.3)
PROT UR-MCNC: SIGNIFICANT CHANGE UP MG/DL
RBC # BLD: 4.4 M/UL — SIGNIFICANT CHANGE UP (ref 4.2–5.8)
RBC # FLD: 13.1 % — SIGNIFICANT CHANGE UP (ref 10.3–14.5)
RBC CASTS # UR COMP ASSIST: 1 /HPF — SIGNIFICANT CHANGE UP (ref 0–4)
RH IG SCN BLD-IMP: POSITIVE — SIGNIFICANT CHANGE UP
SODIUM SERPL-SCNC: 136 MMOL/L — SIGNIFICANT CHANGE UP (ref 135–145)
SP GR SPEC: 1.01 — SIGNIFICANT CHANGE UP (ref 1–1.03)
SQUAMOUS # UR AUTO: 1 /HPF — SIGNIFICANT CHANGE UP (ref 0–5)
UROBILINOGEN FLD QL: 1 MG/DL — SIGNIFICANT CHANGE UP (ref 0.2–1)
WBC # BLD: 18.32 K/UL — HIGH (ref 3.8–10.5)
WBC # FLD AUTO: 18.32 K/UL — HIGH (ref 3.8–10.5)
WBC UR QL: 571 /HPF — HIGH (ref 0–5)

## 2024-09-15 PROCEDURE — 74177 CT ABD & PELVIS W/CONTRAST: CPT | Mod: 26,MC

## 2024-09-15 PROCEDURE — 71045 X-RAY EXAM CHEST 1 VIEW: CPT | Mod: 26

## 2024-09-15 PROCEDURE — 99223 1ST HOSP IP/OBS HIGH 75: CPT | Mod: GC

## 2024-09-15 RX ORDER — PIPERACILLIN SODIUM AND TAZOBACTAM SODIUM 3; .375 G/15ML; G/15ML
3.38 INJECTION, POWDER, FOR SOLUTION INTRAVENOUS ONCE
Refills: 0 | Status: COMPLETED | OUTPATIENT
Start: 2024-09-15 | End: 2024-09-15

## 2024-09-15 RX ORDER — POTASSIUM CHLORIDE 10 MEQ
10 TABLET, EXT RELEASE, PARTICLES/CRYSTALS ORAL
Refills: 0 | Status: COMPLETED | OUTPATIENT
Start: 2024-09-15 | End: 2024-09-15

## 2024-09-15 RX ORDER — HYDROMORPHONE HYDROCHLORIDE 2 MG/1
0.5 TABLET ORAL EVERY 4 HOURS
Refills: 0 | Status: DISCONTINUED | OUTPATIENT
Start: 2024-09-15 | End: 2024-09-17

## 2024-09-15 RX ORDER — HYDROMORPHONE HYDROCHLORIDE 2 MG/1
1 TABLET ORAL ONCE
Refills: 0 | Status: DISCONTINUED | OUTPATIENT
Start: 2024-09-15 | End: 2024-09-15

## 2024-09-15 RX ORDER — HYDROMORPHONE HYDROCHLORIDE 2 MG/1
0.2 TABLET ORAL EVERY 4 HOURS
Refills: 0 | Status: DISCONTINUED | OUTPATIENT
Start: 2024-09-15 | End: 2024-09-17

## 2024-09-15 RX ORDER — ENOXAPARIN SODIUM 100 MG/ML
40 INJECTION SUBCUTANEOUS EVERY 24 HOURS
Refills: 0 | Status: DISCONTINUED | OUTPATIENT
Start: 2024-09-15 | End: 2024-09-16

## 2024-09-15 RX ORDER — FLU VACCINE TS 2012-2013(5YR+) 45MCG/.5ML
0.5 VIAL (ML) INTRAMUSCULAR ONCE
Refills: 0 | Status: DISCONTINUED | OUTPATIENT
Start: 2024-09-15 | End: 2024-09-19

## 2024-09-15 RX ORDER — PIPERACILLIN SODIUM AND TAZOBACTAM SODIUM 3; .375 G/15ML; G/15ML
3.38 INJECTION, POWDER, FOR SOLUTION INTRAVENOUS EVERY 8 HOURS
Refills: 0 | Status: DISCONTINUED | OUTPATIENT
Start: 2024-09-15 | End: 2024-09-19

## 2024-09-15 RX ORDER — FUROSEMIDE 40 MG
40 TABLET ORAL ONCE
Refills: 0 | Status: COMPLETED | OUTPATIENT
Start: 2024-09-15 | End: 2024-09-15

## 2024-09-15 RX ADMIN — Medication 100 MILLIEQUIVALENT(S): at 17:24

## 2024-09-15 RX ADMIN — Medication 100 MILLIEQUIVALENT(S): at 15:59

## 2024-09-15 RX ADMIN — HYDROMORPHONE HYDROCHLORIDE 0.5 MILLIGRAM(S): 2 TABLET ORAL at 18:05

## 2024-09-15 RX ADMIN — PIPERACILLIN SODIUM AND TAZOBACTAM SODIUM 25 GRAM(S): 3; .375 INJECTION, POWDER, FOR SOLUTION INTRAVENOUS at 17:23

## 2024-09-15 RX ADMIN — ENOXAPARIN SODIUM 40 MILLIGRAM(S): 100 INJECTION SUBCUTANEOUS at 07:15

## 2024-09-15 RX ADMIN — Medication 100 MILLIEQUIVALENT(S): at 14:05

## 2024-09-15 RX ADMIN — HYDROMORPHONE HYDROCHLORIDE 0.5 MILLIGRAM(S): 2 TABLET ORAL at 14:03

## 2024-09-15 RX ADMIN — PIPERACILLIN SODIUM AND TAZOBACTAM SODIUM 25 GRAM(S): 3; .375 INJECTION, POWDER, FOR SOLUTION INTRAVENOUS at 10:03

## 2024-09-15 RX ADMIN — HYDROMORPHONE HYDROCHLORIDE 0.5 MILLIGRAM(S): 2 TABLET ORAL at 08:24

## 2024-09-15 RX ADMIN — Medication 40 MILLIGRAM(S): at 17:23

## 2024-09-15 RX ADMIN — HYDROMORPHONE HYDROCHLORIDE 1 MILLIGRAM(S): 2 TABLET ORAL at 00:46

## 2024-09-15 RX ADMIN — SODIUM CHLORIDE 1000 MILLILITER(S): 9 INJECTION INTRAMUSCULAR; INTRAVENOUS; SUBCUTANEOUS at 00:06

## 2024-09-15 RX ADMIN — HYDROMORPHONE HYDROCHLORIDE 0.5 MILLIGRAM(S): 2 TABLET ORAL at 16:32

## 2024-09-15 RX ADMIN — Medication 150 MILLILITER(S): at 08:22

## 2024-09-15 RX ADMIN — Medication 150 MILLILITER(S): at 16:00

## 2024-09-15 RX ADMIN — PIPERACILLIN SODIUM AND TAZOBACTAM SODIUM 25 GRAM(S): 3; .375 INJECTION, POWDER, FOR SOLUTION INTRAVENOUS at 21:17

## 2024-09-15 RX ADMIN — PIPERACILLIN SODIUM AND TAZOBACTAM SODIUM 200 GRAM(S): 3; .375 INJECTION, POWDER, FOR SOLUTION INTRAVENOUS at 07:15

## 2024-09-15 RX ADMIN — Medication 4 MILLIGRAM(S): at 00:06

## 2024-09-15 RX ADMIN — HYDROMORPHONE HYDROCHLORIDE 0.5 MILLIGRAM(S): 2 TABLET ORAL at 18:34

## 2024-09-15 RX ADMIN — HYDROMORPHONE HYDROCHLORIDE 0.5 MILLIGRAM(S): 2 TABLET ORAL at 10:09

## 2024-09-15 RX ADMIN — PIPERACILLIN SODIUM AND TAZOBACTAM SODIUM 200 GRAM(S): 3; .375 INJECTION, POWDER, FOR SOLUTION INTRAVENOUS at 04:45

## 2024-09-15 NOTE — H&P ADULT - NSHPPHYSICALEXAM_GEN_ALL_CORE
General: NAD  Neuro: A/Ox4  HEENT: NC/AT  Respiratory: NC 2L, no increased work of breathing  CV: RRR  Abdomen: Soft, Non distended, epigastrium tender to palpation without rebound tenderness/guarding/rigidity  Extremities: WWP, w/o gross deformity, NAVARRO  Vascular: b/l radial pulses palpable   Skin: intact, w/o breakdown

## 2024-09-15 NOTE — CONSULT NOTE ADULT - SUBJECTIVE AND OBJECTIVE BOX
HPI:    Mr. Rider is a 84 yrs old male w/ hx of HLD, HTN, Stg II Colon Ca s/p L hemicolectomy w end colostomy and mucus fistula  w Dr. Browning, COPD, ILD, HFpEF, Afib on Eliquis who presented with abd pain for the past 1 day. Denied fevers, chills, nausea and vomiting. No bloody stools in his colostomy bag. On admission, VSS, labs showed WBC 18, Lipase 1242. T Bili 1.8. Alp 211. AST/ALT: 218/157. CT Imaging demonstrates cf acute pancreatitis and acute cholecystitis with normal bile duct. Advanced GI consulted for possible choledocholithiasis.     Allergies:  No Known Allergies    Hospital Medications:  enoxaparin Injectable 40 milliGRAM(s) SubCutaneous every 24 hours  HYDROmorphone  Injectable 0.5 milliGRAM(s) IV Push every 4 hours PRN  HYDROmorphone  Injectable 0.2 milliGRAM(s) IV Push every 4 hours PRN  lactated ringers. 1000 milliLiter(s) IV Continuous <Continuous>  piperacillin/tazobactam IVPB.. 3.375 Gram(s) IV Intermittent every 8 hours      PMHX/PSHX:  Asbestosis (ICD9 501)    HTN (Hypertension)    BPH (Benign Prostatic Hypertrophy)    Dyslipidemia    Localized Osteoarthrosis, Lower Leg    Obesity    COPD with emphysema    Pulmonary nodule    Thyroid nodule    Aortic ectasia    History of diastolic dysfunction    Venous insufficiency    S/P Cystoscopy (ICD9 V45.89)    Hyperlipidemia (ICD9 272.4)    Localized Osteoarthrosis, Lower Leg    S/P Arthroscopy of Left Knee    Cataract    Inguinal Hernia x2    History of Tonsillectomy    History of appendectomy    Family history:  No pertinent family history in first degree relatives    Denies family history of colon cancer/polyps, stomach cancer/polyps, pancreatic cancer/masses, liver cancer/disease, ovarian cancer and endometrial cancer.    Social History:   Tob: Denies  EtOH: Denies  Illicit Drugs: Denies    ROS:     General:  No wt loss, fevers, chills, night sweats, fatigue  Eyes:  Good vision, no reported pain  ENT:  No sore throat, pain, runny nose, dysphagia  CV:  No pain, palpitations, hypo/hypertension  Pulm:  No dyspnea, cough, tachypnea, wheezing  GI:  see HPI  :  No pain, bleeding, incontinence, nocturia  Muscle:  No pain, weakness  Neuro:  No weakness, tingling, memory problems  Psych:  No fatigue, insomnia, mood problems, depression  Endocrine:  No polyuria, polydipsia, cold/heat intolerance  Heme:  No petechiae, ecchymosis, easy bruisability  Skin:  No rash, tattoos, scars, edema    PHYSICAL EXAM:     GENERAL:  No acute distress  HEENT:  NCAT, no scleral icterus   CHEST:  no respiratory distress  HEART:  Regular rate and rhythm  ABDOMEN:  Soft, mild tender in the epi region, moderately distended with colostomy,   EXTREMITIES: Has 2+ pitting edema in the LE b/l  SKIN:  No rash/erythema/ecchymoses/petechiae/wounds/abscess/warm/dry  NEURO:  Alert and oriented x 3, no tremors.     Vital Signs:  Vital Signs Last 24 Hrs  T(C): 37.1 (15 Sep 2024 16:17), Max: 37.1 (15 Sep 2024 16:17)  T(F): 98.7 (15 Sep 2024 16:17), Max: 98.7 (15 Sep 2024 16:17)  HR: 91 (15 Sep 2024 16:17) (70 - 93)  BP: 143/79 (15 Sep 2024 16:17) (105/58 - 143/79)  BP(mean): 76 (15 Sep 2024 00:50) (76 - 83)  RR: 18 (15 Sep 2024 16:17) (12 - 20)  SpO2: 97% (15 Sep 2024 16:17) (92% - 100%)    Parameters below as of 15 Sep 2024 16:17  Patient On (Oxygen Delivery Method): nasal cannula  O2 Flow (L/min): 2    Daily Height in cm: 180.34 (14 Sep 2024 23:23)    Daily     LABS:                        13.2   18.32 )-----------( 282      ( 15 Sep 2024 00:22 )             40.4     Mean Cell Volume: 91.8 fl (09-15- @ 00:22)    09-15    136  |  97  |  27<H>  ----------------------------<  115<H>  3.3<L>   |  26  |  1.18    Ca    8.8      15 Sep 2024 00:22    TPro  6.8  /  Alb  3.3  /  TBili  1.8<H>  /  DBili  x   /  AST  218<H>  /  ALT  157<H>  /  AlkPhos  211<H>  09-15    LIVER FUNCTIONS - ( 15 Sep 2024 00:22 )  Alb: 3.3 g/dL / Pro: 6.8 g/dL / ALK PHOS: 211 U/L / ALT: 157 U/L / AST: 218 U/L / GGT: x             Urinalysis Basic - ( 15 Sep 2024 01:50 )    Color: Yellow / Appearance: Turbid / S.014 / pH: x  Gluc: x / Ketone: Negative mg/dL  / Bili: Negative / Urobili: 1.0 mg/dL   Blood: x / Protein: Trace mg/dL / Nitrite: Negative   Leuk Esterase: Large / RBC: 1 /HPF /  /HPF   Sq Epi: x / Non Sq Epi: 1 /HPF / Bacteria: Negative /HPF      Amylase Serum--      Lipase [id number]       Ammonia--                          13.2   18.32 )-----------( 282      ( 15 Sep 2024 00:22 )             40.4       Imaging:  INTERPRETATION:  Attending over read. Agree with the below report with   following modifications. Bilateral gynecomastia. Small bilateral pleural   effusions. Parietal pleural thickening bilateral. Calcified   pleurodiaphragmatic plaques suggestive of prior asbestos exposure. Aortic   valve calcification. Distended gallbladder. No radiopaque stones seen   within the gallbladder. Recommend correlation with sonography. Evidence   of acute interstitial edematous pancreatitis. Peripancreatic fluid   collections extending into the right and left anterior pararenal spaces.   No obvious pancreatic necrosis. Splenic vein intact. Replaced right   hepatic artery off the SMA. Dilated IVC and hepatic veins. No   hydronephrosis. 2.2 cm simple cortical cyst upper pole left kidney.   Probable two tiny 0.2 cm nonobstructing calculi right kidney. Left lower   quadrant colostomy. Colonic diverticulosis. Left indirect inguinal hernia   containing nonobstructed sigmoid colon. Cannot rule out prior right   inguinal hernia repair. The prostate measures 4.7 x 4.4 x 5.0 cm.   Bilateral spondylolysis of L5 with grade 2 anterior spondylolisthesis of   L5 on S1. Degenerative disc disease L5-S1 and L2-L3.    MD Denice                    [facility] PRELIMINARY REPORT    PROCEDURE INFORMATION:  Exam: CT Abdomen And Pelvis With Contrast  Exam date and time: 9/15/2024 1:53 AM  Age: 84 years old  Clinical indication: Abd distention HX of colostomy    TECHNIQUE:  Imaging protocol: Computed tomography of the abdomen and pelvis with   contrast.  Contrast material: IV: OMNIPAQUE 350; ORAL: OMNIPAQUE 300; Contrast   volume: 90  ml; Contrast route: IV;    COMPARISON:  CT ABDOMEN AND PELVIS WITH IV CONTRAST 10/7/2023 6:43 PM    FINDINGS:  Pleural spaces: Incompletely visualized small to moderate bilateral   pleural  effusions. Bilateral calcified pleural plaques suggest prior asbestos   exposure.  Heart: Cardiomegaly.    Liver: Normal. No mass.  Gallbladder and biliary ducts: No biliary ductal dilatation or  choledocholithiasis. Gallbladder is distended, the wall is mildly   thickened,  and there is mild pericholecystic inflammation, compatible with acute  cholecystitis.  Pancreas: Peripancreatic inflammation and fluid compatible with acute  pancreatitis.  Spleen: Normal.  Adrenal glands: Normal. No mass.  Kidneys and ureters: Left renal cyst. No obstructive uropathy.  Stomach and bowel: No pneumatosis or portal/mesenteric venous gas. Colonic  diverticulosis. No diverticulitis. Left lower quadrant colostomy and   mucous  fistula. No bowel wall thickening or intestinal obstruction.  Appendix: Normal appendix.    Intraperitoneal space: No pneumoperitoneum or abscess.  Vasculature: See &quot;Stomach and bowel&quot; finding.  Lymph nodes: Unremarkable.  Urinary bladder: Mild urinary bladder trabeculation.  Reproductive: Prostatomegaly.  Bones/joints: The chronic bilateral L5 pars defects. Grade 2   anterolisthesis of  L5 on S1.  Soft tissues: Left inguinal hernia contains a short loop of sigmoid   colon. No  resulting obstruction or strangulation.    IMPRESSION:  1.   Incompletely visualized small to moderate bilateral pleural   effusions.  2.   Bilateral calcified pleural plaques suggest prior asbestos exposure.  3.   Peripancreatic inflammation and fluid compatible with acute   pancreatitis.  4.   Acute cholecystitis as above.

## 2024-09-15 NOTE — H&P ADULT - ASSESSMENT
.84M PMHx HLD, HTN, Stg II Colon Ca s/p L hemicolectomy w end colostomy and mucus fistula 2023 w Dr. Browning, COPD, ILD, HFpEF, Afib on Eliquis who presents to [facility] ED with abdominal pain.     Exam w epigastric TTP. Labs w WBC 18. Lipase 1242. T Bili 1.8. Alp 211. AST/ALT: 218/157. CT Imaging demonstrates cf acute pancreatitis and acute cholecystitis.     Plan:   -Admit to Dr. Hernández, on behalf of Dr. Browning   -Wean NC as able  -Pulm consult (Needs to be Called)  -Pain control   -NPO/IVF  -MRCP i/s/o hyperbilirubinemia   -GI Consult (Needs to be Emailed)  -Trend T Bili   -IV Abx   -SCD/LVX, Hold Eliquis for now   -Possible OR pending optimization, at this time poor surgical candidate given fx status     Discussed with Attending Surgeon Dr. Hernández on behalf of Dr. Nam Negrete MD PGY4  [facility] Team, [id number]    .84M PMHx HLD, HTN, Stg II Colon Ca s/p L hemicolectomy w end colostomy and mucus fistula 2023 w Dr. Browning, COPD, ILD, HFpEF, Afib on Eliquis who presents to [facility] ED with abdominal pain.     Exam w epigastric TTP. Labs w WBC 18. Lipase 1242. T Bili 1.8. Alp 211. AST/ALT: 218/157. CT Imaging demonstrates cf acute pancreatitis and acute cholecystitis.     Plan:   -Admit to Dr. Hernández, on behalf of Dr. Browning   -Wean NC as able  -Pulm consult (Needs to be Called)  -Pain control   -NPO/IVF  -MRCP i/s/o hyperbilirubinemia   -GI Consult (Needs to be Emailed)  -Trend T Bili   -IV Abx   -SCD/LVX, Hold Eliquis for now (CHADS VASC 5, but daily risk < 0.02%)   -Possible OR pending optimization, at this time poor surgical candidate given fx status     Discussed with Attending Surgeon Dr. Hernández on behalf of Dr. Nam Negrete MD PGY4  [facility] Team, [id number]

## 2024-09-15 NOTE — ED ADULT NURSE NOTE - OBJECTIVE STATEMENT
Pt is a 85yo M w/ PMH Colon CA, asbestos lung damage presents to ED c/o abdominal pain, reduced urine output. Pt states "For about 2-3 hours, I've been having really bad bloating and pain in my stomach. I also feel like I can't get any pee out, and I always have to pee." Pt has colostomy, with slight elevated output amount as per granddaughter at bedside. Pt is 86 on RA, provided 2L of O2. Denies chest pain, weakness, dizziness, lightheadedness, blood in stools, hematuria, dysuria, urinary frequency, fevers/chills, sick contacts. A&Ox3. Strong peripheral pulses. Neurologically intact and follows commands. Pulse motor and sensation present and equal to all 4 extremities. Skin warm dry intact and normal for ethnicity. Ambulatory with steady gait in ED. Stretcher locked and in lowest position, appropriate side rails up. Pt instructed to notify RN if assistance is needed.

## 2024-09-15 NOTE — CONSULT NOTE ADULT - TIME BILLING
Review of medicarl records, labs, imaging, coordination of care and did not include time spent teaching.

## 2024-09-15 NOTE — ED ADULT NURSE REASSESSMENT NOTE - NS ED NURSE REASSESS COMMENT FT1
Report received from BESSY Fitzgerald. Pt is A&Ox3, breathing unlabored, SPO2 100% on 2 L NC, skin warm and dry, non febrile. Stretcher sheets changed, pt was changed into a fresh gown. Sacrum pressure ulcer stage ll noted. IV intact and patent, VSS. Pt denies pain/discomfort at this time. Safety and comfort measures maintained, bed locked and in lowest position, urinal on the reach, call bell within reach and pt instructed on use,

## 2024-09-15 NOTE — CONSULT NOTE ADULT - SUBJECTIVE AND OBJECTIVE BOX
DATE OF SERVICE: 09-15-24 @ 14:03    CHIEF COMPLAINT:Patient is a 84y old  Male who presents with a chief complaint of     HISTORY OF PRESENT ILLNESS:HPI:  84M PMHx HLD, HTn, Stg II Colon Ca s/p L hemicolectomy w end colostomy and mucus fistula 2023 w Dr. Browning, COPD, ILD, HFpEF, Afib on Eliquis who presents to [facility] ED with abdominal pain. Pt reports acute onset abdominal pain x 1 day. Denies associated fevers, chills, CP, SOB, nausea, vomiting, diarrhea, cough, dysuria, hematuria, hematemesis, dizziness, lightheadedness. Denies prior episodes of similar pain. Ostomy fx per patient.     In ED, pt is HDS, Afebrile.     Labs w WBC 18. Lipase 1242. T Bili 1.8. Alp 211. AST/ALT: 218/157.     CT Imaging demonstrates cf acute pancreatitis and acute cholecystitis.  (15 Sep 2024 06:11)      PAST MEDICAL & SURGICAL HISTORY:  Asbestosis (ICD9 501)      HTN (Hypertension)      BPH (Benign Prostatic Hypertrophy)      Dyslipidemia      Localized Osteoarthrosis, Lower Leg  left      Obesity      COPD with emphysema      Pulmonary nodule      Thyroid nodule      Aortic ectasia      History of diastolic dysfunction      Venous insufficiency      S/P Cystoscopy (ICD9 V45.89)      Hyperlipidemia (ICD9 272.4)      S/P Arthroscopy of Left Knee  x 20 yrs ago      Cataract  right IOL      Inguinal Hernia x2  right      History of Tonsillectomy      History of appendectomy              MEDICATIONS:  enoxaparin Injectable 40 milliGRAM(s) SubCutaneous every 24 hours    piperacillin/tazobactam IVPB.- 3.375 Gram(s) IV Intermittent once  piperacillin/tazobactam IVPB.. 3.375 Gram(s) IV Intermittent every 8 hours      HYDROmorphone  Injectable 0.5 milliGRAM(s) IV Push every 4 hours PRN  HYDROmorphone  Injectable 0.2 milliGRAM(s) IV Push every 4 hours PRN        lactated ringers. 1000 milliLiter(s) IV Continuous <Continuous>  potassium chloride  10 mEq/100 mL IVPB 10 milliEquivalent(s) IV Intermittent every 1 hour      FAMILY HISTORY:  No pertinent family history in first degree relatives        Non-contributory    SOCIAL HISTORY:    Denies     Allergies    No Known Allergies    Intolerances    	    REVIEW OF SYSTEMS:  CONSTITUTIONAL: No fever  EYES: No eye pain, visual disturbances, or discharge  ENMT:  No difficulty hearing, tinnitus  NECK: No pain or stiffness  RESPIRATORY: No cough, + wheezing,  CARDIOVASCULAR: No chest pain, palpitations, passing out, dizziness, + leg swelling  GASTROINTESTINAL:  No nausea, vomiting, diarrhea or constipation. No melena.  GENITOURINARY: No dysuria, hematuria  NEUROLOGICAL: No stroke like symptoms  SKIN: No burning or lesions   ENDOCRINE: No heat or cold intolerance  MUSCULOSKELETAL: No joint pain or swelling  PSYCHIATRIC: No  anxiety, mood swings  HEME/LYMPH: No bleeding gums  ALLERGY AND IMMUNOLOGIC: No hives or eczema	    All other ROS negative    PHYSICAL EXAM:  T(C): 36.8 (09-15-24 @ 07:20), Max: 36.8 (09-15-24 @ 07:20)  HR: 70 (09-15-24 @ 09:01) (70 - 80)  BP: 109/72 (09-15-24 @ 09:01) (105/58 - 134/72)  RR: 12 (09-15-24 @ 09:01) (12 - 20)  SpO2: 100% (09-15-24 @ 09:01) (92% - 100%)  Wt(kg): --  I&O's Summary      Appearance: Normal	  HEENT:   Normal oral mucosa, EOMI	  Cardiovascular:  S1 S2, No JVD,    Respiratory: Lungs clear to auscultation	  Psychiatry: Alert  Gastrointestinal:  Soft, Non-tender, + BS	  Skin: No rashes   Neurologic: Non-focal  Extremities:  +3 pitting edema b/l   Vascular: Peripheral pulses palpable    	    	  	  CARDIAC MARKERS:  Labs personally reviewed by me                                  13.2   18.32 )-----------( 282      ( 15 Sep 2024 00:22 )             40.4     09-15    136  |  97  |  27<H>  ----------------------------<  115<H>  3.3<L>   |  26  |  1.18    Ca    8.8      15 Sep 2024 00:22    TPro  6.8  /  Alb  3.3  /  TBili  1.8<H>  /  DBili  x   /  AST  218<H>  /  ALT  157<H>  /  AlkPhos  211<H>  09-15    TTE 12/21/23   1. Left ventricular systolic function is hyperdynamic with an ejection fraction of 80 % by Henao's method of disks.   2. Agitated saline injection was negative for intracardiac shunt.      EKG: Personally reviewed by me -  AFIB @ 78  Radiology: Personally reviewed by me - Mild pulmonary edema.  Small left pleural effusion.        Assessment /Plan:   84M PMHx HLD, HTn, Stg II Colon Ca s/p L hemicolectomy w end colostomy and mucus fistula 2023 w Dr. Browning, COPD, ILD, HFpEF, Afib on Eliquis who presents to [facility] ED with abdominal pain. Pt reports acute onset abdominal pain x 1 day.    1. Cardiac Risk Stratification   - patient is not in active tachy/marysol syndrome  - patient exercise tolerance limited due to shortness of breath and instability   - patient with + wheeze/Rhonchi on physical exam   - EKG AFIB @ 78  - Check TTE   - will further risk stratify after TTE    2. HF  - patient with +3 pitting edema b/l  - NC 02 at 3L   - check Probnp   - check TTE  - start Lasix IV 40 mg BID  - Cre wnl   - strick I&O    3. AFIB  - on home Eliquis  - Check TSH  - c/w monitor on tele for occult arrythmia     4. HLD  - hold statin, patient with elevated LFTs     5. DVT prophylactic  - Lovenox subQ          Differential diagnosis and plan of care discussed with patient after the evaluation. Counseling on diet, nutritional counseling, weight management, exercise and medication compliance was done.   Advanced care planning/advanced directives discussed with patient/family. DNR status including forceful chest compressions to attempt to restart the heart, ventilator support/artificial breathing, electric shock, artificial nutrition, health care proxy, Molst form all discussed with pt. Pt wishes to consider. More than fifteen minutes spent on discussing advanced directives.     Karl Giron, ELIZABETH Ballesteros, DO [facility]  Cardiovascular Medicine  [address], [address]  Available for call or text via [employer]  Office [phone]   DATE OF SERVICE: 09-15-24 @ 14:03    CHIEF COMPLAINT:Patient is a 84y old  Male who presents with a chief complaint of     HISTORY OF PRESENT ILLNESS:HPI:  84M PMHx HLD, HTn, Stg II Colon Ca s/p L hemicolectomy w end colostomy and mucus fistula 2023 w Dr. Browning, COPD, ILD, HFpEF, Afib on Eliquis who presents to [facility] ED with abdominal pain. Pt reports acute onset abdominal pain x 1 day. Denies associated fevers, chills, CP, SOB, nausea, vomiting, diarrhea, cough, dysuria, hematuria, hematemesis, dizziness, lightheadedness. Denies prior episodes of similar pain. Ostomy fx per patient.     In ED, pt is HDS, Afebrile.     Labs w WBC 18. Lipase 1242. T Bili 1.8. Alp 211. AST/ALT: 218/157.     CT Imaging demonstrates cf acute pancreatitis and acute cholecystitis.  (15 Sep 2024 06:11)      PAST MEDICAL & SURGICAL HISTORY:  Asbestosis (ICD9 501)      HTN (Hypertension)      BPH (Benign Prostatic Hypertrophy)      Dyslipidemia      Localized Osteoarthrosis, Lower Leg  left      Obesity      COPD with emphysema      Pulmonary nodule      Thyroid nodule      Aortic ectasia      History of diastolic dysfunction      Venous insufficiency      S/P Cystoscopy (ICD9 V45.89)      Hyperlipidemia (ICD9 272.4)      S/P Arthroscopy of Left Knee  x 20 yrs ago      Cataract  right IOL      Inguinal Hernia x2  right      History of Tonsillectomy      History of appendectomy              MEDICATIONS:  enoxaparin Injectable 40 milliGRAM(s) SubCutaneous every 24 hours    piperacillin/tazobactam IVPB.- 3.375 Gram(s) IV Intermittent once  piperacillin/tazobactam IVPB.. 3.375 Gram(s) IV Intermittent every 8 hours      HYDROmorphone  Injectable 0.5 milliGRAM(s) IV Push every 4 hours PRN  HYDROmorphone  Injectable 0.2 milliGRAM(s) IV Push every 4 hours PRN        lactated ringers. 1000 milliLiter(s) IV Continuous <Continuous>  potassium chloride  10 mEq/100 mL IVPB 10 milliEquivalent(s) IV Intermittent every 1 hour      FAMILY HISTORY:  No pertinent family history in first degree relatives        Non-contributory    SOCIAL HISTORY:    Denies     Allergies    No Known Allergies    Intolerances    	    REVIEW OF SYSTEMS:  CONSTITUTIONAL: No fever  EYES: No eye pain, visual disturbances, or discharge  ENMT:  No difficulty hearing, tinnitus  NECK: No pain or stiffness  RESPIRATORY: No cough, + wheezing,  CARDIOVASCULAR: No chest pain, palpitations, passing out, dizziness, + leg swelling  GASTROINTESTINAL:  No nausea, vomiting, diarrhea or constipation. No melena.  GENITOURINARY: No dysuria, hematuria  NEUROLOGICAL: No stroke like symptoms  SKIN: No burning or lesions   ENDOCRINE: No heat or cold intolerance  MUSCULOSKELETAL: No joint pain or swelling  PSYCHIATRIC: No  anxiety, mood swings  HEME/LYMPH: No bleeding gums  ALLERGY AND IMMUNOLOGIC: No hives or eczema	    All other ROS negative    PHYSICAL EXAM:  T(C): 36.8 (09-15-24 @ 07:20), Max: 36.8 (09-15-24 @ 07:20)  HR: 70 (09-15-24 @ 09:01) (70 - 80)  BP: 109/72 (09-15-24 @ 09:01) (105/58 - 134/72)  RR: 12 (09-15-24 @ 09:01) (12 - 20)  SpO2: 100% (09-15-24 @ 09:01) (92% - 100%)  Wt(kg): --  I&O's Summary      Appearance: Normal	  HEENT:   Normal oral mucosa, EOMI	  Cardiovascular:  S1 S2, No JVD,    Respiratory: Lungs clear to auscultation	  Psychiatry: Alert  Gastrointestinal:  Soft, Non-tender, + BS	  Skin: No rashes   Neurologic: Non-focal  Extremities:  +3 pitting edema b/l   Vascular: Peripheral pulses palpable    	    	  	  CARDIAC MARKERS:  Labs personally reviewed by me                                  13.2   18.32 )-----------( 282      ( 15 Sep 2024 00:22 )             40.4     09-15    136  |  97  |  27<H>  ----------------------------<  115<H>  3.3<L>   |  26  |  1.18    Ca    8.8      15 Sep 2024 00:22    TPro  6.8  /  Alb  3.3  /  TBili  1.8<H>  /  DBili  x   /  AST  218<H>  /  ALT  157<H>  /  AlkPhos  211<H>  09-15    TTE 12/21/23   1. Left ventricular systolic function is hyperdynamic with an ejection fraction of 80 % by Henao's method of disks.   2. Agitated saline injection was negative for intracardiac shunt.      EKG: Personally reviewed by me -  AFIB @ 78  Radiology: Personally reviewed by me - Mild pulmonary edema.  Small left pleural effusion.        Assessment /Plan:   84M PMHx HLD, HTn, Stg II Colon Ca s/p L hemicolectomy w end colostomy and mucus fistula 2023 w Dr. Browning, COPD, ILD, HFpEF, Afib on Eliquis who presents to [facility] ED with abdominal pain. Pt reports acute onset abdominal pain x 1 day.    1. Cardiac Risk Stratification   - patient is not in active tachy/marysol syndrome  - patient exercise tolerance limited due to shortness of breath and instability   - patient with + wheeze/Rhonchi on physical exam   - EKG AFIB @ 78  - Check TTE   - will further risk stratify after TTE and volume optimization     2. HF  - patient with +3 pitting edema b/l  - NC 02 at 3L   - check Probnp   - check TTE  - start Lasix IV 40 mg BID  - Cre wnl   - strick I&O    3. AFIB  - on home Eliquis  - Check TSH  - c/w monitor on tele for occult arrythmia     4. HLD  - hold statin, patient with elevated LFTs     5. DVT prophylactic  - Lovenox subQ          Differential diagnosis and plan of care discussed with patient after the evaluation. Counseling on diet, nutritional counseling, weight management, exercise and medication compliance was done.   Advanced care planning/advanced directives discussed with patient/family. DNR status including forceful chest compressions to attempt to restart the heart, ventilator support/artificial breathing, electric shock, artificial nutrition, health care proxy, Molst form all discussed with pt. Pt wishes to consider. More than fifteen minutes spent on discussing advanced directives.     Karl Giron, ELIZABETH Ballesteros,  [facility]  Cardiovascular Medicine  [address], [address]  Available for call or text via [employer] TE[employer]  Office [phone]   DATE OF SERVICE: 09-15-24 @ 14:03    CHIEF COMPLAINT:Patient is a 84y old  Male who presents with a chief complaint of     HISTORY OF PRESENT ILLNESS:HPI:  84M PMHx HLD, HTn, Stg II Colon Ca s/p L hemicolectomy w end colostomy and mucus fistula 2023 w Dr. Browning, COPD, ILD, HFpEF, Afib on Eliquis who presents to [facility] ED with abdominal pain. Pt reports acute onset abdominal pain x 1 day. Denies associated fevers, chills, CP, SOB, nausea, vomiting, diarrhea, cough, dysuria, hematuria, hematemesis, dizziness, lightheadedness. Denies prior episodes of similar pain. Ostomy fx per patient.     In ED, pt is HDS, Afebrile.     Labs w WBC 18. Lipase 1242. T Bili 1.8. Alp 211. AST/ALT: 218/157.     CT Imaging demonstrates cf acute pancreatitis and acute cholecystitis.  (15 Sep 2024 06:11)      PAST MEDICAL & SURGICAL HISTORY:  Asbestosis (ICD9 501)      HTN (Hypertension)      BPH (Benign Prostatic Hypertrophy)      Dyslipidemia      Localized Osteoarthrosis, Lower Leg  left      Obesity      COPD with emphysema      Pulmonary nodule      Thyroid nodule      Aortic ectasia      History of diastolic dysfunction      Venous insufficiency      S/P Cystoscopy (ICD9 V45.89)      Hyperlipidemia (ICD9 272.4)      S/P Arthroscopy of Left Knee  x 20 yrs ago      Cataract  right IOL      Inguinal Hernia x2  right      History of Tonsillectomy      History of appendectomy              MEDICATIONS:  enoxaparin Injectable 40 milliGRAM(s) SubCutaneous every 24 hours    piperacillin/tazobactam IVPB.- 3.375 Gram(s) IV Intermittent once  piperacillin/tazobactam IVPB.. 3.375 Gram(s) IV Intermittent every 8 hours      HYDROmorphone  Injectable 0.5 milliGRAM(s) IV Push every 4 hours PRN  HYDROmorphone  Injectable 0.2 milliGRAM(s) IV Push every 4 hours PRN        lactated ringers. 1000 milliLiter(s) IV Continuous <Continuous>  potassium chloride  10 mEq/100 mL IVPB 10 milliEquivalent(s) IV Intermittent every 1 hour      FAMILY HISTORY:  No pertinent family history in first degree relatives        Non-contributory    SOCIAL HISTORY:    Denies     Allergies    No Known Allergies    Intolerances    	    REVIEW OF SYSTEMS:  CONSTITUTIONAL: No fever  EYES: No eye pain, visual disturbances, or discharge  ENMT:  No difficulty hearing, tinnitus  NECK: No pain or stiffness  RESPIRATORY: No cough, + wheezing,  CARDIOVASCULAR: No chest pain, palpitations, passing out, dizziness, + leg swelling  GASTROINTESTINAL:  No nausea, vomiting, diarrhea or constipation. No melena.  GENITOURINARY: No dysuria, hematuria  NEUROLOGICAL: No stroke like symptoms  SKIN: No burning or lesions   ENDOCRINE: No heat or cold intolerance  MUSCULOSKELETAL: No joint pain or swelling  PSYCHIATRIC: No  anxiety, mood swings  HEME/LYMPH: No bleeding gums  ALLERGY AND IMMUNOLOGIC: No hives or eczema	    All other ROS negative    PHYSICAL EXAM:  T(C): 36.8 (09-15-24 @ 07:20), Max: 36.8 (09-15-24 @ 07:20)  HR: 70 (09-15-24 @ 09:01) (70 - 80)  BP: 109/72 (09-15-24 @ 09:01) (105/58 - 134/72)  RR: 12 (09-15-24 @ 09:01) (12 - 20)  SpO2: 100% (09-15-24 @ 09:01) (92% - 100%)  Wt(kg): --  I&O's Summary      Appearance: Normal	  HEENT:   Normal oral mucosa, EOMI	  Cardiovascular:  S1 S2, No JVD,    Respiratory: Lungs clear to auscultation	  Psychiatry: Alert  Gastrointestinal:  Soft, Non-tender, + BS	  Skin: No rashes   Neurologic: Non-focal  Extremities:  +3 pitting edema b/l   Vascular: Peripheral pulses palpable    	    	  	  CARDIAC MARKERS:  Labs personally reviewed by me                                  13.2   18.32 )-----------( 282      ( 15 Sep 2024 00:22 )             40.4     09-15    136  |  97  |  27<H>  ----------------------------<  115<H>  3.3<L>   |  26  |  1.18    Ca    8.8      15 Sep 2024 00:22    TPro  6.8  /  Alb  3.3  /  TBili  1.8<H>  /  DBili  x   /  AST  218<H>  /  ALT  157<H>  /  AlkPhos  211<H>  09-15    TTE 12/21/23   1. Left ventricular systolic function is hyperdynamic with an ejection fraction of 80 % by Henao's method of disks.   2. Agitated saline injection was negative for intracardiac shunt.      EKG: Personally reviewed by me -  AFIB @ 78  Radiology: Personally reviewed by me - Mild pulmonary edema.  Small left pleural effusion.        Assessment /Plan:   84M PMHx HLD, HTn, Stg II Colon Ca s/p L hemicolectomy w end colostomy and mucus fistula 2023 w Dr. Browning, COPD, ILD, HFpEF, Afib on Eliquis who presents to [facility] ED with abdominal pain. Pt reports acute onset abdominal pain x 1 day.    1. Cardiac Risk Stratification   - patient is not in active tachy/marysol syndrome  - patient exercise tolerance limited due to shortness of breath (COPD and asbestosis) and instability   - EKG AFIB @ 78  - Check TTE   - will further risk stratify after TTE and volume optimization     2. HF  - patient with +1 pitting edema b/l  - CXR with pulm edema  - check Probnp   - check TTE  - start Lasix IV 40 mg daily to optimize for surgery    3. AFIB  - on home Eliquis  - Check TSH    4. HLD  - hold statin, patient with elevated LFTs     5. DVT prophylactic  - Lovenox subQ          Differential diagnosis and plan of care discussed with patient after the evaluation. Counseling on diet, nutritional counseling, weight management, exercise and medication compliance was done.   Advanced care planning/advanced directives discussed with patient/family. DNR status including forceful chest compressions to attempt to restart the heart, ventilator support/artificial breathing, electric shock, artificial nutrition, health care proxy, Molst form all discussed with pt. Pt wishes to consider. More than fifteen minutes spent on discussing advanced directives.     ELIZABETH Mcghee, DO Located within [facility]  Cardiovascular Medicine  [address], [address]  Available for call or text via [employer]  Office [phone]   DATE OF SERVICE: 09-15-24 @ 14:03    CHIEF COMPLAINT:Patient is a 84y old  Male who presents with a chief complaint of     HISTORY OF PRESENT ILLNESS:HPI:  84M PMHx HLD, HTn, Stg II Colon Ca s/p L hemicolectomy w end colostomy and mucus fistula 2023 w Dr. Browning, COPD, ILD, HFpEF, Afib on Eliquis who presents to [facility] ED with abdominal pain. Pt reports acute onset abdominal pain x 1 day. Denies associated fevers, chills, CP, SOB, nausea, vomiting, diarrhea, cough, dysuria, hematuria, hematemesis, dizziness, lightheadedness. Denies prior episodes of similar pain. Ostomy fx per patient.     In ED, pt is HDS, Afebrile.     Labs w WBC 18. Lipase 1242. T Bili 1.8. Alp 211. AST/ALT: 218/157.     CT Imaging demonstrates cf acute pancreatitis and acute cholecystitis.  (15 Sep 2024 06:11)      PAST MEDICAL & SURGICAL HISTORY:  Asbestosis (ICD9 501)      HTN (Hypertension)      BPH (Benign Prostatic Hypertrophy)      Dyslipidemia      Localized Osteoarthrosis, Lower Leg  left      Obesity      COPD with emphysema      Pulmonary nodule      Thyroid nodule      Aortic ectasia      History of diastolic dysfunction      Venous insufficiency      S/P Cystoscopy (ICD9 V45.89)      Hyperlipidemia (ICD9 272.4)      S/P Arthroscopy of Left Knee  x 20 yrs ago      Cataract  right IOL      Inguinal Hernia x2  right      History of Tonsillectomy      History of appendectomy              MEDICATIONS:  enoxaparin Injectable 40 milliGRAM(s) SubCutaneous every 24 hours    piperacillin/tazobactam IVPB.- 3.375 Gram(s) IV Intermittent once  piperacillin/tazobactam IVPB.. 3.375 Gram(s) IV Intermittent every 8 hours      HYDROmorphone  Injectable 0.5 milliGRAM(s) IV Push every 4 hours PRN  HYDROmorphone  Injectable 0.2 milliGRAM(s) IV Push every 4 hours PRN        lactated ringers. 1000 milliLiter(s) IV Continuous <Continuous>  potassium chloride  10 mEq/100 mL IVPB 10 milliEquivalent(s) IV Intermittent every 1 hour      FAMILY HISTORY:  No pertinent family history in first degree relatives        Non-contributory    SOCIAL HISTORY:    Denies     Allergies    No Known Allergies    Intolerances    	    REVIEW OF SYSTEMS:  CONSTITUTIONAL: No fever  EYES: No eye pain, visual disturbances, or discharge  ENMT:  No difficulty hearing, tinnitus  NECK: No pain or stiffness  RESPIRATORY: No cough, + wheezing,  CARDIOVASCULAR: No chest pain, palpitations, passing out, dizziness, + leg swelling  GASTROINTESTINAL:  No nausea, vomiting, diarrhea or constipation. No melena.  GENITOURINARY: No dysuria, hematuria  NEUROLOGICAL: No stroke like symptoms  SKIN: No burning or lesions   ENDOCRINE: No heat or cold intolerance  MUSCULOSKELETAL: No joint pain or swelling  PSYCHIATRIC: No  anxiety, mood swings  HEME/LYMPH: No bleeding gums  ALLERGY AND IMMUNOLOGIC: No hives or eczema	    All other ROS negative    PHYSICAL EXAM:  T(C): 36.8 (09-15-24 @ 07:20), Max: 36.8 (09-15-24 @ 07:20)  HR: 70 (09-15-24 @ 09:01) (70 - 80)  BP: 109/72 (09-15-24 @ 09:01) (105/58 - 134/72)  RR: 12 (09-15-24 @ 09:01) (12 - 20)  SpO2: 100% (09-15-24 @ 09:01) (92% - 100%)  Wt(kg): --  I&O's Summary      Appearance: Normal	  HEENT:   Normal oral mucosa, EOMI	  Cardiovascular:  S1 S2, No JVD,    Respiratory: Lungs clear to auscultation	  Psychiatry: Alert  Gastrointestinal:  Soft, Non-tender, + BS	  Skin: No rashes   Neurologic: Non-focal  Extremities:  +3 pitting edema b/l   Vascular: Peripheral pulses palpable    	    	  	  CARDIAC MARKERS:  Labs personally reviewed by me                                  13.2   18.32 )-----------( 282      ( 15 Sep 2024 00:22 )             40.4     09-15    136  |  97  |  27<H>  ----------------------------<  115<H>  3.3<L>   |  26  |  1.18    Ca    8.8      15 Sep 2024 00:22    TPro  6.8  /  Alb  3.3  /  TBili  1.8<H>  /  DBili  x   /  AST  218<H>  /  ALT  157<H>  /  AlkPhos  211<H>  09-15    TTE 12/21/23   1. Left ventricular systolic function is hyperdynamic with an ejection fraction of 80 % by Henao's method of disks.   2. Agitated saline injection was negative for intracardiac shunt.      EKG: Personally reviewed by me -  AFIB @ 78  Radiology: Personally reviewed by me - Mild pulmonary edema.  Small left pleural effusion.        Assessment /Plan:   84M PMHx HLD, HTn, Stg II Colon Ca s/p L hemicolectomy w end colostomy and mucus fistula 2023 w Dr. Browning, COPD, ILD, HFpEF, Afib on Eliquis who presents to [facility] ED with abdominal pain. Pt reports acute onset abdominal pain x 1 day.    1. Cardiac Risk Stratification   - patient is not in active tachy/marysol syndrome  - patient exercise tolerance limited due to shortness of breath (COPD and asbestosis) and instability   - EKG AFIB @ 78  - Check TTE   - will further risk stratify after TTE and volume optimization     2. HF  - patient with +1 pitting edema b/l  - CXR with pulm edema  - check Probnp   - check TTE  - start Lasix IV 40 mg x1 to optimize for surgery    3. AFIB  - on home Eliquis  - Check TSH    4. HLD  - hold statin, patient with elevated LFTs     5. DVT prophylactic  - Lovenox subQ          Differential diagnosis and plan of care discussed with patient after the evaluation. Counseling on diet, nutritional counseling, weight management, exercise and medication compliance was done.   Advanced care planning/advanced directives discussed with patient/family. DNR status including forceful chest compressions to attempt to restart the heart, ventilator support/artificial breathing, electric shock, artificial nutrition, health care proxy, Molst form all discussed with pt. Pt wishes to consider. More than fifteen minutes spent on discussing advanced directives.     ELIZABETH Mcghee, DO [facility]  Cardiovascular Medicine  [address], [address]  Available for call or text via [employer]  Office [phone]

## 2024-09-15 NOTE — PATIENT PROFILE ADULT - STATED REASON FOR ADMISSION
"Chief Complaint   Patient presents with     Pain       Initial /77 (BP Location: Left arm, Patient Position: Chair, Cuff Size: Adult Small)  Pulse 84  Resp 16  SpO2 98% Estimated body mass index is 27.46 kg/(m^2) as calculated from the following:    Height as of 10/11/16: 1.626 m (5' 4\").    Weight as of 5/16/17: 72.6 kg (160 lb).  Medication Reconciliation: complete       Ernie Alexis MA  [facility]      "
abdominal pain/difficulty walking

## 2024-09-15 NOTE — CONSULT NOTE ADULT - ASSESSMENT
84M PMHx HLD, HTn, Stg II Colon Ca s/p L hemicolectomy w end colostomy and mucus fistula 2023 w Dr. 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 PFTs 3/24 with severe combined obstructive/restrictive ventilatory defect with concomitant loss of functional alveolar units. ABG on admission 7.45/42/117/29. Weaned O2 to RA. No respiratory complaints  - Intermediate risk for perioperative pulmonary complications  - Wheezing on exam, possibly due to heart failure but would start standing duonebs q6h for now and in the perioperative period  - resume home stiolto and PRN albuterol upon d/c  - Outpatient f/u with Dr. Thomas

## 2024-09-15 NOTE — H&P ADULT - TIME BILLING
All 47 minutes spent on clinical care:  reviewing chart, data, films, at bedside and coordinating care

## 2024-09-15 NOTE — CONSULT NOTE ADULT - ATTENDING COMMENTS
As above  84M w/ hx of HLD, HTN, Stg II Colon Ca s/p L hemicolectomy w end colostomy and mucus fistula 2023, COPD, ILD, HFpEF, Afib on Eliquis  Here with likely gallstone pancreatitis  Elevated liver tests but no stones in CBD on CT, not seen vs passed stone?  Please obtain MRCP   Fluids, pain control, low fat diet as tolerated for pancreatitis  Hold Eliquis for now and will determine whether ERCP needed pending MRCP and liver test trend    Thank you for this interesting consult.  Please call the advanced GI service with any questions or concerns.
85 yo M w/ PMHx HLD, HTN, Afib (Eliquis), Stg II Colon Ca s/p L hemicolectomy w end colostomy and mucus fistula (2023), COPD, ILD, asbestosis, HFpEF, Afib on Eliquis who presented with acute pancreatitis/cholecystitis. PFTs (03/2024): FEV1 37%, FVC 46%, TLC 66%, RV 94% DLCO 37% indicating mild restrictive lung disease w/ severely reduced diffusion capacity.  ABG on admission 7.45/42/117.  Wheezing on exam, likely component of pulm edema as CXR suggestive of pulm vascular congestion.  Agree with lasix and would start Duonebs q6h for now.  Resume home Stiolto. Weaned to RA with O2Sat 94%.  Patient is an Intermediate risk for perioperative pulmonary complications, pulmonary optimization as per above.    Please call with any questions.    Shannan Bartlett MD, MSCR

## 2024-09-15 NOTE — PATIENT PROFILE ADULT - FALL HARM RISK - HARM RISK INTERVENTIONS

## 2024-09-15 NOTE — H&P ADULT - HISTORY OF PRESENT ILLNESS
84M PMHx HLD, HTn, Stg II Colon Ca s/p L hemicolectomy w end colostomy and mucus fistula 2023 w Dr. Browning, COPD, ILD, HFpEF, Afib on Eliquis who presents to [facility] ED with abdominal pain. Pt reports acute onset abdominal pain x 1 day. Denies associated fevers, chills, CP, SOB, nausea, vomiting, diarrhea, cough, dysuria, hematuria, hematemesis, dizziness, lightheadedness. Denies prior episodes of similar pain. Ostomy fx per patient.     In ED, pt is HDS, Afebrile.     Labs w WBC 18. Lipase 1242. T Bili 1.8. Alp 211. AST/ALT: 218/157.     CT Imaging demonstrates cf acute pancreatitis and acute cholecystitis.

## 2024-09-15 NOTE — CONSULT NOTE ADULT - SUBJECTIVE AND OBJECTIVE BOX
CHIEF COMPLAINT:Patient is a 84y old  Male who presents with a chief complaint of     HPI:  84M PMHx HLD, HTn, Stg II Colon Ca s/p L hemicolectomy w end colostomy and mucus fistula  w Dr. Browning, COPD, ILD, HFpEF, Afib on Eliquis who presents to [facility] ED with abdominal pain. Pt reports acute onset abdominal pain x 1 day. Denies associated fevers, chills, CP, SOB, nausea, vomiting, diarrhea, cough, dysuria, hematuria, hematemesis, dizziness, lightheadedness. Denies prior episodes of similar pain. Ostomy fx per patient.     In ED, pt is HDS, Afebrile.     Labs w WBC 18. Lipase 1242. T Bili 1.8. Alp 211. AST/ALT: 218/157.     CT Imaging demonstrates cf acute pancreatitis and acute cholecystitis.  (15 Sep 2024 06:11)    pulmonary called for perioperative risk assessment for CCY. PFTs 3/24 with severe combined obstructive/restrictive ventilatory defect with concomitant loss of functional alveolar units. ABG on admission 7.45/42/117/29. Currently saturating 100% on 2L NC.      PAST MEDICAL & SURGICAL HISTORY:  Asbestosis (ICD9 501)      HTN (Hypertension)      BPH (Benign Prostatic Hypertrophy)      Dyslipidemia      Localized Osteoarthrosis, Lower Leg  left      Obesity      COPD with emphysema      Pulmonary nodule      Thyroid nodule      Aortic ectasia      History of diastolic dysfunction      Venous insufficiency      S/P Cystoscopy (ICD9 V45.89)      Hyperlipidemia (ICD9 272.4)      S/P Arthroscopy of Left Knee  x 20 yrs ago      Cataract  right IOL      Inguinal Hernia x2  right      History of Tonsillectomy      History of appendectomy          FAMILY HISTORY:  No pertinent family history in first degree relatives        SOCIAL HISTORY:  Smoking: [ ] Never Smoked [ ] Former Smoker (__ packs x ___ years) [ ] Current Smoker  (__ packs x ___ years)  Substance Use: [ ] Never Used [ ] Used ____  EtOH Use:  Marital Status: [ ] Single [ ]  [ ]  [ ]   Sexual History:   Occupation:  Recent Travel:  Country of Birth:  Advance Directives:    Allergies    No Known Allergies    Intolerances        HOME MEDICATIONS:  Home Medications:  albuterol 2.5 mg/3 mL (0.083%) inhalation solution: 2.5 milligram(s) inhaled every 6 hours (2024 12:41)  apixaban 2.5 mg oral tablet: 1 tab(s) orally 2 times a day (2024 11:47)  ascorbic acid 500 mg oral tablet: 1 tab(s) orally once a day (2024 11:47)  atorvastatin 10 mg oral tablet: 1 tab(s) orally once a day (20 Dec 2023 17:07)  benzonatate 100 mg oral capsule: 1 cap(s) orally every 8 hours (2024 11:47)  budesonide-formoterol 160 mcg-4.5 mcg/inh inhalation aerosol: 2 puff(s) inhaled 2 times a day (2024 11:47)  bumetanide 1 mg oral tablet: 2 tab(s) orally once a day in the morning, and 1 tablet at night (2024 12:41)  finasteride 5 mg oral tablet: 1 tab(s) orally once a day (20 Dec 2023 17:07)  FIRST Mouthwash BLM mucous membrane suspension: 5 milliliter(s) mucous membrane every 6 hours Swish and spit (2024 12:23)  metoprolol tartrate 25 mg oral tablet: 1 tab(s) orally 2 times a day (20 Dec 2023 17:07)  Mucinex 600 mg oral tablet, extended release: 1 tab(s) orally every 12 hours (2024 11:47)  Multiple Vitamins oral tablet: 1 tab(s) orally once a day (2024 11:47)  nystatin 100,000 units/mL oral suspension: 5 milliliter(s) orally 4 times a day (2024 12:23)  pantoprazole 40 mg oral granule, delayed release: 1 tab(s) orally once a day (2024 12:25)  predniSONE 20 mg oral tablet: 1 tab(s) orally once a day (2024 11:47)  tamsulosin 0.4 mg oral capsule: 1 cap(s) orally once a day (at bedtime) (20 Dec 2023 17:07)      REVIEW OF SYSTEMS:  Constitutional: [ ] negative [ ] fevers [ ] chills [ ] weight loss [ ] weight gain  HEENT: [ ] negative [ ] dry eyes [ ] eye irritation [ ] postnasal drip [ ] nasal congestion  CV: [ ] negative  [ ] chest pain [ ] orthopnea [ ] palpitations [ ] murmur  Resp: [ ] negative [ ] cough [ ] shortness of breath [ ] dyspnea [ ] wheezing [ ] sputum [ ] hemoptysis  GI: [ ] negative [ ] nausea [ ] vomiting [ ] diarrhea [ ] constipation [ ] abd pain [ ] dysphagia   : [ ] negative [ ] dysuria [ ] nocturia [ ] hematuria [ ] increased urinary frequency  Musculoskeletal: [ ] negative [ ] back pain [ ] myalgias [ ] arthralgias [ ] fracture  Skin: [ ] negative [ ] rash [ ] itch  Neurological: [ ] negative [ ] headache [ ] dizziness [ ] syncope [ ] weakness [ ] numbness  Psychiatric: [ ] negative [ ] anxiety [ ] depression  Endocrine: [ ] negative [ ] diabetes [ ] thyroid problem  Hematologic/Lymphatic: [ ] negative [ ] anemia [ ] bleeding problem  Allergic/Immunologic: [ ] negative [ ] itchy eyes [ ] nasal discharge [ ] hives [ ] angioedema  [ ] All other systems negative  [ ] Unable to assess ROS because ________    OBJECTIVE:  ICU Vital Signs Last 24 Hrs  T(C): 36.8 (15 Sep 2024 07:20), Max: 36.8 (15 Sep 2024 07:20)  T(F): 98.3 (15 Sep 2024 07:20), Max: 98.3 (15 Sep 2024 07:20)  HR: 70 (15 Sep 2024 09:01) (70 - 80)  BP: 109/72 (15 Sep 2024 09:01) (105/58 - 134/72)  BP(mean): 76 (15 Sep 2024 00:50) (76 - 83)  ABP: --  ABP(mean): --  RR: 12 (15 Sep 2024 09:01) (12 - 20)  SpO2: 100% (15 Sep 2024 09:01) (92% - 100%)    O2 Parameters below as of 15 Sep 2024 09:  Patient On (Oxygen Delivery Method): nasal cannula  O2 Flow (L/min): 2            CAPILLARY BLOOD GLUCOSE          PHYSICAL EXAM:  GENERAL: NAD, well-groomed, well-developed  HEAD:  Atraumatic, Normocephalic  EYES: EOMI, PERRLA, conjunctiva and sclera clear  ENMT: No tonsillar erythema, exudates, or enlargement; Moist mucous membranes, Good dentition, No lesions  NECK: Supple, No JVD, Normal thyroid  CHEST/LUNG: Clear to auscultation bilaterally; No rales, rhonchi, wheezing, or rubs  HEART: Regular rate and rhythm; No murmurs, rubs, or gallops  ABDOMEN: Soft, Nontender, Nondistended; Bowel sounds present  VASCULAR:  2+ Peripheral Pulses, No clubbing, cyanosis, or edema  LYMPH: No lymphadenopathy noted  SKIN: No rashes or lesions  NERVOUS SYSTEM:  Alert & Oriented X3, Good concentration; Motor Strength 5/5 B/L upper and lower extremities; DTRs 2+ intact and symmetric    HOSPITAL MEDICATIONS:  Standing Meds:  enoxaparin Injectable 40 milliGRAM(s) SubCutaneous every 24 hours  lactated ringers. 1000 milliLiter(s) IV Continuous <Continuous>  piperacillin/tazobactam IVPB.- 3.375 Gram(s) IV Intermittent once  piperacillin/tazobactam IVPB.- 3.375 Gram(s) IV Intermittent once  piperacillin/tazobactam IVPB.. 3.375 Gram(s) IV Intermittent every 8 hours      PRN Meds:  HYDROmorphone  Injectable 0.2 milliGRAM(s) IV Push every 4 hours PRN  HYDROmorphone  Injectable 0.5 milliGRAM(s) IV Push every 4 hours PRN      LABS:    The Labs were reviewed by me   The Radiology was reviewed by me    EKG tracing reviewed by me    09-15    136  |  97  |  27<H>  ----------------------------<  115<H>  3.3<L>   |  26  |  1.18    Ca    8.8      15 Sep 2024 00:22    TPro  6.8  /  Alb  3.3  /  TBili  1.8<H>  /  DBili  x   /  AST  218<H>  /  ALT  157<H>  /  AlkPhos  211<H>  09-15                        Urinalysis Basic - ( 15 Sep 2024 01:50 )    Color: Yellow / Appearance: Turbid / S.014 / pH: x  Gluc: x / Ketone: Negative mg/dL  / Bili: Negative / Urobili: 1.0 mg/dL   Blood: x / Protein: Trace mg/dL / Nitrite: Negative   Leuk Esterase: Large / RBC: 1 /HPF /  /HPF   Sq Epi: x / Non Sq Epi: 1 /HPF / Bacteria: Negative /HPF                              13.2   18.32 )-----------( 282      ( 15 Sep 2024 00:22 )             40.4     CAPILLARY BLOOD GLUCOSE            MICROBIOLOGY:       RADIOLOGY:  [ ] Reviewed and interpreted by me    PULMONARY FUNCTION TESTS:    EKG:   CHIEF COMPLAINT:Patient is a 84y old  Male who presents with a chief complaint of     HPI:  84M PMHx HLD, HTn, Stg II Colon Ca s/p L hemicolectomy w end colostomy and mucus fistula  w Dr. Browning, COPD, ILD, HFpEF, Afib on Eliquis who presents to [facility] ED with abdominal pain. Pt reports acute onset abdominal pain x 1 day. Denies associated fevers, chills, CP, SOB, nausea, vomiting, diarrhea, cough, dysuria, hematuria, hematemesis, dizziness, lightheadedness. Denies prior episodes of similar pain. Ostomy fx per patient.     In ED, pt is HDS, Afebrile.     Labs w WBC 18. Lipase 1242. T Bili 1.8. Alp 211. AST/ALT: 218/157.     CT Imaging demonstrates cf acute pancreatitis and acute cholecystitis.  (15 Sep 2024 06:11)    pulmonary called for perioperative risk assessment for CCY. PFTs 3/24 with severe combined obstructive/restrictive ventilatory defect with concomitant loss of functional alveolar units. ABG on admission 7.45/42/117/29. Currently saturating 100% on 2L NC, weaned to RA on assessment with saturation at 94%. He has no complaint of dyspnea. He has an occasional cough with sputum production that is chronic and unchanged. Follows with Dr. Thomas. Kinjal bess.      PAST MEDICAL & SURGICAL HISTORY:  Asbestosis (ICD9 501)      HTN (Hypertension)      BPH (Benign Prostatic Hypertrophy)      Dyslipidemia      Localized Osteoarthrosis, Lower Leg  left      Obesity      COPD with emphysema      Pulmonary nodule      Thyroid nodule      Aortic ectasia      History of diastolic dysfunction      Venous insufficiency      S/P Cystoscopy (ICD9 V45.89)      Hyperlipidemia (ICD9 272.4)      S/P Arthroscopy of Left Knee  x 20 yrs ago      Cataract  right IOL      Inguinal Hernia x2  right      History of Tonsillectomy      History of appendectomy          FAMILY HISTORY:  No pertinent family history in first degree relatives        SOCIAL HISTORY:  Smoking: [ ] Never Smoked [ x] Former Smoker (__ packs x ___ years) [ ] Current Smoker  (__ packs x ___ years)  Substance Use: [ ] Never Used [ ] Used ____  EtOH Use:  Marital Status: [ ] Single [ ]  [ ]  [ ]   Sexual History:   Occupation:  Recent Travel:  Country of Birth:  Advance Directives:    Allergies    No Known Allergies    Intolerances        HOME MEDICATIONS:  Home Medications:  albuterol 2.5 mg/3 mL (0.083%) inhalation solution: 2.5 milligram(s) inhaled every 6 hours (2024 12:41)  apixaban 2.5 mg oral tablet: 1 tab(s) orally 2 times a day (2024 11:47)  ascorbic acid 500 mg oral tablet: 1 tab(s) orally once a day (2024 11:47)  atorvastatin 10 mg oral tablet: 1 tab(s) orally once a day (20 Dec 2023 17:07)  benzonatate 100 mg oral capsule: 1 cap(s) orally every 8 hours (2024 11:47)  budesonide-formoterol 160 mcg-4.5 mcg/inh inhalation aerosol: 2 puff(s) inhaled 2 times a day (2024 11:47)  bumetanide 1 mg oral tablet: 2 tab(s) orally once a day in the morning, and 1 tablet at night (2024 12:41)  finasteride 5 mg oral tablet: 1 tab(s) orally once a day (20 Dec 2023 17:07)  FIRST Mouthwash BLM mucous membrane suspension: 5 milliliter(s) mucous membrane every 6 hours Swish and spit (2024 12:23)  metoprolol tartrate 25 mg oral tablet: 1 tab(s) orally 2 times a day (20 Dec 2023 17:07)  Mucinex 600 mg oral tablet, extended release: 1 tab(s) orally every 12 hours (2024 11:47)  Multiple Vitamins oral tablet: 1 tab(s) orally once a day (2024 11:47)  nystatin 100,000 units/mL oral suspension: 5 milliliter(s) orally 4 times a day (2024 12:23)  pantoprazole 40 mg oral granule, delayed release: 1 tab(s) orally once a day (2024 12:25)  predniSONE 20 mg oral tablet: 1 tab(s) orally once a day (2024 11:47)  tamsulosin 0.4 mg oral capsule: 1 cap(s) orally once a day (at bedtime) (20 Dec 2023 17:07)      REVIEW OF SYSTEMS:  Constitutional: [x ] negative [ ] fevers [ ] chills [ ] weight loss [ ] weight gain  HEENT: [ x] negative [ ] dry eyes [ ] eye irritation [ ] postnasal drip [ ] nasal congestion  CV: [x ] negative  [ ] chest pain [ ] orthopnea [ ] palpitations [ ] murmur  Resp: [ x] negative [ ] cough [ ] shortness of breath [ ] dyspnea [ ] wheezing [ ] sputum [ ] hemoptysis  GI: [ ] negative [ ] nausea [ ] vomiting [ ] diarrhea [ ] constipation [ x] abd pain [ ] dysphagia   : [ ] negative [ ] dysuria [ ] nocturia [ ] hematuria [ ] increased urinary frequency  Musculoskeletal: [ ] negative [ ] back pain [ ] myalgias [ ] arthralgias [ ] fracture  Skin: [ ] negative [ ] rash [ ] itch  Neurological: [ ] negative [ ] headache [ ] dizziness [ ] syncope [ ] weakness [ ] numbness  Psychiatric: [ ] negative [ ] anxiety [ ] depression  Endocrine: [ ] negative [ ] diabetes [ ] thyroid problem  Hematologic/Lymphatic: [ ] negative [ ] anemia [ ] bleeding problem  Allergic/Immunologic: [ ] negative [ ] itchy eyes [ ] nasal discharge [ ] hives [ ] angioedema  [ x] All other systems negative  [ ] Unable to assess ROS because ________    OBJECTIVE:  ICU Vital Signs Last 24 Hrs  T(C): 36.8 (15 Sep 2024 07:20), Max: 36.8 (15 Sep 2024 07:20)  T(F): 98.3 (15 Sep 2024 07:20), Max: 98.3 (15 Sep 2024 07:20)  HR: 70 (15 Sep 2024 09:) (70 - 80)  BP: 109/72 (15 Sep 2024 09:01) (105/58 - 134/72)  BP(mean): 76 (15 Sep 2024 00:50) (76 - 83)  ABP: --  ABP(mean): --  RR: 12 (15 Sep 2024 09:) (12 - 20)  SpO2: 100% (15 Sep 2024 09:) (92% - 100%)    O2 Parameters below as of 15 Sep 2024 09:  Patient On (Oxygen Delivery Method): nasal cannula  O2 Flow (L/min): 2            CAPILLARY BLOOD GLUCOSE          PHYSICAL EXAM:  Gen: NAD, WD, WN  HEENT: MMM, PERRL, EOMI  CV: RRR, no m/r/g  Lung: b/l wheeze, diffuse  Abd: Soft  Ext: WWP  Skin: No rash  Neuro: A&Ox3, no focal deficits    HOSPITAL MEDICATIONS:  Standing Meds:  enoxaparin Injectable 40 milliGRAM(s) SubCutaneous every 24 hours  lactated ringers. 1000 milliLiter(s) IV Continuous <Continuous>  piperacillin/tazobactam IVPB.- 3.375 Gram(s) IV Intermittent once  piperacillin/tazobactam IVPB.- 3.375 Gram(s) IV Intermittent once  piperacillin/tazobactam IVPB.. 3.375 Gram(s) IV Intermittent every 8 hours      PRN Meds:  HYDROmorphone  Injectable 0.2 milliGRAM(s) IV Push every 4 hours PRN  HYDROmorphone  Injectable 0.5 milliGRAM(s) IV Push every 4 hours PRN      LABS:    The Labs were reviewed by me   The Radiology was reviewed by me    EKG tracing reviewed by me    09-15    136  |  97  |  27<H>  ----------------------------<  115<H>  3.3<L>   |  26  |  1.18    Ca    8.8      15 Sep 2024 00:22    TPro  6.8  /  Alb  3.3  /  TBili  1.8<H>  /  DBili  x   /  AST  218<H>  /  ALT  157<H>  /  AlkPhos  211<H>  09-15                        Urinalysis Basic - ( 15 Sep 2024 01:50 )    Color: Yellow / Appearance: Turbid / S.014 / pH: x  Gluc: x / Ketone: Negative mg/dL  / Bili: Negative / Urobili: 1.0 mg/dL   Blood: x / Protein: Trace mg/dL / Nitrite: Negative   Leuk Esterase: Large / RBC: 1 /HPF /  /HPF   Sq Epi: x / Non Sq Epi: 1 /HPF / Bacteria: Negative /HPF                              13.2   18.32 )-----------( 282      ( 15 Sep 2024 00:22 )             40.4     CAPILLARY BLOOD GLUCOSE            MICROBIOLOGY:       RADIOLOGY:  [ ] Reviewed and interpreted by me    PULMONARY FUNCTION TESTS:    EKG:

## 2024-09-15 NOTE — H&P ADULT - NSHPLABSRESULTS_GEN_ALL_CORE
13.2   18.32 )-----------( 282      ( 15 Sep 2024 00:22 )             40.4   09-15    136  |  97  |  27<H>  ----------------------------<  115<H>  3.3<L>   |  26  |  1.18    Ca    8.8      15 Sep 2024 00:22    TPro  6.8  /  Alb  3.3  /  TBili  1.8<H>  /  DBili  x   /  AST  218<H>  /  ALT  157<H>  /  AlkPhos  211<H>  09-15    IMPRESSION:  1.   Incompletely visualized small to moderate bilateral pleural   effusions.  2.   Bilateral calcified pleural plaques suggest prior asbestos exposure.  3.   Peripancreatic inflammation and fluid compatible with acute   pancreatitis.  4.   Acute cholecystitis as above.        ******PRELIMINARY REPORT******      ******PRELIMINARY REPORT******         STUART BANKS M.D.;[facility] RADIOLOGIST  This document is a PRELIMINARY interpretation and is pending final   attending approval. Sep 15 2024  3:42AM

## 2024-09-15 NOTE — CONSULT NOTE ADULT - ASSESSMENT
Impression:   84 yrs old male w/ hx of HLD, HTN, Stg II Colon Ca s/p L hemicolectomy w end colostomy and mucus fistula 2023 w Dr. Browning, COPD, ILD, HFpEF, Afib on Eliquis who presented with abd pain for the past 1 day, found to have acute pancreatitis and cholecystitis.     #Acute abd pain  #Pancreatitis  #Cholecystitis  Likely related to gallstone, had elevated lipase levels 1242. T Bili 1.8. Alp 211. AST/ALT: 218/157.  - CT imaging showed pancreatitis with peripancreatic fluid collections and also cholecystitis, normal CBD with no stones seen.     Recommendations:   - MRCP pending.   - Abx for now.   - Appreciate surgery recs.   - CMP and CBC daily.     GI will continue to follow.     All recommendations are tentative until note is attested by an attending.     Freedom Biswas, PGY-6  Gastroenterology/Hepatology Fellow  Available on [employer] Teams  [phone] (Short Range Pager)  [phone] (Long Range Pager)    After 5pm, please contact the on-call GI fellow. Impression:   84 yrs old male w/ hx of HLD, HTN, Stg II Colon Ca s/p L hemicolectomy w end colostomy and mucus fistula 2023 w Dr. Browning, COPD, ILD, HFpEF, Afib on Eliquis who presented with abd pain for the past 1 day, found to have acute pancreatitis and cholecystitis.     #Acute abd pain  #Pancreatitis  #Cholecystitis  Likely related to gallstone, had elevated lipase levels 1242. T Bili 1.8. Alp 211. AST/ALT: 218/157.  - CT imaging showed pancreatitis with peripancreatic fluid collections and also cholecystitis, normal CBD with no stones seen.   - Patient reported last dose of Eliquis on 9/14.     Recommendations:   - MRCP pending.   - Abx for now.   - Appreciate surgery recs.   - CMP and CBC daily.     GI will continue to follow.     All recommendations are tentative until note is attested by an attending.     Freedom Biswas, PGY-6  Gastroenterology/Hepatology Fellow  Available on [employer] Teams  [phone] (Short Range Pager)  [phone] (Long Range Pager)    After 5pm, please contact the on-call GI fellow.

## 2024-09-16 ENCOUNTER — RESULT REVIEW (OUTPATIENT)
Age: 84
End: 2024-09-16

## 2024-09-16 LAB
ALBUMIN SERPL ELPH-MCNC: 2.7 G/DL — LOW (ref 3.3–5)
ALP SERPL-CCNC: 110 U/L — SIGNIFICANT CHANGE UP (ref 40–120)
ALT FLD-CCNC: 72 U/L — HIGH (ref 10–45)
ANION GAP SERPL CALC-SCNC: 11 MMOL/L — SIGNIFICANT CHANGE UP (ref 5–17)
APTT BLD: 39.6 SEC — HIGH (ref 24.5–35.6)
APTT BLD: 63.5 SEC — HIGH (ref 24.5–35.6)
AST SERPL-CCNC: 42 U/L — HIGH (ref 10–40)
BILIRUB SERPL-MCNC: 1.9 MG/DL — HIGH (ref 0.2–1.2)
BUN SERPL-MCNC: 26 MG/DL — HIGH (ref 7–23)
CALCIUM SERPL-MCNC: 8.9 MG/DL — SIGNIFICANT CHANGE UP (ref 8.4–10.5)
CHLORIDE SERPL-SCNC: 100 MMOL/L — SIGNIFICANT CHANGE UP (ref 96–108)
CO2 SERPL-SCNC: 25 MMOL/L — SIGNIFICANT CHANGE UP (ref 22–31)
CREAT SERPL-MCNC: 1.3 MG/DL — SIGNIFICANT CHANGE UP (ref 0.5–1.3)
CULTURE RESULTS: SIGNIFICANT CHANGE UP
EGFR: 54 ML/MIN/1.73M2 — LOW
GLUCOSE SERPL-MCNC: 95 MG/DL — SIGNIFICANT CHANGE UP (ref 70–99)
HCT VFR BLD CALC: 33.6 % — LOW (ref 39–50)
HCT VFR BLD CALC: 36.1 % — LOW (ref 39–50)
HGB BLD-MCNC: 11 G/DL — LOW (ref 13–17)
HGB BLD-MCNC: 11.6 G/DL — LOW (ref 13–17)
INR BLD: 1.72 RATIO — HIGH (ref 0.85–1.18)
MAGNESIUM SERPL-MCNC: 1.6 MG/DL — SIGNIFICANT CHANGE UP (ref 1.6–2.6)
MCHC RBC-ENTMCNC: 29.8 PG — SIGNIFICANT CHANGE UP (ref 27–34)
MCHC RBC-ENTMCNC: 30 PG — SIGNIFICANT CHANGE UP (ref 27–34)
MCHC RBC-ENTMCNC: 32.1 GM/DL — SIGNIFICANT CHANGE UP (ref 32–36)
MCHC RBC-ENTMCNC: 32.7 GM/DL — SIGNIFICANT CHANGE UP (ref 32–36)
MCV RBC AUTO: 91.6 FL — SIGNIFICANT CHANGE UP (ref 80–100)
MCV RBC AUTO: 92.8 FL — SIGNIFICANT CHANGE UP (ref 80–100)
NRBC # BLD: 0 /100 WBCS — SIGNIFICANT CHANGE UP (ref 0–0)
NRBC # BLD: 0 /100 WBCS — SIGNIFICANT CHANGE UP (ref 0–0)
NT-PROBNP SERPL-SCNC: 4095 PG/ML — HIGH (ref 0–300)
PHOSPHATE SERPL-MCNC: 3.1 MG/DL — SIGNIFICANT CHANGE UP (ref 2.5–4.5)
PLATELET # BLD AUTO: 203 K/UL — SIGNIFICANT CHANGE UP (ref 150–400)
PLATELET # BLD AUTO: 223 K/UL — SIGNIFICANT CHANGE UP (ref 150–400)
POTASSIUM SERPL-MCNC: 3.9 MMOL/L — SIGNIFICANT CHANGE UP (ref 3.5–5.3)
POTASSIUM SERPL-SCNC: 3.9 MMOL/L — SIGNIFICANT CHANGE UP (ref 3.5–5.3)
PROT SERPL-MCNC: 6 G/DL — SIGNIFICANT CHANGE UP (ref 6–8.3)
PROTHROM AB SERPL-ACNC: 17.8 SEC — HIGH (ref 9.5–13)
RBC # BLD: 3.67 M/UL — LOW (ref 4.2–5.8)
RBC # BLD: 3.89 M/UL — LOW (ref 4.2–5.8)
RBC # FLD: 13 % — SIGNIFICANT CHANGE UP (ref 10.3–14.5)
RBC # FLD: 13.1 % — SIGNIFICANT CHANGE UP (ref 10.3–14.5)
SODIUM SERPL-SCNC: 136 MMOL/L — SIGNIFICANT CHANGE UP (ref 135–145)
SPECIMEN SOURCE: SIGNIFICANT CHANGE UP
TSH SERPL-MCNC: 0.09 UIU/ML — LOW (ref 0.27–4.2)
WBC # BLD: 11.9 K/UL — HIGH (ref 3.8–10.5)
WBC # BLD: 13.69 K/UL — HIGH (ref 3.8–10.5)
WBC # FLD AUTO: 11.9 K/UL — HIGH (ref 3.8–10.5)
WBC # FLD AUTO: 13.69 K/UL — HIGH (ref 3.8–10.5)

## 2024-09-16 PROCEDURE — 93306 TTE W/DOPPLER COMPLETE: CPT | Mod: 26

## 2024-09-16 PROCEDURE — 99223 1ST HOSP IP/OBS HIGH 75: CPT | Mod: GC

## 2024-09-16 PROCEDURE — 74181 MRI ABDOMEN W/O CONTRAST: CPT | Mod: 26

## 2024-09-16 RX ORDER — ASCORBIC ACID/ASCORBATE SODIUM 500 MG
500 TABLET,CHEWABLE ORAL DAILY
Refills: 0 | Status: DISCONTINUED | OUTPATIENT
Start: 2024-09-16 | End: 2024-09-19

## 2024-09-16 RX ORDER — GUAIFENESIN 100 MG/5ML
600 LIQUID ORAL EVERY 12 HOURS
Refills: 0 | Status: DISCONTINUED | OUTPATIENT
Start: 2024-09-16 | End: 2024-09-19

## 2024-09-16 RX ORDER — HEPARIN SODIUM,BOVINE 1000/ML
1600 VIAL (ML) INJECTION
Qty: 25000 | Refills: 0 | Status: DISCONTINUED | OUTPATIENT
Start: 2024-09-16 | End: 2024-09-18

## 2024-09-16 RX ORDER — FINASTERIDE 1 MG/1
5 TABLET, FILM COATED ORAL DAILY
Refills: 0 | Status: DISCONTINUED | OUTPATIENT
Start: 2024-09-16 | End: 2024-09-19

## 2024-09-16 RX ORDER — TAMSULOSIN HYDROCHLORIDE 0.4 MG/1
0.4 CAPSULE ORAL AT BEDTIME
Refills: 0 | Status: DISCONTINUED | OUTPATIENT
Start: 2024-09-16 | End: 2024-09-19

## 2024-09-16 RX ORDER — FUROSEMIDE 40 MG
40 TABLET ORAL ONCE
Refills: 0 | Status: COMPLETED | OUTPATIENT
Start: 2024-09-16 | End: 2024-09-16

## 2024-09-16 RX ORDER — ACETAMINOPHEN 325 MG/1
975 TABLET ORAL EVERY 6 HOURS
Refills: 0 | Status: DISCONTINUED | OUTPATIENT
Start: 2024-09-16 | End: 2024-09-19

## 2024-09-16 RX ORDER — METOPROLOL TARTRATE 100 MG/1
25 TABLET ORAL
Refills: 0 | Status: DISCONTINUED | OUTPATIENT
Start: 2024-09-16 | End: 2024-09-16

## 2024-09-16 RX ORDER — METOPROLOL TARTRATE 100 MG/1
25 TABLET ORAL
Refills: 0 | Status: DISCONTINUED | OUTPATIENT
Start: 2024-09-16 | End: 2024-09-19

## 2024-09-16 RX ORDER — IPRATROPIUM BROMIDE AND ALBUTEROL SULFATE .5; 3 MG/3ML; MG/3ML
3 SOLUTION RESPIRATORY (INHALATION) EVERY 6 HOURS
Refills: 0 | Status: DISCONTINUED | OUTPATIENT
Start: 2024-09-16 | End: 2024-09-19

## 2024-09-16 RX ADMIN — Medication 25 GRAM(S): at 10:17

## 2024-09-16 RX ADMIN — TAMSULOSIN HYDROCHLORIDE 0.4 MILLIGRAM(S): 0.4 CAPSULE ORAL at 21:14

## 2024-09-16 RX ADMIN — Medication 40 MILLIGRAM(S): at 10:17

## 2024-09-16 RX ADMIN — Medication 1 TABLET(S): at 13:31

## 2024-09-16 RX ADMIN — Medication 16 UNIT(S)/HR: at 13:32

## 2024-09-16 RX ADMIN — ENOXAPARIN SODIUM 40 MILLIGRAM(S): 100 INJECTION SUBCUTANEOUS at 05:21

## 2024-09-16 RX ADMIN — Medication 16 UNIT(S)/HR: at 18:46

## 2024-09-16 RX ADMIN — Medication 16 UNIT(S)/HR: at 21:11

## 2024-09-16 RX ADMIN — PIPERACILLIN SODIUM AND TAZOBACTAM SODIUM 25 GRAM(S): 3; .375 INJECTION, POWDER, FOR SOLUTION INTRAVENOUS at 13:31

## 2024-09-16 RX ADMIN — METOPROLOL TARTRATE 25 MILLIGRAM(S): 100 TABLET ORAL at 18:08

## 2024-09-16 RX ADMIN — FINASTERIDE 5 MILLIGRAM(S): 1 TABLET, FILM COATED ORAL at 13:31

## 2024-09-16 RX ADMIN — Medication 500 MILLIGRAM(S): at 13:31

## 2024-09-16 RX ADMIN — IPRATROPIUM BROMIDE AND ALBUTEROL SULFATE 3 MILLILITER(S): .5; 3 SOLUTION RESPIRATORY (INHALATION) at 18:08

## 2024-09-16 RX ADMIN — PIPERACILLIN SODIUM AND TAZOBACTAM SODIUM 25 GRAM(S): 3; .375 INJECTION, POWDER, FOR SOLUTION INTRAVENOUS at 05:21

## 2024-09-16 RX ADMIN — IPRATROPIUM BROMIDE AND ALBUTEROL SULFATE 3 MILLILITER(S): .5; 3 SOLUTION RESPIRATORY (INHALATION) at 23:03

## 2024-09-16 RX ADMIN — IPRATROPIUM BROMIDE AND ALBUTEROL SULFATE 3 MILLILITER(S): .5; 3 SOLUTION RESPIRATORY (INHALATION) at 13:32

## 2024-09-16 RX ADMIN — PIPERACILLIN SODIUM AND TAZOBACTAM SODIUM 25 GRAM(S): 3; .375 INJECTION, POWDER, FOR SOLUTION INTRAVENOUS at 21:14

## 2024-09-16 RX ADMIN — GUAIFENESIN 600 MILLIGRAM(S): 100 LIQUID ORAL at 18:07

## 2024-09-16 NOTE — PROGRESS NOTE ADULT - SUBJECTIVE AND OBJECTIVE BOX
DATE OF SERVICE: 09-16-24 @ 16:27    Patient is a 84y old  Male who presents with a chief complaint of Abdominal pain (16 Sep 2024 02:18)      INTERVAL HISTORY: Denies chest pain, SOB and palpitations    REVIEW OF SYSTEMS:  CONSTITUTIONAL: No weakness  EYES/ENT: No visual changes;  No throat pain   NECK: No pain or stiffness  RESPIRATORY: No cough, wheezing; No shortness of breath  CARDIOVASCULAR: No chest pain or palpitations  GASTROINTESTINAL: No abdominal  pain. No nausea, vomiting, or hematemesis  GENITOURINARY: No dysuria, frequency or hematuria  NEUROLOGICAL: No stroke like symptoms  SKIN: No rashes    	  MEDICATIONS:  metoprolol tartrate 25 milliGRAM(s) Oral two times a day        PHYSICAL EXAM:  T(C): 37 (09-16-24 @ 13:08), Max: 37 (09-16-24 @ 04:35)  HR: 99 (09-16-24 @ 13:08) (94 - 99)  BP: 119/64 (09-16-24 @ 13:08) (101/58 - 131/75)  RR: 18 (09-16-24 @ 13:08) (18 - 18)  SpO2: 96% (09-16-24 @ 13:08) (92% - 97%)  Wt(kg): --  I&O's Summary    15 Sep 2024 07:01  -  16 Sep 2024 07:00  --------------------------------------------------------  IN: 1800 mL / OUT: 500 mL / NET: 1300 mL    16 Sep 2024 07:01  -  16 Sep 2024 16:27  --------------------------------------------------------  IN: 0 mL / OUT: 600 mL / NET: -600 mL          Appearance: In no distress	  HEENT:    PERRL, EOMI	  Cardiovascular:  S1 S2, No JVD  Respiratory: Lungs clear to auscultation	  Gastrointestinal:  Soft, Non-tender, + BS	  Vascularature:  No edema of LE  Psychiatric: Appropriate affect   Neuro: no acute focal deficits                               11.6   13.69 )-----------( 223      ( 16 Sep 2024 07:09 )             36.1     09-16    136  |  100  |  26<H>  ----------------------------<  95  3.9   |  25  |  1.30    Ca    8.9      16 Sep 2024 07:08  Phos  3.1     09-16  Mg     1.6     09-16    TPro  6.0  /  Alb  2.7<L>  /  TBili  1.9<H>  /  DBili  x   /  AST  42<H>  /  ALT  72<H>  /  AlkPhos  110  09-16        Labs personally reviewed      ASSESSMENT/PLAN: 	  84M PMHx HLD, HTn, Stg II Colon Ca s/p L hemicolectomy w end colostomy and mucus fistula 2023 w Dr. Browning, COPD, ILD, HFpEF, Afib on Eliquis who presents to [facility] ED with abdominal pain. Pt reports acute onset abdominal pain x 1 day.    1. Cardiac Risk Stratification   - patient is not in active tachy/marysol syndrome  - patient exercise tolerance limited due to shortness of breath (COPD and asbestosis) and instability   - EKG AFIB @ 78  - Check TTE   - will further risk stratify after TTE and volume optimization     2. HF  - patient with +1 pitting edema b/l  - CXR with pulm edema  - check Probnp   - check TTE  - start Lasix IV 40 mg x1 to optimize for surgery    3. AFIB  - on home Eliquis  - Check TSH    4. HLD  - hold statin, patient with elevated LFTs     5. DVT prophylactic  - Lovenox subQ            JOSÉ Steward DO [facility]  Cardiovascular Medicine  [address], [address]  Available via call or text on [employer]  Office: [phone]   DATE OF SERVICE: 09-16-24 @ 16:27    Patient is a 84y old  Male who presents with a chief complaint of Abdominal pain (16 Sep 2024 02:18)      INTERVAL HISTORY: Denies chest pain, SOB and palpitations    REVIEW OF SYSTEMS:  CONSTITUTIONAL: No weakness  EYES/ENT: No visual changes;  No throat pain   NECK: No pain or stiffness  RESPIRATORY: No cough, wheezing; No shortness of breath  CARDIOVASCULAR: No chest pain or palpitations  GASTROINTESTINAL: No abdominal  pain. No nausea, vomiting, or hematemesis  GENITOURINARY: No dysuria, frequency or hematuria  NEUROLOGICAL: No stroke like symptoms  SKIN: No rashes    	  MEDICATIONS:  metoprolol tartrate 25 milliGRAM(s) Oral two times a day        PHYSICAL EXAM:  T(C): 37 (09-16-24 @ 13:08), Max: 37 (09-16-24 @ 04:35)  HR: 99 (09-16-24 @ 13:08) (94 - 99)  BP: 119/64 (09-16-24 @ 13:08) (101/58 - 131/75)  RR: 18 (09-16-24 @ 13:08) (18 - 18)  SpO2: 96% (09-16-24 @ 13:08) (92% - 97%)  Wt(kg): --  I&O's Summary    15 Sep 2024 07:01  -  16 Sep 2024 07:00  --------------------------------------------------------  IN: 1800 mL / OUT: 500 mL / NET: 1300 mL    16 Sep 2024 07:01  -  16 Sep 2024 16:27  --------------------------------------------------------  IN: 0 mL / OUT: 600 mL / NET: -600 mL          Appearance: In no distress	  HEENT:    PERRL, EOMI	  Cardiovascular:  S1 S2, No JVD  Respiratory: Lungs clear to auscultation	  Gastrointestinal:  Soft, Non-tender, + BS	  Vascularature:  No edema of LE  Psychiatric: Appropriate affect   Neuro: no acute focal deficits                               11.6   13.69 )-----------( 223      ( 16 Sep 2024 07:09 )             36.1     09-16    136  |  100  |  26<H>  ----------------------------<  95  3.9   |  25  |  1.30    Ca    8.9      16 Sep 2024 07:08  Phos  3.1     09-16  Mg     1.6     09-16    TPro  6.0  /  Alb  2.7<L>  /  TBili  1.9<H>  /  DBili  x   /  AST  42<H>  /  ALT  72<H>  /  AlkPhos  110  09-16        Labs personally reviewed      ASSESSMENT/PLAN: 	  84M PMHx HLD, HTn, Stg II Colon Ca s/p L hemicolectomy w end colostomy and mucus fistula 2023 w Dr. Browning, COPD, ILD, HFpEF, Afib on Eliquis who presents to [facility] ED with abdominal pain. Pt reports acute onset abdominal pain x 1 day.    1. Cardiac Risk Stratification   - patient is not in active tachy/marysol syndrome  - patient exercise tolerance limited due to shortness of breath (COPD and asbestosis) and instability   - EKG AFIB @ 78  - Check TTE   - will further risk stratify after TTE and volume optimization     2. Acute diastolic HF  - patient with +1 pitting edema b/l  - CXR with pulm edema  - Probnp 4095  - TTE unremarkable  - start Lasix IV 40 mg x1   - Caution with IVF    3. AFIB  - on home Eliquis  - Check TSH    4. HLD  - hold statin, patient with elevated LFTs     5. DVT prophylactic  - Lovenox subQ            JOSÉ Steward DO [facility]  Cardiovascular Medicine  [address], [address]  Available via call or text on [employer] TE[employer]  Office: [phone]

## 2024-09-16 NOTE — PROGRESS NOTE ADULT - SUBJECTIVE AND OBJECTIVE BOX
INTERVAL EVENTS: No acute events overnight.  SUBJECTIVE: Patient seen and examined at bedside with surgical team, patient without complaints. Denies fever, chills, CP, SOB nausea, vomiting, abdominal pain.    OBJECTIVE:    Vital Signs Last 24 Hrs  T(C): 36.8 (16 Sep 2024 00:19), Max: 37.1 (15 Sep 2024 16:17)  T(F): 98.2 (16 Sep 2024 00:19), Max: 98.7 (15 Sep 2024 16:17)  HR: 94 (16 Sep 2024 00:19) (70 - 94)  BP: 112/61 (16 Sep 2024 00:19) (101/58 - 143/79)  BP(mean): --  RR: 18 (16 Sep 2024 00:19) (12 - 18)  SpO2: 92% (16 Sep 2024 00:19) (92% - 100%)    Parameters below as of 16 Sep 2024 00:19  Patient On (Oxygen Delivery Method): room air    I&O's Detail    15 Sep 2024 07:01  -  16 Sep 2024 02:24  --------------------------------------------------------  IN:  Total IN: 0 mL    OUT:    Oral Fluid: 0 mL    Voided (mL): 350 mL  Total OUT: 350 mL    Total NET: -350 mL      MEDICATIONS  (STANDING):  enoxaparin Injectable 40 milliGRAM(s) SubCutaneous every 24 hours  influenza  Vaccine (HIGH DOSE) 0.5 milliLiter(s) IntraMuscular once  lactated ringers. 1000 milliLiter(s) (150 mL/Hr) IV Continuous <Continuous>  piperacillin/tazobactam IVPB.. 3.375 Gram(s) IV Intermittent every 8 hours    MEDICATIONS  (PRN):  HYDROmorphone  Injectable 0.2 milliGRAM(s) IV Push every 4 hours PRN Moderate Pain (4 - 6)  HYDROmorphone  Injectable 0.5 milliGRAM(s) IV Push every 4 hours PRN Severe Pain (7 - 10)      PHYSICAL EXAM:  Constitutional: A&Ox3, NAD  Respiratory: Unlabored breathing  Abdomen: Soft, Non distended, epigastrium tender to palpation without rebound tenderness/guarding/rigidity  Extremities: WWP, NAVARRO spontaneously    LABS:                        13.2   18.32 )-----------( 282      ( 15 Sep 2024 00:22 )             40.4     09-15    136  |  97  |  27<H>  ----------------------------<  115<H>  3.3<L>   |  26  |  1.18    Ca    8.8      15 Sep 2024 00:22    TPro  6.8  /  Alb  3.3  /  TBili  1.8<H>  /  DBili  x   /  AST  218<H>  /  ALT  157<H>  /  AlkPhos  211<H>  09-15      LIVER FUNCTIONS - ( 15 Sep 2024 00:22 )  Alb: 3.3 g/dL / Pro: 6.8 g/dL / ALK PHOS: 211 U/L / ALT: 157 U/L / AST: 218 U/L / GGT: x           Urinalysis Basic - ( 15 Sep 2024 01:50 )    Color: Yellow / Appearance: Turbid / S.014 / pH: x  Gluc: x / Ketone: Negative mg/dL  / Bili: Negative / Urobili: 1.0 mg/dL   Blood: x / Protein: Trace mg/dL / Nitrite: Negative   Leuk Esterase: Large / RBC: 1 /HPF /  /HPF   Sq Epi: x / Non Sq Epi: 1 /HPF / Bacteria: Negative /HPF      ABO Interpretation: O (09-15-24 @ 05:45)         INTERVAL EVENTS: No acute events overnight.  SUBJECTIVE: Patient seen and examined at bedside with surgical team, patient reports some difficulty voiding. Denies fever, chills, CP, SOB nausea, vomiting, abdominal pain.    OBJECTIVE:    Vital Signs Last 24 Hrs  T(C): 36.8 (16 Sep 2024 00:19), Max: 37.1 (15 Sep 2024 16:17)  T(F): 98.2 (16 Sep 2024 00:19), Max: 98.7 (15 Sep 2024 16:17)  HR: 94 (16 Sep 2024 00:19) (70 - 94)  BP: 112/61 (16 Sep 2024 00:19) (101/58 - 143/79)  BP(mean): --  RR: 18 (16 Sep 2024 00:19) (12 - 18)  SpO2: 92% (16 Sep 2024 00:19) (92% - 100%)    Parameters below as of 16 Sep 2024 00:19  Patient On (Oxygen Delivery Method): room air    I&O's Detail    15 Sep 2024 07:01  -  16 Sep 2024 02:24  --------------------------------------------------------  IN:  Total IN: 0 mL    OUT:    Oral Fluid: 0 mL    Voided (mL): 350 mL  Total OUT: 350 mL    Total NET: -350 mL      MEDICATIONS  (STANDING):  enoxaparin Injectable 40 milliGRAM(s) SubCutaneous every 24 hours  influenza  Vaccine (HIGH DOSE) 0.5 milliLiter(s) IntraMuscular once  lactated ringers. 1000 milliLiter(s) (150 mL/Hr) IV Continuous <Continuous>  piperacillin/tazobactam IVPB.. 3.375 Gram(s) IV Intermittent every 8 hours    MEDICATIONS  (PRN):  HYDROmorphone  Injectable 0.2 milliGRAM(s) IV Push every 4 hours PRN Moderate Pain (4 - 6)  HYDROmorphone  Injectable 0.5 milliGRAM(s) IV Push every 4 hours PRN Severe Pain (7 - 10)      PHYSICAL EXAM:  Constitutional: A&Ox3, NAD  Respiratory: Unlabored breathing  Abdomen: Soft, Non distended, epigastrium tender to palpation without rebound tenderness/guarding/rigidity  Extremities: WWP, NAVARRO spontaneously    LABS:                        13.2   18.32 )-----------( 282      ( 15 Sep 2024 00:22 )             40.4     09-15    136  |  97  |  27<H>  ----------------------------<  115<H>  3.3<L>   |  26  |  1.18    Ca    8.8      15 Sep 2024 00:22    TPro  6.8  /  Alb  3.3  /  TBili  1.8<H>  /  DBili  x   /  AST  218<H>  /  ALT  157<H>  /  AlkPhos  211<H>  09-15      LIVER FUNCTIONS - ( 15 Sep 2024 00:22 )  Alb: 3.3 g/dL / Pro: 6.8 g/dL / ALK PHOS: 211 U/L / ALT: 157 U/L / AST: 218 U/L / GGT: x           Urinalysis Basic - ( 15 Sep 2024 01:50 )    Color: Yellow / Appearance: Turbid / S.014 / pH: x  Gluc: x / Ketone: Negative mg/dL  / Bili: Negative / Urobili: 1.0 mg/dL   Blood: x / Protein: Trace mg/dL / Nitrite: Negative   Leuk Esterase: Large / RBC: 1 /HPF /  /HPF   Sq Epi: x / Non Sq Epi: 1 /HPF / Bacteria: Negative /HPF      ABO Interpretation: O (09-15-24 @ 05:45)

## 2024-09-16 NOTE — ADVANCED PRACTICE NURSE CONSULT - REASON FOR CONSULT
Requested by staff to assess skin status of pt a/w deep tissue injuries. PMH is noted;  84M PMHx HLD, HTN, Stg II Colon Ca s/p L hemicolectomy w end colostomy and mucus fistula 2023 w Dr. Browning, COPD, ILD, HFpEF, Afib on Eliquis who presents to [facility] ED with abdominal pain.     Exam w epigastric TTP. Labs w WBC 18. Lipase 1242. T Bili 1.8. Alp 211. AST/ALT: 218/157. CT Imaging demonstrates cf acute pancreatitis and acute cholecystitis.

## 2024-09-16 NOTE — ADVANCED PRACTICE NURSE CONSULT - ASSESSMENT
The pt was encountered on [employer]- Mr Landis was awake and alert and engaging in conversation. He is NPO at present and states that he will be having gall bladder surgery. A nutrition consult is pending for further evaluation.   The pt is in a [employer] P 500 support surface and was able to turn himself to his side with minimal assistance.   Upon assessment, the pt presents with skin changes consistent with a deep tissue injury . it measures 14cmx 20cm x0.2cm.  approximately 20% of the wound is open with moist red tissue, the remaining skin is intact with a deep maroon discoloration, the DTI is evolving at this time.  Will recommend the application of Triad paste twice daily to lay down a protective coating on the skin.  the pt has a gomez.  Education was provided to the pt re: condition of skin, tx plan and PI education. The pt was encountered on [employer]- Mr Landis was awake and alert and engaging in conversation. He is NPO at present and states that he will be having gall bladder surgery. A nutrition consult is pending for further evaluation.   The pt is in a [employer] P 500 support surface and was able to turn himself to his side with minimal assistance.   Upon assessment, the pt presents with skin changes consistent with a deep tissue injury . it measures 14cmx 20cm x0.2cm.  approximately 20% of the wound is open with moist red tissue, the remaining skin is intact with a deep maroon discoloration, the DTI is evolving at this time.  The skin on the b/l heels is intact with blanchable erythema- this may be early stage DTI  Will recommend the application of Triad paste twice daily to lay down a protective coating on the skin.  the pt has a gomez.  Education was provided to the pt re: condition of skin, tx plan and PI education.

## 2024-09-16 NOTE — ADVANCED PRACTICE NURSE CONSULT - RECOMMEDATIONS
Will recommend the followin. Sacrum, b/l buttocks: continue to monitor, apply Triad paste twice daily and prn   2. continue to encourage mobility, T&P  3. seat cushion when OOB to chair  4. off-load heels with boots  5. nutrition support as pt condition allows  Tx plan discussed with RN

## 2024-09-16 NOTE — PROGRESS NOTE ADULT - ASSESSMENT
84M PMHx HLD, HTN, Stg II Colon Ca s/p L hemicolectomy w end colostomy and mucus fistula 2023 w Dr. Browning, COPD, ILD, HFpEF, Afib on Eliquis who presents to [facility] ED with abdominal pain.     Exam w epigastric TTP. Labs w WBC 18. Lipase 1242. T Bili 1.8. Alp 211. AST/ALT: 218/157. CT Imaging demonstrates cf acute pancreatitis and acute cholecystitis.     Plan:   -Wean NC as able  -Pulm consult - start standing duonebs q6h for now and in the perioperative period, resume home stiolto and PRN albuterol upon d/c, outpatient follow up Dr. Thomas   - Card Consult -  start Lasix IV 40 mg x1 to optimize for surgery, check TTE, pro-BNP, start Eliquis, TSH, LVX  -Pain control   -NPO/IVF  -GI Consulted - -MRCP i/s/o hyperbilirubinemia   -Trend T Bili   -IV Abx - zosyn   -SCD/LVX, Hold Eliquis for now (CHADS VASC 5, but daily risk < 0.02%)   -Possible OR pending optimization, at this time poor surgical candidate given fx status       Gold team   [id number] 84M PMHx HLD, HTN, Stg II Colon Ca s/p L hemicolectomy w end colostomy and mucus fistula 2023 w Dr. Browning, COPD, ILD, HFpEF, Afib on Eliquis who presents to [facility] ED with abdominal pain.     Exam w epigastric TTP. Labs w WBC 18. Lipase 1242. T Bili 1.8. Alp 211. AST/ALT: 218/157. CT Imaging demonstrates cf acute pancreatitis and acute cholecystitis.     Plan:   -AFib, holding Eliquis -> pending coags to start hep gtt  -GI Consulted -> MRCP i/s/o hyperbilirubinemia   -Pulm consult for periop risk assessment & ILD 2/2 asbestosis:     - Standing duonebs q6h now & in the perioperative period      - Hold home stiolto and PRN albuterol until d/c w/ outpatient follow up Dr. Thomas   -2L NC, wean as able  - Card Consult:     - s/p Lasix IV 40 mg x1      -TSH, TTE pending, pro-BNP -> 4095   -Pain control: PO Tylenol q6, IV dilaudid PRN    -NPO/IVF   -Walls today to monitor output  -Monitor I/Os  -Daily CBC & CMP -> Trend T Bili   -IV Abx: Zosyn   -DVT ppx: SCD/LVX, Hold Eliquis for now (CHADS VASC 5, but daily risk < 0.02%)   -Possible OR pending optimization, at this time poor surgical candidate given fx status       Gold team   [id number]

## 2024-09-17 ENCOUNTER — TRANSCRIPTION ENCOUNTER (OUTPATIENT)
Age: 84
End: 2024-09-17

## 2024-09-17 LAB
ALBUMIN SERPL ELPH-MCNC: 2.5 G/DL — LOW (ref 3.3–5)
ALP SERPL-CCNC: 88 U/L — SIGNIFICANT CHANGE UP (ref 40–120)
ALT FLD-CCNC: 45 U/L — SIGNIFICANT CHANGE UP (ref 10–45)
ANION GAP SERPL CALC-SCNC: 10 MMOL/L — SIGNIFICANT CHANGE UP (ref 5–17)
APTT BLD: 70.1 SEC — HIGH (ref 24.5–35.6)
AST SERPL-CCNC: 21 U/L — SIGNIFICANT CHANGE UP (ref 10–40)
BILIRUB SERPL-MCNC: 1.2 MG/DL — SIGNIFICANT CHANGE UP (ref 0.2–1.2)
BUN SERPL-MCNC: 27 MG/DL — HIGH (ref 7–23)
CALCIUM SERPL-MCNC: 8.3 MG/DL — LOW (ref 8.4–10.5)
CHLORIDE SERPL-SCNC: 99 MMOL/L — SIGNIFICANT CHANGE UP (ref 96–108)
CO2 SERPL-SCNC: 26 MMOL/L — SIGNIFICANT CHANGE UP (ref 22–31)
CREAT SERPL-MCNC: 1.31 MG/DL — HIGH (ref 0.5–1.3)
EGFR: 53 ML/MIN/1.73M2 — LOW
GLUCOSE SERPL-MCNC: 111 MG/DL — HIGH (ref 70–99)
HCT VFR BLD CALC: 32.4 % — LOW (ref 39–50)
HGB BLD-MCNC: 10.6 G/DL — LOW (ref 13–17)
LIDOCAIN IGE QN: 28 U/L — SIGNIFICANT CHANGE UP (ref 7–60)
MAGNESIUM SERPL-MCNC: 2 MG/DL — SIGNIFICANT CHANGE UP (ref 1.6–2.6)
MCHC RBC-ENTMCNC: 30.6 PG — SIGNIFICANT CHANGE UP (ref 27–34)
MCHC RBC-ENTMCNC: 32.7 GM/DL — SIGNIFICANT CHANGE UP (ref 32–36)
MCV RBC AUTO: 93.6 FL — SIGNIFICANT CHANGE UP (ref 80–100)
NRBC # BLD: 0 /100 WBCS — SIGNIFICANT CHANGE UP (ref 0–0)
PHOSPHATE SERPL-MCNC: 2.7 MG/DL — SIGNIFICANT CHANGE UP (ref 2.5–4.5)
PLATELET # BLD AUTO: 193 K/UL — SIGNIFICANT CHANGE UP (ref 150–400)
POTASSIUM SERPL-MCNC: 3.1 MMOL/L — LOW (ref 3.5–5.3)
POTASSIUM SERPL-SCNC: 3.1 MMOL/L — LOW (ref 3.5–5.3)
PROT SERPL-MCNC: 5.6 G/DL — LOW (ref 6–8.3)
RBC # BLD: 3.46 M/UL — LOW (ref 4.2–5.8)
RBC # FLD: 13 % — SIGNIFICANT CHANGE UP (ref 10.3–14.5)
SODIUM SERPL-SCNC: 135 MMOL/L — SIGNIFICANT CHANGE UP (ref 135–145)
WBC # BLD: 11.39 K/UL — HIGH (ref 3.8–10.5)
WBC # FLD AUTO: 11.39 K/UL — HIGH (ref 3.8–10.5)

## 2024-09-17 PROCEDURE — 99233 SBSQ HOSP IP/OBS HIGH 50: CPT | Mod: GC

## 2024-09-17 RX ORDER — FUROSEMIDE 40 MG
60 TABLET ORAL ONCE
Refills: 0 | Status: COMPLETED | OUTPATIENT
Start: 2024-09-17 | End: 2024-09-17

## 2024-09-17 RX ORDER — SENNA 187 MG
2 TABLET ORAL AT BEDTIME
Refills: 0 | Status: DISCONTINUED | OUTPATIENT
Start: 2024-09-17 | End: 2024-09-17

## 2024-09-17 RX ORDER — POLYETHYLENE GLYCOL 3350 17 G/17G
17 POWDER, FOR SOLUTION ORAL EVERY 24 HOURS
Refills: 0 | Status: DISCONTINUED | OUTPATIENT
Start: 2024-09-17 | End: 2024-09-17

## 2024-09-17 RX ORDER — OXYCODONE HYDROCHLORIDE 5 MG/1
5 TABLET ORAL EVERY 4 HOURS
Refills: 0 | Status: DISCONTINUED | OUTPATIENT
Start: 2024-09-17 | End: 2024-09-19

## 2024-09-17 RX ORDER — POTASSIUM CHLORIDE 10 MEQ
40 TABLET, EXT RELEASE, PARTICLES/CRYSTALS ORAL EVERY 4 HOURS
Refills: 0 | Status: DISCONTINUED | OUTPATIENT
Start: 2024-09-17 | End: 2024-09-18

## 2024-09-17 RX ORDER — OXYCODONE HYDROCHLORIDE 5 MG/1
2.5 TABLET ORAL EVERY 4 HOURS
Refills: 0 | Status: DISCONTINUED | OUTPATIENT
Start: 2024-09-17 | End: 2024-09-19

## 2024-09-17 RX ORDER — FUROSEMIDE 40 MG
40 TABLET ORAL EVERY 24 HOURS
Refills: 0 | Status: DISCONTINUED | OUTPATIENT
Start: 2024-09-18 | End: 2024-09-19

## 2024-09-17 RX ADMIN — FINASTERIDE 5 MILLIGRAM(S): 1 TABLET, FILM COATED ORAL at 12:01

## 2024-09-17 RX ADMIN — GUAIFENESIN 600 MILLIGRAM(S): 100 LIQUID ORAL at 05:06

## 2024-09-17 RX ADMIN — Medication 16 UNIT(S)/HR: at 07:19

## 2024-09-17 RX ADMIN — IPRATROPIUM BROMIDE AND ALBUTEROL SULFATE 3 MILLILITER(S): .5; 3 SOLUTION RESPIRATORY (INHALATION) at 17:33

## 2024-09-17 RX ADMIN — Medication 16 UNIT(S)/HR: at 02:11

## 2024-09-17 RX ADMIN — PIPERACILLIN SODIUM AND TAZOBACTAM SODIUM 25 GRAM(S): 3; .375 INJECTION, POWDER, FOR SOLUTION INTRAVENOUS at 21:20

## 2024-09-17 RX ADMIN — IPRATROPIUM BROMIDE AND ALBUTEROL SULFATE 3 MILLILITER(S): .5; 3 SOLUTION RESPIRATORY (INHALATION) at 12:04

## 2024-09-17 RX ADMIN — Medication 40 MILLIEQUIVALENT(S): at 12:00

## 2024-09-17 RX ADMIN — PIPERACILLIN SODIUM AND TAZOBACTAM SODIUM 25 GRAM(S): 3; .375 INJECTION, POWDER, FOR SOLUTION INTRAVENOUS at 05:07

## 2024-09-17 RX ADMIN — IPRATROPIUM BROMIDE AND ALBUTEROL SULFATE 3 MILLILITER(S): .5; 3 SOLUTION RESPIRATORY (INHALATION) at 23:08

## 2024-09-17 RX ADMIN — TAMSULOSIN HYDROCHLORIDE 0.4 MILLIGRAM(S): 0.4 CAPSULE ORAL at 21:19

## 2024-09-17 RX ADMIN — GUAIFENESIN 600 MILLIGRAM(S): 100 LIQUID ORAL at 17:33

## 2024-09-17 RX ADMIN — Medication 16 UNIT(S)/HR: at 03:46

## 2024-09-17 RX ADMIN — IPRATROPIUM BROMIDE AND ALBUTEROL SULFATE 3 MILLILITER(S): .5; 3 SOLUTION RESPIRATORY (INHALATION) at 05:07

## 2024-09-17 RX ADMIN — Medication 16 UNIT(S)/HR: at 19:06

## 2024-09-17 RX ADMIN — METOPROLOL TARTRATE 25 MILLIGRAM(S): 100 TABLET ORAL at 05:06

## 2024-09-17 RX ADMIN — Medication 1 TABLET(S): at 12:00

## 2024-09-17 RX ADMIN — Medication 60 MILLIGRAM(S): at 12:01

## 2024-09-17 RX ADMIN — Medication 500 MILLIGRAM(S): at 12:00

## 2024-09-17 RX ADMIN — METOPROLOL TARTRATE 25 MILLIGRAM(S): 100 TABLET ORAL at 17:33

## 2024-09-17 RX ADMIN — PIPERACILLIN SODIUM AND TAZOBACTAM SODIUM 25 GRAM(S): 3; .375 INJECTION, POWDER, FOR SOLUTION INTRAVENOUS at 17:34

## 2024-09-17 NOTE — DISCHARGE NOTE PROVIDER - HOSPITAL COURSE
83 y/o Male with PMHx of HLD, HTN, Stg II Colon Ca s/p L hemicolectomy w end colostomy and mucus fistula 2023 w Dr. Browning, COPD, ILD, HFpEF, Afib on Eliquis who presents to [facility] ED with abdominal pain. Pt reports acute onset abdominal pain x 1 day. Denies associated fevers, chills, CP, SOB, nausea, vomiting, diarrhea, cough, dysuria, hematuria, hematemesis, dizziness, lightheadedness. Denies prior episodes of similar pain. Ostomy fx per patient.   In ED, pt is HDS, Afebrile.   Labs w WBC 18. Lipase 1242. T Bili 1.8. Alp 211. AST/ALT: 218/157.   CT abd/pelvis (9/15/24) significant for distended gallbladder, but no cholelithiasis; peripancreatic fluid collections extending into the right and left anterior pararenal spaces  demonstrates cf acute pancreatitis and acute cholecystitis.  (15 Sep 2024 06:11)  < from: CT Abdomen and Pelvis w/ Oral Cont and w/ IV Cont (09.15.24 @ 02:01) >    INTERPRETATION:  Attending over read. Agree with the below report with   following modifications. Bilateral gynecomastia. Small bilateral pleural   effusions. Parietal pleural thickening bilateral. Calcified   pleurodiaphragmatic plaques suggestive of prior asbestos exposure. Aortic   valve calcification. Distended gallbladder. No radiopaque stones seen   within the gallbladder. Recommend correlation with sonography. Evidence   of acute interstitial edematous pancreatitis. Peripancreatic fluid   collections extending into the right and left anterior pararenal spaces.   No obvious pancreatic necrosis. Splenic vein intact.Replaced right   hepatic artery off the SMA. Dilated IVC and hepatic veins. No   hydronephrosis. 2.2 cm simple cortical cyst upper pole left kidney.   Probable two tiny 0.2 cm nonobstructing calculi right kidney. Left lower   quadrant colostomy. Colonic diverticulosis. Left indirect inguinal hernia   containing nonobstructed sigmoid colon. Cannot rule out prior right   inguinal hernia repair. The prostate measures 4.7 x 4.4 x 5.0 cm.   Bilateral spondylolysis of L5 with grade 2 anterior spondylolisthesis of   L5 on S1. Degenerative disc disease L5-S1 and L2-L3.    < end of copied text >        Pt was admitted under Surgery for further evaluation and management.  Pt's Walls was discontinued on , and passed TOV. Once IV pain control dosing complete, pt was transitioned to oral Tylenol with Oxycodone for breakthrough pain. Diet was advanced as tolerated, and GI function returned.   Ambulation and incentive spirometry encouraged. Labs were monitored daily, and electrolytes were repleted as necessary.   Caprini score is ; pt will be prescribed with Eliquis as DVT prophylaxis for 30 days     Physical therapy evaluated the patient and recommended home PT    On the day of discharge, the patient's vital signs are within normal limits, pain is controlled, voiding urine, passing gas/stool, tolerating a low fiber diet, and ambulating well. Pt will f/u with Dr. Browning in 1-2 weeks. Pt will f/u with PCP in 1-2 weeks. 85 y/o Male with PMHx of HLD, HTN, Stg II Colon Ca s/p L hemicolectomy w end colostomy and mucus fistula 2023 w Dr. Browning, COPD, ILD, HFpEF, Afib on Eliquis who presents to [facility] ED with abdominal pain. Pt reports acute onset abdominal pain x 1 day. Denies associated fevers, chills, CP, SOB, nausea, vomiting, diarrhea, cough, dysuria, hematuria, hematemesis, dizziness, lightheadedness. Denies prior episodes of similar pain. Ostomy fx per patient.   In ED, pt is HDS, Afebrile.   Labs w WBC 18. Lipase 1242. T Bili 1.8. Alp 211. AST/ALT: 218/157.   CT abd/pelvis (9/15/24) significant for distended gallbladder, but no cholelithiasis; acute interstitial edematous pancreatitis; peripancreatic fluid collections extending into the right and left anterior pararenal spaces; no obvious pancreatic necrosis    Pt was admitted under [facility] for further evaluation and management. Pt was started on IV Zosyn. Pt was made NPO, and given IVF.     Pt's Walls was discontinued on , and passed TOV. Once IV pain control dosing complete, pt was transitioned to oral Tylenol with Oxycodone for breakthrough pain. Diet was advanced as tolerated, and GI function returned.   Ambulation and incentive spirometry encouraged. Labs were monitored daily, and electrolytes were repleted as necessary.     Gastroenterology was consulted for     Pulmonology was consulted for hx of COPD and ILD. As per pulmonology, wheezing on exam likely component of pulm edema as CXR suggestive of pulm vascular congestion. Pulmonology recommended standing duonebs q6h, resuming home Stiolto, and weaning oxygen as tolerated. Pulmonology clearance for surgery documented on 9/15     Cardiology was consulted for cardiac co-management; recommendations were followed. TTE (9/16/24) showed EF: 73% and no wall motion abnormalities; no acute changes from previous TTE on /2/23    Physical therapy evaluated the patient and recommended home PT    On the day of discharge, the patient's vital signs are within normal limits, pain is controlled, voiding urine, passing gas/stool, tolerating a low fiber diet, and ambulating well. Pt will f/u with Dr. Browning (general surgery), Dr. Thomas (pulmonology) in 1-2 weeks. Pt will f/u with PCP in 1-2 weeks. 83 y/o Male with PMHx of HLD, HTN, Stg II Colon Ca s/p L hemicolectomy w end colostomy and mucus fistula 2023 w Dr. Browning, COPD, ILD, HFpEF, Afib on Eliquis who presents to [facility] ED with abdominal pain. Pt reports acute onset abdominal pain x 1 day. Denies associated fevers, chills, CP, SOB, nausea, vomiting, diarrhea, cough, dysuria, hematuria, hematemesis, dizziness, lightheadedness. Denies prior episodes of similar pain. Ostomy fx per patient.   In ED, pt is HDS, Afebrile.   Labs w WBC 18. Lipase 1242. T Bili 1.8. Alp 211. AST/ALT: 218/157.   CT abd/pelvis (9/15/24) significant for distended gallbladder, but no cholelithiasis; acute interstitial edematous pancreatitis; peripancreatic fluid collections extending into the right and left anterior pararenal spaces; no obvious pancreatic necrosis    Pt was admitted under [facility] for further evaluation and management. Pt was made NPO, and given IVF. Pt was started on IV Zosyn. Walls was placed on 9/16 for closer monitoring. Walls was discontinued on 9/17, and pt passed TOV. Diet was advanced as tolerated.   Labs were monitored daily, and electrolytes were repleted as necessary.     Gastroenterology was consulted for possible choledocholithiasis. MRCP (9/16/24) significant for peripancreatic fluid/edema and ill-defined collection noted caudal to the pancreatic body measuring approximately 2.5 x 8.2 x 4.1 cm; gallbladder distended with trace pericholecystic fluid, but no wall thickening or dependent calculi; no choledocholithiasis or biliary ductal dilatation. Gastroenterology recommended repeat CT outpatient in 1 month for assessment of pancreatitis and lobulated collection     Pulmonology was consulted for hx of COPD and ILD. As per pulmonology, wheezing on exam likely component of pulm edema as CXR suggestive of pulm vascular congestion. Pulmonology recommended standing Duonebs q6h and weaning oxygen as tolerated. Pulmonology clearance for surgery documented on 9/15     Cardiology was consulted for cardiac co-management; recommendations were followed. CXR (9/15/24) significant for mild pulmonary edema; small left pleural effusion. ProBNP elevated at 4025. TTE (9/16/24) showed EF: 73% and no wall motion abnormalities; no acute changes from previous TTE on 12/21/23. Pt was treated with IV Lasix for diuresis.    Physical therapy evaluated the patient and recommended home PT    On the day of discharge, the patient's vital signs are within normal limits, pain is controlled, voiding urine, passing gas/stool via colostomy, tolerating a low fat diet, and ambulating well. Pt will f/u with Dr. Browning (general surgery) and Dr. Thomas (pulmonology) in 1-2 weeks. Pt will f/u [facility] GI clinic in 1 month. Pt will f/u with PCP in 1-2 weeks. 83 y/o Male with PMHx of HLD, HTN, Stg II Colon Ca s/p L hemicolectomy w end colostomy and mucus fistula 2023 w Dr. Browning, COPD, ILD, HFpEF, Afib on Eliquis who presents to [facility] ED with abdominal pain. Pt reports acute onset abdominal pain x 1 day. Denies associated fevers, chills, CP, SOB, nausea, vomiting, diarrhea, cough, dysuria, hematuria, hematemesis, dizziness, lightheadedness. Denies prior episodes of similar pain. Ostomy fx per patient.   In ED, pt is HDS, Afebrile.   Labs w WBC 18. Lipase 1242. T Bili 1.8. Alp 211. AST/ALT: 218/157.   CT abd/pelvis (9/15/24) significant for distended gallbladder, but no cholelithiasis; acute interstitial edematous pancreatitis; peripancreatic fluid collections extending into the right and left anterior pararenal spaces; no obvious pancreatic necrosis    Pt was admitted under [facility] for further evaluation and management. Pt was made NPO, and given IVF. Pt was started on IV Zosyn. Walls was placed on 9/16 for closer monitoring. Walls was discontinued on 9/17, and pt passed TOV. Diet was advanced as tolerated.   Labs were monitored daily, and electrolytes were repleted as necessary.     Gastroenterology was consulted for possible choledocholithiasis. MRCP (9/16/24) significant for peripancreatic fluid/edema and ill-defined collection noted caudal to the pancreatic body measuring approximately 2.5 x 8.2 x 4.1 cm; gallbladder distended with trace pericholecystic fluid, but no wall thickening or dependent calculi; no choledocholithiasis or biliary ductal dilatation. Gastroenterology recommended repeat CT outpatient in 1 month for assessment of pancreatitis and lobulated collection     Pulmonology was consulted for hx of COPD and ILD. As per pulmonology, wheezing on exam likely component of pulm edema as CXR suggestive of pulm vascular congestion. Pulmonology recommended standing Duonebs q6h and weaning oxygen as tolerated. Pulmonology clearance for surgery documented on 9/15     Cardiology was consulted for cardiac co-management; recommendations were followed. CXR (9/15/24) significant for mild pulmonary edema; small left pleural effusion. ProBNP elevated at 4025. TTE (9/16/24) showed EF: 73% and no wall motion abnormalities; no acute changes from previous TTE on 12/21/23. Pt was treated with IV Lasix for diuresis.    Physical therapy evaluated the patient and recommended home PT    On the day of discharge, the patient's vital signs are within normal limits, pain is controlled, voiding urine, passing gas/stool via colostomy, tolerating a low fat diet, and ambulating well. Pt will f/u with Dr. Browning (general surgery), Dr. Thomas (pulmonology),and Dr. Ballesteros (cardiology) in 1-2 weeks. Pt will f/u [facility] GI clinic in 1 month. Pt will f/u with PCP in 1-2 weeks. 85 y/o Male with PMHx of HLD, HTN, Stg II Colon Ca s/p L hemicolectomy w end colostomy and mucus fistula 2023 w Dr. Browning, COPD, ILD, HFpEF, Afib on Eliquis who presents to [facility] ED with abdominal pain. Pt reports acute onset abdominal pain x 1 day. Denies associated fevers, chills, CP, SOB, nausea, vomiting, diarrhea, cough, dysuria, hematuria, hematemesis, dizziness, lightheadedness. Denies prior episodes of similar pain. Ostomy fx per patient.   In ED, pt is HDS, Afebrile.   Labs w WBC 18. Lipase 1242. T Bili 1.8. Alp 211. AST/ALT: 218/157.   CT abd/pelvis (9/15/24) significant for distended gallbladder, but no cholelithiasis; acute interstitial edematous pancreatitis; peripancreatic fluid collections extending into the right and left anterior pararenal spaces; no obvious pancreatic necrosis    Pt was admitted under [facility] for further evaluation and management. Pt was made NPO, and given IVF. Pt was started on IV Zosyn. Walls was placed on 9/16 for closer monitoring. Walls was discontinued on 9/17, and pt passed TOV. Diet was advanced as tolerated.   Labs were monitored daily, and electrolytes were repleted as necessary.     Gastroenterology was consulted for possible choledocholithiasis. MRCP (9/16/24) significant for peripancreatic fluid/edema and ill-defined collection noted caudal to the pancreatic body measuring approximately 2.5 x 8.2 x 4.1 cm; gallbladder distended with trace pericholecystic fluid, but no wall thickening or dependent calculi; no choledocholithiasis or biliary ductal dilatation. Gastroenterology recommended repeat CT outpatient in 1 month for assessment of pancreatitis and lobulated collection     Pulmonology was consulted for hx of COPD and ILD. As per pulmonology, wheezing on exam likely component of pulm edema as CXR suggestive of pulm vascular congestion. Pulmonology recommended standing Duonebs q6h and weaning oxygen as tolerated. Pulmonology clearance for surgery documented on 9/15     Cardiology was consulted for cardiac co-management; recommendations were followed. CXR (9/15/24) significant for mild pulmonary edema; small left pleural effusion. ProBNP elevated at 4025. TTE (9/16/24) showed EF: 73% and no wall motion abnormalities; no acute changes from previous TTE on 12/21/23. Pt was treated with IV Lasix for diuresis. Patient to go home on oral bumex.     Physical therapy evaluated the patient and recommended home PT.    On the day of discharge, the patient's vital signs are within normal limits, pain is controlled, voiding urine, passing gas/stool via colostomy, tolerating a low fat diet, and ambulating well. Pt will f/u with GI, Dr. Thomas (pulmonology),and Dr. Ballesteros (cardiology) in 1-2 weeks. Pt will f/u [facility] GI clinic in 1 month. Pt will f/u with PCP in 1-2 weeks. 85 y/o Male with PMHx of HLD, HTN, Stg II Colon Ca s/p L hemicolectomy w end colostomy and mucus fistula 2023 w Dr. Browning, COPD, ILD, HFpEF, Afib on Eliquis who presents to [facility] ED with abdominal pain. Pt reports acute onset abdominal pain x 1 day. Denies associated fevers, chills, CP, SOB, nausea, vomiting, diarrhea, cough, dysuria, hematuria, hematemesis, dizziness, lightheadedness. Denies prior episodes of similar pain. Ostomy fx per patient.   In ED, pt is HDS, Afebrile.   Labs w WBC 18. Lipase 1242. T Bili 1.8. Alp 211. AST/ALT: 218/157.   CT abd/pelvis (9/15/24) significant for distended gallbladder, but no cholelithiasis; acute interstitial edematous pancreatitis; peripancreatic fluid collections extending into the right and left anterior pararenal spaces; no obvious pancreatic necrosis    Pt was admitted under [facility] for further evaluation and management. Pt was made NPO, and given IVF. Pt was started on IV Zosyn. Walls was placed on 9/16 for closer monitoring. Walls was discontinued on 9/17, and pt passed TOV. Diet was advanced as tolerated.   Labs were monitored daily, and electrolytes were repleted as necessary.     Gastroenterology was consulted for possible choledocholithiasis. MRCP (9/16/24) significant for peripancreatic fluid/edema and ill-defined collection noted caudal to the pancreatic body measuring approximately 2.5 x 8.2 x 4.1 cm; gallbladder distended with trace pericholecystic fluid, but no wall thickening or dependent calculi; no choledocholithiasis or biliary ductal dilatation. Gastroenterology recommended repeat CT outpatient in 1 month for assessment of pancreatitis and lobulated collection. Patient will require an EUS and colonoscopy in 2-3 weeks with GI outpatient.    Pulmonology was consulted for hx of COPD and ILD. As per pulmonology, wheezing on exam likely component of pulm edema as CXR suggestive of pulm vascular congestion. Pulmonology recommended standing Duonebs q6h and weaning oxygen as tolerated. Pulmonology clearance for surgery documented on 9/15     Cardiology was consulted for cardiac co-management; recommendations were followed. CXR (9/15/24) significant for mild pulmonary edema; small left pleural effusion. ProBNP elevated at 4025. TTE (9/16/24) showed EF: 73% and no wall motion abnormalities; no acute changes from previous TTE on 12/21/23. Pt was treated with IV Lasix for diuresis. Patient to go home on oral bumex.     Physical therapy evaluated the patient and recommended home PT.    On the day of discharge, the patient's vital signs are within normal limits, pain is controlled, voiding urine, passing gas/stool via colostomy, tolerating a low fat diet, and ambulating well. Pt will f/u with GI, Dr. Thomas (pulmonology),and Dr. Ballesteros (cardiology) in 1-2 weeks. Pt will f/u [facility] GI clinic in 1 month. Pt will f/u with PCP in 1-2 weeks. 85 y/o Male with PMHx of HLD, HTN, Stg II Colon Ca s/p L hemicolectomy w end colostomy and mucus fistula 2023 w Dr. Browning, COPD, ILD, HFpEF, Afib on Eliquis who presents to [facility] ED with abdominal pain. Pt reports acute onset abdominal pain x 1 day. Denies associated fevers, chills, CP, SOB, nausea, vomiting, diarrhea, cough, dysuria, hematuria, hematemesis, dizziness, lightheadedness. Denies prior episodes of similar pain. Ostomy fx per patient.   In ED, pt is HDS, Afebrile.   Labs w WBC 18. Lipase 1242. T Bili 1.8. Alp 211. AST/ALT: 218/157.   CT abd/pelvis (9/15/24) significant for distended gallbladder, but no cholelithiasis; acute interstitial edematous pancreatitis; peripancreatic fluid collections extending into the right and left anterior pararenal spaces; no obvious pancreatic necrosis    Pt was admitted under [facility] for further evaluation and management. Pt was made NPO, and given IVF. Pt was started on IV Zosyn. Walls was placed on 9/16 for closer monitoring. Walls was discontinued on 9/17, and pt passed TOV. Diet was advanced as tolerated.   Labs were monitored daily, and electrolytes were repleted as necessary.     Gastroenterology was consulted for possible choledocholithiasis. MRCP (9/16/24) significant for peripancreatic fluid/edema and ill-defined collection noted caudal to the pancreatic body measuring approximately 2.5 x 8.2 x 4.1 cm; gallbladder distended with trace pericholecystic fluid, but no wall thickening or dependent calculi; no choledocholithiasis or biliary ductal dilatation. Gastroenterology recommended repeat CT outpatient in 1 month for assessment of pancreatitis and lobulated collection. Patient will require an EUS and colonoscopy in 2-3 weeks with GI outpatient.    Pulmonology was consulted for hx of COPD and ILD. As per pulmonology, wheezing on exam likely component of pulm edema as CXR suggestive of pulm vascular congestion. Pulmonology recommended standing Duonebs q6h and weaning oxygen as tolerated. Pulmonology clearance for surgery documented on 9/15     Cardiology was consulted for cardiac co-management; recommendations were followed. CXR (9/15/24) significant for mild pulmonary edema; small left pleural effusion. ProBNP elevated at 4025. TTE (9/16/24) showed EF: 73% and no wall motion abnormalities; no acute changes from previous TTE on 12/21/23. Pt was treated with IV Lasix for diuresis. Patient to go home on oral bumex. Cr was elevated after IV lasix, but stable for discharge.    Physical therapy evaluated the patient and recommended home PT.    On the day of discharge, the patient's vital signs are within normal limits, pain is controlled, voiding urine, passing gas/stool via colostomy, tolerating a low fat diet, and ambulating well. Pt will f/u with GI, Dr. Thomas (pulmonology),and Dr. Ballesteros (cardiology) in 1-2 weeks. Pt will f/u [facility] GI clinic in 1 month. Pt will f/u with PCP in 1-2 weeks. 85 y/o Male with PMHx of HLD, HTN, Stg II Colon Ca s/p L hemicolectomy w end colostomy and mucus fistula 2023 w Dr. Browning, COPD, ILD, HFpEF, Afib on Eliquis who presents to [facility] ED with abdominal pain. Pt reports acute onset abdominal pain x 1 day. Denies associated fevers, chills, CP, SOB, nausea, vomiting, diarrhea, cough, dysuria, hematuria, hematemesis, dizziness, lightheadedness. Denies prior episodes of similar pain. Ostomy fx per patient.   In ED, pt is HDS, Afebrile.   Labs w WBC 18. Lipase 1242. T Bili 1.8. Alp 211. AST/ALT: 218/157.   CT abd/pelvis (9/15/24) significant for distended gallbladder, but no cholelithiasis; acute interstitial edematous pancreatitis; peripancreatic fluid collections extending into the right and left anterior pararenal spaces; no obvious pancreatic necrosis    Pt was admitted under [facility] for further evaluation and management. Pt was made NPO, and given IVF. Pt was started on IV Zosyn. Walls was placed on 9/16 for closer monitoring. Walls was discontinued on 9/17, and pt passed TOV. Diet was advanced as tolerated.   Labs were monitored daily, and electrolytes were repleted as necessary.     Gastroenterology was consulted for possible choledocholithiasis. MRCP (9/16/24) significant for peripancreatic fluid/edema and ill-defined collection noted caudal to the pancreatic body measuring approximately 2.5 x 8.2 x 4.1 cm; gallbladder distended with trace pericholecystic fluid, but no wall thickening or dependent calculi; no choledocholithiasis or biliary ductal dilatation. Gastroenterology recommended repeat CT outpatient in 1 month for assessment of pancreatitis and lobulated collection. Patient will require an EUS and colonoscopy in 2-3 weeks with GI outpatient.    Pulmonology was consulted for hx of COPD and ILD. As per pulmonology, wheezing on exam likely component of pulm edema as CXR suggestive of pulm vascular congestion. Pulmonology recommended standing Duonebs q6h and weaning oxygen as tolerated. Pulmonology clearance for surgery documented on 9/15.    Cardiology was consulted for cardiac co-management; recommendations were followed. CXR (9/15/24) significant for mild pulmonary edema; small left pleural effusion. ProBNP elevated at 4025. TTE (9/16/24) showed EF: 73% and no wall motion abnormalities; no acute changes from previous TTE on 12/21/23. Pt was treated with IV Lasix for diuresis. Patient to go home on oral bumex. Cr was elevated after IV lasix, but stable for discharge.    Physical therapy evaluated the patient and recommended home PT.    On the day of discharge, the patient's vital signs are within normal limits, pain is controlled, voiding urine, passing gas/stool via colostomy, tolerating a low fat diet, and ambulating well. Pt will f/u with GI, Dr. Thomas (pulmonology),and Dr. Ballesteros (cardiology) in 1-2 weeks. Pt will f/u [facility] GI clinic in 1 month. Pt will f/u with PCP in 1-2 weeks.

## 2024-09-17 NOTE — DIETITIAN INITIAL EVALUATION ADULT - ORAL INTAKE PTA/DIET HISTORY
Pt reports good PO intake and appetite at baseline. Consumes regular diet. NKFA. Pt denies chewing/swallowing difficulty, nausea, vomiting, diarrhea, constipation at baseline. Pt admitted with one day of abdominal pain. Pt with colostomy, functioning PTA.

## 2024-09-17 NOTE — PHYSICAL THERAPY INITIAL EVALUATION ADULT - GAIT DEVIATIONS NOTED, PT EVAL
decreased remington/increased time in double stance/decreased step length/decreased stride length/decreased weight-shifting ability

## 2024-09-17 NOTE — DISCHARGE NOTE PROVIDER - NSDCCPCAREPLAN_GEN_ALL_CORE_FT
PRINCIPAL DISCHARGE DIAGNOSIS  Diagnosis: Acute pancreatitis  Assessment and Plan of Treatment: HOSPITAL COURSE: You were found to have acute pancreatitis and distended gallbladder, but no gallstones, on imaging (CT abdomen/pelvis on 9/15/24 and MRCP on 9/16/24). You were treated with IV fluids and IV antibiotics.   DIET: Maintain Low Fiber Diet until your next appointment. Avoid raw fruits and vegetables. Thoroughly cooked vegetables that are soft and easily mashed with a fork are ok to eat. Bananas are also ok to eat.  PAIN: A prescription for oxycodone has been sent to the pharmacy. You should only take these for severe pain. For mild or moderate pain, you may take 975mg of tylenol every 6 hours. Do not exceed more than 4G per day.   NOTIFY YOUR SURGEON IF: You have any bleeding that does not stop, any fever (over 100.4 F) or chills, persistent nausea/vomiting with inability to tolerate food or liquids, persistent diarrhea, or if severe abdominal pain is not controlled on your discharge pain medications.  FOLLOW-UP:  1. Please call to make a follow-up appointment within one to two weeks of discharge with Dr. Browning  2. Please follow up with [facility] in 1 month for repeat CT abdomen/pelvis to re-evaluate pancreatitis and lobulated collection with possible endoscopy. Please call [phone] for an appointment  3. Please follow up with your primary care physician in one week regarding your hospitalization.     PRINCIPAL DISCHARGE DIAGNOSIS  Diagnosis: Acute pancreatitis  Assessment and Plan of Treatment: HOSPITAL COURSE: You were found to have acute pancreatitis and distended gallbladder, but no gallstones, on imaging (CT abdomen/pelvis on 9/15/24 and MRCP on 9/16/24). You were treated with IV fluids and IV antibiotics.   DIET: Maintain Low Fiber Diet until your next appointment. Avoid raw fruits and vegetables. Thoroughly cooked vegetables that are soft and easily mashed with a fork are ok to eat. Bananas are also ok to eat.  PAIN: A prescription for oxycodone has been sent to the pharmacy. You should only take these for severe pain. For mild or moderate pain, you may take 975mg of tylenol every 6 hours. Do not exceed more than 4G per day.   NOTIFY YOUR SURGEON IF: You have any bleeding that does not stop, any fever (over 100.4 F) or chills, persistent nausea/vomiting with inability to tolerate food or liquids, persistent diarrhea, or if severe abdominal pain is not controlled on your discharge pain medications.  FOLLOW-UP:  1. Please call to make a follow-up appointment within one to two weeks of discharge with Dr. Browning  2. Please follow up with [facility] in 1 month for repeat CT abdomen/pelvis to re-evaluate pancreatitis and lobulated collection with possible endoscopy. Please call [phone] for an appointment  3. Please follow up with your primary care physician in one week regarding your hospitalization.      SECONDARY DISCHARGE DIAGNOSES  Diagnosis: Chronic heart failure  Assessment and Plan of Treatment: MEDICATIONS: Resume home oral Bumex 2mg in AM and 1mg in PM HOME CARE INSTRUCTIONS:  Weigh yourself daily.  If you gain 3lbs in 3 days, or 5lbs in a week call your Health Care Provider.  Do not eat or drink foods containing more than 2000mg of salt (sodium) in your diet every day.  Call your Health Care Provider if you have any swelling or increased swelling in your feet, ankles, and/or stomach.  Take all of your medication as directed.  If you become dizzy call your Health Care Provider.  FOLLOW UP: Please follow up with Dr. Ballesteros (cardiologist) in 1-2 weeks     PRINCIPAL DISCHARGE DIAGNOSIS  Diagnosis: Acute pancreatitis  Assessment and Plan of Treatment: HOSPITAL COURSE: You were found to have acute pancreatitis and distended gallbladder, but no gallstones, on imaging (CT abdomen/pelvis on 9/15/24 and MRCP on 9/16/24). You were treated with IV fluids and IV antibiotics.   DIET: Maintain Low Fiber Diet until your next appointment. Avoid raw fruits and vegetables. Thoroughly cooked vegetables that are soft and easily mashed with a fork are ok to eat. Bananas are also ok to eat.  PAIN: A prescription for oxycodone has been sent to the pharmacy. You should only take these for severe pain. For mild or moderate pain, you may take 975mg of tylenol every 6 hours. Do not exceed more than 4G per day.   NOTIFY YOUR SURGEON IF: You have any bleeding that does not stop, any fever (over 100.4 F) or chills, persistent nausea/vomiting with inability to tolerate food or liquids, persistent diarrhea, or if severe abdominal pain is not controlled on your discharge pain medications.  FOLLOW-UP:  1. Please call to make a follow-up appointment after discharge with Dr. Browning as needed.  2. Please follow up with [facility] in 2-3 weeks for colonoscopy and endoscopy. Follow up in 1 month for repeat CT abdomen/pelvis to re-evaluate pancreatitis and lobulated collection. Please call [phone] for an appointment.  3. Please follow up with your primary care physician in one week regarding your hospitalization.      SECONDARY DISCHARGE DIAGNOSES  Diagnosis: Chronic heart failure  Assessment and Plan of Treatment: MEDICATIONS: Resume home oral Bumex 2mg in AM and 1mg in PM HOME CARE INSTRUCTIONS:  Weigh yourself daily.  If you gain 3lbs in 3 days, or 5lbs in a week call your Health Care Provider.  Do not eat or drink foods containing more than 2000mg of salt (sodium) in your diet every day.  Call your Health Care Provider if you have any swelling or increased swelling in your feet, ankles, and/or stomach.  Take all of your medication as directed.  If you become dizzy call your Health Care Provider.  FOLLOW UP: Please follow up with Dr. Ballesteros (cardiologist) in 1-2 weeks

## 2024-09-17 NOTE — DISCHARGE NOTE PROVIDER - NSDCFUADDAPPT_GEN_ALL_CORE_FT
Please follow up with [facility] in 1 month for repeat CT abdomen/pelvis to re-evaluate pancreatitis and lobulated collection with possible endoscopy. Please call [phone] for an appointment    Please follow up with your primary care provider in 1-2 weeks after discharge from the hospital Please follow up with [facility] in 2-3 weeks for a colonoscopy and endoscopy. Follow up in 1 month for repeat CT abdomen/pelvis to re-evaluate pancreatitis and lobulated collection. Please call [phone] for an appointment    Please follow up with your primary care provider in 1-2 weeks after discharge from the hospital Please follow up with [facility] in 2-3 weeks for a colonoscopy and endoscopy. Clinic should call you to set up an appointment. Follow up in 1 month for repeat CT abdomen/pelvis to re-evaluate pancreatitis and lobulated collection. Please call [phone] for an appointment if the clinic does not call.    Please follow up with your primary care provider in 1-2 weeks after discharge from the hospital

## 2024-09-17 NOTE — DIETITIAN INITIAL EVALUATION ADULT - REASON FOR ADMISSION
Acute cholecystitis    Chart reviewed, events noted. This is a "84 yrs old male w/ hx of HLD, HTN, Stg II Colon Ca s/p L hemicolectomy w end colostomy and mucus fistula 2023 w Dr. Browning, COPD, ILD, HFpEF, Afib on Eliquis who presented with abd pain for the past 1 day, found to have acute pancreatitis and cholecystitis."

## 2024-09-17 NOTE — PROGRESS NOTE ADULT - ASSESSMENT
Impression:   84 yrs old male w/ hx of HLD, HTN, Stg II Colon Ca s/p L hemicolectomy w end colostomy and mucus fistula 2023 w Dr. Browning, COPD, ILD, HFpEF, Afib on Eliquis who presented with abd pain for the past 1 day, found to have acute pancreatitis and cholecystitis.     #Acute abd pain  #Pancreatitis  #Cholecystitis  Likely related to gallstone, had elevated lipase levels 1242. T Bili 1.8. Alp 211. AST/ALT: 218/157.  - CT imaging showed pancreatitis with peripancreatic fluid collections and also cholecystitis, normal CBD with no stones seen.   - Patient reported last dose of Eliquis on 9/14.   - MRCP with Pancreatitis with 8.2 cm ill-defined lobulated collection noted caudal to the pancreatic body; no cholelithiasis, choledocholithiasis, or biliary ductal dilatation noted.    Recommendations:   - repeat CT outpatient in 1 month for assessment of pancreatitis and lobulated collection   - Please provide patient with Gastroenterology Clinic outpatient follow up number for an appointment [phone]  - Abx per primary team  - Appreciate surgery recs.   - CMP and CBC daily.     No further recommendations from Gastroenterology at this point. Gastroenterology will sign off at this time. Please re-consult for any other further questions.     All recommendations are tentative until note is attested by attending.     Corinne Han, PGY-6  Gastroenterology/Hepatology Fellow  Available on [employer] Teams  [phone] ([facility] Short Range Pager)  [phone] ([facility] Long Range Pager)    On Weekends/Holidays (All day) and Weekdays after 5 PM to 8AM:  For non-urgent consults, please email [email or web address]  For emergent consults, please contact on call GI team.  See Amion schedule ([facility]), [employer] paging system ([facility]), or call hospital  ([facility])

## 2024-09-17 NOTE — DISCHARGE NOTE PROVIDER - PROVIDER TOKENS
PROVIDER:[TOKEN:[[id number]],FOLLOWUP:[1 week]],PROVIDER:[TOKEN:[[id number]],FOLLOWUP:[1 week]] PROVIDER:[TOKEN:[[id number]],FOLLOWUP:[1 week]],PROVIDER:[TOKEN:[[id number]],FOLLOWUP:[1 week]],PROVIDER:[TOKEN:[[id number]],FOLLOWUP:[1 week]] PROVIDER:[TOKEN:[[id number]]],PROVIDER:[TOKEN:[[id number]],FOLLOWUP:[1 week]],PROVIDER:[TOKEN:[[id number]],FOLLOWUP:[1 week]]

## 2024-09-17 NOTE — DIETITIAN INITIAL EVALUATION ADULT - ETIOLOGY
Sent Pix[employer] message to patient to advise.    related to increased physiological demand for nutrients

## 2024-09-17 NOTE — PHYSICAL THERAPY INITIAL EVALUATION ADULT - ADDITIONAL COMMENTS
Pt lives in a private home with his granddaughter and great grandson. There are 2 steps to enter in the front +B/L handrails and 3 steps to enter in the back + B/L handrails, no steps inside the house. Pt has a HHA that comes to the house 4 hours everyday to assist with daily needs. Pt ambulates in the house with a RW without assist and a wheelchair in the community. Pt has good family support with his daughter that lives down the block.

## 2024-09-17 NOTE — DIETITIAN INITIAL EVALUATION ADULT - PERTINENT LABORATORY DATA
09-17    135  |  99  |  27[H]  ----------------------------<  111[H]  3.1[L]   |  26  |  1.31[H]    Ca    8.3[L]      17 Sep 2024 03:08  Phos  2.7     09-17  Mg     2.0     09-17    TPro  5.6[L]  /  Alb  2.5[L]  /  TBili  1.2  /  DBili  x   /  AST  21  /  ALT  45  /  AlkPhos  88  09-17  A1C with Estimated Average Glucose Result: 6.0 % (12-24-23 @ 06:50)  A1C with Estimated Average Glucose Result: 5.9 % (12-22-23 @ 11:35)

## 2024-09-17 NOTE — PROGRESS NOTE ADULT - SUBJECTIVE AND OBJECTIVE BOX
DATE OF SERVICE: 09-17-24 @ 10:13    Patient is a 84y old  Male who presents with a chief complaint of Abdominal pain (16 Sep 2024 02:18)      INTERVAL HISTORY: Feels ok.     REVIEW OF SYSTEMS:  CONSTITUTIONAL: No weakness  EYES/ENT: No visual changes;  No throat pain   NECK: No pain or stiffness  RESPIRATORY: No cough, wheezing; No shortness of breath  CARDIOVASCULAR: No chest pain or palpitations  GASTROINTESTINAL: No abdominal  pain. No nausea, vomiting, or hematemesis  GENITOURINARY: No dysuria, frequency or hematuria  NEUROLOGICAL: No stroke like symptoms  SKIN: No rashes    	  MEDICATIONS:  metoprolol tartrate 25 milliGRAM(s) Oral two times a day        PHYSICAL EXAM:  T(C): 36.9 (09-17-24 @ 09:49), Max: 37 (09-16-24 @ 13:08)  HR: 95 (09-17-24 @ 09:49) (82 - 102)  BP: 115/64 (09-17-24 @ 09:49) (107/68 - 133/69)  RR: 18 (09-17-24 @ 04:45) (18 - 18)  SpO2: 93% (09-17-24 @ 09:49) (93% - 97%)  Wt(kg): --  I&O's Summary    16 Sep 2024 07:01  -  17 Sep 2024 07:00  --------------------------------------------------------  IN: 3588 mL / OUT: 1470 mL / NET: 2118 mL    17 Sep 2024 07:01  -  17 Sep 2024 10:13  --------------------------------------------------------  IN: 260 mL / OUT: 0 mL / NET: 260 mL          Appearance: In no distress	  HEENT:    PERRL, EOMI	  Cardiovascular:  S1 S2, + JVD  Respiratory: Lungs clear to auscultation	  Gastrointestinal:  Soft, Non-tender, + BS	  Vascularature:  + edema of LE  Psychiatric: Appropriate affect   Neuro: no acute focal deficits                               10.6   11.39 )-----------( 193      ( 17 Sep 2024 03:08 )             32.4     09-17    135  |  99  |  27[H]  ----------------------------<  111[H]  3.1[L]   |  26  |  1.31[H]    Ca    8.3[L]      17 Sep 2024 03:08  Phos  2.7     09-17  Mg     2.0     09-17    TPro  5.6[L]  /  Alb  2.5[L]  /  TBili  1.2  /  DBili  x   /  AST  21  /  ALT  45  /  AlkPhos  88  09-17        Labs personally reviewed      ASSESSMENT/PLAN: 	    84M PMHx HLD, HTn, Stg II Colon Ca s/p L hemicolectomy w end colostomy and mucus fistula 2023 w Dr. Browning, COPD, ILD, HFpEF, Afib on Eliquis who presents to [facility] ED with abdominal pain. Pt reports acute onset abdominal pain x 1 day.    1. Cardiac Risk Stratification   - patient is not in active tachy/marysol syndrome  - patient exercise tolerance limited due to shortness of breath (COPD and asbestosis) and instability   - EKG AFIB @ 78  - TTE with preserved EF, mild pHTN  - will further risk stratify after volume optimization     2. Acute diastolic HF  - patient with +1 pitting edema b/l  - CXR with pulm edema  - Probnp 4095  - TTE unremarkable  - Cont Lasix 40mg IV daily  - Caution with IVF    3. AFIB  - on home Eliquis  - Check TSH    4. HLD  - hold statin, patient with elevated LFTs     5. DVT prophylactic  - Lovenox subQ        Zena Garcia, AG-NP   Joce Ballesteros DO [facility]  Cardiovascular Medicine  [address], [address]  Available through call or text on [employer] TEAMs  Office: [phone]

## 2024-09-17 NOTE — DISCHARGE NOTE PROVIDER - NSFOLLOWUPCLINICS_GEN_ALL_ED_FT
[facility]  Gastroenterology  [address] [place] [id number]  Phone: [phone]  Fax:   Follow Up Time: 1 month     [facility]  Gastroenterology  [address] [place] [id number]  Phone: [phone]  Fax:   Follow Up Time: 2 weeks

## 2024-09-17 NOTE — DIETITIAN INITIAL EVALUATION ADULT - NSFNSGIIOFT_GEN_A_CORE
no bowel movement noted   09-16-24 @ 07:01  -  09-17-24 @ 07:00  --------------------------------------------------------  OUT:    Colostomy (mL): 0 mL  Total OUT: 0 mL    Total NET: 0 mL

## 2024-09-17 NOTE — DIETITIAN INITIAL EVALUATION ADULT - ADD RECOMMEND
1/2023 report in HIE 1) Medical team to advance diet as feasible. Recommend advancing to DASH diet. 2) Continue multivitamin, ascorbic acid for wound healing. 3) RD to remain available and follow-up as medically appropriate.

## 2024-09-17 NOTE — PROGRESS NOTE ADULT - ASSESSMENT
84M PMHx HLD, HTN, Stg II Colon Ca s/p L hemicolectomy w end colostomy and mucus fistula 2023 w Dr. Browning, COPD, ILD, HFpEF, Afib on Eliquis who presents to [facility] ED with abdominal pain.     Exam w epigastric TTP. Labs w WBC 18. Lipase 1242. T Bili 1.8. Alp 211. AST/ALT: 218/157. CT Imaging demonstrates cf acute pancreatitis and acute cholecystitis.     Plan:   -AFib - hep gtt  -GI Consulted -> MRCP - 8.2 cm inferior pancreatic collection, low suspicion of cholecystitis.   -Pulm consult for periop risk assessment & ILD 2/2 asbestosis:     - Standing duonebs q6h now & in the perioperative period      - Hold home stiolto and PRN albuterol until d/c w/ outpatient follow up Dr. Thomas   -2L NC, wean as able  - Card Consult:     - s/p Lasix IV 40 mg x1      -TSH, TTE pending, pro-BNP -> 4095   -Pain control: PO Tylenol q6, IV dilaudid PRN    -NPO/IVF   -Walls today to monitor output  -Monitor I/Os  -Daily CBC & CMP -> Trend T Bili   -IV Abx: Zosyn   -DVT ppx: SCD/LVX, Hold Eliquis for now (CHADS VASC 5, but daily risk < 0.02%)   -Possible OR pending optimization, at this time poor surgical candidate given fx status       Gold team   [id number] 84M PMHx HLD, HTN, Stg II Colon Ca s/p L hemicolectomy w end colostomy and mucus fistula 2023 w Dr. Browning, COPD, ILD, HFpEF, Afib on Eliquis who presents to [facility] ED with abdominal pain.   Exam w epigastric TTP. Labs w WBC 18. Lipase 1242. T Bili 1.8. Alp 211. AST/ALT: 218/157. CT abd/pelvis (9/15/24) demonstrates c/f acute pancreatitis and acute cholecystitis. MRCP (9/16/24) with 8.2cm inferior pancreatic collection; low suspicion of cholecystitis.     PLAN:   - RUQ US to r/o acute cholecystitis/cholelithiasis  - Diet: Adv to CLD   - IVF: 1/2 to 75cc/hr  - Pain: Transition to PO Meds (PO Tylenol ATC and oxy PRN)  - D/c Walls; f/u TOV at 17:00  - Abx: IV Zosyn  - F/u GI recs  - F/u cardio recs  - Appreciate pulm recs for prieto-op risk assessment (clearance doc on 9/15) & ILD 2/2 asbestosis     - Standing duonebs q6h now & in the perioperative period      - Hold home stiolto and PRN albuterol until d/c w/ outpatient follow up Dr. Thomas   - 2L NC; wean as able   - VTE ppx: Hep gtt (Eliquis on hold)      Gold team   [id number]

## 2024-09-17 NOTE — DIETITIAN INITIAL EVALUATION ADULT - ENERGY INTAKE
Fair (50-75%) Pt previously NPO (since 9/15), advanced to clear liquids this morning. Reports good tolerance, consumed jello, broth, ice. No acute GI distress noted.

## 2024-09-17 NOTE — PROGRESS NOTE ADULT - SUBJECTIVE AND OBJECTIVE BOX
DATE OF SERVICE: 09-17-24 @ 10:06    Patient is a 84y old  Male who presents with a chief complaint of Abdominal pain (16 Sep 2024 02:18)      INTERVAL HISTORY: Feels ok.     REVIEW OF SYSTEMS:  CONSTITUTIONAL: No weakness  EYES/ENT: No visual changes;  No throat pain   NECK: No pain or stiffness  RESPIRATORY: No cough, wheezing; No shortness of breath  CARDIOVASCULAR: No chest pain or palpitations  GASTROINTESTINAL: No abdominal  pain. No nausea, vomiting, or hematemesis  GENITOURINARY: No dysuria, frequency or hematuria  NEUROLOGICAL: No stroke like symptoms  SKIN: No rashes    	  MEDICATIONS:  metoprolol tartrate 25 milliGRAM(s) Oral two times a day        PHYSICAL EXAM:  T(C): 36.9 (09-17-24 @ 09:49), Max: 37 (09-16-24 @ 13:08)  HR: 95 (09-17-24 @ 09:49) (82 - 102)  BP: 115/64 (09-17-24 @ 09:49) (107/68 - 133/69)  RR: 18 (09-17-24 @ 04:45) (18 - 18)  SpO2: 93% (09-17-24 @ 09:49) (93% - 97%)  Wt(kg): --  I&O's Summary    16 Sep 2024 07:01  -  17 Sep 2024 07:00  --------------------------------------------------------  IN: 3588 mL / OUT: 1470 mL / NET: 2118 mL    17 Sep 2024 07:01  -  17 Sep 2024 10:06  --------------------------------------------------------  IN: 260 mL / OUT: 0 mL / NET: 260 mL          Appearance: In no distress	  HEENT:    PERRL, EOMI	  Cardiovascular:  S1 S2, + JVD  Respiratory: Lungs clear to auscultation	  Gastrointestinal:  Soft, Non-tender, + BS	  Vascularature:  + edema of LE  Psychiatric: Appropriate affect   Neuro: no acute focal deficits                               10.6   11.39 )-----------( 193      ( 17 Sep 2024 03:08 )             32.4     09-17    135  |  99  |  27[H]  ----------------------------<  111[H]  3.1[L]   |  26  |  1.31[H]    Ca    8.3[L]      17 Sep 2024 03:08  Phos  2.7     09-17  Mg     2.0     09-17    TPro  5.6[L]  /  Alb  2.5[L]  /  TBili  1.2  /  DBili  x   /  AST  21  /  ALT  45  /  AlkPhos  88  09-17    Labs personally reviewed  < from: TTE W or WO Ultrasound Enhancing Agent (09.16.24 @ 10:44) >  CONCLUSIONS:      1. Left ventricular systolic function is hyperdynamic with an ejection fraction of 73 % by Henao's method of disks.   2. Normal right ventricular systolic function. Tricuspid annular plane systolic excursion (TAPSE) is 1.9 cm (normal >=1.7 cm). Tricuspid annular tissue Doppler S' is 18.4 cm/s (normal >10 cm/s).   3. No pericardial effusion seen.   4. Aortic valve anatomy cannot be determined.   5. Compared to the transthoracic echocardiogram performed on 12/21/2023, there have been no significant interval changes.   6. Estimated pulmonary artery systolic pressure is 40 mmHg, consistent with mild pulmonary hypertension.   7. There is normal LV mass and normal geometry.        ASSESSMENT/PLAN: 	    84M PMHx HLD, HTn, Stg II Colon Ca s/p L hemicolectomy w end colostomy and mucus fistula 2023 w Dr. Browning, COPD, ILD, HFpEF, Afib on Eliquis who presents to [facility] ED with abdominal pain. Pt reports acute onset abdominal pain x 1 day.    1. Cardiac Risk Stratification   - patient is not in active tachy/marysol syndrome  - patient exercise tolerance limited due to shortness of breath (COPD and asbestosis) and instability   - EKG AFIB @ 78  - TTE with preserved EF, mild pHTN  - will further risk stratify after volume optimization     2. Acute diastolic HF  - patient with +1 pitting edema b/l  - CXR with pulm edema  - Probnp 4095  - TTE unremarkable  - start Lasix IV 40 mg x1   - Caution with IVF    3. AFIB  - on home Eliquis  - Check TSH    4. HLD  - hold statin, patient with elevated LFTs     5. DVT prophylactic  - Lovenox subQ        Zena Garcia, AG-NP   Joce Ballesteros DO [facility]  Cardiovascular Medicine  [address] [place] [address], [address]  Available through call or text on [employer] TEAMs  Office: [phone]

## 2024-09-17 NOTE — PHYSICAL THERAPY INITIAL EVALUATION ADULT - PERTINENT HX OF CURRENT PROBLEM, REHAB EVAL
84M PMHx HLD, HTn, Stg II Colon Ca s/p L hemicolectomy w end colostomy and mucus fistula 2023 w Dr. Browning, COPD, ILD, HFpEF, Afib on Eliquis who presents to [facility] ED with abdominal pain. Pt reports acute onset abdominal pain x 1 day. Denies associated fevers, chills, CP, SOB, nausea, vomiting, diarrhea, cough, dysuria, hematuria, hematemesis, dizziness, lightheadedness. Denies prior episodes of similar pain. Ostomy fx per patient. Possible OR pending optimization, at this time poor surgical candidate given fx status. CT Abdomen and Pelvis (9/15): 1.   Incompletely visualized small to moderate bilateral pleural effusions. 2.   Bilateral calcified pleural plaques suggest prior asbestos exposure. 3.   Peripancreatic inflammation and fluid compatible with acute pancreatitis. 4.   Acute cholecystitis as above. Xray Chest (9/15): Mild pulmonary edema. Small left pleural effusion. MR MRCP (9/16): Pancreatitis with 8.2 cm ill-defined lobulated collection noted caudal to the pancreatic body. No cholelithiasis, choledocholithiasis, or biliary ductal dilatation noted. Trace ascites, mild bilateral pleural effusion/atelectasis., And mild soft tissue edema.

## 2024-09-17 NOTE — DIETITIAN INITIAL EVALUATION ADULT - PERTINENT MEDS FT
MEDICATIONS  (STANDING):  albuterol/ipratropium for Nebulization 3 milliLiter(s) Nebulizer every 6 hours  ascorbic acid 500 milliGRAM(s) Oral daily  finasteride 5 milliGRAM(s) Oral daily  furosemide   Injectable 60 milliGRAM(s) IV Push once  guaiFENesin  milliGRAM(s) Oral every 12 hours  heparin  Infusion 1600 Unit(s)/Hr (16 mL/Hr) IV Continuous <Continuous>  influenza  Vaccine (HIGH DOSE) 0.5 milliLiter(s) IntraMuscular once  lactated ringers. 1000 milliLiter(s) (75 mL/Hr) IV Continuous <Continuous>  metoprolol tartrate 25 milliGRAM(s) Oral two times a day  multivitamin 1 Tablet(s) Oral daily  piperacillin/tazobactam IVPB.. 3.375 Gram(s) IV Intermittent every 8 hours  potassium chloride    Tablet ER 40 milliEquivalent(s) Oral every 4 hours  tamsulosin 0.4 milliGRAM(s) Oral at bedtime    MEDICATIONS  (PRN):  acetaminophen     Tablet .. 975 milliGRAM(s) Oral every 6 hours PRN Mild Pain (1 - 3)  oxyCODONE    IR 2.5 milliGRAM(s) Oral every 4 hours PRN Moderate Pain (4 - 6)  oxyCODONE    IR 5 milliGRAM(s) Oral every 4 hours PRN Severe Pain (7 - 10)

## 2024-09-17 NOTE — DISCHARGE NOTE PROVIDER - CARE PROVIDERS DIRECT ADDRESSES
[email or web address]

## 2024-09-17 NOTE — PHYSICAL THERAPY INITIAL EVALUATION ADULT - GENERAL OBSERVATIONS, REHAB EVAL
Pt received semi-supine in bed +IV, +2L O2 NC (okay to remove for PT as per RN Daphnie), +pulse ox. Pt received semi-supine in bed +IV, +O2 (removed prior to mobilizing as per RN Daphnie), +pulse ox.

## 2024-09-17 NOTE — DISCHARGE NOTE PROVIDER - CARE PROVIDER_API CALL
Igor Browning  Colon/Rectal Surgery  [address], [address]  [place] [address]  Phone: [phone]  Fax: [phone]  Follow Up Time: 1 week    Zev Thomas  Pulmonary Disease  [address], [address]  [place] [address]  Phone: [phone]  Fax: [phone]  Follow Up Time: 1 week   Igor Browning  Colon/Rectal Surgery  [address], [address]  [place] [address]  Phone: [phone]  Fax: [phone]  Follow Up Time: 1 week    Zev Thomas  Pulmonary Disease  [address], [address]  [place] [address]  Phone: [phone]  Fax: [phone]  Follow Up Time: 1 week    Joce Ballesteros  Cardiovascular Disease  [address] [place] [address], [address]  [place] [address]  Phone: [phone]  Fax: [phone]  Follow Up Time: 1 week   Igor Browning  Colon/Rectal Surgery  [address], [address]  [place] [address]  Phone: [phone]  Fax: [phone]  Follow Up Time:     Zev Thomas  Pulmonary Disease  [address], [address]  [place] [address]  Phone: [phone]  Fax: [phone]  Follow Up Time: 1 week    Joce Ballesteros  Cardiovascular Disease  [address] [place] [address], [address]  [place] [address]  Phone: [phone]  Fax: [phone]  Follow Up Time: 1 week

## 2024-09-17 NOTE — DISCHARGE NOTE PROVIDER - NSDCMRMEDTOKEN_GEN_ALL_CORE_FT
albuterol 2.5 mg/3 mL (0.083%) inhalation solution: 2.5 milligram(s) inhaled every 6 hours  apixaban 2.5 mg oral tablet: 1 tab(s) orally 2 times a day  ascorbic acid 500 mg oral tablet: 1 tab(s) orally once a day  atorvastatin 10 mg oral tablet: 1 tab(s) orally once a day  budesonide-formoterol 160 mcg-4.5 mcg/inh inhalation aerosol: 2 puff(s) inhaled 2 times a day  bumetanide 1 mg oral tablet: 2 tab(s) orally once a day in the morning, and 1 tablet at night  finasteride 5 mg oral tablet: 1 tab(s) orally once a day  FIRST Mouthwash BLM mucous membrane suspension: 5 milliliter(s) mucous membrane every 6 hours Swish and spit  metoprolol tartrate 25 mg oral tablet: 1 tab(s) orally 2 times a day  Mucinex 600 mg oral tablet, extended release: 1 tab(s) orally every 12 hours  Multiple Vitamins oral tablet: 1 tab(s) orally once a day  pantoprazole 40 mg oral granule, delayed release: 1 tab(s) orally once a day  Elizabeth Walker: Dx: R26.89  tamsulosin 0.4 mg oral capsule: 1 cap(s) orally once a day (at bedtime)   acetaminophen 325 mg oral tablet: 3 tab(s) orally every 6 hours As needed Mild Pain (1 - 3)  albuterol 2.5 mg/3 mL (0.083%) inhalation solution: 2.5 milligram(s) inhaled every 6 hours  apixaban 2.5 mg oral tablet: 1 tab(s) orally 2 times a day  ascorbic acid 500 mg oral tablet: 1 tab(s) orally once a day  budesonide-formoterol 160 mcg-4.5 mcg/inh inhalation aerosol: 2 puff(s) inhaled 2 times a day  bumetanide 1 mg oral tablet: 2 tab(s) orally once a day in the morning, and 1 tablet at night  finasteride 5 mg oral tablet: 1 tab(s) orally once a day  FIRST Mouthwash BLM mucous membrane suspension: 5 milliliter(s) mucous membrane every 6 hours Swish and spit  metoprolol tartrate 25 mg oral tablet: 1 tab(s) orally 2 times a day  Mucinex 600 mg oral tablet, extended release: 1 tab(s) orally every 12 hours  Multiple Vitamins oral tablet: 1 tab(s) orally once a day  pantoprazole 40 mg oral granule, delayed release: 1 tab(s) orally once a day  Rolling Walker: Dx: R26.89  tamsulosin 0.4 mg oral capsule: 1 cap(s) orally once a day (at bedtime)

## 2024-09-17 NOTE — PHYSICAL THERAPY INITIAL EVALUATION ADULT - NSPTDISCHREC_GEN_A_CORE
to increase strength, endurance, and balance. Assist still recommended for mobility./Home PT to increase strength, endurance, and balance. Assist recommended for all mobility./Home PT

## 2024-09-17 NOTE — PROGRESS NOTE ADULT - SUBJECTIVE AND OBJECTIVE BOX
Gastroenterology/Hepatology Progress Note    Interval Events:   - no acute ON events   - patient with clear diet, reports that he is tolerating well     Allergies:  No Known Allergies      Hospital Medications:  acetaminophen     Tablet .. 975 milliGRAM(s) Oral every 6 hours PRN  albuterol/ipratropium for Nebulization 3 milliLiter(s) Nebulizer every 6 hours  ascorbic acid 500 milliGRAM(s) Oral daily  finasteride 5 milliGRAM(s) Oral daily  guaiFENesin  milliGRAM(s) Oral every 12 hours  heparin  Infusion 1600 Unit(s)/Hr IV Continuous <Continuous>  influenza  Vaccine (HIGH DOSE) 0.5 milliLiter(s) IntraMuscular once  lactated ringers. 1000 milliLiter(s) IV Continuous <Continuous>  metoprolol tartrate 25 milliGRAM(s) Oral two times a day  multivitamin 1 Tablet(s) Oral daily  oxyCODONE    IR 2.5 milliGRAM(s) Oral every 4 hours PRN  oxyCODONE    IR 5 milliGRAM(s) Oral every 4 hours PRN  piperacillin/tazobactam IVPB.. 3.375 Gram(s) IV Intermittent every 8 hours  potassium chloride    Tablet ER 40 milliEquivalent(s) Oral every 4 hours  tamsulosin 0.4 milliGRAM(s) Oral at bedtime      ROS: 14 point ROS negative unless otherwise state in subjective    PHYSICAL EXAM:   Vital Signs:  Vital Signs Last 24 Hrs  T(C): 36.9 (17 Sep 2024 09:49), Max: 37 (16 Sep 2024 13:08)  T(F): 98.4 (17 Sep 2024 09:49), Max: 98.6 (16 Sep 2024 13:08)  HR: 95 (17 Sep 2024 09:49) (82 - 102)  BP: 115/64 (17 Sep 2024 09:49) (107/68 - 133/69)  BP(mean): --  RR: 18 (17 Sep 2024 04:45) (18 - 18)  SpO2: 93% (17 Sep 2024 09:49) (93% - 97%)    Parameters below as of 17 Sep 2024 09:49  Patient On (Oxygen Delivery Method): room air      Daily     Daily     GENERAL:  No acute distress  HEENT:  NCAT, no scleral icterus   CHEST:  no respiratory distress  HEART:  Regular rate and rhythm  ABDOMEN:  Soft, nontender, moderately distended with colostomy,   EXTREMITIES: Has pitting edema in the LE b/l  SKIN:  No rash/erythema/ecchymoses/petechiae/wounds/abscess/warm/dry  NEURO:  Alert and oriented x 3, no tremors.     LABS:                        10.6   11.39 )-----------( 193      ( 17 Sep 2024 03:08 )             32.4     Mean Cell Volume: 93.6 fl (09-17-24 @ 03:08)    09-17    135  |  99  |  27[H]  ----------------------------<  111[H]  3.1[L]   |  26  |  1.31[H]    Ca    8.3[L]      17 Sep 2024 03:08  Phos  2.7     09-17  Mg     2.0     09-17    TPro  5.6[L]  /  Alb  2.5[L]  /  TBili  1.2  /  DBili  x   /  AST  21  /  ALT  45  /  AlkPhos  88  09-17    LIVER FUNCTIONS - ( 17 Sep 2024 03:08 )  Alb: 2.5 g/dL / Pro: 5.6 g/dL / ALK PHOS: 88 U/L / ALT: 45 U/L / AST: 21 U/L / GGT: x           PT/INR - ( 16 Sep 2024 09:50 )   PT: 17.8 sec;   INR: 1.72 ratio         PTT - ( 17 Sep 2024 03:08 )  PTT:70.1 sec  Urinalysis Basic - ( 17 Sep 2024 03:08 )    Color: x / Appearance: x / SG: x / pH: x  Gluc: 111 mg/dL / Ketone: x  / Bili: x / Urobili: x   Blood: x / Protein: x / Nitrite: x   Leuk Esterase: x / RBC: x / WBC x   Sq Epi: x / Non Sq Epi: x / Bacteria: x      Amylase Serum--      Lipase serum28       Ammonia--        Imaging:

## 2024-09-17 NOTE — PROGRESS NOTE ADULT - SUBJECTIVE AND OBJECTIVE BOX
INTERVAL EVENTS: No acute events overnight.  SUBJECTIVE: Patient seen and examined at bedside with surgical team, patient without complaints. Denies fever, chills, CP, SOB nausea, vomiting, abdominal pain.    OBJECTIVE:    Vital Signs Last 24 Hrs  T(C): 36.7 (17 Sep 2024 00:55), Max: 37 (16 Sep 2024 04:35)  T(F): 98 (17 Sep 2024 00:55), Max: 98.6 (16 Sep 2024 04:35)  HR: 84 (17 Sep 2024 00:55) (82 - 100)  BP: 107/68 (17 Sep 2024 00:55) (107/68 - 133/69)  BP(mean): --  RR: 18 (17 Sep 2024 00:55) (18 - 18)  SpO2: 97% (17 Sep 2024 00:55) (96% - 97%)    Parameters below as of 17 Sep 2024 00:55  Patient On (Oxygen Delivery Method): nasal cannula  O2 Flow (L/min): 2  I&O's Detail    15 Sep 2024 07:01  -  16 Sep 2024 07:00  --------------------------------------------------------  IN:    Lactated Ringers: 1800 mL  Total IN: 1800 mL    OUT:    Colostomy (mL): 0 mL    Oral Fluid: 0 mL    Voided (mL): 500 mL  Total OUT: 500 mL    Total NET: 1300 mL      16 Sep 2024 07:01  -  17 Sep 2024 03:08  --------------------------------------------------------  IN:    Heparin: 96 mL    Lactated Ringers: 1500 mL  Total IN: 1596 mL    OUT:    Colostomy (mL): 0 mL    Indwelling Catheter - Urethral (mL): 1170 mL    Oral Fluid: 0 mL  Total OUT: 1170 mL    Total NET: 426 mL      MEDICATIONS  (STANDING):  albuterol/ipratropium for Nebulization 3 milliLiter(s) Nebulizer every 6 hours  ascorbic acid 500 milliGRAM(s) Oral daily  finasteride 5 milliGRAM(s) Oral daily  guaiFENesin  milliGRAM(s) Oral every 12 hours  heparin  Infusion 1600 Unit(s)/Hr (16 mL/Hr) IV Continuous <Continuous>  influenza  Vaccine (HIGH DOSE) 0.5 milliLiter(s) IntraMuscular once  lactated ringers. 1000 milliLiter(s) (150 mL/Hr) IV Continuous <Continuous>  metoprolol tartrate 25 milliGRAM(s) Oral two times a day  multivitamin 1 Tablet(s) Oral daily  piperacillin/tazobactam IVPB.. 3.375 Gram(s) IV Intermittent every 8 hours  tamsulosin 0.4 milliGRAM(s) Oral at bedtime    MEDICATIONS  (PRN):  acetaminophen     Tablet .. 975 milliGRAM(s) Oral every 6 hours PRN Mild Pain (1 - 3)  HYDROmorphone  Injectable 0.2 milliGRAM(s) IV Push every 4 hours PRN Moderate Pain (4 - 6)  HYDROmorphone  Injectable 0.5 milliGRAM(s) IV Push every 4 hours PRN Severe Pain (7 - 10)      PHYSICAL EXAM:  Constitutional: A&Ox3, NAD  Respiratory: Unlabored breathing  Abdomen: Soft, Non distended, epigastrium tender to palpation without rebound  Extremities: WWP, NAVARRO spontaneously    LABS:                        11.0   11.90 )-----------( 203      ( 16 Sep 2024 20:22 )             33.6     09-16    136  |  100  |  26<H>  ----------------------------<  95  3.9   |  25  |  1.30    Ca    8.9      16 Sep 2024 07:08  Phos  3.1     09-16  Mg     1.6     09-16    TPro  6.0  /  Alb  2.7<L>  /  TBili  1.9<H>  /  DBili  x   /  AST  42<H>  /  ALT  72<H>  /  AlkPhos  110  09-16    PT/INR - ( 16 Sep 2024 09:50 )   PT: 17.8 sec;   INR: 1.72 ratio         PTT - ( 16 Sep 2024 20:22 )  PTT:63.5 sec  LIVER FUNCTIONS - ( 16 Sep 2024 07:08 )  Alb: 2.7 g/dL / Pro: 6.0 g/dL / ALK PHOS: 110 U/L / ALT: 72 U/L / AST: 42 U/L / GGT: x           Urinalysis Basic - ( 16 Sep 2024 07:08 )    Color: x / Appearance: x / SG: x / pH: x  Gluc: 95 mg/dL / Ketone: x  / Bili: x / Urobili: x   Blood: x / Protein: x / Nitrite: x   Leuk Esterase: x / RBC: x / WBC x   Sq Epi: x / Non Sq Epi: x / Bacteria: x           INTERVAL EVENTS: MRCP (9/16) significant for peripancreatic fluid/edema; no cholelithiasis or choledocholithiasis     SUBJECTIVE: Patient seen and examined at bedside with surgical team. Pt reports much improved abdominal pain. Denies fever, chills, CP, SOB nausea, vomiting    OBJECTIVE:  Vital Signs Last 24 Hrs  T(C): 36.7 (17 Sep 2024 00:55), Max: 37 (16 Sep 2024 04:35)  T(F): 98 (17 Sep 2024 00:55), Max: 98.6 (16 Sep 2024 04:35)  HR: 84 (17 Sep 2024 00:55) (82 - 100)  BP: 107/68 (17 Sep 2024 00:55) (107/68 - 133/69)  BP(mean): --  RR: 18 (17 Sep 2024 00:55) (18 - 18)  SpO2: 97% (17 Sep 2024 00:55) (96% - 97%)    Parameters below as of 17 Sep 2024 00:55  Patient On (Oxygen Delivery Method): nasal cannula  O2 Flow (L/min): 2  I&O's Detail    15 Sep 2024 07:01  -  16 Sep 2024 07:00  --------------------------------------------------------  IN:    Lactated Ringers: 1800 mL  Total IN: 1800 mL    OUT:    Colostomy (mL): 0 mL    Oral Fluid: 0 mL    Voided (mL): 500 mL  Total OUT: 500 mL    Total NET: 1300 mL      16 Sep 2024 07:01  -  17 Sep 2024 03:08  --------------------------------------------------------  IN:    Heparin: 96 mL    Lactated Ringers: 1500 mL  Total IN: 1596 mL    OUT:    Colostomy (mL): 0 mL    Indwelling Catheter - Urethral (mL): 1170 mL    Oral Fluid: 0 mL  Total OUT: 1170 mL    Total NET: 426 mL      MEDICATIONS  (STANDING):  albuterol/ipratropium for Nebulization 3 milliLiter(s) Nebulizer every 6 hours  ascorbic acid 500 milliGRAM(s) Oral daily  finasteride 5 milliGRAM(s) Oral daily  guaiFENesin  milliGRAM(s) Oral every 12 hours  heparin  Infusion 1600 Unit(s)/Hr (16 mL/Hr) IV Continuous <Continuous>  influenza  Vaccine (HIGH DOSE) 0.5 milliLiter(s) IntraMuscular once  lactated ringers. 1000 milliLiter(s) (150 mL/Hr) IV Continuous <Continuous>  metoprolol tartrate 25 milliGRAM(s) Oral two times a day  multivitamin 1 Tablet(s) Oral daily  piperacillin/tazobactam IVPB.. 3.375 Gram(s) IV Intermittent every 8 hours  tamsulosin 0.4 milliGRAM(s) Oral at bedtime    MEDICATIONS  (PRN):  acetaminophen     Tablet .. 975 milliGRAM(s) Oral every 6 hours PRN Mild Pain (1 - 3)  HYDROmorphone  Injectable 0.2 milliGRAM(s) IV Push every 4 hours PRN Moderate Pain (4 - 6)  HYDROmorphone  Injectable 0.5 milliGRAM(s) IV Push every 4 hours PRN Severe Pain (7 - 10)      PHYSICAL EXAM:  Constitutional: A&Ox3, NAD  Respiratory: Unlabored breathing  Abdomen: Soft, Non distended, epigastrium tender to palpation without rebound  Extremities: WWP, NAVARRO spontaneously    LABS:                        11.0   11.90 )-----------( 203      ( 16 Sep 2024 20:22 )             33.6     09-16    136  |  100  |  26<H>  ----------------------------<  95  3.9   |  25  |  1.30    Ca    8.9      16 Sep 2024 07:08  Phos  3.1     09-16  Mg     1.6     09-16    TPro  6.0  /  Alb  2.7<L>  /  TBili  1.9<H>  /  DBili  x   /  AST  42<H>  /  ALT  72<H>  /  AlkPhos  110  09-16    PT/INR - ( 16 Sep 2024 09:50 )   PT: 17.8 sec;   INR: 1.72 ratio         PTT - ( 16 Sep 2024 20:22 )  PTT:63.5 sec  LIVER FUNCTIONS - ( 16 Sep 2024 07:08 )  Alb: 2.7 g/dL / Pro: 6.0 g/dL / ALK PHOS: 110 U/L / ALT: 72 U/L / AST: 42 U/L / GGT: x           Urinalysis Basic - ( 16 Sep 2024 07:08 )    Color: x / Appearance: x / SG: x / pH: x  Gluc: 95 mg/dL / Ketone: x  / Bili: x / Urobili: x   Blood: x / Protein: x / Nitrite: x   Leuk Esterase: x / RBC: x / WBC x   Sq Epi: x / Non Sq Epi: x / Bacteria: x

## 2024-09-18 ENCOUNTER — TRANSCRIPTION ENCOUNTER (OUTPATIENT)
Age: 84
End: 2024-09-18

## 2024-09-18 LAB
ALBUMIN SERPL ELPH-MCNC: 2.6 G/DL — LOW (ref 3.3–5)
ALP SERPL-CCNC: 90 U/L — SIGNIFICANT CHANGE UP (ref 40–120)
ALT FLD-CCNC: 31 U/L — SIGNIFICANT CHANGE UP (ref 10–45)
ANION GAP SERPL CALC-SCNC: 9 MMOL/L — SIGNIFICANT CHANGE UP (ref 5–17)
APTT BLD: 27.2 SEC — SIGNIFICANT CHANGE UP (ref 24.5–35.6)
APTT BLD: >200 SEC — CRITICAL HIGH (ref 24.5–35.6)
AST SERPL-CCNC: 14 U/L — SIGNIFICANT CHANGE UP (ref 10–40)
BILIRUB SERPL-MCNC: 1.1 MG/DL — SIGNIFICANT CHANGE UP (ref 0.2–1.2)
BUN SERPL-MCNC: 26 MG/DL — HIGH (ref 7–23)
CALCIUM SERPL-MCNC: 8.3 MG/DL — LOW (ref 8.4–10.5)
CHLORIDE SERPL-SCNC: 98 MMOL/L — SIGNIFICANT CHANGE UP (ref 96–108)
CO2 SERPL-SCNC: 26 MMOL/L — SIGNIFICANT CHANGE UP (ref 22–31)
CREAT SERPL-MCNC: 1.31 MG/DL — HIGH (ref 0.5–1.3)
EGFR: 53 ML/MIN/1.73M2 — LOW
GLUCOSE SERPL-MCNC: 119 MG/DL — HIGH (ref 70–99)
HCT VFR BLD CALC: 32.8 % — LOW (ref 39–50)
HGB BLD-MCNC: 10.9 G/DL — LOW (ref 13–17)
MAGNESIUM SERPL-MCNC: 2.2 MG/DL — SIGNIFICANT CHANGE UP (ref 1.6–2.6)
MCHC RBC-ENTMCNC: 30.7 PG — SIGNIFICANT CHANGE UP (ref 27–34)
MCHC RBC-ENTMCNC: 33.2 GM/DL — SIGNIFICANT CHANGE UP (ref 32–36)
MCV RBC AUTO: 92.4 FL — SIGNIFICANT CHANGE UP (ref 80–100)
NRBC # BLD: 0 /100 WBCS — SIGNIFICANT CHANGE UP (ref 0–0)
PHOSPHATE SERPL-MCNC: 2.5 MG/DL — SIGNIFICANT CHANGE UP (ref 2.5–4.5)
PLATELET # BLD AUTO: 208 K/UL — SIGNIFICANT CHANGE UP (ref 150–400)
POTASSIUM SERPL-MCNC: 3.2 MMOL/L — LOW (ref 3.5–5.3)
POTASSIUM SERPL-SCNC: 3.2 MMOL/L — LOW (ref 3.5–5.3)
PROT SERPL-MCNC: 5.8 G/DL — LOW (ref 6–8.3)
RBC # BLD: 3.55 M/UL — LOW (ref 4.2–5.8)
RBC # FLD: 12.8 % — SIGNIFICANT CHANGE UP (ref 10.3–14.5)
SODIUM SERPL-SCNC: 133 MMOL/L — LOW (ref 135–145)
WBC # BLD: 9.83 K/UL — SIGNIFICANT CHANGE UP (ref 3.8–10.5)
WBC # FLD AUTO: 9.83 K/UL — SIGNIFICANT CHANGE UP (ref 3.8–10.5)

## 2024-09-18 RX ORDER — POTASSIUM CHLORIDE 10 MEQ
40 TABLET, EXT RELEASE, PARTICLES/CRYSTALS ORAL EVERY 4 HOURS
Refills: 0 | Status: COMPLETED | OUTPATIENT
Start: 2024-09-18 | End: 2024-09-18

## 2024-09-18 RX ORDER — HEPARIN SODIUM,BOVINE 1000/ML
1600 VIAL (ML) INJECTION
Qty: 25000 | Refills: 0 | Status: DISCONTINUED | OUTPATIENT
Start: 2024-09-18 | End: 2024-09-19

## 2024-09-18 RX ORDER — FUROSEMIDE 40 MG
40 TABLET ORAL ONCE
Refills: 0 | Status: COMPLETED | OUTPATIENT
Start: 2024-09-18 | End: 2024-09-18

## 2024-09-18 RX ORDER — SODIUM PHOSPHATE, DIBASIC, ANHYDROUS, POTASSIUM PHOSPHATE, MONOBASIC, AND SODIUM PHOSPHATE, MONOBASIC, MONOHYDRATE 852; 155; 130 MG/1; MG/1; MG/1
1 TABLET, COATED ORAL ONCE
Refills: 0 | Status: COMPLETED | OUTPATIENT
Start: 2024-09-18 | End: 2024-09-18

## 2024-09-18 RX ORDER — SPIRONOLACTONE 25 MG/1
25 TABLET, FILM COATED ORAL ONCE
Refills: 0 | Status: COMPLETED | OUTPATIENT
Start: 2024-09-18 | End: 2024-09-18

## 2024-09-18 RX ORDER — HEPARIN SODIUM,BOVINE 1000/ML
1900 VIAL (ML) INJECTION
Qty: 25000 | Refills: 0 | Status: DISCONTINUED | OUTPATIENT
Start: 2024-09-18 | End: 2024-09-18

## 2024-09-18 RX ADMIN — GUAIFENESIN 600 MILLIGRAM(S): 100 LIQUID ORAL at 17:48

## 2024-09-18 RX ADMIN — PIPERACILLIN SODIUM AND TAZOBACTAM SODIUM 25 GRAM(S): 3; .375 INJECTION, POWDER, FOR SOLUTION INTRAVENOUS at 21:33

## 2024-09-18 RX ADMIN — Medication 40 MILLIGRAM(S): at 09:14

## 2024-09-18 RX ADMIN — Medication 16 UNIT(S)/HR: at 18:54

## 2024-09-18 RX ADMIN — Medication 40 MILLIEQUIVALENT(S): at 15:18

## 2024-09-18 RX ADMIN — TAMSULOSIN HYDROCHLORIDE 0.4 MILLIGRAM(S): 0.4 CAPSULE ORAL at 21:33

## 2024-09-18 RX ADMIN — FINASTERIDE 5 MILLIGRAM(S): 1 TABLET, FILM COATED ORAL at 13:15

## 2024-09-18 RX ADMIN — GUAIFENESIN 600 MILLIGRAM(S): 100 LIQUID ORAL at 05:48

## 2024-09-18 RX ADMIN — Medication 16 UNIT(S)/HR: at 07:19

## 2024-09-18 RX ADMIN — IPRATROPIUM BROMIDE AND ALBUTEROL SULFATE 3 MILLILITER(S): .5; 3 SOLUTION RESPIRATORY (INHALATION) at 13:14

## 2024-09-18 RX ADMIN — IPRATROPIUM BROMIDE AND ALBUTEROL SULFATE 3 MILLILITER(S): .5; 3 SOLUTION RESPIRATORY (INHALATION) at 05:48

## 2024-09-18 RX ADMIN — Medication 500 MILLIGRAM(S): at 13:16

## 2024-09-18 RX ADMIN — Medication 40 MILLIGRAM(S): at 15:17

## 2024-09-18 RX ADMIN — IPRATROPIUM BROMIDE AND ALBUTEROL SULFATE 3 MILLILITER(S): .5; 3 SOLUTION RESPIRATORY (INHALATION) at 23:49

## 2024-09-18 RX ADMIN — IPRATROPIUM BROMIDE AND ALBUTEROL SULFATE 3 MILLILITER(S): .5; 3 SOLUTION RESPIRATORY (INHALATION) at 17:48

## 2024-09-18 RX ADMIN — Medication 40 MILLIEQUIVALENT(S): at 09:12

## 2024-09-18 RX ADMIN — Medication 1 TABLET(S): at 13:15

## 2024-09-18 RX ADMIN — METOPROLOL TARTRATE 25 MILLIGRAM(S): 100 TABLET ORAL at 05:48

## 2024-09-18 RX ADMIN — PIPERACILLIN SODIUM AND TAZOBACTAM SODIUM 25 GRAM(S): 3; .375 INJECTION, POWDER, FOR SOLUTION INTRAVENOUS at 05:48

## 2024-09-18 RX ADMIN — SODIUM PHOSPHATE, DIBASIC, ANHYDROUS, POTASSIUM PHOSPHATE, MONOBASIC, AND SODIUM PHOSPHATE, MONOBASIC, MONOHYDRATE 1 PACKET(S): 852; 155; 130 TABLET, COATED ORAL at 09:13

## 2024-09-18 RX ADMIN — Medication 19 UNIT(S)/HR: at 09:10

## 2024-09-18 RX ADMIN — SPIRONOLACTONE 25 MILLIGRAM(S): 25 TABLET, FILM COATED ORAL at 09:13

## 2024-09-18 RX ADMIN — METOPROLOL TARTRATE 25 MILLIGRAM(S): 100 TABLET ORAL at 17:48

## 2024-09-18 RX ADMIN — Medication 16 UNIT(S)/HR: at 17:46

## 2024-09-18 RX ADMIN — PIPERACILLIN SODIUM AND TAZOBACTAM SODIUM 25 GRAM(S): 3; .375 INJECTION, POWDER, FOR SOLUTION INTRAVENOUS at 13:16

## 2024-09-18 NOTE — PROVIDER CONTACT NOTE (OTHER) - SITUATION
Patient APTT came back >200 not therapeutic
Patient APTT came back 27.2 not therapeutic and potassium 3.2

## 2024-09-18 NOTE — PROGRESS NOTE ADULT - SUBJECTIVE AND OBJECTIVE BOX
INTERVAL EVENTS: No acute events overnight.  SUBJECTIVE: Patient seen and examined at bedside with surgical team, patient without complaints. Denies fever, chills, CP, SOB nausea, vomiting, abdominal pain.    OBJECTIVE:    Vital Signs Last 24 Hrs  T(C): 36.8 (18 Sep 2024 00:08), Max: 36.9 (17 Sep 2024 09:49)  T(F): 98.3 (18 Sep 2024 00:08), Max: 98.4 (17 Sep 2024 09:49)  HR: 89 (18 Sep 2024 00:08) (89 - 102)  BP: 111/64 (18 Sep 2024 00:08) (111/64 - 123/72)  BP(mean): --  RR: 18 (18 Sep 2024 00:08) (18 - 18)  SpO2: 92% (18 Sep 2024 00:08) (92% - 97%)    Parameters below as of 18 Sep 2024 00:08  Patient On (Oxygen Delivery Method): room air    I&O's Detail    16 Sep 2024 07:01  -  17 Sep 2024 07:00  --------------------------------------------------------  IN:    Heparin: 288 mL    Lactated Ringers: 3300 mL  Total IN: 3588 mL    OUT:    Colostomy (mL): 0 mL    Indwelling Catheter - Urethral (mL): 1470 mL    Oral Fluid: 0 mL  Total OUT: 1470 mL    Total NET: 2118 mL      17 Sep 2024 07:01  -  18 Sep 2024 02:10  --------------------------------------------------------  IN:    IV PiggyBack: 100 mL    Oral Fluid: 600 mL  Total IN: 700 mL    OUT:    Colostomy (mL): 350 mL    Voided (mL): 675 mL  Total OUT: 1025 mL    Total NET: -325 mL      MEDICATIONS  (STANDING):  albuterol/ipratropium for Nebulization 3 milliLiter(s) Nebulizer every 6 hours  ascorbic acid 500 milliGRAM(s) Oral daily  finasteride 5 milliGRAM(s) Oral daily  furosemide   Injectable 40 milliGRAM(s) IV Push every 24 hours  guaiFENesin  milliGRAM(s) Oral every 12 hours  heparin  Infusion 1600 Unit(s)/Hr (16 mL/Hr) IV Continuous <Continuous>  influenza  Vaccine (HIGH DOSE) 0.5 milliLiter(s) IntraMuscular once  metoprolol tartrate 25 milliGRAM(s) Oral two times a day  multivitamin 1 Tablet(s) Oral daily  piperacillin/tazobactam IVPB.. 3.375 Gram(s) IV Intermittent every 8 hours  potassium chloride    Tablet ER 40 milliEquivalent(s) Oral every 4 hours  tamsulosin 0.4 milliGRAM(s) Oral at bedtime    MEDICATIONS  (PRN):  acetaminophen     Tablet .. 975 milliGRAM(s) Oral every 6 hours PRN Mild Pain (1 - 3)  oxyCODONE    IR 2.5 milliGRAM(s) Oral every 4 hours PRN Moderate Pain (4 - 6)  oxyCODONE    IR 5 milliGRAM(s) Oral every 4 hours PRN Severe Pain (7 - 10)      PHYSICAL EXAM:  Constitutional: A&Ox3, NAD  Respiratory: Unlabored breathing  Abdomen: Soft, Non distended, epigastrium tender to palpation without rebound  Extremities: WWP, NAVARRO spontaneously  LABS:                        10.6   11.39 )-----------( 193      ( 17 Sep 2024 03:08 )             32.4     09-17    135  |  99  |  27[H]  ----------------------------<  111[H]  3.1[L]   |  26  |  1.31[H]    Ca    8.3[L]      17 Sep 2024 03:08  Phos  2.7     09-17  Mg     2.0     09-17    TPro  5.6[L]  /  Alb  2.5[L]  /  TBili  1.2  /  DBili  x   /  AST  21  /  ALT  45  /  AlkPhos  88  09-17    PT/INR - ( 16 Sep 2024 09:50 )   PT: 17.8 sec;   INR: 1.72 ratio         PTT - ( 17 Sep 2024 03:08 )  PTT:70.1 sec  LIVER FUNCTIONS - ( 17 Sep 2024 03:08 )  Alb: 2.5 g/dL / Pro: 5.6 g/dL / ALK PHOS: 88 U/L / ALT: 45 U/L / AST: 21 U/L / GGT: x           Urinalysis Basic - ( 17 Sep 2024 03:08 )    Color: x / Appearance: x / SG: x / pH: x  Gluc: 111 mg/dL / Ketone: x  / Bili: x / Urobili: x   Blood: x / Protein: x / Nitrite: x   Leuk Esterase: x / RBC: x / WBC x   Sq Epi: x / Non Sq Epi: x / Bacteria: x             INTERVAL EVENTS: No acute events overnight.  SUBJECTIVE: Patient seen and examined at bedside with surgical team, patient without complaints. Passing trial of voiding. Tolerating clear liquid diet. Denies fever, chills, CP, SOB nausea, vomiting, abdominal pain.    OBJECTIVE:    Vital Signs Last 24 Hrs  T(C): 36.8 (18 Sep 2024 00:08), Max: 36.9 (17 Sep 2024 09:49)  T(F): 98.3 (18 Sep 2024 00:08), Max: 98.4 (17 Sep 2024 09:49)  HR: 89 (18 Sep 2024 00:08) (89 - 102)  BP: 111/64 (18 Sep 2024 00:08) (111/64 - 123/72)  BP(mean): --  RR: 18 (18 Sep 2024 00:08) (18 - 18)  SpO2: 92% (18 Sep 2024 00:08) (92% - 97%)    Parameters below as of 18 Sep 2024 00:08  Patient On (Oxygen Delivery Method): room air    I&O's Detail    16 Sep 2024 07:01  -  17 Sep 2024 07:00  --------------------------------------------------------  IN:    Heparin: 288 mL    Lactated Ringers: 3300 mL  Total IN: 3588 mL    OUT:    Colostomy (mL): 0 mL    Indwelling Catheter - Urethral (mL): 1470 mL    Oral Fluid: 0 mL  Total OUT: 1470 mL    Total NET: 2118 mL      17 Sep 2024 07:01  -  18 Sep 2024 02:10  --------------------------------------------------------  IN:    IV PiggyBack: 100 mL    Oral Fluid: 600 mL  Total IN: 700 mL    OUT:    Colostomy (mL): 350 mL    Voided (mL): 675 mL  Total OUT: 1025 mL    Total NET: -325 mL      MEDICATIONS  (STANDING):  albuterol/ipratropium for Nebulization 3 milliLiter(s) Nebulizer every 6 hours  ascorbic acid 500 milliGRAM(s) Oral daily  finasteride 5 milliGRAM(s) Oral daily  furosemide   Injectable 40 milliGRAM(s) IV Push every 24 hours  guaiFENesin  milliGRAM(s) Oral every 12 hours  heparin  Infusion 1600 Unit(s)/Hr (16 mL/Hr) IV Continuous <Continuous>  influenza  Vaccine (HIGH DOSE) 0.5 milliLiter(s) IntraMuscular once  metoprolol tartrate 25 milliGRAM(s) Oral two times a day  multivitamin 1 Tablet(s) Oral daily  piperacillin/tazobactam IVPB.. 3.375 Gram(s) IV Intermittent every 8 hours  potassium chloride    Tablet ER 40 milliEquivalent(s) Oral every 4 hours  tamsulosin 0.4 milliGRAM(s) Oral at bedtime    MEDICATIONS  (PRN):  acetaminophen     Tablet .. 975 milliGRAM(s) Oral every 6 hours PRN Mild Pain (1 - 3)  oxyCODONE    IR 2.5 milliGRAM(s) Oral every 4 hours PRN Moderate Pain (4 - 6)  oxyCODONE    IR 5 milliGRAM(s) Oral every 4 hours PRN Severe Pain (7 - 10)      PHYSICAL EXAM:  Constitutional: A&Ox3, NAD  Respiratory: Unlabored breathing  Abdomen: Soft, Non distended, improving epigastrium tenderness to palpation without rebound  Extremities: WWP, NAVARRO spontaneously    LABS:                        10.6   11.39 )-----------( 193      ( 17 Sep 2024 03:08 )             32.4     09-17    135  |  99  |  27[H]  ----------------------------<  111[H]  3.1[L]   |  26  |  1.31[H]    Ca    8.3[L]      17 Sep 2024 03:08  Phos  2.7     09-17  Mg     2.0     09-17    TPro  5.6[L]  /  Alb  2.5[L]  /  TBili  1.2  /  DBili  x   /  AST  21  /  ALT  45  /  AlkPhos  88  09-17    PT/INR - ( 16 Sep 2024 09:50 )   PT: 17.8 sec;   INR: 1.72 ratio         PTT - ( 17 Sep 2024 03:08 )  PTT:70.1 sec  LIVER FUNCTIONS - ( 17 Sep 2024 03:08 )  Alb: 2.5 g/dL / Pro: 5.6 g/dL / ALK PHOS: 88 U/L / ALT: 45 U/L / AST: 21 U/L / GGT: x           Urinalysis Basic - ( 17 Sep 2024 03:08 )    Color: x / Appearance: x / SG: x / pH: x  Gluc: 111 mg/dL / Ketone: x  / Bili: x / Urobili: x   Blood: x / Protein: x / Nitrite: x   Leuk Esterase: x / RBC: x / WBC x   Sq Epi: x / Non Sq Epi: x / Bacteria: x

## 2024-09-18 NOTE — PROVIDER CONTACT NOTE (OTHER) - ACTION/TREATMENT ORDERED:
Jovany Melendez ([facility] surgery) ordered to stop heparin for 1 hour and will restart heparin with new rate. RN help heparin drip and will await providers orders to change heparin rate in 1 hour.
Jovany Melendez ([facility] surgery) will change rate of heparin and states ok to hold Lasix for now.

## 2024-09-18 NOTE — DISCHARGE NOTE NURSING/CASE MANAGEMENT/SOCIAL WORK - PATIENT PORTAL LINK FT
You can access the FollowMyHealth Patient Portal offered by [facility] by registering at the following website: [email or web address]. By joining [employer]’s FollowMyHealth portal, you will also be able to view your health information using other applications (apps) compatible with our system.

## 2024-09-18 NOTE — PROGRESS NOTE ADULT - ASSESSMENT
84M PMHx HLD, HTN, Stg II Colon Ca s/p L hemicolectomy w end colostomy and mucus fistula 2023 w Dr. Browning, COPD, ILD, HFpEF, Afib on Eliquis who presents to [facility] ED with abdominal pain.   Exam w epigastric TTP. Labs w WBC 18. Lipase 1242. T Bili 1.8. Alp 211. AST/ALT: 218/157. CT abd/pelvis (9/15/24) demonstrates c/f acute pancreatitis and acute cholecystitis. MRCP (9/16/24) with 8.2cm inferior pancreatic collection; low suspicion of cholecystitis.     PLAN:   - RUQ US to r/o acute cholecystitis/cholelithiasis  - Diet: CLD - adv today   - IVF: 1/2 to 75cc/hr  - Pain: PO Meds (PO Tylenol ATC and oxy PRN)  - Passed TOV 17:00  - Abx: IV Zosyn  - F/u GI recs  - F/u cardio recs  - Appreciate pulm recs for prieto-op risk assessment (clearance doc on 9/15) & ILD 2/2 asbestosis     - Standing duonebs q6h now & in the perioperative period      - Hold home stiolto and PRN albuterol until d/c w/ outpatient follow up Dr. 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 Browning, COPD, ILD, HFpEF, Afib on Eliquis who presents to [facility] ED with abdominal pain.   Exam w epigastric TTP. Labs w WBC 18. Lipase 1242. T Bili 1.8. Alp 211. AST/ALT: 218/157. CT abd/pelvis (9/15/24) demonstrates c/f acute pancreatitis and acute cholecystitis. MRCP (9/16/24) with 8.2cm inferior pancreatic collection; low suspicion of cholecystitis.     PLAN:   - Diet: CLD -> adv today   - Pain: PO Meds (PO Tylenol ATC and oxy PRN)  - Passed TOV  - Abx: IV Zosyn (9/15-)  - 1month f/u CT to assess pancreatic collection w/ GI, appreciate recs  - Continue Lasix 40mg daily per cardiology, appreciate recs   - Appreciate pulm recs for prieto-op risk assessment (clearance doc on 9/15) & ILD 2/2 asbestosis     - Standing duonebs q6h now & in the perioperative period      - Hold home stiolto and PRN albuterol until d/c w/ outpatient follow up Dr. Thomas   - VTE ppx: Hep gtt (Eliquis on hold)      Gold team   [id number]

## 2024-09-18 NOTE — PROVIDER CONTACT NOTE (OTHER) - ASSESSMENT
Patient APTT came back >200 not therapeutic. Patient vitals stable at this time. No c/o distress.
Patient APTT came back 27.2 not therapeutic. Patient on heparin patient specific protocol @ 16 ml/hr.  and Patient due for Lasix but potassium 3.2. RN will hold Lasix due to risk of potassium depletion.

## 2024-09-18 NOTE — DISCHARGE NOTE NURSING/CASE MANAGEMENT/SOCIAL WORK - NSDCFUADDAPPT_GEN_ALL_CORE_FT
Please follow up with [facility] in 1 month for repeat CT abdomen/pelvis to re-evaluate pancreatitis and lobulated collection with possible endoscopy. Please call [phone] for an appointment    Please follow up with your primary care provider in 1-2 weeks after discharge from the hospital

## 2024-09-18 NOTE — PROVIDER CONTACT NOTE (OTHER) - RECOMMENDATIONS
Jovany Melendez ([facility]) aware and notified.
Jovany Melendez ([facility]) aware and notified.

## 2024-09-18 NOTE — PROGRESS NOTE ADULT - ATTENDING COMMENTS
As above  84M w HLD, HTN, Stg II Colon Ca s/p L hemicolectomy w end colostomy and mucus fistula 2023 w Dr. Browning, COPD, ILD, HFpEF, Afib on Eliquis here with pancreatitis  No gallstones seen in GB or in CBD - ?passed stone  Also noted HOP fluid collection (immature) with upstream PD dilation  No endoscopic intervention at this time, need pancreatitis to improve then will need repeat CT scan + EUS as an outpatient in 3-4 weeks  Continue IVF, pain control, oral hydration and low fat diet    Thank you for this interesting consult.  Please call the advanced GI service with any questions or concerns.
I have seen and examined the patient. I agree with the above surgery resident's note.  pancreatitis- unclear etiology  mrcp today  npo, ivf
feels better  abd bening  resolving pancreatitis  reg diet  GI f/u as out pt  no role for ethan

## 2024-09-18 NOTE — PROVIDER CONTACT NOTE (OTHER) - BACKGROUND
Patient admitted for decreased urine output and increased ostomy output.
Patient admitted for decreased urine output and increased ostomy output.
none

## 2024-09-18 NOTE — PROVIDER CONTACT NOTE (OTHER) - REASON
Patient APTT came back 27.2 not therapeutic and potassium 3.2
Patient APTT came back >200 not therapeutic

## 2024-09-18 NOTE — PROGRESS NOTE ADULT - SUBJECTIVE AND OBJECTIVE BOX
DATE OF SERVICE: 09-18-24 @ 14:56    Patient is a 84y old  Male who presents with a chief complaint of Acute cholecystitis    Chart reviewed, events noted. This is a "84 yrs old male w/ hx of HLD, HTN, Stg II Colon Ca s/p L hemicolectomy w end colostomy and mucus fistula 2023 w Dr. Browning, COPD, ILD, HFpEF, Afib on Eliquis who presented with abd pain for the past 1 day, found to have acute pancreatitis and cholecystitis." (17 Sep 2024 10:51)      INTERVAL HISTORY: Feels ok.     REVIEW OF SYSTEMS:  CONSTITUTIONAL: No weakness  EYES/ENT: No visual changes;  No throat pain   NECK: No pain or stiffness  RESPIRATORY: No cough, wheezing; No shortness of breath  CARDIOVASCULAR: No chest pain or palpitations  GASTROINTESTINAL: No abdominal  pain. No nausea, vomiting, or hematemesis  GENITOURINARY: No dysuria, frequency or hematuria  NEUROLOGICAL: No stroke like symptoms  SKIN: No rashes    	  MEDICATIONS:  furosemide   Injectable 40 milliGRAM(s) IV Push every 24 hours  furosemide   Injectable 40 milliGRAM(s) IV Push once  metoprolol tartrate 25 milliGRAM(s) Oral two times a day        PHYSICAL EXAM:  T(C): 36.8 (09-18-24 @ 14:03), Max: 36.8 (09-18-24 @ 00:08)  HR: 95 (09-18-24 @ 14:03) (89 - 95)  BP: 130/72 (09-18-24 @ 14:03) (111/64 - 130/72)  RR: 18 (09-18-24 @ 14:03) (18 - 18)  SpO2: 95% (09-18-24 @ 14:03) (92% - 95%)  Wt(kg): --  I&O's Summary    17 Sep 2024 07:01  -  18 Sep 2024 07:00  --------------------------------------------------------  IN: 992 mL / OUT: 1275 mL / NET: -283 mL    18 Sep 2024 07:01  -  18 Sep 2024 14:56  --------------------------------------------------------  IN: 500 mL / OUT: 300 mL / NET: 200 mL          Appearance: In no distress	  HEENT:    PERRL, EOMI	  Cardiovascular:  S1 S2, No JVD  Respiratory: Lungs clear to auscultation	  Gastrointestinal:  Soft, Non-tender, + BS	  Vascularature:  No edema of LE  Psychiatric: Appropriate affect   Neuro: no acute focal deficits                               10.9   9.83  )-----------( 208      ( 18 Sep 2024 06:56 )             32.8     09-18    133[L]  |  98  |  26[H]  ----------------------------<  119[H]  3.2[L]   |  26  |  1.31[H]    Ca    8.3[L]      18 Sep 2024 06:58  Phos  2.5     09-18  Mg     2.2     09-18    TPro  5.8[L]  /  Alb  2.6[L]  /  TBili  1.1  /  DBili  x   /  AST  14  /  ALT  31  /  AlkPhos  90  09-18        Labs personally reviewed      ASSESSMENT/PLAN: 	  84M PMHx HLD, HTn, Stg II Colon Ca s/p L hemicolectomy w end colostomy and mucus fistula 2023 w Dr. Browning, COPD, ILD, HFpEF, Afib on Eliquis who presents to [facility] ED with abdominal pain. Pt reports acute onset abdominal pain x 1 day.    1. Cardiac Risk Stratification   - patient is not in active tachy/marysol syndrome  - patient exercise tolerance limited due to shortness of breath (COPD and asbestosis) and instability   - EKG AFIB @ 78  - TTE with preserved EF, mild pHTN    2. Acute diastolic HF  - patient with +1 pitting edema b/l  - CXR with pulm edema  - Probnp 4095  - TTE unremarkable  - Cont Lasix 40mg IV daily  - Caution with IVF  - Resume home Bumex 2mg PO in am and 1mg in PM for DC    3. AFIB  - on home Eliquis  - Check TSH    4. HLD  - hold statin, patient with elevated LFTs     5. DVT prophylactic  - Lovenox subQ              ALPESH Cottrell-ELIZABETH Ballesteros DO [facility]  Cardiovascular Medicine  [address] [place] [address], [address]  Available through call or text on [employer] TEAMs  Office: [phone]

## 2024-09-19 ENCOUNTER — NON-APPOINTMENT (OUTPATIENT)
Age: 84
End: 2024-09-19

## 2024-09-19 VITALS
HEART RATE: 91 BPM | RESPIRATION RATE: 18 BRPM | DIASTOLIC BLOOD PRESSURE: 73 MMHG | OXYGEN SATURATION: 92 % | TEMPERATURE: 98 F | SYSTOLIC BLOOD PRESSURE: 116 MMHG

## 2024-09-19 LAB
ALBUMIN SERPL ELPH-MCNC: 2.6 G/DL — LOW (ref 3.3–5)
ALP SERPL-CCNC: 87 U/L — SIGNIFICANT CHANGE UP (ref 40–120)
ALT FLD-CCNC: 23 U/L — SIGNIFICANT CHANGE UP (ref 10–45)
ANION GAP SERPL CALC-SCNC: 12 MMOL/L — SIGNIFICANT CHANGE UP (ref 5–17)
ANION GAP SERPL CALC-SCNC: 12 MMOL/L — SIGNIFICANT CHANGE UP (ref 5–17)
APTT BLD: 49.9 SEC — HIGH (ref 24.5–35.6)
AST SERPL-CCNC: 12 U/L — SIGNIFICANT CHANGE UP (ref 10–40)
BILIRUB SERPL-MCNC: 0.8 MG/DL — SIGNIFICANT CHANGE UP (ref 0.2–1.2)
BUN SERPL-MCNC: 29 MG/DL — HIGH (ref 7–23)
BUN SERPL-MCNC: 30 MG/DL — HIGH (ref 7–23)
CALCIUM SERPL-MCNC: 8.2 MG/DL — LOW (ref 8.4–10.5)
CALCIUM SERPL-MCNC: 8.4 MG/DL — SIGNIFICANT CHANGE UP (ref 8.4–10.5)
CHLORIDE SERPL-SCNC: 95 MMOL/L — LOW (ref 96–108)
CHLORIDE SERPL-SCNC: 98 MMOL/L — SIGNIFICANT CHANGE UP (ref 96–108)
CO2 SERPL-SCNC: 25 MMOL/L — SIGNIFICANT CHANGE UP (ref 22–31)
CO2 SERPL-SCNC: 25 MMOL/L — SIGNIFICANT CHANGE UP (ref 22–31)
CREAT SERPL-MCNC: 1.36 MG/DL — HIGH (ref 0.5–1.3)
CREAT SERPL-MCNC: 1.51 MG/DL — HIGH (ref 0.5–1.3)
EGFR: 45 ML/MIN/1.73M2 — LOW
EGFR: 51 ML/MIN/1.73M2 — LOW
GLUCOSE SERPL-MCNC: 101 MG/DL — HIGH (ref 70–99)
GLUCOSE SERPL-MCNC: 104 MG/DL — HIGH (ref 70–99)
MAGNESIUM SERPL-MCNC: 2.1 MG/DL — SIGNIFICANT CHANGE UP (ref 1.6–2.6)
PHOSPHATE SERPL-MCNC: 2.8 MG/DL — SIGNIFICANT CHANGE UP (ref 2.5–4.5)
POTASSIUM SERPL-MCNC: 3.3 MMOL/L — LOW (ref 3.5–5.3)
POTASSIUM SERPL-MCNC: 3.6 MMOL/L — SIGNIFICANT CHANGE UP (ref 3.5–5.3)
POTASSIUM SERPL-SCNC: 3.3 MMOL/L — LOW (ref 3.5–5.3)
POTASSIUM SERPL-SCNC: 3.6 MMOL/L — SIGNIFICANT CHANGE UP (ref 3.5–5.3)
PROT SERPL-MCNC: 5.8 G/DL — LOW (ref 6–8.3)
SODIUM SERPL-SCNC: 132 MMOL/L — LOW (ref 135–145)
SODIUM SERPL-SCNC: 135 MMOL/L — SIGNIFICANT CHANGE UP (ref 135–145)
TSH SERPL-MCNC: 0.03 UIU/ML — LOW (ref 0.27–4.2)

## 2024-09-19 RX ORDER — APIXABAN 5 MG/1
2.5 TABLET, FILM COATED ORAL EVERY 12 HOURS
Refills: 0 | Status: DISCONTINUED | OUTPATIENT
Start: 2024-09-19 | End: 2024-09-19

## 2024-09-19 RX ORDER — ACETAMINOPHEN 325 MG/1
3 TABLET ORAL
Qty: 0 | Refills: 0 | DISCHARGE
Start: 2024-09-19

## 2024-09-19 RX ORDER — SODIUM PHOSPHATE, DIBASIC, ANHYDROUS, POTASSIUM PHOSPHATE, MONOBASIC, AND SODIUM PHOSPHATE, MONOBASIC, MONOHYDRATE 852; 155; 130 MG/1; MG/1; MG/1
1 TABLET, COATED ORAL ONCE
Refills: 0 | Status: COMPLETED | OUTPATIENT
Start: 2024-09-19 | End: 2024-09-19

## 2024-09-19 RX ORDER — POTASSIUM PHOSPHATE 236; 224 MG/ML; MG/ML
15 INJECTION, SOLUTION INTRAVENOUS ONCE
Refills: 0 | Status: COMPLETED | OUTPATIENT
Start: 2024-09-19 | End: 2024-09-19

## 2024-09-19 RX ADMIN — SODIUM PHOSPHATE, DIBASIC, ANHYDROUS, POTASSIUM PHOSPHATE, MONOBASIC, AND SODIUM PHOSPHATE, MONOBASIC, MONOHYDRATE 1 PACKET(S): 852; 155; 130 TABLET, COATED ORAL at 09:12

## 2024-09-19 RX ADMIN — Medication 18 UNIT(S)/HR: at 07:59

## 2024-09-19 RX ADMIN — Medication 40 MILLIGRAM(S): at 08:28

## 2024-09-19 RX ADMIN — Medication 18 UNIT(S)/HR: at 01:40

## 2024-09-19 RX ADMIN — POTASSIUM PHOSPHATE 62.5 MILLIMOLE(S): 236; 224 INJECTION, SOLUTION INTRAVENOUS at 09:12

## 2024-09-19 RX ADMIN — IPRATROPIUM BROMIDE AND ALBUTEROL SULFATE 3 MILLILITER(S): .5; 3 SOLUTION RESPIRATORY (INHALATION) at 05:38

## 2024-09-19 RX ADMIN — PIPERACILLIN SODIUM AND TAZOBACTAM SODIUM 25 GRAM(S): 3; .375 INJECTION, POWDER, FOR SOLUTION INTRAVENOUS at 05:39

## 2024-09-19 RX ADMIN — GUAIFENESIN 600 MILLIGRAM(S): 100 LIQUID ORAL at 05:39

## 2024-09-19 RX ADMIN — METOPROLOL TARTRATE 25 MILLIGRAM(S): 100 TABLET ORAL at 05:39

## 2024-09-19 NOTE — PROGRESS NOTE ADULT - PROVIDER SPECIALTY LIST ADULT
Cardiology
Gastroenterology
Cardiology
Cardiology
Surgery

## 2024-09-19 NOTE — PROGRESS NOTE ADULT - SUBJECTIVE AND OBJECTIVE BOX
DATE OF SERVICE: 09-19-24 @ 10:05    Patient is a 84y old  Male who presents with a chief complaint of Acute cholecystitis    Chart reviewed, events noted. This is a "84 yrs old male w/ hx of HLD, HTN, Stg II Colon Ca s/p L hemicolectomy w end colostomy and mucus fistula 2023 w Dr. Browning, COPD, ILD, HFpEF, Afib on Eliquis who presented with abd pain for the past 1 day, found to have acute pancreatitis and cholecystitis." (17 Sep 2024 10:51)      INTERVAL HISTORY: Feels ok.     REVIEW OF SYSTEMS:  CONSTITUTIONAL: No weakness  EYES/ENT: No visual changes;  No throat pain   NECK: No pain or stiffness  RESPIRATORY: No cough, wheezing; No shortness of breath  CARDIOVASCULAR: No chest pain or palpitations  GASTROINTESTINAL: No abdominal  pain. No nausea, vomiting, or hematemesis  GENITOURINARY: No dysuria, frequency or hematuria  NEUROLOGICAL: No stroke like symptoms  SKIN: No rashes    	  MEDICATIONS:  furosemide   Injectable 40 milliGRAM(s) IV Push every 24 hours  metoprolol tartrate 25 milliGRAM(s) Oral two times a day        PHYSICAL EXAM:  T(C): 36.6 (09-19-24 @ 08:34), Max: 36.9 (09-18-24 @ 20:37)  HR: 98 (09-19-24 @ 08:34) (82 - 98)  BP: 108/68 (09-19-24 @ 08:34) (108/68 - 138/79)  RR: 18 (09-19-24 @ 08:34) (18 - 18)  SpO2: 92% (09-19-24 @ 08:34) (92% - 95%)  Wt(kg): --  I&O's Summary    18 Sep 2024 07:01  -  19 Sep 2024 07:00  --------------------------------------------------------  IN: 902 mL / OUT: 1850 mL / NET: -948 mL    19 Sep 2024 07:01  -  19 Sep 2024 10:05  --------------------------------------------------------  IN: 600 mL / OUT: 0 mL / NET: 600 mL          Appearance: In no distress	  HEENT:    PERRL, EOMI	  Cardiovascular:  S1 S2, + JVD  Respiratory: Lungs clear to auscultation	  Gastrointestinal:  Soft, Non-tender, + BS	  Vascularature:  + edema of LE  Psychiatric: Appropriate affect   Neuro: no acute focal deficits                               10.9   9.83  )-----------( 208      ( 18 Sep 2024 06:56 )             32.8     09-19    132[L]  |  95[L]  |  30[H]  ----------------------------<  104[H]  3.3[L]   |  25  |  1.51[H]    Ca    8.2[L]      19 Sep 2024 07:21  Phos  2.8     09-19  Mg     2.1     09-19    TPro  5.8[L]  /  Alb  2.6[L]  /  TBili  0.8  /  DBili  x   /  AST  12  /  ALT  23  /  AlkPhos  87  09-19        Labs personally reviewed      ASSESSMENT/PLAN: 	    84M PMHx HLD, HTn, Stg II Colon Ca s/p L hemicolectomy w end colostomy and mucus fistula 2023 w Dr. Browning, COPD, ILD, HFpEF, Afib on Eliquis who presents to [facility] ED with abdominal pain. Pt reports acute onset abdominal pain x 1 day.    1. Cardiac Risk Stratification   - patient is not in active tachy/marysol syndrome  - patient exercise tolerance limited due to shortness of breath (COPD and asbestosis) and instability   - EKG AFIB @ 78  - TTE with preserved EF, mild pHTN    2. Acute diastolic HF  - patient with +1 pitting edema b/l  - CXR with pulm edema  - Probnp 4095  - TTE unremarkable  - Cont Lasix 40mg IV daily  - Caution with IVF  - Resume home Bumex 2mg PO in am and 1mg in PM for DC  - c/w spironolactone 25mg PO daily    3. AFIB  - on home Eliquis  - Check TSH    4. HLD  - hold statin, patient with elevated LFTs     5. DVT prophylactic  - Lovenox subQ    OP follow up with Dr. Kahn.     Zena Garcia, AG-NP   Joce Ballesteros,  [facility]  Cardiovascular Medicine  [address] [place] [address], [address]  Available through call or text on [employer] TEAMs  Office: [phone]   DATE OF SERVICE: 09-19-24 @ 10:05    Patient is a 84y old  Male who presents with a chief complaint of Acute cholecystitis    Chart reviewed, events noted. This is a "84 yrs old male w/ hx of HLD, HTN, Stg II Colon Ca s/p L hemicolectomy w end colostomy and mucus fistula 2023 w Dr. Browning, COPD, ILD, HFpEF, Afib on Eliquis who presented with abd pain for the past 1 day, found to have acute pancreatitis and cholecystitis." (17 Sep 2024 10:51)      INTERVAL HISTORY: Feels ok.     REVIEW OF SYSTEMS:  CONSTITUTIONAL: No weakness  EYES/ENT: No visual changes;  No throat pain   NECK: No pain or stiffness  RESPIRATORY: No cough, wheezing; No shortness of breath  CARDIOVASCULAR: No chest pain or palpitations  GASTROINTESTINAL: No abdominal  pain. No nausea, vomiting, or hematemesis  GENITOURINARY: No dysuria, frequency or hematuria  NEUROLOGICAL: No stroke like symptoms  SKIN: No rashes    	  MEDICATIONS:  furosemide   Injectable 40 milliGRAM(s) IV Push every 24 hours  metoprolol tartrate 25 milliGRAM(s) Oral two times a day        PHYSICAL EXAM:  T(C): 36.6 (09-19-24 @ 08:34), Max: 36.9 (09-18-24 @ 20:37)  HR: 98 (09-19-24 @ 08:34) (82 - 98)  BP: 108/68 (09-19-24 @ 08:34) (108/68 - 138/79)  RR: 18 (09-19-24 @ 08:34) (18 - 18)  SpO2: 92% (09-19-24 @ 08:34) (92% - 95%)  Wt(kg): --  I&O's Summary    18 Sep 2024 07:01  -  19 Sep 2024 07:00  --------------------------------------------------------  IN: 902 mL / OUT: 1850 mL / NET: -948 mL    19 Sep 2024 07:01  -  19 Sep 2024 10:05  --------------------------------------------------------  IN: 600 mL / OUT: 0 mL / NET: 600 mL          Appearance: In no distress	  HEENT:    PERRL, EOMI	  Cardiovascular:  S1 S2, + JVD  Respiratory: Lungs clear to auscultation	  Gastrointestinal:  Soft, Non-tender, + BS	  Vascularature:  + edema of LE  Psychiatric: Appropriate affect   Neuro: no acute focal deficits                               10.9   9.83  )-----------( 208      ( 18 Sep 2024 06:56 )             32.8     09-19    132[L]  |  95[L]  |  30[H]  ----------------------------<  104[H]  3.3[L]   |  25  |  1.51[H]    Ca    8.2[L]      19 Sep 2024 07:21  Phos  2.8     09-19  Mg     2.1     09-19    TPro  5.8[L]  /  Alb  2.6[L]  /  TBili  0.8  /  DBili  x   /  AST  12  /  ALT  23  /  AlkPhos  87  09-19        Labs personally reviewed      ASSESSMENT/PLAN: 	    84M PMHx HLD, HTn, Stg II Colon Ca s/p L hemicolectomy w end colostomy and mucus fistula 2023 w Dr. Browning, COPD, ILD, HFpEF, Afib on Eliquis who presents to [facility] ED with abdominal pain. Pt reports acute onset abdominal pain x 1 day.    1. Cardiac Risk Stratification   - patient is not in active tachy/marysol syndrome  - patient exercise tolerance limited due to shortness of breath (COPD and asbestosis) and instability   - EKG AFIB @ 78  - TTE with preserved EF, mild pHTN    2. Acute diastolic HF  - patient with +1 pitting edema b/l  - CXR with pulm edema  - Probnp 4095  - TTE unremarkable  - Cont Lasix 40mg IV daily  - Caution with IVF  - Resume home Bumex 2mg PO in am and 1mg in PM for DC  - c/w spironolactone 25mg PO daily    3. AFIB  - on home Eliquis  - Check TSH    4. HLD  - hold statin, patient with elevated LFTs     5. DVT prophylactic  - Lovenox subQ      ALPESH Cottrell-NP   Joce Ballesteros DO [facility]  Cardiovascular Medicine  [address] [place] [address], [address]  Available through call or text on [employer] TEAMs  Office: [phone]   DATE OF SERVICE: 09-19-24 @ 10:05    Patient is a 84y old  Male who presents with a chief complaint of Acute cholecystitis    Chart reviewed, events noted. This is a "84 yrs old male w/ hx of HLD, HTN, Stg II Colon Ca s/p L hemicolectomy w end colostomy and mucus fistula 2023 w Dr. Browning, COPD, ILD, HFpEF, Afib on Eliquis who presented with abd pain for the past 1 day, found to have acute pancreatitis and cholecystitis." (17 Sep 2024 10:51)      INTERVAL HISTORY: Feels ok.     REVIEW OF SYSTEMS:  CONSTITUTIONAL: No weakness  EYES/ENT: No visual changes;  No throat pain   NECK: No pain or stiffness  RESPIRATORY: No cough, wheezing; No shortness of breath  CARDIOVASCULAR: No chest pain or palpitations  GASTROINTESTINAL: No abdominal  pain. No nausea, vomiting, or hematemesis  GENITOURINARY: No dysuria, frequency or hematuria  NEUROLOGICAL: No stroke like symptoms  SKIN: No rashes    	  MEDICATIONS:  furosemide   Injectable 40 milliGRAM(s) IV Push every 24 hours  metoprolol tartrate 25 milliGRAM(s) Oral two times a day        PHYSICAL EXAM:  T(C): 36.6 (09-19-24 @ 08:34), Max: 36.9 (09-18-24 @ 20:37)  HR: 98 (09-19-24 @ 08:34) (82 - 98)  BP: 108/68 (09-19-24 @ 08:34) (108/68 - 138/79)  RR: 18 (09-19-24 @ 08:34) (18 - 18)  SpO2: 92% (09-19-24 @ 08:34) (92% - 95%)  Wt(kg): --  I&O's Summary    18 Sep 2024 07:01  -  19 Sep 2024 07:00  --------------------------------------------------------  IN: 902 mL / OUT: 1850 mL / NET: -948 mL    19 Sep 2024 07:01  -  19 Sep 2024 10:05  --------------------------------------------------------  IN: 600 mL / OUT: 0 mL / NET: 600 mL          Appearance: In no distress	  HEENT:    PERRL, EOMI	  Cardiovascular:  S1 S2, + JVD  Respiratory: Lungs clear to auscultation	  Gastrointestinal:  Soft, Non-tender, + BS	  Vascularature:  + edema of LE  Psychiatric: Appropriate affect   Neuro: no acute focal deficits                               10.9   9.83  )-----------( 208      ( 18 Sep 2024 06:56 )             32.8     09-19    132[L]  |  95[L]  |  30[H]  ----------------------------<  104[H]  3.3[L]   |  25  |  1.51[H]    Ca    8.2[L]      19 Sep 2024 07:21  Phos  2.8     09-19  Mg     2.1     09-19    TPro  5.8[L]  /  Alb  2.6[L]  /  TBili  0.8  /  DBili  x   /  AST  12  /  ALT  23  /  AlkPhos  87  09-19        Labs personally reviewed      ASSESSMENT/PLAN: 	    84M PMHx HLD, HTn, Stg II Colon Ca s/p L hemicolectomy w end colostomy and mucus fistula 2023 w Dr. Browning, COPD, ILD, HFpEF, Afib on Eliquis who presents to [facility] ED with abdominal pain. Pt reports acute onset abdominal pain x 1 day.    1. Cardiac Risk Stratification   - patient is not in active tachy/marysol syndrome  - patient exercise tolerance limited due to shortness of breath (COPD and asbestosis) and instability   - EKG AFIB @ 78  - TTE with preserved EF, mild pHTN    2. Acute diastolic HF  - patient with +1 pitting edema b/l  - CXR with pulm edema  - Probnp 4095  - TTE unremarkable  - Cont Lasix 40mg IV daily  - Caution with IVF  - Resume home Bumex 2mg PO in am and 1mg in PM for DC    3. AFIB  - on home Eliquis  - Check TSH    4. HLD  - hold statin, patient with elevated LFTs     5. DVT prophylactic  - Lovenox subQ      Zena Jose, AG-NP   Joce Javdan, DO Located within [facility]  Cardiovascular Medicine  [address] [place] [address], [address]  Available through call or text on [employer] TEAM[employer]  Office: [phone]   DATE OF SERVICE: 09-19-24 @ 10:05    Patient is a 84y old  Male who presents with a chief complaint of Acute cholecystitis    Chart reviewed, events noted. This is a "84 yrs old male w/ hx of HLD, HTN, Stg II Colon Ca s/p L hemicolectomy w end colostomy and mucus fistula 2023 w Dr. Browning, COPD, ILD, HFpEF, Afib on Eliquis who presented with abd pain for the past 1 day, found to have acute pancreatitis and cholecystitis." (17 Sep 2024 10:51)      INTERVAL HISTORY: Feels ok.     REVIEW OF SYSTEMS:  CONSTITUTIONAL: No weakness  EYES/ENT: No visual changes;  No throat pain   NECK: No pain or stiffness  RESPIRATORY: No cough, wheezing; No shortness of breath  CARDIOVASCULAR: No chest pain or palpitations  GASTROINTESTINAL: No abdominal  pain. No nausea, vomiting, or hematemesis  GENITOURINARY: No dysuria, frequency or hematuria  NEUROLOGICAL: No stroke like symptoms  SKIN: No rashes    	  MEDICATIONS:  furosemide   Injectable 40 milliGRAM(s) IV Push every 24 hours  metoprolol tartrate 25 milliGRAM(s) Oral two times a day        PHYSICAL EXAM:  T(C): 36.6 (09-19-24 @ 08:34), Max: 36.9 (09-18-24 @ 20:37)  HR: 98 (09-19-24 @ 08:34) (82 - 98)  BP: 108/68 (09-19-24 @ 08:34) (108/68 - 138/79)  RR: 18 (09-19-24 @ 08:34) (18 - 18)  SpO2: 92% (09-19-24 @ 08:34) (92% - 95%)  Wt(kg): --  I&O's Summary    18 Sep 2024 07:01  -  19 Sep 2024 07:00  --------------------------------------------------------  IN: 902 mL / OUT: 1850 mL / NET: -948 mL    19 Sep 2024 07:01  -  19 Sep 2024 10:05  --------------------------------------------------------  IN: 600 mL / OUT: 0 mL / NET: 600 mL          Appearance: In no distress	  HEENT:    PERRL, EOMI	  Cardiovascular:  S1 S2, + JVD  Respiratory: Lungs clear to auscultation	  Gastrointestinal:  Soft, Non-tender, + BS	  Vascularature:  + edema of LE  Psychiatric: Appropriate affect   Neuro: no acute focal deficits                               10.9   9.83  )-----------( 208      ( 18 Sep 2024 06:56 )             32.8     09-19    132[L]  |  95[L]  |  30[H]  ----------------------------<  104[H]  3.3[L]   |  25  |  1.51[H]    Ca    8.2[L]      19 Sep 2024 07:21  Phos  2.8     09-19  Mg     2.1     09-19    TPro  5.8[L]  /  Alb  2.6[L]  /  TBili  0.8  /  DBili  x   /  AST  12  /  ALT  23  /  AlkPhos  87  09-19        Labs personally reviewed      ASSESSMENT/PLAN: 	    84M PMHx HLD, HTn, Stg II Colon Ca s/p L hemicolectomy w end colostomy and mucus fistula 2023 w Dr. Browning, COPD, ILD, HFpEF, Afib on Eliquis who presents to [facility] ED with abdominal pain. Pt reports acute onset abdominal pain x 1 day.    1. Cardiac Risk Stratification   - patient is not in active tachy/marysol syndrome  - patient exercise tolerance limited due to shortness of breath (COPD and asbestosis) and instability   - EKG AFIB @ 78  - TTE with preserved EF, mild pHTN    2. Acute diastolic HF  - patient with +1 pitting edema b/l  - CXR with pulm edema  - Probnp 4095  - TTE unremarkable  - Cont Lasix 40mg IV daily  - Caution with IVF  - Resume home Bumex 2mg PO in am and 1mg in PM for DC    3. AFIB  - on home Eliquis  - Check TSH    4. HLD  - hold statin, patient with elevated LFTs     5. DVT prophylactic  - Lovenox subQ            Zena Jose, AG-NP   Joce Javdan, DO [facility]  Cardiovascular Medicine  [address] [place] [address], [address]  Available through call or text on [employer] TE[employer]  Office: [phone]

## 2024-09-19 NOTE — PROGRESS NOTE ADULT - SUBJECTIVE AND OBJECTIVE BOX
INTERVAL EVENTS: No acute events overnight.  SUBJECTIVE: Patient seen and examined at bedside with surgical team, patient without complaints. Denies fever, chills, CP, SOB nausea, vomiting, abdominal pain.    OBJECTIVE:    Vital Signs Last 24 Hrs  T(C): 36.9 (18 Sep 2024 20:37), Max: 36.9 (18 Sep 2024 20:37)  T(F): 98.5 (18 Sep 2024 20:37), Max: 98.5 (18 Sep 2024 20:37)  HR: 93 (18 Sep 2024 20:37) (89 - 97)  BP: 138/79 (18 Sep 2024 20:37) (120/70 - 138/79)  BP(mean): --  RR: 18 (18 Sep 2024 20:37) (18 - 18)  SpO2: 95% (18 Sep 2024 20:37) (94% - 95%)    Parameters below as of 18 Sep 2024 20:37  Patient On (Oxygen Delivery Method): room air    I&O's Detail    17 Sep 2024 07:01  -  18 Sep 2024 07:00  --------------------------------------------------------  IN:    Heparin: 192 mL    IV PiggyBack: 200 mL    Oral Fluid: 600 mL  Total IN: 992 mL    OUT:    Colostomy (mL): 350 mL    Voided (mL): 925 mL  Total OUT: 1275 mL    Total NET: -283 mL      18 Sep 2024 07:01  -  19 Sep 2024 00:33  --------------------------------------------------------  IN:    IV PiggyBack: 100 mL    Oral Fluid: 500 mL  Total IN: 600 mL    OUT:    Colostomy (mL): 150 mL    Voided (mL): 1150 mL  Total OUT: 1300 mL    Total NET: -700 mL      MEDICATIONS  (STANDING):  albuterol/ipratropium for Nebulization 3 milliLiter(s) Nebulizer every 6 hours  ascorbic acid 500 milliGRAM(s) Oral daily  finasteride 5 milliGRAM(s) Oral daily  furosemide   Injectable 40 milliGRAM(s) IV Push every 24 hours  guaiFENesin  milliGRAM(s) Oral every 12 hours  heparin  Infusion 1600 Unit(s)/Hr (16 mL/Hr) IV Continuous <Continuous>  influenza  Vaccine (HIGH DOSE) 0.5 milliLiter(s) IntraMuscular once  metoprolol tartrate 25 milliGRAM(s) Oral two times a day  multivitamin 1 Tablet(s) Oral daily  piperacillin/tazobactam IVPB.. 3.375 Gram(s) IV Intermittent every 8 hours  tamsulosin 0.4 milliGRAM(s) Oral at bedtime    MEDICATIONS  (PRN):  acetaminophen     Tablet .. 975 milliGRAM(s) Oral every 6 hours PRN Mild Pain (1 - 3)  oxyCODONE    IR 2.5 milliGRAM(s) Oral every 4 hours PRN Moderate Pain (4 - 6)  oxyCODONE    IR 5 milliGRAM(s) Oral every 4 hours PRN Severe Pain (7 - 10)      PHYSICAL EXAM:  Constitutional: A&Ox3, NAD  Respiratory: Unlabored breathing  Abdomen: Soft, Non distended, improving epigastrium tenderness to palpation without rebound  Extremities: WWP, NAVARRO spontaneously    LABS:                        10.9   9.83  )-----------( 208      ( 18 Sep 2024 06:56 )             32.8     09-18    133[L]  |  98  |  26[H]  ----------------------------<  119[H]  3.2[L]   |  26  |  1.31[H]    Ca    8.3[L]      18 Sep 2024 06:58  Phos  2.5     09-18  Mg     2.2     09-18    TPro  5.8[L]  /  Alb  2.6[L]  /  TBili  1.1  /  DBili  x   /  AST  14  /  ALT  31  /  AlkPhos  90  09-18    PTT - ( 18 Sep 2024 15:29 )  PTT:>200.0 sec  LIVER FUNCTIONS - ( 18 Sep 2024 06:58 )  Alb: 2.6 g/dL / Pro: 5.8 g/dL / ALK PHOS: 90 U/L / ALT: 31 U/L / AST: 14 U/L / GGT: x           Urinalysis Basic - ( 18 Sep 2024 06:58 )    Color: x / Appearance: x / SG: x / pH: x  Gluc: 119 mg/dL / Ketone: x  / Bili: x / Urobili: x   Blood: x / Protein: x / Nitrite: x   Leuk Esterase: x / RBC: x / WBC x   Sq Epi: x / Non Sq Epi: x / Bacteria: x             INTERVAL EVENTS: No acute events overnight.  SUBJECTIVE: Patient seen and examined at bedside with surgical team, patient without complaints. Denies fever, chills, CP, SOB nausea, vomiting, abdominal pain.    OBJECTIVE:     Vital Signs Last 24 Hrs  T(C): 36.9 (18 Sep 2024 20:37), Max: 36.9 (18 Sep 2024 20:37)  T(F): 98.5 (18 Sep 2024 20:37), Max: 98.5 (18 Sep 2024 20:37)  HR: 93 (18 Sep 2024 20:37) (89 - 97)  BP: 138/79 (18 Sep 2024 20:37) (120/70 - 138/79)  BP(mean): --  RR: 18 (18 Sep 2024 20:37) (18 - 18)  SpO2: 95% (18 Sep 2024 20:37) (94% - 95%)    Parameters below as of 18 Sep 2024 20:37  Patient On (Oxygen Delivery Method): room air    I&O's Detail    17 Sep 2024 07:01  -  18 Sep 2024 07:00  --------------------------------------------------------  IN:    Heparin: 192 mL    IV PiggyBack: 200 mL    Oral Fluid: 600 mL  Total IN: 992 mL    OUT:    Colostomy (mL): 350 mL    Voided (mL): 925 mL  Total OUT: 1275 mL    Total NET: -283 mL      18 Sep 2024 07:01  -  19 Sep 2024 00:33  --------------------------------------------------------  IN:    IV PiggyBack: 100 mL    Oral Fluid: 500 mL  Total IN: 600 mL    OUT:    Colostomy (mL): 150 mL    Voided (mL): 1150 mL  Total OUT: 1300 mL    Total NET: -700 mL      MEDICATIONS  (STANDING):  albuterol/ipratropium for Nebulization 3 milliLiter(s) Nebulizer every 6 hours  ascorbic acid 500 milliGRAM(s) Oral daily  finasteride 5 milliGRAM(s) Oral daily  furosemide   Injectable 40 milliGRAM(s) IV Push every 24 hours  guaiFENesin  milliGRAM(s) Oral every 12 hours  heparin  Infusion 1600 Unit(s)/Hr (16 mL/Hr) IV Continuous <Continuous>  influenza  Vaccine (HIGH DOSE) 0.5 milliLiter(s) IntraMuscular once  metoprolol tartrate 25 milliGRAM(s) Oral two times a day  multivitamin 1 Tablet(s) Oral daily  piperacillin/tazobactam IVPB.. 3.375 Gram(s) IV Intermittent every 8 hours  tamsulosin 0.4 milliGRAM(s) Oral at bedtime    MEDICATIONS  (PRN):  acetaminophen     Tablet .. 975 milliGRAM(s) Oral every 6 hours PRN Mild Pain (1 - 3)  oxyCODONE    IR 2.5 milliGRAM(s) Oral every 4 hours PRN Moderate Pain (4 - 6)  oxyCODONE    IR 5 milliGRAM(s) Oral every 4 hours PRN Severe Pain (7 - 10)      PHYSICAL EXAM:  Constitutional: A&Ox3, NAD  Respiratory: Unlabored breathing  Abdomen: Soft, Non distended, improving epigastrium tenderness to palpation without rebound  Extremities: WWP, NAVARRO spontaneously    LABS:                        10.9   9.83  )-----------( 208      ( 18 Sep 2024 06:56 )             32.8     09-18    133[L]  |  98  |  26[H]  ----------------------------<  119[H]  3.2[L]   |  26  |  1.31[H]    Ca    8.3[L]      18 Sep 2024 06:58  Phos  2.5     09-18  Mg     2.2     09-18    TPro  5.8[L]  /  Alb  2.6[L]  /  TBili  1.1  /  DBili  x   /  AST  14  /  ALT  31  /  AlkPhos  90  09-18    PTT - ( 18 Sep 2024 15:29 )  PTT:>200.0 sec  LIVER FUNCTIONS - ( 18 Sep 2024 06:58 )  Alb: 2.6 g/dL / Pro: 5.8 g/dL / ALK PHOS: 90 U/L / ALT: 31 U/L / AST: 14 U/L / GGT: x           Urinalysis Basic - ( 18 Sep 2024 06:58 )    Color: x / Appearance: x / SG: x / pH: x  Gluc: 119 mg/dL / Ketone: x  / Bili: x / Urobili: x   Blood: x / Protein: x / Nitrite: x   Leuk Esterase: x / RBC: x / WBC x   Sq Epi: x / Non Sq Epi: x / Bacteria: x

## 2024-09-19 NOTE — PROGRESS NOTE ADULT - ASSESSMENT
84M PMHx HLD, HTN, Stg II Colon Ca s/p L hemicolectomy w end colostomy and mucus fistula 2023 w Dr. Browning, COPD, ILD, HFpEF, Afib on Eliquis who presents to [facility] ED with abdominal pain.   Exam w epigastric TTP. Labs w WBC 18. Lipase 1242. T Bili 1.8. Alp 211. AST/ALT: 218/157. CT abd/pelvis (9/15/24) demonstrates c/f acute pancreatitis and acute cholecystitis. MRCP (9/16/24) with 8.2cm inferior pancreatic collection; low suspicion of cholecystitis.     PLAN:   - Diet: LRD   - Pain: PO Meds (PO Tylenol ATC and oxy PRN)  - Passed TOV  - Follow up BMP in AM   - Abx: IV Zosyn (9/15-)  - 1month f/u CT to assess pancreatic collection w/ GI, appreciate recs  - Continue Lasix 40mg daily per cardiology, appreciate recs   - Appreciate pulm recs for prieto-op risk assessment (clearance doc on 9/15) & ILD 2/2 asbestosis     - Standing duonebs q6h now & in the perioperative period      - Hold home stiolto and PRN albuterol until d/c w/ outpatient follow up Dr. Thomas   - VTE ppx: Hep gtt (Eliquis on hold)  - poss DC today       Gold team   [id number] 84M PMHx HLD, HTN, Stg II Colon Ca s/p L hemicolectomy w end colostomy and mucus fistula 2023 w Dr. Browning, COPD, ILD, HFpEF, Afib on Eliquis who presents to [facility] ED with abdominal pain.   Exam w epigastric TTP. Labs w WBC 18. Lipase 1242. T Bili 1.8. Alp 211. AST/ALT: 218/157. CT abd/pelvis (9/15/24) demonstrates c/f acute pancreatitis and acute cholecystitis. MRCP (9/16/24) with 8.2cm inferior pancreatic collection; low suspicion of cholecystitis.     PLAN:   - Diet: LRD   - Pain: PO Meds (PO Tylenol ATC and oxy PRN)  - Passed TOV  - Follow up BMP in AM   - Abx: IV Zosyn (9/15- until discharge)  - 1month f/u CT to assess pancreatic collection w/ GI, appreciate recs  - Continue Lasix 40mg daily per cardiology, appreciate recs   - Appreciate pulm recs for prieto-op risk assessment (clearance doc on 9/15) & ILD 2/2 asbestosis     - Standing duonebs q6h now & in the perioperative period      - Hold home stiolto and PRN albuterol until d/c w/ outpatient follow up Dr. Thomas   - VTE ppx: Hep gtt (Eliquis on hold)  - poss DC today       Gold team   [id number] 84M PMHx HLD, HTN, Stg II Colon Ca s/p L hemicolectomy w end colostomy and mucus fistula 2023 w Dr. Browning, COPD, ILD, HFpEF, Afib on Eliquis who presents to [facility] ED with abdominal pain.   Exam w epigastric TTP. Labs w WBC 18. Lipase 1242. T Bili 1.8. Alp 211. AST/ALT: 218/157. CT abd/pelvis (9/15/24) demonstrates c/f acute pancreatitis and acute cholecystitis. MRCP (9/16/24) with 8.2cm inferior pancreatic collection; low suspicion of cholecystitis.    PLAN:   - Diet: LRD   - Pain: PO Meds (PO Tylenol ATC and oxy PRN)  - Passed TOV  - Follow up BMP in AM   - Abx: IV Zosyn (9/15- until discharge)  - 1month f/u CT to assess pancreatic collection w/ GI, appreciate recs  - Continue Lasix 40mg daily per cardiology, appreciate recs - - Resume home Bumex 2mg PO in am and 1mg in PM for DC  - Appreciate pulm recs for prieto-op risk assessment (clearance doc on 9/15) & ILD 2/2 asbestosis     - Standing duonebs q6h now & in the perioperative period      - Hold home stiolto and PRN albuterol until d/c w/ outpatient follow up Dr. Thomas   - VTE ppx: Hep gtt (Eliquis on hold)  - poss DC today       Gold team   [id number]

## 2024-09-20 LAB
CULTURE RESULTS: SIGNIFICANT CHANGE UP
CULTURE RESULTS: SIGNIFICANT CHANGE UP
SPECIMEN SOURCE: SIGNIFICANT CHANGE UP
SPECIMEN SOURCE: SIGNIFICANT CHANGE UP

## 2024-09-25 ENCOUNTER — NON-APPOINTMENT (OUTPATIENT)
Age: 84
End: 2024-09-25

## 2024-09-25 ENCOUNTER — LABORATORY RESULT (OUTPATIENT)
Age: 84
End: 2024-09-25

## 2024-09-25 ENCOUNTER — APPOINTMENT (OUTPATIENT)
Dept: PULMONOLOGY | Facility: CLINIC | Age: 84
End: 2024-09-25

## 2024-09-25 ENCOUNTER — APPOINTMENT (OUTPATIENT)
Dept: PULMONOLOGY | Facility: CLINIC | Age: 84
End: 2024-09-25
Payer: MEDICARE

## 2024-09-25 VITALS — HEART RATE: 90 BPM | OXYGEN SATURATION: 94 % | DIASTOLIC BLOOD PRESSURE: 80 MMHG | SYSTOLIC BLOOD PRESSURE: 147 MMHG

## 2024-09-25 DIAGNOSIS — I10 ESSENTIAL (PRIMARY) HYPERTENSION: ICD-10-CM

## 2024-09-25 DIAGNOSIS — J61 PNEUMOCONIOSIS DUE TO ASBESTOS AND OTHER MINERAL FIBERS: ICD-10-CM

## 2024-09-25 DIAGNOSIS — E05.90 THYROTOXICOSIS, UNSPECIFIED W/OUT THYROTOXIC CRISIS OR STORM: ICD-10-CM

## 2024-09-25 DIAGNOSIS — J90 PLEURAL EFFUSION, NOT ELSEWHERE CLASSIFIED: ICD-10-CM

## 2024-09-25 DIAGNOSIS — I50.9 HEART FAILURE, UNSPECIFIED: ICD-10-CM

## 2024-09-25 DIAGNOSIS — J43.9 EMPHYSEMA, UNSPECIFIED: ICD-10-CM

## 2024-09-25 DIAGNOSIS — Z23 ENCOUNTER FOR IMMUNIZATION: ICD-10-CM

## 2024-09-25 DIAGNOSIS — E78.5 HYPERLIPIDEMIA, UNSPECIFIED: ICD-10-CM

## 2024-09-25 LAB
BASOPHILS # BLD AUTO: 0.03 K/UL
BASOPHILS NFR BLD AUTO: 0.3 %
EOSINOPHIL # BLD AUTO: 0.25 K/UL
EOSINOPHIL NFR BLD AUTO: 2.8 %
HCT VFR BLD CALC: 38.4 %
HGB BLD-MCNC: 12.6 G/DL
IMM GRANULOCYTES NFR BLD AUTO: 0.4 %
LYMPHOCYTES # BLD AUTO: 1.23 K/UL
LYMPHOCYTES NFR BLD AUTO: 13.8 %
MAN DIFF?: NORMAL
MCHC RBC-ENTMCNC: 30.3 PG
MCHC RBC-ENTMCNC: 32.8 GM/DL
MCV RBC AUTO: 92.3 FL
MONOCYTES # BLD AUTO: 0.52 K/UL
MONOCYTES NFR BLD AUTO: 5.8 %
NEUTROPHILS # BLD AUTO: 6.83 K/UL
NEUTROPHILS NFR BLD AUTO: 76.9 %
NT-PROBNP SERPL-MCNC: 2744 PG/ML
PLATELET # BLD AUTO: 388 K/UL
RBC # BLD: 4.16 M/UL
RBC # FLD: 13 %
WBC # FLD AUTO: 8.9 K/UL

## 2024-09-25 PROCEDURE — 71045 X-RAY EXAM CHEST 1 VIEW: CPT

## 2024-09-25 PROCEDURE — 36415 COLL VENOUS BLD VENIPUNCTURE: CPT

## 2024-09-25 PROCEDURE — 90662 IIV NO PRSV INCREASED AG IM: CPT

## 2024-09-25 PROCEDURE — G0008: CPT

## 2024-09-25 PROCEDURE — 94729 DIFFUSING CAPACITY: CPT

## 2024-09-25 PROCEDURE — 94727 GAS DIL/WSHOT DETER LNG VOL: CPT

## 2024-09-25 PROCEDURE — 94010 BREATHING CAPACITY TEST: CPT

## 2024-09-25 PROCEDURE — 99496 TRANSJ CARE MGMT HIGH F2F 7D: CPT

## 2024-09-25 NOTE — HISTORY OF PRESENT ILLNESS
[Stable] : are stable [None] : ~He/She~ has no significant interval events [Difficulty Breathing During Exertion] : stable dyspnea on exertion [Feelings Of Weakness On Exertion] : stable exercise intolerance [Cough] : denies coughing [Wheezing] : denies wheezing [Regional Soft Tissue Swelling Both Lower Extremities] : denies lower extremity edema [Chest Pain Or Discomfort] : denies chest pain [Fever] : denies fever [Date: ___] : was performed [unfilled] [Wt Gain ___ Lbs] : no recent weight gain [Wt Loss ___ Lbs] : no recent weight loss [Oxygen] : the patient uses no supplemental oxygen [de-identified] : emphysema with mucous plugging [FreeTextEntry1] : 84-year-old male History COPD Interstitial lung disease Asbestos related lung disease with plaque Congestive heart failure Atrial fibrillation on Eliquis Status post hypoxemic respiratory failure secondary to pneumonia possible COPD flare acute heart failure Additional history hypertension Hyperlipidemia Colon cancer Posthospitalization strep pneumonia bacteremia Did not meet criteria for SIRS criteria additional hospital findings included enterorhinovirus CT chest with loculated left pleural effusion and a small right pleural effusion as noted Antibiotic treatment protocol including ceftriaxone and Zithromax Bumex for congestive heart failure Complications including BESSY anemia hypertension chronic A-fib on anticoagulation as noted above Pleural effusion status postthoracentesis negative for malignancy Oxygen as needed to maintain O2 sats greater than 88%  [facility] Imaging studies CT chest angiogram protocol Negative pulmonary embolism Moderate loculated left pleural effusion Small right pleural effusion Associated compressive atelectasis Coronary artery calcification Mild aneurysm dilatation Positive secretions left mainstem bronchus bilateral lower lobe bronchi Emphysema Reticular opacities architectural distortion with groundglass opacities Noted to left pleural effusion is circumferential and loculated in appearance Almost near collapse of the left lower lobe Calcified pleural plaque Note that a scan of the chest completed at a prior hospitalization October 7, 2023 Only reported small bilateral pleural effusions Noncalcified pleural plaques consistent with known asbestos Emphysema Above-noted radiographic features are new  Emergency department review As noted 83-year-old male with a history of hypertension hypercholesterolemia colon cancer colostomy COPD interstitial lung disease with known asbestos and plaque formation diastolic dysfunction congestive heart failure atrial fibrillation on Eliquis Admitted from [facility] with shortness of breath Worsening dyspnea Reported positive fever to 100.2 Reports that was in atrial fibrillation Normotensive  Cardiac Vascular Edema of lower extremity (782.3) (R60.0)  Assessment: 1. Coronary Athero 2. Obesity 3. Asbestosis/copd 4. LE edema, pitting, to the thigh .5. Former smoker 6. Aortic athero Plan:  1. Continue current meds 2. Edema is improving 3. Labwork today to assure he is tolerating lasix  4. will hold off on stress test - he has no symptoms. 5. Reduce lasix to 40mg DAILY. If swelling returns then increase back to 40mg BID.  6. If all ok then return in 6 months.       [TextBox_4] :     ACC: [id number] EXAM: MR MRCP ORDERED BY: IZABELLA RODRIGUEZ  *** ADDENDUM # 1 ***  "GALLBLADDER: Distended with trace pericholecystic fluid. No wall thickening with dependent calculi noted."  should be  "GALLBLADDER: Distended with trace pericholecystic fluid. No wall thickening or dependent calculi noted."  Updated findings discussed with Dr. Igor Browning on 9/17/2024 at 0954.  --- End of Report ---  *** END OF ADDENDUM # 1 ***   PROCEDURE DATE: 09/16/2024    INTERPRETATION: CLINICAL INFORMATION: Choledocholithiasis. Cholelithiasis and cholecystitis.  COMPARISON: 9/15/2024 CT.  CONTRAST/COMPLICATIONS: IV Contrast: NONE Oral Contrast: NONE Complications: None reported at time of study completion  PROCEDURE: MRI of the abdomen was performed. MRCP was performed.  FINDINGS: Multiple sequences are degraded by motion. LOWER CHEST: Mild bilateral pleural effusion/atelectasis. Bilateral gynecomastia. Mild cardiomegaly.  LIVER: Within normal limits. BILE DUCTS: Normal caliber. No choledocholithiasis. GALLBLADDER: Distended with trace pericholecystic fluid. No wall thickening with dependent calculi noted. SPLEEN: Within normal limits. PANCREAS: Dilated pancreatic duct measuring up to 5.8 mm. Peripancreatic fluid/edema can demonstrated. The pancreatic head and neck appears edematous. Ill-defined collection is noted caudal to the pancreatic body measuring approximately 2.5 x 8.2 x 4.1 cm. ADRENALS: Within normal limits. KIDNEYS/URETERS: No hydronephrosis. Renal cysts.  VISUALIZED PORTIONS: BOWEL: Within normal limits. PERITONEUM: Trace fluid. VESSELS: Atherosclerotic changes. RETROPERITONEUM/LYMPH NODES: No lymphadenopathy. ABDOMINAL WALL: Soft tissue edema. BONES: Curvature and degenerative changes of the spine.  IMPRESSION: Pancreatitis with 8.2 cm ill-defined lobulated collection noted caudal to the pancreatic body.  No cholelithiasis, choledocholithiasis, or biliary ductal dilatation noted.  Trace ascites, mild bilateral pleural effusion/atelectasis., And mild soft tissue edema.  EXAM: XR CHEST PORTABLE URGENT 1V ORDERED BY: NAYA CORDOBA  PROCEDURE DATE: 09/15/2024    INTERPRETATION: CLINICAL INDICATION: Shortness of breath.  EXAM: Frontal radiograph of the chest.  COMPARISON: Chest radiograph from 12/28/2023.  FINDINGS: Perihilar opacities, greater on right than left. Small left pleural effusion. No pneumothorax. The heart is normal in size The visualized osseous structures demonstrate no acute pathology. Stable right basilar calcified pleural plaques.  IMPRESSION: Mild pulmonary edema. Small left pleural effusion.   Progress Note: - Provider Specialty Surgery   - Subjective and Objective:  INTERVAL EVENTS: No acute events overnight. SUBJECTIVE: Patient seen and examined at bedside with surgical team, patient without complaints. Denies fever, chills, CP, SOB nausea, vomiting, abdominal pain.  OBJECTIVE:  Vital Signs Last 24 Hrs T(C): 36.9 (18 Sep 2024 20:37), Max: 36.9 (18 Sep 2024 20:37) T(F): 98.5 (18 Sep 2024 20:37), Max: 98.5 (18 Sep 2024 20:37) HR: 93 (18 Sep 2024 20:37) (89 - 97) BP: 138/79 (18 Sep 2024 20:37) (120/70 - 138/79) BP(mean): -- RR: 18 (18 Sep 2024 20:37) (18 - 18) SpO2: 95% (18 Sep 2024 20:37) (94% - 95%)  Parameters below as of 18 Sep 2024 20:37 Patient On (Oxygen Delivery Method): room air  I&O's Detail  17 Sep 2024 07:01 - 18 Sep 2024 07:00 -------------------------------------------------------- IN: Heparin: 192 mL IV PiggyBack: 200 mL Oral Fluid: 600 mL Total IN: 992 mL  OUT: Colostomy (mL): 350 mL Voided (mL): 925 mL Total OUT: 1275 mL  Total NET: -283 mL   18 Sep 2024 07:01 - 19 Sep 2024 00:33 -------------------------------------------------------- IN: IV PiggyBack: 100 mL Oral Fluid: 500 mL Total IN: 600 mL  OUT: Colostomy (mL): 150 mL Voided (mL): 1150 mL Total OUT: 1300 mL  Total NET: -700 mL   MEDICATIONS (STANDING): albuterol/ipratropium for Nebulization 3 milliLiter(s) Nebulizer every 6 hours ascorbic acid 500 milliGRAM(s) Oral daily finasteride 5 milliGRAM(s) Oral daily furosemide Injectable 40 milliGRAM(s) IV Push every 24 hours guaiFENesin  milliGRAM(s) Oral every 12 hours heparin Infusion 1600 Unit(s)/Hr (16 mL/Hr) IV Continuous <Continuous> influenza Vaccine (HIGH DOSE) 0.5 milliLiter(s) IntraMuscular once metoprolol tartrate 25 milliGRAM(s) Oral two times a day multivitamin 1 Tablet(s) Oral daily piperacillin/tazobactam IVPB.. 3.375 Gram(s) IV Intermittent every 8 hours tamsulosin 0.4 milliGRAM(s) Oral at bedtime  MEDICATIONS (PRN): acetaminophen Tablet .. 975 milliGRAM(s) Oral every 6 hours PRN Mild Pain (1 - 3) oxyCODONE IR 2.5 milliGRAM(s) Oral every 4 hours PRN Moderate Pain (4 - 6) oxyCODONE IR 5 milliGRAM(s) Oral every 4 hours PRN Severe Pain (7 - 10)   PHYSICAL EXAM: Constitutional: A&Ox3, NAD Respiratory: Unlabored breathing Abdomen: Soft, Non distended, improving epigastrium tenderness to palpation without rebound Extremities: WWP, NAVARRO spontaneously  LABS:  10.9 9.83 )-----------( 208 ( 18 Sep 2024 06:56 ) 32.8  09-18  133[L] | 98 | 26[H] ----------------------------< 119[H] 3.2[L] | 26 | 1.31[H]  Ca 8.3[L] 18 Sep 2024 06:58 Phos 2.5 09-18 Mg 2.2 09-18  TPro 5.8[L] / Alb 2.6[L] / TBili 1.1 / DBili x / AST 14 / ALT 31 / AlkPhos 90 09-18  PTT - ( 18 Sep 2024 15:29 ) PTT:>200.0 sec LIVER FUNCTIONS - ( 18 Sep 2024 06:58 ) Alb: 2.6 g/dL / Pro: 5.8 g/dL / ALK PHOS: 90 U/L / ALT: 31 U/L / AST: 14 U/L / GGT: x  Urinalysis Basic - ( 18 Sep 2024 06:58 )  Color: x / Appearance: x / SG: x / pH: x Gluc: 119 mg/dL / Ketone: x / Bili: x / Urobili: x Blood: x / Protein: x / Nitrite: x Leuk Esterase: x / RBC: x / WBC x Sq Epi: x / Non Sq Epi: x / Bacteria: x        Assessment and Plan: - Assessment 84M PMHx HLD, HTN, Stg II Colon Ca s/p L hemicolectomy w end colostomy and mucus fistula 2023 w Dr. Browning, COPD, ILD, HFpEF, Afib on Eliquis who presents to [facility] ED with abdominal pain. Exam w epigastric TTP. Labs w WBC 18. Lipase 1242. T Bili 1.8. Alp 211. AST/ALT: 218/157. CT abd/pelvis (9/15/24) demonstrates c/f acute pancreatitis and acute cholecystitis. MRCP (9/16/24) with 8.2cm inferior pancreatic collection; low suspicion of cholecystitis.  PLAN: - Diet: LRD - Pain: PO Meds (PO Tylenol ATC and oxy PRN) - Passed TOV - Follow up BMP in AM - Abx: IV Zosyn (9/15- until discharge) - 1month f/u CT to assess pancreatic collection w/ GI, appreciate recs - Continue Lasix 40mg daily per cardiology, appreciate recs - - Resume home Bumex 2mg PO in am and 1mg in PM for DC - Appreciate pulm recs for prieto-op risk assessment (clearance doc on 9/15) & ILD 2/2 asbestosis - Standing duonebs q6h now & in the perioperative period - Hold home stiolto and PRN albuterol until d/c w/ outpatient follow up Dr. Thomas - VTE ppx: Hep gtt (Eliquis on hold) - poss DC today   - Provider Specialty Cardiology   - Subjective and Objective: DATE OF SERVICE: 09-19-24 @ 10:05  Patient is a 84y old Male who presents with a chief complaint of Acute cholecystitis  Chart reviewed, events noted. This is a "84 yrs old male w/ hx of HLD, HTN, Stg II Colon Ca s/p L hemicolectomy w end colostomy and mucus fistula 2023 w Dr. Browning, COPD, ILD, HFpEF, Afib on Eliquis who presented with abd pain for the past 1 day, found to have acute pancreatitis and cholecystitis." (17 Sep 2024 10:51)   INTERVAL HISTORY: Feels ok.  REVIEW OF SYSTEMS: CONSTITUTIONAL: No weakness EYES/ENT: No visual changes; No throat pain NECK: No pain or stiffness RESPIRATORY: No cough, wheezing; No shortness of breath CARDIOVASCULAR: No chest pain or palpitations GASTROINTESTINAL: No abdominal pain. No nausea, vomiting, or hematemesis GENITOURINARY: No dysuria, frequency or hematuria NEUROLOGICAL: No stroke like symptoms SKIN: No rashes   MEDICATIONS: furosemide Injectable 40 milliGRAM(s) IV Push every 24 hours metoprolol tartrate 25 milliGRAM(s) Oral two times a day    PHYSICAL EXAM: T(C): 36.6 (09-19-24 @ 08:34), Max: 36.9 (09-18-24 @ 20:37) HR: 98 (09-19-24 @ 08:34) (82 - 98) BP: 108/68 (09-19-24 @ 08:34) (108/68 - 138/79) RR: 18 (09-19-24 @ 08:34) (18 - 18) SpO2: 92% (09-19-24 @ 08:34) (92% - 95%) Wt(kg): -- I&O's Summary  18 Sep 2024 07:01 - 19 Sep 2024 07:00 -------------------------------------------------------- IN: 902 mL / OUT: 1850 mL / NET: -948 mL  19 Sep 2024 07:01 - 19 Sep 2024 10:05 -------------------------------------------------------- IN: 600 mL / OUT: 0 mL / NET: 600 mL     Appearance: In no distress HEENT: PERRL, EOMI Cardiovascular: S1 S2, + JVD Respiratory: Lungs clear to auscultation Gastrointestinal: Soft, Non-tender, + BS Vascularature: + edema of LE Psychiatric: Appropriate affect Neuro: no acute focal deficits     10.9 9.83 )-----------( 208 ( 18 Sep 2024 06:56 ) 32.8  09-19  132[L] | 95[L] | 30[H] ----------------------------< 104[H] 3.3[L] | 25 | 1.51[H]  Ca 8.2[L] 19 Sep 2024 07:21 Phos 2.8 09-19 Mg 2.1 09-19  TPro 5.8[L] / Alb 2.6[L] / TBili 0.8 / DBili x / AST 12 / ALT 23 / AlkPhos 87 09-19    Labs personally reviewed   ASSESSMENT/PLAN:  84M PMHx HLD, HTn, Stg II Colon Ca s/p L hemicolectomy w end colostomy and mucus fistula 2023 w Dr. Browning, COPD, ILD, HFpEF, Afib on Eliquis who presents to [facility] ED with abdominal pain. Pt reports acute onset abdominal pain x 1 day.  1. Cardiac Risk Stratification - patient is not in active tachy/marysol syndrome - patient exercise tolerance limited due to shortness of breath (COPD and asbestosis) and instability - EKG AFIB @ 78 - TTE with preserved EF, mild pHTN  2. Acute diastolic HF - patient with +1 pitting edema b/l - CXR with pulm edema - Probnp 4095 - TTE unremarkable - Cont Lasix 40mg IV daily - Caution with IVF - Resume home Bumex 2mg PO in am and 1mg in PM for DC  3. AFIB - on home Eliquis - Check TSH  4. HLD - hold statin, patient with elevated LFTs  5. DVT prophylact

## 2024-09-25 NOTE — REVIEW OF SYSTEMS
[Hypertension] : ~T hypertension [As Noted in HPI] : as noted in HPI [Negative] : Psychiatric [FreeTextEntry7] : abnormal CT  ABD  focal  wall thickening [FreeTextEntry5] : Hematuria  with  cystoscopy [de-identified] : R/O Pre DM

## 2024-09-25 NOTE — PROCEDURE
[FreeTextEntry1] :   Chest x-ray PA lateral September 25, 2025 Cardiac size borderline enlarged Chronic changes consistent with asbestosis Pleural plaquing Cannot exclude a small left lower lobe pleural effusion right costophrenic angle appears relatively clear PFT September 25, 2024 Moderate restrictive ventilatory impairment Severe reduction flow rates with a moderate obstructive pattern Diffusion 28% predicted with a severe loss of functioning alveolar capillary units Hemoglobin 10.9 Chest x-ray PA lateral March 6, 2024 Indication asbestos related exposure Congestive heart failure historically Pleural effusion Cardiac size is grossly normal Evidence of a dense pleural plaque bilateral Right lung does not demonstrate any evidence of pleural effusion Left lung with very small left pleural effusion Interval improvement compared to chest x-ray from hospitalization No appreciable dominant pulmonary nodules  PFT no bronchodilator March 6, 2024 Severe reduction in flow rates Restrictive obstructive ventilatory impairment Severe gas exchange impairment with diffusion 38% predicted Hemoglobin 12.5 Compared to 1 year prior there is a decline in overall pulmonary physiology   PFT April 19 2023 indication asbestosis Interstitial lung disease COPD Moderate obstructive ventilatory impairment Lung volumes normal TLC 81% predicted Air-trapping with RV/TLC ratio 125% predicted Diffusion mild mild reduction 64% predicted with normal functioning alveolar capillary units and gas exchange impairment Hemoglobin 13.5 Overall stable pulmonary physiology  Bone density April 19, 2023 Indication steroids AP spine L1-L4 normal Femoral neck  Normal Total left hip normal Impression normal study Calcium vitamin D Weightbearing walking exercise Consider follow-up in 2 to 5 years  POCT 3/8/23 Glucose 142 HGBA1C  6.1  Chest x-ray PA lateral January 25, 2023 Cardiac size normal Uncoiled aorta Patchy parenchymal infiltrate with scarring bilateral consistent with known pleural plaques fibrotic changes No appreciable densities Left upper lobe pleural-parenchymal scar No gross interval change compared to x-ray dating back to February 28, 2020  PFT 1/25/23 moderate reduction flow  rates  TLC nl range 82 % pred  Pos airtrapping DLCO 58 % with moderate loss fx alveolar  capillary units  HGB 13.2  Labs per Vascular Dec 2022 Reviewed Stable hemoglobin A1c  Spirometry no bronchodilator 8/10/2022 Moderate to severe reduction in flow rates ratio 65 Will dating back over the past year there has been a decline at the FVC most recent noted from June till present the FEV1 is unchanged  PFT no bronchodilator June 24, 2022 Moderate to severe reduction in flow rates Mild obstructive ventilatory impairment TLC normal 83% predicted Positive air trapping with RV/TLC ratio 152% predicted Diffusion moderate to severely reduced 54% predicted with a loss of functioning alveolar capillary units Hemoglobin 15.0 Overall noted decline at diffusion Clinical correlation  POCT  2/28/22 HGBA!C 6.5  Glucose 115  Chest x-ray PA lateral February 28, 2022 Cardiac size is normal Pleural plaques identified On the right lung No parenchymal infiltrates pleural effusions or dominant pulmonary nodules Uncoiled aorta  PFT no bronchodilator December 13, 2021 Flow rates normal Very mild obstructive pattern Ratio 75 Lung volumes normal Total lung capacity 98% predicted Diffusion 108% predicted There is noted to be some decline of pulmonary physiology  EKG 10/27/21  Sinus tach with rate variation  Chest x-ray PA lateral August 9, 2021 Cardiac size normal Uncoiled aorta Pleural plaque identified with the dominant right hemidiaphragm Left lung pleural-based abnormality consistent with known asbestosis No dominant pulmonary nodules pleural effusions pneumothorax  PFT 7/8/21 Moderate OAD  TLC 86 % pred  Airtrapping  Low nl DLCO 73 % HGB 16.1  PFT  4//14/21 Moderate reduction flow  rates  Mod OAD Normal TLC 82 % Pos  airtrapping Low nl DLCO  73 % HGB 17.7  stable pulmonary physiology  CHEST  x-ray PA lateral February 24, 2021 history of asbestosis COPD Normal cardiac size Uncoiled aorta Chronic parenchymal changes with bilateral identifiable pleural disease. Diagnosis with pleural plaques clinically right diaphragm asbestosis Minimal calcification aortic knob no gross interval change compared to chest x-ray Delores 3, 2020  PFT Body Box 12/7/2020 moderate OAD airtrapping Resistance is  normal and sp conductance decreased low  normal DLCO DLCO 78 % HGB 16.3  PFT September 4, 2020 No bronchodilator administered Moderate reduction flow rates with an obstructive ventilatory impairment and a reduced total lung capacity 69% predicted. Mild reduction diffusion with a loss of functioning alveolocapillary units are Impression combined mild obstructive and restrictive ventilatory impairment with mild gas exchange impairment.  POCT  12/7/2020 Glucose 138 HGB  5.8  DATA Delores  3  2020 Serum glucose 112 Hemoglobin A1c 6.1 Urine analysis trace protein negative nitrates  EKG Delores 3, 2020 Sinus tachycardia Cannot exclude old anterior wall changes RSR  Chest x-ray PA lateral Delores 3, 2020 Normal cardiac size " Reported Calcified pleural diaphragmatic pad right Patchy changes midlung zone No pleural effusions dominant pulmonary nodules No gross interval change compared to chest x-ray of December 19, 2019   Spirometry No BD Moderate reduction flow rates Spirometry  1/17/2020  moderate  reduction in flow rates  obstructive ventilatory impairment and with reduced FVC cannot exclude restrictive component Pos BD at FVC 12 %  Chest x-ray PA lateral December 19, 2019 Indication COPD-asbestosis Cardiac size normal " Aortic Dense calcified pleural plaque right hemidiaphragm COPD changes No significant interstitial changes No dominant pulmonary nodules parenchymal infiltrates pleural effusions Change compared to chest x-ray of December 18, 2018  PFT 9/16/2019 Moderate OAD  Pos BD 14 % at FVC  Minimal airtrapping and normal TLC 95 %  DLCO  84 % normal  range  HGB 16.2  Stable pulmonary physiology  Pulmonary Six Minute Walk 5/4/2018 No desaturation  Blood draw Data review June 4, 2020 COVID-19 IgG antibody negative CBC normal White blood count 9.45 hemoglobin 16.3 hematocrit 49.6 Platelet count 315,000 T4 TSH normal PSA normal range 1.38 Lipid profile Cholesterol 148 HDL 47 LDL 82 Vitamin D 27.9   HD Flu administered Oct 27 2021  Bone density screening osteoporosis high risk patient February 24, 2021 AP spine L1-L4 normal Femoral neck left normal Total left hip normal Impression normal study  Blood draw PREVNAIR IM 3/6/24  high-dose flu vaccination September 25, 2024

## 2024-09-25 NOTE — PHYSICAL EXAM
[General Appearance - Well Developed] : well developed [Normal Appearance] : normal appearance [Well Groomed] : well groomed [General Appearance - Well Nourished] : well nourished [No Deformities] : no deformities [General Appearance - In No Acute Distress] : no acute distress [Normal Conjunctiva] : the conjunctiva exhibited no abnormalities [Eyelids - No Xanthelasma] : the eyelids demonstrated no xanthelasmas [Normal Oropharynx] : normal oropharynx [I] : I [Neck Appearance] : the appearance of the neck was normal [Neck Cervical Mass (___cm)] : no neck mass was observed [Jugular Venous Distention Increased] : there was no jugular-venous distention [Thyroid Diffuse Enlargement] : the thyroid was not enlarged [Heart Rate And Rhythm] : heart rate and rhythm were normal [Heart Sounds] : normal S1 and S2 [Murmurs] : no murmurs present [Arterial Pulses Normal] : the arterial pulses were normal [Edema] : no peripheral edema present [Veins - Varicosity Changes] : no varicosital changes were noted in the lower extremities [Respiration, Rhythm And Depth] : normal respiratory rhythm and effort [Exaggerated Use Of Accessory Muscles For Inspiration] : no accessory muscle use [Bowel Sounds] : normal bowel sounds [Abdomen Soft] : soft [Abdomen Tenderness] : non-tender [Abdomen Mass (___ Cm)] : no abdominal mass palpated [Abnormal Walk] : normal gait [Gait - Sufficient For Exercise Testing] : the gait was sufficient for exercise testing [Nail Clubbing] : no clubbing of the fingernails [Cyanosis, Localized] : no localized cyanosis [Petechial Hemorrhages (___cm)] : no petechial hemorrhages [Skin Color & Pigmentation] : normal skin color and pigmentation [] : no rash [Deep Tendon Reflexes (DTR)] : deep tendon reflexes were 2+ and symmetric [Sensation] : the sensory exam was normal to light touch and pinprick [Motor Exam] : the motor exam was normal [No Focal Deficits] : no focal deficits [Oriented To Time, Place, And Person] : oriented to person, place, and time [Impaired Insight] : insight and judgment were intact [Affect] : the affect was normal [Mood] : the mood was normal [FreeTextEntry1] : left groin swelling erythema edema consistent with cellulitis - interval improvement with candida  [FreeTextEntry2] : Only some edema at the level of the ankle

## 2024-09-25 NOTE — DISCUSSION/SUMMARY
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 Congestive heart failure does not appear to be actively fluid overload Atrial fibrillation on Eliquis History of hospitalization December 2023 with sepsis post hypoxemic respiratory failure post pneumonia History hypertension Hyperlipidemia Colon cancer Status post Pneumonia bacteremia with above-noted etiology for respiratory failure Continue current management  Better controlled hypertension-patient is medication adherence compliance in view lower ext edema improved  1 + proteinuria - address at f/u 24 hr urine collection etiology hypertension mild lower  extremity edema/ Venous insufficiency mild interval increase-  lasix- QOD Improved Low normal O2 saturations on room air  abnormal PFT  History asbestosis  COPD-Emphysema with mucoid impaction stable pulmonary physiology Pre DM- inc noted HGBA1C  HTN  - controlled  HLD Groin cellulitis w with recurrence- left inguinal- Resolved without recent  recurrence Hx right  shoulder  cellulitis  ANORO 1 puff Daily-PFT trend   3 month APPT watch Lipid profile with  A1C low sugar diet addressed  Discussed  with patient never did get Colonoscopy - readvised  J and J completed plus booster with PFIZER

## 2024-09-26 ENCOUNTER — NON-APPOINTMENT (OUTPATIENT)
Age: 84
End: 2024-09-26

## 2024-09-26 PROBLEM — E05.90 HYPERTHYROIDISM: Status: ACTIVE | Noted: 2024-09-26

## 2024-09-26 LAB
ALBUMIN SERPL ELPH-MCNC: 3.5 G/DL
ALP BLD-CCNC: 89 U/L
ALT SERPL-CCNC: 21 U/L
ANION GAP SERPL CALC-SCNC: 19 MMOL/L
AST SERPL-CCNC: 21 U/L
BILIRUB SERPL-MCNC: 0.6 MG/DL
BUN SERPL-MCNC: 18 MG/DL
CALCIUM SERPL-MCNC: 9.3 MG/DL
CHLORIDE SERPL-SCNC: 97 MMOL/L
CO2 SERPL-SCNC: 23 MMOL/L
CREAT SERPL-MCNC: 1.19 MG/DL
EGFR: 59 ML/MIN/1.73M2
GLUCOSE SERPL-MCNC: 127 MG/DL
POTASSIUM SERPL-SCNC: 4 MMOL/L
PROT SERPL-MCNC: 6.7 G/DL
SODIUM SERPL-SCNC: 139 MMOL/L
T4 SERPL-MCNC: 8.2 UG/DL
THYROGLOB AB SERPL-ACNC: 19.6 IU/ML
THYROPEROXIDASE AB SERPL IA-ACNC: 13.6 IU/ML
TSH SERPL-ACNC: 0.07 UIU/ML

## 2024-09-29 PROCEDURE — 80048 BASIC METABOLIC PNL TOTAL CA: CPT

## 2024-09-29 PROCEDURE — 74181 MRI ABDOMEN W/O CONTRAST: CPT | Mod: MC

## 2024-09-29 PROCEDURE — 84132 ASSAY OF SERUM POTASSIUM: CPT

## 2024-09-29 PROCEDURE — 84443 ASSAY THYROID STIM HORMONE: CPT

## 2024-09-29 PROCEDURE — 74177 CT ABD & PELVIS W/CONTRAST: CPT | Mod: MC

## 2024-09-29 PROCEDURE — 87040 BLOOD CULTURE FOR BACTERIA: CPT

## 2024-09-29 PROCEDURE — 83690 ASSAY OF LIPASE: CPT

## 2024-09-29 PROCEDURE — 82330 ASSAY OF CALCIUM: CPT

## 2024-09-29 PROCEDURE — 82803 BLOOD GASES ANY COMBINATION: CPT

## 2024-09-29 PROCEDURE — 97116 GAIT TRAINING THERAPY: CPT

## 2024-09-29 PROCEDURE — 80053 COMPREHEN METABOLIC PANEL: CPT

## 2024-09-29 PROCEDURE — 86900 BLOOD TYPING SEROLOGIC ABO: CPT

## 2024-09-29 PROCEDURE — 99285 EMERGENCY DEPT VISIT HI MDM: CPT | Mod: 25

## 2024-09-29 PROCEDURE — 85014 HEMATOCRIT: CPT

## 2024-09-29 PROCEDURE — 83880 ASSAY OF NATRIURETIC PEPTIDE: CPT

## 2024-09-29 PROCEDURE — 36415 COLL VENOUS BLD VENIPUNCTURE: CPT

## 2024-09-29 PROCEDURE — 81001 URINALYSIS AUTO W/SCOPE: CPT

## 2024-09-29 PROCEDURE — 71045 X-RAY EXAM CHEST 1 VIEW: CPT

## 2024-09-29 PROCEDURE — 85027 COMPLETE CBC AUTOMATED: CPT

## 2024-09-29 PROCEDURE — 86901 BLOOD TYPING SEROLOGIC RH(D): CPT

## 2024-09-29 PROCEDURE — 85610 PROTHROMBIN TIME: CPT

## 2024-09-29 PROCEDURE — 94640 AIRWAY INHALATION TREATMENT: CPT

## 2024-09-29 PROCEDURE — 82947 ASSAY GLUCOSE BLOOD QUANT: CPT

## 2024-09-29 PROCEDURE — 97161 PT EVAL LOW COMPLEX 20 MIN: CPT

## 2024-09-29 PROCEDURE — 85730 THROMBOPLASTIN TIME PARTIAL: CPT

## 2024-09-29 PROCEDURE — 85025 COMPLETE CBC W/AUTO DIFF WBC: CPT

## 2024-09-29 PROCEDURE — 96375 TX/PRO/DX INJ NEW DRUG ADDON: CPT

## 2024-09-29 PROCEDURE — 82435 ASSAY OF BLOOD CHLORIDE: CPT

## 2024-09-29 PROCEDURE — 83735 ASSAY OF MAGNESIUM: CPT

## 2024-09-29 PROCEDURE — 85018 HEMOGLOBIN: CPT

## 2024-09-29 PROCEDURE — 87086 URINE CULTURE/COLONY COUNT: CPT

## 2024-09-29 PROCEDURE — 84295 ASSAY OF SERUM SODIUM: CPT

## 2024-09-29 PROCEDURE — 84100 ASSAY OF PHOSPHORUS: CPT

## 2024-09-29 PROCEDURE — 86850 RBC ANTIBODY SCREEN: CPT

## 2024-09-29 PROCEDURE — 96374 THER/PROPH/DIAG INJ IV PUSH: CPT

## 2024-09-29 PROCEDURE — 83605 ASSAY OF LACTIC ACID: CPT

## 2024-09-29 PROCEDURE — 97530 THERAPEUTIC ACTIVITIES: CPT

## 2024-09-29 PROCEDURE — 93306 TTE W/DOPPLER COMPLETE: CPT

## 2024-10-07 ENCOUNTER — APPOINTMENT (OUTPATIENT)
Dept: GASTROENTEROLOGY | Facility: CLINIC | Age: 84
End: 2024-10-07
Payer: MEDICARE

## 2024-10-07 VITALS
DIASTOLIC BLOOD PRESSURE: 79 MMHG | HEIGHT: 71 IN | HEART RATE: 78 BPM | WEIGHT: 207 LBS | SYSTOLIC BLOOD PRESSURE: 138 MMHG | BODY MASS INDEX: 28.98 KG/M2

## 2024-10-07 DIAGNOSIS — C18.9 MALIGNANT NEOPLASM OF COLON, UNSPECIFIED: ICD-10-CM

## 2024-10-07 DIAGNOSIS — K85.90 ACUTE PANCREATITIS WITHOUT NECROSIS OR INFECTION, UNSPECIFIED: ICD-10-CM

## 2024-10-07 PROCEDURE — G2211 COMPLEX E/M VISIT ADD ON: CPT

## 2024-10-07 PROCEDURE — 99214 OFFICE O/P EST MOD 30 MIN: CPT

## 2024-10-11 ENCOUNTER — RX RENEWAL (OUTPATIENT)
Age: 84
End: 2024-10-11

## 2024-10-11 NOTE — ED ADULT TRIAGE NOTE - BP NONINVASIVE SYSTOLIC (MM HG)
Refill request for:    pregabalin (LYRICA) 50 MG capsule 90 capsule 0 8/29/2024 --    Sig - Route: Take 1 capsule by mouth in the morning and 1 capsule at noon and 1 capsule in the evening. - Oral      tiZANidine (ZANAFLEX) 4 MG tablet 270 tablet 1 4/8/2024 --    Sig - Route: TAKE 1 TABLET BY MOUTH IN THE MORNING AND AT NOON AND IN THE EVENING - Oral      Last visit: 9/3/2024  Next visit: 12/10/2024    Patient of Dr. Mike's who is completely out of her medications. Please advise.  
134

## 2024-10-15 RX ORDER — BUDESONIDE AND FORMOTEROL FUMARATE DIHYDRATE 160; 4.5 UG/1; UG/1
160-4.5 AEROSOL RESPIRATORY (INHALATION)
Refills: 0 | Status: ACTIVE | COMMUNITY

## 2024-10-15 RX ORDER — MULTIVIT-MIN/FOLIC/VIT K/LYCOP 400-300MCG
500 TABLET ORAL
Refills: 0 | Status: ACTIVE | COMMUNITY

## 2024-10-15 RX ORDER — GUAIFENESIN 600 MG/1
600 TABLET, EXTENDED RELEASE ORAL
Refills: 0 | Status: ACTIVE | COMMUNITY

## 2024-10-15 RX ORDER — MULTIVITAMIN
TABLET ORAL
Refills: 0 | Status: ACTIVE | COMMUNITY

## 2024-11-07 ENCOUNTER — RX RENEWAL (OUTPATIENT)
Age: 84
End: 2024-11-07

## 2025-01-01 ENCOUNTER — INPATIENT (INPATIENT)
Facility: HOSPITAL | Age: 85
LOS: 6 days | DRG: 443 | End: 2025-04-11
Attending: FAMILY MEDICINE | Admitting: STUDENT IN AN ORGANIZED HEALTH CARE EDUCATION/TRAINING PROGRAM
Payer: MEDICARE

## 2025-01-01 ENCOUNTER — LABORATORY RESULT (OUTPATIENT)
Age: 85
End: 2025-01-01

## 2025-01-01 VITALS
HEART RATE: 106 BPM | OXYGEN SATURATION: 91 % | DIASTOLIC BLOOD PRESSURE: 60 MMHG | TEMPERATURE: 102 F | RESPIRATION RATE: 18 BRPM | SYSTOLIC BLOOD PRESSURE: 96 MMHG

## 2025-01-01 VITALS
SYSTOLIC BLOOD PRESSURE: 122 MMHG | TEMPERATURE: 99 F | WEIGHT: 190.04 LBS | DIASTOLIC BLOOD PRESSURE: 82 MMHG | RESPIRATION RATE: 19 BRPM | HEART RATE: 82 BPM | OXYGEN SATURATION: 99 % | HEIGHT: 71 IN

## 2025-01-01 DIAGNOSIS — N40.0 BENIGN PROSTATIC HYPERPLASIA WITHOUT LOWER URINARY TRACT SYMPTOMS: ICD-10-CM

## 2025-01-01 DIAGNOSIS — N17.9 ACUTE KIDNEY FAILURE, UNSPECIFIED: ICD-10-CM

## 2025-01-01 DIAGNOSIS — L29.9 PRURITUS, UNSPECIFIED: ICD-10-CM

## 2025-01-01 DIAGNOSIS — J84.9 INTERSTITIAL PULMONARY DISEASE, UNSPECIFIED: ICD-10-CM

## 2025-01-01 DIAGNOSIS — Z71.89 OTHER SPECIFIED COUNSELING: ICD-10-CM

## 2025-01-01 DIAGNOSIS — I50.32 CHRONIC DIASTOLIC (CONGESTIVE) HEART FAILURE: ICD-10-CM

## 2025-01-01 DIAGNOSIS — J44.9 CHRONIC OBSTRUCTIVE PULMONARY DISEASE, UNSPECIFIED: ICD-10-CM

## 2025-01-01 DIAGNOSIS — I48.20 CHRONIC ATRIAL FIBRILLATION, UNSPECIFIED: ICD-10-CM

## 2025-01-01 DIAGNOSIS — R06.00 DYSPNEA, UNSPECIFIED: ICD-10-CM

## 2025-01-01 DIAGNOSIS — R50.9 FEVER, UNSPECIFIED: ICD-10-CM

## 2025-01-01 DIAGNOSIS — K83.1 OBSTRUCTION OF BILE DUCT: ICD-10-CM

## 2025-01-01 DIAGNOSIS — Z98.89 OTHER SPECIFIED POSTPROCEDURAL STATES: Chronic | ICD-10-CM

## 2025-01-01 DIAGNOSIS — Z29.9 ENCOUNTER FOR PROPHYLACTIC MEASURES, UNSPECIFIED: ICD-10-CM

## 2025-01-01 DIAGNOSIS — Z51.5 ENCOUNTER FOR PALLIATIVE CARE: ICD-10-CM

## 2025-01-01 DIAGNOSIS — R17 UNSPECIFIED JAUNDICE: ICD-10-CM

## 2025-01-01 DIAGNOSIS — N18.30 CHRONIC KIDNEY DISEASE, STAGE 3 UNSPECIFIED: ICD-10-CM

## 2025-01-01 DIAGNOSIS — E87.6 HYPOKALEMIA: ICD-10-CM

## 2025-01-01 DIAGNOSIS — E87.1 HYPO-OSMOLALITY AND HYPONATREMIA: ICD-10-CM

## 2025-01-01 DIAGNOSIS — R52 PAIN, UNSPECIFIED: ICD-10-CM

## 2025-01-01 LAB
-  AMPICILLIN/SULBACTAM: SIGNIFICANT CHANGE UP
-  AMPICILLIN: SIGNIFICANT CHANGE UP
-  AZTREONAM: SIGNIFICANT CHANGE UP
-  AZTREONAM: SIGNIFICANT CHANGE UP
-  CEFAZOLIN: SIGNIFICANT CHANGE UP
-  CEFEPIME: SIGNIFICANT CHANGE UP
-  CEFEPIME: SIGNIFICANT CHANGE UP
-  CEFOXITIN: SIGNIFICANT CHANGE UP
-  CEFTAZIDIME: SIGNIFICANT CHANGE UP
-  CEFTRIAXONE: SIGNIFICANT CHANGE UP
-  CIPROFLOXACIN: SIGNIFICANT CHANGE UP
-  CIPROFLOXACIN: SIGNIFICANT CHANGE UP
-  ERTAPENEM: SIGNIFICANT CHANGE UP
-  GENTAMICIN: SIGNIFICANT CHANGE UP
-  IMIPENEM: SIGNIFICANT CHANGE UP
-  IMIPENEM: SIGNIFICANT CHANGE UP
-  K. PNEUMONIAE GROUP: SIGNIFICANT CHANGE UP
-  LEVOFLOXACIN: SIGNIFICANT CHANGE UP
-  LEVOFLOXACIN: SIGNIFICANT CHANGE UP
-  MEROPENEM: SIGNIFICANT CHANGE UP
-  MEROPENEM: SIGNIFICANT CHANGE UP
-  PIPERACILLIN/TAZOBACTAM: SIGNIFICANT CHANGE UP
-  PIPERACILLIN/TAZOBACTAM: SIGNIFICANT CHANGE UP
-  TOBRAMYCIN: SIGNIFICANT CHANGE UP
-  TRIMETHOPRIM/SULFAMETHOXAZOLE: SIGNIFICANT CHANGE UP
ADD ON TEST-SPECIMEN IN LAB: SIGNIFICANT CHANGE UP
ALBUMIN SERPL ELPH-MCNC: 2.4 G/DL — LOW (ref 3.3–5)
ALBUMIN SERPL ELPH-MCNC: 2.8 G/DL — LOW (ref 3.3–5)
ALBUMIN SERPL ELPH-MCNC: 2.8 G/DL — LOW (ref 3.3–5)
ALBUMIN SERPL ELPH-MCNC: 2.9 G/DL — LOW (ref 3.3–5)
ALBUMIN SERPL ELPH-MCNC: 3 G/DL — LOW (ref 3.3–5)
ALP SERPL-CCNC: 179 U/L — HIGH (ref 40–120)
ALP SERPL-CCNC: 223 U/L — HIGH (ref 40–120)
ALP SERPL-CCNC: 251 U/L — HIGH (ref 40–120)
ALP SERPL-CCNC: 322 U/L — HIGH (ref 40–120)
ALP SERPL-CCNC: 409 U/L — HIGH (ref 40–120)
ALT FLD-CCNC: 34 U/L — SIGNIFICANT CHANGE UP (ref 10–45)
ALT FLD-CCNC: 35 U/L — SIGNIFICANT CHANGE UP (ref 10–45)
ALT FLD-CCNC: 39 U/L — SIGNIFICANT CHANGE UP (ref 10–45)
ALT FLD-CCNC: 51 U/L — HIGH (ref 10–45)
ALT FLD-CCNC: 60 U/L — HIGH (ref 10–45)
AMMONIA BLD-MCNC: 21 UMOL/L — SIGNIFICANT CHANGE UP (ref 11–55)
ANION GAP SERPL CALC-SCNC: 15 MMOL/L — SIGNIFICANT CHANGE UP (ref 5–17)
ANION GAP SERPL CALC-SCNC: 17 MMOL/L — SIGNIFICANT CHANGE UP (ref 5–17)
ANION GAP SERPL CALC-SCNC: 18 MMOL/L — HIGH (ref 5–17)
ANION GAP SERPL CALC-SCNC: 20 MMOL/L — HIGH (ref 5–17)
ANION GAP SERPL CALC-SCNC: 24 MMOL/L — HIGH (ref 5–17)
ANION GAP SERPL CALC-SCNC: 25 MMOL/L — HIGH (ref 5–17)
APPEARANCE UR: CLEAR — SIGNIFICANT CHANGE UP
APTT BLD: 132.5 SEC — CRITICAL HIGH (ref 24.5–35.6)
APTT BLD: 38.1 SEC — HIGH (ref 24.5–35.6)
APTT BLD: 51.7 SEC — HIGH (ref 24.5–35.6)
APTT BLD: 72.9 SEC — HIGH (ref 24.5–35.6)
APTT BLD: 73.5 SEC — HIGH (ref 24.5–35.6)
APTT BLD: 79.5 SEC — HIGH (ref 24.5–35.6)
AST SERPL-CCNC: 54 U/L — HIGH (ref 10–40)
AST SERPL-CCNC: 57 U/L — HIGH (ref 10–40)
AST SERPL-CCNC: 62 U/L — HIGH (ref 10–40)
AST SERPL-CCNC: 76 U/L — HIGH (ref 10–40)
AST SERPL-CCNC: 92 U/L — HIGH (ref 10–40)
B-OH-BUTYR SERPL-SCNC: 0.3 MMOL/L — SIGNIFICANT CHANGE UP
BACTERIA # UR AUTO: NEGATIVE /HPF — SIGNIFICANT CHANGE UP
BASE EXCESS BLDV CALC-SCNC: -8.4 MMOL/L — LOW (ref -2–3)
BASOPHILS # BLD AUTO: 0.04 K/UL — SIGNIFICANT CHANGE UP (ref 0–0.2)
BASOPHILS # BLD AUTO: 0.05 K/UL — SIGNIFICANT CHANGE UP (ref 0–0.2)
BASOPHILS NFR BLD AUTO: 0.2 % — SIGNIFICANT CHANGE UP (ref 0–2)
BASOPHILS NFR BLD AUTO: 0.3 % — SIGNIFICANT CHANGE UP (ref 0–2)
BILIRUB DIRECT SERPL-MCNC: >10 MG/DL — HIGH (ref 0–0.3)
BILIRUB INDIRECT FLD-MCNC: <20.7 MG/DL — HIGH (ref 0.2–1)
BILIRUB SERPL-MCNC: 27.1 MG/DL — HIGH (ref 0.2–1.2)
BILIRUB SERPL-MCNC: 29.3 MG/DL — HIGH (ref 0.2–1.2)
BILIRUB SERPL-MCNC: 30.7 MG/DL — HIGH (ref 0.2–1.2)
BILIRUB SERPL-MCNC: 30.7 MG/DL — HIGH (ref 0.2–1.2)
BILIRUB SERPL-MCNC: 33 MG/DL — HIGH (ref 0.2–1.2)
BILIRUB UR-MCNC: ABNORMAL
BUN SERPL-MCNC: 48 MG/DL — HIGH (ref 7–23)
BUN SERPL-MCNC: 48 MG/DL — HIGH (ref 7–23)
BUN SERPL-MCNC: 52 MG/DL — HIGH (ref 7–23)
BUN SERPL-MCNC: 58 MG/DL — HIGH (ref 7–23)
BUN SERPL-MCNC: 64 MG/DL — HIGH (ref 7–23)
BUN SERPL-MCNC: 76 MG/DL — HIGH (ref 7–23)
CALCIUM SERPL-MCNC: 8.1 MG/DL — LOW (ref 8.4–10.5)
CALCIUM SERPL-MCNC: 8.3 MG/DL — LOW (ref 8.4–10.5)
CALCIUM SERPL-MCNC: 8.3 MG/DL — LOW (ref 8.4–10.5)
CALCIUM SERPL-MCNC: 8.5 MG/DL — SIGNIFICANT CHANGE UP (ref 8.4–10.5)
CALCIUM SERPL-MCNC: 8.6 MG/DL — SIGNIFICANT CHANGE UP (ref 8.4–10.5)
CALCIUM SERPL-MCNC: 8.9 MG/DL — SIGNIFICANT CHANGE UP (ref 8.4–10.5)
CAST: 4 /LPF — SIGNIFICANT CHANGE UP (ref 0–4)
CHLORIDE SERPL-SCNC: 88 MMOL/L — LOW (ref 96–108)
CHLORIDE SERPL-SCNC: 88 MMOL/L — LOW (ref 96–108)
CHLORIDE SERPL-SCNC: 91 MMOL/L — LOW (ref 96–108)
CHLORIDE SERPL-SCNC: 91 MMOL/L — LOW (ref 96–108)
CHLORIDE SERPL-SCNC: 92 MMOL/L — LOW (ref 96–108)
CHLORIDE SERPL-SCNC: 94 MMOL/L — LOW (ref 96–108)
CHLORIDE UR-SCNC: 20 MMOL/L — SIGNIFICANT CHANGE UP
CO2 BLDV-SCNC: 18 MMOL/L — LOW (ref 22–26)
CO2 SERPL-SCNC: 13 MMOL/L — LOW (ref 22–31)
CO2 SERPL-SCNC: 15 MMOL/L — LOW (ref 22–31)
CO2 SERPL-SCNC: 17 MMOL/L — LOW (ref 22–31)
CO2 SERPL-SCNC: 20 MMOL/L — LOW (ref 22–31)
CO2 SERPL-SCNC: 21 MMOL/L — LOW (ref 22–31)
CO2 SERPL-SCNC: 22 MMOL/L — SIGNIFICANT CHANGE UP (ref 22–31)
COLOR SPEC: SIGNIFICANT CHANGE UP
CORTIS AM PEAK SERPL-MCNC: 17.2 UG/DL — SIGNIFICANT CHANGE UP (ref 6–18.4)
CREAT ?TM UR-MCNC: 75 MG/DL — SIGNIFICANT CHANGE UP
CREAT ?TM UR-MCNC: 75 MG/DL — SIGNIFICANT CHANGE UP
CREAT SERPL-MCNC: 1.4 MG/DL — HIGH (ref 0.5–1.3)
CREAT SERPL-MCNC: 1.78 MG/DL — HIGH (ref 0.5–1.3)
CREAT SERPL-MCNC: 2.08 MG/DL — HIGH (ref 0.5–1.3)
CREAT SERPL-MCNC: 3.18 MG/DL — HIGH (ref 0.5–1.3)
CREAT SERPL-MCNC: 4.23 MG/DL — HIGH (ref 0.5–1.3)
CREAT SERPL-MCNC: 5.07 MG/DL — HIGH (ref 0.5–1.3)
CULTURE RESULTS: ABNORMAL
CULTURE RESULTS: ABNORMAL
CULTURE RESULTS: SIGNIFICANT CHANGE UP
DIFF PNL FLD: NEGATIVE — SIGNIFICANT CHANGE UP
EGFR: 10 ML/MIN/1.73M2 — LOW
EGFR: 10 ML/MIN/1.73M2 — LOW
EGFR: 13 ML/MIN/1.73M2 — LOW
EGFR: 13 ML/MIN/1.73M2 — LOW
EGFR: 18 ML/MIN/1.73M2 — LOW
EGFR: 18 ML/MIN/1.73M2 — LOW
EGFR: 30 ML/MIN/1.73M2 — LOW
EGFR: 30 ML/MIN/1.73M2 — LOW
EGFR: 37 ML/MIN/1.73M2 — LOW
EGFR: 37 ML/MIN/1.73M2 — LOW
EGFR: 49 ML/MIN/1.73M2 — LOW
EGFR: 49 ML/MIN/1.73M2 — LOW
EOSINOPHIL # BLD AUTO: 0.03 K/UL — SIGNIFICANT CHANGE UP (ref 0–0.5)
EOSINOPHIL # BLD AUTO: 0.13 K/UL — SIGNIFICANT CHANGE UP (ref 0–0.5)
EOSINOPHIL NFR BLD AUTO: 0.2 % — SIGNIFICANT CHANGE UP (ref 0–6)
EOSINOPHIL NFR BLD AUTO: 0.6 % — SIGNIFICANT CHANGE UP (ref 0–6)
GAS PNL BLDV: SIGNIFICANT CHANGE UP
GAS PNL BLDV: SIGNIFICANT CHANGE UP
GLUCOSE SERPL-MCNC: 117 MG/DL — HIGH (ref 70–99)
GLUCOSE SERPL-MCNC: 131 MG/DL — HIGH (ref 70–99)
GLUCOSE SERPL-MCNC: 177 MG/DL — HIGH (ref 70–99)
GLUCOSE SERPL-MCNC: 92 MG/DL — SIGNIFICANT CHANGE UP (ref 70–99)
GLUCOSE SERPL-MCNC: 93 MG/DL — SIGNIFICANT CHANGE UP (ref 70–99)
GLUCOSE SERPL-MCNC: 97 MG/DL — SIGNIFICANT CHANGE UP (ref 70–99)
GLUCOSE UR QL: NEGATIVE MG/DL — SIGNIFICANT CHANGE UP
GRAM STN FLD: ABNORMAL
HAPTOGLOB SERPL-MCNC: 126 MG/DL — SIGNIFICANT CHANGE UP (ref 34–200)
HCO3 BLDV-SCNC: 17 MMOL/L — LOW (ref 22–29)
HCT VFR BLD CALC: 28.6 % — LOW (ref 39–50)
HCT VFR BLD CALC: 29.1 % — LOW (ref 39–50)
HCT VFR BLD CALC: 29.8 % — LOW (ref 39–50)
HCT VFR BLD CALC: 30.3 % — LOW (ref 39–50)
HCT VFR BLD CALC: 32.8 % — LOW (ref 39–50)
HCT VFR BLD CALC: 33.9 % — LOW (ref 39–50)
HCT VFR BLD CALC: 35.3 % — LOW (ref 39–50)
HCT VFR BLD CALC: 39 % — SIGNIFICANT CHANGE UP (ref 39–50)
HGB BLD-MCNC: 10.5 G/DL — LOW (ref 13–17)
HGB BLD-MCNC: 10.6 G/DL — LOW (ref 13–17)
HGB BLD-MCNC: 10.9 G/DL — LOW (ref 13–17)
HGB BLD-MCNC: 11.1 G/DL — LOW (ref 13–17)
HGB BLD-MCNC: 11.9 G/DL — LOW (ref 13–17)
HGB BLD-MCNC: 12.3 G/DL — LOW (ref 13–17)
HGB BLD-MCNC: 12.9 G/DL — LOW (ref 13–17)
HGB BLD-MCNC: 14.2 G/DL — SIGNIFICANT CHANGE UP (ref 13–17)
IMM GRANULOCYTES NFR BLD AUTO: 1.6 % — HIGH (ref 0–0.9)
IMM GRANULOCYTES NFR BLD AUTO: 1.8 % — HIGH (ref 0–0.9)
INR BLD: 1.53 RATIO — HIGH (ref 0.85–1.16)
INR BLD: 2.36 RATIO — HIGH (ref 0.85–1.16)
INR BLD: 2.56 RATIO — HIGH (ref 0.85–1.16)
KETONES UR-MCNC: NEGATIVE MG/DL — SIGNIFICANT CHANGE UP
LACTATE SERPL-SCNC: 2.3 MMOL/L — HIGH (ref 0.5–2)
LDH SERPL L TO P-CCNC: 161 U/L — SIGNIFICANT CHANGE UP (ref 50–242)
LEUKOCYTE ESTERASE UR-ACNC: ABNORMAL
LIDOCAIN IGE QN: 12 U/L — SIGNIFICANT CHANGE UP (ref 7–60)
LYMPHOCYTES # BLD AUTO: 0.48 K/UL — LOW (ref 1–3.3)
LYMPHOCYTES # BLD AUTO: 0.68 K/UL — LOW (ref 1–3.3)
LYMPHOCYTES # BLD AUTO: 2.2 % — LOW (ref 13–44)
LYMPHOCYTES # BLD AUTO: 4.3 % — LOW (ref 13–44)
MAGNESIUM SERPL-MCNC: 1 MG/DL — CRITICAL LOW (ref 1.6–2.6)
MAGNESIUM SERPL-MCNC: 1.2 MG/DL — LOW (ref 1.6–2.6)
MAGNESIUM SERPL-MCNC: 1.4 MG/DL — LOW (ref 1.6–2.6)
MAGNESIUM SERPL-MCNC: 1.6 MG/DL — SIGNIFICANT CHANGE UP (ref 1.6–2.6)
MCHC RBC-ENTMCNC: 29.8 PG — SIGNIFICANT CHANGE UP (ref 27–34)
MCHC RBC-ENTMCNC: 30.1 PG — SIGNIFICANT CHANGE UP (ref 27–34)
MCHC RBC-ENTMCNC: 30.2 PG — SIGNIFICANT CHANGE UP (ref 27–34)
MCHC RBC-ENTMCNC: 30.3 PG — SIGNIFICANT CHANGE UP (ref 27–34)
MCHC RBC-ENTMCNC: 30.3 PG — SIGNIFICANT CHANGE UP (ref 27–34)
MCHC RBC-ENTMCNC: 30.4 PG — SIGNIFICANT CHANGE UP (ref 27–34)
MCHC RBC-ENTMCNC: 30.5 PG — SIGNIFICANT CHANGE UP (ref 27–34)
MCHC RBC-ENTMCNC: 30.7 PG — SIGNIFICANT CHANGE UP (ref 27–34)
MCHC RBC-ENTMCNC: 36.3 G/DL — HIGH (ref 32–36)
MCHC RBC-ENTMCNC: 36.3 G/DL — HIGH (ref 32–36)
MCHC RBC-ENTMCNC: 36.4 G/DL — HIGH (ref 32–36)
MCHC RBC-ENTMCNC: 36.4 G/DL — HIGH (ref 32–36)
MCHC RBC-ENTMCNC: 36.5 G/DL — HIGH (ref 32–36)
MCHC RBC-ENTMCNC: 36.6 G/DL — HIGH (ref 32–36)
MCHC RBC-ENTMCNC: 36.6 G/DL — HIGH (ref 32–36)
MCHC RBC-ENTMCNC: 36.7 G/DL — HIGH (ref 32–36)
MCV RBC AUTO: 81.2 FL — SIGNIFICANT CHANGE UP (ref 80–100)
MCV RBC AUTO: 82.8 FL — SIGNIFICANT CHANGE UP (ref 80–100)
MCV RBC AUTO: 83.1 FL — SIGNIFICANT CHANGE UP (ref 80–100)
MCV RBC AUTO: 83.3 FL — SIGNIFICANT CHANGE UP (ref 80–100)
MCV RBC AUTO: 83.3 FL — SIGNIFICANT CHANGE UP (ref 80–100)
MCV RBC AUTO: 83.9 FL — SIGNIFICANT CHANGE UP (ref 80–100)
METHOD TYPE: SIGNIFICANT CHANGE UP
MONOCYTES # BLD AUTO: 0.78 K/UL — SIGNIFICANT CHANGE UP (ref 0–0.9)
MONOCYTES # BLD AUTO: 1.2 K/UL — HIGH (ref 0–0.9)
MONOCYTES NFR BLD AUTO: 3.6 % — SIGNIFICANT CHANGE UP (ref 2–14)
MONOCYTES NFR BLD AUTO: 7.6 % — SIGNIFICANT CHANGE UP (ref 2–14)
NEUTROPHILS # BLD AUTO: 13.66 K/UL — HIGH (ref 1.8–7.4)
NEUTROPHILS # BLD AUTO: 19.92 K/UL — HIGH (ref 1.8–7.4)
NEUTROPHILS NFR BLD AUTO: 86 % — HIGH (ref 43–77)
NEUTROPHILS NFR BLD AUTO: 91.6 % — HIGH (ref 43–77)
NITRITE UR-MCNC: NEGATIVE — SIGNIFICANT CHANGE UP
NRBC BLD AUTO-RTO: 0 /100 WBCS — SIGNIFICANT CHANGE UP (ref 0–0)
ORGANISM # SPEC MICROSCOPIC CNT: ABNORMAL
OSMOLALITY UR: 319 MOS/KG — SIGNIFICANT CHANGE UP (ref 300–900)
P AERUGINOSA DNA BLD POS NAA+NON-PROBE: SIGNIFICANT CHANGE UP
P AERUGINOSA DNA BLD POS NAA+NON-PROBE: SIGNIFICANT CHANGE UP
PCO2 BLDV: 35 MMHG — LOW (ref 42–55)
PH BLDV: 7.3 — LOW (ref 7.32–7.43)
PH UR: 5.5 — SIGNIFICANT CHANGE UP (ref 5–8)
PHOSPHATE SERPL-MCNC: 2.3 MG/DL — LOW (ref 2.5–4.5)
PHOSPHATE SERPL-MCNC: 3.6 MG/DL — SIGNIFICANT CHANGE UP (ref 2.5–4.5)
PHOSPHATE SERPL-MCNC: 4.2 MG/DL — SIGNIFICANT CHANGE UP (ref 2.5–4.5)
PLATELET # BLD AUTO: 268 K/UL — SIGNIFICANT CHANGE UP (ref 150–400)
PLATELET # BLD AUTO: 284 K/UL — SIGNIFICANT CHANGE UP (ref 150–400)
PLATELET # BLD AUTO: 293 K/UL — SIGNIFICANT CHANGE UP (ref 150–400)
PLATELET # BLD AUTO: 336 K/UL — SIGNIFICANT CHANGE UP (ref 150–400)
PLATELET # BLD AUTO: 375 K/UL — SIGNIFICANT CHANGE UP (ref 150–400)
PLATELET # BLD AUTO: 385 K/UL — SIGNIFICANT CHANGE UP (ref 150–400)
PLATELET # BLD AUTO: 390 K/UL — SIGNIFICANT CHANGE UP (ref 150–400)
PLATELET # BLD AUTO: 588 K/UL — HIGH (ref 150–400)
PO2 BLDV: 83 MMHG — HIGH (ref 25–45)
POTASSIUM SERPL-MCNC: 2.6 MMOL/L — CRITICAL LOW (ref 3.5–5.3)
POTASSIUM SERPL-MCNC: 2.7 MMOL/L — CRITICAL LOW (ref 3.5–5.3)
POTASSIUM SERPL-MCNC: 3.4 MMOL/L — LOW (ref 3.5–5.3)
POTASSIUM SERPL-MCNC: 3.6 MMOL/L — SIGNIFICANT CHANGE UP (ref 3.5–5.3)
POTASSIUM SERPL-MCNC: 3.9 MMOL/L — SIGNIFICANT CHANGE UP (ref 3.5–5.3)
POTASSIUM SERPL-MCNC: 3.9 MMOL/L — SIGNIFICANT CHANGE UP (ref 3.5–5.3)
POTASSIUM SERPL-SCNC: 2.6 MMOL/L — CRITICAL LOW (ref 3.5–5.3)
POTASSIUM SERPL-SCNC: 2.7 MMOL/L — CRITICAL LOW (ref 3.5–5.3)
POTASSIUM SERPL-SCNC: 3.4 MMOL/L — LOW (ref 3.5–5.3)
POTASSIUM SERPL-SCNC: 3.6 MMOL/L — SIGNIFICANT CHANGE UP (ref 3.5–5.3)
POTASSIUM SERPL-SCNC: 3.9 MMOL/L — SIGNIFICANT CHANGE UP (ref 3.5–5.3)
POTASSIUM SERPL-SCNC: 3.9 MMOL/L — SIGNIFICANT CHANGE UP (ref 3.5–5.3)
POTASSIUM UR-SCNC: 22 MMOL/L — SIGNIFICANT CHANGE UP
PROCALCITONIN SERPL-MCNC: 7.76 NG/ML — HIGH (ref 0.02–0.1)
PROT ?TM UR-MCNC: 28 MG/DL — HIGH (ref 0–12)
PROT SERPL-MCNC: 4.9 G/DL — LOW (ref 6–8.3)
PROT SERPL-MCNC: 5 G/DL — LOW (ref 6–8.3)
PROT SERPL-MCNC: 5.2 G/DL — LOW (ref 6–8.3)
PROT SERPL-MCNC: 5.2 G/DL — LOW (ref 6–8.3)
PROT SERPL-MCNC: 6.3 G/DL — SIGNIFICANT CHANGE UP (ref 6–8.3)
PROT UR-MCNC: NEGATIVE MG/DL — SIGNIFICANT CHANGE UP
PROT/CREAT UR-RTO: 0.4 RATIO — HIGH (ref 0–0.2)
PROTHROM AB SERPL-ACNC: 17.4 SEC — HIGH (ref 9.9–13.4)
PROTHROM AB SERPL-ACNC: 26.7 SEC — HIGH (ref 9.9–13.4)
PROTHROM AB SERPL-ACNC: 28.9 SEC — HIGH (ref 9.9–13.4)
RBC # BLD: 3.44 M/UL — LOW (ref 4.2–5.8)
RBC # BLD: 3.5 M/UL — LOW (ref 4.2–5.8)
RBC # BLD: 3.55 M/UL — LOW (ref 4.2–5.8)
RBC # BLD: 3.73 M/UL — LOW (ref 4.2–5.8)
RBC # BLD: 3.96 M/UL — LOW (ref 4.2–5.8)
RBC # BLD: 4.07 M/UL — LOW (ref 4.2–5.8)
RBC # BLD: 4.25 M/UL — SIGNIFICANT CHANGE UP (ref 4.2–5.8)
RBC # BLD: 4.68 M/UL — SIGNIFICANT CHANGE UP (ref 4.2–5.8)
RBC # FLD: 17.8 % — HIGH (ref 10.3–14.5)
RBC # FLD: 18 % — HIGH (ref 10.3–14.5)
RBC # FLD: 18.3 % — HIGH (ref 10.3–14.5)
RBC # FLD: 18.3 % — HIGH (ref 10.3–14.5)
RBC # FLD: 18.4 % — HIGH (ref 10.3–14.5)
RBC # FLD: 18.6 % — HIGH (ref 10.3–14.5)
RBC # FLD: 18.7 % — HIGH (ref 10.3–14.5)
RBC # FLD: 18.8 % — HIGH (ref 10.3–14.5)
RBC CASTS # UR COMP ASSIST: 2 /HPF — SIGNIFICANT CHANGE UP (ref 0–4)
SAO2 % BLDV: 97.7 % — HIGH (ref 67–88)
SODIUM SERPL-SCNC: 126 MMOL/L — LOW (ref 135–145)
SODIUM SERPL-SCNC: 127 MMOL/L — LOW (ref 135–145)
SODIUM SERPL-SCNC: 128 MMOL/L — LOW (ref 135–145)
SODIUM SERPL-SCNC: 129 MMOL/L — LOW (ref 135–145)
SODIUM SERPL-SCNC: 130 MMOL/L — LOW (ref 135–145)
SODIUM SERPL-SCNC: 131 MMOL/L — LOW (ref 135–145)
SODIUM UR-SCNC: 17 MMOL/L — SIGNIFICANT CHANGE UP
SP GR SPEC: 1.01 — SIGNIFICANT CHANGE UP (ref 1–1.03)
SPECIMEN SOURCE: SIGNIFICANT CHANGE UP
SQUAMOUS # UR AUTO: 2 /HPF — SIGNIFICANT CHANGE UP (ref 0–5)
TSH SERPL-MCNC: <0.01 UIU/ML — LOW (ref 0.27–4.2)
UROBILINOGEN FLD QL: 0.2 MG/DL — SIGNIFICANT CHANGE UP (ref 0.2–1)
UUN UR-MCNC: 359 MG/DL — SIGNIFICANT CHANGE UP
WBC # BLD: 14.64 K/UL — HIGH (ref 3.8–10.5)
WBC # BLD: 15.36 K/UL — HIGH (ref 3.8–10.5)
WBC # BLD: 15.87 K/UL — HIGH (ref 3.8–10.5)
WBC # BLD: 16.12 K/UL — HIGH (ref 3.8–10.5)
WBC # BLD: 17.46 K/UL — HIGH (ref 3.8–10.5)
WBC # BLD: 21.75 K/UL — HIGH (ref 3.8–10.5)
WBC # BLD: 21.9 K/UL — HIGH (ref 3.8–10.5)
WBC # BLD: 23.88 K/UL — HIGH (ref 3.8–10.5)
WBC # FLD AUTO: 14.64 K/UL — HIGH (ref 3.8–10.5)
WBC # FLD AUTO: 15.36 K/UL — HIGH (ref 3.8–10.5)
WBC # FLD AUTO: 15.87 K/UL — HIGH (ref 3.8–10.5)
WBC # FLD AUTO: 16.12 K/UL — HIGH (ref 3.8–10.5)
WBC # FLD AUTO: 17.46 K/UL — HIGH (ref 3.8–10.5)
WBC # FLD AUTO: 21.75 K/UL — HIGH (ref 3.8–10.5)
WBC # FLD AUTO: 21.9 K/UL — HIGH (ref 3.8–10.5)
WBC # FLD AUTO: 23.88 K/UL — HIGH (ref 3.8–10.5)
WBC UR QL: 3 /HPF — SIGNIFICANT CHANGE UP (ref 0–5)

## 2025-01-01 PROCEDURE — 82947 ASSAY GLUCOSE BLOOD QUANT: CPT

## 2025-01-01 PROCEDURE — 93970 EXTREMITY STUDY: CPT | Mod: 26

## 2025-01-01 PROCEDURE — 99223 1ST HOSP IP/OBS HIGH 75: CPT

## 2025-01-01 PROCEDURE — 99232 SBSQ HOSP IP/OBS MODERATE 35: CPT | Mod: GC

## 2025-01-01 PROCEDURE — 85014 HEMATOCRIT: CPT

## 2025-01-01 PROCEDURE — 74177 CT ABD & PELVIS W/CONTRAST: CPT | Mod: MC

## 2025-01-01 PROCEDURE — 93005 ELECTROCARDIOGRAM TRACING: CPT

## 2025-01-01 PROCEDURE — 85610 PROTHROMBIN TIME: CPT

## 2025-01-01 PROCEDURE — 85018 HEMOGLOBIN: CPT

## 2025-01-01 PROCEDURE — 82010 KETONE BODYS QUAN: CPT

## 2025-01-01 PROCEDURE — 83605 ASSAY OF LACTIC ACID: CPT

## 2025-01-01 PROCEDURE — 82436 ASSAY OF URINE CHLORIDE: CPT

## 2025-01-01 PROCEDURE — 74177 CT ABD & PELVIS W/CONTRAST: CPT | Mod: 26

## 2025-01-01 PROCEDURE — 82330 ASSAY OF CALCIUM: CPT

## 2025-01-01 PROCEDURE — 99223 1ST HOSP IP/OBS HIGH 75: CPT | Mod: GC

## 2025-01-01 PROCEDURE — 85730 THROMBOPLASTIN TIME PARTIAL: CPT

## 2025-01-01 PROCEDURE — 84300 ASSAY OF URINE SODIUM: CPT

## 2025-01-01 PROCEDURE — 82435 ASSAY OF BLOOD CHLORIDE: CPT

## 2025-01-01 PROCEDURE — 82533 TOTAL CORTISOL: CPT

## 2025-01-01 PROCEDURE — 87040 BLOOD CULTURE FOR BACTERIA: CPT

## 2025-01-01 PROCEDURE — 82247 BILIRUBIN TOTAL: CPT

## 2025-01-01 PROCEDURE — 84443 ASSAY THYROID STIM HORMONE: CPT

## 2025-01-01 PROCEDURE — 87086 URINE CULTURE/COLONY COUNT: CPT

## 2025-01-01 PROCEDURE — 87150 DNA/RNA AMPLIFIED PROBE: CPT

## 2025-01-01 PROCEDURE — 80048 BASIC METABOLIC PNL TOTAL CA: CPT

## 2025-01-01 PROCEDURE — 86850 RBC ANTIBODY SCREEN: CPT

## 2025-01-01 PROCEDURE — 99233 SBSQ HOSP IP/OBS HIGH 50: CPT

## 2025-01-01 PROCEDURE — 84133 ASSAY OF URINE POTASSIUM: CPT

## 2025-01-01 PROCEDURE — 84132 ASSAY OF SERUM POTASSIUM: CPT

## 2025-01-01 PROCEDURE — G0545: CPT

## 2025-01-01 PROCEDURE — 83010 ASSAY OF HAPTOGLOBIN QUANT: CPT

## 2025-01-01 PROCEDURE — 99222 1ST HOSP IP/OBS MODERATE 55: CPT | Mod: GC

## 2025-01-01 PROCEDURE — 84540 ASSAY OF URINE/UREA-N: CPT

## 2025-01-01 PROCEDURE — 84295 ASSAY OF SERUM SODIUM: CPT

## 2025-01-01 PROCEDURE — 84145 PROCALCITONIN (PCT): CPT

## 2025-01-01 PROCEDURE — 86901 BLOOD TYPING SEROLOGIC RH(D): CPT

## 2025-01-01 PROCEDURE — 96376 TX/PRO/DX INJ SAME DRUG ADON: CPT

## 2025-01-01 PROCEDURE — 85027 COMPLETE CBC AUTOMATED: CPT

## 2025-01-01 PROCEDURE — 83935 ASSAY OF URINE OSMOLALITY: CPT

## 2025-01-01 PROCEDURE — 93010 ELECTROCARDIOGRAM REPORT: CPT

## 2025-01-01 PROCEDURE — 80076 HEPATIC FUNCTION PANEL: CPT

## 2025-01-01 PROCEDURE — 94640 AIRWAY INHALATION TREATMENT: CPT

## 2025-01-01 PROCEDURE — 99291 CRITICAL CARE FIRST HOUR: CPT

## 2025-01-01 PROCEDURE — 99232 SBSQ HOSP IP/OBS MODERATE 35: CPT

## 2025-01-01 PROCEDURE — 36415 COLL VENOUS BLD VENIPUNCTURE: CPT

## 2025-01-01 PROCEDURE — 82803 BLOOD GASES ANY COMBINATION: CPT

## 2025-01-01 PROCEDURE — 84100 ASSAY OF PHOSPHORUS: CPT

## 2025-01-01 PROCEDURE — 82248 BILIRUBIN DIRECT: CPT

## 2025-01-01 PROCEDURE — 96374 THER/PROPH/DIAG INJ IV PUSH: CPT

## 2025-01-01 PROCEDURE — 87077 CULTURE AEROBIC IDENTIFY: CPT

## 2025-01-01 PROCEDURE — 99233 SBSQ HOSP IP/OBS HIGH 50: CPT | Mod: GC

## 2025-01-01 PROCEDURE — 71045 X-RAY EXAM CHEST 1 VIEW: CPT | Mod: 26

## 2025-01-01 PROCEDURE — 93970 EXTREMITY STUDY: CPT

## 2025-01-01 PROCEDURE — 82570 ASSAY OF URINE CREATININE: CPT

## 2025-01-01 PROCEDURE — 85025 COMPLETE CBC W/AUTO DIFF WBC: CPT

## 2025-01-01 PROCEDURE — 82140 ASSAY OF AMMONIA: CPT

## 2025-01-01 PROCEDURE — 99497 ADVNCD CARE PLAN 30 MIN: CPT | Mod: 25

## 2025-01-01 PROCEDURE — 86900 BLOOD TYPING SEROLOGIC ABO: CPT

## 2025-01-01 PROCEDURE — 80053 COMPREHEN METABOLIC PANEL: CPT

## 2025-01-01 PROCEDURE — 81001 URINALYSIS AUTO W/SCOPE: CPT

## 2025-01-01 PROCEDURE — 87186 SC STD MICRODIL/AGAR DIL: CPT

## 2025-01-01 PROCEDURE — 83615 LACTATE (LD) (LDH) ENZYME: CPT

## 2025-01-01 PROCEDURE — P9059: CPT

## 2025-01-01 PROCEDURE — 83735 ASSAY OF MAGNESIUM: CPT

## 2025-01-01 PROCEDURE — 71045 X-RAY EXAM CHEST 1 VIEW: CPT

## 2025-01-01 PROCEDURE — 36430 TRANSFUSION BLD/BLD COMPNT: CPT

## 2025-01-01 PROCEDURE — 96375 TX/PRO/DX INJ NEW DRUG ADDON: CPT

## 2025-01-01 PROCEDURE — 83690 ASSAY OF LIPASE: CPT

## 2025-01-01 PROCEDURE — 84156 ASSAY OF PROTEIN URINE: CPT

## 2025-01-01 PROCEDURE — P9047: CPT

## 2025-01-01 RX ORDER — ONDANSETRON HCL/PF 4 MG/2 ML
4 VIAL (ML) INJECTION EVERY 8 HOURS
Refills: 0 | Status: DISCONTINUED | OUTPATIENT
Start: 2025-01-01 | End: 2025-01-01

## 2025-01-01 RX ORDER — PIPERACILLIN-TAZO-DEXTROSE,ISO 3.375G/5
3.38 IV SOLUTION, PIGGYBACK PREMIX FROZEN(ML) INTRAVENOUS EVERY 12 HOURS
Refills: 0 | Status: DISCONTINUED | OUTPATIENT
Start: 2025-01-01 | End: 2025-01-01

## 2025-01-01 RX ORDER — HYDROMORPHONE/SOD CHLOR,ISO/PF 2 MG/10 ML
0.5 SYRINGE (ML) INJECTION EVERY 4 HOURS
Refills: 0 | Status: DISCONTINUED | OUTPATIENT
Start: 2025-01-01 | End: 2025-01-01

## 2025-01-01 RX ORDER — PIPERACILLIN-TAZO-DEXTROSE,ISO 3.375G/5
3.38 IV SOLUTION, PIGGYBACK PREMIX FROZEN(ML) INTRAVENOUS EVERY 8 HOURS
Refills: 0 | Status: DISCONTINUED | OUTPATIENT
Start: 2025-01-01 | End: 2025-01-01

## 2025-01-01 RX ORDER — PIPERACILLIN-TAZO-DEXTROSE,ISO 3.375G/5
3.38 IV SOLUTION, PIGGYBACK PREMIX FROZEN(ML) INTRAVENOUS ONCE
Refills: 0 | Status: COMPLETED | OUTPATIENT
Start: 2025-01-01 | End: 2025-01-01

## 2025-01-01 RX ORDER — TAMSULOSIN HYDROCHLORIDE 0.4 MG/1
0.4 CAPSULE ORAL AT BEDTIME
Refills: 0 | Status: DISCONTINUED | OUTPATIENT
Start: 2025-01-01 | End: 2025-01-01

## 2025-01-01 RX ORDER — MAGNESIUM SULFATE 500 MG/ML
2 SYRINGE (ML) INJECTION ONCE
Refills: 0 | Status: COMPLETED | OUTPATIENT
Start: 2025-01-01 | End: 2025-01-01

## 2025-01-01 RX ORDER — HEPARIN SODIUM 1000 [USP'U]/ML
INJECTION INTRAVENOUS; SUBCUTANEOUS
Qty: 25000 | Refills: 0 | Status: DISCONTINUED | OUTPATIENT
Start: 2025-01-01 | End: 2025-01-01

## 2025-01-01 RX ORDER — FINASTERIDE 1 MG/1
5 TABLET, FILM COATED ORAL DAILY
Refills: 0 | Status: DISCONTINUED | OUTPATIENT
Start: 2025-01-01 | End: 2025-01-01

## 2025-01-01 RX ORDER — GLYCOPYRROLATE 0.2 MG/ML
0.4 INJECTION INTRAMUSCULAR; INTRAVENOUS EVERY 6 HOURS
Refills: 0 | Status: DISCONTINUED | OUTPATIENT
Start: 2025-01-01 | End: 2025-01-01

## 2025-01-01 RX ORDER — HYDROMORPHONE/SOD CHLOR,ISO/PF 2 MG/10 ML
0.5 SYRINGE (ML) INJECTION
Refills: 0 | Status: DISCONTINUED | OUTPATIENT
Start: 2025-01-01 | End: 2025-01-01

## 2025-01-01 RX ORDER — GLYCOPYRROLATE 0.2 MG/ML
0.2 INJECTION INTRAMUSCULAR; INTRAVENOUS ONCE
Refills: 0 | Status: DISCONTINUED | OUTPATIENT
Start: 2025-01-01 | End: 2025-01-01

## 2025-01-01 RX ORDER — SODIUM BICARBONATE 1 MEQ/ML
650 SYRINGE (ML) INTRAVENOUS THREE TIMES A DAY
Refills: 0 | Status: DISCONTINUED | OUTPATIENT
Start: 2025-01-01 | End: 2025-01-01

## 2025-01-01 RX ORDER — MELATONIN 5 MG
3 TABLET ORAL AT BEDTIME
Refills: 0 | Status: DISCONTINUED | OUTPATIENT
Start: 2025-01-01 | End: 2025-01-01

## 2025-01-01 RX ORDER — SODIUM CHLORIDE 9 G/1000ML
1000 INJECTION, SOLUTION INTRAVENOUS ONCE
Refills: 0 | Status: COMPLETED | OUTPATIENT
Start: 2025-01-01 | End: 2025-01-01

## 2025-01-01 RX ORDER — ACETAMINOPHEN 500 MG/5ML
1000 LIQUID (ML) ORAL ONCE
Refills: 0 | Status: COMPLETED | OUTPATIENT
Start: 2025-01-01 | End: 2025-01-01

## 2025-01-01 RX ORDER — CALAMINE 8% AND ZINC OXIDE 8% 160 MG/ML
1 LOTION TOPICAL
Refills: 0 | Status: DISCONTINUED | OUTPATIENT
Start: 2025-01-01 | End: 2025-01-01

## 2025-01-01 RX ORDER — SENNA 187 MG
2 TABLET ORAL AT BEDTIME
Refills: 0 | Status: DISCONTINUED | OUTPATIENT
Start: 2025-01-01 | End: 2025-01-01

## 2025-01-01 RX ORDER — NYSTATIN 100000 [USP'U]/G
1 CREAM TOPICAL
Refills: 0 | Status: DISCONTINUED | OUTPATIENT
Start: 2025-01-01 | End: 2025-01-01

## 2025-01-01 RX ORDER — HYDROXYZINE HYDROCHLORIDE 25 MG/1
25 TABLET, FILM COATED ORAL EVERY 6 HOURS
Refills: 0 | Status: DISCONTINUED | OUTPATIENT
Start: 2025-01-01 | End: 2025-01-01

## 2025-01-01 RX ORDER — HYDROMORPHONE/SOD CHLOR,ISO/PF 2 MG/10 ML
0.2 SYRINGE (ML) INJECTION EVERY 4 HOURS
Refills: 0 | Status: DISCONTINUED | OUTPATIENT
Start: 2025-01-01 | End: 2025-01-01

## 2025-01-01 RX ORDER — DIPHENHYDRAMINE HCL 12.5MG/5ML
25 ELIXIR ORAL ONCE
Refills: 0 | Status: COMPLETED | OUTPATIENT
Start: 2025-01-01 | End: 2025-01-01

## 2025-01-01 RX ORDER — LORAZEPAM 4 MG/ML
0.5 VIAL (ML) INJECTION
Refills: 0 | Status: DISCONTINUED | OUTPATIENT
Start: 2025-01-01 | End: 2025-01-01

## 2025-01-01 RX ORDER — HEPARIN SODIUM 1000 [USP'U]/ML
1300 INJECTION INTRAVENOUS; SUBCUTANEOUS
Qty: 25000 | Refills: 0 | Status: DISCONTINUED | OUTPATIENT
Start: 2025-01-01 | End: 2025-01-01

## 2025-01-01 RX ORDER — ACETAMINOPHEN 500 MG/5ML
650 LIQUID (ML) ORAL EVERY 6 HOURS
Refills: 0 | Status: DISCONTINUED | OUTPATIENT
Start: 2025-01-01 | End: 2025-01-01

## 2025-01-01 RX ORDER — DIPHENHYDRAMINE HCL 12.5MG/5ML
50 ELIXIR ORAL EVERY 6 HOURS
Refills: 0 | Status: DISCONTINUED | OUTPATIENT
Start: 2025-01-01 | End: 2025-01-01

## 2025-01-01 RX ORDER — HYDROMORPHONE/SOD CHLOR,ISO/PF 2 MG/10 ML
1 SYRINGE (ML) INJECTION
Refills: 0 | Status: DISCONTINUED | OUTPATIENT
Start: 2025-01-01 | End: 2025-01-01

## 2025-01-01 RX ORDER — METOPROLOL SUCCINATE 50 MG/1
25 TABLET, EXTENDED RELEASE ORAL
Refills: 0 | Status: DISCONTINUED | OUTPATIENT
Start: 2025-01-01 | End: 2025-01-01

## 2025-01-01 RX ORDER — IPRATROPIUM BROMIDE AND ALBUTEROL SULFATE .5; 2.5 MG/3ML; MG/3ML
3 SOLUTION RESPIRATORY (INHALATION) EVERY 6 HOURS
Refills: 0 | Status: DISCONTINUED | OUTPATIENT
Start: 2025-01-01 | End: 2025-01-01

## 2025-01-01 RX ORDER — LORAZEPAM 4 MG/ML
0.25 VIAL (ML) INJECTION
Refills: 0 | Status: DISCONTINUED | OUTPATIENT
Start: 2025-01-01 | End: 2025-01-01

## 2025-01-01 RX ORDER — ALBUMIN (HUMAN) 12.5 G/50ML
100 INJECTION, SOLUTION INTRAVENOUS EVERY 6 HOURS
Refills: 0 | Status: DISCONTINUED | OUTPATIENT
Start: 2025-01-01 | End: 2025-01-01

## 2025-01-01 RX ORDER — HYDROMORPHONE/SOD CHLOR,ISO/PF 2 MG/10 ML
0.2 SYRINGE (ML) INJECTION
Refills: 0 | Status: DISCONTINUED | OUTPATIENT
Start: 2025-01-01 | End: 2025-01-01

## 2025-01-01 RX ORDER — B1/B2/B3/B5/B6/B12/VIT C/FOLIC 500-0.5 MG
1 TABLET ORAL DAILY
Refills: 0 | Status: DISCONTINUED | OUTPATIENT
Start: 2025-01-01 | End: 2025-01-01

## 2025-01-01 RX ORDER — HEPARIN SODIUM 1000 [USP'U]/ML
7000 INJECTION INTRAVENOUS; SUBCUTANEOUS ONCE
Refills: 0 | Status: COMPLETED | OUTPATIENT
Start: 2025-01-01 | End: 2025-01-01

## 2025-01-01 RX ORDER — LORAZEPAM 4 MG/ML
0.25 VIAL (ML) INJECTION EVERY 4 HOURS
Refills: 0 | Status: DISCONTINUED | OUTPATIENT
Start: 2025-01-01 | End: 2025-01-01

## 2025-01-01 RX ORDER — HEPARIN SODIUM 1000 [USP'U]/ML
3500 INJECTION INTRAVENOUS; SUBCUTANEOUS EVERY 6 HOURS
Refills: 0 | Status: DISCONTINUED | OUTPATIENT
Start: 2025-01-01 | End: 2025-01-01

## 2025-01-01 RX ORDER — DIPHENHYDRAMINE HCL 12.5MG/5ML
25 ELIXIR ORAL EVERY 6 HOURS
Refills: 0 | Status: DISCONTINUED | OUTPATIENT
Start: 2025-01-01 | End: 2025-01-01

## 2025-01-01 RX ORDER — ALBUMIN (HUMAN) 12.5 G/50ML
50 INJECTION, SOLUTION INTRAVENOUS EVERY 6 HOURS
Refills: 0 | Status: COMPLETED | OUTPATIENT
Start: 2025-01-01 | End: 2025-01-01

## 2025-01-01 RX ORDER — HEPARIN SODIUM 1000 [USP'U]/ML
7000 INJECTION INTRAVENOUS; SUBCUTANEOUS EVERY 6 HOURS
Refills: 0 | Status: DISCONTINUED | OUTPATIENT
Start: 2025-01-01 | End: 2025-01-01

## 2025-01-01 RX ORDER — MAGNESIUM SULFATE 500 MG/ML
2 SYRINGE (ML) INJECTION DAILY
Refills: 0 | Status: DISCONTINUED | OUTPATIENT
Start: 2025-01-01 | End: 2025-01-01

## 2025-01-01 RX ORDER — HYDROMORPHONE/SOD CHLOR,ISO/PF 2 MG/10 ML
0.5 SYRINGE (ML) INJECTION EVERY 6 HOURS
Refills: 0 | Status: DISCONTINUED | OUTPATIENT
Start: 2025-01-01 | End: 2025-01-01

## 2025-01-01 RX ADMIN — HYDROXYZINE HYDROCHLORIDE 25 MILLIGRAM(S): 25 TABLET, FILM COATED ORAL at 05:58

## 2025-01-01 RX ADMIN — ALBUMIN (HUMAN) 50 MILLILITER(S): 12.5 INJECTION, SOLUTION INTRAVENOUS at 23:29

## 2025-01-01 RX ADMIN — NYSTATIN 1 APPLICATION(S): 100000 CREAM TOPICAL at 05:16

## 2025-01-01 RX ADMIN — Medication 40 MILLIGRAM(S): at 05:14

## 2025-01-01 RX ADMIN — ALBUMIN (HUMAN) 50 MILLILITER(S): 12.5 INJECTION, SOLUTION INTRAVENOUS at 05:58

## 2025-01-01 RX ADMIN — IPRATROPIUM BROMIDE AND ALBUTEROL SULFATE 3 MILLILITER(S): .5; 2.5 SOLUTION RESPIRATORY (INHALATION) at 00:10

## 2025-01-01 RX ADMIN — HYDROXYZINE HYDROCHLORIDE 25 MILLIGRAM(S): 25 TABLET, FILM COATED ORAL at 11:57

## 2025-01-01 RX ADMIN — Medication 25 GRAM(S): at 13:46

## 2025-01-01 RX ADMIN — NYSTATIN 1 APPLICATION(S): 100000 CREAM TOPICAL at 17:01

## 2025-01-01 RX ADMIN — HYDROXYZINE HYDROCHLORIDE 25 MILLIGRAM(S): 25 TABLET, FILM COATED ORAL at 14:55

## 2025-01-01 RX ADMIN — Medication 50 MILLIGRAM(S): at 21:34

## 2025-01-01 RX ADMIN — Medication 0.5 MILLIGRAM(S): at 23:19

## 2025-01-01 RX ADMIN — GLYCOPYRROLATE 0.4 MILLIGRAM(S): 0.2 INJECTION INTRAMUSCULAR; INTRAVENOUS at 22:20

## 2025-01-01 RX ADMIN — ALBUMIN (HUMAN) 50 MILLILITER(S): 12.5 INJECTION, SOLUTION INTRAVENOUS at 05:15

## 2025-01-01 RX ADMIN — Medication 1 TABLET(S): at 11:47

## 2025-01-01 RX ADMIN — Medication 1 TABLET(S): at 11:32

## 2025-01-01 RX ADMIN — IPRATROPIUM BROMIDE AND ALBUTEROL SULFATE 3 MILLILITER(S): .5; 2.5 SOLUTION RESPIRATORY (INHALATION) at 17:54

## 2025-01-01 RX ADMIN — IPRATROPIUM BROMIDE AND ALBUTEROL SULFATE 3 MILLILITER(S): .5; 2.5 SOLUTION RESPIRATORY (INHALATION) at 18:33

## 2025-01-01 RX ADMIN — FINASTERIDE 5 MILLIGRAM(S): 1 TABLET, FILM COATED ORAL at 11:13

## 2025-01-01 RX ADMIN — FINASTERIDE 5 MILLIGRAM(S): 1 TABLET, FILM COATED ORAL at 11:47

## 2025-01-01 RX ADMIN — Medication 0.5 MILLIGRAM(S): at 05:31

## 2025-01-01 RX ADMIN — Medication 650 MILLIGRAM(S): at 13:46

## 2025-01-01 RX ADMIN — Medication 100 MILLIEQUIVALENT(S): at 12:04

## 2025-01-01 RX ADMIN — IPRATROPIUM BROMIDE AND ALBUTEROL SULFATE 3 MILLILITER(S): .5; 2.5 SOLUTION RESPIRATORY (INHALATION) at 12:04

## 2025-01-01 RX ADMIN — Medication 0.5 MILLIGRAM(S): at 09:46

## 2025-01-01 RX ADMIN — Medication 63.75 MILLIMOLE(S): at 13:23

## 2025-01-01 RX ADMIN — NYSTATIN 1 APPLICATION(S): 100000 CREAM TOPICAL at 06:09

## 2025-01-01 RX ADMIN — GLYCOPYRROLATE 0.4 MILLIGRAM(S): 0.2 INJECTION INTRAMUSCULAR; INTRAVENOUS at 05:51

## 2025-01-01 RX ADMIN — HEPARIN SODIUM 1300 UNIT(S)/HR: 1000 INJECTION INTRAVENOUS; SUBCUTANEOUS at 23:47

## 2025-01-01 RX ADMIN — Medication 0.5 MILLIGRAM(S): at 14:20

## 2025-01-01 RX ADMIN — Medication 4 GRAM(S): at 11:29

## 2025-01-01 RX ADMIN — NYSTATIN 1 APPLICATION(S): 100000 CREAM TOPICAL at 21:10

## 2025-01-01 RX ADMIN — FINASTERIDE 5 MILLIGRAM(S): 1 TABLET, FILM COATED ORAL at 11:58

## 2025-01-01 RX ADMIN — Medication 0.5 MILLIGRAM(S): at 04:55

## 2025-01-01 RX ADMIN — Medication 4 GRAM(S): at 23:16

## 2025-01-01 RX ADMIN — Medication 650 MILLIGRAM(S): at 05:56

## 2025-01-01 RX ADMIN — Medication 1 MILLIGRAM(S): at 15:43

## 2025-01-01 RX ADMIN — Medication 0.5 MILLIGRAM(S): at 22:03

## 2025-01-01 RX ADMIN — Medication 0.5 MILLIGRAM(S): at 11:51

## 2025-01-01 RX ADMIN — Medication 0.5 MILLIGRAM(S): at 10:01

## 2025-01-01 RX ADMIN — Medication 500 MILLIGRAM(S): at 11:13

## 2025-01-01 RX ADMIN — IPRATROPIUM BROMIDE AND ALBUTEROL SULFATE 3 MILLILITER(S): .5; 2.5 SOLUTION RESPIRATORY (INHALATION) at 11:13

## 2025-01-01 RX ADMIN — Medication 50 MILLIGRAM(S): at 09:25

## 2025-01-01 RX ADMIN — Medication 20 MILLIEQUIVALENT(S): at 12:04

## 2025-01-01 RX ADMIN — Medication 200 GRAM(S): at 00:11

## 2025-01-01 RX ADMIN — IPRATROPIUM BROMIDE AND ALBUTEROL SULFATE 3 MILLILITER(S): .5; 2.5 SOLUTION RESPIRATORY (INHALATION) at 11:32

## 2025-01-01 RX ADMIN — Medication 500 MILLIGRAM(S): at 11:47

## 2025-01-01 RX ADMIN — Medication 1000 MILLIGRAM(S): at 11:42

## 2025-01-01 RX ADMIN — Medication 0.5 MILLIGRAM(S): at 12:39

## 2025-01-01 RX ADMIN — Medication 0.25 MILLIGRAM(S): at 00:19

## 2025-01-01 RX ADMIN — NYSTATIN 1 APPLICATION(S): 100000 CREAM TOPICAL at 05:39

## 2025-01-01 RX ADMIN — METOPROLOL SUCCINATE 25 MILLIGRAM(S): 50 TABLET, EXTENDED RELEASE ORAL at 05:14

## 2025-01-01 RX ADMIN — HEPARIN SODIUM 1300 UNIT(S)/HR: 1000 INJECTION INTRAVENOUS; SUBCUTANEOUS at 19:03

## 2025-01-01 RX ADMIN — Medication 0.5 MILLIGRAM(S): at 19:10

## 2025-01-01 RX ADMIN — Medication 0.5 MILLIGRAM(S): at 05:15

## 2025-01-01 RX ADMIN — ALBUMIN (HUMAN) 50 MILLILITER(S): 12.5 INJECTION, SOLUTION INTRAVENOUS at 11:47

## 2025-01-01 RX ADMIN — Medication 0.25 MILLIGRAM(S): at 07:20

## 2025-01-01 RX ADMIN — ALBUMIN (HUMAN) 50 MILLILITER(S): 12.5 INJECTION, SOLUTION INTRAVENOUS at 18:06

## 2025-01-01 RX ADMIN — METOPROLOL SUCCINATE 25 MILLIGRAM(S): 50 TABLET, EXTENDED RELEASE ORAL at 17:56

## 2025-01-01 RX ADMIN — Medication 50 MILLIGRAM(S): at 11:20

## 2025-01-01 RX ADMIN — IPRATROPIUM BROMIDE AND ALBUTEROL SULFATE 3 MILLILITER(S): .5; 2.5 SOLUTION RESPIRATORY (INHALATION) at 00:55

## 2025-01-01 RX ADMIN — Medication 400 MILLIGRAM(S): at 00:08

## 2025-01-01 RX ADMIN — HEPARIN SODIUM 1300 UNIT(S)/HR: 1000 INJECTION INTRAVENOUS; SUBCUTANEOUS at 06:46

## 2025-01-01 RX ADMIN — Medication 40 MILLIEQUIVALENT(S): at 06:26

## 2025-01-01 RX ADMIN — IPRATROPIUM BROMIDE AND ALBUTEROL SULFATE 3 MILLILITER(S): .5; 2.5 SOLUTION RESPIRATORY (INHALATION) at 17:22

## 2025-01-01 RX ADMIN — Medication 4 GRAM(S): at 11:13

## 2025-01-01 RX ADMIN — HEPARIN SODIUM 1300 UNIT(S)/HR: 1000 INJECTION INTRAVENOUS; SUBCUTANEOUS at 22:57

## 2025-01-01 RX ADMIN — HEPARIN SODIUM 1300 UNIT(S)/HR: 1000 INJECTION INTRAVENOUS; SUBCUTANEOUS at 14:10

## 2025-01-01 RX ADMIN — Medication 25 MILLIGRAM(S): at 22:17

## 2025-01-01 RX ADMIN — Medication 0.5 MILLIGRAM(S): at 17:36

## 2025-01-01 RX ADMIN — Medication 40 MILLIEQUIVALENT(S): at 18:22

## 2025-01-01 RX ADMIN — FINASTERIDE 5 MILLIGRAM(S): 1 TABLET, FILM COATED ORAL at 11:34

## 2025-01-01 RX ADMIN — IPRATROPIUM BROMIDE AND ALBUTEROL SULFATE 3 MILLILITER(S): .5; 2.5 SOLUTION RESPIRATORY (INHALATION) at 17:01

## 2025-01-01 RX ADMIN — Medication 1 DOSE(S): at 17:54

## 2025-01-01 RX ADMIN — Medication 1 MILLIGRAM(S): at 15:58

## 2025-01-01 RX ADMIN — CALAMINE 8% AND ZINC OXIDE 8% 1 APPLICATION(S): 160 LOTION TOPICAL at 05:16

## 2025-01-01 RX ADMIN — Medication 50 MILLIGRAM(S): at 15:37

## 2025-01-01 RX ADMIN — Medication 100 MILLILITER(S): at 13:22

## 2025-01-01 RX ADMIN — Medication 1 TABLET(S): at 11:13

## 2025-01-01 RX ADMIN — Medication 0.5 MILLIGRAM(S): at 22:28

## 2025-01-01 RX ADMIN — Medication 0.5 MILLIGRAM(S): at 05:16

## 2025-01-01 RX ADMIN — IPRATROPIUM BROMIDE AND ALBUTEROL SULFATE 3 MILLILITER(S): .5; 2.5 SOLUTION RESPIRATORY (INHALATION) at 23:39

## 2025-01-01 RX ADMIN — HYDROXYZINE HYDROCHLORIDE 25 MILLIGRAM(S): 25 TABLET, FILM COATED ORAL at 08:50

## 2025-01-01 RX ADMIN — Medication 0.5 MILLIGRAM(S): at 09:13

## 2025-01-01 RX ADMIN — Medication 1 DOSE(S): at 05:38

## 2025-01-01 RX ADMIN — HYDROXYZINE HYDROCHLORIDE 25 MILLIGRAM(S): 25 TABLET, FILM COATED ORAL at 00:17

## 2025-01-01 RX ADMIN — Medication 1 DOSE(S): at 06:12

## 2025-01-01 RX ADMIN — Medication 25 GRAM(S): at 18:45

## 2025-01-01 RX ADMIN — Medication 25 GRAM(S): at 07:19

## 2025-01-01 RX ADMIN — Medication 0.5 MILLIGRAM(S): at 17:21

## 2025-01-01 RX ADMIN — Medication 1 DOSE(S): at 06:58

## 2025-01-01 RX ADMIN — Medication 0.5 MILLIGRAM(S): at 15:59

## 2025-01-01 RX ADMIN — HEPARIN SODIUM 1300 UNIT(S)/HR: 1000 INJECTION INTRAVENOUS; SUBCUTANEOUS at 07:14

## 2025-01-01 RX ADMIN — Medication 50 MILLIGRAM(S): at 05:16

## 2025-01-01 RX ADMIN — HEPARIN SODIUM 7000 UNIT(S): 1000 INJECTION INTRAVENOUS; SUBCUTANEOUS at 06:59

## 2025-01-01 RX ADMIN — Medication 650 MILLIGRAM(S): at 13:31

## 2025-01-01 RX ADMIN — Medication 0.5 MILLIGRAM(S): at 05:00

## 2025-01-01 RX ADMIN — CALAMINE 8% AND ZINC OXIDE 8% 1 APPLICATION(S): 160 LOTION TOPICAL at 17:32

## 2025-01-01 RX ADMIN — NYSTATIN 1 APPLICATION(S): 100000 CREAM TOPICAL at 17:22

## 2025-01-01 RX ADMIN — Medication 400 MILLIGRAM(S): at 12:52

## 2025-01-01 RX ADMIN — HEPARIN SODIUM 1600 UNIT(S)/HR: 1000 INJECTION INTRAVENOUS; SUBCUTANEOUS at 07:19

## 2025-01-01 RX ADMIN — Medication 100 MILLIEQUIVALENT(S): at 21:24

## 2025-01-01 RX ADMIN — ALBUMIN (HUMAN) 50 MILLILITER(S): 12.5 INJECTION, SOLUTION INTRAVENOUS at 11:15

## 2025-01-01 RX ADMIN — ALBUMIN (HUMAN) 50 MILLILITER(S): 12.5 INJECTION, SOLUTION INTRAVENOUS at 00:48

## 2025-01-01 RX ADMIN — IPRATROPIUM BROMIDE AND ALBUTEROL SULFATE 3 MILLILITER(S): .5; 2.5 SOLUTION RESPIRATORY (INHALATION) at 12:39

## 2025-01-01 RX ADMIN — Medication 0.25 MILLIGRAM(S): at 10:46

## 2025-01-01 RX ADMIN — Medication 40 MILLIGRAM(S): at 05:47

## 2025-01-01 RX ADMIN — SODIUM CHLORIDE 1000 MILLILITER(S): 9 INJECTION, SOLUTION INTRAVENOUS at 02:12

## 2025-01-01 RX ADMIN — Medication 400 MILLIGRAM(S): at 09:25

## 2025-01-01 RX ADMIN — Medication 25 MILLIGRAM(S): at 17:01

## 2025-01-01 RX ADMIN — Medication 4 GRAM(S): at 09:38

## 2025-01-01 RX ADMIN — Medication 0.5 MILLIGRAM(S): at 22:43

## 2025-01-01 RX ADMIN — Medication 1 TABLET(S): at 12:04

## 2025-01-01 RX ADMIN — Medication 25 MILLIGRAM(S): at 10:23

## 2025-01-01 RX ADMIN — Medication 650 MILLIGRAM(S): at 11:14

## 2025-01-01 RX ADMIN — TAMSULOSIN HYDROCHLORIDE 0.4 MILLIGRAM(S): 0.4 CAPSULE ORAL at 22:03

## 2025-01-01 RX ADMIN — Medication 0.25 MILLIGRAM(S): at 17:49

## 2025-01-01 RX ADMIN — Medication 0.5 MILLIGRAM(S): at 10:07

## 2025-01-01 RX ADMIN — Medication 40 MILLIGRAM(S): at 06:07

## 2025-01-01 RX ADMIN — Medication 0.5 MILLIGRAM(S): at 14:05

## 2025-01-01 RX ADMIN — Medication 650 MILLIGRAM(S): at 22:03

## 2025-01-01 RX ADMIN — Medication 100 MILLILITER(S): at 23:39

## 2025-01-01 RX ADMIN — Medication 650 MILLIGRAM(S): at 05:57

## 2025-01-01 RX ADMIN — ALBUMIN (HUMAN) 50 MILLILITER(S): 12.5 INJECTION, SOLUTION INTRAVENOUS at 17:55

## 2025-01-01 RX ADMIN — Medication 40 MILLIGRAM(S): at 05:39

## 2025-01-01 RX ADMIN — HEPARIN SODIUM 1600 UNIT(S)/HR: 1000 INJECTION INTRAVENOUS; SUBCUTANEOUS at 06:59

## 2025-01-01 RX ADMIN — Medication 0.5 MILLIGRAM(S): at 12:54

## 2025-01-01 RX ADMIN — Medication 0.5 MILLIGRAM(S): at 05:50

## 2025-01-01 RX ADMIN — Medication 4 GRAM(S): at 21:06

## 2025-01-01 RX ADMIN — Medication 25 GRAM(S): at 09:52

## 2025-01-01 RX ADMIN — Medication 25 GRAM(S): at 22:24

## 2025-01-01 RX ADMIN — Medication 0.5 MILLIGRAM(S): at 15:07

## 2025-01-01 RX ADMIN — ALBUMIN (HUMAN) 50 MILLILITER(S): 12.5 INJECTION, SOLUTION INTRAVENOUS at 05:47

## 2025-01-01 RX ADMIN — Medication 500 MILLIGRAM(S): at 11:32

## 2025-01-01 RX ADMIN — Medication 650 MILLIGRAM(S): at 05:47

## 2025-01-01 RX ADMIN — TAMSULOSIN HYDROCHLORIDE 0.4 MILLIGRAM(S): 0.4 CAPSULE ORAL at 21:07

## 2025-01-01 RX ADMIN — Medication 200 GRAM(S): at 23:07

## 2025-01-01 RX ADMIN — Medication 25 MILLIGRAM(S): at 00:09

## 2025-01-01 RX ADMIN — METOPROLOL SUCCINATE 25 MILLIGRAM(S): 50 TABLET, EXTENDED RELEASE ORAL at 18:32

## 2025-01-01 RX ADMIN — IPRATROPIUM BROMIDE AND ALBUTEROL SULFATE 3 MILLILITER(S): .5; 2.5 SOLUTION RESPIRATORY (INHALATION) at 17:30

## 2025-01-01 RX ADMIN — HEPARIN SODIUM 1300 UNIT(S)/HR: 1000 INJECTION INTRAVENOUS; SUBCUTANEOUS at 16:30

## 2025-01-01 RX ADMIN — Medication 50 MILLIGRAM(S): at 17:21

## 2025-01-01 RX ADMIN — Medication 1 DOSE(S): at 05:15

## 2025-01-01 RX ADMIN — Medication 0.5 MILLIGRAM(S): at 06:05

## 2025-01-01 RX ADMIN — Medication 50 MILLIGRAM(S): at 23:19

## 2025-01-01 RX ADMIN — IPRATROPIUM BROMIDE AND ALBUTEROL SULFATE 3 MILLILITER(S): .5; 2.5 SOLUTION RESPIRATORY (INHALATION) at 05:47

## 2025-01-01 RX ADMIN — NYSTATIN 1 APPLICATION(S): 100000 CREAM TOPICAL at 17:52

## 2025-01-01 RX ADMIN — Medication 0.5 MILLIGRAM(S): at 04:40

## 2025-01-01 RX ADMIN — IPRATROPIUM BROMIDE AND ALBUTEROL SULFATE 3 MILLILITER(S): .5; 2.5 SOLUTION RESPIRATORY (INHALATION) at 06:58

## 2025-01-01 RX ADMIN — IPRATROPIUM BROMIDE AND ALBUTEROL SULFATE 3 MILLILITER(S): .5; 2.5 SOLUTION RESPIRATORY (INHALATION) at 00:19

## 2025-01-01 RX ADMIN — CALAMINE 8% AND ZINC OXIDE 8% 1 APPLICATION(S): 160 LOTION TOPICAL at 17:22

## 2025-01-01 RX ADMIN — NYSTATIN 1 APPLICATION(S): 100000 CREAM TOPICAL at 17:34

## 2025-01-01 RX ADMIN — IPRATROPIUM BROMIDE AND ALBUTEROL SULFATE 3 MILLILITER(S): .5; 2.5 SOLUTION RESPIRATORY (INHALATION) at 05:58

## 2025-01-01 RX ADMIN — HEPARIN SODIUM 1300 UNIT(S)/HR: 1000 INJECTION INTRAVENOUS; SUBCUTANEOUS at 19:25

## 2025-01-01 RX ADMIN — Medication 100 MILLIEQUIVALENT(S): at 23:12

## 2025-01-01 RX ADMIN — FINASTERIDE 5 MILLIGRAM(S): 1 TABLET, FILM COATED ORAL at 12:04

## 2025-01-01 RX ADMIN — HYDROXYZINE HYDROCHLORIDE 25 MILLIGRAM(S): 25 TABLET, FILM COATED ORAL at 00:21

## 2025-01-01 RX ADMIN — Medication 1 DOSE(S): at 05:46

## 2025-01-01 RX ADMIN — IPRATROPIUM BROMIDE AND ALBUTEROL SULFATE 3 MILLILITER(S): .5; 2.5 SOLUTION RESPIRATORY (INHALATION) at 11:54

## 2025-01-01 RX ADMIN — Medication 0.5 MILLIGRAM(S): at 23:04

## 2025-01-01 RX ADMIN — GLYCOPYRROLATE 0.4 MILLIGRAM(S): 0.2 INJECTION INTRAMUSCULAR; INTRAVENOUS at 11:51

## 2025-01-01 RX ADMIN — Medication 100 MILLIEQUIVALENT(S): at 00:54

## 2025-01-01 RX ADMIN — Medication 100 MILLILITER(S): at 21:06

## 2025-01-01 RX ADMIN — IPRATROPIUM BROMIDE AND ALBUTEROL SULFATE 3 MILLILITER(S): .5; 2.5 SOLUTION RESPIRATORY (INHALATION) at 05:16

## 2025-01-01 RX ADMIN — METOPROLOL SUCCINATE 25 MILLIGRAM(S): 50 TABLET, EXTENDED RELEASE ORAL at 06:25

## 2025-01-01 RX ADMIN — Medication 500 MILLIGRAM(S): at 12:04

## 2025-01-01 RX ADMIN — Medication 650 MILLIGRAM(S): at 00:01

## 2025-01-01 RX ADMIN — Medication 1 DOSE(S): at 18:11

## 2025-01-01 RX ADMIN — IPRATROPIUM BROMIDE AND ALBUTEROL SULFATE 3 MILLILITER(S): .5; 2.5 SOLUTION RESPIRATORY (INHALATION) at 05:15

## 2025-01-01 RX ADMIN — IPRATROPIUM BROMIDE AND ALBUTEROL SULFATE 3 MILLILITER(S): .5; 2.5 SOLUTION RESPIRATORY (INHALATION) at 23:21

## 2025-01-01 RX ADMIN — Medication 1 MILLIGRAM(S): at 09:25

## 2025-01-01 RX ADMIN — Medication 0.5 MILLIGRAM(S): at 16:00

## 2025-01-01 RX ADMIN — Medication 0.5 MILLIGRAM(S): at 16:25

## 2025-01-01 RX ADMIN — ALBUMIN (HUMAN) 50 MILLILITER(S): 12.5 INJECTION, SOLUTION INTRAVENOUS at 00:17

## 2025-01-01 RX ADMIN — Medication 1 DOSE(S): at 19:27

## 2025-01-01 RX ADMIN — Medication 0.5 MILLIGRAM(S): at 18:47

## 2025-01-01 RX ADMIN — TAMSULOSIN HYDROCHLORIDE 0.4 MILLIGRAM(S): 0.4 CAPSULE ORAL at 22:24

## 2025-01-01 RX ADMIN — Medication 0.25 MILLIGRAM(S): at 13:46

## 2025-01-01 RX ADMIN — HYDROXYZINE HYDROCHLORIDE 25 MILLIGRAM(S): 25 TABLET, FILM COATED ORAL at 17:01

## 2025-01-01 RX ADMIN — IPRATROPIUM BROMIDE AND ALBUTEROL SULFATE 3 MILLILITER(S): .5; 2.5 SOLUTION RESPIRATORY (INHALATION) at 05:39

## 2025-01-01 RX ADMIN — Medication 25 GRAM(S): at 05:46

## 2025-01-01 RX ADMIN — Medication 0.25 MILLIGRAM(S): at 18:46

## 2025-01-01 RX ADMIN — Medication 1000 MILLIGRAM(S): at 13:50

## 2025-01-01 RX ADMIN — Medication 25 MILLIGRAM(S): at 10:59

## 2025-01-01 RX ADMIN — Medication 25 GRAM(S): at 05:38

## 2025-01-01 RX ADMIN — Medication 25 GRAM(S): at 11:11

## 2025-01-01 RX ADMIN — Medication 25 GRAM(S): at 05:15

## 2025-01-01 RX ADMIN — NYSTATIN 1 APPLICATION(S): 100000 CREAM TOPICAL at 05:52

## 2025-01-01 RX ADMIN — NYSTATIN 1 APPLICATION(S): 100000 CREAM TOPICAL at 11:46

## 2025-01-01 RX ADMIN — Medication 0.5 MILLIGRAM(S): at 22:52

## 2025-01-14 ENCOUNTER — RX RENEWAL (OUTPATIENT)
Age: 85
End: 2025-01-14

## 2025-01-22 ENCOUNTER — LABORATORY RESULT (OUTPATIENT)
Age: 85
End: 2025-01-22

## 2025-01-23 ENCOUNTER — NON-APPOINTMENT (OUTPATIENT)
Age: 85
End: 2025-01-23

## 2025-01-23 ENCOUNTER — LABORATORY RESULT (OUTPATIENT)
Age: 85
End: 2025-01-23

## 2025-01-23 ENCOUNTER — EMERGENCY (EMERGENCY)
Facility: HOSPITAL | Age: 85
LOS: 1 days | Discharge: HOME | End: 2025-01-23
Attending: STUDENT IN AN ORGANIZED HEALTH CARE EDUCATION/TRAINING PROGRAM
Payer: MEDICARE

## 2025-01-23 VITALS
SYSTOLIC BLOOD PRESSURE: 165 MMHG | WEIGHT: 160.06 LBS | OXYGEN SATURATION: 96 % | HEART RATE: 88 BPM | TEMPERATURE: 98 F | DIASTOLIC BLOOD PRESSURE: 85 MMHG | HEIGHT: 69 IN | RESPIRATION RATE: 18 BRPM

## 2025-01-23 DIAGNOSIS — R82.90 UNSPECIFIED ABNORMAL FINDINGS IN URINE: ICD-10-CM

## 2025-01-23 DIAGNOSIS — Z98.89 OTHER SPECIFIED POSTPROCEDURAL STATES: Chronic | ICD-10-CM

## 2025-01-23 LAB
ADD ON TEST-SPECIMEN IN LAB: SIGNIFICANT CHANGE UP
ALBUMIN SERPL ELPH-MCNC: 3 G/DL — LOW (ref 3.3–5)
ALP SERPL-CCNC: 208 U/L — HIGH (ref 40–120)
ALT FLD-CCNC: 89 U/L — HIGH (ref 10–45)
ANION GAP SERPL CALC-SCNC: 15 MMOL/L — SIGNIFICANT CHANGE UP (ref 5–17)
APPEARANCE UR: ABNORMAL
AST SERPL-CCNC: 80 U/L — HIGH (ref 10–40)
BACTERIA # UR AUTO: NEGATIVE /HPF — SIGNIFICANT CHANGE UP
BASOPHILS # BLD AUTO: 0.03 K/UL — SIGNIFICANT CHANGE UP (ref 0–0.2)
BASOPHILS NFR BLD AUTO: 0.4 % — SIGNIFICANT CHANGE UP (ref 0–2)
BILIRUB SERPL-MCNC: 1.9 MG/DL — HIGH (ref 0.2–1.2)
BILIRUB UR-MCNC: NEGATIVE — SIGNIFICANT CHANGE UP
BLD GP AB SCN SERPL QL: NEGATIVE — SIGNIFICANT CHANGE UP
BUN SERPL-MCNC: 23 MG/DL — SIGNIFICANT CHANGE UP (ref 7–23)
CALCIUM SERPL-MCNC: 9 MG/DL — SIGNIFICANT CHANGE UP (ref 8.4–10.5)
CAST: 1 /LPF — SIGNIFICANT CHANGE UP (ref 0–4)
CHLORIDE SERPL-SCNC: 96 MMOL/L — SIGNIFICANT CHANGE UP (ref 96–108)
CO2 SERPL-SCNC: 27 MMOL/L — SIGNIFICANT CHANGE UP (ref 22–31)
COLOR SPEC: YELLOW — SIGNIFICANT CHANGE UP
CREAT SERPL-MCNC: 1.28 MG/DL — SIGNIFICANT CHANGE UP (ref 0.5–1.3)
DIFF PNL FLD: ABNORMAL
EGFR: 55 ML/MIN/1.73M2 — LOW
EOSINOPHIL # BLD AUTO: 0.21 K/UL — SIGNIFICANT CHANGE UP (ref 0–0.5)
EOSINOPHIL NFR BLD AUTO: 2.9 % — SIGNIFICANT CHANGE UP (ref 0–6)
GLUCOSE SERPL-MCNC: 121 MG/DL — HIGH (ref 70–99)
GLUCOSE UR QL: NEGATIVE MG/DL — SIGNIFICANT CHANGE UP
HCT VFR BLD CALC: 42.2 % — SIGNIFICANT CHANGE UP (ref 39–50)
HGB BLD-MCNC: 13.5 G/DL — SIGNIFICANT CHANGE UP (ref 13–17)
IMM GRANULOCYTES NFR BLD AUTO: 0.6 % — SIGNIFICANT CHANGE UP (ref 0–0.9)
KETONES UR-MCNC: NEGATIVE MG/DL — SIGNIFICANT CHANGE UP
LEUKOCYTE ESTERASE UR-ACNC: ABNORMAL
LIDOCAIN IGE QN: 19 U/L — SIGNIFICANT CHANGE UP (ref 7–60)
LYMPHOCYTES # BLD AUTO: 1.26 K/UL — SIGNIFICANT CHANGE UP (ref 1–3.3)
LYMPHOCYTES # BLD AUTO: 17.3 % — SIGNIFICANT CHANGE UP (ref 13–44)
MCHC RBC-ENTMCNC: 27.6 PG — SIGNIFICANT CHANGE UP (ref 27–34)
MCHC RBC-ENTMCNC: 32 G/DL — SIGNIFICANT CHANGE UP (ref 32–36)
MCV RBC AUTO: 86.3 FL — SIGNIFICANT CHANGE UP (ref 80–100)
MONOCYTES # BLD AUTO: 0.7 K/UL — SIGNIFICANT CHANGE UP (ref 0–0.9)
MONOCYTES NFR BLD AUTO: 9.6 % — SIGNIFICANT CHANGE UP (ref 2–14)
NEUTROPHILS # BLD AUTO: 5.03 K/UL — SIGNIFICANT CHANGE UP (ref 1.8–7.4)
NEUTROPHILS NFR BLD AUTO: 69.2 % — SIGNIFICANT CHANGE UP (ref 43–77)
NITRITE UR-MCNC: NEGATIVE — SIGNIFICANT CHANGE UP
NRBC # BLD: 0 /100 WBCS — SIGNIFICANT CHANGE UP (ref 0–0)
PH UR: 6 — SIGNIFICANT CHANGE UP (ref 5–8)
PLATELET # BLD AUTO: 276 K/UL — SIGNIFICANT CHANGE UP (ref 150–400)
POTASSIUM SERPL-MCNC: 2.9 MMOL/L — CRITICAL LOW (ref 3.5–5.3)
POTASSIUM SERPL-SCNC: 2.9 MMOL/L — CRITICAL LOW (ref 3.5–5.3)
PROT SERPL-MCNC: 6.6 G/DL — SIGNIFICANT CHANGE UP (ref 6–8.3)
PROT UR-MCNC: NEGATIVE MG/DL — SIGNIFICANT CHANGE UP
RBC # BLD: 4.89 M/UL — SIGNIFICANT CHANGE UP (ref 4.2–5.8)
RBC # FLD: 14.4 % — SIGNIFICANT CHANGE UP (ref 10.3–14.5)
RBC CASTS # UR COMP ASSIST: 0 /HPF — SIGNIFICANT CHANGE UP (ref 0–4)
RH IG SCN BLD-IMP: POSITIVE — SIGNIFICANT CHANGE UP
SODIUM SERPL-SCNC: 138 MMOL/L — SIGNIFICANT CHANGE UP (ref 135–145)
SP GR SPEC: 1.01 — SIGNIFICANT CHANGE UP (ref 1–1.03)
SQUAMOUS # UR AUTO: 0 /HPF — SIGNIFICANT CHANGE UP (ref 0–5)
UROBILINOGEN FLD QL: 0.2 MG/DL — SIGNIFICANT CHANGE UP (ref 0.2–1)
WBC # BLD: 7.27 K/UL — SIGNIFICANT CHANGE UP (ref 3.8–10.5)
WBC # FLD AUTO: 7.27 K/UL — SIGNIFICANT CHANGE UP (ref 3.8–10.5)
WBC UR QL: 274 /HPF — HIGH (ref 0–5)

## 2025-01-23 PROCEDURE — 86900 BLOOD TYPING SEROLOGIC ABO: CPT

## 2025-01-23 PROCEDURE — 96375 TX/PRO/DX INJ NEW DRUG ADDON: CPT

## 2025-01-23 PROCEDURE — 76705 ECHO EXAM OF ABDOMEN: CPT | Mod: 26

## 2025-01-23 PROCEDURE — 86901 BLOOD TYPING SEROLOGIC RH(D): CPT

## 2025-01-23 PROCEDURE — 83690 ASSAY OF LIPASE: CPT

## 2025-01-23 PROCEDURE — 86850 RBC ANTIBODY SCREEN: CPT

## 2025-01-23 PROCEDURE — 76705 ECHO EXAM OF ABDOMEN: CPT

## 2025-01-23 PROCEDURE — 80053 COMPREHEN METABOLIC PANEL: CPT

## 2025-01-23 PROCEDURE — 81001 URINALYSIS AUTO W/SCOPE: CPT

## 2025-01-23 PROCEDURE — 87086 URINE CULTURE/COLONY COUNT: CPT

## 2025-01-23 PROCEDURE — 85025 COMPLETE CBC W/AUTO DIFF WBC: CPT

## 2025-01-23 PROCEDURE — 74177 CT ABD & PELVIS W/CONTRAST: CPT | Mod: 26

## 2025-01-23 PROCEDURE — 96374 THER/PROPH/DIAG INJ IV PUSH: CPT | Mod: XU

## 2025-01-23 PROCEDURE — 83735 ASSAY OF MAGNESIUM: CPT

## 2025-01-23 PROCEDURE — 82248 BILIRUBIN DIRECT: CPT

## 2025-01-23 PROCEDURE — 96376 TX/PRO/DX INJ SAME DRUG ADON: CPT

## 2025-01-23 PROCEDURE — 99284 EMERGENCY DEPT VISIT MOD MDM: CPT | Mod: 25

## 2025-01-23 PROCEDURE — 74177 CT ABD & PELVIS W/CONTRAST: CPT | Mod: MC

## 2025-01-23 PROCEDURE — 99285 EMERGENCY DEPT VISIT HI MDM: CPT | Mod: GC

## 2025-01-23 RX ORDER — CEFTRIAXONE SODIUM 1 G/1
1000 INJECTION, POWDER, FOR SOLUTION INTRAMUSCULAR; INTRAVENOUS ONCE
Refills: 0 | Status: COMPLETED | OUTPATIENT
Start: 2025-01-23 | End: 2025-01-23

## 2025-01-23 RX ORDER — MAGNESIUM SULFATE 500 MG/ML
1 INJECTION, SOLUTION INTRAMUSCULAR; INTRAVENOUS ONCE
Refills: 0 | Status: COMPLETED | OUTPATIENT
Start: 2025-01-23 | End: 2025-01-23

## 2025-01-23 RX ORDER — POTASSIUM CHLORIDE 600 MG/1
40 TABLET, FILM COATED, EXTENDED RELEASE ORAL ONCE
Refills: 0 | Status: COMPLETED | OUTPATIENT
Start: 2025-01-23 | End: 2025-01-23

## 2025-01-23 RX ORDER — POTASSIUM CHLORIDE 600 MG/1
10 TABLET, FILM COATED, EXTENDED RELEASE ORAL
Refills: 0 | Status: COMPLETED | OUTPATIENT
Start: 2025-01-23 | End: 2025-01-23

## 2025-01-23 RX ORDER — CEFPODOXIME PROXETIL 100 MG/5ML
1 GRANULE, FOR SUSPENSION ORAL
Qty: 20 | Refills: 0
Start: 2025-01-23 | End: 2025-02-01

## 2025-01-23 RX ADMIN — POTASSIUM CHLORIDE 100 MILLIEQUIVALENT(S): 600 TABLET, FILM COATED, EXTENDED RELEASE ORAL at 22:05

## 2025-01-23 RX ADMIN — MAGNESIUM SULFATE 100 GRAM(S): 500 INJECTION, SOLUTION INTRAMUSCULAR; INTRAVENOUS at 23:44

## 2025-01-23 RX ADMIN — POTASSIUM CHLORIDE 40 MILLIEQUIVALENT(S): 600 TABLET, FILM COATED, EXTENDED RELEASE ORAL at 17:51

## 2025-01-23 RX ADMIN — POTASSIUM CHLORIDE 100 MILLIEQUIVALENT(S): 600 TABLET, FILM COATED, EXTENDED RELEASE ORAL at 20:13

## 2025-01-23 RX ADMIN — POTASSIUM CHLORIDE 100 MILLIEQUIVALENT(S): 600 TABLET, FILM COATED, EXTENDED RELEASE ORAL at 18:30

## 2025-01-23 RX ADMIN — CEFTRIAXONE SODIUM 100 MILLIGRAM(S): 1 INJECTION, POWDER, FOR SOLUTION INTRAMUSCULAR; INTRAVENOUS at 17:51

## 2025-01-23 NOTE — ED PROVIDER NOTE - ATTENDING CONTRIBUTION TO CARE
Attending Arleth: I performed a history and physical exam of the patient and discussed their management with the resident/fellow/student. I have reviewed the resident/fellow/student note and agree with the documented findings and plan of care, except as noted. I have personally performed a substantive portion of the visit including all aspects of the medical decision making. My medical decision making and observations are found below. Please refer to any progress notes for updates on clinical course. My notes supersedes the above resident/fellow/student note in case of discrepancy    MDM:  83 y/o M w/ PMH of COPD, HLD, HTN, colostomy 2/2 partial bowel resection presenting w/ abnormal labs. Seen w/ daughter. Reports had an episode last week where he had sharp abdominal pain associated w/ nausea and vomiting. Symptoms lasted for approx 1 day then resolved. Thought symptoms may have been due to a kidney stone and he had one in the past and that was the only pain he could relate it to. Noticed his urine looked very dark and was urinating more often. Followed up w/ PCP Dr. Thomas who had labs performed. Was called and informed his liver enzymes were elevated, his WBC was "very high," and then he should go to the hospital for IV antibiotics. Pt reports feeling in his usual state of health at this time. No nausea, vomiting, abd pain. Reports urine color has improved. Denies fevers, chills, headache, dizziness, blurred vision, chest pain, cough, shortness of breath, diarrhea, constipation, MSK pain, rash.     Gen: NAD, AOx3, able to make needs known, non-toxic  Head: NCAT  HEENT: EOMI, oral mucosa moist, normal conjunctiva  Lung: no respiratory distress, CTAB, no wheezes/rhonchi/rales B/L, speaking in full sentences  CV: RRR, no murmurs  Abd: non distended, soft, nontender, L sided abdominal ostomy w/ pink stoma, no guarding, no CVA tenderness  MSK: no visible deformities  Neuro: Appears non focal  Skin: Warm, well perfused  Psych: normal affect    Pt overall well appearing, no acute distress. Review of labs on HIE, mildly elevated LFTs, alk phos, bilirubin. WBC normal at 6. Will repeat labs here. Obtain imaging to eval for hepato/billiary/pancreatic abnormalities. Possible viral illness. Possible passed gallstone. Eval for UTI. Plan for labs, imaging, meds PRN. Will reassess the need for additional interventions as clinically warranted. Refer to any progress notes for updates on clinical course and as a continuation of this MDM.

## 2025-01-23 NOTE — ED PROVIDER NOTE - NSFOLLOWUPINSTRUCTIONS_ED_ALL_ED_FT
You were seen in the ED for abnormal lab values observed on outpatient labs. Your labs in the ED were mildly abnormal, but they did not show any emergent abnormalities. Please follow back up with your primary care doctor.    1) Follow up with your doctor   2) Return to the ED immediately for new or worsening symptoms  3) Please continue to take any home medications as prescribed  4) Your test results from your ED visit were discussed with you prior to discharge  5) You were provided with a copy of your test results  6) Please  your antibiotics at the pharmacy and take as directed      Urinary Tract Infection    A urinary tract infection (UTI) is an infection of any part of the urinary tract, which includes the kidneys, ureters, bladder, and urethra. Risk factors include ignoring your need to urinate, wiping back to front if female, being an uncircumcised male, and having diabetes or a weak immune system. Symptoms include frequent urination, pain or burning with urination, foul smelling urine, cloudy urine, pain in the lower abdomen, blood in the urine, and fever. If you were prescribed an antibiotic medicine, take it as told by your health care provider. Do not stop taking the antibiotic even if you start to feel better.    SEEK IMMEDIATE MEDICAL CARE IF YOU HAVE ANY OF THE FOLLOWING SYMPTOMS: severe back or abdominal pain, fever, inability to keep fluids or medicine down, dizziness/lightheadedness, or a change in mental status.

## 2025-01-23 NOTE — ED ADULT NURSE NOTE - OBJECTIVE STATEMENT
83 y/o Male presents to ED from home via EMS with c/o abnormal labs. PMH of Afib on Eliquis and metoprolol, htn, ostomy bag. Daughter at bedside states pt began feeling symptoms of abdominal pain, dark urine and fever with vomiting on Friday. Pt had home nurse come to the house for blood work. Results showed increased WBC count and PCP was concerned for infection recommending pt to come to ER. Pt currently denies any symptoms. Denies SOB, CP, HA, N/V/D, abd pain, back pain. Pt is A&Ox3, well appearing/ speaking full sentences without difficulty. Airway patent with spontaneous unlabored breathing,  skin is warm and normal color for race. No diaphoresis or edema noted. Safety maintained bed in lowest position and locked

## 2025-01-23 NOTE — ED ADULT NURSE NOTE - NSFALLHARMRISKINTERV_ED_ALL_ED

## 2025-01-23 NOTE — ED PROVIDER NOTE - CLINICAL SUMMARY MEDICAL DECISION MAKING FREE TEXT BOX
84-year-old male past medical history A-fib on Eliquis, hypertension, hyperlipidemia, colorectal cancer status post left hemicolectomy with ostomy bag presents to the ED for elevated LFTs on outpatient blood work.  Patient endorses 6 days ago had sharp abdominal pain, associated nausea and vomiting.  States that pain resolved after 1 day. On arrival to ED patient is hypertensive, HR 88bpm, nonfebrile, saturating 96% on RA. On exam abdomen soft/nt/nd, negative barnett, no cva ttp, ostomy bag w/ normal output, no scleral icterus, no petechiae/rash. Differential includes biliary colic, gastroenteritis, cholecystitis, pancreatitis, UTI, nephrolithiasis. Will order CT, RUQ US, UA, basic labs. 84-year-old male past medical history A-fib on Eliquis, hypertension, hyperlipidemia, colorectal cancer status post left hemicolectomy with ostomy bag presents to the ED for elevated LFTs on outpatient blood work.  Patient endorses 6 days ago had sharp abdominal pain, associated nausea and vomiting.  States that pain resolved after 1 day. On arrival to ED patient is hypertensive, HR 88bpm, nonfebrile, saturating 96% on RA. On exam abdomen soft/nt/nd, negative barnett, no cva ttp, ostomy bag w/ normal output, no scleral icterus, no petechiae/rash. Differential includes biliary colic, gastroenteritis, cholecystitis, pancreatitis, UTI, nephrolithiasis. Will order CT, RUQ US, UA, basic labs.    Attending Arleth: See attending attestation.

## 2025-01-23 NOTE — ED PROVIDER NOTE - PHYSICAL EXAMINATION
98 Physical Exam:  Gen: NAD, AOx3, non-toxic appearing, able to ambulate without assistance  Head: NCAT  HEENT: EOMI, PEERLA, normal conjunctiva, tongue midline, oral mucosa moist  Lung: CTAB, no respiratory distress, no wheezes/rhonchi/rales B/L, speaking in full sentences  CV: RRR, no murmurs, rubs or gallops  Abd: soft, NT, ND, no guarding, no rigidity, no rebound tenderness, no CVA tenderness, ostomy bag w/ normal brown output   MSK: no visible deformities, ROM normal in UE/LE, no back pain  Neuro: No focal sensory or motor deficits  Skin: Warm, well perfused, no rash, no leg swelling  Psych: normal affect, calm

## 2025-01-23 NOTE — ED PROVIDER NOTE - OBJECTIVE STATEMENT
84-year-old male past medical history A-fib on Eliquis, hypertension, hyperlipidemia, colorectal cancer status post left hemicolectomy with ostomy bag presents to the ED for elevated LFTs on outpatient blood work.  Patient endorses 6 days ago had sharp abdominal pain, associated nausea and vomiting.  States that pain resolved after 1 day.  Patient had outpatient blood work performed yesterday.  Elevated LFTs, low potassium normal kidney function.  Patient with no acute complaints on arrival.

## 2025-01-23 NOTE — ED PROVIDER NOTE - PROGRESS NOTE DETAILS
Attending Arleth: reviewed results w/ pt and daughter. WBC WNL. LFTs/alk phos/bili mildly elevated. US w/ gallstones, but no acute ethan. CT w/ normal pancrease. b/l pleural effusions (no resp symptoms currently). bladder thickened on CT. abx given for UTI. recommended outpatient f/u w/ PCP to review labs and trend abnormal values to ensure they return back to normal. abnormalites could be related to passed gallstone (possible episode last week when pt had abd pain, nausea, vomiting). daughter (rosetta) agreed w/ DC. will arrange for non emergent ambulance for . abx sent to pharmacy.

## 2025-01-23 NOTE — ED PROVIDER NOTE - PATIENT PORTAL LINK FT
You can access the FollowMyHealth Patient Portal offered by [facility] by registering at the following website: [email or web address]. By joining [employer]’s FollowMyHealth portal, you will also be able to view your health information using other applications (apps) compatible with our system.

## 2025-01-24 ENCOUNTER — NON-APPOINTMENT (OUTPATIENT)
Age: 85
End: 2025-01-24

## 2025-01-24 VITALS
SYSTOLIC BLOOD PRESSURE: 141 MMHG | HEART RATE: 84 BPM | OXYGEN SATURATION: 95 % | RESPIRATION RATE: 18 BRPM | DIASTOLIC BLOOD PRESSURE: 74 MMHG

## 2025-01-25 LAB
CULTURE RESULTS: SIGNIFICANT CHANGE UP
SPECIMEN SOURCE: SIGNIFICANT CHANGE UP

## 2025-01-29 NOTE — ED PROVIDER NOTE - NSCAREINITIATED _GEN_ER
Sven Oscar(Resident)
PAST MEDICAL HISTORY:  Childhood asthma without complication     History of esophageal stricture     Maternal care for low transverse scar from previous  delivery

## 2025-02-04 ENCOUNTER — RX RENEWAL (OUTPATIENT)
Age: 85
End: 2025-02-04

## 2025-04-04 NOTE — ED ADULT NURSE NOTE - OBJECTIVE STATEMENT
Pt is an 83 yo M BIB EMS from home w/ PMHx of HTN, Afib, colon cancer (removed has colostomy), and COPD complaining of jaundice. Pt states hes been increasingly lethargic for the past week and developed yellowing of the skin to include sclera. Pt also reports having dark urine; denies fever/chills and burning of urination. Pt is aox4, airway clear and patent, equal chest rise and fall, sclera and skin jaundice; warm and dry, abd non tender and non distended x4 quadrants, swelling present to B/L lower extremities, motor and sensory skills intact. Pt denies LOC, SOB, chest pain, D/N/V and ETOH use. Pt placed in locked lowered stretcher w/ rails raised.

## 2025-04-04 NOTE — ED PROVIDER NOTE - CLINICAL SUMMARY MEDICAL DECISION MAKING FREE TEXT BOX
The patient is an 84-year-old with a history of intestinal cancer status post colectomy not on active treatment, A-fib on Eliquis, COPD who presents for jaundice for the past week.  Patient granddaughter at bedside also providing collateral to history.  Endorses full body itching, denies any abdominal pain, nausea, vomiting, decreased p.o. intake, decreased output through colostomy bag, melanotic stools, decreased gastric colostomy bag.  Endorses dark urine and decreased urine production.  Denies any fevers, chills.  Endorses more severe cough.    On physical exam patient appears comfortable, scleral icterus and diffuse jaundice lungs with crackles at the bases otherwise moving air well, heart rate regular rate and rhythm no murmurs rubs or gallops abdomen soft nontender, colostomy bag in place with stool present in bag.    Concern for liver failure due to new onset jaundice.  Plan for CT abdomen pelvis, CBC, CMP, coags, direct and indirect bili.  Plan for chest x-ray to evaluate cough and rule out pneumonia low suspicion as patient is afebrile.  Will likely be an admission for eval for new jaundice. The patient is an 84-year-old with a history of intestinal cancer status post colectomy not on active treatment, A-fib on Eliquis, COPD who presents for jaundice for the past week.  Patient granddaughter at bedside also providing collateral to history.  Endorses full body itching, denies any abdominal pain, nausea, vomiting, decreased p.o. intake, decreased output through colostomy bag, melanotic stools, decreased gastric colostomy bag.  Endorses dark urine and decreased urine production.  Denies any fevers, chills.  Endorses more severe cough.    On physical exam patient appears comfortable, scleral icterus and diffuse jaundice lungs with crackles at the bases otherwise moving air well, heart rate regular rate and rhythm no murmurs rubs or gallops abdomen soft nontender, colostomy bag in place with stool present in bag.    Concern for liver failure due to new onset jaundice.  Plan for CT abdomen pelvis, CBC, CMP, coags, direct and indirect bili.  Plan for chest x-ray to evaluate cough and rule out pneumonia low suspicion as patient is afebrile.  Will likely be an admission for eval for new jaundice.    see attending attestation authored by me, Deo Maldonado MD, for further MDM details.

## 2025-04-04 NOTE — ED PROVIDER NOTE - ATTENDING CONTRIBUTION TO CARE
85 yo male hx of CHF, COPD, presents for increasing jaundice noticed @ home.  EMS @ bedside for collateral.  family @ bedside for collateral.     marked jaundice on exam including palms/hands.  CBC, CMP sent.  labs notable for critical hyperbilirubinemia concerning for biliary obstruction -- malignancy would be significant concern.  also with critical hypokalemia noted on labwork.    CT abdomen/pelvis ordered, r/o malignancy.  otherwise plan to admit for further management, advanced GI evaluation for biliary obstruction.

## 2025-04-05 NOTE — CONSULT NOTE ADULT - SUBJECTIVE AND OBJECTIVE BOX
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 He started noticing that he was developing an itch on his elbows a week ago, which started progressing to involve both arms, chest, and back. During that time, he noticed his skin was starting to get more and more yellow. He states he doesn't have any nausea, vomiting, or decreased/increased ostomy output. He has noticed that his legs have been getting more swollen and that he has been having a harder time putting out urine. When he does urinate, he states it is very dark. Lives with his granddaughter. Brought to hospital for further evaluation.    In the ED, he was afebrile and hemodynamically stable (except for 1 episode of systolics to the 80s), and saturating well on RA. CBC w/ leukocytosis of 15.87 and thrombocytosis of 588. CMP w/ hyponatremia of 131, hypokalemia of 2.6, BUN/Cr 48/1.4, and transaminitis w/ , AST/ALT 92/60, T.bili 29.3 w/ direct bilirubin >10 and indirect <19.3. UA wnl. CT A/P showing new moderate intra and extrahepatic biliary duct dilatation with indeterminate high attenuation in the distal CBD near the ampulla could be a stone, sludge ball or mass, as well as distended GB w/o wall thickening or pericholecystic inflammatory changes. Received IVP benadryl 25mg x2, 1L LR, IV KCl 10mEq x3, and Zosyn in the ED. Still complaining of generalized itching. (05 Apr 2025 04:24)     REVIEW OF SYSTEMS  [  ] ROS unobtainable because:    [ x ] All other systems negative except as noted below  Constitutional:  [ ] fever [ ] chills  [ ] weight loss  [ ]night sweat  [ ]poor appetite/PO intake [ ]fatigue   Skin:  [ ] rash [ ] phlebitis	  Eyes: [ ] icterus [ ] pain  [ ] discharge	  ENMT: [ ] sore throat  [ ] thrush [ ] ulcers [ ] exudates [ ]anosmia  Respiratory: [ ] dyspnea [ ] hemoptysis [ ] cough [ ] sputum	  Cardiovascular:  [ ] chest pain [ ] palpitations [ ] edema	  Gastrointestinal:  [ ] nausea [ ] vomiting [ ] diarrhea [ ] constipation [ ] pain	  Genitourinary:  [ ] dysuria [ ] frequency [ ] hematuria [ ] discharge [ ] flank pain  [ ] incontinence  Musculoskeletal:  [ ] myalgias [ ] arthralgias [ ] arthritis  [ ] back pain  Neurological:  [ ] headache [ ] weakness [ ] seizures  [ ] confusion/altered mental status  prior hospital charts reviewed [V]  primary team notes reviewed [V]  other consultant notes reviewed [V]    PAST MEDICAL & SURGICAL HISTORY:  Asbestosis (ICD9 501)      HTN (Hypertension)      BPH (Benign Prostatic Hypertrophy)      Dyslipidemia      Localized Osteoarthrosis, Lower Leg  left      Obesity      COPD with emphysema      Pulmonary nodule      Thyroid nodule      Aortic ectasia      History of diastolic dysfunction      Venous insufficiency      S/P Cystoscopy (ICD9 V45.89)      Hyperlipidemia (ICD9 272.4)      S/P Arthroscopy of Left Knee  x 20 yrs ago      Cataract  right IOL      Inguinal Hernia x2  right      History of Tonsillectomy      History of appendectomy          SOCIAL HISTORY:  - Denied smoking/vaping/alcohol/recreational drug use    FAMILY HISTORY:      Allergies  No Known Allergies        ANTIMICROBIALS:      ANTIMICROBIALS (past 90 days):  MEDICATIONS  (STANDING):  piperacillin/tazobactam IVPB..   25 mL/Hr IV Intermittent (04-05-25 @ 07:19)    piperacillin/tazobactam IVPB...   200 mL/Hr IV Intermittent (04-05-25 @ 00:11)        OTHER MEDS:   MEDICATIONS  (STANDING):  acetaminophen     Tablet .. 650 every 6 hours PRN  albuterol/ipratropium for Nebulization 3 every 6 hours  cholestyramine Powder (Sugar-Free) 4 two times a day  finasteride 5 daily  fluticasone propionate/ salmeterol 250-50 MICROgram(s) Diskus 1 two times a day  heparin   Injectable 7000 every 6 hours PRN  heparin   Injectable 3500 every 6 hours PRN  heparin  Infusion.  <Continuous>  melatonin 3 at bedtime PRN  metoprolol tartrate 25 two times a day  ondansetron Injectable 4 every 8 hours PRN  pantoprazole    Tablet 40 before breakfast  tamsulosin 0.4 at bedtime      VITALS:  Vital Signs Last 24 Hrs  T(F): 97.4 (04-05-25 @ 06:38), Max: 98.9 (04-04-25 @ 19:50)    Vital Signs Last 24 Hrs  HR: 73 (04-05-25 @ 06:38) (73 - 97)  BP: 111/65 (04-05-25 @ 06:38) (82/53 - 135/79)  RR: 18 (04-05-25 @ 06:38)  SpO2: 97% (04-05-25 @ 06:38) (97% - 99%)  Wt(kg): --    EXAM:    GA: NAD, AOx3  HEENT: oral cavity no lesion  CV: nl S1/S2, no RMG  Lungs: CTAB, No distress  Abd: BS+, soft, nontender, no rebounding pain  Ext: no edema  Neuro: No focal deficits  Skin: Intact  IV: no phlebitis  Labs:                        12.3   14.64 )-----------( 375      ( 05 Apr 2025 07:17 )             33.9     04-05    129[L]  |  92[L]  |  48[H]  ----------------------------<  92  2.7[LL]   |  20[L]  |  1.78[H]    Ca    8.5      05 Apr 2025 07:14    TPro  5.2[L]  /  Alb  2.4[L]  /  TBili  27.1[H]  /  DBili  x   /  AST  76[H]  /  ALT  51[H]  /  AlkPhos  322[H]  04-05    WBC Trend:  WBC Count: 14.64 (04-05-25 @ 07:17)  WBC Count: 15.87 (04-04-25 @ 20:16)    Auto Neutrophil #: 5.03 K/uL (01-23-25 @ 16:14)  Auto Neutrophil #: 16.03 K/uL (09-15-24 @ 00:22)    Creatine Trend:  Creatinine: 1.78 (04-05)  Creatinine: 1.40 (04-04)    Liver Biochemical Testing Trend:  Alanine Aminotransferase (ALT/SGPT): 51 *H* (04-05)  Alanine Aminotransferase (ALT/SGPT): 60 *H* (04-04)  Alanine Aminotransferase (ALT/SGPT): 89 *H* (01-23)  Alanine Aminotransferase (ALT/SGPT): 23 (09-19)  Alanine Aminotransferase (ALT/SGPT): 31 (09-18)  Aspartate Aminotransferase (AST/SGOT): 76 (04-05-25 @ 07:14)  Aspartate Aminotransferase (AST/SGOT): 92 (04-04-25 @ 20:16)  Aspartate Aminotransferase (AST/SGOT): 80 (01-23-25 @ 16:14)  Aspartate Aminotransferase (AST/SGOT): 12 (09-19-24 @ 07:21)  Aspartate Aminotransferase (AST/SGOT): 14 (09-18-24 @ 06:58)  Bilirubin Total: 27.1 (04-05)  Bilirubin Total: 29.3 (04-04)  Bilirubin Direct: >10.0 (04-04)  Bilirubin Total: 29.3 (04-04)  Bilirubin Total: 1.9 (01-23)    Trend LDH  04-05-25 @ 07:17  161  12-23-23 @ 03:39  305[H]      Urinalysis Basic - ( 05 Apr 2025 07:14 )    Color: x / Appearance: x / SG: x / pH: x  Gluc: 92 mg/dL / Ketone: x  / Bili: x / Urobili: x   Blood: x / Protein: x / Nitrite: x   Leuk Esterase: x / RBC: x / WBC x   Sq Epi: x / Non Sq Epi: x / Bacteria: x      MICROBIOLOGY:        Culture - Urine (collected 23 Jan 2025 16:17)  Source: Clean Catch Clean Catch (Midstream)  Final Report:    <10,000 CFU/mL Normal Urogenital Milly    Culture - Blood (collected 15 Sep 2024 04:15)  Source: .Blood Blood-Venous  Final Report:    No growth at 5 days    Culture - Blood (collected 15 Sep 2024 04:00)  Source: .Blood Blood-Venous  Final Report:    No growth at 5 days    Culture - Urine (collected 15 Sep 2024 01:50)  Source: Clean Catch Clean Catch (Midstream)  Final Report:    >=3 organisms. Probable collection contamination.    Culture - Fungal, Body Fluid (collected 22 Dec 2023 16:18)  Source: Pleural Fl Pleural Fluid  Final Report:    No fungus isolated at 4 weeks.    Culture - Acid Fast - Body Fluid w/Smear (collected 22 Dec 2023 16:18)  Source: .Body Fluid Thoracentesis Fluid  Final Report:    No acid-fast bacilli isolated after 6 weeks.    Culture - Body Fluid with Gram Stain (collected 22 Dec 2023 16:18)  Source: Pleural Fl Pleural Fluid  Final Report:    No growth at 5 days    Culture - Blood (collected 22 Dec 2023 10:43)  Source: .Blood Blood-Peripheral  Final Report:    No growth at 5 days    Culture - Blood (collected 22 Dec 2023 10:43)  Source: .Blood Blood-Peripheral  Final Report:    No growth at 5 days    Culture - Sputum (collected 21 Dec 2023 01:12)  Source: .Sputum Sputum  Final Report:    Normal Respiratory Milly present      Lactate Dehydrogenase, Serum: 161 (04-05)    Blood Gas Venous - Lactate: 1.9 (04-05 @ 00:05)    CSF:    RADIOLOGY:  < from: CT Abdomen and Pelvis w/ Oral Cont and w/ IV Cont (04.04.25 @ 22:50) >    FINDINGS:  LOWER CHEST: Near resolved left and residual small right pleural   effusion. Bilateral pleural plaques and chronic lungbase atelectasis,   rounded on the right..    LIVER: Within normal limits.  BILE DUCTS: There is new moderate intrahepatic and extrahepatic biliary   ductal dilatation. CBD measures 2.3 cm. There is no high attenuation in   the distal CBD adjacent tothe ampulla (301-51, 602-55).  GALLBLADDER: Gallbladder distention. No wall thickening, pericholecystic   fluid, or radiopaque gallstone.  SPLEEN: Within normal limits.  PANCREAS: No pancreatic lesions. Trace pancreatic ductal dilatation  ADRENALS: Within normal limits.  KIDNEYS/URETERS: Left renal simple cyst. No hydronephrosis.    BLADDER: Bladder diverticulum.  REPRODUCTIVE ORGANS: Prostate is enlarged.    BOWEL: Diverting loop colostomy in the left lower quadrant. No bowel   obstruction. Colonic diverticulosis. Contrast opacifies the small bowel.   Nonobstructive portion of sigmoid colon again seen in left inguinal   hernia. Appendix is not visualized. No evidence of inflammation in the   pericecal region.  PERITONEUM/RETROPERITONEUM: Within normal limits.  VESSELS: Atherosclerotic changes.  LYMPH NODES: No lymphadenopathy.  ABDOMINAL WALL: Left-sided loop colostomy. Left inguinal hernia   containing portion of nonobstructed sigmoid colon.  BONES: Degenerative changes. Grade 2 L5 on S1 anterolisthesis. Bilateral   pars interarticularis defects of L5 also seen on prior CT.    IMPRESSION:  New moderate intra and extrahepatic biliary duct dilatation with   indeterminate high attenuation in the distal CBD near the ampulla could   be astone, sludge ball or mass. Recommend follow-up with   gastroenterology for further management. Distended gallbladder without   wall thickening or pericholecystic inflammatory changes.    No acute bowel finding.    < end of copied text >  < from: Xray Chest 1 View AP/PA (04.04.25 @ 21:39) >  IMPRESSION:  Minimal central interstitial prominence...    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 He started noticing that he was developing an itch on his elbows a week ago, which started progressing to involve both arms, chest, and back. During that time, he noticed his skin was starting to get more and more yellow. He states he doesn't have any nausea, vomiting, or decreased/increased ostomy output. He has noticed that his legs have been getting more swollen and that he has been having a harder time putting out urine. When he does urinate, he states it is very dark. Lives with his granddaughter. Brought to hospital for further evaluation.    In the ED, he was afebrile and hemodynamically stable (except for 1 episode of systolics to the 80s), and saturating well on RA. CBC w/ leukocytosis of 15.87 and thrombocytosis of 588. CMP w/ hyponatremia of 131, hypokalemia of 2.6, BUN/Cr 48/1.4, and transaminitis w/ , AST/ALT 92/60, T.bili 29.3 w/ direct bilirubin >10 and indirect <19.3. UA wnl. CT A/P showing new moderate intra and extrahepatic biliary duct dilatation with indeterminate high attenuation in the distal CBD near the ampulla could be a stone, sludge ball or mass, as well as distended GB w/o wall thickening or pericholecystic inflammatory changes. Received IVP benadryl 25mg x2, 1L LR, IV KCl 10mEq x3, and Zosyn in the ED. Still complaining of generalized itching. (05 Apr 2025 04:24)     REVIEW OF SYSTEMS  [  ] ROS unobtainable because:    [ x ] All other systems negative except as noted below  Constitutional:  [ ] fever [ ] chills  [ ] weight loss  [ ]night sweat  [ ]poor appetite/PO intake [ ]fatigue   Skin:  [ ] rash [ ] phlebitis [x]itching	  Eyes: [x ] icterus [ ] pain  [ ] discharge	  ENMT: [ ] sore throat  [ ] thrush [ ] ulcers [ ] exudates [ ]anosmia  Respiratory: [ ] dyspnea [ ] hemoptysis [ ] cough [ ] sputum	  Cardiovascular:  [ ] chest pain [ ] palpitations [ ] edema	  Gastrointestinal:  [ ] nausea [ ] vomiting [ ] diarrhea [ ] constipation [ ] pain	  Genitourinary:  [ ] dysuria [ ] frequency [ ] hematuria [ ] discharge [ ] flank pain  [ ] incontinence  Musculoskeletal:  [ ] myalgias [ ] arthralgias [ ] arthritis  [ ] back pain  Neurological:  [ ] headache [ ] weakness [ ] seizures  [ ] confusion/altered mental status  prior hospital charts reviewed [V]  primary team notes reviewed [V]  other consultant notes reviewed [V]    PAST MEDICAL & SURGICAL HISTORY:  Asbestosis (ICD9 501)      HTN (Hypertension)      BPH (Benign Prostatic Hypertrophy)      Dyslipidemia      Localized Osteoarthrosis, Lower Leg  left      Obesity      COPD with emphysema      Pulmonary nodule      Thyroid nodule      Aortic ectasia      History of diastolic dysfunction      Venous insufficiency      S/P Cystoscopy (ICD9 V45.89)      Hyperlipidemia (ICD9 272.4)      S/P Arthroscopy of Left Knee  x 20 yrs ago      Cataract  right IOL      Inguinal Hernia x2  right      History of Tonsillectomy      History of appendectomy          SOCIAL HISTORY:  - Denied smoking/vaping/alcohol/recreational drug use    FAMILY HISTORY:      Allergies  No Known Allergies        ANTIMICROBIALS:      ANTIMICROBIALS (past 90 days):  MEDICATIONS  (STANDING):  piperacillin/tazobactam IVPB..   25 mL/Hr IV Intermittent (04-05-25 @ 07:19)    piperacillin/tazobactam IVPB...   200 mL/Hr IV Intermittent (04-05-25 @ 00:11)        OTHER MEDS:   MEDICATIONS  (STANDING):  acetaminophen     Tablet .. 650 every 6 hours PRN  albuterol/ipratropium for Nebulization 3 every 6 hours  cholestyramine Powder (Sugar-Free) 4 two times a day  finasteride 5 daily  fluticasone propionate/ salmeterol 250-50 MICROgram(s) Diskus 1 two times a day  heparin   Injectable 7000 every 6 hours PRN  heparin   Injectable 3500 every 6 hours PRN  heparin  Infusion.  <Continuous>  melatonin 3 at bedtime PRN  metoprolol tartrate 25 two times a day  ondansetron Injectable 4 every 8 hours PRN  pantoprazole    Tablet 40 before breakfast  tamsulosin 0.4 at bedtime      VITALS:  Vital Signs Last 24 Hrs  T(F): 97.4 (04-05-25 @ 06:38), Max: 98.9 (04-04-25 @ 19:50)    Vital Signs Last 24 Hrs  HR: 73 (04-05-25 @ 06:38) (73 - 97)  BP: 111/65 (04-05-25 @ 06:38) (82/53 - 135/79)  RR: 18 (04-05-25 @ 06:38)  SpO2: 97% (04-05-25 @ 06:38) (97% - 99%)  Wt(kg): --    EXAM:    GA: NAD, AOx3  HEENT: oral cavity no lesion  CV: nl S1/S2, no RMG  Lungs: CTAB, No distress  Abd: BS+, soft, nontender, no rebounding pain + colostomy  Ext: no edema  Neuro: No focal deficits  Skin: Intact  IV: no phlebitis  Labs:                        12.3   14.64 )-----------( 375      ( 05 Apr 2025 07:17 )             33.9     04-05    129[L]  |  92[L]  |  48[H]  ----------------------------<  92  2.7[LL]   |  20[L]  |  1.78[H]    Ca    8.5      05 Apr 2025 07:14    TPro  5.2[L]  /  Alb  2.4[L]  /  TBili  27.1[H]  /  DBili  x   /  AST  76[H]  /  ALT  51[H]  /  AlkPhos  322[H]  04-05    WBC Trend:  WBC Count: 14.64 (04-05-25 @ 07:17)  WBC Count: 15.87 (04-04-25 @ 20:16)    Auto Neutrophil #: 5.03 K/uL (01-23-25 @ 16:14)  Auto Neutrophil #: 16.03 K/uL (09-15-24 @ 00:22)    Creatine Trend:  Creatinine: 1.78 (04-05)  Creatinine: 1.40 (04-04)    Liver Biochemical Testing Trend:  Alanine Aminotransferase (ALT/SGPT): 51 *H* (04-05)  Alanine Aminotransferase (ALT/SGPT): 60 *H* (04-04)  Alanine Aminotransferase (ALT/SGPT): 89 *H* (01-23)  Alanine Aminotransferase (ALT/SGPT): 23 (09-19)  Alanine Aminotransferase (ALT/SGPT): 31 (09-18)  Aspartate Aminotransferase (AST/SGOT): 76 (04-05-25 @ 07:14)  Aspartate Aminotransferase (AST/SGOT): 92 (04-04-25 @ 20:16)  Aspartate Aminotransferase (AST/SGOT): 80 (01-23-25 @ 16:14)  Aspartate Aminotransferase (AST/SGOT): 12 (09-19-24 @ 07:21)  Aspartate Aminotransferase (AST/SGOT): 14 (09-18-24 @ 06:58)  Bilirubin Total: 27.1 (04-05)  Bilirubin Total: 29.3 (04-04)  Bilirubin Direct: >10.0 (04-04)  Bilirubin Total: 29.3 (04-04)  Bilirubin Total: 1.9 (01-23)    Trend LDH  04-05-25 @ 07:17  161  12-23-23 @ 03:39  305[H]      Urinalysis Basic - ( 05 Apr 2025 07:14 )    Color: x / Appearance: x / SG: x / pH: x  Gluc: 92 mg/dL / Ketone: x  / Bili: x / Urobili: x   Blood: x / Protein: x / Nitrite: x   Leuk Esterase: x / RBC: x / WBC x   Sq Epi: x / Non Sq Epi: x / Bacteria: x      MICROBIOLOGY:        Culture - Urine (collected 23 Jan 2025 16:17)  Source: Clean Catch Clean Catch (Midstream)  Final Report:    <10,000 CFU/mL Normal Urogenital Milly    Culture - Blood (collected 15 Sep 2024 04:15)  Source: .Blood Blood-Venous  Final Report:    No growth at 5 days    Culture - Blood (collected 15 Sep 2024 04:00)  Source: .Blood Blood-Venous  Final Report:    No growth at 5 days    Culture - Urine (collected 15 Sep 2024 01:50)  Source: Clean Catch Clean Catch (Midstream)  Final Report:    >=3 organisms. Probable collection contamination.    Culture - Fungal, Body Fluid (collected 22 Dec 2023 16:18)  Source: Pleural Fl Pleural Fluid  Final Report:    No fungus isolated at 4 weeks.    Culture - Acid Fast - Body Fluid w/Smear (collected 22 Dec 2023 16:18)  Source: .Body Fluid Thoracentesis Fluid  Final Report:    No acid-fast bacilli isolated after 6 weeks.    Culture - Body Fluid with Gram Stain (collected 22 Dec 2023 16:18)  Source: Pleural Fl Pleural Fluid  Final Report:    No growth at 5 days    Culture - Blood (collected 22 Dec 2023 10:43)  Source: .Blood Blood-Peripheral  Final Report:    No growth at 5 days    Culture - Blood (collected 22 Dec 2023 10:43)  Source: .Blood Blood-Peripheral  Final Report:    No growth at 5 days    Culture - Sputum (collected 21 Dec 2023 01:12)  Source: .Sputum Sputum  Final Report:    Normal Respiratory Milly present      Lactate Dehydrogenase, Serum: 161 (04-05)    Blood Gas Venous - Lactate: 1.9 (04-05 @ 00:05)    CSF:    RADIOLOGY:  < from: CT Abdomen and Pelvis w/ Oral Cont and w/ IV Cont (04.04.25 @ 22:50) >    FINDINGS:  LOWER CHEST: Near resolved left and residual small right pleural   effusion. Bilateral pleural plaques and chronic lungbase atelectasis,   rounded on the right..    LIVER: Within normal limits.  BILE DUCTS: There is new moderate intrahepatic and extrahepatic biliary   ductal dilatation. CBD measures 2.3 cm. There is no high attenuation in   the distal CBD adjacent tothe ampulla (301-51, 602-55).  GALLBLADDER: Gallbladder distention. No wall thickening, pericholecystic   fluid, or radiopaque gallstone.  SPLEEN: Within normal limits.  PANCREAS: No pancreatic lesions. Trace pancreatic ductal dilatation  ADRENALS: Within normal limits.  KIDNEYS/URETERS: Left renal simple cyst. No hydronephrosis.    BLADDER: Bladder diverticulum.  REPRODUCTIVE ORGANS: Prostate is enlarged.    BOWEL: Diverting loop colostomy in the left lower quadrant. No bowel   obstruction. Colonic diverticulosis. Contrast opacifies the small bowel.   Nonobstructive portion of sigmoid colon again seen in left inguinal   hernia. Appendix is not visualized. No evidence of inflammation in the   pericecal region.  PERITONEUM/RETROPERITONEUM: Within normal limits.  VESSELS: Atherosclerotic changes.  LYMPH NODES: No lymphadenopathy.  ABDOMINAL WALL: Left-sided loop colostomy. Left inguinal hernia   containing portion of nonobstructed sigmoid colon.  BONES: Degenerative changes. Grade 2 L5 on S1 anterolisthesis. Bilateral   pars interarticularis defects of L5 also seen on prior CT.    IMPRESSION:  New moderate intra and extrahepatic biliary duct dilatation with   indeterminate high attenuation in the distal CBD near the ampulla could   be astone, sludge ball or mass. Recommend follow-up with   gastroenterology for further management. Distended gallbladder without   wall thickening or pericholecystic inflammatory changes.    No acute bowel finding.    < end of copied text >  < from: Xray Chest 1 View AP/PA (04.04.25 @ 21:39) >  IMPRESSION:  Minimal central interstitial prominence...    < end of copied text >  < from: Xray Chest 1 View AP/PA (04.04.25 @ 21:39) >  IMPRESSION:  Minimal central interstitial prominence...    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 He started noticing that he was developing an itch on his elbows a week ago, which started progressing to involve both arms, chest, and back. During that time, he noticed his skin was starting to get more and more yellow. He states he doesn't have any nausea, vomiting, or decreased/increased ostomy output. He has noticed that his legs have been getting more swollen and that he has been having a harder time putting out urine. When he does urinate, he states it is very dark. Lives with his granddaughter. Brought to hospital for further evaluation.    In the ED, he was afebrile and hemodynamically stable (except for 1 episode of systolics to the 80s), and saturating well on RA. CBC w/ leukocytosis of 15.87 and thrombocytosis of 588. CMP w/ hyponatremia of 131, hypokalemia of 2.6, BUN/Cr 48/1.4, and transaminitis w/ , AST/ALT 92/60, T.bili 29.3 w/ direct bilirubin >10 and indirect <19.3. UA wnl. CT A/P showing new moderate intra and extrahepatic biliary duct dilatation with indeterminate high attenuation in the distal CBD near the ampulla could be a stone, sludge ball or mass, as well as distended GB w/o wall thickening or pericholecystic inflammatory changes. Received IVP benadryl 25mg x2, 1L LR, IV KCl 10mEq x3, and Zosyn in the ED. Still complaining of generalized itching. (05 Apr 2025 04:24)     REVIEW OF SYSTEMS  [  ] ROS unobtainable because:    [ x ] All other systems negative except as noted below    Constitutional:  [ ] fever [ ] chills  Skin:  [ ] rash [x]itching	  Eyes: [x ] icterus 	  ENMT: [ ] sore throat  [ ] thrush   Respiratory: [ ] dyspnea [ ] cough [ ] sputum	  Cardiovascular:  [ ] chest pain   Gastrointestinal:  [ ] nausea [ ] vomiting [ ] diarrhea  [ ] pain	  Genitourinary:  [ ] dysuria [ ] frequency  Musculoskeletal:  [ ] myalgias [ ] back pain  Neurological:  [ ] headache [ ] confusion/altered mental status  Extremities: no edema       prior hospital charts reviewed [V]  primary team notes reviewed [V]  other consultant notes reviewed [V]      PAST MEDICAL & SURGICAL HISTORY:  Asbestosis (ICD9 501)      HTN (Hypertension)      BPH (Benign Prostatic Hypertrophy)      Dyslipidemia      Localized Osteoarthrosis, Lower Leg  left      Obesity      COPD with emphysema      Pulmonary nodule      Thyroid nodule      Aortic ectasia      History of diastolic dysfunction      Venous insufficiency      S/P Cystoscopy (ICD9 V45.89)      Hyperlipidemia (ICD9 272.4)      S/P Arthroscopy of Left Knee  x 20 yrs ago      Cataract  right IOL      Inguinal Hernia x2  right      History of Tonsillectomy      History of appendectomy          SOCIAL HISTORY:  - former smoker, lives with family.       FAMILY HISTORY:  No family hx of cancer in first degree relatives.       Allergies  No Known Allergies        ANTIMICROBIALS:      ANTIMICROBIALS (past 90 days):  MEDICATIONS  (STANDING):  piperacillin/tazobactam IVPB..   25 mL/Hr IV Intermittent (04-05-25 @ 07:19)    piperacillin/tazobactam IVPB...   200 mL/Hr IV Intermittent (04-05-25 @ 00:11)        OTHER MEDS:   MEDICATIONS  (STANDING):  acetaminophen     Tablet .. 650 every 6 hours PRN  albuterol/ipratropium for Nebulization 3 every 6 hours  cholestyramine Powder (Sugar-Free) 4 two times a day  finasteride 5 daily  fluticasone propionate/ salmeterol 250-50 MICROgram(s) Diskus 1 two times a day  heparin   Injectable 7000 every 6 hours PRN  heparin   Injectable 3500 every 6 hours PRN  heparin  Infusion.  <Continuous>  melatonin 3 at bedtime PRN  metoprolol tartrate 25 two times a day  ondansetron Injectable 4 every 8 hours PRN  pantoprazole    Tablet 40 before breakfast  tamsulosin 0.4 at bedtime      VITALS:  Vital Signs Last 24 Hrs  T(F): 97.4 (04-05-25 @ 06:38), Max: 98.9 (04-04-25 @ 19:50)    Vital Signs Last 24 Hrs  HR: 73 (04-05-25 @ 06:38) (73 - 97)  BP: 111/65 (04-05-25 @ 06:38) (82/53 - 135/79)  RR: 18 (04-05-25 @ 06:38)  SpO2: 97% (04-05-25 @ 06:38) (97% - 99%)  Wt(kg): --    EXAM:    GA: NAD, AOx3  Eyes: + icterus  ENT: oral cavity no lesion  CV: nl S1/S2,   Lungs: breathing comfortably   Abd: soft, nontender, + colostomy  Ext: + edema  Neuro: No new focal deficits  Skin: +jaundice   IV: no phlebitis        Labs:                        12.3   14.64 )-----------( 375      ( 05 Apr 2025 07:17 )             33.9     04-05    129[L]  |  92[L]  |  48[H]  ----------------------------<  92  2.7[LL]   |  20[L]  |  1.78[H]    Ca    8.5      05 Apr 2025 07:14    TPro  5.2[L]  /  Alb  2.4[L]  /  TBili  27.1[H]  /  DBili  x   /  AST  76[H]  /  ALT  51[H]  /  AlkPhos  322[H]  04-05    WBC Trend:  WBC Count: 14.64 (04-05-25 @ 07:17)  WBC Count: 15.87 (04-04-25 @ 20:16)    Auto Neutrophil #: 5.03 K/uL (01-23-25 @ 16:14)  Auto Neutrophil #: 16.03 K/uL (09-15-24 @ 00:22)    Creatine Trend:  Creatinine: 1.78 (04-05)  Creatinine: 1.40 (04-04)    Liver Biochemical Testing Trend:  Alanine Aminotransferase (ALT/SGPT): 51 *H* (04-05)  Alanine Aminotransferase (ALT/SGPT): 60 *H* (04-04)  Alanine Aminotransferase (ALT/SGPT): 89 *H* (01-23)  Alanine Aminotransferase (ALT/SGPT): 23 (09-19)  Alanine Aminotransferase (ALT/SGPT): 31 (09-18)  Aspartate Aminotransferase (AST/SGOT): 76 (04-05-25 @ 07:14)  Aspartate Aminotransferase (AST/SGOT): 92 (04-04-25 @ 20:16)  Aspartate Aminotransferase (AST/SGOT): 80 (01-23-25 @ 16:14)  Aspartate Aminotransferase (AST/SGOT): 12 (09-19-24 @ 07:21)  Aspartate Aminotransferase (AST/SGOT): 14 (09-18-24 @ 06:58)  Bilirubin Total: 27.1 (04-05)  Bilirubin Total: 29.3 (04-04)  Bilirubin Direct: >10.0 (04-04)  Bilirubin Total: 29.3 (04-04)  Bilirubin Total: 1.9 (01-23)    Trend LDH  04-05-25 @ 07:17  161  12-23-23 @ 03:39  305[H]      Urinalysis Basic - ( 05 Apr 2025 07:14 )    Color: x / Appearance: x / SG: x / pH: x  Gluc: 92 mg/dL / Ketone: x  / Bili: x / Urobili: x   Blood: x / Protein: x / Nitrite: x   Leuk Esterase: x / RBC: x / WBC x   Sq Epi: x / Non Sq Epi: x / Bacteria: x      MICROBIOLOGY:        Culture - Urine (collected 23 Jan 2025 16:17)  Source: Clean Catch Clean Catch (Midstream)  Final Report:    <10,000 CFU/mL Normal Urogenital Milly    Culture - Blood (collected 15 Sep 2024 04:15)  Source: .Blood Blood-Venous  Final Report:    No growth at 5 days    Culture - Blood (collected 15 Sep 2024 04:00)  Source: .Blood Blood-Venous  Final Report:    No growth at 5 days    Culture - Urine (collected 15 Sep 2024 01:50)  Source: Clean Catch Clean Catch (Midstream)  Final Report:    >=3 organisms. Probable collection contamination.    Culture - Fungal, Body Fluid (collected 22 Dec 2023 16:18)  Source: Pleural Fl Pleural Fluid  Final Report:    No fungus isolated at 4 weeks.    Culture - Acid Fast - Body Fluid w/Smear (collected 22 Dec 2023 16:18)  Source: .Body Fluid Thoracentesis Fluid  Final Report:    No acid-fast bacilli isolated after 6 weeks.    Culture - Body Fluid with Gram Stain (collected 22 Dec 2023 16:18)  Source: Pleural Fl Pleural Fluid  Final Report:    No growth at 5 days    Culture - Blood (collected 22 Dec 2023 10:43)  Source: .Blood Blood-Peripheral  Final Report:    No growth at 5 days    Culture - Blood (collected 22 Dec 2023 10:43)  Source: .Blood Blood-Peripheral  Final Report:    No growth at 5 days    Culture - Sputum (collected 21 Dec 2023 01:12)  Source: .Sputum Sputum  Final Report:    Normal Respiratory Milly present      Lactate Dehydrogenase, Serum: 161 (04-05)    Blood Gas Venous - Lactate: 1.9 (04-05 @ 00:05)        RADIOLOGY: Imaging reviewed personally and interpretation as mentioned below.     < from: CT Abdomen and Pelvis w/ Oral Cont and w/ IV Cont (04.04.25 @ 22:50) >    FINDINGS:  LOWER CHEST: Near resolved left and residual small right pleural   effusion. Bilateral pleural plaques and chronic lungbase atelectasis,   rounded on the right..    LIVER: Within normal limits.  BILE DUCTS: There is new moderate intrahepatic and extrahepatic biliary   ductal dilatation. CBD measures 2.3 cm. There is no high attenuation in   the distal CBD adjacent tothe ampulla (301-51, 602-55).  GALLBLADDER: Gallbladder distention. No wall thickening, pericholecystic   fluid, or radiopaque gallstone.  SPLEEN: Within normal limits.  PANCREAS: No pancreatic lesions. Trace pancreatic ductal dilatation  ADRENALS: Within normal limits.  KIDNEYS/URETERS: Left renal simple cyst. No hydronephrosis.    BLADDER: Bladder diverticulum.  REPRODUCTIVE ORGANS: Prostate is enlarged.    BOWEL: Diverting loop colostomy in the left lower quadrant. No bowel   obstruction. Colonic diverticulosis. Contrast opacifies the small bowel.   Nonobstructive portion of sigmoid colon again seen in left inguinal   hernia. Appendix is not visualized. No evidence of inflammation in the   pericecal region.  PERITONEUM/RETROPERITONEUM: Within normal limits.  VESSELS: Atherosclerotic changes.  LYMPH NODES: No lymphadenopathy.  ABDOMINAL WALL: Left-sided loop colostomy. Left inguinal hernia   containing portion of nonobstructed sigmoid colon.  BONES: Degenerative changes. Grade 2 L5 on S1 anterolisthesis. Bilateral   pars interarticularis defects of L5 also seen on prior CT.    IMPRESSION:  New moderate intra and extrahepatic biliary duct dilatation with   indeterminate high attenuation in the distal CBD near the ampulla could   be astone, sludge ball or mass. Recommend follow-up with   gastroenterology for further management. Distended gallbladder without   wall thickening or pericholecystic inflammatory changes.    No acute bowel finding.      < from: Xray Chest 1 View AP/PA (04.04.25 @ 21:39) >  IMPRESSION:  Minimal central interstitial prominence...      < from: Xray Chest 1 View AP/PA (04.04.25 @ 21:39) >  IMPRESSION:  Minimal central interstitial prominence...

## 2025-04-05 NOTE — H&P ADULT - HISTORY OF PRESENT ILLNESS
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 For the past week, he has been getting jaundiced, having full-body itching, decreased urine production w/ urine being very dark. Brought to hospital for further evaluation.    In the ED, he was afebrile and hemodynamically stable (except for 1 episode of systolics to the 80s), and saturating well on RA. CBC w/ leukocytosis of 15.87 and thrombocytosis of 588. CMP w/ hyponatremia of 131, hypokalemia of 2.6, BUN/Cr 48/1.4, and transaminitis w/ , AST/ALT 92/60, T.bili 29.3 w/ direct bilirubin >10 and indirect <19.3. UA wnl. CT A/P showing new moderate intra and extrahepatic biliary duct dilatation with indeterminate high attenuation in the distal CBD near the ampulla could be a stone, sludge ball or mass, as well as distended GB w/o wall thickening or pericholecystic inflammatory changes. Received IVP benadryl 25mg x2, 1L LR, IV KCl 10mEq x3, and Zosyn in the ED. 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 He started noticing that he was developing an itch on his elbows a week ago, which started progressing to involve both arms, chest, and back. During that time, he noticed his skin was starting to get more and more yellow. He states he doesn't have any nausea, vomiting, or decreased/increased ostomy output. He has noticed that his legs have been getting more swollen and that he has been having a harder time putting out urine. When he does urinate, he states it is very dark. Lives with his granddaughter. Brought to hospital for further evaluation.    In the ED, he was afebrile and hemodynamically stable (except for 1 episode of systolics to the 80s), and saturating well on RA. CBC w/ leukocytosis of 15.87 and thrombocytosis of 588. CMP w/ hyponatremia of 131, hypokalemia of 2.6, BUN/Cr 48/1.4, and transaminitis w/ , AST/ALT 92/60, T.bili 29.3 w/ direct bilirubin >10 and indirect <19.3. UA wnl. CT A/P showing new moderate intra and extrahepatic biliary duct dilatation with indeterminate high attenuation in the distal CBD near the ampulla could be a stone, sludge ball or mass, as well as distended GB w/o wall thickening or pericholecystic inflammatory changes. Received IVP benadryl 25mg x2, 1L LR, IV KCl 10mEq x3, and Zosyn in the ED. Still complaining of generalized itching.

## 2025-04-05 NOTE — CONSULT NOTE ADULT - ASSESSMENT
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 On adm, vitals wnl. However had elevated liver nos ast/alt/alp/tbil of 92/60/409/29.3. LA 1.9, Inr 1.53. Elevated Cr 1.4 --> 1.7 (baseline of 1.2). Wbc of 15.   CT abd showing new moderate intrahepatic and extrahepatic biliary ductal dilation. Cbd 2.3cm. Gallbladder distention. No wall thickening, pericholecystic fluid, or radiopaque gallstone. No pancreatic lesions. Trace pancreatic ductal dilatation. Liver wnl.  Diverting loop colostomy in the left lower quadrant.    Assessment    #Painless jaundice w/biliary dilation - differentials: stone vs stricture vs mass.   #Elevated liver enzymes with hyperbilirubinemia  #Leucocytosis  #Dark urine  #pruritus  #BESSY on ckd (baseline Cr of 1.2)  #History of left colon cancer s/p left colectomy with end colostomy 8/2023    Plan    -Recommend to continue empiric abx with zosyn  -Monitor vital signs  -Keep on CLD for now  -NPO after midnight tomorrow  -GI will plan for EUS/ERCP on Monday. Pls teams GI in the Event of sudden change in patient'  s clinical status - Fever, worsening wbc, change in mental status  -Monitor renal function - Trend UO.   -Avoid renotoxic/hepatotoxic meds  -Daily cbc, cmp, pt/inr  -Start on benadryl to help with pruritus.   -Ok to continue cholestyramine.  -Rest of care per primary.    The above is not finalized until attending' s attestation    Mago Zhu, PGY4  Gastroenterology and Hepatology  Available on TEAMS    For non urgent consult, please email [email or web address] (For [place]) or [email or web address] (For [facility])  Long range page number [phone]. Short range [phone]

## 2025-04-05 NOTE — CONSULT NOTE ADULT - ASSESSMENT
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 Now presents for jaundice, itching and dark urine. New imaging shows New moderate intra and extrahepatic biliary duct dilatation with indeterminate high attenuation in the distal CBD near the ampulla could be a stone, sludge ball or mass. Distended gallbladder without wall thickening or pericholecystic inflammatory changes.  In ED afebrile, VSS jaundiced, Leukocytosis 15, Transaminitis Tbili 29, Hyponatremic. ID consulted for leukocytosis.    Remains afebrile  WBC 14 today  LOWER CHEST: Near resolved left and residual small right pleural effusion. Bilateral pleural plaques and chronic lung base atelectasis, rounded on the right.. 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 Now presents for jaundice, itching and dark urine. New imaging shows New moderate intra and extrahepatic biliary duct dilatation with indeterminate high attenuation in the distal CBD near the ampulla could be a stone, sludge ball or mass. Distended gallbladder without wall thickening or pericholecystic inflammatory changes.  In ED afebrile, VSS jaundiced, Leukocytosis 15, Transaminitis Tbili 29, Hyponatremic. ID consulted for leukocytosis.    Remains afebrile  WBC 14 today  LOWER CHEST: Near resolved left and residual small right pleural effusion. Bilateral pleural plaques and chronic lung base atelectasis, rounded on the right.. Ct a/p results above  Dark urine-Denies dysuria ua neg  4/5 Bld cx x 2 sent  Zosyn x 2 doses currently off abx    #?obstructive jaundice  #BESSY-1.7  #Electrolyte imbalance    Plan  Overall patient with leukocytosis in the setting of Jaundice and billary dilation   No overt infectious source for leukocytosis identified  Await EUS/ERCP on Monday per GI  Agree with Zosyn for now  Continue to monitor for fever  Continue to trend WBC to norm  D/w Dr. Ordonez

## 2025-04-05 NOTE — DOWNTIME INTERRUPTION NOTE - WHICH MANUAL FORMS INITIATED?
@0211 pt b/p was found to be hypotensive. ED resident jesi was made aware and pt given 1 L pressure bagged LR w/ desired effect. No other events occurred during downtime.

## 2025-04-05 NOTE — PROGRESS NOTE ADULT - ASSESSMENT
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 Found to be hyponatremic, hypokalemic, w/ transaminitis and direct hyperbilirubinemia of 29.3. CT w/ intra-and extrahepatic biliary dilatation as well as CBD dilatation. Admitted for workup of obstructive jaundice.

## 2025-04-05 NOTE — PATIENT PROFILE ADULT - NSTRANSFERBELONGINGSDISPO_GEN_A_NUR
[de-identified] : Under sterile technique with a Betadine prep the right knee was injected from a medial portal with 40 mg of methylprednisolone and 2 cc of 1% lidocaine with a Band-Aid dressing applied at the conclusion.  This was tolerated without incident given to family

## 2025-04-05 NOTE — H&P ADULT - PROBLEM SELECTOR PLAN 4
- ECG personally reviewed, atrial fibrillation not in RVR  - C/w metoprolol tartrate 25mg BID  - In anticipation of possible procedure, will hold Eliquis 2.5mg BID and start heparin gtt - BUN 47, Cr 1.4, baseline 1.18-1.3  - Monitor urine output  - Did receive contrast for CT but also received 1L IVLR, f/u AM CMP  - No evidence of hydronephrosis on CT  - May have component of fluid overload given LE edema  - Avoid nephrotoxic medications  - Dose meds in accordance w/ GFR

## 2025-04-05 NOTE — H&P ADULT - NSHPLABSRESULTS_GEN_ALL_CORE
14.2   15.87 )-----------( 588      ( 2025 20:16 )             39.0         131[L]  |  94[L]  |  48[H]  ----------------------------<  177[H]  2.6[LL]   |  22  |  1.40[H]    Ca    8.9      2025 20:16    TPro  6.3  /  Alb  2.8[L]  /  TBili  29.3[H]  /  DBili  >10.0[H]  /  AST  92[H]  /  ALT  60[H]  /  AlkPhos  409[H]  04-04          LIVER FUNCTIONS - ( 2025 20:16 )  Alb: 2.8 g/dL / Pro: 6.3 g/dL / ALK PHOS: 409 U/L / ALT: 60 U/L / AST: 92 U/L / GGT: x           PT/INR - ( 2025 20:37 )   PT: 17.4 sec;   INR: 1.53 ratio         PTT - ( 2025 20:37 )  PTT:38.1 sec  Urinalysis Basic - ( 2025 21:35 )    Color: Dark Yellow / Appearance: Clear / S.011 / pH: x  Gluc: x / Ketone: Negative mg/dL  / Bili: Large / Urobili: 0.2 mg/dL   Blood: x / Protein: Negative mg/dL / Nitrite: Negative   Leuk Esterase: Trace / RBC: 2 /HPF / WBC 3 /HPF   Sq Epi: x / Non Sq Epi: 2 /HPF / Bacteria: Negative /HPF

## 2025-04-05 NOTE — H&P ADULT - PROBLEM SELECTOR PLAN 6
- Takes albuterol q6hrs PRN and symbicort 160-4.5mcg BID at home  - Advair BID + Duonebs q6hrs PRN while admitted - ECG personally reviewed, atrial fibrillation not in RVR  - C/w metoprolol tartrate 25mg BID  - In case of possible procedure over weekend or on Monday 4/7, will hold Eliquis 2.5mg BID and start heparin gtt

## 2025-04-05 NOTE — H&P ADULT - PROBLEM SELECTOR PLAN 7
- Likely in setting of asbestosis, bilateral pleural plaques and chronic lung base atelectasis seen on CT  - Monitor SpO2 - Takes albuterol q6hrs ATC and symbicort 160-4.5mcg BID at home  - Advair BID + Duonebs q6hrs ATC while admitted

## 2025-04-05 NOTE — H&P ADULT - PROBLEM SELECTOR PLAN 5
- Last TTE 9/16/24 - LVEF 73%, indeterminate diastolic function  - Takes bumetanide 2mg AM and 1mg PM, hold for now in setting of hypokalemia  - Not on other GDMT - Last TTE 9/16/24 - LVEF 73%, indeterminate diastolic function  - Takes bumetanide 2mg AM and 1mg PM, hold for now in setting of hypokalemia  - Does have bilateral LE pitting edema that he states has worsened over the past week, if K better on AM labs, would give a dose of IVP Bumex 2mg and assess for response  - Not on other GDMT

## 2025-04-05 NOTE — H&P ADULT - PROBLEM SELECTOR PLAN 9
- C/w finasteride 5mg daily  - C/w tamsulosin 0.4mg QHS - C/w finasteride 5mg daily  - C/w tamsulosin 0.4mg QHS  - Complains of hard time putting out urine this past week that is contributing to LE edema, check bladder scan and straight cath if >400ccs

## 2025-04-05 NOTE — CONSULT NOTE ADULT - SUBJECTIVE AND OBJECTIVE BOX
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 Endorsed ostomy output is lighter. Also reports bilateral ankle edema. He denies abd pain, weight loss, fever, cigarette smoking or etoh use, recent travels, new meds in the past 6 months, h/o liver dx.   On adm, vitals wnl. However had elevated liver nos ast/alt/alp/tbil of 92/60/409/29.3. LA 1.9, Inr 1.53. Wbc of 15. CT abd showing new moderate intrahepatic and extrahepatic biliary ductal dilation. Cbd 2.3cm. Gallbladder distention. No wall thickening, pericholecystic fluid, or radiopaque gallstone. No pancreatic lesions. Trace pancreatic ductal dilatation. Liver wnl.  Diverting loop colostomy in the left lower quadrant. No ascites. Received IVP benadryl 25mg x2, 1L LR, IV KCl 10mEq x3, and Zosyn in the ED.    GI procedure - EGD in 2023 - duodenal ulcer x2 in the duodenum, non bleeding.       PAST MEDICAL & SURGICAL HISTORY:  Asbestosis (ICD9 501)      HTN (Hypertension)      BPH (Benign Prostatic Hypertrophy)      Dyslipidemia      Localized Osteoarthrosis, Lower Leg  left      Obesity      COPD with emphysema      Pulmonary nodule      Thyroid nodule      Aortic ectasia      History of diastolic dysfunction      Venous insufficiency      S/P Cystoscopy (ICD9 V45.89)      Hyperlipidemia (ICD9 272.4)      S/P Arthroscopy of Left Knee  x 20 yrs ago      Cataract  right IOL      Inguinal Hernia x2  right      History of Tonsillectomy      History of appendectomy        FAMILY HISTORY:      MEDS:  MEDICATIONS  (STANDING):  albuterol/ipratropium for Nebulization 3 milliLiter(s) Nebulizer every 6 hours  ascorbic acid 500 milliGRAM(s) Oral daily  cholestyramine Powder (Sugar-Free) 4 Gram(s) Oral two times a day  finasteride 5 milliGRAM(s) Oral daily  fluticasone propionate/ salmeterol 250-50 MICROgram(s) Diskus 1 Dose(s) Inhalation two times a day  heparin  Infusion.  Unit(s)/Hr (16 mL/Hr) IV Continuous <Continuous>  metoprolol tartrate 25 milliGRAM(s) Oral two times a day  multivitamin 1 Tablet(s) Oral daily  pantoprazole    Tablet 40 milliGRAM(s) Oral before breakfast  tamsulosin 0.4 milliGRAM(s) Oral at bedtime    MEDICATIONS  (PRN):  acetaminophen     Tablet .. 650 milliGRAM(s) Oral every 6 hours PRN Temp greater or equal to 38C (100.4F), Mild Pain (1 - 3)  heparin   Injectable 7000 Unit(s) IV Push every 6 hours PRN For aPTT less than 40  heparin   Injectable 3500 Unit(s) IV Push every 6 hours PRN For aPTT between 40 - 57  melatonin 3 milliGRAM(s) Oral at bedtime PRN Insomnia  ondansetron Injectable 4 milliGRAM(s) IV Push every 8 hours PRN Nausea and/or Vomiting    Allergies    No Known Allergies    Intolerances          CONSTITUTIONAL:  No weight loss, fever, chills, weakness or fatigue.  HEENT:  Eyes:  No visual loss, blurred vision, double vision or yellow sclerae.  SKIN:  No rash or itching.  CARDIOVASCULAR:  No chest pain, chest pressure or chest discomfort.  RESPIRATORY:  No shortness of breath, cough or sputum.  GASTROINTESTINAL:  SEE HPI  GENITOURINARY:  No dysuria, hematuria, urinary frequency  NEUROLOGICAL:  No headache, dizziness, syncope, paralysis, ataxia, numbness or tingling in the extremities.  MUSCULOSKELETAL:  No muscle, back pain, joint pain or stiffness.  ENDOCRINOLOGIC:  No reports of sweating, cold or heat intolerance.     ______________________________________________________________________  PHYSICAL EXAM:  T(C): 36.3 (04-05-25 @ 06:38), Max: 37.2 (04-04-25 @ 19:50)  HR: 73 (04-05-25 @ 06:38)  BP: 111/65 (04-05-25 @ 06:38)  RR: 18 (04-05-25 @ 06:38)  SpO2: 97% (04-05-25 @ 06:38)  Wt(kg): --      GEN: icteric  CVS: S1S2+  CHEST: equal chest rise  ABD: soft, full, left colostomy bag with stool (light in color)  EXTR: bilateral pitting pedal edema  NEURO: A&OX3. No asterixis  SKIN:  warm;  non icteric    ______________________________________________________________________  LABS:                        12.3   14.64 )-----------( 375      ( 05 Apr 2025 07:17 )             33.9     04-05    129[L]  |  92[L]  |  48[H]  ----------------------------<  92  2.7[LL]   |  20[L]  |  1.78[H]    Ca    8.5      05 Apr 2025 07:14    TPro  5.2[L]  /  Alb  2.4[L]  /  TBili  27.1[H]  /  DBili  x   /  AST  76[H]  /  ALT  51[H]  /  AlkPhos  322[H]  04-05    LIVER FUNCTIONS - ( 05 Apr 2025 07:14 )  Alb: 2.4 g/dL / Pro: 5.2 g/dL / ALK PHOS: 322 U/L / ALT: 51 U/L / AST: 76 U/L / GGT: x           PT/INR - ( 04 Apr 2025 20:37 )   PT: 17.4 sec;   INR: 1.53 ratio         PTT - ( 04 Apr 2025 20:37 )  PTT:38.1 sec  ____________________________________________           85 y/o male w h/o stage II colon CA s/p L hemicolectomy w/ end-colostomy and mucus fistula 2023, HFpEF, COPD, 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 Endorsed ostomy output is lighter. Also reports bilateral ankle edema. He denies abd pain, weight loss, fever, cigarette smoking or etoh use, recent travels, new meds in the past 6 months, h/o liver dx.   On adm, vitals wnl. However had elevated liver nos ast/alt/alp/tbil of 92/60/409/29.3. LA 1.9, Inr 1.53. Wbc of 15. CT abd showing new moderate intrahepatic and extrahepatic biliary ductal dilation. Cbd 2.3cm. Gallbladder distention. No wall thickening, pericholecystic fluid, or radiopaque gallstone. No pancreatic lesions. Trace pancreatic ductal dilatation. Liver wnl.  Diverting loop colostomy in the left lower quadrant. No ascites. Received IVP benadryl 25mg x2, 1L LR, IV KCl 10mEq x3, and Zosyn in the ED.    GI procedure - EGD in 2023 - duodenal ulcer x2 in the duodenum, non bleeding.       PAST MEDICAL & SURGICAL HISTORY:  Asbestosis (ICD9 501)      HTN (Hypertension)      BPH (Benign Prostatic Hypertrophy)      Dyslipidemia      Localized Osteoarthrosis, Lower Leg  left      Obesity      COPD with emphysema      Pulmonary nodule      Thyroid nodule      Aortic ectasia      History of diastolic dysfunction      Venous insufficiency      S/P Cystoscopy (ICD9 V45.89)      Hyperlipidemia (ICD9 272.4)      S/P Arthroscopy of Left Knee  x 20 yrs ago      Cataract  right IOL      Inguinal Hernia x2  right      History of Tonsillectomy      History of appendectomy        FAMILY HISTORY:      MEDS:  MEDICATIONS  (STANDING):  albuterol/ipratropium for Nebulization 3 milliLiter(s) Nebulizer every 6 hours  ascorbic acid 500 milliGRAM(s) Oral daily  cholestyramine Powder (Sugar-Free) 4 Gram(s) Oral two times a day  finasteride 5 milliGRAM(s) Oral daily  fluticasone propionate/ salmeterol 250-50 MICROgram(s) Diskus 1 Dose(s) Inhalation two times a day  heparin  Infusion.  Unit(s)/Hr (16 mL/Hr) IV Continuous <Continuous>  metoprolol tartrate 25 milliGRAM(s) Oral two times a day  multivitamin 1 Tablet(s) Oral daily  pantoprazole    Tablet 40 milliGRAM(s) Oral before breakfast  tamsulosin 0.4 milliGRAM(s) Oral at bedtime    MEDICATIONS  (PRN):  acetaminophen     Tablet .. 650 milliGRAM(s) Oral every 6 hours PRN Temp greater or equal to 38C (100.4F), Mild Pain (1 - 3)  heparin   Injectable 7000 Unit(s) IV Push every 6 hours PRN For aPTT less than 40  heparin   Injectable 3500 Unit(s) IV Push every 6 hours PRN For aPTT between 40 - 57  melatonin 3 milliGRAM(s) Oral at bedtime PRN Insomnia  ondansetron Injectable 4 milliGRAM(s) IV Push every 8 hours PRN Nausea and/or Vomiting    Allergies    No Known Allergies    Intolerances          CONSTITUTIONAL:  No weight loss, fever, chills, weakness or fatigue.  HEENT:  Eyes:  No visual loss, blurred vision, double vision or yellow sclerae.  SKIN:  No rash or itching.  CARDIOVASCULAR:  No chest pain, chest pressure or chest discomfort.  RESPIRATORY:  No shortness of breath, cough or sputum.  GASTROINTESTINAL:  SEE HPI  GENITOURINARY:  No dysuria, hematuria, urinary frequency  NEUROLOGICAL:  No headache, dizziness, syncope, paralysis, ataxia, numbness or tingling in the extremities.  MUSCULOSKELETAL:  No muscle, back pain, joint pain or stiffness.  ENDOCRINOLOGIC:  No reports of sweating, cold or heat intolerance.     ______________________________________________________________________  PHYSICAL EXAM:  T(C): 36.3 (04-05-25 @ 06:38), Max: 37.2 (04-04-25 @ 19:50)  HR: 73 (04-05-25 @ 06:38)  BP: 111/65 (04-05-25 @ 06:38)  RR: 18 (04-05-25 @ 06:38)  SpO2: 97% (04-05-25 @ 06:38)  Wt(kg): --      GEN: icteric  CVS: S1S2+  CHEST: equal chest rise  ABD: soft, full, left colostomy bag with stool (light in color)  EXTR: bilateral pitting pedal edema  NEURO: A&OX3. No asterixis  SKIN:  warm;  non icteric    ______________________________________________________________________  LABS:                        12.3   14.64 )-----------( 375      ( 05 Apr 2025 07:17 )             33.9     04-05    129[L]  |  92[L]  |  48[H]  ----------------------------<  92  2.7[LL]   |  20[L]  |  1.78[H]    Ca    8.5      05 Apr 2025 07:14    TPro  5.2[L]  /  Alb  2.4[L]  /  TBili  27.1[H]  /  DBili  x   /  AST  76[H]  /  ALT  51[H]  /  AlkPhos  322[H]  04-05    LIVER FUNCTIONS - ( 05 Apr 2025 07:14 )  Alb: 2.4 g/dL / Pro: 5.2 g/dL / ALK PHOS: 322 U/L / ALT: 51 U/L / AST: 76 U/L / GGT: x           PT/INR - ( 04 Apr 2025 20:37 )   PT: 17.4 sec;   INR: 1.53 ratio         PTT - ( 04 Apr 2025 20:37 )  PTT:38.1 sec  ____________________________________________      < from: CT Abdomen and Pelvis w/ Oral Cont and w/ IV Cont (04.04.25 @ 22:50) >    ACC: [id number] EXAM:  CT ABDOMEN AND PELVIS OC IC   ORDERED BY:  ANDRE WRAY     PROCEDURE DATE:  04/04/2025          INTERPRETATION:  CLINICAL INFORMATION: 84-year-old with a history of   intestinal cancer status post colectomy not on active treatment, A-fib on   Eliquis, COPD who presents for jaundice for the past week. Patient   granddaughter at bedside also providing collateral to history. Endorses   full body itching, denies any abdominal pain, nausea, vomiting, decreased   p.o. intake, decreased output through colostomy bag, melanotic stools,   decreased gastric colostomy bag.    COMPARISON: CT abdomen pelvis 1/23/2025    CONTRAST/COMPLICATIONS:  IV Contrast: Omnipaque 350  90 cc administered   10 cc discarded  Oral Contrast: Omnipaque 300  .    PROCEDURE:  CT of the Abdomen and Pelvis was performed.  Sagittal and coronal reformats were performed.    FINDINGS:  LOWER CHEST: Near resolved left and residual small right pleural   effusion. Bilateral pleural plaques and chronic lungbase atelectasis,   rounded on the right..    LIVER: Within normal limits.  BILE DUCTS: There is new moderate intrahepatic and extrahepatic biliary   ductal dilatation. CBD measures 2.3 cm. There is no high attenuation in   the distal CBD adjacent tothe ampulla (301-51, 602-55).  GALLBLADDER: Gallbladder distention. No wall thickening, pericholecystic   fluid, or radiopaque gallstone.  SPLEEN: Within normal limits.  PANCREAS: No pancreatic lesions. Trace pancreatic ductal dilatation  ADRENALS: Within normal limits.  KIDNEYS/URETERS: Left renal simple cyst. No hydronephrosis.    BLADDER: Bladder diverticulum.  REPRODUCTIVE ORGANS: Prostate is enlarged.    BOWEL: Diverting loop colostomy in the left lower quadrant. No bowel   obstruction. Colonic diverticulosis. Contrast opacifies the small bowel.   Nonobstructive portion of sigmoid colon again seen in left inguinal   hernia. Appendix is not visualized. No evidence of inflammation in the   pericecal region.  PERITONEUM/RETROPERITONEUM: Within normal limits.  VESSELS: Atherosclerotic changes.  LYMPH NODES: No lymphadenopathy.  ABDOMINAL WALL: Left-sided loop colostomy. Left inguinal hernia   containing portion of nonobstructed sigmoid colon.  BONES: Degenerative changes. Grade 2 L5 on S1 anterolisthesis. Bilateral   pars interarticularis defects of L5 also seen on prior CT.    IMPRESSION:  New moderate intra and extrahepatic biliary duct dilatation with   indeterminate high attenuation in the distal CBD near the ampulla could   be astone, sludge ball or mass. Recommend follow-up with   gastroenterology for further management. Distended gallbladder without   wall thickening or pericholecystic inflammatory changes.    No acute bowel finding.    --- End of Report ---          JEFF RAJU MD; Resident Radiologist  This document has been electronically signed.  TAYLOR VAZQUEZ MD; Attending Radiologist  This document has been electronically signed. Apr 5 2025  1:46AM    < end of copied text >

## 2025-04-05 NOTE — H&P ADULT - PROBLEM SELECTOR PLAN 3
- Na 131  - Check urine studies  - Could be hypovolemic in nature given concurrent hypokalemia - Na 131  - Check urine studies  - Patient seems to be more on the fluid overloaded side given bilateral LE edema, can try IVP Bumex 2mg if potassium normalizes on labs and monitor for response

## 2025-04-05 NOTE — H&P ADULT - NSHPREVIEWOFSYSTEMS_GEN_ALL_CORE
Review of Systems:   CONSTITUTIONAL: No fever, weight loss  EYES: No eye pain, visual disturbances, or discharge  RESPIRATORY: No SOB. No cough, wheezing, chills or hemoptysis  CARDIOVASCULAR: No chest pain, palpitations, dizziness, or leg swelling  GASTROINTESTINAL: No abdominal or epigastric pain. No nausea, vomiting, or hematemesis; No diarrhea or constipation. No melena or hematochezia.  GENITOURINARY: No dysuria, frequency, hematuria, or incontinence  NEUROLOGICAL: No headaches, memory loss, loss of strength, numbness, or tremors  SKIN: +jaundice; No itching, burning, rashes, or lesions   MUSCULOSKELETAL: No joint pain or swelling; No muscle, back pain  PSYCHIATRIC: No depression, anxiety, mood swings, or difficulty sleeping  HEME/LYMPH: No easy bruising, or bleeding gums Review of Systems:   CONSTITUTIONAL: No fever, weight loss  EYES: No eye pain, visual disturbances, or discharge  RESPIRATORY: No SOB. No cough, wheezing, chills or hemoptysis  CARDIOVASCULAR: No chest pain, palpitations, dizziness, or leg swelling  GASTROINTESTINAL: No abdominal or epigastric pain. No nausea, vomiting, or hematemesis; No diarrhea or constipation. No melena or hematochezia.  GENITOURINARY: +dark urine; No dysuria, frequency, hematuria, or incontinence  NEUROLOGICAL: No headaches, memory loss, loss of strength, numbness, or tremors  SKIN: +jaundice and itching  MUSCULOSKELETAL: No joint pain or swelling; No muscle, back pain  PSYCHIATRIC: No depression, anxiety, mood swings, or difficulty sleeping  HEME/LYMPH: No easy bruising, or bleeding gums

## 2025-04-05 NOTE — H&P ADULT - PROBLEM SELECTOR PLAN 2
- K 2.6 on arrival, s/p 3 runs IV KCl  - No TWI, U-waves, or ST-depressions seen on ECG  - Will give PO KCl 40mEq  - F/u repeat K on AM labs - K 2.6 on arrival, s/p 3 runs IV KCl  - No TWI, U-waves, or ST-depressions seen on ECG  - Will give additional PO KCl 40mEq  - F/u repeat K on AM labs

## 2025-04-05 NOTE — H&P ADULT - PROBLEM SELECTOR PLAN 1
- Total bilirubin 29.3, direct bilirubin >10, indirect bili <19.3 (exact value unclear)  - CT A/P - new moderate intrahepatic and extrahepatic biliary ductal dilatation, CBD measures 2.3 cm, and GB distension w/o wall thickening, pericholecystic fluid, or radiopaque gallstone  - Differential includes choledocholithiasis vs obstruction 2/2 soft-tissue mass, strictures  - Given WBC of 15.87, will continue Zosyn for now  - Keep NPO, will order for MRCP  - GI consult emailed by ED, f/u recs - Total bilirubin 29.3, direct bilirubin >10, indirect bili <19.3 (exact value unclear)  - CT A/P - new moderate intrahepatic and extrahepatic biliary ductal dilatation, CBD measures 2.3 cm, and GB distension w/o wall thickening, pericholecystic fluid, or radiopaque gallstone  - Differential includes choledocholithiasis vs obstruction 2/2 soft-tissue mass, strictures  - Given WBC of 15.87 and transient episode of hypotension in the ED, will continue Zosyn for now  - Keep NPO, will order for MRCP  - GI consult emailed by ED, f/u recs  - Patient complaining of severe itching in setting of jaundice, will start cholestyramine 4g BID for now

## 2025-04-05 NOTE — H&P ADULT - NSHPPHYSICALEXAM_GEN_ALL_CORE
Vital Signs Last 24 Hrs  T(C): 36.7 (05 Apr 2025 00:57), Max: 37.2 (04 Apr 2025 19:50)  T(F): 98.1 (05 Apr 2025 00:57), Max: 98.9 (04 Apr 2025 19:50)  HR: 77 (05 Apr 2025 02:30) (77 - 97)  BP: 113/55 (05 Apr 2025 02:30) (82/53 - 135/79)  BP(mean): --  RR: 18 (05 Apr 2025 02:30) (18 - 20)  SpO2: 98% (05 Apr 2025 02:30) (97% - 99%)    Parameters below as of 05 Apr 2025 02:30  Patient On (Oxygen Delivery Method): room air        CONSTITUTIONAL: Well-groomed, in no apparent distress  EYES: No conjunctival or scleral injection, non-icteric;   NECK: Trachea midline without palpable neck mass  RESPIRATORY: Breathing comfortably; lungs CTA without wheeze/rhonchi/rales  CARDIOVASCULAR: +S1S2, RRR, no M/G/R; no lower extremity edema  GASTROINTESTINAL: No palpable masses or tenderness, +BS throughout, no rebound/guarding  SKIN: No rashes or ulcers noted  NEUROLOGIC: Sensation intact in LEs b/l to light touch  PSYCHIATRIC: A+O x 3; mood and affect appropriate; appropriate insight and judgment Vital Signs Last 24 Hrs  T(C): 36.7 (05 Apr 2025 00:57), Max: 37.2 (04 Apr 2025 19:50)  T(F): 98.1 (05 Apr 2025 00:57), Max: 98.9 (04 Apr 2025 19:50)  HR: 77 (05 Apr 2025 02:30) (77 - 97)  BP: 113/55 (05 Apr 2025 02:30) (82/53 - 135/79)  BP(mean): --  RR: 18 (05 Apr 2025 02:30) (18 - 20)  SpO2: 98% (05 Apr 2025 02:30) (97% - 99%)    Parameters below as of 05 Apr 2025 02:30  Patient On (Oxygen Delivery Method): room air        CONSTITUTIONAL: Well-groomed, in no apparent distress  EYES: +scleral icterus; No conjunctival or scleral injection  NECK: Trachea midline without palpable neck mass  RESPIRATORY: Breathing comfortably; lungs CTA with +dry crackles at bases  CARDIOVASCULAR: +S1S2, RRR, no M/G/R; 2+ pitting edema in bilateral LE  GASTROINTESTINAL: No palpable masses or tenderness, +BS throughout, no rebound/guarding; LLQ ostomy seen w/ soft stool in bag  SKIN: +jaundice; No rashes or ulcers noted  NEUROLOGIC: Sensation intact in LEs b/l to light touch  PSYCHIATRIC: A+O x 3

## 2025-04-05 NOTE — H&P ADULT - PROBLEM SELECTOR PLAN 8
- Cr 1.4, baseline 1.3-1.5  - Monitor urine output  - Did receive contrast for CT, monitor AM CMP  - Avoid nephrotoxic medications  - Dose meds in accordance w/ GFR - Likely in setting of asbestosis, bilateral pleural plaques and chronic lung base atelectasis seen on CT  - Has chronic productive cough at baseline

## 2025-04-05 NOTE — CONSULT NOTE ADULT - NS ATTEND AMEND GEN_ALL_CORE FT
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 Now presents for jaundice, itching and dark urine. New imaging shows New moderate intra and extrahepatic biliary duct dilatation with indeterminate high attenuation in the distal CBD near the ampulla could be a stone, sludge ball or mass. Distended gallbladder without wall thickening or pericholecystic inflammatory changes.  In ED afebrile, VSS jaundiced, Leukocytosis 15, Transaminitis Tbili 29, Hyponatremic. ID consulted for leukocytosis.    Remains afebrile  WBC 14 today  LOWER CHEST: Near resolved left and residual small right pleural effusion. Bilateral pleural plaques and chronic lung base atelectasis, rounded on the right.. Ct a/p results above  Dark urine-Denies dysuria ua neg  4/5 Bld cx x 2 sent  Zosyn x 2 doses currently off abx    #obstructive jaundice  #BESSY-1.7  #Abnormal CT  #Leucocytosis    Plan  Overall patient with leukocytosis in the setting of Jaundice and billary dilation   Await EUS/ERCP on Monday per GI  Agree with Zosyn for now  Continue to monitor for fever  Continue to trend WBC  trend LFT's   GI on board.     Plan discussed with consulting team.      Kip Boland  Please contact through MS Teams   If no response or past 5 pm/weekend call [phone].

## 2025-04-05 NOTE — PATIENT PROFILE ADULT - HAS THE PATIENT RECEIVED THE INFLUENZA VACCINE THIS SEASON?
"Subjective:      Patient ID: Bridgett Rivera is a 58 y.o. female.    Vitals:  height is 5' 3" (1.6 m) and weight is 84.4 kg (186 lb). Her oral temperature is 99.6 °F (37.6 °C). Her blood pressure is 139/93 (abnormal) and her pulse is 106. Her respiration is 20 and oxygen saturation is 97%.     Chief Complaint: Fever (Entered by patient)    Pt reports to clinic with cough. Pt experiencing congestion, sore throat, eyes Burn, fever, fatigue, yellow mucus, chills, body aches, hoarseness, headache onset Saturday. Pt denies exposure and rated pain 4 out of 10. Pt using sudafed, Nyquil, and mucinex dm for symptoms at home. Pt requested covid and flu testing    Fever   This is a new problem. The current episode started in the past 7 days. The problem occurs constantly. The problem has been unchanged. The maximum temperature noted was 101 to 101.9 F. The temperature was taken using an oral thermometer. Associated symptoms include coughing, headaches, muscle aches and a sore throat. She has tried nothing for the symptoms. The treatment provided no relief.       Constitution: Positive for fever.   HENT:  Positive for sore throat.    Respiratory:  Positive for cough.    Neurological:  Positive for headaches.      Objective:     Physical Exam    Physical Exam  Vitals signs and nursing note reviewed.   Constitutional:       Appearance: Pt is well-developed. Alert, NAD.  Pt is cooperative.  Non-toxic appearance.  HENT:      Head: Normocephalic and atraumatic. .      Right Ear: External ear normal.      Left Ear: External ear normal.   Eyes:      General: Lids are normal.      Conjunctiva/sclera: Conjunctivae normal. Visual tracking is normal. Right eye exhibits no exudate. Left eye exhibits no exudate. No scleral icterus.     Pupils: Pupils are equal, round  Neck:      Musculoskeletal: range of motion without pain and neck supple.      Trachea: Trachea and phonation normal.   Throat: No apparent pharyngeal edema or swelling no tonsil " swelling or asymmetry  Cardiovascular:      Rate and Rhythm: Normal Rhythm. Extremities well perfused.   Pulmonary:      Effort: Pulmonary effort is normal. No respiratory distress. NO stridor or difficulty breathing     Breath sounds: Normal breath sounds.   Abdomen: NO obvious distention.  Musculoskeletal: Normal range of motion. No ambulation issues  Skin:     General: Skin is warm and dry. No open wounds or abrasions. No petechiae No cyanosis  no jaundice not diaphoretic, not pale, not purpuric  Neurological:      Mental Status:Pt is alert and oriented to person, place, and time.   Psychiatric:         Speech: Speech normal.         Behavior: Behavior normal.         Thought Content: Thought content normal.         Judgment: Judgment normal.       Assessment:     1. Cough, unspecified type        Plan:    also sick.     Cough, unspecified type  -     SARS Coronavirus 2 Antigen, POCT Manual Read  -     POCT Influenza A/B MOLECULAR    Other orders  -     promethazine (PHENERGAN) 6.25 mg/5 mL syrup; 10mL at night as needed  Dispense: 120 mL; Refill: 1  -     ipratropium (ATROVENT) 21 mcg (0.03 %) nasal spray; 1 spray by Each Nostril route 2 (two) times daily.  Dispense: 30 mL; Refill: 1                       yes...

## 2025-04-05 NOTE — CONSULT NOTE ADULT - ASSESSMENT
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 Pt a/w obstructive jaundice. Nephrology consulted for Elevated serum creatinine.      1. BESSY- in the setting of ?bile cast nephropathy. Recc to hold diuretics. Consider albumin gtt 25% in 100ml q 6 hrs x 48 hrs due to LE edema with hypoalbuminemia. UA neg protein and neg blood, large bilirubin. Check FeUrea.   CT abd/pelvis with no hydro. Strict I/Os. Avoid nephrotoxins/ NSAIDs/ RCA. Monitor BMP.    2. CKD-3a- latanya SCr 1.1-1.3. Defer secondary w/u as an outpt. Avoid nephrotoxins    3. Hypokalemia- pt given KCl PO and IV. Check serum Mg. Monitor lytes.     4. LE edema- recc to hold diuretics. Check LE dopplers b/l. Consider b/l compression devices if no signs of DVT.     5. Hyponatremia- Check urine sodium, urine osmolality and serum osmolality. Check TSH and AM Cortisol. Consider albumin gtt as above.  Do not correct more than 8 meQ/24 hours. Monitor serum sodium.    [facility] NEPHROLOGY  Doug Vallecillo M.D.  Jeffry Khan D.O.  Ade Judge M.D.  MD Pat Marie, MSN, ANP-C    Telephone: [phone]  Facsimile: [phone] [address], [address]  [place] [address]

## 2025-04-05 NOTE — PATIENT PROFILE ADULT - FALL HARM RISK - HARM RISK INTERVENTIONS

## 2025-04-05 NOTE — H&P ADULT - ASSESSMENT
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 Found to be hyponatremic, hypokalemic, w/ transaminitis and direct hyperbilirubinemia of 29.3. CT w/ intra-and extrahepatic biliary dilatation as well as CBD dilatation. Admitted for workup of obstructive jaundice.

## 2025-04-05 NOTE — CONSULT NOTE ADULT - SUBJECTIVE AND OBJECTIVE BOX
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 Pt a/w obstructive jaundice. Nephrology consulted for Elevated serum creatinine.      Pt denies any h/o kidney disease. Patient denies any NSAID use, recent CT with contrast, h/o hepatitis or blood transfusions.   +dry cough +dark urine +LE edema on Bumex +jaundice skin for few days Denies increased ostomy o/p.   Pt denies any fevers, chills, SOB, chest pain, n/v/d, abd pain, or dysuria        PAST MEDICAL & SURGICAL HISTORY:  Asbestosis (ICD9 501)      HTN (Hypertension)      BPH (Benign Prostatic Hypertrophy)      Dyslipidemia      Localized Osteoarthrosis, Lower Leg  left      Obesity      COPD with emphysema      Pulmonary nodule      Thyroid nodule      Aortic ectasia      History of diastolic dysfunction      Venous insufficiency      S/P Cystoscopy (ICD9 V45.89)      Hyperlipidemia (ICD9 272.4)      S/P Arthroscopy of Left Knee  x 20 yrs ago      Cataract  right IOL      Inguinal Hernia x2  right      History of Tonsillectomy      History of appendectomy        No Known Allergies    Home Medications Reviewed  Hospital Medications:   MEDICATIONS  (STANDING):  albuterol/ipratropium for Nebulization 3 milliLiter(s) Nebulizer every 6 hours  ascorbic acid 500 milliGRAM(s) Oral daily  cholestyramine Powder (Sugar-Free) 4 Gram(s) Oral two times a day  finasteride 5 milliGRAM(s) Oral daily  fluticasone propionate/ salmeterol 250-50 MICROgram(s) Diskus 1 Dose(s) Inhalation two times a day  heparin  Infusion.  Unit(s)/Hr (16 mL/Hr) IV Continuous <Continuous>  metoprolol tartrate 25 milliGRAM(s) Oral two times a day  multivitamin 1 Tablet(s) Oral daily  pantoprazole    Tablet 40 milliGRAM(s) Oral before breakfast  tamsulosin 0.4 milliGRAM(s) Oral at bedtime    SOCIAL HISTORY:  Denies ETOh, Smoking, or drug use  FAMILY HISTORY:      REVIEW OF SYSTEMS:  Gen: no fevers or chills  HEENT: no rhinorrhea  Neck: no sore throat  Cards: no chest pain  Resp: no dyspnea  GI: no nausea or vomiting or diarrhea  : no dysuria or hematuria  Vascular: + LE edema  Derm: yellow skin with pruritis  Neuro: no numbness/tingling  All other review of systems is negative unless indicated above.    VITALS:  T(F): 97.4 (04-05-25 @ 06:38), Max: 98.9 (04-04-25 @ 19:50)  HR: 73 (04-05-25 @ 06:38)  BP: 111/65 (04-05-25 @ 06:38)  RR: 18 (04-05-25 @ 06:38)  SpO2: 97% (04-05-25 @ 06:38)  Wt(kg): --    Height (cm): 180.3 (04-04 @ 19:50)  Weight (kg): 88.6 (04-05 @ 05:55)  BMI (kg/m2): 27.3 (04-05 @ 05:55)  BSA (m2): 2.09 (04-05 @ 05:55)    PHYSICAL EXAM:  Gen: NAD, calm  HEENT: icteric  Neck: no JVD  Cards: RRR, +S1/S2, no M/G/R  Resp: CTA B/L  GI: soft, NT +left sided ostomy  : no CVA tenderness  Extremities: +1 pitting LE edema B/L  Derm: +jaundice  Neuro: non-focal    LABS:  04-05  129 <--, 131 <--  129[L]  |  92[L]  |  48[H]  ----------------------------<  92  2.7[LL]   |  20[L]  |  1.78[H]    Ca    8.5      05 Apr 2025 07:14    TPro  5.2[L]  /  Alb  2.4[L]  /  TBili  27.1[H]  /  DBili      /  AST  76[H]  /  ALT  51[H]  /  AlkPhos  322[H]  04-05    Creatinine Trend: 1.78 <--, 1.40 <--                        12.3   14.64 )-----------( 375      ( 05 Apr 2025 07:17 )             33.9     Urine Studies:  Urinalysis Basic - ( 05 Apr 2025 07:14 )    Color:  / Appearance:  / SG:  / pH:   Gluc: 92 mg/dL / Ketone:   / Bili:  / Urobili:    Blood:  / Protein:  / Nitrite:    Leuk Esterase:  / RBC:  / WBC    Sq Epi:  / Non Sq Epi:  / Bacteria:         RADIOLOGY & ADDITIONAL STUDIES:    < from: CT Abdomen and Pelvis w/ Oral Cont and w/ IV Cont (04.04.25 @ 22:50) >    ACC: [id number] EXAM:  CT ABDOMEN AND PELVIS OC IC   ORDERED BY:  ANDRE WRAY     PROCEDURE DATE:  04/04/2025      < end of copied text >  < from: CT Abdomen and Pelvis w/ Oral Cont and w/ IV Cont (04.04.25 @ 22:50) >  KIDNEYS/URETERS: Left renal simple cyst. No hydronephrosis.    < end of copied text >  < from: CT Abdomen and Pelvis w/ Oral Cont and w/ IV Cont (04.04.25 @ 22:50) >    IMPRESSION:  New moderate intra and extrahepatic biliary duct dilatation with   indeterminate high attenuation in the distal CBD near the ampulla could   be astone, sludge ball or mass. Recommend follow-up with   gastroenterology for further management. Distended gallbladder without   wall thickening or pericholecystic inflammatory changes.    No acute bowel finding.    --- End of Report ---      < end of copied text >

## 2025-04-06 NOTE — PROVIDER CONTACT NOTE (CRITICAL VALUE NOTIFICATION) - SITUATION
Pt APTT elevated 132.5
Pt potassium noted at 2.7
Patient Elmer Landis has a Magnesium level of 1.0.
Patient has lactate of 3.3.

## 2025-04-06 NOTE — PROVIDER CONTACT NOTE (CRITICAL VALUE NOTIFICATION) - RECOMMENDATIONS
Supplement w/potassium
Blood cultures sent.
The patient should be given supplemental magnesium.
Follow heparin protocol. Stop hep infusion for 1 hr and resume at 1300 ml/hr.

## 2025-04-06 NOTE — DIETITIAN INITIAL EVALUATION ADULT - PROBLEM SELECTOR PLAN 9
- C/w finasteride 5mg daily  - C/w tamsulosin 0.4mg QHS  - Complains of hard time putting out urine this past week that is contributing to LE edema, check bladder scan and straight cath if >400ccs

## 2025-04-06 NOTE — DIETITIAN INITIAL EVALUATION ADULT - PROBLEM SELECTOR PLAN 3
- Na 131  - Check urine studies  - Patient seems to be more on the fluid overloaded side given bilateral LE edema, can try IVP Bumex 2mg if potassium normalizes on labs and monitor for response

## 2025-04-06 NOTE — DIETITIAN INITIAL EVALUATION ADULT - PROBLEM SELECTOR PLAN 5
- Last TTE 9/16/24 - LVEF 73%, indeterminate diastolic function  - Takes bumetanide 2mg AM and 1mg PM, hold for now in setting of hypokalemia  - Does have bilateral LE pitting edema that he states has worsened over the past week, if K better on AM labs, would give a dose of IVP Bumex 2mg and assess for response  - Not on other GDMT

## 2025-04-06 NOTE — DIETITIAN INITIAL EVALUATION ADULT - PROBLEM SELECTOR PLAN 1
- Total bilirubin 29.3, direct bilirubin >10, indirect bili <19.3 (exact value unclear)  - CT A/P - new moderate intrahepatic and extrahepatic biliary ductal dilatation, CBD measures 2.3 cm, and GB distension w/o wall thickening, pericholecystic fluid, or radiopaque gallstone  - Differential includes choledocholithiasis vs obstruction 2/2 soft-tissue mass, strictures  - Given WBC of 15.87 and transient episode of hypotension in the ED, will continue Zosyn for now  - Keep NPO, will order for MRCP  - GI consult emailed by ED, f/u recs  - Patient complaining of severe itching in setting of jaundice, will start cholestyramine 4g BID for now

## 2025-04-06 NOTE — PROVIDER CONTACT NOTE (CRITICAL VALUE NOTIFICATION) - PERSON GIVING RESULT:
Paul Walton/[facility]
Omid Martinez
Paul Walton/[facility]
Paul Walton from Lab

## 2025-04-06 NOTE — DIETITIAN NUTRITION RISK NOTIFICATION - TREATMENT: THE FOLLOWING DIET HAS BEEN RECOMMENDED
Diet, NPO after Midnight:      NPO Start Date: 06-Apr-2025,   NPO Start Time: 23:59  Except Medications  With Ice Chips/Sips of Water (04-06-25 @ 09:13) [Active]  Diet, Clear Liquid (04-05-25 @ 11:27) [Active]

## 2025-04-06 NOTE — PROVIDER CONTACT NOTE (CRITICAL VALUE NOTIFICATION) - ACTION/TREATMENT ORDERED:
Supplement w/potassium
Follow heparin protocol. Stop hep infusion for 1 hr and resume at 1300 ml/hr.
Magnesium 2g IVPB ordered as a one-time dose.
Patient is on Zosyn, continue to monitor lab results.

## 2025-04-06 NOTE — PROGRESS NOTE ADULT - ASSESSMENT
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 Pt a/w obstructive jaundice. Nephrology consulted for Elevated serum creatinine.      1. BESSY- in the setting of elevated BR (total BR 27); likley bile cast nephropathy. Continue to hold diuretics. Renal function worsening despite albumin gtt 25% in 100ml q 6 hrs x 24 hrs (given due to LE edema with hypoalbuminemia). Will hold albumin gtt and start NS @ 100ml/hr.  UA neg protein and neg blood, large bilirubin. FeUrea 19%. Post void bladder scan 119 ml  CT abd/pelvis with no hydro. Strict I/Os. Avoid nephrotoxins/ NSAIDs/ RCA. Monitor BMP.    2. CKD-3a- latanya SCr 1.1-1.3. Defer secondary w/u as an outpt. Avoid nephrotoxins    3. Hypokalemia- resolved s/p repletion. Hypomagnesemia- will give Mg 2g IVx1. Low phos- will give Kphos IV x1. Monitor lytes.     4. LE edema- Continue to hold diuretics. Check LE dopplers b/l. Consider b/l compression devices if no signs of DVT.     5. Hyponatremia- Urine studies consistent with hypovolemia. IVF as above. Check TSH and AM Cortisol.  Recc to switch heparin gtt to NS base (instead of D5) to limit free water. Do not correct more than 8 meQ/24 hours. Monitor serum sodium.    [facility] NEPHROLOGY  Doug Vallecillo M.D.  LESLEE OlmosO.  Ade Judge M.D.  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 Pt a/w obstructive jaundice. Nephrology consulted for Elevated serum creatinine.      1. BESSY- in the setting of elevated BR (total BR 27); keyanna bile cast nephropathy with EPI (s/p CT with IV contrast on 4/4). Continue to hold diuretics. Renal function worsening despite albumin gtt 25% in 100ml q 6 hrs x 24 hrs (given due to LE edema with hypoalbuminemia). Will hold albumin gtt and start NS @ 100ml/hr.  UA neg protein and neg blood, large bilirubin. FeUrea 19%. Post void bladder scan 119 ml  CT abd/pelvis with no hydro. Strict I/Os. Avoid nephrotoxins/ NSAIDs/ RCA. Monitor BMP.    2. CKD-3a- latanya SCr 1.1-1.3. Defer secondary w/u as an outpt. Avoid nephrotoxins    3. Hypokalemia- resolved s/p repletion. Hypomagnesemia- will give Mg 2g IVx1. Low phos- will give Kphos IV x1. Monitor lytes.     4. LE edema- Continue to hold diuretics. Check LE dopplers b/l. Consider b/l compression devices if no signs of DVT.     5. Hyponatremia- Urine studies consistent with hypovolemia. IVF as above. Check TSH and AM Cortisol.  Recc to switch heparin gtt to NS base (instead of D5) to limit free water. Do not correct more than 8 meQ/24 hours. Monitor serum sodium.    [facility] NEPHROLOGY  Doug Vallecillo M.D.  Jeffry Khan D.O.  Ade Judge M.D.  MD Pat Marie, MSN, ANP-C    Telephone: [phone]  Facsimile: [phone]    [facility] [place] [address], [address]  [place] [address]

## 2025-04-06 NOTE — PROGRESS NOTE ADULT - ASSESSMENT
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 No abd pain. On adm, vitals wnl. However had elevated liver nos ast/alt/alp/tbil of 92/60/409/29.3. LA 1.9, Inr 1.53. Elevated Cr 1.4 --> 1.7 (baseline of 1.2). Wbc of 15.   CT abd showing new moderate intrahepatic and extrahepatic biliary ductal dilation. Cbd 2.3cm. Gallbladder distention. No wall thickening, pericholecystic fluid, or radiopaque gallstone. No pancreatic lesions. Trace pancreatic ductal dilatation. Liver wnl.  Diverting loop colostomy in the left lower quadrant.    Assessment    #Painless jaundice w/biliary dilation - differentials: stone vs stricture vs mass.   #Elevated liver enzymes with hyperbilirubinemia - liver enzymes are down trending, mild uptrending bili  #Sepsis w/cholangitis, tokyo grade III - bcx 4/5 neg  #Electrolyte derangement - hypomag/phos, hypona  #Dark urine  #pruritus  #BESSY on ckd (baseline Cr of 1.2)  #History of left colon cancer s/p left colectomy with end colostomy 8/2023    Plan    -Ct empiric abx with zosyn  -Rec to replace all electrolytes in prep for endoscopic procedure - Mg with goal >2. K goal >4 and phos  -Optimize and Work up hyponatremia - urine/serum osm  -Hold heparin at 3am tomorrow  -Order labs chaves tomorrow at 4am - cbc, cmp, mg and phos  -Bladder scan pt due to acute urinary retention and straight cath/place a gomez. Monitor UO  -Keep on clears today  -Monitor vital signs  -NPO after midnight today  -GI will plan for EUS/ERCP on Monday. Pls teams GI in the Event of sudden change in patient'  s clinical status - Fever, worsening wbc, change in mental status  -Avoid renotoxic/hepatotoxic meds  -Daily cbc, cmp, pt/inr  -Start on benadryl to help with pruritus.   -Ok to continue cholestyramine.  -Rest of care per primary.      Mago Zhu, PGY4  Gastroenterology and Hepatology  Available on TEAMS    For non urgent consult, please email [email or web address] (For [facility]) or [email or web address] (For [facility])  Long range page number [phone]. 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 No abd pain. On adm, vitals wnl. However had elevated liver nos ast/alt/alp/tbil of 92/60/409/29.3. LA 1.9, Inr 1.53. Elevated Cr 1.4 --> 1.7 (baseline of 1.2). Wbc of 15.   CT abd showing new moderate intrahepatic and extrahepatic biliary ductal dilation. Cbd 2.3cm. Gallbladder distention. No wall thickening, pericholecystic fluid, or radiopaque gallstone. No pancreatic lesions. Trace pancreatic ductal dilatation. Liver wnl.  Diverting loop colostomy in the left lower quadrant.    Assessment    #Painless jaundice w/biliary dilation - differentials: stone vs stricture vs mass.   #Elevated liver enzymes with hyperbilirubinemia - liver enzymes are down trending, mild uptrending bili  #Sepsis w/cholangitis, tokyo grade III - bcx 4/5 neg  #Electrolyte derangement - hypomag/phos, hypona  #Dark urine  #pruritus  #BESSY on ckd (baseline Cr of 1.2)  #History of left colon cancer s/p left colectomy with end colostomy 8/2023    Plan    -Ct empiric abx with zosyn  -Rec to replace all electrolytes in prep for endoscopic procedure - Mg with goal >2. K goal >4 and phos  -Optimize and Work up hyponatremia - urine/serum osm  -Hold heparin at 3am tomorrow  -Holding eliquis  -Order labs chaves tomorrow at 4am - cbc, cmp, mg and phos  -Bladder scan pt due to acute urinary retention and straight cath/place a gomez. Monitor UO  -Keep on clears today  -Monitor vital signs  -NPO after midnight today  -GI will plan for EUS/ERCP on Monday. Pls teams GI in the Event of sudden change in patient'  s clinical status - Fever, worsening wbc, change in mental status  -Avoid renotoxic/hepatotoxic meds  -Daily cbc, cmp, pt/inr  -Start on benadryl to help with pruritus.   -Ok to continue cholestyramine.  -Rest of care per primary.    last Echo in 2024 sept - EF 73%, mild PH.       Mago Zhu, PGY4  Gastroenterology and Hepatology  Available on TEAMS    For non urgent consult, please email [email or web address] (For [facility]) or [email or web address] (For [facility])  Long range page number [phone]. Short range [phone]

## 2025-04-06 NOTE — DIETITIAN INITIAL EVALUATION ADULT - PERTINENT MEDS FT
MEDICATIONS  (STANDING):  albuterol/ipratropium for Nebulization 3 milliLiter(s) Nebulizer every 6 hours  ascorbic acid 500 milliGRAM(s) Oral daily  cholestyramine Powder (Sugar-Free) 4 Gram(s) Oral two times a day  finasteride 5 milliGRAM(s) Oral daily  fluticasone propionate/ salmeterol 250-50 MICROgram(s) Diskus 1 Dose(s) Inhalation two times a day  heparin  Infusion. 1300 Unit(s)/Hr (13 mL/Hr) IV Continuous <Continuous>  metoprolol tartrate 25 milliGRAM(s) Oral two times a day  multivitamin 1 Tablet(s) Oral daily  pantoprazole    Tablet 40 milliGRAM(s) Oral before breakfast  piperacillin/tazobactam IVPB.. 3.375 Gram(s) IV Intermittent every 12 hours  sodium chloride 0.9%. 1000 milliLiter(s) (100 mL/Hr) IV Continuous <Continuous>  tamsulosin 0.4 milliGRAM(s) Oral at bedtime    MEDICATIONS  (PRN):  acetaminophen     Tablet .. 650 milliGRAM(s) Oral every 6 hours PRN Temp greater or equal to 38C (100.4F), Mild Pain (1 - 3)  heparin   Injectable 7000 Unit(s) IV Push every 6 hours PRN For aPTT less than 40  heparin   Injectable 3500 Unit(s) IV Push every 6 hours PRN For aPTT between 40 - 57  melatonin 3 milliGRAM(s) Oral at bedtime PRN Insomnia  ondansetron Injectable 4 milliGRAM(s) IV Push every 8 hours PRN Nausea and/or Vomiting

## 2025-04-06 NOTE — DIETITIAN INITIAL EVALUATION ADULT - PROBLEM SELECTOR PLAN 7
- Takes albuterol q6hrs ATC and symbicort 160-4.5mcg BID at home  - Advair BID + Duonebs q6hrs ATC while admitted

## 2025-04-06 NOTE — PROVIDER CONTACT NOTE (CRITICAL VALUE NOTIFICATION) - BACKGROUND
Pt admitted for generalized Jaundice.
Patient was admitted for jaundice. Pt has a history of benign prostatic hypertrophy, hypertension, chronic AFib, COPD with emphysema, and venous insufficiency.
Patient was admitted with jaundice. He has a medical history of chronic heart failure, chronic AFib, COPD with emphysema, hypertension, venous insufficiency, and benign prostatic hypertrophy.
Pt admitted for generalized jaundice

## 2025-04-06 NOTE — DIETITIAN INITIAL EVALUATION ADULT - PROBLEM SELECTOR PLAN 2
- K 2.6 on arrival, s/p 3 runs IV KCl  - No TWI, U-waves, or ST-depressions seen on ECG  - Will give additional PO KCl 40mEq  - F/u repeat K on AM labs

## 2025-04-06 NOTE — DIETITIAN INITIAL EVALUATION ADULT - PROBLEM SELECTOR PLAN 8
- Likely in setting of asbestosis, bilateral pleural plaques and chronic lung base atelectasis seen on CT  - Has chronic productive cough at baseline

## 2025-04-06 NOTE — PROVIDER CONTACT NOTE (CRITICAL VALUE NOTIFICATION) - ASSESSMENT
Pt shows no s/sx of chest pain, VS remains stable.
Pt vital signs stable, reports no pain at this time.
Patient A+Ox4, vital signs stable.
Pt shows no s/sx of bleeding, vitals remains stable.

## 2025-04-06 NOTE — DIETITIAN INITIAL EVALUATION ADULT - PROBLEM SELECTOR PLAN 4
- BUN 47, Cr 1.4, baseline 1.18-1.3  - Monitor urine output  - Did receive contrast for CT but also received 1L IVLR, f/u AM CMP  - No evidence of hydronephrosis on CT  - May have component of fluid overload given LE edema  - Avoid nephrotoxic medications  - Dose meds in accordance w/ GFR

## 2025-04-06 NOTE — DIETITIAN INITIAL EVALUATION ADULT - ORAL INTAKE PTA/DIET HISTORY
cheerios, banana and grapes for breakfast, cold cut sandwich for lunch, chicken, steak, Italian food for dinner.

## 2025-04-06 NOTE — DIETITIAN INITIAL EVALUATION ADULT - SIGNS/SYMPTOMS
Clear Liquid diet pending NPO at midnight pressure injuries stage 3 x 2 Clear Liquid diet pending NPO at midnight, edema

## 2025-04-06 NOTE — PROGRESS NOTE ADULT - ASSESSMENT
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 Now presents for jaundice, itching and dark urine. New imaging shows New moderate intra and extrahepatic biliary duct dilatation with indeterminate high attenuation in the distal CBD near the ampulla could be a stone, sludge ball or mass. Distended gallbladder without wall thickening or pericholecystic inflammatory changes.  In ED afebrile, VSS jaundiced, Leukocytosis 15, Transaminitis Tbili 29, Hyponatremic. ID consulted for leukocytosis.    Remains afebrile  WBC 14 today  LOWER CHEST: Near resolved left and residual small right pleural effusion. Bilateral pleural plaques and chronic lung base atelectasis, rounded on the right.. Ct a/p results above  Dark urine-Denies dysuria ua neg  4/5 Bld cx x 2 sent  Zosyn x 2 doses currently off abx    #obstructive jaundice  #BESSY-1.7  #Abnormal CT  #Leucocytosis      Plan  Overall patient with leukocytosis in the setting of Jaundice and billary dilation   c/w Zosyn for now, dose decreased based on renal function  Continue to trend WBC  trend LFT's   GI on board. plan for ERCP tomorrow       Plan discussed with medicine ACP       Kip Boland  Please contact through MS Teams

## 2025-04-06 NOTE — CHART NOTE - NSCHARTNOTEFT_GEN_A_CORE
S- pt c/o chills S- pt c/o chills      85yo M PMHx Stage II colon CA s/p L hemicolectomy w/ colostomy and mucus fistula 2023 admitted with painless jaundice work up now c/o shaking.    O- PHYSICAL EXAM:  Vital Signs Last 24 Hrs  T(C): 36.8 (06 Apr 2025 16:30), Max: 37.1 (05 Apr 2025 20:44)  T(F): 98.3 (06 Apr 2025 16:30), Max: 98.7 (05 Apr 2025 20:44)  HR: 90 (06 Apr 2025 16:30) (87 - 103)  BP: 115/68 (06 Apr 2025 16:30) (100/62 - 115/68)  BP(mean): --  RR: 18 (06 Apr 2025 16:30) (18 - 18)  SpO2: 97% (06 Apr 2025 16:30) (95% - 97%)Room air   PE: Alert & O x 3, reports as being in a fog.   ENMT: dry, no oral lesions, jaundice  RESPIRATORY: lungs are clear   CARDIOVASCULAR: Regular rate and rhythm, normal S1 and S2, no murmur/rub/gallop; + edema; Peripheral 2+ bilaterally  ABDOMEN: colostomy bag intact with brown soft minimal stool, Nontender to palpation, normoactive bowel sounds, no rebound/guarding  PSYCH: A+O to person, place, and time; affect appropriate  SKIN: Jaundice     LABS:                        10.9   21.75 )-----------( 293      ( 06 Apr 2025 17:46 )             29.8     04-06    128[L]  |  91[L]  |  58[H]  ----------------------------<  97  3.6   |  17[L]  |  3.18[H]    Ca    8.3[L]      06 Apr 2025 06:01  Phos  2.3     04-06  Mg     1.0     04-06    TPro  5.0[L]  /  Alb  2.9[L]  /  TBili  30.7[H]  /  DBili  >10.0[H]  /  AST  62[H]  /  ALT  39  /  AlkPhos  251[H]  04-06    PT/INR - ( 04 Apr 2025 20:37 )   PT: 17.4 sec;   INR: 1.53 ratio         PTT - ( 06 Apr 2025 06:01 )  PTT:79.5 sec      Urinalysis Basic - ( 06 Apr 2025 06:01 )    Color: x / Appearance: x / SG: x / pH: x  Gluc: 97 mg/dL / Ketone: x  / Bili: x / Urobili: x   Blood: x / Protein: x / Nitrite: x   Leuk Esterase: x / RBC: x / WBC x   Sq Epi: x / Non Sq Epi: x / Bacteria: x        Culture - Blood (collected 05 Apr 2025 00:15)  Source: Blood Blood-Peripheral  Preliminary Report (06 Apr 2025 03:02):    No growth at 24 hours    Culture - Blood (collected 05 Apr 2025 00:00)  Source: Blood Blood-Peripheral  Preliminary Report (06 Apr 2025 03:02):    No growth at 24 hours    Culture - Urine (collected 04 Apr 2025 21:35)  Source: Clean Catch Clean Catch (Midstream)  Final Report (06 Apr 2025 09:50):    >=3 organisms. Probable collection contamination.      A/P Painless jaundice w/biliary dilation, transaminitis       # repeat labs now per GI. CBC w/diff, CMP, coags, lactate, VBG, procalcitonin, ammonia level       # NPO after Modinight for ERCP in am       # c/w Zosyn             COORDINATION OF CARE:  Care Discussed with Consultants/Other Providers: attending, ID, GI, daughter and granddaughter at bedside

## 2025-04-06 NOTE — DIETITIAN INITIAL EVALUATION ADULT - NS FNS DIET ORDER
Diet, NPO after Midnight:      NPO Start Date: 06-Apr-2025,   NPO Start Time: 23:59  Except Medications  With Ice Chips/Sips of Water (04-06-25 @ 09:13)   Clear Liquid

## 2025-04-06 NOTE — DIETITIAN INITIAL EVALUATION ADULT - PROBLEM SELECTOR PLAN 6
- ECG personally reviewed, atrial fibrillation not in RVR  - C/w metoprolol tartrate 25mg BID  - In case of possible procedure over weekend or on Monday 4/7, will hold Eliquis 2.5mg BID and start heparin gtt

## 2025-04-06 NOTE — DIETITIAN INITIAL EVALUATION ADULT - PERTINENT LABORATORY DATA
04-06    128[L]  |  91[L]  |  58[H]  ----------------------------<  97  3.6   |  17[L]  |  3.18[H]    Ca    8.3[L]      06 Apr 2025 06:01  Phos  2.3     04-06  Mg     1.0     04-06    TPro  5.0[L]  /  Alb  2.9[L]  /  TBili  30.7[H]  /  DBili  >10.0[H]  /  AST  62[H]  /  ALT  39  /  AlkPhos  251[H]  04-06

## 2025-04-07 NOTE — CONSULT NOTE ADULT - PROBLEM SELECTOR RECOMMENDATION 10
- Palliative care consulted for goals of care, complex medical decision making, and symptom management.   - Patient requests comfort approach to care and home hospice referral. - See above GOC note, patient now comfort care  - MOLST Updated: DNR/DNI; Comfort Care Decision Maker: Patient  - HCP: Love Sears (Daughter) [phone]  - Patient/Family requests transfer to PCU when bed available

## 2025-04-07 NOTE — CONSULT NOTE ADULT - NSCONSULTADDITIONALINFOA_GEN_ALL_CORE
Patient seen under the direct supervision of Dr. Rosales who was directly involved in patient care and decision making.    This note is not finalized until reviewed and signed by Attending Physician Dr. Bobby White DO   Fellow - Hospice & Palliative Medicine

## 2025-04-07 NOTE — CONSULT NOTE ADULT - PROBLEM SELECTOR RECOMMENDATION 11
- Palliative care consulted for goals of care, complex medical decision making, and symptom management.   - Patient requests comfort approach to care and home hospice referral.

## 2025-04-07 NOTE — CONSULT NOTE ADULT - PROBLEM SELECTOR RECOMMENDATION 3
- Patient currently denies any pain  - Start Dilaudid 0.2mg q4hr PRN for Moderate Pain   - Start Dilaudid 0.5mg q4hr PRN for Severe Pain

## 2025-04-07 NOTE — PROGRESS NOTE ADULT - ASSESSMENT
84y Male with history of colon CA s/p hemicolectomy with ostomy presents with jaundice. Nephrology consulted for elevated Scr.    1. BESSY: Secondary to ATN from EPI and bile cast nephropathy. Scr increasing with poor reported UO. Discussed with patient and patient's daughter need for RRT if Scr continues to increase however they are interested in more conservative care. Recommend palliative care consult for GOC. In interim, will attempt to medically manage. D/C IVF and start IV albumin. UA bland. FeNa low. CT without obstruction. Check bladder scan. Avoid nephrotoxins.     2. CKD-3a: Baseline Scr 1-1.2 as per prior records. Defer inpatient work up. Monitor electrolytes.    3. HTN with CKD: BP low normal. Metoprolol as per primary team.     4. LE edema: F/U LE dopplers r/o DVT. Change IVF to IV albumin. Hold off on IV diuretics for now given BESSY. Prior TTE with normal LVSF. Monitor UO.    5. Metabolic acidosis: Compensated as per blood gas with lactic acidosis. Check BOH. Start sodium bicarbonate 650 mg PO TID. Monitor pH.    6. Hyponatremia: likely due to intravascular depletion. IV albumin as above. Start 1L FR once diet resumed.    [facility] NEPHROLOGY  Doug Vallecillo M.D.  Jeffry Khan D.O.  Ade Judge M.D.  MD Pat Marie, MSN, ANP-C    Telephone: [phone]  Facsimile: [phone] [place] [address], [address]  [place] [address]

## 2025-04-07 NOTE — CONSULT NOTE ADULT - PROBLEM SELECTOR PROBLEM 8
Acute kidney injury superimposed on CKD Chronic heart failure with preserved ejection fraction (HFpEF, >= 50%)

## 2025-04-07 NOTE — CONSULT NOTE ADULT - PROBLEM SELECTOR RECOMMENDATION 9
- Total bilirubin 29.3, direct bilirubin >10, indirect bili <19.3 (exact value unclear)  - CT A/P - new moderate intrahepatic and extrahepatic biliary ductal dilatation, CBD measures 2.3 cm, and GB distension w/o wall thickening, pericholecystic fluid, or radiopaque gallstone  - Currently receiving IV Zosyn for infection/leukocytosis, plan to discontinue when transferred to PCU  - GI recs appreciated: Planned for ERCP   - See GOC note 4/7, patient now comfort care declines ERCP - See above GOC note, patient now comfort care  - MOLST Updated: DNR/DNI; Comfort Care Decision Maker: Patient  - HCP: Love Sears (Daughter) [phone]  - Patient/Family requests transfer to PCU when bed available - Cr 5.07, BUN 76, eGFR 10  - Comfort Care

## 2025-04-07 NOTE — PROVIDER CONTACT NOTE (SEPSIS SCREENING) - SEPSIS CRITERIA TO COINCIDE WITH MEWS
WBC less than 4 or greater than 12/Heart Rate greater than 90/Temperature less than 96.8 or greater than 100.4

## 2025-04-07 NOTE — CONSULT NOTE ADULT - PROBLEM SELECTOR RECOMMENDATION 8
- Cr 5.07, BUN 76, eGFR 10  - Comfort Care - Last TTE 9/16/24 - LVEF 73%, indeterminate diastolic function  - Care per Primary Team

## 2025-04-07 NOTE — CONSULT NOTE ADULT - PROBLEM SELECTOR RECOMMENDATION 7
- Last TTE 9/16/24 - LVEF 73%, indeterminate diastolic function  - Care per Primary Team - Metoprolol Tartrate 25mg BID   - Eliquis 2.5mg BID held in anticipation of procedure  - Care per Primary Team

## 2025-04-07 NOTE — CONSULT NOTE ADULT - TIME BILLING
This includes chart review, patient assessment, discussion and collaboration with interdisciplinary team members, excluding ACP.    COUNSELING:  Face to face meeting to discuss Advanced Care Planning- Time Spent 30 minutes

## 2025-04-07 NOTE — CONSULT NOTE ADULT - ATTENDING COMMENTS
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  Will need EUS/ERCP  2. c/w empiric ABx  3. Full liquid diet,   4. trend wbc, LFTs, Cr, fever curve
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 Found to be hyponatremic, hypokalemic, w/ transaminitis and direct hyperbilirubinemia of 29.3. CT w/ intra-and extrahepatic biliary dilatation as well as CBD dilatation. Admitted for workup of obstructive jaundice. Palliative care consulted for goals of care, complex medical decision making, and symptom management. Patient requests comfort approach to care and home hospice referral.     See above GOC. Pt able to participate in discussion and clear in his goals to transition his focus of care to comfort directed care. He does not wish to pursue futher procedures including ERCP. Pt reports significant itching, which is diminishing his quality of life. Recommend starting hydroxizine 25mg q6h PRN. Agree with rest of symptom management as noted above. Pt to be transferred to PCU when bed available. Further disposition to be guided by clinical course, likely home vs inpatient hospice.    For acute issues or uncontrolled symptoms please page palliative team.    Giana Rosales MD  Geriatrics and Palliative Medicine Attending  [facility] pager: [phone]     The Geriatrics and Palliative Medicine consult service has 24/7 coverage for medical recommendations, including symptom management needs.

## 2025-04-07 NOTE — CONSULT NOTE ADULT - CONVERSATION DETAILS
Palliative consulted for complex medical decision making in the setting of serious illness.     Elicited patient/family’s understanding of patient’s illness and prognosis. Patient accompanied by HCP Love Sears (Daughter) and Leydi - they shared that patient has medically declined with respect to his kidney and liver function. Including now a concern regarding blockage within gall bladder including an infection for which patient is receiving IV Antibiotics. Family share that patient has a poor quality of life, is dependent on iADLs and ADLs, is only able to transfer from bed to chair. Unable to do anything else without support. Patient himself states that he has not seen any significant benefit from treatment and medical interventions. He feels that the ERCP which is scheduled for today will not improve his overall quality of life. Similarly he states that IV Antibiotics are not beneficial despite his active infection. Patient/Family described patient's overall decline.     Discussed treatment options and preferences. Explored patient’s/family’s values and goals. Patient states that at this point given his poor quality of life, he would rather be comfortable and focus on symptom management. Discussed that with various co-morbidities and poor functional status, patient was high risk with further medical events, hospitalizations. Explained that patient was a hospice candidate, discussed hospice philosophy and holistic services.    Discussed Advanced Directives such as MOLST.     Discussed option of comfort care to prioritize end of life symptom management with deescalation of other interventions that would be burdensome at end of life including continued blood draws, imaging, other investigations, vitals monitoring. Discussed that palliative and hospice philosophies of care will not expedite the dying process, rather focus on keeping patient comfortable.    Discussed code status, recommended DNR/DNI as CPR/intubation have poor outcomes in a patient with such serious illness and can be burdensome at end of life. Patient requests that he be DNR/DNI to allow a natural death. He also states he would like to discontinue antibiotics and forego all other medical treatment/investigations with the understanding that it will ultimately result in his death.    Discussed PCU transfer to optimize symptom management with the understanding that it is a short term stay unit with ultimate goal of home with hospice. Patient and family verbalized understanding verbatim and are in agreement to transfer patient to PCU when a bed is available.     MOLST completed with patient

## 2025-04-07 NOTE — CONSULT NOTE ADULT - PROBLEM SELECTOR RECOMMENDATION 6
- Metoprolol Tartrate 25mg BID   - Eliquis 2.5mg BID held in anticipation of procedure  - Care per Primary Team - Asymptomatic   - Takes albuterol q6hrs ATC and symbicort 160-4.5mcg BID at home  - Continue Advair BID + Duonebs q6hrs ATC while admitted.

## 2025-04-07 NOTE — PROGRESS NOTE ADULT - ASSESSMENT
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 No abd pain. On adm, vitals wnl. However had elevated liver nos ast/alt/alp/tbil of 92/60/409/29.3. LA 1.9, Inr 1.53. Elevated Cr 1.4 --> 1.7 (baseline of 1.2). Wbc of 15.   CT abd showing new moderate intrahepatic and extrahepatic biliary ductal dilation. Cbd 2.3cm. Gallbladder distention. No wall thickening, pericholecystic fluid, or radiopaque gallstone. No pancreatic lesions. Trace pancreatic ductal dilatation. Liver wnl.  Diverting loop colostomy in the left lower quadrant.    Assessment    #Painless jaundice w/biliary dilation - differentials: stone vs stricture vs mass.   #Elevated liver enzymes with hyperbilirubinemia - liver enzymes are down trending, mild uptrending bili  #Sepsis w/cholangitis, tokyo grade III - bcx 4/5 neg  #Electrolyte derangement - hypomag/phos, hypona  #Dark urine  #pruritus  #BESSY on ckd (baseline Cr of 1.2)  #History of left colon cancer s/p left colectomy with end colostomy 8/2023  Advanced GI was consulted on patient presenting w/ obstructive sxs found to have elevated liver chemistries and w/ imaging showing moderate intrahepatic and extrahepatic biliary dilatation with CBD measuring 2.3. C/f cholangitis given fevers and gram neg bacteremia as well as leukocytosis. Was planned for ERCP today however pt was seen by palliative care and decision was made to move in palliative direction. Discussed benefits/risks of ERCP with patient including liklihood that procedure would improve some of his bothersome sxs including pruritis and infection. He wished to proceed with palliative treatment and asked for abx to be discontinued. Palliative at bedside when discussing treatment options with patient.  Recommendations;  -Given patient opting for palliative care, will not proceed with ERCP   -Appreciate palliative recommendations  -risks/benefits of ERCP discussed with patient as above  -Advanced GI will sign off. Please call with questions    Note incomplete until finalized by attending signature/attestation.    Lyndsey Rafia  GI/Hepatology Fellow PGY5    NON-URGENT CONSULTS:  Please email [email or web address]  AT NIGHT AND ON WEEKENDS:  Available on [employer] Teams  [phone] ([employer] Pager)    For urgent consults, please contact on call GI team. See Amion schedule ([facility]) or [employer] paging system ([facility])

## 2025-04-07 NOTE — PROGRESS NOTE ADULT - NUTRITIONAL ASSESSMENT
This patient has been assessed with a concern for Malnutrition and has been determined to have a diagnosis/diagnoses of Mild protein-calorie malnutrition.    This patient is being managed with:   Diet NPO after Midnight-     NPO Start Date: 06-Apr-2025   NPO Start Time: 23:59  Except Medications  With Ice Chips/Sips of Water  Entered: Apr 6 2025  9:13AM    Diet Clear Liquid-  Entered: Apr 5 2025 11:27AM  
This patient has been assessed with a concern for Malnutrition and has been determined to have a diagnosis/diagnoses of Mild protein-calorie malnutrition.    This patient is being managed with:   Diet Clear Liquid-  Entered: Apr 5 2025 11:27AM  
This patient has been assessed with a concern for Malnutrition and has been determined to have a diagnosis/diagnoses of Mild protein-calorie malnutrition.    This patient is being managed with:   Diet NPO after Midnight-     NPO Start Date: 06-Apr-2025   NPO Start Time: 23:59  Except Medications  With Ice Chips/Sips of Water  Entered: Apr 6 2025  9:13AM    Diet Clear Liquid-  Entered: Apr 5 2025 11:27AM    This patient has been assessed with a concern for Malnutrition and has been determined to have a diagnosis/diagnoses of Mild protein-calorie malnutrition.    This patient is being managed with:   Diet NPO after Midnight-     NPO Start Date: 06-Apr-2025   NPO Start Time: 23:59  Except Medications  With Ice Chips/Sips of Water  Entered: Apr 6 2025  9:13AM    Diet Clear Liquid-  Entered: Apr 5 2025 11:27AM

## 2025-04-07 NOTE — CONSULT NOTE ADULT - ASSESSMENT
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 Found to be hyponatremic, hypokalemic, w/ transaminitis and direct hyperbilirubinemia of 29.3. CT w/ intra-and extrahepatic biliary dilatation as well as CBD dilatation. Admitted for workup of obstructive jaundice. Palliative care consulted for goals of care, complex medical decision making, and symptom management. Patient requests comfort approach to care and home hospice referral.

## 2025-04-07 NOTE — CONSULT NOTE ADULT - PROBLEM SELECTOR RECOMMENDATION 5
- Asymptomatic   - Takes albuterol q6hrs ATC and symbicort 160-4.5mcg BID at home  - Continue Advair BID + Duonebs q6hrs ATC while admitted. - Asymptomatic   - Start Ativan 0.25mg q4hrs PRN for Anxiety

## 2025-04-07 NOTE — CONSULT NOTE ADULT - CONSULT REASON
Elevated liver enzymes, hyperbilirubinemia
Leukocytosis
Elevated serum creatinine.
Goals of Care, Complex Medical Decision Making, Symptom Management

## 2025-04-07 NOTE — CONSULT NOTE ADULT - SUBJECTIVE AND OBJECTIVE BOX
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 He started noticing that he was developing an itch on his elbows a week ago, which started progressing to involve both arms, chest, and back. During that time, he noticed his skin was starting to get more and more yellow. He states he doesn't have any nausea, vomiting, or decreased/increased ostomy output. He has noticed that his legs have been getting more swollen and that he has been having a harder time putting out urine. When he does urinate, he states it is very dark. Lives with his granddaughter. Brought to hospital for further evaluation.    In the ED, he was afebrile and hemodynamically stable (except for 1 episode of systolics to the 80s), and saturating well on RA. CBC w/ leukocytosis of 15.87 and thrombocytosis of 588. CMP w/ hyponatremia of 131, hypokalemia of 2.6, BUN/Cr 48/1.4, and transaminitis w/ , AST/ALT 92/60, T.bili 29.3 w/ direct bilirubin >10 and indirect <19.3. UA wnl. CT A/P showing new moderate intra and extrahepatic biliary duct dilatation with indeterminate high attenuation in the distal CBD near the ampulla could be a stone, sludge ball or mass, as well as distended GB w/o wall thickening or pericholecystic inflammatory changes. Received IVP benadryl 25mg x2, 1L LR, IV KCl 10mEq x3, and Zosyn in the ED. Still complaining of generalized itching. (05 Apr 2025 04:24)    Patient seen today afternoon at bedside, accompanied by daughter and grand-daugther. Patient resting and when awoken, states that he is uncomfortable from itching, feels that medical treatment/interventions thus far are not helping him to get better. Patient requests an evaluation for home hospice care.     PERTINENT PM/SXH:   Asbestosis (ICD9 501)    HTN (Hypertension)    BPH (Benign Prostatic Hypertrophy)    Dyslipidemia    Localized Osteoarthrosis, Lower Leg    Obesity    COPD with emphysema    Pulmonary nodule    Thyroid nodule    Aortic ectasia    History of diastolic dysfunction    Venous insufficiency      S/P Cystoscopy (ICD9 V45.89)    Hyperlipidemia (ICD9 272.4)    Localized Osteoarthrosis, Lower Leg    S/P Arthroscopy of Left Knee    Cataract    Inguinal Hernia x2    History of Tonsillectomy    History of appendectomy      FAMILY HISTORY:    Family Hx substance abuse [ ]yes [ ]no  ITEMS NOT CHECKED ARE NOT PRESENT    SOCIAL HISTORY:   Significant other/partner[X]  Children[X]  Taoist/Spirituality:  Substance hx:  [ ]   Tobacco hx:  [ ]   Alcohol hx: [ ]   Home Opioid hx:  [X] I-Stop Reference No: Reference #: [id number]  Living Situation: [X]Home  [ ]Long term care  [ ]Rehab [ ]Other    ADVANCE DIRECTIVES:    DNR/MOLST  [X]  Living Will  [ ]   DECISION MAKER(s): Patient  [X] Health Care Proxy(s)  [ ] Surrogate(s)  [ ] Guardian           Name(s): Phone Number(s):  Love Sears (Daughter) [phone]    BASELINE (I)ADL(s) (prior to admission):  Day: [ ]Total  [ ] Moderate [X]Dependent    Allergies    No Known Allergies    Intolerances    MEDICATIONS  (STANDING):  albumin human 25% IVPB 50 milliLiter(s) IV Intermittent every 6 hours  albuterol/ipratropium for Nebulization 3 milliLiter(s) Nebulizer every 6 hours  ascorbic acid 500 milliGRAM(s) Oral daily  cholestyramine Powder (Sugar-Free) 4 Gram(s) Oral two times a day  finasteride 5 milliGRAM(s) Oral daily  fluticasone propionate/ salmeterol 250-50 MICROgram(s) Diskus 1 Dose(s) Inhalation two times a day  metoprolol tartrate 25 milliGRAM(s) Oral two times a day  multivitamin 1 Tablet(s) Oral daily  nystatin Cream 1 Application(s) Topical two times a day  pantoprazole    Tablet 40 milliGRAM(s) Oral before breakfast  piperacillin/tazobactam IVPB.. 3.375 Gram(s) IV Intermittent every 12 hours  sodium bicarbonate 650 milliGRAM(s) Oral three times a day  tamsulosin 0.4 milliGRAM(s) Oral at bedtime    MEDICATIONS  (PRN):  acetaminophen     Tablet .. 650 milliGRAM(s) Oral every 6 hours PRN Temp greater or equal to 38C (100.4F), Mild Pain (1 - 3)  hydrOXYzine hydrochloride 25 milliGRAM(s) Oral every 6 hours PRN Itching  melatonin 3 milliGRAM(s) Oral at bedtime PRN Insomnia  ondansetron Injectable 4 milliGRAM(s) IV Push every 8 hours PRN Nausea and/or Vomiting    PRESENT SYMPTOMS: [ ]Unable to self-report see CPOT, PAINADs, RDOS  Source if other than patient:  [ ]Family   [ ]Team     Pain: [ ]yes [X]no  QOL impact -   Location -                    Aggravating factors -  Quality -  Radiation -  Timing-  Severity (0-10 scale):  Minimal acceptable level (0-10 scale):     PCSSQ [Palliative Care Spiritual Screening Question]   Severity (0-10):  Score of 4 or > indicate consideration of Chaplaincy referral.  Chaplaincy Referral: [ ] yes [X] refused [ ] following    Caregiver [place]? : [ ] yes [X] no [ ] deferred:  Social work referral [ ] Patient & Family Centered Care Referral [ ]     Anticipatory Grief Present?: [X] yes [ ] no  [ ] deferred: Social work referral [ ]  Patient & Family Centered Care Referral [ ]     SYMPTOMS:   Dyspnea:                           [ ]Mild [ ]Moderate [ ]Severe  Anxiety:                             [ ]Mild [ ]Moderate [ ]Severe  Fatigue:                             [ ]Mild [X]Moderate [ ]Severe  Nausea/Vomiting:              [ ]Mild [ ]Moderate [ ]Severe  Loss of appetite:                [ ]Mild [ ]Moderate [ ]Severe  Constipation:                     [ ]Mild [ ]Moderate [ ]Severe  Itching         :                     [ ]Mild [ ]Moderate [X]Severe    Other Symptoms:  [X]All other review of systems negative     PHYSICAL EXAM:  Vital Signs Last 24 Hrs  T(C): 36.4 (07 Apr 2025 12:46), Max: 38.1 (06 Apr 2025 23:58)  T(F): 97.5 (07 Apr 2025 12:46), Max: 100.6 (06 Apr 2025 23:58)  HR: 104 (07 Apr 2025 12:46) (90 - 108)  BP: 96/55 (07 Apr 2025 12:46) (93/58 - 115/68)  BP(mean): --  RR: 18 (07 Apr 2025 12:46) (17 - 19)  SpO2: 98% (07 Apr 2025 12:46) (95% - 98%)    Parameters below as of 07 Apr 2025 12:46  Patient On (Oxygen Delivery Method): room air     I&O's Summary    06 Apr 2025 07:01  -  07 Apr 2025 07:00  --------------------------------------------------------  IN: 2506 mL / OUT: 0 mL / NET: 2506 mL    07 Apr 2025 07:01  -  07 Apr 2025 14:24  --------------------------------------------------------  IN: 360 mL / OUT: 0 mL / NET: 360 mL      GENERAL: [ ]Cachexia    [X]Alert  [X]Oriented x4   [ ]Lethargic  [ ]Unarousable  [X]Verbal  [ ]Non-Verbal  Behavioral:   [ ] Anxiety  [ ] Delirium [ ] Agitation [X] Other - Calm  HEENT:  [ ]Normal   [X]Dry mouth   [ ]ET Tube/Trach  [ ]Oral lesions  PULMONARY:   [X]Clear [ ]Tachypnea  [ ]Audible excessive secretions   [ ]Rhonchi        [ ]Right [ ]Left [ ]Bilateral  [ ]Crackles        [ ]Right [ ]Left [ ]Bilateral  [ ]Wheezing     [ ]Right [ ]Left [ ]Bilateral  [ ]Diminished breath sounds [ ]right [ ]left [ ]bilateral  CARDIOVASCULAR:    [ ]Regular [ ]Irregular [X]Tachy  [ ]Maurisio [ ]Murmur [ ]Other  GASTROINTESTINAL:  [X]Soft  [ ]Distended   [X]+BS  [X]Non tender [ ]Tender  [ ]Other [ ]PEG [ ]OGT/ NGT  Last BM: Unknown  GENITOURINARY:  [X]Normal [ ] Incontinent   [ ]Oliguria/Anuria   [ ]Walls  MUSCULOSKELETAL:   [ ]Normal   [X]Weakness  [ ]Bed/Wheelchair bound [ ]Edema  NEUROLOGIC:   [X]No focal deficits  [ ]Cognitive impairment  [ ]Dysphagia [ ]Dysarthria [ ]Paresis [ ]Other   SKIN:   [ ]Normal  [ ]Rash  [X]Other - Ecchymosis, please see RN documentation which I have reviewed   [ ]Pressure ulcer(s)       Present on admission [ ]y [ ]n    CRITICAL CARE:  [ ] Shock Present  [ ]Septic [ ]Cardiogenic [ ]Neurologic [ ]Hypovolemic  [ ]  Vasopressors [ ]  Inotropes   [ ]Respiratory failure present [ ]Mechanical ventilation [ ]Non-invasive ventilatory support [ ]High flow    [ ]Acute  [ ]Chronic [ ]Hypoxic  [ ]Hypercarbic [ ]Other  [X]Other organ failure: Liver, Kidney     LABS:                        10.6   23.88 )-----------( 284      ( 07 Apr 2025 05:03 )             29.1   04-07    126[L]  |  88[L]  |  76[H]  ----------------------------<  93  3.9   |  13[L]  |  5.07[H]    Ca    8.1[L]      07 Apr 2025 05:03  Phos  4.2     04-07  Mg     1.4     04-07    TPro  4.9[L]  /  Alb  2.8[L]  /  TBili  30.7[H]  /  DBili  x   /  AST  54[H]  /  ALT  34  /  AlkPhos  179[H]  04-07  PT/INR - ( 07 Apr 2025 05:04 )   PT: 26.7 sec;   INR: 2.36 ratio         PTT - ( 07 Apr 2025 05:04 )  PTT:51.7 sec    Urinalysis Basic - ( 07 Apr 2025 05:03 )    Color: x / Appearance: x / SG: x / pH: x  Gluc: 93 mg/dL / Ketone: x  / Bili: x / Urobili: x   Blood: x / Protein: x / Nitrite: x   Leuk Esterase: x / RBC: x / WBC x   Sq Epi: x / Non Sq Epi: x / Bacteria: x      RADIOLOGY & ADDITIONAL STUDIES:  < from: [facility] Duplex Lower Ext Vein Scan, Bilat (04.07.25 @ 09:34) >  IMPRESSION:  No evidence of acute deep venous thrombosis in either lower extremity.      < end of copied text >  < from: CT Abdomen and Pelvis w/ Oral Cont and w/ IV Cont (04.04.25 @ 22:50) >  IMPRESSION:  New moderate intra and extrahepatic biliary duct dilatation with   indeterminate high attenuation in the distal CBD near the ampulla could   be astone, sludge ball or mass. Recommend follow-up with   gastroenterology for further management. Distended gallbladder without   wall thickening or pericholecystic inflammatory changes.    No acute bowel finding.    < end of copied text >      PROTEIN CALORIE MALNUTRITION PRESENT: [ ]mild [ ]moderate [ ]severe [ ]underweight [ ]morbid obesity  https://www.andeal.org/vault/2440/web/files/ONC/Table_Clinical%20Characteristics%20to%20Document%20Malnutrition-White%20JV%20et%20al%543056.pdf    Height (cm): 180.3 (04-04-25 @ 19:50), 175.3 (01-23-25 @ 14:35), 180.3 (09-14-24 @ 23:23)  Weight (kg): 88.6 (04-05-25 @ 05:55), 72.6 (01-23-25 @ 14:35), 89.8 (09-14-24 @ 23:23)  BMI (kg/m2): 27.3 (04-05-25 @ 05:55), 22.3 (04-04-25 @ 19:50), 23.6 (01-23-25 @ 14:35)    [X]PPSV2 < or = to 30% [ ]significant weight loss  [ ]poor nutritional intake  [ ]anasarca[ ]Artificial Nutrition      Other REFERRALS:  [ ]Hospice  [ ]Child Life  [ ]Social Work  [ ]Case management [ ]Holistic Therapy     Care Coordination Assessment 201 [C. Provider] (04-05-25 @ 14:32)      Palliative Performance Scale:  http://npcrc.org/files/news/palliative_performance_scale_ppsv2.pdf  (Ctrl +  left click to view)  Respiratory Distress Observation Tool:  https://homecareinformation.net/handouts/hen/Respiratory_Distress_Observation_Scale.pdf (Ctrl +  left click to view)  PAINAD Score:  http://geriatrictoolkit.[facility]/cog/painad.pdf (Ctrl +  left click to view)

## 2025-04-09 NOTE — PROGRESS NOTE ADULT - PROBLEM SELECTOR PLAN 9
- C/w finasteride 5mg daily  - C/w tamsulosin 0.4mg QHS
- BUN 76, Cr 5.07  - Avoid nephrotoxic medications  - Now comfort care
- Likely in setting of asbestosis, bilateral pleural plaques and chronic lung base atelectasis seen on CT  - Has chronic productive cough at baseline
- Likely in setting of asbestosis, bilateral pleural plaques and chronic lung base atelectasis seen on CT  - Has chronic productive cough at baseline
- BUN 76, Cr 5.07  - Avoid nephrotoxic medications  - Now comfort care

## 2025-04-09 NOTE — PROGRESS NOTE ADULT - ASSESSMENT
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 Found to be hyponatremic, hypokalemic, w/ transaminitis and direct hyperbilirubinemia of 29.3. CT w/ intra-and extrahepatic biliary dilatation as well as CBD dilatation. Admitted for workup of obstructive jaundice. Palliative care consulted for goals of care, complex medical decision making, and symptom management. Patient requests comfort approach to care and home hospice referral.

## 2025-04-09 NOTE — PROGRESS NOTE ADULT - PROBLEM SELECTOR PROBLEM 9
Interstitial lung disease
Interstitial lung disease
Acute kidney injury superimposed on CKD
Acute kidney injury superimposed on CKD
BPH (benign prostatic hyperplasia)

## 2025-04-09 NOTE — PROGRESS NOTE ADULT - PROBLEM SELECTOR PROBLEM 11
Prophylactic measure
Prophylactic measure
Advanced care planning/counseling discussion
Palliative care encounter

## 2025-04-09 NOTE — PROGRESS NOTE ADULT - PROBLEM SELECTOR PLAN 10
- MOLST Updated: DNR/DNI; Comfort Care Decision Maker: Patient  - HCP: Love Sears (Daughter) [phone]  - Patient transferred to PCU for comfort care and symptom management
- Continue Flomax and Finasteride
- C/w finasteride 5mg daily  - C/w tamsulosin 0.4mg QHS
- C/w finasteride 5mg daily  - C/w tamsulosin 0.4mg QHS
DVT PPx: Heparin gtt

## 2025-04-09 NOTE — PROGRESS NOTE ADULT - PROBLEM SELECTOR PLAN 11
- MOLST Updated: DNR/DNI; Comfort Care Decision Maker: Patient  - HCP: Love Sears (Daughter) [phone]  - Patient transferred to PCU for comfort care and symptom management
DVT PPx: Heparin gtt
DVT PPx: Heparin gtt
- Palliative care consulted for goals of care, complex medical decision making, and symptom management.   - Patient requests comfort approach to care and home hospice referral.  - Disposition pending clinical course  - Family updated regarding plan of care

## 2025-04-09 NOTE — CHART NOTE - NSCHARTNOTEFT_GEN_A_CORE
Interim Note    Pt declined ERCP and now comfort measures only as of 4/7, remains on clear liquid diet - advance as tolerated/appropriate.  Pending home hospice evaluation. Pt inappropriate for nutrition follow up at this time.    RD remains available.  Nichole Boyce MPH, RD, CDN - TEAMS

## 2025-04-09 NOTE — PROGRESS NOTE ADULT - PROBLEM/PLAN-10
DISPLAY PLAN FREE TEXT
DISPLAY PLAN FREE TEXT
Rivaroxaban/Xarelto is used to treat and prevent blood clots.  If you are not able to swallow the tablets whole, you may crush the tablets and mix them with a small amount of applesauce and promptly take within four hours. Eat some food right after you swallow the mixture. Take 2.5mg or 10mg tablets of Rivaroxaban/Xarelto with or without food. Take 15mg or 20mg tablets of Rivaroxaban/Xarelto with food. Never skip a dose of Rivaroxaban/Xarelto.  If you take Rivaroxaban/Xarelto once a day and you forget to take your dose, take a dose as soon as you remember on the same day. If you take Rivaroxaban/Xarelto 2.5 mg twice a day and you forget to take your dose, skip the missed dose and take your next dose at your usual time. DO NOT take an extra pill to ‘catch up’. If you take Rivaroxaban/Xarelto 15 mg twice a day and you forget to take your dose, take a dose as soon as you remember on the same day. You may take 2 doses at the same time to make up for missed dose ONLY if you take a total of 30mg per day. Notify your doctor that you missed a dose. Take Rivaroxaban/Xarelto at the same time each morning and evening. Rivaroxaban/Xarelto may be taken with other medication or food.
DISPLAY PLAN FREE TEXT

## 2025-04-09 NOTE — PROGRESS NOTE ADULT - PROBLEM SELECTOR PLAN 12
- Palliative care consulted for goals of care, complex medical decision making, and symptom management.   - Patient requests comfort approach to care and home hospice referral.  - Disposition pending clinical course  - Family updated regarding plan of care

## 2025-04-09 NOTE — PROGRESS NOTE ADULT - PROBLEM SELECTOR PROBLEM 10
Prophylactic measure
BPH (benign prostatic hyperplasia)
BPH (benign prostatic hyperplasia)
Advanced care planning/counseling discussion
BPH (benign prostatic hyperplasia)

## 2025-04-09 NOTE — PROGRESS NOTE ADULT - PROBLEM SELECTOR PROBLEM 12
Administered By (Optional): SEVERIANO
Medical Necessity Clause: This procedure was medically necessary because the lesions that were treated were:
Total Volume Injected (Ccs- Only Use Numbers And Decimals): .1
Consent: The risks of atrophy were reviewed with the patient.
Treatment Number (Optional): 1
Concentration Of Solution Injected (Mg/Ml): 1.0
X Size Of Lesion In Cm (Optional): 0
Kenalog Preparation: Kenalog
Include Z78.9 (Other Specified Conditions Influencing Health Status) As An Associated Diagnosis?: No
Detail Level: Simple
Palliative care encounter

## 2025-04-10 NOTE — PROGRESS NOTE ADULT - CONVERSATION DETAILS
Met with granddaughter and daughter at the bedside. Introduced myself and my role in the patient's care. Offered emotional support and active listening. Granddaughter Leydi shared her love of her grandfather and how she has been caring for him for the last 8 months. She started throwing "parties" every two weeks in their home to celebrate different occasions. She shared a photo of her son, Brein and the patient in matching mustaches and hats to celebrate a "trip to [place]". His daughter shared that she wishes her father would pass so he would have peace. Both daughter and granddaughter are pleased with the care the patient is receiving and are happy that he is not in pain. Continued support provided and shared if they need anything to please ask.

## 2025-04-11 NOTE — PROGRESS NOTE ADULT - PROBLEM SELECTOR PROBLEM 8
COPD, moderate
Chronic heart failure with preserved ejection fraction (HFpEF, >= 50%)
Palliative care encounter
COPD, moderate
Interstitial lung disease
Palliative care encounter
Chronic heart failure with preserved ejection fraction (HFpEF, >= 50%)

## 2025-04-11 NOTE — PROGRESS NOTE ADULT - PROBLEM SELECTOR PROBLEM 6
COPD, moderate
Chronic heart failure with preserved ejection fraction (HFpEF, >= 50%)
Chronic heart failure with preserved ejection fraction (HFpEF, >= 50%)
Chronic atrial fibrillation
COPD, moderate

## 2025-04-11 NOTE — PROGRESS NOTE ADULT - PROVIDER SPECIALTY LIST ADULT
Nephrology
Internal Medicine
Nephrology
Gastroenterology
Gastroenterology
Infectious Disease
Palliative Care
Palliative Care
Infectious Disease
Internal Medicine
Palliative Care
Palliative Care

## 2025-04-11 NOTE — PROGRESS NOTE ADULT - PROBLEM SELECTOR PLAN 2
-Requires symptom optimization  Over the past 24hrs from 8AM to 8AM, patient required:   Hydroxyzine 25mg x3 for Itching   - Patient reports uncontrolled itching likely secondary to obstructive jaundice   - Start Hydroxyzine 25mg q6hrs for itching.  - Start Benadryl 25mg q6hrs PRN for itching/rash.
- K 2.6 on arrival, s/p 3 runs IV KCl  - No TWI, U-waves, or ST-depressions seen on ECG  - Will give additional PO KCl 40mEq  f/u
- Total bilirubin 29.3, direct bilirubin >10, indirect bili <19.3 (exact value unclear)  - CT A/P - new moderate intrahepatic and extrahepatic biliary ductal dilatation, CBD measures 2.3 cm, and GB distension w/o wall thickening, pericholecystic fluid, or radiopaque gallstone  - as per GI , -Ct empiric abx with zosyn  -bacteremia+
- Symptom controlled on current regimen  Over the past 24hrs from 8AM to 8AM, patient required:   Benadryl 25mg x2 for Itching   Hydroxyzine 25mg x1 for Itching   - Patient reports uncontrolled itching likely secondary to obstructive jaundice   - Continue Hydroxyzine 25mg q6hrs ATC for itching.  - Continue Benadryl 25mg q6hrs PRN for itching/rash.
- Total bilirubin 29.3, direct bilirubin >10, indirect bili <19.3 (exact value unclear)  - CT A/P - new moderate intrahepatic and extrahepatic biliary ductal dilatation, CBD measures 2.3 cm, and GB distension w/o wall thickening, pericholecystic fluid, or radiopaque gallstone  - as per GI , -Ct empiric abx with zosyn  -Rec to replace all electrolytes in prep for endoscopic procedure - Mg with goal >2. K goal >4 and phos  -Optimize and Work up hyponatremia - urine/serum osm  -Hold heparin at 3am tomorrow  -Holding eliquis  - Poss eus/ ercp
- Generalized itching   - Within the past 24 hours (7a-7a) patient required Benadryl 25mg IVP x1 Benadryl 50mg IVP x3 for itching/rash  - Hydroxyzine discontinued, not adequately relieving symptoms   - Continue with Benadryl 50mg IVP q6hr PRN for itching
- Generalized itching   Within the last 24 hours (8a-8a) patient required :  Benadryl 50mg x3 for Itching   - Hydroxyzine discontinued, not adequately relieving symptoms   - Continue with Benadryl 50mg IVP q6hr PRN for itching

## 2025-04-11 NOTE — PROGRESS NOTE ADULT - PROBLEM SELECTOR PROBLEM 7
Chronic atrial fibrillation
COPD, moderate
BPH (benign prostatic hyperplasia)
BPH (benign prostatic hyperplasia)
Chronic atrial fibrillation

## 2025-04-11 NOTE — PROGRESS NOTE ADULT - ATTENDING COMMENTS
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  Found to have cholangitis with possible distal CBD stone  Recommended ERCP, urgent, for cholangitis  Patient and family declined intervention and electing for hospice care only  Advanced GI team available should patient/family change mind    Thank you for this interesting consult.  Please call the advanced GI service with any questions or concerns.
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  Will need EUS/ERCP  2. c/w empiric ABx  3. Full liquid diet,   4. trend wbc, LFTs, Cr, fever curve
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 Found to be hyponatremic, hypokalemic, w/ transaminitis and direct hyperbilirubinemia of 29.3. CT w/ intra-and extrahepatic biliary dilatation as well as CBD dilatation. Admitted for workup of obstructive jaundice. Palliative care consulted for goals of care, complex medical decision making, and symptom management. Patient requests comfort approach to care and home hospice referral.     Pt transferred to PCU overnight. Requiring PRN IV dilaudid for pain and PRN atarax for pruritus in setting of obstructive jaundice. Will schedule atarax ATC and start PRN benadryl for pruritus. Case discussed during IDT rounds.    For acute issues or uncontrolled symptoms please page palliative team.    Giana Rosales MD  Geriatrics and Palliative Medicine Attending  [facility] pager: [phone]
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 Found to be hyponatremic, hypokalemic, w/ transaminitis and direct hyperbilirubinemia of 29.3. CT w/ intra-and extrahepatic biliary dilatation as well as CBD dilatation. Admitted for workup of obstructive jaundice. Palliative care consulted for goals of care, complex medical decision making, and symptom management. Patient requests comfort approach to care and home hospice referral.     Requiring PRN IV dilaudid and IV ativan for symptom management. Pt also continues to report itching despite ATC atarax and PRN benadryl. Will start PRN IV benadryl 50mg q6h PRN. Case discussed during IDT rounds. Dispo planning to be guided by clinical course, possibly home vs inpatient hospice.
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 Found to be hyponatremic, hypokalemic, w/ transaminitis and direct hyperbilirubinemia of 29.3. CT w/ intra-and extrahepatic biliary dilatation as well as CBD dilatation. Admitted for workup of obstructive jaundice. Palliative care consulted for goals of care, complex medical decision making, and symptom management. Patient requests comfort approach to care and home hospice referral.     Pt with excessive oral secretion. Started IV robinul ATC. Continues to requirie PRN IV dilaudid and IV ativan for symptom management. Continue IV dilaudid 0.5mg q6h ATC.  Continue rest of symptom management as noted above. Case discussed during IDT rounds.
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 Found to be hyponatremic, hypokalemic, w/ transaminitis and direct hyperbilirubinemia of 29.3. CT w/ intra-and extrahepatic biliary dilatation as well as CBD dilatation. Admitted for workup of obstructive jaundice. Palliative care consulted for goals of care, complex medical decision making, and symptom management. Patient requests comfort approach to care and home hospice referral.     Requiring PRN IV dilaudid and IV ativan for symptom management. Will start ATC IV dilaudid 0.5mg q6h with increase PRN doses as noted above. Continue rest of symptom management as noted above. Case discussed during IDT rounds. Dispo planning to be guided by clinical course, pt hypotensive this AM, will continue to monitor.

## 2025-04-11 NOTE — PROGRESS NOTE ADULT - PROBLEM SELECTOR PLAN 4
- Na 131  - Check urine studies  - Patient seems to be more on the fluid overloaded side given bilateral LE edema,
- BUN 47, Cr 1.4, baseline 1.18-1.3  - Monitor urine output  - monitor closely
- Continue to monitor for symptoms of dyspnea  - Within the last 24 hours (7a-7a) patient did not require any medication for symptoms   - PRN medication remains available if needed Dilaudid 0.5mg IVP q1hr PRN for dyspnea
- Na 131  - Check urine studies  - Patient seems to be more on the fluid overloaded side given bilateral LE edema,
- Symptom controlled on current regimen, patient currently denies any dyspnea   Over the past 24hrs from 8AM to 8AM, patient required:   Dilaudid 0.5mg x1 for Dyspnea   - Continue Dilaudid 0.5mg q4hrs PRN for Dyspnea.
- Patient currently denies any dyspnea   - Continue Dilaudid 0.5mg q4hrs PRN for Dyspnea.
- Continue to monitor for symptoms of dyspnea  - Within the last 24 hours (8a-8a) patient did not require any medication for symptoms   - PRN medication remains available if needed Dilaudid 1mg IVP q1hr PRN for dyspnea

## 2025-04-11 NOTE — PROGRESS NOTE ADULT - PROBLEM SELECTOR PROBLEM 1
Obstructive jaundice
Fever
Fever
Obstructive jaundice

## 2025-04-11 NOTE — PROGRESS NOTE ADULT - PROBLEM SELECTOR PLAN 5
- Symptoms well controlled on current regimen   Over the past 24hrs from 8AM to 8AM, patient required:    Ativan 0.25mg x2 Anxiety   - Continue Ativan 0.25mg q4hrs PRN for Anxiety.
- Continue to monitor for symptoms of anxiety   - Within the last 24 hours (7a-7a) patient required Ativan 0.25mg IVP q1hr PRN for anxiety   - PRN medications remain available if needed for anxiety
- Last TTE 9/16/24 - LVEF 73%, indeterminate diastolic function  - Takes bumetanide 2mg AM and 1mg PM, hold for now in setting of hypokalemia  -
- BUN 47, Cr 1.4, baseline 1.18-1.3  - Monitor urine output  - monitor closely
- Continue to monitor for symptoms of anxiety   Within the last 24 hours (8a-8a) patient required :   Ativan 0.5mg x2 for Anxiety/Agitation  - PRN medications remain available if needed for anxiety
- Symptoms well controlled on current regimen   Over the past 24hrs from 8AM to 8AM, patient required:   Ativan 0.25mg x1 for Anxiety  - Continue Ativan 0.25mg q4hrs PRN for Anxiety.
- BUN 47, Cr 1.4, baseline 1.18-1.3  - Monitor urine output  - monitor closely

## 2025-04-11 NOTE — PROGRESS NOTE ADULT - PROBLEM SELECTOR PROBLEM 3
Generalized pain
Hyponatremia
Hypokalemia
Generalized pain
Generalized pain
Hypokalemia
Generalized pain

## 2025-04-11 NOTE — PROGRESS NOTE ADULT - SUBJECTIVE AND OBJECTIVE BOX
SUBJECTIVE: Patient seen and examined at bedside. c/o urinary retention this am. Now w/Uptrending wbc. Na 128 from 130, Bicarb 17. Remains afebrile. Low magnesium 1, low phos. Uptrending tbili. Uptrending Cr.     OBJECTIVE:    VITAL SIGNS:  ICU Vital Signs Last 24 Hrs  T(C): 36.3 (06 Apr 2025 12:56), Max: 37.1 (05 Apr 2025 20:44)  T(F): 97.4 (06 Apr 2025 12:56), Max: 98.7 (05 Apr 2025 20:44)  HR: 88 (06 Apr 2025 12:56) (87 - 103)  BP: 100/62 (06 Apr 2025 12:56) (100/62 - 108/68)  BP(mean): --  ABP: --  ABP(mean): --  RR: 18 (06 Apr 2025 12:56) (18 - 18)  SpO2: 97% (06 Apr 2025 12:56) (95% - 97%)    O2 Parameters below as of 06 Apr 2025 12:56  Patient On (Oxygen Delivery Method): room air              04-05 @ 07:01  -  04-06 @ 07:00  --------------------------------------------------------  IN: 240 mL / OUT: 300 mL / NET: -60 mL        PHYSICAL EXAM:      GEN: icteric  CVS: S1S2+  CHEST: equal chest rise  ABD: soft, full, left colostomy bag with stool (light in color)  EXTR: bilateral pitting pedal edema  NEURO: A&OX3. No asterixis  SKIN:  warm;  icteric    MEDICATIONS:  MEDICATIONS  (STANDING):  albuterol/ipratropium for Nebulization 3 milliLiter(s) Nebulizer every 6 hours  ascorbic acid 500 milliGRAM(s) Oral daily  cholestyramine Powder (Sugar-Free) 4 Gram(s) Oral two times a day  finasteride 5 milliGRAM(s) Oral daily  fluticasone propionate/ salmeterol 250-50 MICROgram(s) Diskus 1 Dose(s) Inhalation two times a day  heparin  Infusion. 1300 Unit(s)/Hr (13 mL/Hr) IV Continuous <Continuous>  metoprolol tartrate 25 milliGRAM(s) Oral two times a day  multivitamin 1 Tablet(s) Oral daily  pantoprazole    Tablet 40 milliGRAM(s) Oral before breakfast  piperacillin/tazobactam IVPB.. 3.375 Gram(s) IV Intermittent every 12 hours  sodium chloride 0.9%. 1000 milliLiter(s) (100 mL/Hr) IV Continuous <Continuous>  tamsulosin 0.4 milliGRAM(s) Oral at bedtime    MEDICATIONS  (PRN):  acetaminophen     Tablet .. 650 milliGRAM(s) Oral every 6 hours PRN Temp greater or equal to 38C (100.4F), Mild Pain (1 - 3)  heparin   Injectable 7000 Unit(s) IV Push every 6 hours PRN For aPTT less than 40  heparin   Injectable 3500 Unit(s) IV Push every 6 hours PRN For aPTT between 40 - 57  melatonin 3 milliGRAM(s) Oral at bedtime PRN Insomnia  ondansetron Injectable 4 milliGRAM(s) IV Push every 8 hours PRN Nausea and/or Vomiting      ALLERGIES:  Allergies    No Known Allergies    Intolerances        LABS:                        11.1   21.90 )-----------( 336      ( 06 Apr 2025 06:01 )             30.3     04-06    128[L]  |  91[L]  |  58[H]  ----------------------------<  97  3.6   |  17[L]  |  3.18[H]    Ca    8.3[L]      06 Apr 2025 06:01  Phos  2.3     04-06  Mg     1.0     04-06    TPro  5.0[L]  /  Alb  2.9[L]  /  TBili  30.7[H]  /  DBili  >10.0[H]  /  AST  62[H]  /  ALT  39  /  AlkPhos  251[H]  04-06    PT/INR - ( 04 Apr 2025 20:37 )   PT: 17.4 sec;   INR: 1.53 ratio         PTT - ( 06 Apr 2025 06:01 )  PTT:79.5 sec  Urinalysis Basic - ( 06 Apr 2025 06:01 )    Color: x / Appearance: x / SG: x / pH: x  Gluc: 97 mg/dL / Ketone: x  / Bili: x / Urobili: x   Blood: x / Protein: x / Nitrite: x   Leuk Esterase: x / RBC: x / WBC x   Sq Epi: x / Non Sq Epi: x / Bacteria: x        
84yPatient is a 84y old  Male who presents with a chief complaint of Obstructive jaundice (06 Apr 2025 16:03)      Interval history:  Afebrile, denies any pain, urinating.       Allergies:   No Known Allergies    Antimicrobials:    piperacillin/tazobactam IVPB.. 3.375 Gram(s) IV Intermittent every 12 hours    REVIEW OF SYSTEMS:  No chest pain   No SOB  No  abdominal pain      Vital Signs Last 24 Hrs  T(C): 36.8 (04-06-25 @ 16:30), Max: 37.1 (04-05-25 @ 20:44)  T(F): 98.3 (04-06-25 @ 16:30), Max: 98.7 (04-05-25 @ 20:44)  HR: 90 (04-06-25 @ 16:30) (87 - 103)  BP: 115/68 (04-06-25 @ 16:30) (100/62 - 115/68)  BP(mean): --  RR: 18 (04-06-25 @ 16:30) (18 - 18)  SpO2: 97% (04-06-25 @ 16:30) (95% - 97%)    PHYSICAL EXAM:  Pt in no acute distress, alert, awake.   + icterus  non distended abdomen  + edema LE   jaundice   no phlebitis                             10.9   21.75 )-----------( 293      ( 06 Apr 2025 17:46 )             29.8   04-06    127[L]  |  88[L]  |  64[H]  ----------------------------<  131[H]  3.9   |  15[L]  |  4.23[H]    Ca    8.3[L]      06 Apr 2025 17:46  Phos  3.6     04-06  Mg     1.6     04-06    TPro  5.2[L]  /  Alb  3.0[L]  /  TBili  33.0[H]  /  DBili  x   /  AST  57[H]  /  ALT  35  /  AlkPhos  223[H]  04-06      LIVER FUNCTIONS - ( 06 Apr 2025 17:46 )  Alb: 3.0 g/dL / Pro: 5.2 g/dL / ALK PHOS: 223 U/L / ALT: 35 U/L / AST: 57 U/L / GGT: x               Culture - Blood (collected 05 Apr 2025 00:15)  Source: Blood Blood-Peripheral  Preliminary Report (06 Apr 2025 03:02):    No growth at 24 hours    Culture - Blood (collected 05 Apr 2025 00:00)  Source: Blood Blood-Peripheral  Preliminary Report (06 Apr 2025 03:02):    No growth at 24 hours    Culture - Urine (collected 04 Apr 2025 21:35)  Source: Clean Catch Clean Catch (Midstream)  Final Report (06 Apr 2025 09:50):    >=3 organisms. Probable collection contamination.            
[facility] [place] NEPHROLOGY- PROGRESS NOTE    84y Male with history of colon CA s/p hemicolectomy with ostomy presents with jaundice. Nephrology consulted for elevated Scr.    REVIEW OF SYSTEMS:  Gen: + fevers, + pruritis   Cards: no chest pain  Resp: no dyspnea  GI: no nausea or vomiting or diarrhea  Vascular: + LE edema    No Known Allergies      Hospital Medications: Medications reviewed    VITALS:  T(F): 97.5 (04-07-25 @ 05:07), Max: 100.6 (04-06-25 @ 23:58)  HR: 93 (04-07-25 @ 05:07)  BP: 93/58 (04-07-25 @ 05:07)  RR: 19 (04-07-25 @ 05:07)  SpO2: 97% (04-07-25 @ 05:07)  Wt(kg): --  Height (cm): 180.3 (04-04 @ 19:50)  Weight (kg): 88.6 (04-05 @ 05:55)  BMI (kg/m2): 27.3 (04-05 @ 05:55)  BSA (m2): 2.09 (04-05 @ 05:55)    04-06 @ 07:01  -  04-07 @ 07:00  --------------------------------------------------------  IN: 2506 mL / OUT: 0 mL / NET: 2506 mL    04-07 @ 07:01  -  04-07 @ 10:28  --------------------------------------------------------  IN: 360 mL / OUT: 0 mL / NET: 360 mL        PHYSICAL EXAM:    Gen: NAD, calm  Cards: + tachycardia, +S1/S2, no M/G/R  Resp: CTA B/L  GI: soft, NT, + ab distention, NABS, + ostomy  Vascular: 2+ LLE edema > 1+ RLE edema, + jaundice     LABS:  04-07    126[L]  |  88[L]  |  76[H]  ----------------------------<  93  3.9   |  13[L]  |  5.07[H]    Ca    8.1[L]      07 Apr 2025 05:03  Phos  4.2     04-07  Mg     1.4     04-07    TPro  4.9[L]  /  Alb  2.8[L]  /  TBili  30.7[H]  /  DBili      /  AST  54[H]  /  ALT  34  /  AlkPhos  179[H]  04-07    Creatinine Trend: 5.07 <--, 4.23 <--, 3.18 <--, 2.08 <--, 1.78 <--, 1.40 <--                        10.6   23.88 )-----------( 284      ( 07 Apr 2025 05:03 )             29.1     Urine Studies:  Urinalysis Basic - ( 07 Apr 2025 05:03 )    Color:  / Appearance:  / SG:  / pH:   Gluc: 93 mg/dL / Ketone:   / Bili:  / Urobili:    Blood:  / Protein:  / Nitrite:    Leuk Esterase:  / RBC:  / WBC    Sq Epi:  / Non Sq Epi:  / Bacteria:       Osmolality, Random Urine: 319 mos/kg (04-05 @ 12:46)  Sodium, Random Urine: 17 mmol/L (04-05 @ 12:45)  Chloride, Random Urine: 20 mmol/L (04-05 @ 12:45)  Creatinine, Random Urine: 75 mg/dL (04-05 @ 12:45)  Potassium, Random Urine: 22 mmol/L (04-05 @ 12:45)  Creatinine, Random Urine: 75 mg/dL (04-05 @ 12:45)  Protein/Creatinine Ratio Calculation: 0.4 Ratio (04-05 @ 12:45)      RADIOLOGY & ADDITIONAL STUDIES:
Interval Events:   -Pt was planned for ERCP today hweover called by team kimberly he was opting to become comfort care    Hospital Medications:  acetaminophen     Tablet .. 650 milliGRAM(s) Oral every 6 hours PRN  albumin human 25% IVPB 50 milliLiter(s) IV Intermittent every 6 hours  albuterol/ipratropium for Nebulization 3 milliLiter(s) Nebulizer every 6 hours  ascorbic acid 500 milliGRAM(s) Oral daily  cholestyramine Powder (Sugar-Free) 4 Gram(s) Oral two times a day  finasteride 5 milliGRAM(s) Oral daily  fluticasone propionate/ salmeterol 250-50 MICROgram(s) Diskus 1 Dose(s) Inhalation two times a day  HYDROmorphone  Injectable 0.2 milliGRAM(s) IV Push every 4 hours PRN  HYDROmorphone  Injectable 0.5 milliGRAM(s) IV Push every 4 hours PRN  HYDROmorphone  Injectable 0.5 milliGRAM(s) IV Push every 4 hours PRN  hydrOXYzine hydrochloride 25 milliGRAM(s) Oral every 6 hours PRN  LORazepam   Injectable 0.25 milliGRAM(s) IV Push every 4 hours PRN  melatonin 3 milliGRAM(s) Oral at bedtime PRN  metoprolol tartrate 25 milliGRAM(s) Oral two times a day  multivitamin 1 Tablet(s) Oral daily  nystatin Cream 1 Application(s) Topical two times a day  ondansetron Injectable 4 milliGRAM(s) IV Push every 8 hours PRN  pantoprazole    Tablet 40 milliGRAM(s) Oral before breakfast  piperacillin/tazobactam IVPB.. 3.375 Gram(s) IV Intermittent every 12 hours  sodium bicarbonate 650 milliGRAM(s) Oral three times a day  tamsulosin 0.4 milliGRAM(s) Oral at bedtime          25 @ 07:01  -  25 @ 17:19  --------------------------------------------------------  IN: 360 mL / OUT: 0 mL / NET: 360 mL          PHYSICAL EXAM:   Vital Signs:  Vital Signs Last 24 Hrs  T(C): 36.4 (2025 12:46), Max: 38.1 (2025 23:58)  T(F): 97.5 (2025 12:46), Max: 100.6 (2025 23:58)  HR: 104 (2025 12:46) (93 - 108)  BP: 96/55 (2025 12:46) (93/58 - 107/64)  BP(mean): --  RR: 18 (2025 12:46) (17 - 19)  SpO2: 98% (2025 12:46) (95% - 98%)    Parameters below as of 2025 12:46  Patient On (Oxygen Delivery Method): room air      Daily     Daily   GENERAL:  Diffusely jaundiced, itching at skin  HEENT:  NC/AT,  conjunctivae clear and pink, sclera icteric  CHEST:  Normal Effort, no signs of resp distress  HEART:  RRR, HD stable  ABDOMEN:  Soft, non-tender, non-distended  EXTREMITIES:  no cyanosis or edema  SKIN:  Warm & Dry. No rash or erythema  NEURO:  Alert, oriented, no focal deficit  LABS:                        10.6   23.88 )-----------( 284      ( 2025 05:03 )             29.1     Last Hb:Hemoglobin: 10.6 g/dL (25 @ 05:03)  Hemoglobin: 10.9 g/dL (25 @ 17:46)  Hemoglobin: 11.1 g/dL (25 @ 06:01)               126   |  88    |  76                 Ca: 8.1    BMP:   ----------------------------< 93     M.4   (25 @ 05:03)             3.9    |  13    | 5.07               Ph: 4.2      LFT:     TPro: 4.9 / Alb: 2.8 / TBili: 30.7 / DBili: x / AST: 54 / ALT: 34 / AlkPhos: 179   (25 @ 05:03)    Creatinine: 5.07 mg/dL  Creatinine: 4.23 mg/dL  Creatinine: 3.18 mg/dL      LIVER FUNCTIONS - ( 2025 05:03 )  Alb: 2.8 g/dL / Pro: 4.9 g/dL / ALK PHOS: 179 U/L / ALT: 34 U/L / AST: 54 U/L / GGT: x           PT/INR - ( 2025 05:04 )   PT: 26.7 sec;   INR: 2.36 ratio         PTT - ( 2025 05:04 )  PTT:51.7 sec  Urinalysis Basic - ( 2025 05:03 )    Color: x / Appearance: x / SG: x / pH: x  Gluc: 93 mg/dL / Ketone: x  / Bili: x / Urobili: x   Blood: x / Protein: x / Nitrite: x   Leuk Esterase: x / RBC: x / WBC x   Sq Epi: x / Non Sq Epi: x / Bacteria: x        Culture - Blood (collected 25 @ 17:48)  Source: Blood Blood  Gram Stain (25 @ 13:40):    Growth in aerobic bottle: Gram Negative Rods  Preliminary Report (25 @ 13:40):    Growth in aerobic bottle: Gram Negative Rods    Culture - Blood (collected 25 @ 17:48)  Source: Blood Blood  Gram Stain (25 @ 16:19):    Growth in aerobic bottle: Gram Negative Rods    Growth in anaerobic bottle: Gram Negative Rods  Preliminary Report (25 @ 16:19):    Growth in aerobic bottle: Gram Negative Rods    Direct identification is available within approximately 3-5    hours either by Blood Panel Multiplexed PCR or Direct    MALDI-TOF. Details: [email or web address]    Growth in anaerobic bottle: Gram Negative Rods  Organism: Blood Culture PCR (25 @ 14:02)  Organism: Blood Culture PCR (25 @ 14:02)      Method Type: PCR      -  Pseudomonas aeruginosa: Detec    Culture - Blood (collected 25 @ 00:15)  Source: Blood Blood-Peripheral  Preliminary Report (25 @ 03:02):    No growth at 48 Hours    Culture - Blood (collected 25 @ 00:00)  Source: Blood Blood-Peripheral  Preliminary Report (25 @ 03:02):    No growth at 48 Hours    Culture - Urine (collected 25 @ 21:35)  Source: Clean Catch Clean Catch (Midstream)  Final Report (25 @ 09:50):    >=3 organisms. Probable collection contamination.      Amylase Serum--      Lipase serum--       [id number]          
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 Pt a/w obstructive jaundice (total BR 27). Nephrology consulted for Elevated serum creatinine.    Hospital Medications: Medications reviewed.  REVIEW OF SYSTEMS:  CONSTITUTIONAL: No fevers or chills +jaundice  RESPIRATORY: No shortness of breath  CARDIOVASCULAR: No chest pain.  GASTROINTESTINAL: No nausea, vomiting, diarrhea or abdominal pain.   : decreased urination  VASCULAR: +bilateral lower extremity edema.     VITALS:  T(F): 98.4 (04-06-25 @ 05:04), Max: 98.7 (04-05-25 @ 20:44)  HR: 87 (04-06-25 @ 05:04)  BP: 108/68 (04-06-25 @ 05:04)  RR: 18 (04-06-25 @ 05:04)  SpO2: 95% (04-06-25 @ 05:04)  Wt(kg): --  Height (cm): 180.3 (04-04 @ 19:50)  Weight (kg): 88.6 (04-05 @ 05:55)  BMI (kg/m2): 27.3 (04-05 @ 05:55)  BSA (m2): 2.09 (04-05 @ 05:55)    04-05 @ 07:01  -  04-06 @ 07:00  --------------------------------------------------------  IN: 240 mL / OUT: 300 mL / NET: -60 mL      PHYSICAL EXAM:  Gen: NAD, calm  HEENT: icteric  Cards: RRR, +S1/S2, no M/G/R  Resp: CTA B/L  GI: soft, NT +left sided ostomy  Extremities: +1 pitting LE edema B/L  Derm: +jaundice    LABS:  04-06  128 <--, 130 <--, 129 <--, 131 <--  128[L]  |  91[L]  |  58[H]  ----------------------------<  97  3.6   |  17[L]  |  3.18[H]    Ca    8.3[L]      06 Apr 2025 06:01  Phos  2.3     04-06  Mg     1.0     04-06    TPro  5.2[L]  /  Alb  2.4[L]  /  TBili  27.1[H]  /  DBili      /  AST  76[H]  /  ALT  51[H]  /  AlkPhos  322[H]  04-05    Creatinine Trend: 3.18 <--, 2.08 <--, 1.78 <--, 1.40 <--                        11.1   21.90 )-----------( 336      ( 06 Apr 2025 06:01 )             30.3     Urine Studies:  Urinalysis Basic - ( 06 Apr 2025 06:01 )    Color:  / Appearance:  / SG:  / pH:   Gluc: 97 mg/dL / Ketone:   / Bili:  / Urobili:    Blood:  / Protein:  / Nitrite:    Leuk Esterase:  / RBC:  / WBC    Sq Epi:  / Non Sq Epi:  / Bacteria:       Osmolality, Random Urine: 319 mos/kg (04-05 @ 12:46)  Sodium, Random Urine: 17 mmol/L (04-05 @ 12:45)  Chloride, Random Urine: 20 mmol/L (04-05 @ 12:45)  Creatinine, Random Urine: 75 mg/dL (04-05 @ 12:45)  Potassium, Random Urine: 22 mmol/L (04-05 @ 12:45)  Creatinine, Random Urine: 75 mg/dL (04-05 @ 12:45)  Protein/Creatinine Ratio Calculation: 0.4 Ratio (04-05 @ 12:45)    RADIOLOGY & ADDITIONAL STUDIES:      
    SUBJECTIVE / OVERNIGHT EVENTS:pt seen and examined  04-06-25     MEDICATIONS  (STANDING):  albuterol/ipratropium for Nebulization 3 milliLiter(s) Nebulizer every 6 hours  ascorbic acid 500 milliGRAM(s) Oral daily  cholestyramine Powder (Sugar-Free) 4 Gram(s) Oral two times a day  finasteride 5 milliGRAM(s) Oral daily  fluticasone propionate/ salmeterol 250-50 MICROgram(s) Diskus 1 Dose(s) Inhalation two times a day  heparin  Infusion. 1300 Unit(s)/Hr (13 mL/Hr) IV Continuous <Continuous>  metoprolol tartrate 25 milliGRAM(s) Oral two times a day  multivitamin 1 Tablet(s) Oral daily  nystatin Cream 1 Application(s) Topical two times a day  pantoprazole    Tablet 40 milliGRAM(s) Oral before breakfast  piperacillin/tazobactam IVPB.. 3.375 Gram(s) IV Intermittent every 12 hours  sodium chloride 0.9%. 1000 milliLiter(s) (100 mL/Hr) IV Continuous <Continuous>  tamsulosin 0.4 milliGRAM(s) Oral at bedtime    MEDICATIONS  (PRN):  acetaminophen     Tablet .. 650 milliGRAM(s) Oral every 6 hours PRN Temp greater or equal to 38C (100.4F), Mild Pain (1 - 3)  heparin   Injectable 7000 Unit(s) IV Push every 6 hours PRN For aPTT less than 40  heparin   Injectable 3500 Unit(s) IV Push every 6 hours PRN For aPTT between 40 - 57  melatonin 3 milliGRAM(s) Oral at bedtime PRN Insomnia  ondansetron Injectable 4 milliGRAM(s) IV Push every 8 hours PRN Nausea and/or Vomiting    Vital Signs Last 24 Hrs  T(C): 37.4 (04-06-25 @ 23:39), Max: 37.4 (04-06-25 @ 23:39)  T(F): 99.3 (04-06-25 @ 23:39), Max: 99.3 (04-06-25 @ 23:39)  HR: 98 (04-06-25 @ 23:39) (87 - 103)  BP: 97/60 (04-06-25 @ 23:39) (97/60 - 115/68)  BP(mean): --  RR: 18 (04-06-25 @ 23:39) (18 - 18)  SpO2: 95% (04-06-25 @ 23:39) (95% - 97%)          Constitutional: No fever, fatigue  Skin: No rash.  Eyes: No recent vision problems or eye pain.  ENT: No congestion, ear pain, or sore throat.  Cardiovascular: No chest pain or palpation.  Respiratory: No cough, shortness of breath, congestion, or wheezing.  Gastrointestinal: No abdominal pain, nausea, vomiting, or diarrhea.  Genitourinary: No dysuria.  Musculoskeletal: No joint swelling.  Neurologic: No headache.    PHYSICAL EXAM:  GENERAL: NAD  EYES: EOMI, PERRLA  NECK: Supple, No JVD  CHEST/LUNG: dec breath sounds at bases  HEART:  S1 , S2 +  ABDOMEN: soft , bs+, mild distension+, no guarding / no rebound tenderness  EXTREMITIES:  edema  NEUROLOGY:alert awake    LABS:  04-06    127[L]  |  88[L]  |  64[H]  ----------------------------<  131[H]  3.9   |  15[L]  |  4.23[H]    Ca    8.3[L]      06 Apr 2025 17:46  Phos  3.6     04-06  Mg     1.6     04-06    TPro  5.2[L]  /  Alb  3.0[L]  /  TBili  33.0[H]  /  DBili      /  AST  57[H]  /  ALT  35  /  AlkPhos  223[H]  04-06    Creatinine Trend: 4.23 <--, 3.18 <--, 2.08 <--, 1.78 <--, 1.40 <--                        10.9   21.75 )-----------( 293      ( 06 Apr 2025 17:46 )             29.8     Urine Studies:  Urinalysis Basic - ( 06 Apr 2025 17:46 )    Color:  / Appearance:  / SG:  / pH:   Gluc: 131 mg/dL / Ketone:   / Bili:  / Urobili:    Blood:  / Protein:  / Nitrite:    Leuk Esterase:  / RBC:  / WBC    Sq Epi:  / Non Sq Epi:  / Bacteria:       Osmolality, Random Urine: 319 mos/kg (04-05 @ 12:46)  Sodium, Random Urine: 17 mmol/L (04-05 @ 12:45)  Chloride, Random Urine: 20 mmol/L (04-05 @ 12:45)  Creatinine, Random Urine: 75 mg/dL (04-05 @ 12:45)  Potassium, Random Urine: 22 mmol/L (04-05 @ 12:45)  Creatinine, Random Urine: 75 mg/dL (04-05 @ 12:45)  Protein/Creatinine Ratio Calculation: 0.4 Ratio (04-05 @ 12:45)          LIVER FUNCTIONS - ( 06 Apr 2025 17:46 )  Alb: 3.0 g/dL / Pro: 5.2 g/dL / ALK PHOS: 223 U/L / ALT: 35 U/L / AST: 57 U/L / GGT: x           PT/INR - ( 06 Apr 2025 17:46 )   PT: 28.9 sec;   INR: 2.56 ratio         PTT - ( 06 Apr 2025 17:46 )  PTT:72.9 sec      RADIOLOGY & ADDITIONAL TESTS:    Imaging Personally Reviewed:    Consultant(s) Notes Reviewed:      Care Discussed with Consultants/Other Providers:  
GAP TEAM PALLIATIVE CARE UNIT PROGRESS NOTE:      [  ] Patient on hospice program.    INDICATION FOR PALLIATIVE CARE UNIT SERVICES/Interval HPI:  Palliative care consulted for goals of care, complex medical decision making, and symptom management. Patient requests comfort approach to care and home hospice referral.     OVERNIGHT EVENTS:  Patient seen today morning at bedside, resting comfortably. Patient accompanied by Leydi, who states that patient itching has not significantly improved despite medication use. States that patient has not reported any pain.     Over the past 24hrs from 8AM to 8AM, patient required:   Dilaudid 0.5mg x2 for Severe Pain  Hydroxyzine 25mg x3 for Itching   Ativan 0.25mg x1 for Anxiety    Allergies    No Known Allergies    Intolerances    MEDICATIONS  (STANDING):  albumin human 25% IVPB 50 milliLiter(s) IV Intermittent every 6 hours  albuterol/ipratropium for Nebulization 3 milliLiter(s) Nebulizer every 6 hours  ascorbic acid 500 milliGRAM(s) Oral daily  cholestyramine Powder (Sugar-Free) 4 Gram(s) Oral two times a day  finasteride 5 milliGRAM(s) Oral daily  fluticasone propionate/ salmeterol 250-50 MICROgram(s) Diskus 1 Dose(s) Inhalation two times a day  hydrOXYzine hydrochloride 25 milliGRAM(s) Oral every 6 hours  metoprolol tartrate 25 milliGRAM(s) Oral two times a day  multivitamin 1 Tablet(s) Oral daily  nystatin Cream 1 Application(s) Topical two times a day  pantoprazole    Tablet 40 milliGRAM(s) Oral before breakfast  sodium bicarbonate 650 milliGRAM(s) Oral three times a day  tamsulosin 0.4 milliGRAM(s) Oral at bedtime    MEDICATIONS  (PRN):  acetaminophen     Tablet .. 650 milliGRAM(s) Oral every 6 hours PRN Temp greater or equal to 38C (100.4F), Mild Pain (1 - 3)  diphenhydrAMINE 25 milliGRAM(s) Oral every 6 hours PRN Rash and/or Itching  HYDROmorphone  Injectable 0.2 milliGRAM(s) IV Push every 4 hours PRN Moderate Pain (4 - 6)  HYDROmorphone  Injectable 0.5 milliGRAM(s) IV Push every 4 hours PRN Severe Pain (7 - 10)  HYDROmorphone  Injectable 0.5 milliGRAM(s) IV Push every 4 hours PRN dyspnea  LORazepam   Injectable 0.25 milliGRAM(s) IV Push every 4 hours PRN Anxiety  melatonin 3 milliGRAM(s) Oral at bedtime PRN Insomnia  ondansetron Injectable 4 milliGRAM(s) IV Push every 8 hours PRN Nausea and/or Vomiting  senna 2 Tablet(s) Oral at bedtime PRN Constipation    ITEMS UNCHECKED ARE NOT PRESENT    PRESENT SYMPTOMS: [ ]Unable to self-report see PAINAD, RDOS below  Source if other than patient:  [ ]Family   [ ]Team     Pain: [ ] yes [X] no  QOL impact -   Location -                    Aggravating factors -  Quality -  Radiation -  Timing-  Severity (0-10 scale):  Minimal acceptable level (0-10 scale):     PCSSQ [Palliative Care Spiritual Screening Question]   Severity (0-10):  Score of 4 or > indicate consideration of Chaplaincy referral.  Chaplaincy Referral: [ ] yes [X] refused [ ] following    Caregiver [place]? : [ ] yes [X] no [ ] deferred:  Social work referral [ ] Patient & Family Centered Care Referral [ ]     Anticipatory Grief Present?: [X] yes [ ] no  [ ] deferred: Social work referral [ ]  Patient & Family Centered Care Referral [ ]     SYMPTOMS:   Dyspnea:                           [ ]Mild [ ]Moderate [ ]Severe  Anxiety:                             [ ]Mild [ ]Moderate [ ]Severe  Fatigue:                             [ ]Mild [X]Moderate [ ]Severe  Nausea/Vomiting:              [ ]Mild [ ]Moderate [ ]Severe  Loss of appetite:                [ ]Mild [ ]Moderate [ ]Severe  Constipation:                     [ ]Mild [ ]Moderate [ ]Severe  Itching         :                     [ ]Mild [ ]Moderate [X]Severe    Other Symptoms:  [X]All other review of systems negative       PHYSICAL EXAM:   Vital Signs Last 24 Hrs  T(C): 36.3 (07 Apr 2025 23:35), Max: 36.4 (07 Apr 2025 12:46)  T(F): 97.4 (07 Apr 2025 23:35), Max: 97.6 (07 Apr 2025 22:35)  HR: 101 (08 Apr 2025 05:30) (99 - 104)  BP: 90/54 (08 Apr 2025 05:30) (90/54 - 110/66)  BP(mean): --  RR: 18 (07 Apr 2025 23:35) (17 - 18)  SpO2: 95% (07 Apr 2025 23:35) (95% - 98%)    Parameters below as of 07 Apr 2025 23:35  Patient On (Oxygen Delivery Method): room air     I&O's Summary    07 Apr 2025 07:01  -  08 Apr 2025 07:00  --------------------------------------------------------  IN: 360 mL / OUT: 0 mL / NET: 360 mL      GENERAL: [ ]Cachexia    [X]Alert  [X]Oriented x4   [ ]Lethargic  [ ]Unarousable  [X]Verbal  [ ]Non-Verbal  Behavioral:   [ ] Anxiety  [ ] Delirium [ ] Agitation [X] Other - Calm  HEENT:  [ ]Normal   [X]Dry mouth   [ ]ET Tube/Trach  [ ]Oral lesions  PULMONARY:   [X]Clear [ ]Tachypnea  [ ]Audible excessive secretions   [ ]Rhonchi        [ ]Right [ ]Left [ ]Bilateral  [ ]Crackles        [ ]Right [ ]Left [ ]Bilateral  [ ]Wheezing     [ ]Right [ ]Left [ ]Bilateral  [ ]Diminished breath sounds [ ]right [ ]left [ ]bilateral  CARDIOVASCULAR:    [ ]Regular [ ]Irregular [X]Tachy  [ ]Maurisio [ ]Murmur [ ]Other  GASTROINTESTINAL:  [X]Soft  [ ]Distended   [X]+BS  [X]Non tender [ ]Tender  [ ]Other [ ]PEG [ ]OGT/ NGT  Last BM: Unknown  GENITOURINARY:  [X]Normal [ ] Incontinent   [ ]Oliguria/Anuria   [ ]Walls  MUSCULOSKELETAL:   [ ]Normal   [X]Weakness  [ ]Bed/Wheelchair bound [ ]Edema  NEUROLOGIC:   [X]No focal deficits  [ ]Cognitive impairment  [ ]Dysphagia [ ]Dysarthria [ ]Paresis [ ]Other   SKIN:   [ ]Normal  [ ]Rash  [X]Other - Ecchymosis, please see RN documentation which I have reviewed   [ ]Pressure ulcer(s)       Present on admission [ ]y [ ]n    CRITICAL CARE:  [ ] Shock Present  [ ]Septic [ ]Cardiogenic [ ]Neurologic [ ]Hypovolemic  [ ]  Vasopressors [ ]  Inotropes   [ ]Respiratory failure present [ ]Mechanical ventilation [ ]Non-invasive ventilatory support [ ]High flow    [ ]Acute  [ ]Chronic [ ]Hypoxic  [ ]Hypercarbic [ ]Other  [X]Other organ failure: Liver, Kidney     LABS:                        10.5   16.12 )-----------( 268      ( 08 Apr 2025 06:55 )             28.6   04-07    126[L]  |  88[L]  |  76[H]  ----------------------------<  93  3.9   |  13[L]  |  5.07[H]    Ca    8.1[L]      07 Apr 2025 05:03  Phos  4.2     04-07  Mg     1.4     04-07    TPro  4.9[L]  /  Alb  2.8[L]  /  TBili  30.7[H]  /  DBili  x   /  AST  54[H]  /  ALT  34  /  AlkPhos  179[H]  04-07  PT/INR - ( 07 Apr 2025 05:04 )   PT: 26.7 sec;   INR: 2.36 ratio         PTT - ( 07 Apr 2025 05:04 )  PTT:51.7 sec    Urinalysis Basic - ( 07 Apr 2025 05:03 )    Color: x / Appearance: x / SG: x / pH: x  Gluc: 93 mg/dL / Ketone: x  / Bili: x / Urobili: x   Blood: x / Protein: x / Nitrite: x   Leuk Esterase: x / RBC: x / WBC x   Sq Epi: x / Non Sq Epi: x / Bacteria: x      RADIOLOGY & ADDITIONAL STUDIES:  < from: [facility] Duplex Lower Ext Vein Scan, Bilat (04.07.25 @ 09:34) >  IMPRESSION:  No evidence of acute deep venous thrombosis in either lower extremity.      < end of copied text >  < from: CT Abdomen and Pelvis w/ Oral Cont and w/ IV Cont (04.04.25 @ 22:50) >  IMPRESSION:  New moderate intra and extrahepatic biliary duct dilatation with   indeterminate high attenuation in the distal CBD near the ampulla could   be astone, sludge ball or mass. Recommend follow-up with   gastroenterology for further management. Distended gallbladder without   wall thickening or pericholecystic inflammatory changes.    No acute bowel finding.    < end of copied text >    PROTEIN CALORIE MALNUTRITION: [ ] mild [ ] moderate [ ] severe  [ ] underweight [ ] morbid obesity    https://www.andeal.org/vault/2440/web/files/ONC/Table_Clinical%20Characteristics%20to%20Document%20Malnutrition-White%20JV%20et%20al%202012.pdf    [X]PPSV2 < or = to 30% [ ]significant weight loss  [ ]poor nutritional intake  [ ]anasarca[ ]Artificial Nutrition      Other REFERRALS:    [ ] Hospice  [ ]Child Life  [ ]Social Work  [ ]Case management [ ]Holistic Therapy [ ] Physical Therapy [ ] Dietary     Palliative Performance Scale:  http://npcrc.org/files/news/palliative_performance_scale_ppsv2.pdf  (Ctrl +  left click to view)  Respiratory Distress Observation Tool:  https://homecareinformation.net/handouts/hen/Respiratory_Distress_Observation_Scale.pdf (Ctrl +  left click to view)  PAINAD Score:  http://geriatrictoolkit.[place].[facility]/cog/painad.pdf (Ctrl +  left click to view)  
GAP TEAM PALLIATIVE CARE UNIT PROGRESS NOTE:      [  ] Patient on hospice program.    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 Found to be hyponatremic, hypokalemic, w/ transaminitis and direct hyperbilirubinemia of 29.3. CT w/ intra-and extrahepatic biliary dilatation as well as CBD dilatation. Admitted for workup of obstructive jaundice. Palliative care consulted for goals of care, complex medical decision making, and symptom management. Patient requests comfort approach to care and home hospice referral.     OVERNIGHT EVENTS: Patient seen and examined at the bedside. Chart reviewed. Within the last 24 hours (7a-7a) patient required Dilaudid 0.5mg IVP q1hr PRN x4 for severe pain, Ativan 0.25mg IVP q1hr x2 for agitation, Benadryl 25 mg IVP for itching and rash x1 and Benadryl 50mg IVP PRN for itching and rash x3.     DNR on chart: Yes  DNI      Allergies    No Known Allergies    Intolerances    MEDICATIONS  (STANDING):  albuterol/ipratropium for Nebulization 3 milliLiter(s) Nebulizer every 6 hours  calamine/zinc oxide Lotion 1 Application(s) Topical two times a day  finasteride 5 milliGRAM(s) Oral daily  fluticasone propionate/ salmeterol 250-50 MICROgram(s) Diskus 1 Dose(s) Inhalation two times a day  HYDROmorphone  Injectable 0.5 milliGRAM(s) IV Push every 6 hours  nystatin Cream 1 Application(s) Topical two times a day  tamsulosin 0.4 milliGRAM(s) Oral at bedtime    MEDICATIONS  (PRN):  acetaminophen     Tablet .. 650 milliGRAM(s) Oral every 6 hours PRN Temp greater or equal to 38C (100.4F), Mild Pain (1 - 3)  diphenhydrAMINE Injectable 50 milliGRAM(s) IV Push every 6 hours PRN Rash and/or Itching  HYDROmorphone  Injectable 1 milliGRAM(s) IV Push every 1 hour PRN Severe Pain (7 - 10)  HYDROmorphone  Injectable 0.5 milliGRAM(s) IV Push every 1 hour PRN Moderate Pain (4 - 6)  HYDROmorphone  Injectable 0.5 milliGRAM(s) IV Push every 1 hour PRN dyspnea  LORazepam   Injectable 0.5 milliGRAM(s) IV Push every 1 hour PRN Agitation  melatonin 3 milliGRAM(s) Oral at bedtime PRN Insomnia  ondansetron Injectable 4 milliGRAM(s) IV Push every 8 hours PRN Nausea and/or Vomiting  senna 2 Tablet(s) Oral at bedtime PRN Constipation    ITEMS UNCHECKED ARE NOT PRESENT    PRESENT SYMPTOMS: [ ]Unable to self-report see PAINAD, RDOS below  Source if other than patient:  [ x]Family   [ ]Team     Pain: [ ] yes [x ] no [x] Itching  QOL impact -   Location -                    Aggravating factors -  Quality -  Radiation -  Timing-  Severity (0-10 scale):  Minimal acceptable level (0-10 scale):     Dyspnea:                           [ ]Mild [ ]Moderate [ ]Severe  Anxiety:                             [ ]Mild [ ]Moderate [ ]Severe  Fatigue:                             [ ]Mild [ ]Moderate [ ]Severe  Nausea:                             [ ]Mild [ ]Moderate [ ]Severe  Loss of appetite:              [ ]Mild [ ]Moderate [ ]Severe  Constipation:                    [ ]Mild [ ]Moderate [ ]Severe  Itching:                              [] Mild [] Moderate [x] Severe     PCSSQ [Palliative Care Spiritual Screening Question]   Severity (0-10):  Score of 4 or > indicate consideration of Chaplaincy referral.  Chaplaincy Referral: [ ] yes [x ] refused [ ] following [ ] deferred    Caregiver [place]? : [ ] yes [x ] no [ ] deferred:  Social work referral [ ] Patient & Family Centered Care Referral [ ]   Anticipatory Grief present?:  [ x] yes [ ] no [ ] deferred:  Social work referral [ ] Patient & Family Centered Care Referral [ ]  	  Other Symptoms:  [x ]All other review of systems negative     PHYSICAL EXAM:   Vital Signs Last 24 Hrs  T(C): 36.7 (10 Apr 2025 08:25), Max: 36.7 (10 Apr 2025 08:25)  T(F): 98 (10 Apr 2025 08:25), Max: 98 (10 Apr 2025 08:25)  HR: 96 (10 Apr 2025 08:25) (96 - 96)  BP: 76/43 (10 Apr 2025 08:25) (76/43 - 76/43)  BP(mean): --  RR: 18 (10 Apr 2025 08:25) (18 - 18)  SpO2: 93% (10 Apr 2025 08:25) (93% - 93%)     I&O's Summary    GENERAL: [ ] Cachexia  [x ]Alert  [x ]Oriented x   [ ]Lethargic  [ ]Unarousable  [x ]Verbal  [ ]Non-Verbal  Behavioral:   [ ] Anxiety  [ ] Delirium [ x] Agitation/ Itchy [ ] Other  HEENT:  [x ]Normal   [ ]Dry mouth   [ ]ET Tube/Trach  [ ]Oral lesions  PULMONARY:   [x ]Clear [ ]Tachypnea  [ ]Audible excessive secretions   [ ]Rhonchi        [ ]Right [ ]Left [ ]Bilateral  [ ]Crackles        [ ]Right [ ]Left [ ]Bilateral  [ ]Wheezing     [ ]Right [ ]Left [ ]Bilateral  [ ]Diminished BS [ ]Right [ ]Left [ ]Bilateral    CARDIOVASCULAR:    [x ]Regular [ ]Irregular [ ]Tachy  [ ]Maurisio [ ]Murmur [ ]Other  GASTROINTESTINAL:  [ x]Soft  [ ]Distended   [x ]+BS  [x ]Non tender [ ]Tender  [x ]Other - Colostomy [ ]PEG [ ]OGT/ NGT   Last BM:  output 4/9   GENITOURINARY:  [x ]Normal [ ] Incontinent   [ ]Oliguria/Anuria   [ ]Walls  MUSCULOSKELETAL:   [ ]Normal   [x ]Weakness  [ ]Bed/Wheelchair bound [ ]Edema  NEUROLOGIC:   [x ]No focal deficits  [ ] Cognitive impairment  [ ] Dysphagia [ ]Dysarthria [ ] Paresis [ ]Other   SKIN:   [ ]Normal  [ ]Rash  [ x]Other Ecchymosis - please see nursing documentation which I have reviewed   [ ]Pressure ulcer(s)  [ ]y [ ]n  Present on admission      CRITICAL CARE:  [ ] Shock Present  [ ]Septic [ ]Cardiogenic [ ]Neurologic [ ]Hypovolemic  [ ]  Vasopressors [ ]  Inotropes   [ ] Respiratory failure present [ ] Mechanical Ventilation [ ] Non-invasive ventilatory support [ ] High-Flow  [ ] Acute  [ ] Chronic [ ] Hypoxic  [ ] Hypercarbic [ ] Other  [ ] Other organ failure     LABS: none new   RADIOLOGY & ADDITIONAL STUDIES: none new     PROTEIN CALORIE MALNUTRITION: [ ] mild [ ] moderate [ ] severe  [ ] underweight [ ] morbid obesity    https://www.andeal.org/vault/2440/web/files/ONC/Table_Clinical%20Characteristics%20to%20Document%20Malnutrition-White%20JV%20et%20al%202012.pdf    Height (cm): 180.3 (04-04-25 @ 19:50), 175.3 (01-23-25 @ 14:35), 180.3 (09-14-24 @ 23:23)  Weight (kg): 88.6 (04-05-25 @ 05:55), 72.6 (01-23-25 @ 14:35), 89.8 (09-14-24 @ 23:23)  BMI (kg/m2): 27.3 (04-05-25 @ 05:55), 22.3 (04-04-25 @ 19:50), 23.6 (01-23-25 @ 14:35)    [x ] PPSV2 < or = 30% [ ] significant weight loss [ ] poor nutritional intake [ ] anasarca   Artificial Nutrition [ ]     Other REFERRALS:    [ ] Hospice  [ ]Child Life  [ ]Social Work  [ ]Case management [ ]Holistic Therapy [ ] Physical Therapy [ ] Dietary     Progress Notes - Care Coordination [C. Provider] (04-09-25 @ 15:01)      Palliative Performance Scale:  http://npcrc.org/files/news/palliative_performance_scale_ppsv2.pdf  (Ctrl +  left click to view)  Respiratory Distress Observation Tool:  https://homecareinformation.net/handouts/hen/Respiratory_Distress_Observation_Scale.pdf (Ctrl +  left click to view)  PAINAD Score:  http://geriatrictoolkit.[place].[facility]/cog/painad.pdf (Ctrl +  left click to view)  
    SUBJECTIVE / OVERNIGHT EVENTS:pt seen and examined  04-05-25     MEDICATIONS  (STANDING):  albumin human 25% IVPB 100 milliLiter(s) IV Intermittent every 6 hours  albuterol/ipratropium for Nebulization 3 milliLiter(s) Nebulizer every 6 hours  ascorbic acid 500 milliGRAM(s) Oral daily  cholestyramine Powder (Sugar-Free) 4 Gram(s) Oral two times a day  finasteride 5 milliGRAM(s) Oral daily  fluticasone propionate/ salmeterol 250-50 MICROgram(s) Diskus 1 Dose(s) Inhalation two times a day  heparin  Infusion. 1300 Unit(s)/Hr (13 mL/Hr) IV Continuous <Continuous>  metoprolol tartrate 25 milliGRAM(s) Oral two times a day  multivitamin 1 Tablet(s) Oral daily  pantoprazole    Tablet 40 milliGRAM(s) Oral before breakfast  piperacillin/tazobactam IVPB.. 3.375 Gram(s) IV Intermittent every 8 hours  tamsulosin 0.4 milliGRAM(s) Oral at bedtime    MEDICATIONS  (PRN):  acetaminophen     Tablet .. 650 milliGRAM(s) Oral every 6 hours PRN Temp greater or equal to 38C (100.4F), Mild Pain (1 - 3)  heparin   Injectable 7000 Unit(s) IV Push every 6 hours PRN For aPTT less than 40  heparin   Injectable 3500 Unit(s) IV Push every 6 hours PRN For aPTT between 40 - 57  melatonin 3 milliGRAM(s) Oral at bedtime PRN Insomnia  ondansetron Injectable 4 milliGRAM(s) IV Push every 8 hours PRN Nausea and/or Vomiting    T(C): 37.1 (04-05-25 @ 20:44), Max: 37.1 (04-05-25 @ 20:44)  HR: 89 (04-05-25 @ 20:44) (73 - 97)  BP: 104/63 (04-05-25 @ 20:44) (82/53 - 128/76)  RR: 18 (04-05-25 @ 20:44) (18 - 20)  SpO2: 96% (04-05-25 @ 20:44) (96% - 100%)    CAPILLARY BLOOD GLUCOSE        I&O's Summary    05 Apr 2025 07:01  -  05 Apr 2025 22:10  --------------------------------------------------------  IN: 240 mL / OUT: 300 mL / NET: -60 mL        Constitutional: No fever, fatigue  Skin: No rash.  Eyes: No recent vision problems or eye pain.  ENT: No congestion, ear pain, or sore throat.  Cardiovascular: No chest pain or palpation.  Respiratory: No cough, shortness of breath, congestion, or wheezing.  Gastrointestinal: No abdominal pain, nausea, vomiting, or diarrhea.  Genitourinary: No dysuria.  Musculoskeletal: No joint swelling.  Neurologic: No headache.    PHYSICAL EXAM:  GENERAL: NAD  EYES: EOMI, PERRLA  NECK: Supple, No JVD  CHEST/LUNG: dec breath sounds at bases  HEART:  S1 , S2 +  ABDOMEN: soft , bs+, mild distension+, no guarding / no rebound tendernes  EXTREMITIES:  edema  NEUROLOGY:alert awake      LABS:                        12.9   15.36 )-----------( 390      ( 05 Apr 2025 14:45 )             35.3     04-05    130[L]  |  91[L]  |  52[H]  ----------------------------<  117[H]  3.4[L]   |  21[L]  |  2.08[H]    Ca    8.6      05 Apr 2025 16:56  Mg     1.2     04-05    TPro  5.2[L]  /  Alb  2.4[L]  /  TBili  27.1[H]  /  DBili  x   /  AST  76[H]  /  ALT  51[H]  /  AlkPhos  322[H]  04-05    PT/INR - ( 04 Apr 2025 20:37 )   PT: 17.4 sec;   INR: 1.53 ratio         PTT - ( 05 Apr 2025 14:45 )  PTT:132.5 sec      Urinalysis Basic - ( 05 Apr 2025 16:56 )    Color: x / Appearance: x / SG: x / pH: x  Gluc: 117 mg/dL / Ketone: x  / Bili: x / Urobili: x   Blood: x / Protein: x / Nitrite: x   Leuk Esterase: x / RBC: x / WBC x   Sq Epi: x / Non Sq Epi: x / Bacteria: x        RADIOLOGY & ADDITIONAL TESTS:    Imaging Personally Reviewed:    Consultant(s) Notes Reviewed:      Care Discussed with Consultants/Other Providers:  
GAP TEAM PALLIATIVE CARE UNIT PROGRESS NOTE:      [  ] Patient on hospice program.    INDICATION FOR PALLIATIVE CARE UNIT SERVICES/Interval HPI:  Palliative care consulted for goals of care, complex medical decision making, and symptom management. Patient requests comfort approach to care and home hospice referral.     OVERNIGHT EVENTS:  Patient seen today morning at bedside, resting comfortably. Patient accompanied by Leydi. Patient not overtly in distress.    Over the past 24hrs from 8AM to 8AM, patient required:   Benadryl 25mg x2 for Itching   Hydroxyzine 25mg x1 for Itching   Dilaudid 0.5mg x1 for Dyspnea   Dilaudid 0.5 mg x3 for Severe Pain   Ativan 0.25mg x2 Anxiety     Allergies    No Known Allergies    Intolerances    MEDICATIONS  (STANDING):  albuterol/ipratropium for Nebulization 3 milliLiter(s) Nebulizer every 6 hours  finasteride 5 milliGRAM(s) Oral daily  fluticasone propionate/ salmeterol 250-50 MICROgram(s) Diskus 1 Dose(s) Inhalation two times a day  hydrOXYzine hydrochloride 25 milliGRAM(s) Oral every 6 hours  nystatin Cream 1 Application(s) Topical two times a day  tamsulosin 0.4 milliGRAM(s) Oral at bedtime    MEDICATIONS  (PRN):  acetaminophen     Tablet .. 650 milliGRAM(s) Oral every 6 hours PRN Temp greater or equal to 38C (100.4F), Mild Pain (1 - 3)  diphenhydrAMINE Injectable 25 milliGRAM(s) IV Push every 6 hours PRN Rash and/or Itching  HYDROmorphone  Injectable 0.5 milliGRAM(s) IV Push every 1 hour PRN dyspnea  HYDROmorphone  Injectable 0.2 milliGRAM(s) IV Push every 1 hour PRN Moderate Pain (4 - 6)  HYDROmorphone  Injectable 0.5 milliGRAM(s) IV Push every 1 hour PRN Severe Pain (7 - 10)  LORazepam   Injectable 0.25 milliGRAM(s) IV Push every 1 hour PRN Anxiety  melatonin 3 milliGRAM(s) Oral at bedtime PRN Insomnia  ondansetron Injectable 4 milliGRAM(s) IV Push every 8 hours PRN Nausea and/or Vomiting  senna 2 Tablet(s) Oral at bedtime PRN Constipation    ITEMS UNCHECKED ARE NOT PRESENT    PRESENT SYMPTOMS: [ ]Unable to self-report see PAINELADIO RDOS below  Source if other than patient:  [ ]Family   [ ]Team     Pain: [ ] yes [X] no  QOL impact -   Location -                    Aggravating factors -  Quality -  Radiation -  Timing-  Severity (0-10 scale):  Minimal acceptable level (0-10 scale):     PCSSQ [Palliative Care Spiritual Screening Question]   Severity (0-10):  Score of 4 or > indicate consideration of Chaplaincy referral.  Chaplaincy Referral: [ ] yes [X] refused [ ] following    Caregiver [place]? : [ ] yes [X] no [ ] deferred:  Social work referral [ ] Patient & Family Centered Care Referral [ ]     Anticipatory Grief Present?: [X] yes [ ] no  [ ] deferred: Social work referral [ ]  Patient & Family Centered Care Referral [ ]     SYMPTOMS:   Dyspnea:                           [ ]Mild [ ]Moderate [ ]Severe  Anxiety:                             [ ]Mild [ ]Moderate [ ]Severe  Fatigue:                             [ ]Mild [X]Moderate [ ]Severe  Nausea/Vomiting:              [ ]Mild [ ]Moderate [ ]Severe  Loss of appetite:                [ ]Mild [ ]Moderate [ ]Severe  Constipation:                     [ ]Mild [ ]Moderate [ ]Severe  Itching         :                     [ ]Mild [ ]Moderate [X]Severe    Other Symptoms:  [X]All other review of systems negative       PHYSICAL EXAM:   Vital Signs Last 24 Hrs  T(C): 36.5 (09 Apr 2025 05:50), Max: 36.5 (09 Apr 2025 05:50)  T(F): 97.7 (09 Apr 2025 05:50), Max: 97.7 (09 Apr 2025 05:50)  HR: 120 (09 Apr 2025 05:50) (100 - 120)  BP: 100/64 (09 Apr 2025 05:50) (95/59 - 100/64)  BP(mean): --  RR: --  SpO2: --    I&O's Summary    GENERAL: [ ]Cachexia    [X]Alert  [X]Oriented x4   [ ]Lethargic  [ ]Unarousable  [X]Verbal  [ ]Non-Verbal  Behavioral:   [ ] Anxiety  [ ] Delirium [ ] Agitation [X] Other - Calm  HEENT:  [ ]Normal   [X]Dry mouth   [ ]ET Tube/Trach  [ ]Oral lesions  PULMONARY:   [X]Clear [ ]Tachypnea  [ ]Audible excessive secretions   [ ]Rhonchi        [ ]Right [ ]Left [ ]Bilateral  [ ]Crackles        [ ]Right [ ]Left [ ]Bilateral  [ ]Wheezing     [ ]Right [ ]Left [ ]Bilateral  [ ]Diminished breath sounds [ ]right [ ]left [ ]bilateral  CARDIOVASCULAR:    [ ]Regular [ ]Irregular [X]Tachy  [ ]Maurisio [ ]Murmur [ ]Other  GASTROINTESTINAL:  [X]Soft  [ ]Distended   [X]+BS  [X]Non tender [ ]Tender  [ ]Other [ ]PEG [ ]OGT/ NGT  Last BM: Unknown  GENITOURINARY:  [X]Normal [ ] Incontinent   [ ]Oliguria/Anuria   [ ]Walls  MUSCULOSKELETAL:   [ ]Normal   [X]Weakness  [ ]Bed/Wheelchair bound [ ]Edema  NEUROLOGIC:   [X]No focal deficits  [ ]Cognitive impairment  [ ]Dysphagia [ ]Dysarthria [ ]Paresis [ ]Other   SKIN:   [ ]Normal  [ ]Rash  [X]Other - Ecchymosis, please see RN documentation which I have reviewed   [ ]Pressure ulcer(s)       Present on admission [ ]y [ ]n    CRITICAL CARE:  [ ] Shock Present  [ ]Septic [ ]Cardiogenic [ ]Neurologic [ ]Hypovolemic  [ ]  Vasopressors [ ]  Inotropes   [ ]Respiratory failure present [ ]Mechanical ventilation [ ]Non-invasive ventilatory support [ ]High flow    [ ]Acute  [ ]Chronic [ ]Hypoxic  [ ]Hypercarbic [ ]Other  [X]Other organ failure: Liver, Kidney     LABS:                                   10.5   16.12 )-----------( 268      ( 08 Apr 2025 06:55 )             28.6               RADIOLOGY & ADDITIONAL STUDIES:  < from: VA Duplex Lower Ext Vein Scan, Bilat (04.07.25 @ 09:34) >  IMPRESSION:  No evidence of acute deep venous thrombosis in either lower extremity.      < end of copied text >  < from: CT Abdomen and Pelvis w/ Oral Cont and w/ IV Cont (04.04.25 @ 22:50) >  IMPRESSION:  New moderate intra and extrahepatic biliary duct dilatation with   indeterminate high attenuation in the distal CBD near the ampulla could   be astone, sludge ball or mass. Recommend follow-up with   gastroenterology for further management. Distended gallbladder without   wall thickening or pericholecystic inflammatory changes.    No acute bowel finding.    < end of copied text >    PROTEIN CALORIE MALNUTRITION: [ ] mild [ ] moderate [ ] severe  [ ] underweight [ ] morbid obesity    https://www.andeal.org/vault/2440/web/files/ONC/Table_Clinical%20Characteristics%20to%20Document%20Malnutrition-White%20JV%20et%20al%202012.pdf    [X]PPSV2 < or = to 30% [ ]significant weight loss  [ ]poor nutritional intake  [ ]anasarca[ ]Artificial Nutrition      Other REFERRALS:    [ ] Hospice  [ ]Child Life  [ ]Social Work  [ ]Case management [ ]Holistic Therapy [ ] Physical Therapy [ ] Dietary     Palliative Performance Scale:  http://npcrc.org/files/news/palliative_performance_scale_ppsv2.pdf  (Ctrl +  left click to view)  Respiratory Distress Observation Tool:  https://homecareinformation.net/handouts/hen/Respiratory_Distress_Observation_Scale.pdf (Ctrl +  left click to view)  PAINAD Score:  http://geriatrictoolkit.[email or web address].[facility]/cog/painad.pdf (Ctrl +  left click to view)  
    SUBJECTIVE / OVERNIGHT EVENTS:pt seen and examined  04-07-25     MEDICATIONS  (STANDING):  albumin human 25% IVPB 50 milliLiter(s) IV Intermittent every 6 hours  albuterol/ipratropium for Nebulization 3 milliLiter(s) Nebulizer every 6 hours  ascorbic acid 500 milliGRAM(s) Oral daily  cholestyramine Powder (Sugar-Free) 4 Gram(s) Oral two times a day  finasteride 5 milliGRAM(s) Oral daily  fluticasone propionate/ salmeterol 250-50 MICROgram(s) Diskus 1 Dose(s) Inhalation two times a day  metoprolol tartrate 25 milliGRAM(s) Oral two times a day  multivitamin 1 Tablet(s) Oral daily  nystatin Cream 1 Application(s) Topical two times a day  pantoprazole    Tablet 40 milliGRAM(s) Oral before breakfast  piperacillin/tazobactam IVPB.- 3.375 Gram(s) IV Intermittent once  piperacillin/tazobactam IVPB.. 3.375 Gram(s) IV Intermittent every 12 hours  sodium bicarbonate 650 milliGRAM(s) Oral three times a day  tamsulosin 0.4 milliGRAM(s) Oral at bedtime    MEDICATIONS  (PRN):  acetaminophen     Tablet .. 650 milliGRAM(s) Oral every 6 hours PRN Temp greater or equal to 38C (100.4F), Mild Pain (1 - 3)  HYDROmorphone  Injectable 0.2 milliGRAM(s) IV Push every 4 hours PRN Moderate Pain (4 - 6)  HYDROmorphone  Injectable 0.5 milliGRAM(s) IV Push every 4 hours PRN Severe Pain (7 - 10)  HYDROmorphone  Injectable 0.5 milliGRAM(s) IV Push every 4 hours PRN dyspnea  hydrOXYzine hydrochloride 25 milliGRAM(s) Oral every 6 hours PRN Itching  LORazepam   Injectable 0.25 milliGRAM(s) IV Push every 4 hours PRN Anxiety  melatonin 3 milliGRAM(s) Oral at bedtime PRN Insomnia  ondansetron Injectable 4 milliGRAM(s) IV Push every 8 hours PRN Nausea and/or Vomiting    Vital Signs Last 24 Hrs  T(C): 36.3 (04-07-25 @ 23:35), Max: 36.4 (04-07-25 @ 05:07)  T(F): 97.4 (04-07-25 @ 23:35), Max: 97.6 (04-07-25 @ 22:35)  HR: 104 (04-07-25 @ 23:35) (93 - 104)  BP: 110/66 (04-07-25 @ 23:35) (93/58 - 110/66)  BP(mean): --  RR: 18 (04-07-25 @ 23:35) (17 - 19)  SpO2: 95% (04-07-25 @ 23:35) (95% - 98%)          Constitutional: No fever, fatigue  Skin: No rash.  Eyes: No recent vision problems or eye pain.  ENT: No congestion, ear pain, or sore throat.  Cardiovascular: No chest pain or palpation.  Respiratory: No cough, shortness of breath, congestion, or wheezing.  Gastrointestinal: No abdominal pain, nausea, vomiting, or diarrhea.  Genitourinary: No dysuria.  Musculoskeletal: No joint swelling.  Neurologic: No headache.    PHYSICAL EXAM:  GENERAL: NAD  EYES: EOMI, PERRLA  NECK: Supple, No JVD  CHEST/LUNG: dec breath sounds at bases  HEART:  S1 , S2 +  ABDOMEN: soft , bs+, mild distension+, no guarding / no rebound tenderness  EXTREMITIES:  edema  NEUROLOGY:alert awake    LABS:  04-07    126[L]  |  88[L]  |  76[H]  ----------------------------<  93  3.9   |  13[L]  |  5.07[H]    Ca    8.1[L]      07 Apr 2025 05:03  Phos  4.2     04-07  Mg     1.4     04-07    TPro  4.9[L]  /  Alb  2.8[L]  /  TBili  30.7[H]  /  DBili      /  AST  54[H]  /  ALT  34  /  AlkPhos  179[H]  04-07    Creatinine Trend: 5.07 <--, 4.23 <--, 3.18 <--, 2.08 <--, 1.78 <--, 1.40 <--                        10.6   23.88 )-----------( 284      ( 07 Apr 2025 05:03 )             29.1     Urine Studies:  Urinalysis Basic - ( 07 Apr 2025 05:03 )    Color:  / Appearance:  / SG:  / pH:   Gluc: 93 mg/dL / Ketone:   / Bili:  / Urobili:    Blood:  / Protein:  / Nitrite:    Leuk Esterase:  / RBC:  / WBC    Sq Epi:  / Non Sq Epi:  / Bacteria:       Osmolality, Random Urine: 319 mos/kg (04-05 @ 12:46)  Sodium, Random Urine: 17 mmol/L (04-05 @ 12:45)  Chloride, Random Urine: 20 mmol/L (04-05 @ 12:45)  Creatinine, Random Urine: 75 mg/dL (04-05 @ 12:45)  Potassium, Random Urine: 22 mmol/L (04-05 @ 12:45)  Creatinine, Random Urine: 75 mg/dL (04-05 @ 12:45)  Protein/Creatinine Ratio Calculation: 0.4 Ratio (04-05 @ 12:45)          LIVER FUNCTIONS - ( 07 Apr 2025 05:03 )  Alb: 2.8 g/dL / Pro: 4.9 g/dL / ALK PHOS: 179 U/L / ALT: 34 U/L / AST: 54 U/L / GGT: x           PT/INR - ( 07 Apr 2025 05:04 )   PT: 26.7 sec;   INR: 2.36 ratio         PTT - ( 07 Apr 2025 05:04 )  PTT:51.7 sec      RADIOLOGY & ADDITIONAL TESTS:    Imaging Personally Reviewed:    Consultant(s) Notes Reviewed:      Care Discussed with Consultants/Other Providers:  
GAP TEAM PALLIATIVE CARE UNIT PROGRESS NOTE:      [  ] Patient on hospice program.    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 Found to be hyponatremic, hypokalemic, w/ transaminitis and direct hyperbilirubinemia of 29.3. CT w/ intra-and extrahepatic biliary dilatation as well as CBD dilatation. Admitted for workup of obstructive jaundice. Palliative care consulted for goals of care, complex medical decision making, and symptom management. Patient requests comfort approach to care and home hospice referral.     OVERNIGHT EVENTS: Patient seen and examined at the bedside. Chart reviewed. Within the last 24 hours (8a-8a) patient required :  Benadryl 50mg x3 for Itching   Robinul 0.4 x2 for Oral Secretions   Dilaudid 0.5mg x1 for Severe Pain   Dilaudid 1mg x1 for Severe Pain   Ativan 0.5mg x2 for Anxiety/Agitation     DNR on chart: Yes  DNI      Allergies    No Known Allergies    Intolerances    MEDICATIONS  (STANDING):  calamine/zinc oxide Lotion 1 Application(s) Topical two times a day  glycopyrrolate Injectable 0.4 milliGRAM(s) IV Push every 6 hours  HYDROmorphone  Injectable 0.5 milliGRAM(s) IV Push every 6 hours  nystatin Cream 1 Application(s) Topical two times a day    MEDICATIONS  (PRN):  diphenhydrAMINE Injectable 50 milliGRAM(s) IV Push every 6 hours PRN Rash and/or Itching  HYDROmorphone  Injectable 1 milliGRAM(s) IV Push every 1 hour PRN Severe Pain (7 - 10)  HYDROmorphone  Injectable 0.5 milliGRAM(s) IV Push every 1 hour PRN Moderate Pain (4 - 6)  HYDROmorphone  Injectable 1 milliGRAM(s) IV Push every 1 hour PRN dyspnea  LORazepam   Injectable 0.5 milliGRAM(s) IV Push every 1 hour PRN Agitation  ondansetron Injectable 4 milliGRAM(s) IV Push every 8 hours PRN Nausea and/or Vomiting    ITEMS UNCHECKED ARE NOT PRESENT    PRESENT SYMPTOMS: [ ]Unable to self-report see ASHLI KAY below  Source if other than patient:  [ x]Family   [ ]Team     Pain: [ ] yes [x ] no [x] Itching  QOL impact -   Location -                    Aggravating factors -  Quality -  Radiation -  Timing-  Severity (0-10 scale):  Minimal acceptable level (0-10 scale):     Dyspnea:                           [ ]Mild [ ]Moderate [ ]Severe  Anxiety:                             [ ]Mild [ ]Moderate [ ]Severe  Fatigue:                             [ ]Mild [ ]Moderate [ ]Severe  Nausea:                             [ ]Mild [ ]Moderate [ ]Severe  Loss of appetite:              [ ]Mild [ ]Moderate [ ]Severe  Constipation:                    [ ]Mild [ ]Moderate [ ]Severe  Itching:                              [] Mild [] Moderate [x] Severe     PCSSQ [Palliative Care Spiritual Screening Question]   Severity (0-10):  Score of 4 or > indicate consideration of Chaplaincy referral.  Chaplaincy Referral: [ ] yes [x ] refused [ ] following [ ] deferred    Caregiver [place]? : [ ] yes [x ] no [ ] deferred:  Social work referral [ ] Patient & Family Centered Care Referral [ ]   Anticipatory Grief present?:  [ x] yes [ ] no [ ] deferred:  Social work referral [ ] Patient & Family Centered Care Referral [ ]  	  Other Symptoms:  [x ]All other review of systems negative     PHYSICAL EXAM:   Vital Signs Last 24 Hrs  T(C): 38.9 (11 Apr 2025 08:56), Max: 38.9 (11 Apr 2025 08:56)  T(F): 102 (11 Apr 2025 08:56), Max: 102 (11 Apr 2025 08:56)  HR: 106 (11 Apr 2025 08:56) (106 - 106)  BP: 96/60 (11 Apr 2025 08:56) (96/60 - 96/60)  BP(mean): --  RR: 18 (11 Apr 2025 08:56) (18 - 18)  SpO2: 91% (11 Apr 2025 08:56) (91% - 91%)    Parameters below as of 11 Apr 2025 08:56  Patient On (Oxygen Delivery Method): room air    I&O's Summary      GENERAL: [ ] Cachexia  [x ]Alert  [x ]Oriented x   [ ]Lethargic  [ ]Unarousable  [x ]Verbal  [ ]Non-Verbal  Behavioral:   [ ] Anxiety  [ ] Delirium [ x] Agitation/ Itchy [ ] Other  HEENT:  [x ]Normal   [ ]Dry mouth   [ ]ET Tube/Trach  [ ]Oral lesions  PULMONARY:   [x ]Clear [ ]Tachypnea  [ ]Audible excessive secretions   [ ]Rhonchi        [ ]Right [ ]Left [ ]Bilateral  [ ]Crackles        [ ]Right [ ]Left [ ]Bilateral  [ ]Wheezing     [ ]Right [ ]Left [ ]Bilateral  [ ]Diminished BS [ ]Right [ ]Left [ ]Bilateral    CARDIOVASCULAR:    [ ]Regular [ ]Irregular [x ]Tachy  [ ]Maurisio [ ]Murmur [ ]Other  GASTROINTESTINAL:  [ x]Soft  [ ]Distended   [x ]+BS  [x ]Non tender [ ]Tender  [x ]Other - Colostomy [ ]PEG [ ]OGT/ NGT   Last BM:  output 4/9   GENITOURINARY:  [x ]Normal [ ] Incontinent   [ ]Oliguria/Anuria   [ ]Walls  MUSCULOSKELETAL:   [ ]Normal   [x ]Weakness  [ ]Bed/Wheelchair bound [ ]Edema  NEUROLOGIC:   [x ]No focal deficits  [ ] Cognitive impairment  [ ] Dysphagia [ ]Dysarthria [ ] Paresis [ ]Other   SKIN:   [ ]Normal  [ ]Rash  [ x]Other Ecchymosis - please see nursing documentation which I have reviewed   [ ]Pressure ulcer(s)  [ ]y [ ]n  Present on admission      CRITICAL CARE:  [ ] Shock Present  [ ]Septic [ ]Cardiogenic [ ]Neurologic [ ]Hypovolemic  [ ]  Vasopressors [ ]  Inotropes   [ ] Respiratory failure present [ ] Mechanical Ventilation [ ] Non-invasive ventilatory support [ ] High-Flow  [ ] Acute  [ ] Chronic [ ] Hypoxic  [ ] Hypercarbic [ ] Other  [ ] Other organ failure     LABS: none new   RADIOLOGY & ADDITIONAL STUDIES: none new     PROTEIN CALORIE MALNUTRITION: [ ] mild [ ] moderate [ ] severe  [ ] underweight [ ] morbid obesity    https://www.andeal.org/vault/2440/web/files/ONC/Table_Clinical%20Characteristics%20to%20Document%20Malnutrition-White%20JV%20et%20al%202012.pdf    Height (cm): 180.3 (04-04-25 @ 19:50), 175.3 (01-23-25 @ 14:35), 180.3 (09-14-24 @ 23:23)  Weight (kg): 88.6 (04-05-25 @ 05:55), 72.6 (01-23-25 @ 14:35), 89.8 (09-14-24 @ 23:23)  BMI (kg/m2): 27.3 (04-05-25 @ 05:55), 22.3 (04-04-25 @ 19:50), 23.6 (01-23-25 @ 14:35)    [x ] PPSV2 < or = 30% [ ] significant weight loss [ ] poor nutritional intake [ ] anasarca   Artificial Nutrition [ ]     Other REFERRALS:    [ ] Hospice  [ ]Child Life  [ ]Social Work  [ ]Case management [ ]Holistic Therapy [ ] Physical Therapy [ ] Dietary     Progress Notes - Care Coordination [C. Provider] (04-09-25 @ 15:01)      Palliative Performance Scale:  http://npcrc.org/files/news/palliative_performance_scale_ppsv2.pdf  (Ctrl +  left click to view)  Respiratory Distress Observation Tool:  https://homecareinformation.net/handouts/hen/Respiratory_Distress_Observation_Scale.pdf (Ctrl +  left click to view)  PAINAD Score:  http://geriatrictoolkit.[place].[facility]/cog/painad.pdf (Ctrl +  left click to view)

## 2025-04-11 NOTE — PROGRESS NOTE ADULT - PROBLEM SELECTOR PLAN 1
- Total bilirubin 29.3, direct bilirubin >10, indirect bili <19.3 (exact value unclear)  - CT A/P - new moderate intrahepatic and extrahepatic biliary ductal dilatation, CBD measures 2.3 cm, and GB distension w/o wall thickening, pericholecystic fluid, or radiopaque gallstone  - Differential includes choledocholithiasis vs obstruction 2/2 soft-tissue mass, strictures  - Given WBC of 15.87 and transient episode of hypotension in the ED, will continue Zosyn for now  - Keep NPO, will order for MRCP  - gi eval
as per ID, Overall patient with leukocytosis in the setting of Jaundice and billary dilation   c/w Zosyn for now, dose decreased based on renal function  Continue to trend WBC  trend LFT's   GI on board. plan for ERCP but pt declined  pt asking for comfort care only
- GI consult and recommendations appreciated   - ERCP declined by patient on 4/7 during GOC conversation   - will continue with symptom directed care
as per ID, Overall patient with leukocytosis in the setting of Jaundice and billary dilation   c/w Zosyn for now, dose decreased based on renal function  Continue to trend WBC  trend LFT's   GI on board. plan for ERCP tomorrow
- Total bilirubin 29.3, direct bilirubin >10, indirect bili <19.3 (exact value unclear)  - CT A/P - new moderate intrahepatic and extrahepatic biliary ductal dilatation, CBD measures 2.3 cm, and GB distension w/o wall thickening, pericholecystic fluid, or radiopaque gallstone  - Currently receiving IV Zosyn for infection/leukocytosis, plan to discontinue when transferred to PCU  - GI recs appreciated: Planned for ERCP   - See GOC note 4/7, patient now comfort care declines ERCP.
- Total bilirubin 29.3, direct bilirubin >10, indirect bili <19.3 (exact value unclear)  - CT A/P - new moderate intrahepatic and extrahepatic biliary ductal dilatation, CBD measures 2.3 cm, and GB distension w/o wall thickening, pericholecystic fluid, or radiopaque gallstone  - Currently receiving IV Zosyn for infection/leukocytosis, plan to discontinue when transferred to PCU  - GI recs appreciated: Planned for ERCP   - See GOC note 4/7, patient now comfort care declines ERCP.
- GI consult and recommendations appreciated   - ERCP declined by patient on 4/7 during GOC conversation   - will continue with symptom directed care

## 2025-04-11 NOTE — PROGRESS NOTE ADULT - NS ATTEST RISK PROBLEM GEN_ALL_CORE FT
1. Number and complexity of problems addressed for this patient:    1.1 Moderate (At least 1)  [ ] 1 or more chronic illnesses with exacerbation, progression, or side effects of treatment  [ ] 2 or more stable chronic illnesses  [ ] 1 undiagnosed new problem with uncertain prognosis  [ ] 1 acute illness with systemic symptoms  [ ] 1 acute complicated injury  1.2 High (At least 1)   [ ] 1 or more chronic illnesses with severe exacerbation, progression, or side effects of treatment  [ x] 1 acute or chronic illnesses or injuries that may pose a threat to life or bodily function    3. Risk of Complications and/or Morbidity or Mortality of for this Patient’s Management:  3.1 Moderate risk of morbidity from additional diagnostic testing or treatment (At least 1):   [ ] Prescription drug management   [ ] Decision regarding minor surgery, treatment, or procedure with identified patient or procedure risk factors  [ ] Decision regarding elective major surgery, treatment, or procedure without identified patient or procedure risk factors   [ ] Diagnosis or treatment significantly limited by social determinants of health   [ ] Other:   3.2 High risk of morbidity from additional diagnostic testing or treatment (At least 1):   [ ] Drug therapy requiring intensive monitoring for toxicity   [ ] Decision regarding elective major surgery, treatment, or procedure with identified patient or procedure risk factors   [ ] Decision regarding emergency major surgery, treatment, or procedure   [ ] Decision regarding hospitalization or escalation of hospital-level of care  [ ] Decision not to resuscitate, not to intubate, or to de-escalate care because of poor prognosis   [ ] Decision to proceed or not with artificial nutrition   [ x] Parenteral controlled substance  [ ] Other:
safety/toxicity monitoring of intravenous opiates and/or benzodiazepines in end stage disease process.

## 2025-04-11 NOTE — PROGRESS NOTE ADULT - PROBLEM SELECTOR PROBLEM 5
Anxiety
Acute kidney injury superimposed on CKD
Chronic heart failure with preserved ejection fraction (HFpEF, >= 50%)
Anxiety
Anxiety
Acute kidney injury superimposed on CKD
Anxiety

## 2025-04-11 NOTE — PROGRESS NOTE ADULT - PROBLEM SELECTOR PLAN 7
- Takes albuterol q6hrs ATC and symbicort 160-4.5mcg BID at home  - Advair BID + Duonebs q6hrs ATC while admitted
- Continue with flomax and finasteride   - Continue to monitor for urinary symptoms
- ECG personally reviewed, atrial fibrillation not in RVR  - C/w metoprolol tartrate 25mg BID  - In case of possible procedure over weekend or on Monday 4/7, will hold Eliquis 2.5mg BID and start heparin gtt
- ECG personally reviewed, atrial fibrillation not in RVR  - C/w metoprolol tartrate 25mg BID  - In case of possible procedure over weekend or on Monday 4/7, will hold Eliquis 2.5mg BID and start heparin gtt
- Continue with flomax and finasteride   - Continue to monitor for urinary symptoms
- Discontinue Metoprolol Tartrate 25mg BID as patient unable to take medication PO, continue symptom monitoring   - Patient's Eliquis 2.5mg BID held on admission due to anticipated procedure   - Patient with new onset BESSY, potentially candidate for RRT. Patient deemed comfort measures, and patient report while itching skin is easily bleeding. In light of BESSY, and bleeding risk will hold all anticoagulation including home Eliquis 2.5mg BID.
- Continue Metoprolol Tartrate 25mg BID   - Patient's Eliquis 2.5mg BID held on admission due to anticipated procedure   - Patient with new onset BESSY, potentially candidate for RRT. Patient deemed comfort measures, and patient report while itching skin is easily bleeding. In light of BESSY, and bleeding risk will hold all anticoagulation including home Eliquis 2.5mg BID.

## 2025-04-11 NOTE — DISCHARGE NOTE FOR THE EXPIRED PATIENT - HOSPITAL COURSE
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 Found to be hyponatremic, hypokalemic, w/ transaminitis and direct hyperbilirubinemia of 29.3. CT w/ intra-and extrahepatic biliary dilatation as well as CBD dilatation. Admitted for workup of obstructive jaundice. Palliative care consulted for goals of care, complex medical decision making, and symptom management. Patient requests comfort approach to care and home hospice referral. Patient transferred to Palliative Care Unit for comfort care and symptom management.     Patient pronounced  by Primary RN Nola on 25 at 12:37PM. RN to Notify next of kin. 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 Found to be hyponatremic, hypokalemic, w/ transaminitis and direct hyperbilirubinemia of 29.3. CT w/ intra-and extrahepatic biliary dilatation as well as CBD dilatation. Admitted for workup of obstructive jaundice. Patient with Sepsis on admission with acute organ dysfunction, BESSY. Blood cultures positive for gram negative rods-pseudomnas and klebsiella. Treatment of bacteremia not pursued per patient's wishes for transition to comfort care. Patient transferred to Palliative Care Unit for comfort care and symptom management.     Patient pronounced  by Primary RN Nola on 25 at 12:37PM. RN to Notify next of kin.

## 2025-04-11 NOTE — PROGRESS NOTE ADULT - REASON FOR ADMISSION
Obstructive jaundice

## 2025-04-11 NOTE — PROGRESS NOTE ADULT - PROBLEM SELECTOR PLAN 3
- Na 131  - Check urine studies  - Patient seems to be more on the fluid overloaded side given bilateral LE edema,
- K 2.6 on arrival, s/p 3 runs IV KCl  - No TWI, U-waves, or ST-depressions seen on ECG  - Will give additional PO KCl 40mEq  f/u
- Continue to monitor for symptoms of pain   - Within the last 24 hours (7a-7a) patient required Dilaudid 0.5mg IVP q1hr PRN for severe pain   - Medications adjusted 4/10  - Dilaudid 0.5mg IVP q6hr ATC  - Dilaudid 1mg IVP q1hr for Severe Pain  - Dilaudid 0.5mg IVP q1hr for Moderate Pain
- K 2.6 on arrival, s/p 3 runs IV KCl  - No TWI, U-waves, or ST-depressions seen on ECG  - Will give additional PO KCl 40mEq  f/u
- Symptoms well controlled on current regimen   Over the past 24hrs from 8AM to 8AM, patient required:   Dilaudid 0.5 mg x3 for Severe Pain   - Patient currently denies any pain  - Continue Dilaudid 0.2mg q4hr PRN for Moderate Pain   - Continue Dilaudid 0.5mg q4hr PRN for Severe Pain.
- Symptoms well controlled on current regimen   Over the past 24hrs from 8AM to 8AM, patient required:   Dilaudid 0.5mg x2 for Severe Pain  - Patient currently denies any pain  - Continue Dilaudid 0.2mg q4hr PRN for Moderate Pain   - Continue Dilaudid 0.5mg q4hr PRN for Severe Pain.
- Continue to monitor for symptoms of pain   Within the last 24 hours (8a-8a) patient required :  Dilaudid 0.5mg x1 for Severe Pain   Dilaudid 1mg x1 for Severe Pain   - Medications adjusted 4/10  - Dilaudid 0.5mg IVP q6hr ATC  - Dilaudid 1mg IVP q1hr for Severe Pain  - Dilaudid 0.5mg IVP q1hr for Moderate Pain

## 2025-04-11 NOTE — PROGRESS NOTE ADULT - PROBLEM SELECTOR PLAN 8
- Likely in setting of asbestosis, bilateral pleural plaques and chronic lung base atelectasis seen on CT  - Has chronic productive cough at baseline
- Takes albuterol q6hrs ATC and symbicort 160-4.5mcg BID at home  - Advair BID + Duonebs q6hrs ATC while admitted
- Continue to provide comfort directed care   - Granddaughter at the bedside updates provided  - Disposition pending clinical course
- Continue to provide comfort directed care   - Granddaughter at the bedside updates provided  - Disposition pending clinical course
- Currently asymptomatic, no overt signs/symptoms of fluid overload  - Last TTE 9/16/24 - LVEF 73%, indeterminate diastolic function  - Takes bumetanide 2mg AM and 1mg PM, held in setting of hypokalemia on admission  - Not on other GDMT.  - Patient transitioned to comfort care, can consider spot doses of Bumetanide if patient is symptomatic
- Takes albuterol q6hrs ATC and symbicort 160-4.5mcg BID at home  - Advair BID + Duonebs q6hrs ATC while admitted
- Currently asymptomatic, no overt signs/symptoms of fluid overload  - Last TTE 9/16/24 - LVEF 73%, indeterminate diastolic function  - Takes bumetanide 2mg AM and 1mg PM, held in setting of hypokalemia on admission  - Not on other GDMT.  - Patient transitioned to comfort care, can consider spot doses of Bumetanide if patient is symptomatic

## 2025-04-11 NOTE — PROGRESS NOTE ADULT - PROBLEM SELECTOR PLAN 6
- Asymptomatic   - Takes albuterol q6hrs ATC and symbicort 160-4.5mcg BID at home  - Continue Advair BID + Duonebs q6hrs ATC while admitted.
- Continue inhalers as needed q6hr ATC while admitted   - Continue to monitor
- Asymptomatic   - Takes albuterol q6hrs ATC and symbicort 160-4.5mcg BID at home  - Continue Advair BID + Duonebs q6hrs ATC while admitted.
- Last TTE 9/16/24 - LVEF 73%, indeterminate diastolic function  - Takes bumetanide 2mg AM and 1mg PM, hold for now in setting of hypokalemia  -cards f/u
- ECG personally reviewed, atrial fibrillation not in RVR  - C/w metoprolol tartrate 25mg BID  - In case of possible procedure over weekend or on Monday 4/7, will hold Eliquis 2.5mg BID and start heparin gtt
- Last TTE 9/16/24 - LVEF 73%, indeterminate diastolic function  - Takes bumetanide 2mg AM and 1mg PM, hold for now in setting of hypokalemia  -cards f/u
- Continue inhalers as needed q6hr ATC while admitted   - Continue to monitor

## (undated) DEVICE — DRSG STERISTRIPS 0.5 X 4"

## (undated) DEVICE — SUT POLYSORB 2-0 18" TIES UNDYED

## (undated) DEVICE — STAPLER COVIDIEN ENDO GIA SHORT HANDLE

## (undated) DEVICE — ELCTR CORD FOOTSWITCH 1PLR LAPSCP 10FT

## (undated) DEVICE — CATH IV SAFE BC 22G X 1" (BLUE)

## (undated) DEVICE — DRSG TEGADERM 6"X8"

## (undated) DEVICE — SOL IRR POUR NS 0.9% 500ML

## (undated) DEVICE — SUT SOFSILK 3-0 18" V-20 (POP-OFF)

## (undated) DEVICE — STAPLER COVIDIEN ENDO GIA STANDARD HANDLE

## (undated) DEVICE — DRAPE GENERAL ENDOSCOPY

## (undated) DEVICE — SUT POLYSORB 3-0 30" V-20 UNDYED

## (undated) DEVICE — SUT POLYSORB 1 60" TIES

## (undated) DEVICE — BITE BLOCK ADULT 20 X 27MM (GREEN)

## (undated) DEVICE — PACK COLON BUNDLE

## (undated) DEVICE — Device

## (undated) DEVICE — TUBING STRYKEFLOW II SUCTION / IRRIGATOR

## (undated) DEVICE — SUT POLYSORB 3-0 18" TIES UNDYED

## (undated) DEVICE — SUT POLYSORB 0 18" MP UNDYED

## (undated) DEVICE — TROCAR ETHICON ENDOPATH XCEL BLADELESS 5MM X 100MM STABILITY

## (undated) DEVICE — POSITIONER FOAM EGG CRATE ULNAR 2PCS (PINK)

## (undated) DEVICE — TROCAR ETHICON ENDOPATH XCEL BLADELESS 12MM X 100MM STABILITY

## (undated) DEVICE — DRAPE TOWEL BLUE 17" X 24"

## (undated) DEVICE — SUT MAXON 1 36" GS-24

## (undated) DEVICE — DRAIN PENROSE .25" X 18" LATEX

## (undated) DEVICE — BLADE SCALPEL SAFETYLOCK #15

## (undated) DEVICE — TROCAR COVIDIEN VERSASTEP PLUS 12MM STANDARD

## (undated) DEVICE — PACK IV START WITH CHG

## (undated) DEVICE — TUBING IV SET GRAVITY 3Y 100" MACRO

## (undated) DEVICE — SUCTION YANKAUER NO CONTROL VENT

## (undated) DEVICE — TROCAR APPLIED MEDICAL KII BALLOON BLUNT TIP 12MM X 100MM

## (undated) DEVICE — SYR ALLIANCE II INFLATION 60ML

## (undated) DEVICE — GLV 7 PROTEXIS (WHITE)

## (undated) DEVICE — VENODYNE/SCD SLEEVE CALF LARGE

## (undated) DEVICE — WARMING BLANKET UPPER ADULT

## (undated) DEVICE — SOL IRR POUR H2O 250ML

## (undated) DEVICE — CATH IV SAFE BC 20G X 1.16" (PINK)

## (undated) DEVICE — BALLOON US ENDO

## (undated) DEVICE — D HELP - CLEARVIEW CLEARIFY SYSTEM

## (undated) DEVICE — BIOPSY FORCEP RADIAL JAW 4 STANDARD WITH NEEDLE

## (undated) DEVICE — INSUFFLATION NDL COVIDIEN STEP 14G FOR STEP/VERSASTEP

## (undated) DEVICE — TUBING INSUFFLATION LAP FILTER 10FT

## (undated) DEVICE — PREP CHLORAPREP HI-LITE ORANGE 26ML

## (undated) DEVICE — DRAPE 1/2 SHEET 40X57"

## (undated) DEVICE — TROCAR COVIDIEN VERSASTEP 5MM STANDARD

## (undated) DEVICE — DRSG OPSITE 13.75 X 4"

## (undated) DEVICE — TUBING SUCTION CONN 6FT STERILE

## (undated) DEVICE — GLV 8 PROTEXIS (WHITE)

## (undated) DEVICE — FOLEY TRAY 16FR 5CC LTX UMETER CLOSED

## (undated) DEVICE — LIGASURE IMPACT

## (undated) DEVICE — SHEARS COVIDIEN ENDO SHEAR 5MM X 31CM W UNIPOLAR CAUTERY

## (undated) DEVICE — SUT PDS II 0 18" ENDOLOOP LIGATURE

## (undated) DEVICE — TROCAR COVIDIEN STEP 5MM SHORT 70MM

## (undated) DEVICE — GLV 6.5 PROTEXIS (WHITE)

## (undated) DEVICE — TROCAR COVIDIEN VERSAPORT BLADELESS OPTICAL 5MM STANDARD

## (undated) DEVICE — FOLEY HOLDER STATLOCK 2 WAY ADULT

## (undated) DEVICE — TUBING SUCTION 20FT

## (undated) DEVICE — GLV 7.5 PROTEXIS (WHITE)

## (undated) DEVICE — GOWN TRIMAX LG

## (undated) DEVICE — SPECIMEN CONTAINER 100ML

## (undated) DEVICE — APPLICATOR VISTASEAL LAP DUAL 35CM RIGID

## (undated) DEVICE — STAPLER SKIN VISI-STAT 35 WIDE

## (undated) DEVICE — PACK MAJOR ABDOMINAL SUPINE

## (undated) DEVICE — DRAPE IOBAN 23" X 23"

## (undated) DEVICE — SOL INJ NS 0.9% 500ML 2 PORT

## (undated) DEVICE — DRSG TAPE UMBILICAL COTTON 2" X 30 X 1/8"

## (undated) DEVICE — GLV 8.5 PROTEXIS (WHITE)

## (undated) DEVICE — SENSOR O2 FINGER ADULT

## (undated) DEVICE — LIGASURE MARYLAND 37CM